# Patient Record
Sex: MALE | Race: WHITE | NOT HISPANIC OR LATINO
[De-identification: names, ages, dates, MRNs, and addresses within clinical notes are randomized per-mention and may not be internally consistent; named-entity substitution may affect disease eponyms.]

---

## 2021-02-20 VITALS
DIASTOLIC BLOOD PRESSURE: 80 MMHG | OXYGEN SATURATION: 95 % | HEART RATE: 128 BPM | RESPIRATION RATE: 18 BRPM | SYSTOLIC BLOOD PRESSURE: 181 MMHG | TEMPERATURE: 99 F

## 2021-02-20 LAB
ALBUMIN SERPL ELPH-MCNC: 2.7 G/DL — LOW (ref 3.3–5)
ALP SERPL-CCNC: 158 U/L — HIGH (ref 40–120)
ALT FLD-CCNC: 27 U/L — SIGNIFICANT CHANGE UP (ref 10–45)
ANION GAP SERPL CALC-SCNC: 17 MMOL/L — SIGNIFICANT CHANGE UP (ref 5–17)
ANISOCYTOSIS BLD QL: SLIGHT — SIGNIFICANT CHANGE UP
AST SERPL-CCNC: 22 U/L — SIGNIFICANT CHANGE UP (ref 10–40)
BASOPHILS # BLD AUTO: 0 K/UL — SIGNIFICANT CHANGE UP (ref 0–0.2)
BASOPHILS NFR BLD AUTO: 0 % — SIGNIFICANT CHANGE UP (ref 0–2)
BILIRUB SERPL-MCNC: 0.8 MG/DL — SIGNIFICANT CHANGE UP (ref 0.2–1.2)
BUN SERPL-MCNC: 108 MG/DL — HIGH (ref 7–23)
BURR CELLS BLD QL SMEAR: PRESENT — SIGNIFICANT CHANGE UP
CALCIUM SERPL-MCNC: 9.5 MG/DL — SIGNIFICANT CHANGE UP (ref 8.4–10.5)
CHLORIDE SERPL-SCNC: 101 MMOL/L — SIGNIFICANT CHANGE UP (ref 96–108)
CO2 SERPL-SCNC: 20 MMOL/L — LOW (ref 22–31)
CREAT SERPL-MCNC: 6.59 MG/DL — HIGH (ref 0.5–1.3)
EOSINOPHIL # BLD AUTO: 0 K/UL — SIGNIFICANT CHANGE UP (ref 0–0.5)
EOSINOPHIL NFR BLD AUTO: 0 % — SIGNIFICANT CHANGE UP (ref 0–6)
GLUCOSE SERPL-MCNC: 173 MG/DL — HIGH (ref 70–99)
HCT VFR BLD CALC: 35.1 % — LOW (ref 39–50)
HGB BLD-MCNC: 11.5 G/DL — LOW (ref 13–17)
LYMPHOCYTES # BLD AUTO: 0.48 K/UL — LOW (ref 1–3.3)
LYMPHOCYTES # BLD AUTO: 1.7 % — LOW (ref 13–44)
MACROCYTES BLD QL: SLIGHT — SIGNIFICANT CHANGE UP
MANUAL SMEAR VERIFICATION: SIGNIFICANT CHANGE UP
MCHC RBC-ENTMCNC: 28.3 PG — SIGNIFICANT CHANGE UP (ref 27–34)
MCHC RBC-ENTMCNC: 32.8 GM/DL — SIGNIFICANT CHANGE UP (ref 32–36)
MCV RBC AUTO: 86.2 FL — SIGNIFICANT CHANGE UP (ref 80–100)
MICROCYTES BLD QL: SLIGHT — SIGNIFICANT CHANGE UP
MONOCYTES # BLD AUTO: 0.74 K/UL — SIGNIFICANT CHANGE UP (ref 0–0.9)
MONOCYTES NFR BLD AUTO: 2.6 % — SIGNIFICANT CHANGE UP (ref 2–14)
NEUTROPHILS # BLD AUTO: 27.08 K/UL — HIGH (ref 1.8–7.4)
NEUTROPHILS NFR BLD AUTO: 94.8 % — HIGH (ref 43–77)
NEUTS BAND # BLD: 0.9 % — SIGNIFICANT CHANGE UP (ref 0–8)
NT-PROBNP SERPL-SCNC: HIGH PG/ML (ref 0–300)
OVALOCYTES BLD QL SMEAR: SLIGHT — SIGNIFICANT CHANGE UP
PLAT MORPH BLD: ABNORMAL
PLATELET # BLD AUTO: 149 K/UL — LOW (ref 150–400)
POIKILOCYTOSIS BLD QL AUTO: SLIGHT — SIGNIFICANT CHANGE UP
POLYCHROMASIA BLD QL SMEAR: SLIGHT — SIGNIFICANT CHANGE UP
POTASSIUM SERPL-MCNC: 5.6 MMOL/L — HIGH (ref 3.5–5.3)
POTASSIUM SERPL-SCNC: 5.6 MMOL/L — HIGH (ref 3.5–5.3)
PROT SERPL-MCNC: 5.7 G/DL — LOW (ref 6–8.3)
RBC # BLD: 4.07 M/UL — LOW (ref 4.2–5.8)
RBC # FLD: 14.8 % — HIGH (ref 10.3–14.5)
RBC BLD AUTO: ABNORMAL
SCHISTOCYTES BLD QL AUTO: SLIGHT — SIGNIFICANT CHANGE UP
SODIUM SERPL-SCNC: 138 MMOL/L — SIGNIFICANT CHANGE UP (ref 135–145)
WBC # BLD: 28.3 K/UL — HIGH (ref 3.8–10.5)
WBC # FLD AUTO: 28.3 K/UL — HIGH (ref 3.8–10.5)

## 2021-02-20 PROCEDURE — 93970 EXTREMITY STUDY: CPT | Mod: 26

## 2021-02-20 PROCEDURE — 71045 X-RAY EXAM CHEST 1 VIEW: CPT | Mod: 26

## 2021-02-20 RX ORDER — FUROSEMIDE 40 MG
80 TABLET ORAL ONCE
Refills: 0 | Status: COMPLETED | OUTPATIENT
Start: 2021-02-20 | End: 2021-02-20

## 2021-02-20 RX ADMIN — Medication 80 MILLIGRAM(S): at 22:54

## 2021-02-20 NOTE — ED PROVIDER NOTE - NS ED ROS FT
General: no fever, chills, confusion, + weakness  Cardiac: no chest pain, chest tightness, palpitations  Lungs: no sob, difficulty breathing  Abdomen: no abdominal pain, nausea, vomiting, diarrhea, mild constipation, last full bm x1 week ago  : no dysuria, urinary frequency/urgency  Extremities: + swelling to b/l upper and lower extremities    All other systems negative except as per HPI

## 2021-02-20 NOTE — ED ADULT NURSE NOTE - OBJECTIVE STATEMENT
Received pt on stretcher, family member at bedside. No apparent distress. Brought by daughter for generalized weakness and constipation. Recently discharged from [facility]. No SOB/cp.    -Labs collected and sent.   -for US ang CT.    Comfort and safety measures maintained. Received pt on stretcher, family member at bedside. No apparent distress. Brought by daughter for generalized weakness, constipation and b/l LE edema. Recently discharged from [facility] for RU. No SOB/CP.     -Labs collected and sent.   -US and CT done.  -meds given as ordered.  -comfort and safety measures maintained.     Pending dispo.     Comfort and safety measures maintained.

## 2021-02-20 NOTE — ED ADULT NURSE NOTE - NSIMPLEMENTINTERV_GEN_ALL_ED
Implemented All Fall with Harm Risk Interventions:  [place] to call system. Call bell, personal items and telephone within reach. Instruct patient to call for assistance. Room bathroom lighting operational. Non-slip footwear when patient is off stretcher. Physically safe environment: no spills, clutter or unnecessary equipment. Stretcher in lowest position, wheels locked, appropriate side rails in place. Provide visual cue, wrist band, yellow gown, etc. Monitor gait and stability. Monitor for mental status changes and reorient to person, place, and time. Review medications for side effects contributing to fall risk. Reinforce activity limits and safety measures with patient and family. Provide visual clues: red socks.

## 2021-02-20 NOTE — ED PROVIDER NOTE - CARE PLAN
Principal Discharge DX:	RU (acute kidney injury)  Secondary Diagnosis:	Weakness  Secondary Diagnosis:	Hyperkalemia

## 2021-02-20 NOTE — ED PROVIDER NOTE - PHYSICAL EXAMINATION
CONSTITUTIONAL: Well-developed; well-nourished; in no acute distress.  SKIN: Warm and dry, no acute rash. well healing ulcer to 1st digit  HEAD: Normocephalic; atraumatic.  EYES: PERRL, EOM intact; conjunctiva and sclera clear.  ENT: No nasal discharge; airway clear.  NECK: Supple; non tender.  CARD: S1, S2 normal; no murmurs, gallops, or rubs. Regular rate and rhythm.  RESP: No wheezes, rales or rhonchi.  ABD: Normal bowel sounds; soft; distended; non-tender; no hepatosplenomegaly, no CVAT  EXT: Normal ROM. + moderate 1+ pitting edema b/l lower and upper extremities  LYMPH: No acute cervical adenopathy.  NEURO: Alert, oriented. Grossly unremarkable.  PSYCH: Cooperative, appropriate.

## 2021-02-20 NOTE — ED PROVIDER NOTE - OBJECTIVE STATEMENT
77 y/o male with a PMHx of HTN, Gout, hemophillia A, dress syndrome who presents to the ED with is daughter (Char #[phone]) who noticed that pt was becoming more weak over the past week. Daughter states x3 weeks ago, pt was very active and ambulatory without difficulty, however now has noticed increased swelling to his legs and arms. Early January, pt had a gout attack and was placed on allopurinol 300 mg x3 weeks. 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 He was recently admitted for hyponatremia and RU at [facility] and was advised to follow-up with Nephrology. As per daughter, she has been conversing with Dr. Yap regarding pt's condition. Pt c/o weakness and additionally last full bm was x1 week ago. Denies the following: fever, chills, N/V, cough, chest pain, sob, back pain, numbness/tingling to extremities, inability to urinate, abdominal pain. 75 y/o male with a PMHx of HTN, Gout, hemophillia A, dress syndrome and PSHx of cholecystectomy (2011) who presents to the ED with his daughter (Char #[phone]) who noticed that pt was becoming more weak over the past week. Daughter states x3 weeks ago, pt was very active and ambulatory without difficulty, however now has noticed increased swelling to his legs and arms. Early January, pt had a gout attack and was placed on allopurinol 300 mg x3 weeks. 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 He was recently admitted for hyponatremia and RU at [facility] and was advised to follow-up with Nephrology. As per daughter, she has been conversing with Dr. Yap regarding pt's condition. Pt c/o weakness and additionally last full bm was x1 week ago. Denies the following: fever, chills, N/V, cough, chest pain, sob, back pain, numbness/tingling to extremities, inability to urinate, abdominal pain.    Current meds: Norvasc 5 mg QD, Doxazosin 4 mg QD, Renvela 800 mg TID, lasix 30 mg QD, Sodium Bicarbonate 650 mg BID

## 2021-02-20 NOTE — ED PROVIDER NOTE - PROGRESS NOTE DETAILS
Discussed results with daughter who advised recent echo conducted 1-2 weeks ago with negative findings and normal EF. Also reports pt with hx of tachycardia and recently elevated wbc in the mid 20's.

## 2021-02-20 NOTE — ED PROVIDER NOTE - CLINICAL SUMMARY MEDICAL DECISION MAKING FREE TEXT BOX
75 y/o male with a PMHx of HTN, Gout, hemophillia A, dress syndrome and PSHx of cholecystectomy (2011) who presents to the ED with his daughter (Char #[phone]) who noticed that pt was becoming more weak over the past week. Recent labs on Friday shows known leukocytosis of 23 and BUN of 100 and Cr of 6.3. Discussed with Dr. Yap, plan for labs, CT, US, IV lasix 80 mg and admission with possible dialysis in the am. Consider ATN/interstitial nephritis secondary to recent Allopurinol. 77 y/o male with a PMHx of HTN, Gout, hemophillia A, dress syndrome and PSHx of cholecystectomy (2011) who presents to the ED with his daughter (Char #[phone]) who noticed that pt was becoming more weak over the past week. Recent labs on Friday shows known leukocytosis of 23 and BUN of 100 and Cr of 6.3. Discussed with Dr. Yap, plan for labs, CT, US, IV lasix 80 mg and admission with possible dialysis in the am. Consider ATN/interstitial nephritis secondary to recent Allopurinol. CT chest shows mild b/l pleural effusions. Pt on RA 95% in NAD. CT abdomen shows mild ascites. Pt given lasix with good output in the ED.

## 2021-02-21 ENCOUNTER — TRANSCRIPTION ENCOUNTER (OUTPATIENT)
Age: 76
End: 2021-02-21

## 2021-02-21 ENCOUNTER — INPATIENT (INPATIENT)
Facility: HOSPITAL | Age: 76
LOS: 15 days | Discharge: HOME | DRG: 871 | End: 2021-03-09
Payer: MEDICARE

## 2021-02-21 DIAGNOSIS — D72.12 DRUG RASH WITH EOSINOPHILIA AND SYSTEMIC SYMPTOMS SYNDROME: ICD-10-CM

## 2021-02-21 DIAGNOSIS — I31.3 PERICARDIAL EFFUSION (NONINFLAMMATORY): ICD-10-CM

## 2021-02-21 DIAGNOSIS — Z87.438 PERSONAL HISTORY OF OTHER DISEASES OF MALE GENITAL ORGANS: ICD-10-CM

## 2021-02-21 DIAGNOSIS — E87.2 ACIDOSIS: ICD-10-CM

## 2021-02-21 DIAGNOSIS — M10.9 GOUT, UNSPECIFIED: ICD-10-CM

## 2021-02-21 DIAGNOSIS — R18.8 OTHER ASCITES: ICD-10-CM

## 2021-02-21 DIAGNOSIS — N17.9 ACUTE KIDNEY FAILURE, UNSPECIFIED: ICD-10-CM

## 2021-02-21 DIAGNOSIS — E87.5 HYPERKALEMIA: ICD-10-CM

## 2021-02-21 DIAGNOSIS — I10 ESSENTIAL (PRIMARY) HYPERTENSION: ICD-10-CM

## 2021-02-21 DIAGNOSIS — R63.8 OTHER SYMPTOMS AND SIGNS CONCERNING FOOD AND FLUID INTAKE: ICD-10-CM

## 2021-02-21 DIAGNOSIS — Z96.21 COCHLEAR IMPLANT STATUS: Chronic | ICD-10-CM

## 2021-02-21 DIAGNOSIS — D66 HEREDITARY FACTOR VIII DEFICIENCY: ICD-10-CM

## 2021-02-21 LAB
ALBUMIN SERPL ELPH-MCNC: 2.6 G/DL — LOW (ref 3.3–5)
ALP SERPL-CCNC: 157 U/L — HIGH (ref 40–120)
ALT FLD-CCNC: 23 U/L — SIGNIFICANT CHANGE UP (ref 10–45)
ANION GAP SERPL CALC-SCNC: 18 MMOL/L — HIGH (ref 5–17)
APPEARANCE UR: CLEAR — SIGNIFICANT CHANGE UP
APTT BLD: 43.2 SEC — HIGH (ref 27.5–35.5)
AST SERPL-CCNC: 24 U/L — SIGNIFICANT CHANGE UP (ref 10–40)
BACTERIA # UR AUTO: PRESENT /HPF
BILIRUB SERPL-MCNC: 0.9 MG/DL — SIGNIFICANT CHANGE UP (ref 0.2–1.2)
BILIRUB UR-MCNC: NEGATIVE — SIGNIFICANT CHANGE UP
BUN SERPL-MCNC: 109 MG/DL — HIGH (ref 7–23)
CALCIUM SERPL-MCNC: 9 MG/DL — SIGNIFICANT CHANGE UP (ref 8.4–10.5)
CHLORIDE SERPL-SCNC: 100 MMOL/L — SIGNIFICANT CHANGE UP (ref 96–108)
CO2 SERPL-SCNC: 22 MMOL/L — SIGNIFICANT CHANGE UP (ref 22–31)
COLOR SPEC: YELLOW — SIGNIFICANT CHANGE UP
CREAT SERPL-MCNC: 6.76 MG/DL — HIGH (ref 0.5–1.3)
DIFF PNL FLD: ABNORMAL
EPI CELLS # UR: SIGNIFICANT CHANGE UP /HPF (ref 0–5)
GLUCOSE SERPL-MCNC: 195 MG/DL — HIGH (ref 70–99)
GLUCOSE UR QL: NEGATIVE — SIGNIFICANT CHANGE UP
GRAM STN FLD: SIGNIFICANT CHANGE UP
GRAM STN FLD: SIGNIFICANT CHANGE UP
HCT VFR BLD CALC: 30.7 % — LOW (ref 39–50)
HCV AB S/CO SERPL IA: 0.08 S/CO — SIGNIFICANT CHANGE UP
HCV AB SERPL-IMP: SIGNIFICANT CHANGE UP
HGB BLD-MCNC: 10.3 G/DL — LOW (ref 13–17)
INR BLD: 1.52 — HIGH (ref 0.88–1.16)
INR BLD: 1.52 — HIGH (ref 0.88–1.16)
KETONES UR-MCNC: NEGATIVE — SIGNIFICANT CHANGE UP
LEUKOCYTE ESTERASE UR-ACNC: ABNORMAL
MAGNESIUM SERPL-MCNC: 2.3 MG/DL — SIGNIFICANT CHANGE UP (ref 1.6–2.6)
MAGNESIUM SERPL-MCNC: 2.3 MG/DL — SIGNIFICANT CHANGE UP (ref 1.6–2.6)
MCHC RBC-ENTMCNC: 28.1 PG — SIGNIFICANT CHANGE UP (ref 27–34)
MCHC RBC-ENTMCNC: 33.6 GM/DL — SIGNIFICANT CHANGE UP (ref 32–36)
MCV RBC AUTO: 83.9 FL — SIGNIFICANT CHANGE UP (ref 80–100)
METHOD TYPE: SIGNIFICANT CHANGE UP
MSSA DNA SPEC QL NAA+PROBE: SIGNIFICANT CHANGE UP
NITRITE UR-MCNC: NEGATIVE — SIGNIFICANT CHANGE UP
NRBC # BLD: 0 /100 WBCS — SIGNIFICANT CHANGE UP (ref 0–0)
PH UR: 5.5 — SIGNIFICANT CHANGE UP (ref 5–8)
PHOSPHATE SERPL-MCNC: 6.8 MG/DL — HIGH (ref 2.5–4.5)
PHOSPHATE SERPL-MCNC: 7 MG/DL — HIGH (ref 2.5–4.5)
PLATELET # BLD AUTO: 123 K/UL — LOW (ref 150–400)
POTASSIUM SERPL-MCNC: 5.1 MMOL/L — SIGNIFICANT CHANGE UP (ref 3.5–5.3)
POTASSIUM SERPL-SCNC: 5.1 MMOL/L — SIGNIFICANT CHANGE UP (ref 3.5–5.3)
PROT SERPL-MCNC: 5 G/DL — LOW (ref 6–8.3)
PROT UR-MCNC: NEGATIVE MG/DL — SIGNIFICANT CHANGE UP
PROTHROM AB SERPL-ACNC: 17.9 SEC — HIGH (ref 10.6–13.6)
PROTHROM AB SERPL-ACNC: 17.9 SEC — HIGH (ref 10.6–13.6)
RBC # BLD: 3.66 M/UL — LOW (ref 4.2–5.8)
RBC # FLD: 14.7 % — HIGH (ref 10.3–14.5)
RBC CASTS # UR COMP ASSIST: < 5 /HPF — SIGNIFICANT CHANGE UP
SARS-COV-2 RNA SPEC QL NAA+PROBE: SIGNIFICANT CHANGE UP
SODIUM SERPL-SCNC: 140 MMOL/L — SIGNIFICANT CHANGE UP (ref 135–145)
SP GR SPEC: 1.01 — SIGNIFICANT CHANGE UP (ref 1–1.03)
SPECIMEN SOURCE: SIGNIFICANT CHANGE UP
TROPONIN T SERPL-MCNC: 0.1 NG/ML — CRITICAL HIGH (ref 0–0.01)
TROPONIN T SERPL-MCNC: 0.15 NG/ML — CRITICAL HIGH (ref 0–0.01)
TROPONIN T SERPL-MCNC: 0.15 NG/ML — CRITICAL HIGH (ref 0–0.01)
UROBILINOGEN FLD QL: 0.2 E.U./DL — SIGNIFICANT CHANGE UP
WBC # BLD: 23.75 K/UL — HIGH (ref 3.8–10.5)
WBC # FLD AUTO: 23.75 K/UL — HIGH (ref 3.8–10.5)
WBC UR QL: ABNORMAL /HPF

## 2021-02-21 PROCEDURE — 76770 US EXAM ABDO BACK WALL COMP: CPT | Mod: 26

## 2021-02-21 PROCEDURE — 99285 EMERGENCY DEPT VISIT HI MDM: CPT

## 2021-02-21 PROCEDURE — 74176 CT ABD & PELVIS W/O CONTRAST: CPT | Mod: 26,MG

## 2021-02-21 PROCEDURE — 93010 ELECTROCARDIOGRAM REPORT: CPT

## 2021-02-21 PROCEDURE — 99223 1ST HOSP IP/OBS HIGH 75: CPT

## 2021-02-21 PROCEDURE — 71250 CT THORAX DX C-: CPT | Mod: 26,MG

## 2021-02-21 PROCEDURE — G1004: CPT

## 2021-02-21 PROCEDURE — 99223 1ST HOSP IP/OBS HIGH 75: CPT | Mod: GC

## 2021-02-21 RX ORDER — SENNA PLUS 8.6 MG/1
2 TABLET ORAL AT BEDTIME
Refills: 0 | Status: DISCONTINUED | OUTPATIENT
Start: 2021-02-21 | End: 2021-03-09

## 2021-02-21 RX ORDER — DOXAZOSIN MESYLATE 4 MG
4 TABLET ORAL DAILY
Refills: 0 | Status: DISCONTINUED | OUTPATIENT
Start: 2021-02-21 | End: 2021-03-09

## 2021-02-21 RX ORDER — CEFAZOLIN SODIUM 1 G
1000 VIAL (EA) INJECTION EVERY 24 HOURS
Refills: 0 | Status: DISCONTINUED | OUTPATIENT
Start: 2021-02-21 | End: 2021-02-22

## 2021-02-21 RX ORDER — FUROSEMIDE 40 MG
80 TABLET ORAL
Refills: 0 | Status: DISCONTINUED | OUTPATIENT
Start: 2021-02-21 | End: 2021-02-22

## 2021-02-21 RX ORDER — SODIUM ZIRCONIUM CYCLOSILICATE 10 G/10G
10 POWDER, FOR SUSPENSION ORAL ONCE
Refills: 0 | Status: COMPLETED | OUTPATIENT
Start: 2021-02-21 | End: 2021-02-21

## 2021-02-21 RX ORDER — DOXAZOSIN MESYLATE 4 MG
4 TABLET ORAL DAILY
Refills: 0 | Status: DISCONTINUED | OUTPATIENT
Start: 2021-02-21 | End: 2021-02-21

## 2021-02-21 RX ORDER — AMLODIPINE BESYLATE 2.5 MG/1
5 TABLET ORAL DAILY
Refills: 0 | Status: DISCONTINUED | OUTPATIENT
Start: 2021-02-21 | End: 2021-02-22

## 2021-02-21 RX ORDER — FUROSEMIDE 40 MG
40 TABLET ORAL
Refills: 0 | Status: DISCONTINUED | OUTPATIENT
Start: 2021-02-21 | End: 2021-02-21

## 2021-02-21 RX ORDER — SEVELAMER CARBONATE 2400 MG/1
800 POWDER, FOR SUSPENSION ORAL
Refills: 0 | Status: DISCONTINUED | OUTPATIENT
Start: 2021-02-21 | End: 2021-02-21

## 2021-02-21 RX ORDER — HEPARIN SODIUM 5000 [USP'U]/ML
5000 INJECTION INTRAVENOUS; SUBCUTANEOUS EVERY 8 HOURS
Refills: 0 | Status: DISCONTINUED | OUTPATIENT
Start: 2021-02-21 | End: 2021-02-23

## 2021-02-21 RX ORDER — FUROSEMIDE 40 MG
20 TABLET ORAL
Refills: 0 | Status: DISCONTINUED | OUTPATIENT
Start: 2021-02-21 | End: 2021-02-21

## 2021-02-21 RX ORDER — SODIUM ZIRCONIUM CYCLOSILICATE 10 G/10G
10 POWDER, FOR SUSPENSION ORAL EVERY 8 HOURS
Refills: 0 | Status: DISCONTINUED | OUTPATIENT
Start: 2021-02-21 | End: 2021-02-22

## 2021-02-21 RX ORDER — SODIUM BICARBONATE 1 MEQ/ML
650 SYRINGE (ML) INTRAVENOUS
Refills: 0 | Status: DISCONTINUED | OUTPATIENT
Start: 2021-02-21 | End: 2021-02-24

## 2021-02-21 RX ORDER — SEVELAMER CARBONATE 2400 MG/1
800 POWDER, FOR SUSPENSION ORAL
Refills: 0 | Status: DISCONTINUED | OUTPATIENT
Start: 2021-02-21 | End: 2021-03-09

## 2021-02-21 RX ORDER — POLYETHYLENE GLYCOL 3350 17 G/17G
17 POWDER, FOR SOLUTION ORAL DAILY
Refills: 0 | Status: DISCONTINUED | OUTPATIENT
Start: 2021-02-21 | End: 2021-03-09

## 2021-02-21 RX ADMIN — SEVELAMER CARBONATE 800 MILLIGRAM(S): 2400 POWDER, FOR SUSPENSION ORAL at 13:17

## 2021-02-21 RX ADMIN — Medication 80 MILLIGRAM(S): at 18:46

## 2021-02-21 RX ADMIN — AMLODIPINE BESYLATE 5 MILLIGRAM(S): 2.5 TABLET ORAL at 06:53

## 2021-02-21 RX ADMIN — Medication 40 MILLIGRAM(S): at 09:42

## 2021-02-21 RX ADMIN — Medication 60 MILLIGRAM(S): at 09:43

## 2021-02-21 RX ADMIN — HEPARIN SODIUM 5000 UNIT(S): 5000 INJECTION INTRAVENOUS; SUBCUTANEOUS at 21:39

## 2021-02-21 RX ADMIN — HEPARIN SODIUM 5000 UNIT(S): 5000 INJECTION INTRAVENOUS; SUBCUTANEOUS at 12:45

## 2021-02-21 RX ADMIN — Medication 650 MILLIGRAM(S): at 18:45

## 2021-02-21 RX ADMIN — SEVELAMER CARBONATE 800 MILLIGRAM(S): 2400 POWDER, FOR SUSPENSION ORAL at 18:45

## 2021-02-21 RX ADMIN — SODIUM ZIRCONIUM CYCLOSILICATE 10 GRAM(S): 10 POWDER, FOR SUSPENSION ORAL at 21:39

## 2021-02-21 RX ADMIN — SODIUM ZIRCONIUM CYCLOSILICATE 10 GRAM(S): 10 POWDER, FOR SUSPENSION ORAL at 16:31

## 2021-02-21 RX ADMIN — Medication 4 MILLIGRAM(S): at 09:43

## 2021-02-21 RX ADMIN — SODIUM ZIRCONIUM CYCLOSILICATE 10 GRAM(S): 10 POWDER, FOR SUSPENSION ORAL at 01:32

## 2021-02-21 RX ADMIN — Medication 650 MILLIGRAM(S): at 09:43

## 2021-02-21 RX ADMIN — POLYETHYLENE GLYCOL 3350 17 GRAM(S): 17 POWDER, FOR SOLUTION ORAL at 12:45

## 2021-02-21 RX ADMIN — SEVELAMER CARBONATE 800 MILLIGRAM(S): 2400 POWDER, FOR SUSPENSION ORAL at 09:43

## 2021-02-21 NOTE — H&P ADULT - PROBLEM SELECTOR PLAN 7
Previously on allopurinol for gout prophylaxis; however, patient acquired DRESS Syndrome from the therapy  - Monitor symptoms and avoid NSAIDs for pain

## 2021-02-21 NOTE — H&P ADULT - PROBLEM SELECTOR PLAN 9
Prior history of significant bleeding in past following ERCP  - Follows with Dr. Grace at [facility]  - Consider heme consult prior to any invasive procedures

## 2021-02-21 NOTE — H&P ADULT - HISTORY OF PRESENT ILLNESS
Mr. Don is a 76 year old male with a past medical history of CKD (unclear baseline), Hemophilia A, HTN, Gout, and DRESS Syndrome who presents with weakness for 1 week. Mr. Don states that he was in his usual state of health (performing all ADLs) until January 1st when he had an acute gout attack. He was treated with allopurinol x 3 weeks where he developed DRESS Syndrome. Subsequently, he was treated with prednisone 60 mg daily until February 7th. On February 14th, he was hospitalized at [facility] for hyponatremia and RU (unclear Cr). He was discharged with nephrology follow up. He denies any fevers/chills, nausea/vomiting, SOB or changes in bowel or urinary habits.     In the ED, he was afebrile (98.7 F), , /80, RR 18, and O2 saturation 95%.  Mr. Don is a 76 year old male with a past medical history of CKD (unclear baseline), Hemophilia A, HTN, Gout, and DRESS Syndrome who presents with weakness for 1 week. Mr. Don states that he was in his usual state of health (performing all ADLs) until January 1st when he had an acute gout attack. He was treated with allopurinol x 3 weeks where he developed a rash associated with decreased appetite and developed DRESS Syndrome. Subsequently, he was treated with prednisone 60 mg daily until February 7th. On February 14th, he was hospitalized at [facility] for hyponatremia and RU (unclear Cr). He was discharged with nephrology follow up. He denies any fevers/chills, nausea/vomiting, SOB or changes in bowel or urinary habits.     In the ED, he was afebrile (98.7 F), , /80, RR 18, and O2 saturation 95%. Labs were significant for WBC 28.30, Hgb 11.5, K 5.6, CO2 20  , Cr 6.59, albumin 2.7, alk phos 158, BNP 12138. CXR showed pleural effusion left greater than right with dependent edema consistent with volume overload. Imaging significant for a CT abdomen small to moderate bilateral pleural effusions, greater on the left. Mild pericardial effusion. Anasarca greater in left lower chest and trace ascites. He was given Lasix 80 mg IV and Lokelma 10 g. He was admitted for acute on chronic renal failure and likely induction of hemodialysis.    Mr. Don is a 76 year old male with a past medical history of CKD (recent Cr 6.6 at [facility]), Hemophilia A, HTN, Gout, and DRESS Syndrome who presents with weakness for 1 week. Mr. Don states that he was in his usual state of health (performing all ADLs) until January 1st when he had an acute gout attack. He was treated with allopurinol x 3 weeks where he developed a rash associated with decreased appetite and developed DRESS Syndrome. Subsequently, he was treated with prednisone 60 mg daily until February 7th. On February 14th, he was hospitalized at [facility] for hyponatremia and RU (unclear Cr). He was discharged with nephrology follow up. Since that time, he has experienced increasing weakness and swelling in his stomach and legs. The swelling has made it difficult to ambulate due to pain. He denies any fevers/chills, nausea/vomiting, SOB or changes in bowel or urinary habits.     In the ED, he was afebrile (98.7 F), , /80, RR 18, and O2 saturation 95%. Labs were significant for WBC 28.30, Hgb 11.5, K 5.6, CO2 20  , Cr 6.59, albumin 2.7, alk phos 158, BNP 56182. CXR showed pleural effusion left greater than right with dependent edema consistent with volume overload. Imaging significant for a CT abdomen small to moderate bilateral pleural effusions, greater on the left. Mild pericardial effusion. Anasarca greater in left lower chest and trace ascites. LE dopplers negative for DVT. He was given Lasix 80 mg IV and Lokelma 10 g. He was admitted for acute on chronic renal failure and likely induction of hemodialysis.

## 2021-02-21 NOTE — H&P ADULT - PROBLEM SELECTOR PLAN 3
Meeting 2/4 SIRS criteria on arrival (WBC, tachycardia); however, unlikely to be infectious in origin due lack of source or any infectious symptomology   - Blood cultures drawn in ED; f/u result prior to likely PermCath placement for HD    #Leukocytosis  - Chronic due to prolonged high dose Prednisone use for Dress Syndrome    #Dress Syndrome  - Currently taking Prednisone 60 mg with improvement in rash   - Continue Prednisone 60 mg daily  - F/U renal recs as above Meeting 2/4 SIRS criteria on arrival (WBC, tachycardia); however, unlikely to be infectious in origin due lack of source or any infectious symptomology   - Blood cultures drawn in ED; f/u result prior to likely PermCath placement for HD    #Leukocytosis  - Chronic due to prolonged high dose Prednisone use for Dress Syndrome    #Dress Syndrome  - Currently taking Prednisone 60 mg with improvement in rash   - Continue Prednisone 60 mg daily  - F/U renal recs as above    #Tachycardia  - HR constantly in 110s per daughter  - Sinus tachycardia on EKG  - Likely 2/2 intravascular depletion due to ascites/anasarca    #Anemia  - Hgb 11.5 on arrival with unclear baseline  - Like 2/2 acute kidney failure or dilutional due to volume overload  - Continue to monitor on daily CBC w diff

## 2021-02-21 NOTE — H&P ADULT - ATTENDING COMMENTS
reviewed pertinent data , h&p  patient seen and examined overnight   pt asleep at time of exam, awoken, in NAD, tired appearing however, hard of hearing (left cochlera implant), MMM,  +generalized anasarca, prominent at four extremities ; s1s2, mild crackles at the left base; abdomen soft/nd     1. RU on CKD w/ evidence of fluid overload, hyperkalemia on labs in setting of renal dysfunction from suspected DRESS; agree w/ bid IV lasix dosing; monitor renal function, followup renal consult recs, likely to have HD initiation. agree w/ heme consult in setting of Hemophilia A to minimize procedural bleeding risks and for recommendations re: VTE PPX ; c/w prednisone for DRESS. consult rheumatology for further recs if warranted.     2. followup TTE   3. monitor heart rate , tachycardia cause unclear, monitor for signs/sxs of infection vs PE (less likely) vs intravascular depletion given third spacing        rest of  plan as above reviewed pertinent data , h&p  patient seen and examined overnight   pt asleep at time of exam, awoken, in NAD, tired appearing however, hard of hearing (left cochlera implant), MMM,  +generalized anasarca, prominent at four extremities ; s1s2, mild crackles at the left base; abdomen soft/nd     1. RU on CKD w/ evidence of fluid overload, hyperkalemia on labs in setting of renal dysfunction from suspected DRESS; agree w/ bid IV lasix dosing; monitor renal function, followup renal consult recs, likely to have HD initiation. agree w/ heme consult in setting of Hemophilia A to minimize procedural bleeding risks and for recommendations re: VTE PPX ; c/w prednisone for DRESS. consult rheumatology for further recs    2. followup TTE ; trend troponins     3. monitor heart rate , tachycardia cause unclear, monitor for signs/sxs of infection vs PE (negative lower ext dopplers) vs intravascular depletion given third spacing ; pt not in pain at time of exam     rest of  plan as above

## 2021-02-21 NOTE — H&P ADULT - PROBLEM SELECTOR PLAN 4
CT abdomen showed trace pericaridal effusion. Likely 2/2 volume overload and fluid shifts from acute kidney failure  - No signs of tamponade or any of Valentin's Triad  - TTE ordered for AM to evaluate

## 2021-02-21 NOTE — PROGRESS NOTE ADULT - SUBJECTIVE AND OBJECTIVE BOX
O/N Events: STEPHANIE  Subjective/ROS: Denies HA, CP, SOB, n/v, changes in bowel/urinary habits.  12pt ROS otherwise negative.    VITALS  Vital Signs Last 24 Hrs  T(C): 36.8 (2021 20:31), Max: 37.7 (2021 02:59)  T(F): 98.2 (2021 20:31), Max: 99.9 (2021 02:59)  HR: 104 (2021 20:31) (104 - 120)  BP: 133/75 (2021 20:31) (121/63 - 153/74)  BP(mean): --  RR: 18 (2021 20:31) (17 - 20)  SpO2: 94% (2021 20:31) (93% - 95%)    CAPILLARY BLOOD GLUCOSE          PHYSICAL EXAM  General: A&Ox3; NAD, lethargic   Head: NC/AT; MMM; PERRL; EOMI;  Neck: Supple; no JVD  Respiratory: CTA B/L; no wheezes/crackles   Cardiovascular: Regular rhythm/rate; S1/S2   Gastrointestinal: Soft; NTND; normoactive BS  Extremities: WWP; 2+ Edema in the lower extremity   Neurological:  CNII-XII grossly intact; no obvious focal deficits    MEDICATIONS  (STANDING):  amLODIPine   Tablet 5 milliGRAM(s) Oral daily  doxazosin 4 milliGRAM(s) Oral daily  furosemide   Injectable 80 milliGRAM(s) IV Push two times a day  heparin   Injectable 5000 Unit(s) SubCutaneous every 8 hours  polyethylene glycol 3350 17 Gram(s) Oral daily  predniSONE   Tablet 60 milliGRAM(s) Oral daily  senna 2 Tablet(s) Oral at bedtime  sevelamer carbonate 800 milliGRAM(s) Oral three times a day with meals  sodium bicarbonate 650 milliGRAM(s) Oral two times a day  sodium zirconium cyclosilicate 10 Gram(s) Oral every 8 hours    MEDICATIONS  (PRN):      No Known Allergies      LABS                        10.3   23.75 )-----------( 123      ( 2021 14:26 )             30.7     02-21    140  |  100  |  109<H>  ----------------------------<  195<H>  5.1   |  22  |  6.76<H>    Ca    9.0      2021 14:26  Phos  6.8       Mg     2.3         TPro  5.0<L>  /  Alb  2.6<L>  /  TBili  0.9  /  DBili  x   /  AST  24  /  ALT  23  /  AlkPhos  157<H>      PT/INR - ( 2021 14:44 )   PT: 17.9 sec;   INR: 1.52          PTT - ( 2021 14:26 )  PTT:43.2 sec  Urinalysis Basic - ( 2021 14:26 )    Color: Yellow / Appearance: Clear / S.015 / pH: x  Gluc: x / Ketone: NEGATIVE  / Bili: Negative / Urobili: 0.2 E.U./dL   Blood: x / Protein: NEGATIVE mg/dL / Nitrite: NEGATIVE   Leuk Esterase: Small / RBC: < 5 /HPF / WBC 5-10 /HPF   Sq Epi: x / Non Sq Epi: 0-5 /HPF / Bacteria: Present /HPF      CARDIAC MARKERS ( 2021 16:38 )  x     / 0.15 ng/mL / x     / x     / x      CARDIAC MARKERS ( 2021 10:19 )  x     / 0.15 ng/mL / x     / x     / x      CARDIAC MARKERS ( 2021 22:56 )  x     / 0.10 ng/mL / x     / x     / x

## 2021-02-21 NOTE — PROGRESS NOTE ADULT - PROBLEM SELECTOR PLAN 3
Meeting 2/4 SIRS criteria on arrival (WBC, tachycardia); however, unlikely to be infectious in origin due lack of source or any infectious symptomology   - Blood cultures drawn in ED; f/u result prior to likely PermCath placement for HD    #Leukocytosis  - Chronic due to prolonged high dose Prednisone use for Dress Syndrome    #Dress Syndrome  - Currently taking Prednisone 60 mg with improvement in rash   - Continue Prednisone 60 mg daily  - F/U renal recs as above    #Tachycardia  - HR constantly in 110s per daughter  - Sinus tachycardia on EKG  - Likely 2/2 intravascular depletion due to ascites/anasarca    #Anemia  - Hgb 11.5 on arrival with unclear baseline  - Like 2/2 acute kidney failure or dilutional due to volume overload  - Continue to monitor on daily CBC w diff

## 2021-02-21 NOTE — CONSULT NOTE ADULT - ASSESSMENT
76 year old man with RU in setting of DRESS from allopurinol on steroids-   no signs of renal recovery as yet and becoming more volume expanded, edematous, hyperkalemic and acidotic  underlying  Hemophilia A, HTN, Gout.   for edema try lasix 80 mg IV- if no response increase to 120 mg this evening  for hyperkalemia add lokelma 10 mg Q8 x 48 hours, then daily  for acidosis add NaHCO3 tabs 650 BID  Suspect he will need acute dialysis with in the next 24-48 hours-   needs hematologic evaluation prior to any HD line placement to address his hemophilia  Have reviewed in detail with pt's daughter

## 2021-02-21 NOTE — PROGRESS NOTE ADULT - PROBLEM SELECTOR PLAN 10
F: None  E: Lokelma for K >5.5;  N: Renal diet    DVT: None, awaiting heme recs (hemophilia)  GI: none

## 2021-02-21 NOTE — H&P ADULT - PROBLEM SELECTOR PLAN 5
Tense abdomen on physical exam and CT showing ascitic fluid in abdomen  - Likely 2/2 acute renal failure and fluid shift  - Continue diuresis as above

## 2021-02-21 NOTE — H&P ADULT - PROBLEM SELECTOR PLAN 6
Continue home Norvasc of 5 mg daily  - Lasix as above for ascites/edema Continue home Norvasc of 5 mg daily  - Lasix as above for ascites/edema    ADDENDUM:   #elevated troponins: trend to peak, likely occuring in setting of demand ischemia

## 2021-02-21 NOTE — CONSULT NOTE ADULT - SUBJECTIVE AND OBJECTIVE BOX
Patient is a 76y Male admitted for edema and weakness  Mr. Don has a baseline creatinine 1.1 (9/2020), developed severe gout in January-  was started on allopurinol.   after several weeks develops diffuse erythematous rash with eosinophils Shortly thereafter developed several days of N. V-   felt weak and was admitted to [facility] 2/14 with a dx of DRESS  steroids started for his allergic reaction; creat was 4 and peaked at about 6.6 with  (2/15)- was stable and dc'd home 2/16  pt initially oliguric, converted to non oliguric last weekend  repeat labs on 2/19 showed creat 6.37 and - was becoming more edematous and weak-  No N, V SOB. Does feels that his legs are heavy        PAST MEDICAL & SURGICAL HISTORY:  History of BPH    Gout    Hemophilia A    HTN (hypertension)    Uses cochlear implant        MEDICATIONS  (STANDING):  amLODIPine   Tablet 5 milliGRAM(s) Oral daily  doxazosin 4 milliGRAM(s) Oral daily  furosemide   Injectable 80 milliGRAM(s) IV Push two times a day  heparin   Injectable 5000 Unit(s) SubCutaneous every 8 hours  polyethylene glycol 3350 17 Gram(s) Oral daily  predniSONE   Tablet 60 milliGRAM(s) Oral daily  senna 2 Tablet(s) Oral at bedtime  sevelamer carbonate 800 milliGRAM(s) Oral three times a day with meals  sodium bicarbonate 650 milliGRAM(s) Oral two times a day    MEDICATIONS  (PRN):      Allergies    No Known Allergies    SOCIAL HISTORY: No smoke    FAMILY HISTORY:  FH: HTN (hypertension)        T(C): , Max: 37.7 (02-21-21 @ 02:59)  T(F): , Max: 99.9 (02-21-21 @ 02:59)  HR: 112 (02-21-21 @ 06:36)  BP: 152/76 (02-21-21 @ 06:36)  BP(mean): --  RR: 18 (02-21-21 @ 06:36)  SpO2: 94% (02-21-21 @ 06:36)  Wt(kg): --    02-20 @ 07:01  -  02-21 @ 07:00  --------------------------------------------------------  IN:  Total IN: 0 mL    OUT:    Voided (mL): 210 mL  Total OUT: 210 mL    Total NET: -210 mL            PHYSICAL EXAM:  Constitutional: No acute distress  Back: No CVA tenderness  Respiratory: Clear with reduced BS bases  Cardiovascular: S1, S2.  Regular rate and rhythm.    Gastrointestinal: soft, non-tender, mildly distended  Extremities: + lower extremity edema.    Skin: Warm- no overt rash  Lymph Nodes:  No cervical lymph nodes.    Psychiatric: Normal affect.      LABS:                        11.5   28.30 )-----------( 149      ( 20 Feb 2021 22:56 )             35.1     02-20    138  |  101  |  108<H>  ----------------------------<  173<H>  5.6<H>   |  20<L>  |  6.59<H>    Ca    9.5      20 Feb 2021 22:56    TPro  5.7<L>  /  Alb  2.7<L>  /  TBili  0.8  /  DBili  x   /  AST  22  /  ALT  27  /  AlkPhos  158<H>  02-20                RADIOLOGY & ADDITIONAL STUDIES:

## 2021-02-21 NOTE — CONSULT NOTE ADULT - SUBJECTIVE AND OBJECTIVE BOX
HEMATOLOGY CONSULT NOTE  Mr. Don is a 76 year old male with a past medical history of CKD (recent Cr 6.6 at [facility]), Hemophilia A, HTN, Gout, and DRESS Syndrome who presents with weakness for 1 week. Mr. Don states that he was in his usual state of health (performing all ADLs) until January 1st when he had an acute gout attack. He was treated with allopurinol x 3 weeks where he developed a rash associated with decreased appetite and developed DRESS Syndrome. Subsequently, he was treated with prednisone 60 mg daily until February 7th. On February 14th, he was hospitalized at OSH for hyponatremia and RU (unclear Cr). He was discharged with nephrology follow up. Since that time, he has experienced increasing weakness and swelling in his stomach and legs. The swelling has made it difficult to ambulate due to pain. He denies any fevers/chills, nausea/vomiting, SOB or changes in bowel or urinary habits.     In the ED, he was afebrile (98.7 F), , /80, RR 18, and O2 saturation 95%. Labs were significant for WBC 28.30, Hgb 11.5, K 5.6, CO2 20  , Cr 6.59, albumin 2.7, alk phos 158, BNP 73765. CXR showed pleural effusion left greater than right with dependent edema consistent with volume overload. Imaging significant for a CT abdomen small to moderate bilateral pleural effusions, greater on the left. Mild pericardial effusion. Anasarca greater in left lower chest and trace ascites. LE dopplers negative for DVT. He was given Lasix 80 mg IV and Lokelma 10 g. He was admitted for acute on chronic renal failure and likely induction of hemodialysis.    (21 Feb 2021 03:38)      MEDICATIONS  (STANDING):  amLODIPine   Tablet 5 milliGRAM(s) Oral daily  doxazosin 4 milliGRAM(s) Oral daily  furosemide   Injectable 80 milliGRAM(s) IV Push two times a day  heparin   Injectable 5000 Unit(s) SubCutaneous every 8 hours  polyethylene glycol 3350 17 Gram(s) Oral daily  predniSONE   Tablet 60 milliGRAM(s) Oral daily  senna 2 Tablet(s) Oral at bedtime  sevelamer carbonate 800 milliGRAM(s) Oral three times a day with meals  sodium bicarbonate 650 milliGRAM(s) Oral two times a day    MEDICATIONS  (PRN):    Vital Signs Last 24 Hrs  T(C): 36.9 (02-21-21 @ 06:36), Max: 37.7 (02-21-21 @ 02:59)  T(F): 98.5 (02-21-21 @ 06:36), Max: 99.9 (02-21-21 @ 02:59)  HR: 112 (02-21-21 @ 06:36) (112 - 128)  BP: 152/76 (02-21-21 @ 06:36) (133/60 - 181/80)  BP(mean): --  RR: 18 (02-21-21 @ 06:36) (18 - 20)  SpO2: 94% (02-21-21 @ 06:36) (94% - 95%)    PHYSICAL EXAM:  Constitutional: NAD, well-groomed, well-developed  HEENT: PERRLA, EOMI, Normal Hearing, MMM  Neck: No LAD, No JVD  Back: Normal spine flexure, No CVA tenderness  Respiratory: rales B/l  Cardiovascular: S1 and S2, RRR, no M/G/R  Gastrointestinal: BS+, soft, NT/ND  Extremities: No peripheral edema  Vascular: 2+ peripheral pulses  Neurological: A/O x 3, no focal deficits  Psychiatric: Normal mood, normal affect  Musculoskeletal: 5/5 strength b/l upper and lower extremities  Skin: No rashes        CBC Full  -  ( 20 Feb 2021 22:56 )  WBC Count : 28.30 K/uL  RBC Count : 4.07 M/uL  Hemoglobin : 11.5 g/dL  Hematocrit : 35.1 %  Platelet Count - Automated : 149 K/uL  Mean Cell Volume : 86.2 fl  Mean Cell Hemoglobin : 28.3 pg  Mean Cell Hemoglobin Concentration : 32.8 gm/dL  Auto Neutrophil # : 27.08 K/uL  Auto Lymphocyte # : 0.48 K/uL  Auto Monocyte # : 0.74 K/uL  Auto Eosinophil # : 0.00 K/uL  Auto Basophil # : 0.00 K/uL  Auto Neutrophil % : 94.8 %  Auto Lymphocyte % : 1.7 %  Auto Monocyte % : 2.6 %  Auto Eosinophil % : 0.0 %  Auto Basophil % : 0.0 %            02-20    138  |  101  |  108<H>  ----------------------------<  173<H>  5.6<H>   |  20<L>  |  6.59<H>    Ca    9.5      20 Feb 2021 22:56    TPro  5.7<L>  /  Alb  2.7<L>  /  TBili  0.8  /  DBili  x   /  AST  22  /  ALT  27  /  AlkPhos  158<H>  02-20    Pathology:

## 2021-02-21 NOTE — PROGRESS NOTE ADULT - PROBLEM SELECTOR PLAN 9
Prior history of significant bleeding in past following ERCP  - Follows with Dr. Grace at [facility]  - Heme consulted

## 2021-02-21 NOTE — H&P ADULT - NSHPLABSRESULTS_GEN_ALL_CORE
LABS:                         11.5   28.30 )-----------( 149      ( 20 Feb 2021 22:56 )             35.1     02-20    138  |  101  |  108<H>  ----------------------------<  173<H>  5.6<H>   |  20<L>  |  6.59<H>    Ca    9.5      20 Feb 2021 22:56    TPro  5.7<L>  /  Alb  2.7<L>  /  TBili  0.8  /  DBili  x   /  AST  22  /  ALT  27  /  AlkPhos  158<H>  02-20        CARDIAC MARKERS ( 20 Feb 2021 22:56 )  x     / 0.10 ng/mL / x     / x     / x          Serum Pro-Brain Natriuretic Peptide: 95395 pg/mL (02-20 @ 22:56)        RADIOLOGY, EKG & ADDITIONAL TESTS: Reviewed.

## 2021-02-21 NOTE — CONSULT NOTE ADULT - ASSESSMENT
76 year old male with a past medical history of CKD (recent Cr 6.6 at [facility]), Hemophilia A, HTN, Gout, and DRESS Syndrome who presents with weakness for 1 week. being worked up for worsening CKD with plans for HD.  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 Patient states he was given some infusions prior to a surgery several years ago but doesn't know the details. Follows with . Per med rec review he is currently not on any ppx.    Hemophilia A  For preoperative optimization prior to HD cath placement it is imperative we find out the patient's most recent Factor VIII level, also if he has a Factor VIII inhibitor or not.  At this time he is not oozing or bleeding, his counts are stable. No coags available for review.  Please check STAT: PT/INR, PTT, factor VIII level and mixing studies  Please note the latter two will not result until tomorrow  Also please obtain collateral from patient's hematologist - we need to know most recent labs and if he has an inhibitor present  At this time no indication for factor replacement since he is not oozing or bleeding    Factor VIII replacement is calculated by the following in IU: (pt's weight x correction needed)/2  We need to know the patient's factor VIII level prior to recommending replacement needed  Alternatively DDAVP is an option, but that is in patient's who have previously demonstrated a response to DDAVP  Dosin.3 mcg/kg once (maximum recommended dose: 20 to 30 mcg). If used for prevention of surgical bleeding, administer 30 to 60 minutes before procedure    Due to the risk of bleeding and too many unknown parameters at this time, would recommend holding off HD catheter placement until we get an accurate Factor VIII level tomorrow morning since HD is not urgent per primary team and renal.    Of note, if patient is to develop severe bleeding he should be given Factor VIII dose of approximately 50 units/kg.    D/w .

## 2021-02-21 NOTE — PROGRESS NOTE ADULT - SUBJECTIVE AND OBJECTIVE BOX
Patient was seen and examined by me at bedside. I agree with resident's note, subjective, objective physical exam, assessment and plan with following modifications/additions.    Greater than 35 minutes spent on total encounter; more than 50% of the visit was spent counseling and/or coordinating care by the attending physician.    S: no events overnight, repeat labs today stable K and acidosis, uop noted to 80 IV lasix per admit team though minimal outpt noted on 40 IV lasix today am. Pt himself largely asymptomatic, minimal shortness of breath, n/v, endorsing LE edema    O: VSS- low grade ST, AOx3, lungs decreased BS at bases, CV RRR, abd mildly distended no fluid wave, sig 2+ pitting le edema b/l. Labs/imaging reviewed- fluid overload on CT c/a/p    76 year old male with hx of Hemophilia A, HTN, Gout, and recent DRESS Syndrome c/b stage V CKD sent in by nephrologist (Moreno) for HD initiation given marked overload and c/f uremia per hx No urgent indications for HD today, but likely to need in next 24-48 hours  -Reviewed case with Dr. Mayer and heme fellow personally- favor results back from heme tests today to setup factor replacement prior to TDC placement by IR- possibly for tomorrow afternoon or Tuesday (factor studies sent this afternoon return in 24 hours usually), will keep NPO after MN    -Progressive stage V CKD with anisarca- 80 lasix bid (bladder scan to ensure no retention, and monitor uop to ensure not needing higher dose of diuretic or pt not becoming oliguric), started bicarb, sevelemer, lokelma  -DRESS hx and progressive RU presumed AIN vs ATN- c/w prednisone 60mg, f/u UA, obtain renal US to quantify chronicity of changes and look into biopsy option to confirm diagnosis. Though since already on steroids and DRESS resolved (eos wnl) unclear value of biopsy at this time  -DVT px- SQH- can hold for procedure tomorrow   -Dispo- will likely need HD placement this visit- pending initiation currently     Pilo Gann MD [phone]  Patient was seen and examined by me at bedside. I agree with resident's note, subjective, objective physical exam, assessment and plan with following modifications/additions.    Greater than 35 minutes spent on total encounter; more than 50% of the visit was spent counseling and/or coordinating care by the attending physician.    S: no events overnight, repeat labs today stable K and acidosis, uop noted to 80 IV lasix per admit team though minimal outpt noted on 40 IV lasix today am. Pt himself largely asymptomatic, minimal shortness of breath, n/v, endorsing LE edema    O: VSS- low grade ST, AOx3, lungs decreased BS at bases, CV RRR, abd mildly distended no fluid wave, sig 2+ pitting le edema b/l. Labs/imaging reviewed- fluid overload on CT c/a/p    76 year old male with hx of Hemophilia A, HTN, Gout, and recent DRESS Syndrome c/b stage V CKD sent in by nephrologist (Moreno) for HD initiation given marked overload and c/f uremia per hx No urgent indications for HD today, but likely to need in next 24-48 hours  -Reviewed case with Dr. Mayer and heme fellow personally- favor results back from heme tests today to setup factor replacement prior to TDC placement by IR- possibly for tomorrow afternoon or Tuesday (factor studies sent this afternoon return in 24 hours usually), will keep NPO after MN    -Progressive stage V CKD with anisarca- 80 lasix bid (bladder scan to ensure no retention, and monitor uop to ensure not needing higher dose of diuretic or pt not becoming oliguric), started bicarb, sevelemer, lokelma  -DRESS hx and progressive RU presumed AIN vs ATN- c/w prednisone 60mg, f/u UA, obtain renal US to quantify chronicity of changes and look into biopsy option to confirm diagnosis. Though since already on steroids and DRESS resolved (eos wnl) unclear value of biopsy at this time  -Leukocytosis likely 2/2 steroids, low grade ST otherwise no clear signs of infection- f/u bcx, monitor HR   -DVT px- SQH- can hold for procedure tomorrow   -Dispo- will likely need HD placement this visit- pending initiation currently     Pilo Gann MD [phone]  Patient was seen and examined by me at bedside. I agree with resident's note, subjective, objective physical exam, assessment and plan with following modifications/additions.    Greater than 35 minutes spent on total encounter; more than 50% of the visit was spent counseling and/or coordinating care by the attending physician.    S: no events overnight, repeat labs today stable K and acidosis, uop noted to 80 IV lasix per admit team though minimal outpt noted on 40 IV lasix today am. Pt himself largely asymptomatic, minimal shortness of breath, n/v, endorsing LE edema    O: VSS- low grade ST, AOx3, lungs decreased BS at bases, CV RRR, abd mildly distended no fluid wave, sig 2+ pitting le edema b/l. Labs/imaging reviewed- fluid overload on CT c/a/p    76 year old male with hx of Hemophilia A, HTN, Gout, and recent DRESS Syndrome 2 weeks ago OSH c/b advanced RU sent in by nephrologist (Moreno) for HD initiation given marked overload and c/f uremia per hx No urgent indications for HD today, but likely to need in next 24-48 hours  -Reviewed case with Dr. Mayer and heme fellow personally- favor results back from heme tests today to setup factor replacement prior to TDC placement by IR- possibly for tomorrow afternoon or Tuesday (factor studies sent this afternoon return in 24 hours usually), will keep NPO after MN    -Advanced RU with anisarca- 80 lasix bid (bladder scan to ensure no retention, and monitor uop to ensure not needing higher dose of diuretic or pt not becoming oliguric), started bicarb, sevelemer, lokelma  -DRESS hx and progressive RU presumed AIN vs ATN- c/w prednisone 60mg, f/u UA, obtain renal US to quantify chronicity of changes and look into biopsy option to confirm diagnosis. Though since already on steroids and DRESS resolved (eos wnl) unclear value of biopsy at this time  -Leukocytosis likely 2/2 steroids, low grade ST otherwise no clear signs of infection- f/u bcx, monitor HR   -DVT px- SQH- can hold for procedure tomorrow   -Dispo- will likely need HD placement this visit- pending initiation currently     Pilo Gann MD [phone]

## 2021-02-21 NOTE — H&P ADULT - NSHPPHYSICALEXAM_GEN_ALL_CORE
VITAL SIGNS:  T(C): 37.7 (02-21-21 @ 02:59), Max: 37.7 (02-21-21 @ 02:59)  T(F): 99.9 (02-21-21 @ 02:59), Max: 99.9 (02-21-21 @ 02:59)  HR: 120 (02-21-21 @ 02:59) (116 - 128)  BP: 133/60 (02-21-21 @ 02:59) (133/60 - 181/80)  RR: 18 (02-21-21 @ 02:59) (18 - 20)  SpO2: 95% (02-21-21 @ 02:59) (95% - 95%)    PHYSICAL EXAM:  Constitutional: WDWN, lying comfortably in bed, NAD  HEENT: No JVD, NCAT, EOMI  Respiratory: CTA b/l, no wheezes, rales, or rhonchi  Cardiac: Tachycardia, Regular rhythm, normal s1/s2, no murmurs  Gastrointestinal: distended, slightly tense, nontender, no evidence of fluid wave, normoactive bs  Extremities: 2+ pitting edema to level of knee bilaterally, symmetric, pedal pulses intact  Musculoskeletal: NROM x4; no joint swelling, tenderness or erythema  Vascular: 2+ radial and DP pulses b/l  Dermatologic: skin warm, dry and intact, bilateral rash on all extremities (erythematous, maculopapular)  Lymphatic: no submandibular or cervical LAD  Neurologic: AAOx3, CNII-XII grossly intact, no focal deficits  Psychiatric: affect and characteristics of appearance, verbalizations, behaviors are appropriate

## 2021-02-21 NOTE — H&P ADULT - PROBLEM SELECTOR PLAN 1
Per patient's daughter, increasing BUN/Cr over time with unclear etiology. , Cr 6.59 on arrival. Cr in 11/20 was 1.1. Prior to hospitalization for DRESS, no known kidney disease. /Cr 6.6 upon discharge from [facility].   - Dr. Yap in contact with family and renal aware  - Start Lasix 40 mg IV BID  - Continue to closely monitor electrolytes and BUN    #Volume Overload  - Significant response to Lasix 80 mg IV in ED  - Likely due to acute renal failure; however, TTE ordered for AM  - Continue Lasix 40 mg IV BID as above

## 2021-02-21 NOTE — PROGRESS NOTE ADULT - PROBLEM SELECTOR PLAN 1
Per patient's daughter, increasing BUN/Cr over time with unclear etiology. , Cr 6.59 on arrival. Cr in 11/20 was 1.1. Prior to hospitalization for DRESS, no known kidney disease. /Cr 6.6 upon discharge from [facility].   - Dr. Yap in contact with family and renal aware  - Start Lasix 80 mg IV BID  - Continue to closely monitor electrolytes and BUN  - Will need dialysis in 24 - 48 hours

## 2021-02-21 NOTE — H&P ADULT - ASSESSMENT
Mr. Don is a 76 year old male with a past medical history of CKD (unclear baseline), Hemophilia A, HTN, Gout, and DRESS Syndrome who presents with weakness for 1 week. He was admitted for acute on chronic renal failure and likely induction of hemodialysis.

## 2021-02-21 NOTE — H&P ADULT - NSHPSOCIALHISTORY_GEN_ALL_CORE
Seldom alcohol use, denies tobacco use, denies illicit drug use. Performs all ADLs. Lives with wife and son.

## 2021-02-21 NOTE — PROGRESS NOTE ADULT - PROBLEM SELECTOR PLAN 2
K of 5.6 on arrival. Given Lokelma 10 g orally. EKG as above did not show any acute changes  -Lokelma 10mg BID

## 2021-02-21 NOTE — H&P ADULT - PROBLEM SELECTOR PLAN 2
K of 5.6 on arrival. Given Lokelma 10 g orally. EKG as above did not show any acute changes  - Continue to monitor with daily BMP  - Give Lokelma 10 g for potassium >5.5  - F/u renal recommendations

## 2021-02-21 NOTE — H&P ADULT - PROBLEM SELECTOR PLAN 10
F: None  E: Lokelma for K >5.5;  N: Renal diet    DVT: Heparin q8 subQ  GI: none F: None  E: Lokelma for K >5.5;  N: Renal diet    DVT: None, awaiting heme recs (hemophilia)  GI: none

## 2021-02-22 DIAGNOSIS — R78.81 BACTEREMIA: ICD-10-CM

## 2021-02-22 DIAGNOSIS — N17.9 ACUTE KIDNEY FAILURE, UNSPECIFIED: ICD-10-CM

## 2021-02-22 LAB
ALBUMIN SERPL ELPH-MCNC: 2.5 G/DL — LOW (ref 3.3–5)
ALP SERPL-CCNC: 158 U/L — HIGH (ref 40–120)
ALT FLD-CCNC: 24 U/L — SIGNIFICANT CHANGE UP (ref 10–45)
ANION GAP SERPL CALC-SCNC: 20 MMOL/L — HIGH (ref 5–17)
ANION GAP SERPL CALC-SCNC: 22 MMOL/L — HIGH (ref 5–17)
APTT 50/50 2HOUR INCUB: 46.2 SEC — HIGH (ref 27.5–36.5)
APTT BLD: 30 SECS — SIGNIFICANT CHANGE UP (ref 27.5–36.5)
APTT BLD: 44.3 SEC — HIGH (ref 27.5–35.5)
AST SERPL-CCNC: 25 U/L — SIGNIFICANT CHANGE UP (ref 10–40)
BILIRUB SERPL-MCNC: 1.2 MG/DL — SIGNIFICANT CHANGE UP (ref 0.2–1.2)
BLD GP AB SCN SERPL QL: NEGATIVE — SIGNIFICANT CHANGE UP
BLD GP AB SCN SERPL QL: NEGATIVE — SIGNIFICANT CHANGE UP
BUN SERPL-MCNC: 108 MG/DL — HIGH (ref 7–23)
BUN SERPL-MCNC: 111 MG/DL — HIGH (ref 7–23)
CALCIUM SERPL-MCNC: 9 MG/DL — SIGNIFICANT CHANGE UP (ref 8.4–10.5)
CALCIUM SERPL-MCNC: 9.1 MG/DL — SIGNIFICANT CHANGE UP (ref 8.4–10.5)
CHLORIDE SERPL-SCNC: 94 MMOL/L — LOW (ref 96–108)
CHLORIDE SERPL-SCNC: 97 MMOL/L — SIGNIFICANT CHANGE UP (ref 96–108)
CO2 SERPL-SCNC: 21 MMOL/L — LOW (ref 22–31)
CO2 SERPL-SCNC: 24 MMOL/L — SIGNIFICANT CHANGE UP (ref 22–31)
CREAT SERPL-MCNC: 6.47 MG/DL — HIGH (ref 0.5–1.3)
CREAT SERPL-MCNC: 6.66 MG/DL — HIGH (ref 0.5–1.3)
FACT VII ACT/NOR PPP: 24 % — LOW (ref 53–149)
FACT VIII ACT/NOR PPP: 35 % — LOW (ref 51–138)
GLUCOSE SERPL-MCNC: 179 MG/DL — HIGH (ref 70–99)
GLUCOSE SERPL-MCNC: 186 MG/DL — HIGH (ref 70–99)
HBV SURFACE AB SER-ACNC: SIGNIFICANT CHANGE UP
HBV SURFACE AG SER-ACNC: SIGNIFICANT CHANGE UP
HBV SURFACE AG SER-ACNC: SIGNIFICANT CHANGE UP
HCT VFR BLD CALC: 29.8 % — LOW (ref 39–50)
HCT VFR BLD CALC: 34.3 % — LOW (ref 39–50)
HCV AB S/CO SERPL IA: 0.08 S/CO — SIGNIFICANT CHANGE UP
HCV AB SERPL-IMP: SIGNIFICANT CHANGE UP
HGB BLD-MCNC: 10.1 G/DL — LOW (ref 13–17)
HGB BLD-MCNC: 11.4 G/DL — LOW (ref 13–17)
INR BLD: 1.52 — HIGH (ref 0.88–1.16)
LACTATE SERPL-SCNC: 1.2 MMOL/L — SIGNIFICANT CHANGE UP (ref 0.5–2)
MAGNESIUM SERPL-MCNC: 2.3 MG/DL — SIGNIFICANT CHANGE UP (ref 1.6–2.6)
MCHC RBC-ENTMCNC: 28 PG — SIGNIFICANT CHANGE UP (ref 27–34)
MCHC RBC-ENTMCNC: 28.5 PG — SIGNIFICANT CHANGE UP (ref 27–34)
MCHC RBC-ENTMCNC: 33.2 GM/DL — SIGNIFICANT CHANGE UP (ref 32–36)
MCHC RBC-ENTMCNC: 33.9 GM/DL — SIGNIFICANT CHANGE UP (ref 32–36)
MCV RBC AUTO: 84.2 FL — SIGNIFICANT CHANGE UP (ref 80–100)
MCV RBC AUTO: 84.3 FL — SIGNIFICANT CHANGE UP (ref 80–100)
NRBC # BLD: 0 /100 WBCS — SIGNIFICANT CHANGE UP (ref 0–0)
PAT CTL 2H: 48.4 SEC — HIGH (ref 27.5–36.5)
PHOSPHATE SERPL-MCNC: 7.6 MG/DL — HIGH (ref 2.5–4.5)
PLATELET # BLD AUTO: 77 K/UL — LOW (ref 150–400)
PLATELET # BLD AUTO: 98 K/UL — LOW (ref 150–400)
POTASSIUM SERPL-MCNC: 4.2 MMOL/L — SIGNIFICANT CHANGE UP (ref 3.5–5.3)
POTASSIUM SERPL-MCNC: 4.2 MMOL/L — SIGNIFICANT CHANGE UP (ref 3.5–5.3)
POTASSIUM SERPL-SCNC: 4.2 MMOL/L — SIGNIFICANT CHANGE UP (ref 3.5–5.3)
POTASSIUM SERPL-SCNC: 4.2 MMOL/L — SIGNIFICANT CHANGE UP (ref 3.5–5.3)
PROT SERPL-MCNC: 5.6 G/DL — LOW (ref 6–8.3)
PROTHROM AB SERPL-ACNC: 17.9 SEC — HIGH (ref 10.6–13.6)
PT 50/50: 13.2 SECS — SIGNIFICANT CHANGE UP (ref 10.6–14.7)
RBC # BLD: 3.54 M/UL — LOW (ref 4.2–5.8)
RBC # BLD: 4.07 M/UL — LOW (ref 4.2–5.8)
RBC # FLD: 14.6 % — HIGH (ref 10.3–14.5)
RBC # FLD: 14.7 % — HIGH (ref 10.3–14.5)
RH IG SCN BLD-IMP: POSITIVE — SIGNIFICANT CHANGE UP
RH IG SCN BLD-IMP: POSITIVE — SIGNIFICANT CHANGE UP
SODIUM SERPL-SCNC: 137 MMOL/L — SIGNIFICANT CHANGE UP (ref 135–145)
SODIUM SERPL-SCNC: 141 MMOL/L — SIGNIFICANT CHANGE UP (ref 135–145)
THROMBIN TIME: 19.4 SEC — SIGNIFICANT CHANGE UP (ref 17.6–24)
WBC # BLD: 16.67 K/UL — HIGH (ref 3.8–10.5)
WBC # BLD: 20.63 K/UL — HIGH (ref 3.8–10.5)
WBC # FLD AUTO: 16.67 K/UL — HIGH (ref 3.8–10.5)
WBC # FLD AUTO: 20.63 K/UL — HIGH (ref 3.8–10.5)

## 2021-02-22 PROCEDURE — 99233 SBSQ HOSP IP/OBS HIGH 50: CPT | Mod: GC

## 2021-02-22 PROCEDURE — 99291 CRITICAL CARE FIRST HOUR: CPT

## 2021-02-22 PROCEDURE — 99222 1ST HOSP IP/OBS MODERATE 55: CPT

## 2021-02-22 PROCEDURE — 99233 SBSQ HOSP IP/OBS HIGH 50: CPT

## 2021-02-22 PROCEDURE — 93306 TTE W/DOPPLER COMPLETE: CPT | Mod: 26

## 2021-02-22 PROCEDURE — 93010 ELECTROCARDIOGRAM REPORT: CPT

## 2021-02-22 PROCEDURE — 36556 INSERT NON-TUNNEL CV CATH: CPT | Mod: GC

## 2021-02-22 PROCEDURE — 71045 X-RAY EXAM CHEST 1 VIEW: CPT | Mod: 26

## 2021-02-22 PROCEDURE — 99223 1ST HOSP IP/OBS HIGH 75: CPT

## 2021-02-22 RX ORDER — NAFCILLIN 10 G/100ML
2 INJECTION, POWDER, FOR SOLUTION INTRAVENOUS EVERY 4 HOURS
Refills: 0 | Status: DISCONTINUED | OUTPATIENT
Start: 2021-02-22 | End: 2021-02-22

## 2021-02-22 RX ORDER — CEFAZOLIN SODIUM 1 G
1000 VIAL (EA) INJECTION EVERY 24 HOURS
Refills: 0 | Status: DISCONTINUED | OUTPATIENT
Start: 2021-02-23 | End: 2021-02-24

## 2021-02-22 RX ORDER — TUBERCULIN PURIFIED PROTEIN DERIVATIVE 5 [IU]/.1ML
5 INJECTION, SOLUTION INTRADERMAL ONCE
Refills: 0 | Status: DISCONTINUED | OUTPATIENT
Start: 2021-02-22 | End: 2021-03-09

## 2021-02-22 RX ADMIN — POLYETHYLENE GLYCOL 3350 17 GRAM(S): 17 POWDER, FOR SOLUTION ORAL at 17:50

## 2021-02-22 RX ADMIN — Medication 650 MILLIGRAM(S): at 17:50

## 2021-02-22 RX ADMIN — SEVELAMER CARBONATE 800 MILLIGRAM(S): 2400 POWDER, FOR SUSPENSION ORAL at 17:50

## 2021-02-22 RX ADMIN — Medication 60 MILLIGRAM(S): at 05:28

## 2021-02-22 RX ADMIN — AMLODIPINE BESYLATE 5 MILLIGRAM(S): 2.5 TABLET ORAL at 05:28

## 2021-02-22 RX ADMIN — Medication 100 MILLIGRAM(S): at 03:10

## 2021-02-22 RX ADMIN — SENNA PLUS 2 TABLET(S): 8.6 TABLET ORAL at 22:48

## 2021-02-22 RX ADMIN — SEVELAMER CARBONATE 800 MILLIGRAM(S): 2400 POWDER, FOR SUSPENSION ORAL at 07:09

## 2021-02-22 RX ADMIN — HEPARIN SODIUM 5000 UNIT(S): 5000 INJECTION INTRAVENOUS; SUBCUTANEOUS at 05:27

## 2021-02-22 RX ADMIN — Medication 650 MILLIGRAM(S): at 05:28

## 2021-02-22 RX ADMIN — SODIUM ZIRCONIUM CYCLOSILICATE 10 GRAM(S): 10 POWDER, FOR SUSPENSION ORAL at 05:27

## 2021-02-22 RX ADMIN — Medication 4 MILLIGRAM(S): at 05:28

## 2021-02-22 RX ADMIN — Medication 80 MILLIGRAM(S): at 05:27

## 2021-02-22 RX ADMIN — HEPARIN SODIUM 5000 UNIT(S): 5000 INJECTION INTRAVENOUS; SUBCUTANEOUS at 22:48

## 2021-02-22 NOTE — CONSULT NOTE ADULT - SUBJECTIVE AND OBJECTIVE BOX
Consultation Requested by:    Patient is a 76y old  Male who presents with a chief complaint of Weakness; Worsening CKD (2021 17:09)    HPI:  Mr. Don is a 76 year old male with a past medical history of CKD (recent Cr 6.6 at [facility]), Hemophilia A, HTN, Gout, BPH and DRESS Syndrome who presents with weakness for 1 week. Mr. Don states that he was in his usual state of health (performing all ADLs) until  when he had an acute gout attack. He was treated with allopurinol x 3 weeks where he developed a rash associated with decreased appetite and developed DRESS Syndrome. Subsequently, he was treated with prednisone 60 mg daily until . On , he was hospitalized at OSH for hyponatremia and RU (unclear Cr). He was discharged with nephrology follow up. Since that time, he has experienced increasing weakness and swelling in his stomach and legs. The swelling has made it difficult to ambulate due to pain. He denies any fevers/chills, nausea/vomiting, SOB or changes in bowel or urinary habits.     In the ED, he was afebrile (98.7 F), , /80, RR 18, and O2 saturation 95%. Labs were significant for WBC 28.30, Hgb 11.5, K 5.6, CO2 20  , Cr 6.59, albumin 2.7, alk phos 158, BNP 69030. CXR showed pleural effusion left greater than right with dependent edema consistent with volume overload. Imaging significant for a CT abdomen small to moderate bilateral pleural effusions, greater on the left. Mild pericardial effusion. Anasarca greater in left lower chest and trace ascites. LE dopplers negative for DVT. He was given Lasix 80 mg IV and Lokelma 10 g. He was admitted for acute on chronic renal failure and likely induction of hemodialysis.    (2021 03:38)    Interval History: Patient seen and examined at bedside. Patient endorses fatigue, weakness and edema of lower extremities and abdomen. Patient also noted SOB last night which has resolved since getting placed on 2L NC. Patient stated that rash from DRESS syndrome was pruritic and he frequently scratched his body. Patient denies fever, recent skin infections, presence of indwelling prosthetic devices, dysuria, urinary frequency, lower back pain, abdominal pain, N/V, vision changes, HA, CP, cough, pleuritic chest pain,       REVIEW OF SYSTEMS    Allergies    No Known Allergies    Intolerances      Antimicrobials:      Other Medications:  doxazosin 4 milliGRAM(s) Oral daily  heparin   Injectable 5000 Unit(s) SubCutaneous every 8 hours  polyethylene glycol 3350 17 Gram(s) Oral daily  PPD  5 Tuberculin Unit(s) Injectable 5 Unit(s) IntraDermal once  predniSONE   Tablet 60 milliGRAM(s) Oral daily  senna 2 Tablet(s) Oral at bedtime  sevelamer carbonate 800 milliGRAM(s) Oral three times a day with meals  sodium bicarbonate 650 milliGRAM(s) Oral two times a day      FAMILY HISTORY:  FH: HTN (hypertension)      PAST MEDICAL & SURGICAL HISTORY:  History of BPH    Gout    Hemophilia A    HTN (hypertension)    Uses cochlear implant      SOCIAL HISTORY:    IMMUNIZATIONS  [] Up to Date		[] Not Up to Date:  Recent Immunizations:	[] No	[] Yes:    Daily Height in cm: 172.72 (2021 16:55)    Daily Weight in k.4 (2021 15:25)  Head Circumference:  Vital Signs Last 24 Hrs  T(C): 37 (2021 17:03), Max: 37.1 (2021 05:53)  T(F): 98.6 (2021 17:03), Max: 98.7 (2021 05:53)  HR: 103 (2021 17:03) (102 - 120)  BP: 99/62 (2021 17:03) (98/51 - 133/75)  BP(mean): --  RR: 17 (2021 17:03) (17 - 19)  SpO2: 93% (2021 17:03) (93% - 95%)    PHYSICAL EXAM    General: NAD, laying comfortably in bed  Skin: Extremities and chest has coarse red rash with linear and circular scabs. Rash and scabs have no signs of warmth, purulence, drainage,   HEENT: Conjunctiva clear, sclera white, PERRLA  Cardiac: S1 and S1 clear. No murmurs, rubs or gallops  Lungs: unlabored breathing on 2L NC, no accessory muscle use, vesicular b/l   Abdomen: Distended, Soft, non-tender, normoactive BS in all four quadrants  Peripheral extremities: 1+ pitting edema in lower extremities b/l, 2+ DP and PT pulses b/l   Neurological: A&Ox3     Lab Results:                        11.4   20.63 )-----------( 98       ( 2021 07:14 )             34.3         141  |  97  |  111<H>  ----------------------------<  186<H>  4.2   |  24  |  6.47<H>    Ca    9.0      2021 16:06  Phos  7.6       Mg     2.3         TPro  5.6<L>  /  Alb  2.5<L>  /  TBili  1.2  /  DBili  x   /  AST  25  /  ALT  24  /  AlkPhos  158<H>      LIVER FUNCTIONS - ( 2021 07:14 )  Alb: 2.5 g/dL / Pro: 5.6 g/dL / ALK PHOS: 158 U/L / ALT: 24 U/L / AST: 25 U/L / GGT: x           PT/INR - ( 2021 10:35 )   PT: 17.9 sec;   INR: 1.52          PTT - ( 2021 10:35 )  PTT:44.3 sec  Urinalysis Basic - ( 2021 14:26 )    Color: Yellow / Appearance: Clear / S.015 / pH: x  Gluc: x / Ketone: NEGATIVE  / Bili: Negative / Urobili: 0.2 E.U./dL   Blood: x / Protein: NEGATIVE mg/dL / Nitrite: NEGATIVE   Leuk Esterase: Small / RBC: < 5 /HPF / WBC 5-10 /HPF   Sq Epi: x / Non Sq Epi: 0-5 /HPF / Bacteria: Present /HPF        MICROBIOLOGY  [] Pathology slides reviewed and/or discussed with pathologist  [] Microbiology findings discussed with microbiologist or slides reviewed  [] Images erviewed with radiologist  [] Case discussed with an attending physician in addition to the patient's primary physician  [] Records, [place] [facility] [place]    [] Patient requires continued monitoring for:  [] Total time spent by attending physician: __ minutes, excluding procedure time.

## 2021-02-22 NOTE — CONSULT NOTE ADULT - ASSESSMENT
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 Patient currently on cefazolin 1g q 24hrs which was chosen over Nafcillin in the setting of severe fluid overload since Cefazolin is administered with less fluid. Source of MSSA unknown. Possible source from skin as patient is immunocompromised from chronic steroids and broke skin barrier scratching the DRESS syndrome rash which created multiple lesions. Unlikely lung source since no cough, sputum production and CXR does not show any lung field opacities. SOB  and need for 2L NC likely due to pleural effusion noted on CXR in setting of severe fluid overload secondary to CKD. Weakly positive UA, however unlikely  source as patient does not have urinary symptoms and staph aureus usually does not commonly arise from urinary sources. Patient afebrile with downtrending WBC from 28.30 on admission (2/20) to 20.63 (2/22). TTE (2/22) showed no valvular disease.       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 Patient currently on cefazolin 1g q 24hrs which was chosen over Nafcillin in the setting of severe fluid overload since Cefazolin is administered with less fluid. Source of MSSA unknown. Possible source from skin as patient is immunocompromised from chronic steroids and broke skin barrier scratching the DRESS syndrome rash which created multiple lesions. Unlikely lung source since no cough, sputum production and CXR does not show any lung field opacities. SOB  and need for 2L NC likely due to pleural effusion noted on CXR in setting of severe fluid overload secondary to CKD. Weakly positive UA, however unlikely  source as patient does not have urinary symptoms and staph aureus usually does not commonly arise from urinary sources. Patient afebrile with downtrending WBC from 28.30 on admission (2/20) to 20.63 (2/22). TTE (2/22) showed no valvular disease.     Plan:  - C/w cefazolin 1g IV q 24hrs   - Daily surveillance blood cultures until MSSA bacteremia clears - As staph aureus is prone to seeding on heart valves, next positive blood culture will warrant a GUANAKO  - F/u blood culture susceptibilities    ID Team 1 will follow.    Discussed with Infectious Disease Attending Dr. Edgar. Recommendations are considered final after attending attestation.   75 yo M with PMHx of CKD (recent Cr 6.6 at 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 Patient currently on cefazolin 1g q 24hrs which was chosen over Nafcillin in the setting of severe fluid overload since Cefazolin is administered with less fluid. Source of MSSA unknown. Possible source from skin as patient is immunocompromised from chronic steroids and broke skin barrier scratching the DRESS syndrome rash which created multiple lesions. Unlikely lung source since no cough, sputum production and CXR does not show any lung field opacities. SOB  and need for 2L NC likely due to pleural effusion noted on CXR in setting of severe fluid overload secondary to CKD. Weakly positive UA, however unlikely  source as patient does not have urinary symptoms and staph aureus usually does not commonly arise from urinary sources. Patient afebrile with downtrending WBC from 28.30 on admission (2/20) to 20.63 (2/22). TTE (2/22) showed no valvular disease.     Plan:  - C/w cefazolin 1g IV q 24hrs   - Daily surveillance blood cultures until MSSA bacteremia clears - As staph aureus is prone to seeding on heart valves, next positive blood culture may warrant consideration of a GUANAKO  - F/u blood culture susceptibilities    ID Team 1 will follow.    Discussed with Infectious Disease Attending Dr. Edgar. Recommendations are considered final after attending attestation.

## 2021-02-22 NOTE — PROGRESS NOTE ADULT - SUBJECTIVE AND OBJECTIVE BOX
Patient was seen and evaluated on dialysis.     Dialyzer: Optiflux [id number]  QB: 250 mL/min  QD: 500 mL/min  K bath: 3  Goal UF: 1L, lowered from 2.5L secondary to hypotension   Duration: 120 min    Patient is tolerating the procedure well.   Continue full hemodialysis treatment as prescribed. Patient was seen and evaluated on dialysis.     Dialyzer: Optiflux [id number]  QB: 250 mL/min  QD: 500 mL/min  K bath: 3  Goal UF: 1L, lowered from 2.5L secondary to hypotension   Duration: 120 min    Patient is not tolerating the procedure well.     Returned fluid and aborted hemodialysis treatment.     BP now 102/56, remains on NC O2, recommend ICU evaluation.

## 2021-02-22 NOTE — PROGRESS NOTE ADULT - PROBLEM SELECTOR PLAN 3
Meeting 2/4 SIRS criteria on arrival (WBC, tachycardia); however, unlikely to be infectious in origin due lack of source or any infectious symptomology   - Blood cultures drawn in ED; f/u result prior to likely PermCath placement for HD    #Leukocytosis  - Chronic due to prolonged high dose Prednisone use for Dress Syndrome    #Dress Syndrome  - Currently taking Prednisone 60 mg with improvement in rash   - Continue Prednisone 60 mg daily  - F/U renal recs as above    #Tachycardia  - HR constantly in 110s per daughter  - Sinus tachycardia on EKG  - Likely 2/2 intravascular depletion due to ascites/anasarca    #Anemia  - Hgb 11.5 on arrival with unclear baseline  - Like 2/2 acute kidney failure or dilutional due to volume overload  - Continue to monitor on daily CBC w diff K of 5.6 on arrival. Given Lokelma 10 g orally. EKG as above did not show any acute changes  -Stopped Lokelma

## 2021-02-22 NOTE — PROGRESS NOTE ADULT - ATTENDING COMMENTS
Patient seen and examined. Factor VIII recommendations as above. If urgent and FVIII preparation not available in time, can give DDAVP within 30 minutes of procedure.   Discussed in detail with Dr. Bush and Primary Care team.

## 2021-02-22 NOTE — PROGRESS NOTE ADULT - PROBLEM SELECTOR PLAN 4
CT abdomen showed trace pericaridal effusion. Likely 2/2 volume overload and fluid shifts from acute kidney failure  - No signs of tamponade or any of Valentin's Triad  - TTE ordered

## 2021-02-22 NOTE — PROGRESS NOTE ADULT - PROBLEM SELECTOR PLAN 10
F: None  E: Lokelma for K >5.5;  N: Renal diet    DVT: None, awaiting heme recs (hemophilia)  GI: none F: None  E: Lokelma for K >5.5;  N: Renal diet  DVT: None  GI: none

## 2021-02-22 NOTE — PROGRESS NOTE ADULT - PROBLEM SELECTOR PLAN 3
K of 5.6 on arrival. Given Lokelma 10 g orally. EKG as above did not show any acute changes  -Stopped Lokelma

## 2021-02-22 NOTE — PROGRESS NOTE ADULT - ASSESSMENT
Mr. Don is a 76 year old male with a past medical history of CKD (unclear baseline), Hemophilia A, HTN, Gout, and DRESS Syndrome who presents with weakness for 1 week. He was admitted for acute on chronic renal failure and likely induction of hemodialysis.    Mr. Don is a 76 year old male with a past medical history Hemophilia A, HTN, Gout, and DRESS Syndrome who presents with weakness for 1 week. He was admitted for acute on chronic renal failure and likely induction of hemodialysis.

## 2021-02-22 NOTE — PROGRESS NOTE ADULT - PROBLEM SELECTOR PLAN 9
Prior history of significant bleeding in past following ERCP  - Follows with Dr. Grace at [facility]  - Heme consulted Prior history of significant bleeding in past following ERCP.   - Follows with Dr. Grace at [facility]  - [facility] consulted Prior history of significant bleeding in past following ERCP.   - Follows with Dr. Grace at [facility]  - Daily INR/PT/PTT Prior history of significant bleeding in past following ERCP.   -Heme following   -Daily INR/PT/PTT  -Factor VIII after procedure   -Follows with Dr. Grace at [facility]

## 2021-02-22 NOTE — PROGRESS NOTE ADULT - ATTENDING COMMENTS
given c/o SOB, needs for NC O2 and no significant improvement in BUN/creat  proceeding with HD  reviewed with med team, heme team  vascular team in regard to timing of factor 8 replacement and HD catheter placement  reviewed in detail with patient's daughter

## 2021-02-22 NOTE — PROGRESS NOTE ADULT - PROBLEM SELECTOR PLAN 4
CT abdomen showed trace pericaridal effusion. Likely 2/2 volume overload and fluid shifts from acute kidney failure  - No signs of tamponade or any of Valentin's Triad  - TTE ordered for AM to evaluate CT abdomen showed trace pericaridal effusion. Likely 2/2 volume overload and fluid shifts from acute kidney failure  - No signs of tamponade or any of Valentin's Triad  - TTE ordered CT abdomen showed trace pericaridal effusion. Likely 2/2 volume overload and fluid shifts from acute kidney failure  - No signs of tamponade or any of Valentin's Triad  - TTE not showing pathology consistent with tamponade

## 2021-02-22 NOTE — PROGRESS NOTE ADULT - PROBLEM SELECTOR PLAN 1
Acute renal failure with anasarca.  Patient with no history of CKD prior to admission. 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 Creatine worsened with deranged electrolytes after being diagnosed with Dress Syndrome.   -Dialysis tomorrow  -Factor VIII 25U/kg   -Recheck factor VIII level   -Hep Panel   -PPD

## 2021-02-22 NOTE — PROGRESS NOTE ADULT - ATTENDING COMMENTS
seen and evaluated while on dialysis  BP in 90s then some drop after only several minutes of HD  tried reduced Qb and no UF- BP remained low  ordered discontinuation of HD-  etiology of drop in BP unclear  Pt with no complaints at this time  reviewed with med team-   no evidence of bleeding  SIRS?   ?pericardial effusion  antihypertensive meds-  to follow up

## 2021-02-22 NOTE — PROGRESS NOTE ADULT - PROBLEM SELECTOR PLAN 2
K of 5.6 on arrival. Given Lokelma 10 g orally. EKG as above did not show any acute changes  -Lokelma 10mg BID K of 5.6 on arrival. Given Lokelma 10 g orally. EKG as above did not show any acute changes  -Stopped Lokelma #Bacteremia   Patient growing MSSA started on ANCEF at 3AM on 2/22.   -Continue ANCEF   -Repeat Blood cultures at 3AM.     #Leukocytosis  - Chronic due to prolonged high dose Prednisone use for Dress Syndrome    #Dress Syndrome  - Currently taking Prednisone 60 mg with improvement in rash   - Continue Prednisone 60 mg daily    #Tachycardia  - HR constantly in 110s per daughter  - Sinus tachycardia on EKG  - Likely 2/2 intravascular depletion due to ascites/anasarca    #Anemia  - Hgb 11.5 on arrival with unclear baseline  - Like 2/2 acute kidney failure or dilutional due to volume overload

## 2021-02-22 NOTE — PROGRESS NOTE ADULT - ASSESSMENT
76M PMHx Hemophilia A, HTN, Gout, p/w RU in setting of DRESS from allopurinol on steroids   no signs of renal recovery yet   remains hypervolemic, hyperkalemic, and acidotic without improvement     #RU secondary to ATN vs AIN on steroids   #hyperkalemia  #acidosis  #hypervolemia   now pending hemodialysis initiation for volume   continue with lasix 80 mg IV for edema   continue with  lokelma 10 mg Q8 x 48 hours, then daily for hyperkalemia   continue with NaHCO3 tabs 650 BID for acidosis   continue prednisone per primary team   hematologic evaluation to address hemophilia appreciated, pending HD line placement   trend CBC, BMP, Mg, Phos daily   strict IOs  renal diet     Thank you for the opportunity to participate in the care of your patient. The nephrology service remains available to assist with any questions or concerns. Please feel free to reach us by paging the on-call nephrology fellow for urgent issues or as below.     Ramón Dumont M.D.   PGY-4, Nephrology Fellow   C: [phone]   P: [phone]

## 2021-02-22 NOTE — PROGRESS NOTE ADULT - PROBLEM SELECTOR PLAN 2
Statement Selected #Bacteremia   Patient growing MSSA started on ANCEF at 3AM on 2/22.   -Continue ANCEF   -Repeat Blood cultures sent today at 8 PM    #Leukocytosis  - Chronic due to prolonged high dose Prednisone use for Dress Syndrome    #Dress Syndrome  - Currently taking Prednisone 60 mg with improvement in rash   - Continue Prednisone 60 mg daily    #Tachycardia  - HR constantly in 110s per daughter  - Sinus tachycardia on EKG  - Likely 2/2 intravascular depletion due to ascites/anasarca    #Anemia  - Hgb 11.5 on arrival with unclear baseline  - Like 2/2 acute kidney failure or dilutional due to volume overload #Bacteremia   Patient growing MSSA started on ANCEF at 3AM on 2/22.   -Continue ANCEF   -Repeat Blood cultures sent today at 10 PM    #Leukocytosis  - Chronic due to prolonged high dose Prednisone use for Dress Syndrome    #Dress Syndrome  - Currently taking Prednisone 60 mg with improvement in rash   - Continue Prednisone 60 mg daily    #Tachycardia  - HR constantly in 110s per daughter  - Sinus tachycardia on EKG  - Likely 2/2 intravascular depletion due to ascites/anasarca    #Anemia  - Hgb 11.5 on arrival with unclear baseline  - Like 2/2 acute kidney failure or dilutional due to volume overload

## 2021-02-22 NOTE — CONSULT NOTE ADULT - ASSESSMENT
76 year old male with a PMH of CKD (recent Cr 6.6 at [facility]), Hemophilia A, HTN, Gout, and DRESS Syndrome who presents with weakness for 1 week and progressive swelling of his legs and abdomen, admitted for acute on chronic renal failure with volume overload. Hospital course c/b MSSA bacteremia (2/21), started on Ancef. Patient was taken down to hemodialysis on 2/22, during session, patient became hypotensive (SBP 90s prior to HD), and session was immediately stopped.  ICU consulted for need for closer monitoring    #Hypotension  Patient is a new HD pt, first session on 2/22 c/b episode of hypotension. Patient with documented SBP 90s prior to HD, unclear how low BP dropped during HD. Patient was on Lasix 80mg IV PRN, last dose 5am on 2/22  - monitor BP   - goal MAP >65  - plan for HD on 2/23    #ESRD  On HD, first session on 2/22, Nephro following  - f/u renal recs  - plan for HD on 2/23    #MSSA bacteremia      Dispo: [email or web address]    Discussed with attending 76 year old male with a PMH of CKD (recent Cr 6.6 at [facility]), Hemophilia A, HTN, Gout, and DRESS Syndrome who presents with weakness for 1 week and progressive swelling of his legs and abdomen, admitted for acute on chronic renal failure with volume overload. Hospital course c/b MSSA bacteremia (2/21), started on Ancef. Patient was taken down to hemodialysis on 2/22, during session, patient became hypotensive (SBP 90s prior to HD), and session was immediately stopped.  ICU consulted for need for closer monitoring    #Hypotension  Patient is a new HD pt, first session on 2/22 c/b episode of hypotension. Patient with documented SBP 90s prior to HD, unclear how low BP dropped during HD. Patient was on Lasix 80mg IV PRN, last dose 5am on 2/22  - monitor BP   - goal MAP >65  - plan for HD on 2/23    #ESRD  On HD, first session on 2/22, Nephro following  - f/u renal recs  - plan for HD on 2/23    #MSSA bacteremia  Unclear source. Pt with blood cultures on admission growing MSSA  - TTE: no valvular disease  - c/w Ancef  - f/u ID recs    Dispo: [facility]    Discussed with attending 76 year old male with a PMH of CKD (recent Cr 6.6 at [facility]), Hemophilia A, HTN, Gout, and DRESS Syndrome who presents with weakness for 1 week and progressive swelling of his legs and abdomen, admitted for acute on chronic renal failure with volume overload. Hospital course c/b MSSA bacteremia (2/21), started on Ancef. Patient was taken down to hemodialysis on 2/22, during session, patient became hypotensive (SBP 90s prior to HD), and session was immediately stopped.  ICU consulted for need for closer monitoring    #Hypotension  Patient is a new HD pt, first session on 2/22 c/b episode of hypotension. Patient with documented SBP 90s prior to HD, unclear how low BP dropped during HD. Patient was on Lasix 80mg IV PRN, last dose 5am on 2/22  - monitor BP   - goal MAP >65  - plan for HD on 2/23    #ESRD  On HD, first session on 2/22, Nephro following  - f/u renal recs  - plan for HD on 2/23    #MSSA bacteremia  Unclear source. Likely skin given multiple lesions from scratching due to DRESS vs pulmonary source given pleural effusion however unlikely as CT doesn't show any consolidation. UA weakly positive however staph aureus usually does not commonly arise from urinary sources.  - TTE: no valvular disease  - f/u surveillance cultures  - c/w Cefazolin 1g q24h  - f/u ID recs    Dispo: [facility]    Discussed with attending

## 2021-02-22 NOTE — PROGRESS NOTE ADULT - PROBLEM SELECTOR PLAN 6
Continue home Norvasc of 5 mg daily  - Lasix as above for ascites/edema    ADDENDUM:   #elevated troponins: trend to peak, likely occuring in setting of demand ischemia Continue home Norvasc of 5 mg daily  - Lasix as above for ascites/edema

## 2021-02-22 NOTE — CONSULT NOTE ADULT - ASSESSMENT
76 year old male with a past medical history of CKD (recent Cr 6.6 at [facility]), Hemophilia A, HTN, Gout, and DRESS Syndrome who presents with weakness for 1 week. Cardiology consult for volume overload and elevated troponins.    Volume Overload: Patient with anasarca in setting of RU on CKD. Still making urine. EKG with no ischemic changes. Echo shows normal EF, no diastolic dysfunction. Likely secondary to renal failure rather than cardiogenic cause.   -Continue with Lasix 80mg IVP BID.   -Appreciate renal recs.   -STrict I&Os.  -Daily weights.  -Normal EF, no need for GDMT.     Troponemia: Trop T 0.15, flat in setting of RU on CKD. No ischemic changes.   -Likely due to demand and renal failure.  -No need to continue to trend.   -If patient has CP, can check EKG and cardiac enzymes.     Discussed with attending.

## 2021-02-22 NOTE — CONSULT NOTE ADULT - SUBJECTIVE AND OBJECTIVE BOX
HPI:  Mr. Don is a 76 year old male with a past medical history of CKD (recent Cr 6.6 at [facility]), Hemophilia A, HTN, Gout, and DRESS Syndrome who presents with weakness for 1 week. Mr. Don states that he was in his usual state of health (performing all ADLs) until January 1st when he had an acute gout attack. He was treated with allopurinol x 3 weeks where he developed a rash associated with decreased appetite and developed DRESS Syndrome. Subsequently, he was treated with prednisone 60 mg daily until February 7th. On February 14th, he was hospitalized at OSH for hyponatremia and RU (unclear Cr). He was discharged with nephrology follow up. Since that time, he has experienced increasing weakness and swelling in his stomach and legs. The swelling has made it difficult to ambulate due to pain. He denies any fevers/chills, nausea/vomiting, SOB or changes in bowel or urinary habits.     In the ED, he was afebrile (98.7 F), , /80, RR 18, and O2 saturation 95%. Labs were significant for WBC 28.30, Hgb 11.5, K 5.6, CO2 20  , Cr 6.59, albumin 2.7, alk phos 158, BNP 65240. CXR showed pleural effusion left greater than right with dependent edema consistent with volume overload. Imaging significant for a CT abdomen small to moderate bilateral pleural effusions, greater on the left. Mild pericardial effusion. Anasarca greater in left lower chest and trace ascites. LE dopplers negative for DVT. He was given Lasix 80 mg IV and Lokelma 10 g. He was admitted for acute on chronic renal failure and likely induction of hemodialysis.    (21 Feb 2021 03:38)    Patient denies any CP. Still with SOB though, but feels like it is better.     ROS: A 10-point review of systems was otherwise negative.    PAST MEDICAL & SURGICAL HISTORY:  History of BPH    Gout    Hemophilia A    HTN (hypertension)    Uses cochlear implant        SOCIAL HISTORY:  FAMILY HISTORY:  FH: HTN (hypertension)        ALLERGIES: 	  No Known Allergies            MEDICATIONS:  doxazosin 4 milliGRAM(s) Oral daily  heparin   Injectable 5000 Unit(s) SubCutaneous every 8 hours  polyethylene glycol 3350 17 Gram(s) Oral daily  PPD  5 Tuberculin Unit(s) Injectable 5 Unit(s) IntraDermal once  predniSONE   Tablet 60 milliGRAM(s) Oral daily  senna 2 Tablet(s) Oral at bedtime  sevelamer carbonate 800 milliGRAM(s) Oral three times a day with meals  sodium bicarbonate 650 milliGRAM(s) Oral two times a day      PHYSICAL EXAM:  T(C): 37 (02-22-21 @ 17:03), Max: 37.1 (02-22-21 @ 05:53)  HR: 103 (02-22-21 @ 17:03) (102 - 120)  BP: 99/62 (02-22-21 @ 17:03) (98/51 - 133/75)  RR: 17 (02-22-21 @ 17:03) (17 - 19)  SpO2: 93% (02-22-21 @ 17:03) (93% - 95%)  Wt(kg): --    GEN: Awake, comfortable. NAD.   HEENT: NCAT, PERRL, EOMI. Mucosa moist. No JVD.   RESP: B/L crackles on exam.   CV: RRR, normal s1/s2. No m/r/g.  ABD: Soft, NTND. BS+  EXT: Warm. 2-3+ pitting edema, diffusely on B/L UE/LE.   NEURO: AAOx3. No focal deficits.    I&O's Summary    21 Feb 2021 07:01  -  22 Feb 2021 07:00  --------------------------------------------------------  IN: 0 mL / OUT: 2200 mL / NET: -2200 mL    22 Feb 2021 07:01  -  22 Feb 2021 17:10  --------------------------------------------------------  IN: 400 mL / OUT: 65 mL / NET: 335 mL      Height (cm): 172.7 (02-22 @ 16:55)  Weight (kg): 84.5 (02-22 @ 16:55)  BMI (kg/m2): 28.3 (02-22 @ 16:55)  BSA (m2): 1.98 (02-22 @ 16:55)  	  LABS:	 	    CARDIAC MARKERS:  CARDIAC MARKERS ( 21 Feb 2021 16:38 )  x     / 0.15 ng/mL / x     / x     / x      CARDIAC MARKERS ( 21 Feb 2021 10:19 )  x     / 0.15 ng/mL / x     / x     / x      CARDIAC MARKERS ( 20 Feb 2021 22:56 )  x     / 0.10 ng/mL / x     / x     / x                                        11.4   20.63 )-----------( 98       ( 22 Feb 2021 07:14 )             34.3     02-22    141  |  97  |  x   ----------------------------<  186<H>  4.2   |  24  |  6.47<H>    Ca    9.0      22 Feb 2021 16:06  Phos  7.6     02-22  Mg     2.3     02-22    TPro  5.6<L>  /  Alb  2.5<L>  /  TBili  1.2  /  DBili  x   /  AST  25  /  ALT  24  /  AlkPhos  158<H>  02-22    proBNP:   Lipid Profile:   HgA1c:   TSH:         ECG:  NSR	  RADIOLOGY:   ECHO:< from: TTE Echo Complete w/o Contrast w/ Doppler (02.22.21 @ 14:30) >  1. No significant valvular disease.   2. Normal left and right ventricular size and systolic function.   3. Small pericardial effusion without echocardiographic evidence of cardiac tamponade physiology.   4. Left pleural effusion.   5. No prior echo is available for comparison.    < end of copied text >

## 2021-02-22 NOTE — CONSULT NOTE ADULT - SUBJECTIVE AND OBJECTIVE BOX
HPI:  Mr. Don is a 76 year old male with a past medical history of CKD (recent Cr 6.6 at [facility]), Hemophilia A, HTN, Gout, and DRESS Syndrome who presents with weakness for 1 week. Mr. Don states that he was in his usual state of health (performing all ADLs) until  when he had an acute gout attack. He was treated with allopurinol x 3 weeks where he developed a rash associated with decreased appetite and developed DRESS Syndrome. Subsequently, he was treated with prednisone 60 mg daily until . On , he was hospitalized at OSH for hyponatremia and UR (unclear Cr). He was discharged with nephrology follow up. Since that time, he has experienced increasing weakness and swelling in his stomach and legs. The swelling has made it difficult to ambulate due to pain. He denies any fevers/chills, nausea/vomiting, SOB or changes in bowel or urinary habits.     In the ED, he was afebrile (98.7 F), , /80, RR 18, and O2 saturation 95%. Labs were significant for WBC 28.30, Hgb 11.5, K 5.6, CO2 20  , Cr 6.59, albumin 2.7, alk phos 158, BNP 48150. CXR showed pleural effusion left greater than right with dependent edema consistent with volume overload. Imaging significant for a CT abdomen small to moderate bilateral pleural effusions, greater on the left. Mild pericardial effusion. Anasarca greater in left lower chest and trace ascites. LE dopplers negative for DVT. He was given Lasix 80 mg IV and Lokelma 10 g. He was admitted for acute on chronic renal failure and likely induction of hemodialysis.    (2021 03:38)      ADDITIONAL ID HPI:    PAST MEDICAL & SURGICAL HISTORY:  History of BPH    Gout    Hemophilia A    HTN (hypertension)    Uses cochlear implant        Home Medications:  doxazosin 4 mg oral tablet: 1 tab(s) orally once a day (2021 04:36)  Lasix 40 mg oral tablet: 1 tab(s) orally once a day (2021 04:36)  Norvasc 5 mg oral tablet: 1 tab(s) orally once a day (2021 04:35)  predniSONE: 60 milligram(s) orally once a day (2021 06:08)  Renvela 800 mg oral tablet: 1 tab(s) orally 3 times a day (with meals) (2021 04:36)  sodium bicarbonate 650 mg oral tablet: 1 tab(s) orally 2 times a day (2021 04:37)      OTHER MEDICATIONS:  doxazosin 4 milliGRAM(s) Oral daily  heparin   Injectable 5000 Unit(s) SubCutaneous every 8 hours  polyethylene glycol 3350 17 Gram(s) Oral daily  PPD  5 Tuberculin Unit(s) Injectable 5 Unit(s) IntraDermal once  predniSONE   Tablet 60 milliGRAM(s) Oral daily  senna 2 Tablet(s) Oral at bedtime  sevelamer carbonate 800 milliGRAM(s) Oral three times a day with meals  sodium bicarbonate 650 milliGRAM(s) Oral two times a day      ALLERGIES:    Allergies    No Known Allergies    Intolerances        FAMILY HISTORY:  FH: HTN (hypertension)        Social Hx:  Social History:  Seldom alcohol use, denies tobacco use, denies illicit drug use. Performs all ADLs. Lives with wife and son. (2021 03:38)      ROS:    VITAL SIGNS:  Vital Signs Last 24 Hrs  T(C): 36.2 (2021 15:25), Max: 37.1 (2021 05:53)  T(F): 97.2 (2021 15:25), Max: 98.7 (2021 05:53)  HR: 107 (2021 15:25) (102 - 120)  BP: 102/56 (2021 15:25) (98/51 - 133/75)  BP(mean): --  RR: 18 (2021 15:25) (17 - 19)  SpO2: 95% (2021 15:25) (94% - 95%)    PHYSICAL EXAM:  General: in NAD, lying comfortably in bed  HEENT: normocephalic, atraumatic; PERRL, anicteric sclera; MMM  Neck: supple, no JVD, no thyromegaly, no lymphadenopathy  Cardiovascular: +S1/S2, RRR, no M/G/R  Respiratory: clear to auscultation B/L; no wheezing, no rales, no rhonchi  Gastrointestinal: soft, NT/ND; +BSx4, no organomegaly  Extremities: WWP; no edema, clubbing or cyanosis  Vascular: 2+ radial, DP/PT pulses B/L  Neurological: AAOx3; no focal deficits    LABS:                        11.4   20.63 )-----------( 98       ( 2021 07:14 )             34.3         137  |  94<L>  |  108<H>  ----------------------------<  179<H>  4.2   |  21<L>  |  6.66<H>    Ca    9.1      2021 07:14  Phos  7.6       Mg     2.3         TPro  5.6<L>  /  Alb  2.5<L>  /  TBili  1.2  /  DBili  x   /  AST  25  /  ALT  24  /  AlkPhos  158<H>      PT/INR - ( 2021 10:35 )   PT: 17.9 sec;   INR: 1.52          PTT - ( 2021 10:35 )  PTT:44.3 sec  Urinalysis Basic - ( 2021 14:26 )    Color: Yellow / Appearance: Clear / S.015 / pH: x  Gluc: x / Ketone: NEGATIVE  / Bili: Negative / Urobili: 0.2 E.U./dL   Blood: x / Protein: NEGATIVE mg/dL / Nitrite: NEGATIVE   Leuk Esterase: Small / RBC: < 5 /HPF / WBC 5-10 /HPF   Sq Epi: x / Non Sq Epi: 0-5 /HPF / Bacteria: Present /HPF        RADIOLOGY & ADDITIONAL STUDIES:   HPI:  Patient is a 76 year old male with a PMH of CKD (recent Cr 6.6 at [facility]), Hemophilia A, HTN, Gout, and DRESS Syndrome who presents with weakness for 1 week. Patient was recently discharged from [facility] on  with a diagnosis of DRESS secondary to allopurinol used to treat gout.  While at [facility] patient had a creatinine that ranged from 4-6, however baseline is 1.1 (2020). After discharge from [facility] patient complained of weakness and progressive swelling of his legs and abdomen.     Upon ED arrival, T 98.7 F, , /80, RR 18, and O2 saturation 95%. Labs were significant for WBC 28.30, Hgb 11.5, K 5.6, CO2 20  , Cr 6.59, albumin 2.7, alk phos 158, BNP 86359. CXR showed pleural effusion left greater than right with dependent edema consistent with volume overload. Imaging significant for a CT abdomen small to moderate bilateral pleural effusions, greater on the left. Mild pericardial effusion. Anasarca greater in left lower chest and trace ascites. LE dopplers negative for DVT. The patient was given 80mg of IV lasix and admitted for acute renal failure with potential need of dialysis. While on [facility] nephrology was consulted who recommended IV diuresis with plans of hemodialysis on . Hematology was consulted prior to hemodialysis catheter placement due to history of  Hemophilia A. Prior to procedure (HD cath) Factor VIII levels were obtained.  Patient was given 25U/Kg prior to HD cath placement. Patient received echo on  due to pericardial effusion seen on CT chest.  Echo did not show evidence of tamponade physiology. Echo revealed normal left and right ventricular size and systolic function, and left pleural effusion.  Patient was taken down to hemodialysis session today.  During session patient became hypotensive (SBP 90s), and session was immediately stopped.  ICU consulted for need for closer monitoring    Blood cultures grew MSSA started on Ancef.  Around 5:30 Am patient desated to 90 nurse put him on 2L however no history of COPD.  Night team ordered chest X-ray appearing unchanged from CXR on .     ADDITIONAL ID HPI:    PAST MEDICAL & SURGICAL HISTORY:  History of BPH    Gout    Hemophilia A    HTN (hypertension)    Uses cochlear implant        Home Medications:  doxazosin 4 mg oral tablet: 1 tab(s) orally once a day (2021 04:36)  Lasix 40 mg oral tablet: 1 tab(s) orally once a day (2021 04:36)  Norvasc 5 mg oral tablet: 1 tab(s) orally once a day (2021 04:35)  predniSONE: 60 milligram(s) orally once a day (2021 06:08)  Renvela 800 mg oral tablet: 1 tab(s) orally 3 times a day (with meals) (2021 04:36)  sodium bicarbonate 650 mg oral tablet: 1 tab(s) orally 2 times a day (2021 04:37)      OTHER MEDICATIONS:  doxazosin 4 milliGRAM(s) Oral daily  heparin   Injectable 5000 Unit(s) SubCutaneous every 8 hours  polyethylene glycol 3350 17 Gram(s) Oral daily  PPD  5 Tuberculin Unit(s) Injectable 5 Unit(s) IntraDermal once  predniSONE   Tablet 60 milliGRAM(s) Oral daily  senna 2 Tablet(s) Oral at bedtime  sevelamer carbonate 800 milliGRAM(s) Oral three times a day with meals  sodium bicarbonate 650 milliGRAM(s) Oral two times a day      ALLERGIES:    Allergies    No Known Allergies    Intolerances        FAMILY HISTORY:  FH: HTN (hypertension)        Social Hx:  Social History:  Seldom alcohol use, denies tobacco use, denies illicit drug use. Performs all ADLs. Lives with wife and son. (2021 03:38)      ROS:    VITAL SIGNS:  Vital Signs Last 24 Hrs  T(C): 36.2 (2021 15:25), Max: 37.1 (2021 05:53)  T(F): 97.2 (2021 15:25), Max: 98.7 (2021 05:53)  HR: 107 (2021 15:25) (102 - 120)  BP: 102/56 (2021 15:25) (98/51 - 133/75)  BP(mean): --  RR: 18 (2021 15:25) (17 - 19)  SpO2: 95% (2021 15:25) (94% - 95%)    PHYSICAL EXAM:  General: in NAD, lying comfortably in bed  HEENT: normocephalic, atraumatic; PERRL, anicteric sclera; MMM  Neck: supple, no JVD, no thyromegaly, no lymphadenopathy  Cardiovascular: +S1/S2, RRR, no M/G/R  Respiratory: clear to auscultation B/L; no wheezing, no rales, no rhonchi  Gastrointestinal: soft, NT/ND; +BSx4, no organomegaly  Extremities: WWP; no edema, clubbing or cyanosis  Vascular: 2+ radial, DP/PT pulses B/L  Neurological: AAOx3; no focal deficits    LABS:                        11.4   20.63 )-----------( 98       ( 2021 07:14 )             34.3         137  |  94<L>  |  108<H>  ----------------------------<  179<H>  4.2   |  21<L>  |  6.66<H>    Ca    9.1      2021 07:14  Phos  7.6       Mg     2.3         TPro  5.6<L>  /  Alb  2.5<L>  /  TBili  1.2  /  DBili  x   /  AST  25  /  ALT  24  /  AlkPhos  158<H>      PT/INR - ( 2021 10:35 )   PT: 17.9 sec;   INR: 1.52          PTT - ( 2021 10:35 )  PTT:44.3 sec  Urinalysis Basic - ( 2021 14:26 )    Color: Yellow / Appearance: Clear / S.015 / pH: x  Gluc: x / Ketone: NEGATIVE  / Bili: Negative / Urobili: 0.2 E.U./dL   Blood: x / Protein: NEGATIVE mg/dL / Nitrite: NEGATIVE   Leuk Esterase: Small / RBC: < 5 /HPF / WBC 5-10 /HPF   Sq Epi: x / Non Sq Epi: 0-5 /HPF / Bacteria: Present /HPF        RADIOLOGY & ADDITIONAL STUDIES:   HPI:  Patient is a 76 year old male with a PMH of CKD (recent Cr 6.6 at [facility]), Hemophilia A, HTN, Gout, and DRESS Syndrome who presents with weakness for 1 week and progressive swelling of his legs and abdomen, admitted for acute on chronic renal failure with volume overload. Patient was diuresed with lasix PRN. CXR showed pleural effusion left greater than right with dependent edema consistent with volume overload. CT abdomen small to moderate bilateral pleural effusions, greater on the left. Mild pericardial effusion. Anasarca greater in left lower chest and trace ascites. Echo on  did not show evidence of tamponade physiology, with normal left and right ventricular size and systolic function, and left pleural effusion. Hospital course c/b MSSA bacteremia (), started on Ancef. Patient was taken down to hemodialysis on , during session, patient became hypotensive (SBP 90s), and session was immediately stopped.  ICU consulted for need for closer monitoring    Subjective: patient seen and examined at bedside, sitting up in bed, in NAD. No acute complaints, patient not aware of his BP issues during HD. Denies chest pain, palpitations, headache, lightheadedness, dizziness, blurry vision, nausea/vomiting.     PAST MEDICAL & SURGICAL HISTORY:  History of BPH    Gout    Hemophilia A    HTN (hypertension)    Uses cochlear implant        Home Medications:  doxazosin 4 mg oral tablet: 1 tab(s) orally once a day (2021 04:36)  Lasix 40 mg oral tablet: 1 tab(s) orally once a day (2021 04:36)  Norvasc 5 mg oral tablet: 1 tab(s) orally once a day (2021 04:35)  predniSONE: 60 milligram(s) orally once a day (2021 06:08)  Renvela 800 mg oral tablet: 1 tab(s) orally 3 times a day (with meals) (2021 04:36)  sodium bicarbonate 650 mg oral tablet: 1 tab(s) orally 2 times a day (2021 04:37)      OTHER MEDICATIONS:  doxazosin 4 milliGRAM(s) Oral daily  heparin   Injectable 5000 Unit(s) SubCutaneous every 8 hours  polyethylene glycol 3350 17 Gram(s) Oral daily  PPD  5 Tuberculin Unit(s) Injectable 5 Unit(s) IntraDermal once  predniSONE   Tablet 60 milliGRAM(s) Oral daily  senna 2 Tablet(s) Oral at bedtime  sevelamer carbonate 800 milliGRAM(s) Oral three times a day with meals  sodium bicarbonate 650 milliGRAM(s) Oral two times a day      ALLERGIES:    Allergies    No Known Allergies    Intolerances    FAMILY HISTORY:  FH: HTN (hypertension)    Social Hx:  Social History:  Seldom alcohol use, denies tobacco use, denies illicit drug use. Performs all ADLs. Lives with wife and son. (2021 03:38)    ROS: as per HPI    VITAL SIGNS:  Vital Signs Last 24 Hrs  T(C): 36.2 (2021 15:25), Max: 37.1 (2021 05:53)  T(F): 97.2 (2021 15:25), Max: 98.7 (2021 05:53)  HR: 107 (2021 15:25) (102 - 120)  BP: 102/56 (2021 15:25) (98/51 - 133/75)  BP(mean): --  RR: 18 (2021 15:25) (17 - 19)  SpO2: 95% (2021 15:25) (94% - 95%)    PHYSICAL EXAM:  General: in NAD, sitting up comfortably in bed, NC in place  HEENT: normocephalic, atraumatic; PERRL, anicteric sclera; MMM  Neck: supple, no JVD, no thyromegaly, no lymphadenopathy  Cardiovascular: +S1/S2, RRR, no M/G/R  Respiratory: decreased breath sounds on b/l bases; no increased WOB  Gastrointestinal: soft, NT/ND; +BSx4, no organomegaly  Extremities: WWP; 3+ pitting edema to abdomen, no clubbing or cyanosis  Vascular: 2+ radial, DP/PT pulses B/L  Neurological: AAOx3 (person, place, time); following commands, moving all extremities    LABS:                        11.4   20.63 )-----------( 98       ( 2021 07:14 )             34.3     02-22    137  |  94<L>  |  108<H>  ----------------------------<  179<H>  4.2   |  21<L>  |  6.66<H>    Ca    9.1      2021 07:14  Phos  7.6       Mg     2.3         TPro  5.6<L>  /  Alb  2.5<L>  /  TBili  1.2  /  DBili  x   /  AST  25  /  ALT  24  /  AlkPhos  158<H>      PT/INR - ( 2021 10:35 )   PT: 17.9 sec;   INR: 1.52          PTT - ( 2021 10:35 )  PTT:44.3 sec  Urinalysis Basic - ( 2021 14:26 )    Color: Yellow / Appearance: Clear / S.015 / pH: x  Gluc: x / Ketone: NEGATIVE  / Bili: Negative / Urobili: 0.2 E.U./dL   Blood: x / Protein: NEGATIVE mg/dL / Nitrite: NEGATIVE   Leuk Esterase: Small / RBC: < 5 /HPF / WBC 5-10 /HPF   Sq Epi: x / Non Sq Epi: 0-5 /HPF / Bacteria: Present /HPF    RADIOLOGY & ADDITIONAL STUDIES:  Xray Chest 1 View-PORTABLE IMMEDIATE (Xray Chest 1 View-PORTABLE IMMEDIATE .) (21 @ 06:22)  Frontal chest.    COMPARISON: 2021.    Findings/  impression: Stable heart size. Left basilar opacity/pleural effusion, increased. Right basilar opacity/pleural effusion.. Stable bony structures.    CT Abdomen and Pelvis No Cont (21 @ 02:30)     IMPRESSION:  1.  Small to moderate bilateral pleuraleffusions, greater on the left. Left greater than right lower lobe passive atelectasis.  2.  Mild pericardial effusion.  3.  Anasarca, greater in the lateral left lower chest/upper abdominal wall.  4.  Trace ascites. HPI:  Patient is a 76 year old male with a PMH of CKD (recent Cr 6.6 at [facility]), Hemophilia A, HTN, Gout, and DRESS Syndrome who presents with weakness for 1 week and progressive swelling of his legs and abdomen, admitted for acute on chronic renal failure with volume overload. Patient was diuresed with lasix PRN. CXR showed pleural effusion left greater than right with dependent edema consistent with volume overload. CT abdomen small to moderate bilateral pleural effusions, greater on the left. Mild pericardial effusion. Anasarca greater in left lower chest and trace ascites. Echo on  did not show evidence of tamponade physiology, with normal left and right ventricular size and systolic function, and left pleural effusion.     Hospital course c/b MSSA bacteremia (), started on Ancef. Patient was taken down to hemodialysis on , during session, patient became hypotensive (SBP 90s prior to HD), and session was immediately stopped.  ICU consulted for need for closer monitoring    Subjective: patient seen and examined at bedside, sitting up in bed, in NAD. No acute complaints, patient not aware of his BP issues during HD. Denies chest pain, palpitations, headache, lightheadedness, dizziness, blurry vision, nausea/vomiting.     PAST MEDICAL & SURGICAL HISTORY:  History of BPH    Gout    Hemophilia A    HTN (hypertension)    Uses cochlear implant    Home Medications:  doxazosin 4 mg oral tablet: 1 tab(s) orally once a day (2021 04:36)  Lasix 40 mg oral tablet: 1 tab(s) orally once a day (2021 04:36)  Norvasc 5 mg oral tablet: 1 tab(s) orally once a day (2021 04:35)  predniSONE: 60 milligram(s) orally once a day (2021 06:08)  Renvela 800 mg oral tablet: 1 tab(s) orally 3 times a day (with meals) (2021 04:36)  sodium bicarbonate 650 mg oral tablet: 1 tab(s) orally 2 times a day (2021 04:37)      OTHER MEDICATIONS:  doxazosin 4 milliGRAM(s) Oral daily  heparin   Injectable 5000 Unit(s) SubCutaneous every 8 hours  polyethylene glycol 3350 17 Gram(s) Oral daily  PPD  5 Tuberculin Unit(s) Injectable 5 Unit(s) IntraDermal once  predniSONE   Tablet 60 milliGRAM(s) Oral daily  senna 2 Tablet(s) Oral at bedtime  sevelamer carbonate 800 milliGRAM(s) Oral three times a day with meals  sodium bicarbonate 650 milliGRAM(s) Oral two times a day      ALLERGIES:    Allergies    No Known Allergies    Intolerances    FAMILY HISTORY:  FH: HTN (hypertension)    Social Hx:  Social History:  Seldom alcohol use, denies tobacco use, denies illicit drug use. Performs all ADLs. Lives with wife and son. (2021 03:38)    ROS: as per HPI    VITAL SIGNS:  Vital Signs Last 24 Hrs  T(C): 36.2 (2021 15:25), Max: 37.1 (2021 05:53)  T(F): 97.2 (2021 15:25), Max: 98.7 (2021 05:53)  HR: 107 (2021 15:25) (102 - 120)  BP: 102/56 (2021 15:25) (98/51 - 133/75)  BP(mean): --  RR: 18 (2021 15:25) (17 - 19)  SpO2: 95% (2021 15:25) (94% - 95%)    PHYSICAL EXAM:  General: in NAD, sitting up comfortably in bed, NC in place  HEENT: normocephalic, atraumatic; PERRL, anicteric sclera; MMM  Neck: supple, no JVD, no thyromegaly, no lymphadenopathy  Cardiovascular: +S1/S2, RRR, no M/G/R  Respiratory: decreased breath sounds on b/l bases; no increased WOB  Gastrointestinal: soft, NT/ND; +BSx4, no organomegaly  Extremities: WWP; 3+ pitting edema to abdomen, no clubbing or cyanosis  Vascular: 2+ radial, DP/PT pulses B/L  Neurological: AAOx3 (person, place, time); following commands, moving all extremities    LABS:                        11.4   20.63 )-----------( 98       ( 2021 07:14 )             34.3     02-22    137  |  94<L>  |  108<H>  ----------------------------<  179<H>  4.2   |  21<L>  |  6.66<H>    Ca    9.1      2021 07:14  Phos  7.6       Mg     2.3         TPro  5.6<L>  /  Alb  2.5<L>  /  TBili  1.2  /  DBili  x   /  AST  25  /  ALT  24  /  AlkPhos  158<H>      PT/INR - ( 2021 10:35 )   PT: 17.9 sec;   INR: 1.52          PTT - ( 2021 10:35 )  PTT:44.3 sec  Urinalysis Basic - ( 2021 14:26 )    Color: Yellow / Appearance: Clear / S.015 / pH: x  Gluc: x / Ketone: NEGATIVE  / Bili: Negative / Urobili: 0.2 E.U./dL   Blood: x / Protein: NEGATIVE mg/dL / Nitrite: NEGATIVE   Leuk Esterase: Small / RBC: < 5 /HPF / WBC 5-10 /HPF   Sq Epi: x / Non Sq Epi: 0-5 /HPF / Bacteria: Present /HPF    RADIOLOGY & ADDITIONAL STUDIES:  Xray Chest 1 View-PORTABLE IMMEDIATE (Xray Chest 1 View-PORTABLE IMMEDIATE .) (21 @ 06:22)  Frontal chest.    COMPARISON: 2021.    Findings/  impression: Stable heart size. Left basilar opacity/pleural effusion, increased. Right basilar opacity/pleural effusion.. Stable bony structures.    CT Abdomen and Pelvis No Cont (21 @ 02:30)     IMPRESSION:  1.  Small to moderate bilateral pleural effusions, greater on the left. Left greater than right lower lobe passive atelectasis.  2.  Mild pericardial effusion.  3.  Anasarca, greater in the lateral left lower chest/upper abdominal wall.  4.  Trace ascites. HPI:  Patient is a 76 year old male with a PMH of CKD (recent Cr 6.6 at [facility]), Hemophilia A, HTN, Gout, and DRESS Syndrome who presents with weakness for 1 week and progressive swelling of his legs and abdomen, admitted for acute on chronic renal failure with volume overload. Patient was diuresed with lasix PRN. CXR showed pleural effusion left greater than right with dependent edema consistent with volume overload. CT abdomen small to moderate bilateral pleural effusions, greater on the left. Mild pericardial effusion. Anasarca greater in left lower chest and trace ascites. Echo on  did not show evidence of tamponade physiology, with normal left and right ventricular size and systolic function, and left pleural effusion.     Hospital course c/b MSSA bacteremia (), started on Ancef. Patient was taken down to hemodialysis on , during session, patient became hypotensive (SBP 90s prior to HD), and session was immediately stopped.  ICU consulted for need for closer monitoring    Subjective: patient seen and examined at bedside, sitting up in bed, in NAD. No acute complaints, patient not aware of his BP issues during HD. Denies chest pain, palpitations, headache, lightheadedness, dizziness, blurry vision, nausea/vomiting.     PAST MEDICAL & SURGICAL HISTORY:  History of BPH    Gout    Hemophilia A    HTN (hypertension)    Uses cochlear implant    Home Medications:  doxazosin 4 mg oral tablet: 1 tab(s) orally once a day (2021 04:36)  Lasix 40 mg oral tablet: 1 tab(s) orally once a day (2021 04:36)  Norvasc 5 mg oral tablet: 1 tab(s) orally once a day (2021 04:35)  predniSONE: 60 milligram(s) orally once a day (2021 06:08)  Renvela 800 mg oral tablet: 1 tab(s) orally 3 times a day (with meals) (2021 04:36)  sodium bicarbonate 650 mg oral tablet: 1 tab(s) orally 2 times a day (2021 04:37)      OTHER MEDICATIONS:  doxazosin 4 milliGRAM(s) Oral daily  heparin   Injectable 5000 Unit(s) SubCutaneous every 8 hours  polyethylene glycol 3350 17 Gram(s) Oral daily  PPD  5 Tuberculin Unit(s) Injectable 5 Unit(s) IntraDermal once  predniSONE   Tablet 60 milliGRAM(s) Oral daily  senna 2 Tablet(s) Oral at bedtime  sevelamer carbonate 800 milliGRAM(s) Oral three times a day with meals  sodium bicarbonate 650 milliGRAM(s) Oral two times a day      ALLERGIES:    Allergies    No Known Allergies    Intolerances    FAMILY HISTORY:  FH: HTN (hypertension)    Social Hx:  Social History:  Seldom alcohol use, denies tobacco use, denies illicit drug use. Performs all ADLs. Lives with wife and son. (2021 03:38)    ROS: as per HPI    VITAL SIGNS:  Vital Signs Last 24 Hrs  T(C): 36.2 (2021 15:25), Max: 37.1 (2021 05:53)  T(F): 97.2 (2021 15:25), Max: 98.7 (2021 05:53)  HR: 107 (2021 15:25) (102 - 120)  BP: 102/56 (2021 15:25) (98/51 - 133/75)  BP(mean): --  RR: 18 (2021 15:25) (17 - 19)  SpO2: 95% (2021 15:25) (94% - 95%)    PHYSICAL EXAM:  General: in NAD, sitting up comfortably in bed, NC in place  HEENT: normocephalic, atraumatic; PERRL, anicteric sclera; MMM  Neck: supple, no JVD, no thyromegaly, no lymphadenopathy  Cardiovascular: +S1/S2, RRR, no M/G/R  Respiratory: decreased breath sounds on b/l bases; no increased WOB  Gastrointestinal: soft, NT/ND; +BSx4, no organomegaly  Genitourinary: +primafit in place, draining yellow urine  Extremities: WWP; 3+ pitting edema to abdomen, +R groin HD cath  Vascular: 2+ radial, DP/PT pulses B/L  Neurological: AAOx3 (person, place, time); following commands, moving all extremities    LABS:                        11.4   20.63 )-----------( 98       ( 2021 07:14 )             34.3     02-22    137  |  94<L>  |  108<H>  ----------------------------<  179<H>  4.2   |  21<L>  |  6.66<H>    Ca    9.1      2021 07:14  Phos  7.6       Mg     2.3         TPro  5.6<L>  /  Alb  2.5<L>  /  TBili  1.2  /  DBili  x   /  AST  25  /  ALT  24  /  AlkPhos  158<H>      PT/INR - ( 2021 10:35 )   PT: 17.9 sec;   INR: 1.52          PTT - ( 2021 10:35 )  PTT:44.3 sec  Urinalysis Basic - ( 2021 14:26 )    Color: Yellow / Appearance: Clear / S.015 / pH: x  Gluc: x / Ketone: NEGATIVE  / Bili: Negative / Urobili: 0.2 E.U./dL   Blood: x / Protein: NEGATIVE mg/dL / Nitrite: NEGATIVE   Leuk Esterase: Small / RBC: < 5 /HPF / WBC 5-10 /HPF   Sq Epi: x / Non Sq Epi: 0-5 /HPF / Bacteria: Present /HPF    RADIOLOGY & ADDITIONAL STUDIES:  Xray Chest 1 View-PORTABLE IMMEDIATE (Xray Chest 1 View-PORTABLE IMMEDIATE .) (21 @ 06:22)  Frontal chest.    COMPARISON: 2021.    Findings/  impression: Stable heart size. Left basilar opacity/pleural effusion, increased. Right basilar opacity/pleural effusion.. Stable bony structures.    CT Abdomen and Pelvis No Cont (21 @ 02:30)     IMPRESSION:  1.  Small to moderate bilateral pleural effusions, greater on the left. Left greater than right lower lobe passive atelectasis.  2.  Mild pericardial effusion.  3.  Anasarca, greater in the lateral left lower chest/upper abdominal wall.  4.  Trace ascites.

## 2021-02-22 NOTE — PROGRESS NOTE ADULT - SUBJECTIVE AND OBJECTIVE BOX
O/N Events: Blood cultures grew MSSA started on Ance[facility].  Around 5:30 Am patient desated to 90 nurse put him on 2L however no history of COPD.  Night team ordered chest X-ray appearing unchanged from CXR on .     Subjective/ROS: Patient stated he fel short of breath, but no cough, or pleuritic chest pain. Patient also stated he had pain in his feet bilaterally.  12pt ROS otherwise negative.    VITALS  Vital Signs Last 24 Hrs  T(C): 37.1 (2021 05:53), Max: 37.1 (2021 05:53)  T(F): 98.7 (2021 05:53), Max: 98.7 (2021 05:53)  HR: 120 (2021 05:53) (104 - 120)  BP: 117/71 (2021 05:53) (117/71 - 133/75)  BP(mean): --  RR: 17 (2021 05:53) (17 - 18)  SpO2: 94% (2021 05:53) (93% - 94%)    CAPILLARY BLOOD GLUCOSE    PHYSICAL EXAM  General: A&Ox3; NAD, Patient was taken off O2 and was able to converse well and remained at 92% saturation  Head: NC/AT; MMM; PERRL; EOMI;  Neck: Supple; no JVD  Respiratory: CTA B/L; no wheezes/crackles   Cardiovascular: Tachy, S1/S2   Gastrointestinal: Soft; NTND; normoactive BS  Extremities: WWP; Edema 2+ in ankles, reduces at shin level, 2+ in the thighs, 1+ in the upper extermity   Neurological:  CNII-XII grossly intact; no obvious focal deficits    MEDICATIONS  (STANDING):  amLODIPine   Tablet 5 milliGRAM(s) Oral daily  doxazosin 4 milliGRAM(s) Oral daily  furosemide   Injectable 80 milliGRAM(s) IV Push two times a day  heparin   Injectable 5000 Unit(s) SubCutaneous every 8 hours  polyethylene glycol 3350 17 Gram(s) Oral daily  predniSONE   Tablet 60 milliGRAM(s) Oral daily  senna 2 Tablet(s) Oral at bedtime  sevelamer carbonate 800 milliGRAM(s) Oral three times a day with meals  sodium bicarbonate 650 milliGRAM(s) Oral two times a day  sodium zirconium cyclosilicate 10 Gram(s) Oral every 8 hours    MEDICATIONS  (PRN):      No Known Allergies      LABS                        11.4   20.63 )-----------( 98       ( 2021 07:14 )             34.3     02    137  |  94<L>  |  108<H>  ----------------------------<  179<H>  4.2   |  21<L>  |  6.66<H>    Ca    9.1      2021 07:14  Phos  7.6       Mg     2.3         TPro  5.6<L>  /  Alb  2.5<L>  /  TBili  1.2  /  DBili  x   /  AST  25  /  ALT  24  /  AlkPhos  158<H>      PT/INR - ( 2021 10:35 )   PT: 17.9 sec;   INR: 1.52          PTT - ( 2021 10:35 )  PTT:44.3 sec  Urinalysis Basic - ( 2021 14:26 )    Color: Yellow / Appearance: Clear / S.015 / pH: x  Gluc: x / Ketone: NEGATIVE  / Bili: Negative / Urobili: 0.2 E.U./dL   Blood: x / Protein: NEGATIVE mg/dL / Nitrite: NEGATIVE   Leuk Esterase: Small / RBC: < 5 /HPF / WBC 5-10 /HPF   Sq Epi: x / Non Sq Epi: 0-5 /HPF / Bacteria: Present /HPF      CARDIAC MARKERS ( 2021 16:38 )  x     / 0.15 ng/mL / x     / x     / x      CARDIAC MARKERS ( 2021 10:19 )  x     / 0.15 ng/mL / x     / x     / x      CARDIAC MARKERS ( 2021 22:56 )  x     / 0.10 ng/mL / x     / x     / x         Transfer from [facility] to [facility].     Hospital Course:     76 year old male with a past medical history of CKD (recent Cr 6.6 at [facility]), Hemophilia A, HTN, Gout, and DRESS Syndrome who presents with weakness for 1 week. Patient was recently discharged from [facility] on  with a diagnosis of DRESS secondary to allopurinol used to treat gout.  While at [facility] patient had a creatinine that ranged from 4-6, however baseline is 1.1 (2020). After discharge from [facility] patient complained of weakness and progressive swelling of his legs and abdomen.  While in the [facility] ED he was afebrile (98.7 F), , /80, RR 18, and O2 saturation 95%. Labs were significant for WBC 28.30, Hgb 11.5, K 5.6, CO2 20  , Cr 6.59, albumin 2.7, alk phos 158, BNP 42400. CXR showed pleural effusion left greater than right with dependent edema consistent with volume overload. Imaging significant for a CT abdomen small to moderate bilateral pleural effusions, greater on the left. Mild pericardial effusion. Anasarca greater in left lower chest and trace ascites. LE dopplers negative for DVT. The patient was given 80mg of IV lasix and admitted for acute renal failure with potential need of dialysis. While on [facility] nephrology was consulted who recommended IV diuresis with palns of hemodialysis on . Hematology was consulted prior to hemodialysis catheter placement due to history of  Hemophilia A. Prior to procedure (HD cath) Factor VIII levels were obtained.  Patient was given 25U/Kg prior to HD cath placement. Patient received echo on  due to pericardial effusion seen on CT chest.  Echo did not show evidence of tamponade physiology. Echo revealed normal left and right ventricular size and systolic function, and left pleural effusion.  Patient was taken down to hemodialysis session today.  During session patient became hypotensive (BP unknown), and session was immediately stopped.  Patient will require closer monitoring and will be stepped up to [facility].       O/N Events: Blood cultures grew MSSA started on Ancef.  Around 5:30 Am patient desated to 90 nurse put him on 2L however no history of COPD.  Night team ordered chest X-ray appearing unchanged from CXR on .     Subjective/ROS: Patient stated he fel short of breath, but no cough, or pleuritic chest pain. Patient also stated he had pain in his feet bilaterally.  12pt ROS otherwise negative.    VITALS  Vital Signs Last 24 Hrs  T(C): 37.1 (2021 05:53), Max: 37.1 (2021 05:53)  T(F): 98.7 (2021 05:53), Max: 98.7 (2021 05:53)  HR: 120 (2021 05:53) (104 - 120)  BP: 117/71 (2021 05:53) (117/71 - 133/75)  BP(mean): --  RR: 17 (2021 05:53) (17 - 18)  SpO2: 94% (2021 05:53) (93% - 94%)    CAPILLARY BLOOD GLUCOSE    PHYSICAL EXAM  General: A&Ox3; NAD, Patient was taken off O2 and was able to converse well and remained at 92% saturation  Head: NC/AT; MMM; PERRL; EOMI;  Neck: Supple; no JVD  Respiratory: CTA B/L; no wheezes/crackles   Cardiovascular: Tachy, S1/S2   Gastrointestinal: Soft; NTND; normoactive BS  : Erythema in inguinal region   Extremities: WWP; Edema 2+ in ankles, reduces at shin level, 2+ in the thighs, 1+ in the upper extermity   Neurological:  CNII-XII grossly intact; no obvious focal deficits    MEDICATIONS  (STANDING):  amLODIPine   Tablet 5 milliGRAM(s) Oral daily  doxazosin 4 milliGRAM(s) Oral daily  furosemide   Injectable 80 milliGRAM(s) IV Push two times a day  heparin   Injectable 5000 Unit(s) SubCutaneous every 8 hours  polyethylene glycol 3350 17 Gram(s) Oral daily  predniSONE   Tablet 60 milliGRAM(s) Oral daily  senna 2 Tablet(s) Oral at bedtime  sevelamer carbonate 800 milliGRAM(s) Oral three times a day with meals  sodium bicarbonate 650 milliGRAM(s) Oral two times a day  sodium zirconium cyclosilicate 10 Gram(s) Oral every 8 hours    MEDICATIONS  (PRN):      No Known Allergies      LABS                        11.4   20.63 )-----------( 98       ( 2021 07:14 )             34.3     02    137  |  94<L>  |  108<H>  ----------------------------<  179<H>  4.2   |  21<L>  |  6.66<H>    Ca    9.1      2021 07:14  Phos  7.6       Mg     2.3         TPro  5.6<L>  /  Alb  2.5<L>  /  TBili  1.2  /  DBili  x   /  AST  25  /  ALT  24  /  AlkPhos  158<H>      PT/INR - ( 2021 10:35 )   PT: 17.9 sec;   INR: 1.52          PTT - ( 2021 10:35 )  PTT:44.3 sec  Urinalysis Basic - ( 2021 14:26 )    Color: Yellow / Appearance: Clear / S.015 / pH: x  Gluc: x / Ketone: NEGATIVE  / Bili: Negative / Urobili: 0.2 E.U./dL   Blood: x / Protein: NEGATIVE mg/dL / Nitrite: NEGATIVE   Leuk Esterase: Small / RBC: < 5 /HPF / WBC 5-10 /HPF   Sq Epi: x / Non Sq Epi: 0-5 /HPF / Bacteria: Present /HPF      CARDIAC MARKERS ( 2021 16:38 )  x     / 0.15 ng/mL / x     / x     / x      CARDIAC MARKERS ( 2021 10:19 )  x     / 0.15 ng/mL / x     / x     / x      CARDIAC MARKERS ( 2021 22:56 )  x     / 0.10 ng/mL / x     / x     / x         Transfer from [facility] to [facility].     Hospital Course:     76 year old male with a past medical history of CKD (recent Cr 6.6 at [facility]), Hemophilia A, HTN, Gout, and DRESS Syndrome who presents with weakness for 1 week. Patient was recently discharged from [facility] on  with a diagnosis of DRESS secondary to allopurinol used to treat gout.  While at [facility] patient had a creatinine that ranged from 4-6, however baseline is 1.1 (2020). After discharge from [facility] patient complained of weakness and progressive swelling of his legs and abdomen.  While in the [facility] ED he was afebrile (98.7 F), , /80, RR 18, and O2 saturation 95%. Labs were significant for WBC 28.30, Hgb 11.5, K 5.6, CO2 20  , Cr 6.59, albumin 2.7, alk phos 158, BNP 11896. CXR showed pleural effusion left greater than right with dependent edema consistent with volume overload. Imaging significant for a CT abdomen small to moderate bilateral pleural effusions, greater on the left. Mild pericardial effusion. Anasarca greater in left lower chest and trace ascites. LE dopplers negative for DVT. The patient was given 80mg of IV lasix and admitted for acute renal failure with potential need of dialysis. While on [facility] nephrology was consulted who recommended IV diuresis with palns of hemodialysis on . Hematology was consulted prior to hemodialysis catheter placement due to history of  Hemophilia A.  On the morning of  blood cultures grew MSSA.  At 3:00AM patient was started on Ancef, and the patient had desaturated to 90s and was placed on 2L of oxygen.  Prior to procedure (HD cath) Factor VIII levels were obtained.  Patient was given 25U/Kg prior to HD cath placement. Patient received echo on  due to pericardial effusion seen on CT chest.  Echo did not show evidence of tamponade physiology. Echo revealed normal left and right ventricular size and systolic function, and left pleural effusion.  Patient was taken down to hemodialysis session today.  During session patient became hypotensive (BP unknown), and session was immediately stopped.  Patient will require closer monitoring and will be stepped up to [facility].       O/N Events: Blood cultures grew MSSA started on [facility]ce[facility].  Around 5:30 Am patient desated to 90 nurse put him on 2L however no history of COPD.  Night team ordered chest X-ray appearing unchanged from CXR on .     Subjective/ROS: Patient stated he felt short of breath, but no cough, or pleuritic chest pain. Patient also stated he had pain in his feet bilaterally.  12pt ROS otherwise negative.    VITALS  Vital Signs Last 24 Hrs  T(C): 37.1 (2021 05:53), Max: 37.1 (2021 05:53)  T(F): 98.7 (2021 05:53), Max: 98.7 (2021 05:53)  HR: 120 (2021 05:53) (104 - 120)  BP: 117/71 (2021 05:53) (117/71 - 133/75)  BP(mean): --  RR: 17 (2021 05:53) (17 - 18)  SpO2: 94% (2021 05:53) (93% - 94%)    CAPILLARY BLOOD GLUCOSE    PHYSICAL EXAM  General: A&Ox3; NAD, Patient was taken off O2 and was able to converse well and remained at 92% saturation  Head: NC/AT; MMM; PERRL; EOMI;  Neck: Supple; no JVD  Respiratory: CTA B/L; no wheezes/crackles   Cardiovascular: Tachy, S1/S2   Gastrointestinal: Soft; NTND; normoactive BS  : Erythema in inguinal region   Extremities: WWP; Edema 2+ in ankles, reduces at shin level, 2+ in the thighs, 1+ in the upper extremity   Neurological:  CNII-XII grossly intact; no obvious focal deficits    MEDICATIONS  (STANDING):  amLODIPine   Tablet 5 milliGRAM(s) Oral daily  doxazosin 4 milliGRAM(s) Oral daily  furosemide   Injectable 80 milliGRAM(s) IV Push two times a day  heparin   Injectable 5000 Unit(s) SubCutaneous every 8 hours  polyethylene glycol 3350 17 Gram(s) Oral daily  predniSONE   Tablet 60 milliGRAM(s) Oral daily  senna 2 Tablet(s) Oral at bedtime  sevelamer carbonate 800 milliGRAM(s) Oral three times a day with meals  sodium bicarbonate 650 milliGRAM(s) Oral two times a day  sodium zirconium cyclosilicate 10 Gram(s) Oral every 8 hours    MEDICATIONS  (PRN):      No Known Allergies      LABS                        11.4   20.63 )-----------( 98       ( 2021 07:14 )             34.3     02    137  |  94<L>  |  108<H>  ----------------------------<  179<H>  4.2   |  21<L>  |  6.66<H>    Ca    9.1      2021 07:14  Phos  7.6       Mg     2.3         TPro  5.6<L>  /  Alb  2.5<L>  /  TBili  1.2  /  DBili  x   /  AST  25  /  ALT  24  /  AlkPhos  158<H>      PT/INR - ( 2021 10:35 )   PT: 17.9 sec;   INR: 1.52          PTT - ( 2021 10:35 )  PTT:44.3 sec  Urinalysis Basic - ( 2021 14:26 )    Color: Yellow / Appearance: Clear / S.015 / pH: x  Gluc: x / Ketone: NEGATIVE  / Bili: Negative / Urobili: 0.2 E.U./dL   Blood: x / Protein: NEGATIVE mg/dL / Nitrite: NEGATIVE   Leuk Esterase: Small / RBC: < 5 /HPF / WBC 5-10 /HPF   Sq Epi: x / Non Sq Epi: 0-5 /HPF / Bacteria: Present /HPF      CARDIAC MARKERS ( 2021 16:38 )  x     / 0.15 ng/mL / x     / x     / x      CARDIAC MARKERS ( 2021 10:19 )  x     / 0.15 ng/mL / x     / x     / x      CARDIAC MARKERS ( 2021 22:56 )  x     / 0.10 ng/mL / x     / x     / x

## 2021-02-22 NOTE — PROGRESS NOTE ADULT - SUBJECTIVE AND OBJECTIVE BOX
Patient is a 76y Male seen and evaluated at bedside.   ~400cc/12h UOP   +SOB overnight, no CP fever  on RA now   BP is acceptable  for hemodialysis initiation     Meds:    amLODIPine   Tablet 5 daily  doxazosin 4 daily  furosemide   Injectable 80 two times a day  heparin   Injectable 5000 every 8 hours  polyethylene glycol 3350 17 daily  predniSONE   Tablet 60 daily  senna 2 at bedtime  sevelamer carbonate 800 three times a day with meals  sodium bicarbonate 650 two times a day  sodium zirconium cyclosilicate 10 every 8 hours      T(C): , Max: 37.1 (21 @ 05:53)  T(F): , Max: 98.7 (21 @ 05:53)  HR: 120 (21 @ 05:53)  BP: 117/71 (21 @ 05:53)  BP(mean): --  RR: 17 (21 @ 05:53)  SpO2: 94% (21 @ 05:53)  Wt(kg): --     @ 07:01  -   @ 07:00  --------------------------------------------------------  IN: 0 mL / OUT: 2200 mL / NET: -2200 mL          ROS:  +SOB, no CP abdominal pain nausea or fever     PHYSICAL EXAM:  Constitutional: No acute distress on RA   Respiratory: Clear with reduced BS bases  Cardiovascular: S1, S2.  Regular rate and rhythm.    Gastrointestinal: soft, non-tender, mildly distended  Extremities: +2 non-pitting lower extremity edema  Access: pending       LABS:                        11.4   20.63 )-----------( 98       ( 2021 07:14 )             34.3     02-22    137  |  94<L>  |  108<H>  ----------------------------<  179<H>  4.2   |  21<L>  |  6.66<H>    Ca    9.1      2021 07:14  Phos  7.6       Mg     2.3         TPro  5.6<L>  /  Alb  2.5<L>  /  TBili  1.2  /  DBili  x   /  AST  25  /  ALT  24  /  AlkPhos  158<H>        PT/INR - ( 2021 10:35 )   PT: 17.9 sec;   INR: 1.52          PTT - ( 2021 10:35 )  PTT:44.3 sec  Urinalysis Basic - ( 2021 14:26 )    Color: Yellow / Appearance: Clear / S.015 / pH: x  Gluc: x / Ketone: NEGATIVE  / Bili: Negative / Urobili: 0.2 E.U./dL   Blood: x / Protein: NEGATIVE mg/dL / Nitrite: NEGATIVE   Leuk Esterase: Small / RBC: < 5 /HPF / WBC 5-10 /HPF   Sq Epi: x / Non Sq Epi: 0-5 /HPF / Bacteria: Present /HPF            RADIOLOGY & ADDITIONAL STUDIES:

## 2021-02-22 NOTE — PROGRESS NOTE ADULT - SUBJECTIVE AND OBJECTIVE BOX
LENGTH OF HOSPITAL STAY: 1d    SUBJECTIVE: Factor VIII level is 35%.     HISTORY OF PRESENTING ILLNESS:   Mr. Don is a 76 year old male with a past medical history of CKD (recent Cr 6.6 at [facility]), Hemophilia A, HTN, Gout, and DRESS Syndrome who presents with weakness for 1 week. He was admitted for acute on chronic renal failure and likely induction of hemodialysis.    (2021 03:38)    Patient is a very poor historian, but daughter who is a PA at [facility] [facility], who said he has gotten DDAVP prior to dental extractions, and needed pre, intra and post op IV factor VIII for a lap prabhjot. She also said he almost bled out during an ERCP when they didn't give him anything. She is not sure of hist FVIII level but the number 30 rings a bell to her. His hematologist is .    PAST MEDICAL & SURGICAL HISTORY:  History of BPH  Gout  Hemophilia A  HTN (hypertension)  Uses cochlear implant    ALLERGIES:  No Known Allergies    MEDICATIONS:  STANDING MEDICATIONS  amLODIPine   Tablet 5 milliGRAM(s) Oral daily  doxazosin 4 milliGRAM(s) Oral daily  furosemide   Injectable 80 milliGRAM(s) IV Push two times a day  heparin   Injectable 5000 Unit(s) SubCutaneous every 8 hours  polyethylene glycol 3350 17 Gram(s) Oral daily  predniSONE   Tablet 60 milliGRAM(s) Oral daily  senna 2 Tablet(s) Oral at bedtime  sevelamer carbonate 800 milliGRAM(s) Oral three times a day with meals  sodium bicarbonate 650 milliGRAM(s) Oral two times a day  sodium zirconium cyclosilicate 10 Gram(s) Oral every 8 hours    PRN MEDICATIONS    VITALS:   T(F): 98.7  HR: 120  BP: 117/71  RR: 17  SpO2: 94%    LABS:                        11.4   20.63 )-----------( 98       ( 2021 07:14 )             34.3     02-22    137  |  94<L>  |  108<H>  ----------------------------<  179<H>  4.2   |  21<L>  |  6.66<H>    Ca    9.1      2021 07:14  Phos  7.6     02-  Mg     2.3         TPro  5.6<L>  /  Alb  2.5<L>  /  TBili  1.2  /  DBili  x   /  AST  25  /  ALT  24  /  AlkPhos  158<H>      PT/INR - ( 2021 14:44 )   PT: 17.9 sec;   INR: 1.52       PTT - ( 2021 14:26 )  PTT:43.2 sec  Urinalysis Basic - ( 2021 14:26 )    Color: Yellow / Appearance: Clear / S.015 / pH: x  Gluc: x / Ketone: NEGATIVE  / Bili: Negative / Urobili: 0.2 E.U./dL   Blood: x / Protein: NEGATIVE mg/dL / Nitrite: NEGATIVE   Leuk Esterase: Small / RBC: < 5 /HPF / WBC 5-10 /HPF   Sq Epi: x / Non Sq Epi: 0-5 /HPF / Bacteria: Present /HPF    Troponin T, Serum: 0.15 ng/mL <HH> (21 @ 16:38)  Troponin T, Serum: 0.15 ng/mL <HH> (21 @ 10:19)    Culture - Blood (collected 2021 08:31)  Source: .Blood Blood  Gram Stain (2021 22:35):    Aerobic Bottle: Gram Positive Cocci in Clusters    Result called to and read back byUmu Hough RN  2021 20:09:26    Anaerobic Bottle: Gram Positive Cocci in Clusters    Floor previously notified.    ***Blood Panel PCR results on this specimen are available    approximately 3 hours after the Gram stain result.***    Gram stain, PCR, and/or culture results may not always    correspond due to difference in methodologies.    ************************************************************    This PCR assay was performed using [employer].    The following targets are tested for: Enterococcus,    vancomycin resistant enterococci, Listeria monocytogenes,    coagulase negative staphylococci, S. aureus,    methicillin resistant S. aureus, Streptococcus agalactiae    (Group B), S. pneumoniae, S. pyogenes (Group A),    Acinetobacter baumannii, Enterobacter cloacae, E. coli,    Klebsiella oxytoca, K. pneumoniae, Proteus sp.,    Serratia marcescens, Haemophilus influenzae,    Neisseria meningitidis, Pseudomonas aeruginosa, Candida    albicans, C. glabrata, C krusei, C parapsilosis,    C. tropicalis and the KPC resistance gene.  Preliminary Report (2021 09:12):    Growth in aerobic and anaerobic bottles: Staphylococcus aureus    Presumptive Methicillin susceptible    Confirmation to follow within 24 hrs.    Susceptibility to follow.  Organism: Blood Culture PCR (2021 21:46)  Organism: Blood Culture PCR (2021 21:46)    CARDIAC MARKERS ( 2021 16:38 )  x     / 0.15 ng/mL / x     / x     / x      CARDIAC MARKERS ( 2021 10:19 )  x     / 0.15 ng/mL / x     / x     / x      CARDIAC MARKERS ( 2021 22:56 )  x     / 0.10 ng/mL / x     / x     / x        RADIOLOGY:  < from: US Retroperitoneal Complete (21 @ 17:47) >  1.  No hydronephrosis.  2.  Post-void volume of 104 mL.    < end of copied text >    < from: CT Abdomen and Pelvis No Cont (21 @ 02:30) >  1.  Small to moderate bilateral pleuraleffusions, greater on the left. Left greater than right lower lobe passive atelectasis.  2.  Mild pericardial effusion.  3.  Anasarca, greater in the lateral left lower chest/upper abdominal wall.  4.  Trace ascites.    < end of copied text >    PHYSICAL EXAM:  GEN: No acute distress  HEENT:   LUNGS: Clear to auscultation bilaterally   HEART: S1/S2 present. RRR.   ABD: Soft, non-tender, non-distended. Bowel sounds present  EXT:  NEURO: AAOX3     LENGTH OF HOSPITAL STAY: 1d    SUBJECTIVE: Factor VIII level is 35%.     HISTORY OF PRESENTING ILLNESS:   Mr. Don is a 76 year old male with a past medical history of CKD (recent Cr 6.6 at [facility]), Hemophilia A, HTN, Gout, and DRESS Syndrome who presents with weakness for 1 week. He was admitted for acute on chronic renal failure and likely induction of hemodialysis.    (2021 03:38)    Patient is a very poor historian, but daughter who is a PA at [employer], who said he has gotten DDAVP prior to dental extractions, and needed pre, intra and post op IV factor VIII for a lap cholysystectomy. She also said he  bled excessively during an ERCP when they didn't give him anything. She is not sure of hist FVIII level but the number 30 rings a bell to her. His hematologist is .    PAST MEDICAL & SURGICAL HISTORY:  History of BPH  Gout  Hemophilia A  HTN (hypertension)  Uses cochlear implant    ALLERGIES:  No Known Allergies    MEDICATIONS:  STANDING MEDICATIONS  amLODIPine   Tablet 5 milliGRAM(s) Oral daily  doxazosin 4 milliGRAM(s) Oral daily  furosemide   Injectable 80 milliGRAM(s) IV Push two times a day  heparin   Injectable 5000 Unit(s) SubCutaneous every 8 hours  polyethylene glycol 3350 17 Gram(s) Oral daily  predniSONE   Tablet 60 milliGRAM(s) Oral daily  senna 2 Tablet(s) Oral at bedtime  sevelamer carbonate 800 milliGRAM(s) Oral three times a day with meals  sodium bicarbonate 650 milliGRAM(s) Oral two times a day  sodium zirconium cyclosilicate 10 Gram(s) Oral every 8 hours    PRN MEDICATIONS    VITALS:   T(F): 98.7  HR: 120  BP: 117/71  RR: 17  SpO2: 94%    LABS:                        11.4   20.63 )-----------( 98       ( 2021 07:14 )             34.3     02-22    137  |  94<L>  |  108<H>  ----------------------------<  179<H>  4.2   |  21<L>  |  6.66<H>    Ca    9.1      2021 07:14  Phos  7.6     -  Mg     2.3         TPro  5.6<L>  /  Alb  2.5<L>  /  TBili  1.2  /  DBili  x   /  AST  25  /  ALT  24  /  AlkPhos  158<H>      PT/INR - ( 2021 14:44 )   PT: 17.9 sec;   INR: 1.52       PTT - ( 2021 14:26 )  PTT:43.2 sec  Urinalysis Basic - ( 2021 14:26 )    Color: Yellow / Appearance: Clear / S.015 / pH: x  Gluc: x / Ketone: NEGATIVE  / Bili: Negative / Urobili: 0.2 E.U./dL   Blood: x / Protein: NEGATIVE mg/dL / Nitrite: NEGATIVE   Leuk Esterase: Small / RBC: < 5 /HPF / WBC 5-10 /HPF   Sq Epi: x / Non Sq Epi: 0-5 /HPF / Bacteria: Present /HPF    Troponin T, Serum: 0.15 ng/mL <HH> (21 @ 16:38)  Troponin T, Serum: 0.15 ng/mL <HH> (21 @ 10:19)    Culture - Blood (collected 2021 08:31)  Source: .Blood Blood  Gram Stain (2021 22:35):    Aerobic Bottle: Gram Positive Cocci in Clusters    Result called to and read back byUmu Hough RN  2021 20:09:26    Anaerobic Bottle: Gram Positive Cocci in Clusters    Floor previously notified.    ***Blood Panel PCR results on this specimen are available    approximately 3 hours after the Gram stain result.***    Gram stain, PCR, and/or culture results may not always    correspond due to difference in methodologies.    ************************************************************    This PCR assay was performed using [employer].    The following targets are tested for: Enterococcus,    vancomycin resistant enterococci, Listeria monocytogenes,    coagulase negative staphylococci, S. aureus,    methicillin resistant S. aureus, Streptococcus agalactiae    (Group B), S. pneumoniae, S. pyogenes (Group A),    Acinetobacter baumannii, Enterobacter cloacae, E. coli,    Klebsiella oxytoca, K. pneumoniae, Proteus sp.,    Serratia marcescens, Haemophilus influenzae,    Neisseria meningitidis, Pseudomonas aeruginosa, Candida    albicans, C. glabrata, C krusei, C parapsilosis,    C. tropicalis and the KPC resistance gene.  Preliminary Report (2021 09:12):    Growth in aerobic and anaerobic bottles: Staphylococcus aureus    Presumptive Methicillin susceptible    Confirmation to follow within 24 hrs.    Susceptibility to follow.  Organism: Blood Culture PCR (2021 21:46)  Organism: Blood Culture PCR (2021 21:46)    CARDIAC MARKERS ( 2021 16:38 )  x     / 0.15 ng/mL / x     / x     / x      CARDIAC MARKERS ( 2021 10:19 )  x     / 0.15 ng/mL / x     / x     / x      CARDIAC MARKERS ( 2021 22:56 )  x     / 0.10 ng/mL / x     / x     / x        RADIOLOGY:  < from: US Retroperitoneal Complete (21 @ 17:47) >  1.  No hydronephrosis.  2.  Post-void volume of 104 mL.    < end of copied text >    < from: CT Abdomen and Pelvis No Cont (21 @ 02:30) >  1.  Small to moderate bilateral pleuraleffusions, greater on the left. Left greater than right lower lobe passive atelectasis.  2.  Mild pericardial effusion.  3.  Anasarca, greater in the lateral left lower chest/upper abdominal wall.  4.  Trace ascites.    < end of copied text >    PHYSICAL EXAM:  GEN: No acute distress  HEENT:   LUNGS: Clear to auscultation bilaterally   HEART: S1/S2 present. RRR.   ABD: Soft, non-tender, non-distended. Bowel sounds present  EXT:  NEURO: AAOX3     LENGTH OF HOSPITAL STAY: 1d    SUBJECTIVE: Factor VIII level is 35%.     HISTORY OF PRESENTING ILLNESS:   Mr. Don is a 76 year old male with a past medical history of CKD (recent Cr 6.6 at [facility]), Hemophilia A, HTN, Gout, and DRESS Syndrome who presents with weakness for 1 week. He was admitted for acute on chronic renal failure and likely induction of hemodialysis.    (2021 03:38)    Patient is a very poor historian, but daughter who is a PA at [facility] [employer] [employer], who said he has gotten DDAVP prior to dental extractions, and needed pre, intra and post op IV factor VIII for a lap cholysystectomy. She also said he  bled excessively during an ERCP when they didn't give him anything. She is not sure of hist FVIII level but the number 30 rings a bell to her. His hematologist is .    PAST MEDICAL & SURGICAL HISTORY:  History of BPH  Gout  Hemophilia A  HTN (hypertension)  Uses cochlear implant    ALLERGIES:  No Known Allergies    MEDICATIONS:  STANDING MEDICATIONS  amLODIPine   Tablet 5 milliGRAM(s) Oral daily  doxazosin 4 milliGRAM(s) Oral daily  furosemide   Injectable 80 milliGRAM(s) IV Push two times a day  heparin   Injectable 5000 Unit(s) SubCutaneous every 8 hours  polyethylene glycol 3350 17 Gram(s) Oral daily  predniSONE   Tablet 60 milliGRAM(s) Oral daily  senna 2 Tablet(s) Oral at bedtime  sevelamer carbonate 800 milliGRAM(s) Oral three times a day with meals  sodium bicarbonate 650 milliGRAM(s) Oral two times a day  sodium zirconium cyclosilicate 10 Gram(s) Oral every 8 hours    PRN MEDICATIONS    VITALS:   T(F): 98.7  HR: 120  BP: 117/71  RR: 17  SpO2: 94%    LABS:                        11.4   20.63 )-----------( 98       ( 2021 07:14 )             34.3     02-22    137  |  94<L>  |  108<H>  ----------------------------<  179<H>  4.2   |  21<L>  |  6.66<H>    Ca    9.1      2021 07:14  Phos  7.6     -  Mg     2.3         TPro  5.6<L>  /  Alb  2.5<L>  /  TBili  1.2  /  DBili  x   /  AST  25  /  ALT  24  /  AlkPhos  158<H>      PT/INR - ( 2021 14:44 )   PT: 17.9 sec;   INR: 1.52       PTT - ( 2021 14:26 )  PTT:43.2 sec  Urinalysis Basic - ( 2021 14:26 )    Color: Yellow / Appearance: Clear / S.015 / pH: x  Gluc: x / Ketone: NEGATIVE  / Bili: Negative / Urobili: 0.2 E.U./dL   Blood: x / Protein: NEGATIVE mg/dL / Nitrite: NEGATIVE   Leuk Esterase: Small / RBC: < 5 /HPF / WBC 5-10 /HPF   Sq Epi: x / Non Sq Epi: 0-5 /HPF / Bacteria: Present /HPF    Troponin T, Serum: 0.15 ng/mL <HH> (21 @ 16:38)  Troponin T, Serum: 0.15 ng/mL <HH> (21 @ 10:19)    Culture - Blood (collected 2021 08:31)  Source: .Blood Blood  Gram Stain (2021 22:35):    Aerobic Bottle: Gram Positive Cocci in Clusters    Result called to and read back byUmu Hough RN  2021 20:09:26    Anaerobic Bottle: Gram Positive Cocci in Clusters    Floor previously notified.    ***Blood Panel PCR results on this specimen are available    approximately 3 hours after the Gram stain result.***    Gram stain, PCR, and/or culture results may not always    correspond due to difference in methodologies.    ************************************************************    This PCR assay was performed using [employer].    The following targets are tested for: Enterococcus,    vancomycin resistant enterococci, Listeria monocytogenes,    coagulase negative staphylococci, S. aureus,    methicillin resistant S. aureus, Streptococcus agalactiae    (Group B), S. pneumoniae, S. pyogenes (Group A),    Acinetobacter baumannii, Enterobacter cloacae, E. coli,    Klebsiella oxytoca, K. pneumoniae, Proteus sp.,    Serratia marcescens, Haemophilus influenzae,    Neisseria meningitidis, Pseudomonas aeruginosa, Candida    albicans, C. glabrata, C krusei, C parapsilosis,    C. tropicalis and the KPC resistance gene.  Preliminary Report (2021 09:12):    Growth in aerobic and anaerobic bottles: Staphylococcus aureus    Presumptive Methicillin susceptible    Confirmation to follow within 24 hrs.    Susceptibility to follow.  Organism: Blood Culture PCR (2021 21:46)  Organism: Blood Culture PCR (2021 21:46)    CARDIAC MARKERS ( 2021 16:38 )  x     / 0.15 ng/mL / x     / x     / x      CARDIAC MARKERS ( 2021 10:19 )  x     / 0.15 ng/mL / x     / x     / x      CARDIAC MARKERS ( 2021 22:56 )  x     / 0.10 ng/mL / x     / x     / x        RADIOLOGY:  < from: US Retroperitoneal Complete (21 @ 17:47) >  1.  No hydronephrosis.  2.  Post-void volume of 104 mL.    < end of copied text >    < from: CT Abdomen and Pelvis No Cont (21 @ 02:30) >  1.  Small to moderate bilateral pleuraleffusions, greater on the left. Left greater than right lower lobe passive atelectasis.  2.  Mild pericardial effusion.  3.  Anasarca, greater in the lateral left lower chest/upper abdominal wall.  4.  Trace ascites.    < end of copied text >    PHYSICAL EXAM:  GEN: No acute distress  HEENT: NCAT  LUNGS: Decreased breath sounds at bilateral lung bases  HEART: S1/S2 present. RRR.   ABD: Soft, non-tender, non-distended. Bowel sounds present  EXT: +2 pitting edema bilaterally  NEURO: AAOX3

## 2021-02-22 NOTE — PROGRESS NOTE ADULT - ASSESSMENT
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 Hematologist is Dr. Grace.     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   - Will need active T+S x 2 STAT per Blood Bank.    - Recheck factor VIII level post-procedure today  - Follow-up CBC and coagulation panel (PT/PTT) once daily  - Upon discharge, can follow-up with Dr. Grace (Hematologist)    Discussed with Dr. Tolbert 77 yo M with PMHx of CKD4, DRESS syndrome, Hemophilia A (last known 35%, no history of inhibitors) had dental extractions requiring DDAVP prior and pre-, intra- and post-operative factor VIII for a lap cholecystectomy, had near-miss event post-ERCP when he bled due to procedure, presents with weakness, now with plans for emergent hemodialysis due to resistant hypervolemia. Hematologist is Dr. Grace.     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 Completely corrects with normal plasma.    #) PLAN   - Due to emergent nature of hemodialysis for resistant hypervolemia, will recommend 25 U/Kg Factor VIII stat.   - Will need active T+S x 2 STAT per Blood Bank.    - Recheck factor VIII level post-procedure today  - Follow-up CBC and coagulation panel (PT/PTT) once daily  - Upon discharge, can follow-up with Dr. Grace (Hematologist)    Patient seen, examined and discussed with Dr. 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 Hematologist is Dr. Grace.     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 Completely corrects with normal plasma.    #) PLAN   - Due to emergent nature of hemodialysis for resistant hypervolemia, will recommend 25 U/Kg Factor VIII stat.   - Will need active T+S x 2 STAT per Blood Bank.    - Recheck factor VIII level post-procedure today  - Follow-up CBC and coagulation panel (PT/PTT) once daily  - Upon discharge, can follow-up with Dr. Grace (Hematologist)    Patient seen, examined and discussed with Dr. Tolbert

## 2021-02-22 NOTE — PROGRESS NOTE ADULT - ATTENDING COMMENTS
pt seen and examined by me this am and at HD  pt appeared comfortable in am breathing comfortably, at HD more somnolent but RR 20    1- Sepsis- 2/2 Staph bacteremia  c/w cefazolin  surveillance cultures  TTE  2- Acute renal failure/Acute pulm Edema- pt went for HD however due to labile BP HD was stopped  pt also received high dose diuresis this am and antihypertensives which may be contributing  3- Hypotension- is likely a combination of sepsis/antihypertensive meds and possible adrenal insuff  will likely give stress dose steroids  4- DRESS_ on prednisone , 2/2 allopurinol  5- Hemophilia A- heme following

## 2021-02-22 NOTE — PROGRESS NOTE ADULT - PROBLEM SELECTOR PLAN 1
Acute renal failure with anasarca.  Patient with no history of CKD prior to admission. Developed Dress Syndrome after allopurinol. Acute renal failure with anasarca.  Patient with no history of CKD prior to admission. 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 Creatine worsened Acute renal failure with anasarca.  Patient with no history of CKD prior to admission. 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 Creatine worsened with deranged electrolytes.   -Dialysis today   -Hep Panel   -PPD Acute renal failure with anasarca.  Patient with no history of CKD prior to admission. 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 Creatine worsened with deranged electrolytes.   -Dialysis today   -Factor VIII 25U/kg   -Hep Panel   -PPD Acute renal failure with anasarca.  Patient with no history of CKD prior to admission. 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 Creatine worsened with deranged electrolytes.   -Dialysis today   -Factor VIII 25U/kg   -Recheck factor VIII level post-procedure today  -Hep Panel   -PPD Acute renal failure with anasarca.  Patient with no history of CKD prior to admission. 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 Creatine worsened with deranged electrolytes after being diagnosed with Dress Syndrome.   -Dialysis today   -Factor VIII 25U/kg   -Recheck factor VIII level post-procedure today  -Hep Panel   -PPD Acute renal failure with anasarca.  Patient with no history of CKD prior to admission. 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 Creatine worsened with deranged electrolytes after being diagnosed with Dress Syndrome.   -Dialysis today   -Factor VIII 25U/kg   -Recheck factor VIII level post-procedure today  -Hep Panel (for potential long term dialysis)  -PPD (for potential long term dialysis)

## 2021-02-22 NOTE — PROGRESS NOTE ADULT - NUTRITIONAL ASSESSMENT
Per patient's daughter, increasing BUN/Cr over time with unclear etiology. , Cr 6.59 on arrival. Cr in 11/20 was 1.1. Prior to hospitalization for DRESS, no known kidney disease. /Cr 6.6 upon discharge from [facility].   - Dr. Yap in contact with family and renal aware  - Start Lasix 80 mg IV BID  - Continue to closely monitor electrolytes and BUN  - Will need dialysis in 24 - 48 hours Meeting 2/4 SIRS criteria on arrival (WBC, tachycardia); however, unlikely to be infectious in origin due lack of source or any infectious symptomology   - Blood cultures drawn in ED; f/u result prior to likely PermCath placement for HD    #Bactermia     #Leukocytosis  - Chronic due to prolonged high dose Prednisone use for Dress Syndrome    #Dress Syndrome  - Currently taking Prednisone 60 mg with improvement in rash   - Continue Prednisone 60 mg daily  - F/U renal recs as above    #Tachycardia  - HR constantly in 110s per daughter  - Sinus tachycardia on EKG  - Likely 2/2 intravascular depletion due to ascites/anasarca    #Anemia  - Hgb 11.5 on arrival with unclear baseline  - Like 2/2 acute kidney failure or dilutional due to volume overload  - Continue to monitor on daily CBC w diff

## 2021-02-22 NOTE — PROGRESS NOTE ADULT - SUBJECTIVE AND OBJECTIVE BOX
---------------------------TRANSFER ACCEPTANCE TO [facility] FROM [facility]---------------------------  HOSPITAL COURSE:  76 year old male with a past medical history of CKD (recent Cr 6.6 at [facility]), Hemophilia A, HTN, Gout, and DRESS Syndrome who presents with weakness for 1 week. Patient was recently discharged from [facility] on  with a diagnosis of DRESS secondary to allopurinol used to treat gout.  While at [facility] patient had a creatinine that ranged from 4-6, however baseline is 1.1 (2020). After discharge from [facility] patient complained of weakness and progressive swelling of his legs and abdomen.  While in the [facility] ED he was afebrile (98.7 F), , /80, RR 18, and O2 saturation 95%. Labs were significant for WBC 28.30, Hgb 11.5, K 5.6, CO2 20  , Cr 6.59, albumin 2.7, alk phos 158, BNP 06744. CXR showed pleural effusion left greater than right with dependent edema consistent with volume overload. Imaging significant for a CT abdomen small to moderate bilateral pleural effusions, greater on the left. Mild pericardial effusion. Anasarca greater in left lower chest and trace ascites. LE dopplers negative for DVT. The patient was given 80mg of IV lasix and admitted for acute renal failure with potential need of dialysis. While on [facility] nephrology was consulted who recommended IV diuresis with plans of hemodialysis on . Hematology was consulted prior to hemodialysis catheter placement due to history of  Hemophilia A.  On the morning of  blood cultures grew MSSA.  At 3:00AM patient was started on Ancef, and the patient had desaturated to 90s and was placed on 2L of oxygen.  Prior to procedure (HD cath) Factor VIII levels were obtained.  Patient was given 25U/Kg prior to HD cath placement. Patient received echo on  due to pericardial effusion seen on CT chest.  Echo did not show evidence of tamponade physiology. Echo revealed normal left and right ventricular size and systolic function, and left pleural effusion.  Patient was taken down to hemodialysis session today.  During session patient became hypotensive (BP unknown), and session was immediately stopped.  Patient will require closer monitoring and will be stepped up to [address] [facility] for HD tomorrow.      VITAL SIGNS:  Vital Signs Last 24 Hrs  T(C): 37 (2021 17:03), Max: 37.1 (2021 05:53)  T(F): 98.6 (2021 17:03), Max: 98.7 (2021 05:53)  HR: 103 (2021 17:03) (102 - 120)  BP: 99/62 (2021 17:03) (98/51 - 133/75)  BP(mean): --  RR: 17 (2021 17:03) (17 - 19)  SpO2: 93% (2021 17:03) (93% - 95%)    PHYSICAL EXAM:    General:   HEENT:   Neck:   Cardiovascular:   Respiratory:   Gastrointestinal:   Extremities:   Vascular:   Neurological:     MEDICATIONS:  MEDICATIONS  (STANDING):  doxazosin 4 milliGRAM(s) Oral daily  heparin   Injectable 5000 Unit(s) SubCutaneous every 8 hours  polyethylene glycol 3350 17 Gram(s) Oral daily  PPD  5 Tuberculin Unit(s) Injectable 5 Unit(s) IntraDermal once  predniSONE   Tablet 60 milliGRAM(s) Oral daily  senna 2 Tablet(s) Oral at bedtime  sevelamer carbonate 800 milliGRAM(s) Oral three times a day with meals  sodium bicarbonate 650 milliGRAM(s) Oral two times a day    MEDICATIONS  (PRN):      ALLERGIES:  Allergies    No Known Allergies    Intolerances        LABS:                        10.1   16.67 )-----------( 77       ( 2021 16:06 )             29.8     02-22    141  |  97  |  111<H>  ----------------------------<  186<H>  4.2   |  24  |  6.47<H>    Ca    9.0      2021 16:06  Phos  7.6     02-22  Mg     2.3     02-    TPro  5.6<L>  /  Alb  2.5<L>  /  TBili  1.2  /  DBili  x   /  AST  25  /  ALT  24  /  AlkPhos  158<H>  02-    PT/INR - ( 2021 10:35 )   PT: 17.9 sec;   INR: 1.52          PTT - ( 2021 10:35 )  PTT:44.3 sec  Urinalysis Basic - ( 2021 14:26 )    Color: Yellow / Appearance: Clear / S.015 / pH: x  Gluc: x / Ketone: NEGATIVE  / Bili: Negative / Urobili: 0.2 E.U./dL   Blood: x / Protein: NEGATIVE mg/dL / Nitrite: NEGATIVE   Leuk Esterase: Small / RBC: < 5 /HPF / WBC 5-10 /HPF   Sq Epi: x / Non Sq Epi: 0-5 /HPF / Bacteria: Present /HPF      CAPILLARY BLOOD GLUCOSE          RADIOLOGY & ADDITIONAL TESTS: Reviewed.   ---------------------------TRANSFER ACCEPTANCE TO [facility] FROM [facility]---------------------------  HOSPITAL COURSE:  76 year old male with a past medical history of CKD (recent Cr 6.6 at [facility]), Hemophilia A, HTN, Gout, and DRESS Syndrome who presents with weakness for 1 week. Patient was recently discharged from [facility] on  with a diagnosis of DRESS secondary to allopurinol used to treat gout.  While at [facility] patient had a creatinine that ranged from 4-6, however baseline is 1.1 (2020). After discharge from [facility] patient complained of weakness and progressive swelling of his legs and abdomen.  While in the [facility] ED he was afebrile (98.7 F), , /80, RR 18, and O2 saturation 95%. Labs were significant for WBC 28.30, Hgb 11.5, K 5.6, CO2 20  , Cr 6.59, albumin 2.7, alk phos 158, BNP 32733. CXR showed pleural effusion left greater than right with dependent edema consistent with volume overload. Imaging significant for a CT abdomen small to moderate bilateral pleural effusions, greater on the left. Mild pericardial effusion. Anasarca greater in left lower chest and trace ascites. LE dopplers negative for DVT. The patient was given 80mg of IV lasix and admitted for acute renal failure with potential need of dialysis. While on [facility] nephrology was consulted who recommended IV diuresis with plans of hemodialysis on . Hematology was consulted prior to hemodialysis catheter placement due to history of  Hemophilia A.  On the morning of  blood cultures grew MSSA.  At 3:00AM patient was started on Ancef, and the patient had desaturated to 90s and was placed on 2L of oxygen.  Prior to procedure (HD cath) Factor VIII levels were obtained.  Patient was given 25U/Kg prior to HD cath placement. Patient received echo on  due to pericardial effusion seen on CT chest.  Echo did not show evidence of tamponade physiology. Echo revealed normal left and right ventricular size and systolic function, and left pleural effusion.  Patient was taken down to hemodialysis session today.  During session patient became hypotensive (BP unknown), and session was immediately stopped.  Patient will require closer monitoring and will be stepped up to [address] [facility] for HD tomorrow.    VITAL SIGNS:  T(C): 37 (21 @ 17:03), Max: 37.1 (21 @ 05:53)  T(F): 98.6 (21 @ 17:03), Max: 98.7 (21 @ 05:53)  HR: 104 (21 @ 20:45) (102 - 120)  BP: 110/55 (21 @ 20:45) (98/51 - 117/71)  BP(mean): 76 (21 @ 20:45) (75 - 76)  RR: 18 (21 @ 20:45) (16 - 19)  SpO2: 93% (21 @ 20:45) (93% - 95%)  Wt(kg): --    PHYSICAL EXAM:    Constitutional: WDWN resting comfortably in bed; NAD  Head: NC/AT  Eyes: anicteric sclera  Respiratory: CTA B/L; no W/R/R, no retractions; on NC  Cardiac: +S1/S2; RRR; no M/R/G;   Gastrointestinal: soft, NT/ND; no rebound or guarding;  Ext: WWP, no edema  Dermatologic: skin warm, dry; small black ulcer present on L large toe, non tender to palpation  Neurologic: AAOx3;  Psychiatric: affect and characteristics of appearance, verbalizations, behaviors are appropriate    MEDICATIONS:  MEDICATIONS  (STANDING):  doxazosin 4 milliGRAM(s) Oral daily  heparin   Injectable 5000 Unit(s) SubCutaneous every 8 hours  polyethylene glycol 3350 17 Gram(s) Oral daily  PPD  5 Tuberculin Unit(s) Injectable 5 Unit(s) IntraDermal once  predniSONE   Tablet 60 milliGRAM(s) Oral daily  senna 2 Tablet(s) Oral at bedtime  sevelamer carbonate 800 milliGRAM(s) Oral three times a day with meals  sodium bicarbonate 650 milliGRAM(s) Oral two times a day    MEDICATIONS  (PRN):      ALLERGIES:  Allergies    No Known Allergies    Intolerances        LABS:                        10.1   16.67 )-----------( 77       ( 2021 16:06 )             29.8         141  |  97  |  111<H>  ----------------------------<  186<H>  4.2   |  24  |  6.47<H>    Ca    9.0      2021 16:06  Phos  7.6       Mg     2.3         TPro  5.6<L>  /  Alb  2.5<L>  /  TBili  1.2  /  DBili  x   /  AST  25  /  ALT  24  /  AlkPhos  158<H>      PT/INR - ( 2021 10:35 )   PT: 17.9 sec;   INR: 1.52          PTT - ( 2021 10:35 )  PTT:44.3 sec  Urinalysis Basic - ( 2021 14:26 )    Color: Yellow / Appearance: Clear / S.015 / pH: x  Gluc: x / Ketone: NEGATIVE  / Bili: Negative / Urobili: 0.2 E.U./dL   Blood: x / Protein: NEGATIVE mg/dL / Nitrite: NEGATIVE   Leuk Esterase: Small / RBC: < 5 /HPF / WBC 5-10 /HPF   Sq Epi: x / Non Sq Epi: 0-5 /HPF / Bacteria: Present /HPF      CAPILLARY BLOOD GLUCOSE          RADIOLOGY & ADDITIONAL TESTS: Reviewed.   ---------------------------TRANSFER ACCEPTANCE TO [facility] FROM [facility]---------------------------  HOSPITAL COURSE:  76 year old male with a past medical history of Hemophilia A, HTN, Gout, and DRESS Syndrome who presents with weakness for 1 week. Patient was recently discharged from [facility] on  with a diagnosis of DRESS secondary to allopurinol used to treat gout.  While at [facility] patient had a creatinine that ranged from 4-6, however baseline is 1.1 (2020). After discharge from [facility] patient complained of weakness and progressive swelling of his legs and abdomen.  While in the [facility] ED he was afebrile (98.7 F), , /80, RR 18, and O2 saturation 95%. Labs were significant for WBC 28.30, Hgb 11.5, K 5.6, CO2 20  , Cr 6.59, albumin 2.7, alk phos 158, BNP 44596. CXR showed pleural effusion left greater than right with dependent edema consistent with volume overload. Imaging significant for a CT abdomen small to moderate bilateral pleural effusions, greater on the left. Mild pericardial effusion. Anasarca greater in left lower chest and trace ascites. LE dopplers negative for DVT. The patient was given 80mg of IV lasix and admitted for acute renal failure with potential need of dialysis. While on [facility] nephrology was consulted who recommended IV diuresis with plans of hemodialysis on . Hematology was consulted prior to hemodialysis catheter placement due to history of  Hemophilia A.  On the morning of  blood cultures grew MSSA.  At 3:00AM patient was started on Ancef, and the patient had desaturated to 90s and was placed on 2L of oxygen.  Prior to procedure (HD cath) Factor VIII levels were obtained.  Patient was given 25U/Kg prior to HD cath placement. Patient received echo on  due to pericardial effusion seen on CT chest.  Echo did not show evidence of tamponade physiology. Echo revealed normal left and right ventricular size and systolic function, and left pleural effusion.  Patient was taken down to hemodialysis session today.  During session patient became hypotensive (BP unknown), and session was immediately stopped.  Patient will require closer monitoring and will be stepped up to [facility] for HD tomorrow.    VITAL SIGNS:  T(C): 37 (21 @ 17:03), Max: 37.1 (21 @ 05:53)  T(F): 98.6 (21 @ 17:03), Max: 98.7 (21 @ 05:53)  HR: 104 (21 @ 20:45) (102 - 120)  BP: 110/55 (21 @ 20:45) (98/51 - 117/71)  BP(mean): 76 (21 @ 20:45) (75 - 76)  RR: 18 (21 @ 20:45) (16 - 19)  SpO2: 93% (21 @ 20:45) (93% - 95%)  Wt(kg): --    PHYSICAL EXAM:    Constitutional: WDWN resting comfortably in bed; NAD  Head: NC/AT  Eyes: anicteric sclera  Respiratory: CTA B/L; no W/R/R, no retractions; on NC  Cardiac: +S1/S2; RRR; no M/R/G;   Gastrointestinal: soft, NT/ND; no rebound or guarding;  Ext: WWP, no edema  Dermatologic: skin warm, dry; small black ulcer present on L large toe, non tender to palpation  Neurologic: AAOx3;  Psychiatric: affect and characteristics of appearance, verbalizations, behaviors are appropriate    MEDICATIONS:  MEDICATIONS  (STANDING):  doxazosin 4 milliGRAM(s) Oral daily  heparin   Injectable 5000 Unit(s) SubCutaneous every 8 hours  polyethylene glycol 3350 17 Gram(s) Oral daily  PPD  5 Tuberculin Unit(s) Injectable 5 Unit(s) IntraDermal once  predniSONE   Tablet 60 milliGRAM(s) Oral daily  senna 2 Tablet(s) Oral at bedtime  sevelamer carbonate 800 milliGRAM(s) Oral three times a day with meals  sodium bicarbonate 650 milliGRAM(s) Oral two times a day    MEDICATIONS  (PRN):      ALLERGIES:  Allergies    No Known Allergies    Intolerances        LABS:                        10.1   16.67 )-----------( 77       ( 2021 16:06 )             29.8         141  |  97  |  111<H>  ----------------------------<  186<H>  4.2   |  24  |  6.47<H>    Ca    9.0      2021 16:06  Phos  7.6       Mg     2.3         TPro  5.6<L>  /  Alb  2.5<L>  /  TBili  1.2  /  DBili  x   /  AST  25  /  ALT  24  /  AlkPhos  158<H>      PT/INR - ( 2021 10:35 )   PT: 17.9 sec;   INR: 1.52          PTT - ( 2021 10:35 )  PTT:44.3 sec  Urinalysis Basic - ( 2021 14:26 )    Color: Yellow / Appearance: Clear / S.015 / pH: x  Gluc: x / Ketone: NEGATIVE  / Bili: Negative / Urobili: 0.2 E.U./dL   Blood: x / Protein: NEGATIVE mg/dL / Nitrite: NEGATIVE   Leuk Esterase: Small / RBC: < 5 /HPF / WBC 5-10 /HPF   Sq Epi: x / Non Sq Epi: 0-5 /HPF / Bacteria: Present /HPF      CAPILLARY BLOOD GLUCOSE          RADIOLOGY & ADDITIONAL TESTS: Reviewed.

## 2021-02-22 NOTE — PROGRESS NOTE ADULT - PROBLEM SELECTOR PLAN 9
Prior history of significant bleeding in past following ERCP.   -Heme following   -Daily INR/PT/PTT  -Factor VIII after procedure   -Follows with Dr. Grace at [facility]

## 2021-02-23 LAB
-  CEFAZOLIN: SIGNIFICANT CHANGE UP
-  CLINDAMYCIN: SIGNIFICANT CHANGE UP
-  ERYTHROMYCIN: SIGNIFICANT CHANGE UP
-  LINEZOLID: SIGNIFICANT CHANGE UP
-  OXACILLIN: SIGNIFICANT CHANGE UP
-  RIFAMPIN: SIGNIFICANT CHANGE UP
-  TRIMETHOPRIM/SULFAMETHOXAZOLE: SIGNIFICANT CHANGE UP
-  VANCOMYCIN: SIGNIFICANT CHANGE UP
ALBUMIN SERPL ELPH-MCNC: 2 G/DL — LOW (ref 3.3–5)
ALP SERPL-CCNC: 135 U/L — HIGH (ref 40–120)
ALT FLD-CCNC: 17 U/L — SIGNIFICANT CHANGE UP (ref 10–45)
ANION GAP SERPL CALC-SCNC: 19 MMOL/L — HIGH (ref 5–17)
APTT BLD: 37.8 SEC — HIGH (ref 27.5–35.5)
APTT BLD: 45.1 SEC — HIGH (ref 27.5–35.5)
AST SERPL-CCNC: 19 U/L — SIGNIFICANT CHANGE UP (ref 10–40)
BASOPHILS # BLD AUTO: 0.01 K/UL — SIGNIFICANT CHANGE UP (ref 0–0.2)
BASOPHILS NFR BLD AUTO: 0.1 % — SIGNIFICANT CHANGE UP (ref 0–2)
BILIRUB SERPL-MCNC: 0.7 MG/DL — SIGNIFICANT CHANGE UP (ref 0.2–1.2)
BUN SERPL-MCNC: 118 MG/DL — HIGH (ref 7–23)
CALCIUM SERPL-MCNC: 8.8 MG/DL — SIGNIFICANT CHANGE UP (ref 8.4–10.5)
CHLORIDE SERPL-SCNC: 95 MMOL/L — LOW (ref 96–108)
CK MB CFR SERPL CALC: 10.1 NG/ML — HIGH (ref 0–6.7)
CK MB CFR SERPL CALC: 10.3 NG/ML — HIGH (ref 0–6.7)
CK MB CFR SERPL CALC: 7.6 NG/ML — HIGH (ref 0–6.7)
CK SERPL-CCNC: 153 U/L — SIGNIFICANT CHANGE UP (ref 30–200)
CK SERPL-CCNC: 164 U/L — SIGNIFICANT CHANGE UP (ref 30–200)
CK SERPL-CCNC: 86 U/L — SIGNIFICANT CHANGE UP (ref 30–200)
CO2 SERPL-SCNC: 23 MMOL/L — SIGNIFICANT CHANGE UP (ref 22–31)
CREAT SERPL-MCNC: 6.79 MG/DL — HIGH (ref 0.5–1.3)
D DIMER BLD IA.RAPID-MCNC: 1208 NG/ML DDU — HIGH
EOSINOPHIL # BLD AUTO: 0 K/UL — SIGNIFICANT CHANGE UP (ref 0–0.5)
EOSINOPHIL NFR BLD AUTO: 0 % — SIGNIFICANT CHANGE UP (ref 0–6)
FACT VIII ACT/NOR PPP: 78 % — SIGNIFICANT CHANGE UP (ref 51–138)
FACT VIII ACT/NOR PPP: 83 % — SIGNIFICANT CHANGE UP (ref 51–138)
FIBRINOGEN PPP-MCNC: 562 MG/DL — HIGH (ref 258–438)
GLUCOSE SERPL-MCNC: 151 MG/DL — HIGH (ref 70–99)
GRAM STN FLD: SIGNIFICANT CHANGE UP
GRAM STN FLD: SIGNIFICANT CHANGE UP
HAPTOGLOB SERPL-MCNC: 352 MG/DL — HIGH (ref 34–200)
HAV IGM SER-ACNC: SIGNIFICANT CHANGE UP
HAV IGM SER-ACNC: SIGNIFICANT CHANGE UP
HBV CORE IGM SER-ACNC: SIGNIFICANT CHANGE UP
HBV CORE IGM SER-ACNC: SIGNIFICANT CHANGE UP
HBV SURFACE AG SER-ACNC: SIGNIFICANT CHANGE UP
HCT VFR BLD CALC: 29.3 % — LOW (ref 39–50)
HCT VFR BLD CALC: 36.1 % — LOW (ref 39–50)
HCV AB S/CO SERPL IA: 0.09 S/CO — SIGNIFICANT CHANGE UP
HCV AB SERPL-IMP: SIGNIFICANT CHANGE UP
HGB BLD-MCNC: 11.8 G/DL — LOW (ref 13–17)
HGB BLD-MCNC: 9.8 G/DL — LOW (ref 13–17)
IMM GRANULOCYTES NFR BLD AUTO: 1.1 % — SIGNIFICANT CHANGE UP (ref 0–1.5)
INR BLD: 1.35 — HIGH (ref 0.88–1.16)
INR BLD: 1.44 — HIGH (ref 0.88–1.16)
LDH SERPL L TO P-CCNC: 231 U/L — SIGNIFICANT CHANGE UP (ref 50–242)
LYMPHOCYTES # BLD AUTO: 0.35 K/UL — LOW (ref 1–3.3)
LYMPHOCYTES # BLD AUTO: 2.3 % — LOW (ref 13–44)
MAGNESIUM SERPL-MCNC: 2.4 MG/DL — SIGNIFICANT CHANGE UP (ref 1.6–2.6)
MCHC RBC-ENTMCNC: 27.9 PG — SIGNIFICANT CHANGE UP (ref 27–34)
MCHC RBC-ENTMCNC: 28 PG — SIGNIFICANT CHANGE UP (ref 27–34)
MCHC RBC-ENTMCNC: 32.7 GM/DL — SIGNIFICANT CHANGE UP (ref 32–36)
MCHC RBC-ENTMCNC: 33.4 GM/DL — SIGNIFICANT CHANGE UP (ref 32–36)
MCV RBC AUTO: 83.5 FL — SIGNIFICANT CHANGE UP (ref 80–100)
MCV RBC AUTO: 85.7 FL — SIGNIFICANT CHANGE UP (ref 80–100)
METHOD TYPE: SIGNIFICANT CHANGE UP
MONOCYTES # BLD AUTO: 0.41 K/UL — SIGNIFICANT CHANGE UP (ref 0–0.9)
MONOCYTES NFR BLD AUTO: 2.6 % — SIGNIFICANT CHANGE UP (ref 2–14)
NEUTROPHILS # BLD AUTO: 14.57 K/UL — HIGH (ref 1.8–7.4)
NEUTROPHILS NFR BLD AUTO: 93.9 % — HIGH (ref 43–77)
NRBC # BLD: 0 /100 WBCS — SIGNIFICANT CHANGE UP (ref 0–0)
NRBC # BLD: 0 /100 WBCS — SIGNIFICANT CHANGE UP (ref 0–0)
PHOSPHATE SERPL-MCNC: 8.3 MG/DL — HIGH (ref 2.5–4.5)
PLATELET # BLD AUTO: 54 K/UL — LOW (ref 150–400)
PLATELET # BLD AUTO: 78 K/UL — LOW (ref 150–400)
POTASSIUM SERPL-MCNC: 4 MMOL/L — SIGNIFICANT CHANGE UP (ref 3.5–5.3)
POTASSIUM SERPL-SCNC: 4 MMOL/L — SIGNIFICANT CHANGE UP (ref 3.5–5.3)
PROT SERPL-MCNC: 5.1 G/DL — LOW (ref 6–8.3)
PROTHROM AB SERPL-ACNC: 16 SEC — HIGH (ref 10.6–13.6)
PROTHROM AB SERPL-ACNC: 17 SEC — HIGH (ref 10.6–13.6)
RBC # BLD: 3.51 M/UL — LOW (ref 4.2–5.8)
RBC # BLD: 4.03 M/UL — LOW (ref 4.2–5.8)
RBC # BLD: 4.21 M/UL — SIGNIFICANT CHANGE UP (ref 4.2–5.8)
RBC # FLD: 14.6 % — HIGH (ref 10.3–14.5)
RBC # FLD: 14.6 % — HIGH (ref 10.3–14.5)
RETICS #: 19.3 K/UL — LOW (ref 25–125)
RETICS/RBC NFR: 0.5 % — SIGNIFICANT CHANGE UP (ref 0.5–2.5)
SODIUM SERPL-SCNC: 137 MMOL/L — SIGNIFICANT CHANGE UP (ref 135–145)
TROPONIN T SERPL-MCNC: 0.24 NG/ML — CRITICAL HIGH (ref 0–0.01)
TROPONIN T SERPL-MCNC: 0.27 NG/ML — CRITICAL HIGH (ref 0–0.01)
TROPONIN T SERPL-MCNC: 0.28 NG/ML — CRITICAL HIGH (ref 0–0.01)
WBC # BLD: 11.22 K/UL — HIGH (ref 3.8–10.5)
WBC # BLD: 15.51 K/UL — HIGH (ref 3.8–10.5)
WBC # FLD AUTO: 11.22 K/UL — HIGH (ref 3.8–10.5)
WBC # FLD AUTO: 15.51 K/UL — HIGH (ref 3.8–10.5)

## 2021-02-23 PROCEDURE — 93010 ELECTROCARDIOGRAM REPORT: CPT

## 2021-02-23 PROCEDURE — 99233 SBSQ HOSP IP/OBS HIGH 50: CPT

## 2021-02-23 PROCEDURE — 99232 SBSQ HOSP IP/OBS MODERATE 35: CPT | Mod: GC

## 2021-02-23 PROCEDURE — 99291 CRITICAL CARE FIRST HOUR: CPT

## 2021-02-23 RX ORDER — DILTIAZEM HCL 120 MG
5 CAPSULE, EXT RELEASE 24 HR ORAL ONCE
Refills: 0 | Status: COMPLETED | OUTPATIENT
Start: 2021-02-23 | End: 2021-02-23

## 2021-02-23 RX ORDER — PANTOPRAZOLE SODIUM 20 MG/1
40 TABLET, DELAYED RELEASE ORAL
Refills: 0 | Status: DISCONTINUED | OUTPATIENT
Start: 2021-02-23 | End: 2021-03-09

## 2021-02-23 RX ORDER — NOREPINEPHRINE BITARTRATE/D5W 8 MG/250ML
0.05 PLASTIC BAG, INJECTION (ML) INTRAVENOUS
Qty: 8 | Refills: 0 | Status: DISCONTINUED | OUTPATIENT
Start: 2021-02-23 | End: 2021-02-24

## 2021-02-23 RX ORDER — METOPROLOL TARTRATE 50 MG
5 TABLET ORAL ONCE
Refills: 0 | Status: COMPLETED | OUTPATIENT
Start: 2021-02-23 | End: 2021-02-23

## 2021-02-23 RX ORDER — MIDODRINE HYDROCHLORIDE 2.5 MG/1
5 TABLET ORAL EVERY 8 HOURS
Refills: 0 | Status: DISCONTINUED | OUTPATIENT
Start: 2021-02-23 | End: 2021-02-24

## 2021-02-23 RX ADMIN — MIDODRINE HYDROCHLORIDE 5 MILLIGRAM(S): 2.5 TABLET ORAL at 09:58

## 2021-02-23 RX ADMIN — Medication 650 MILLIGRAM(S): at 18:46

## 2021-02-23 RX ADMIN — Medication 60 MILLIGRAM(S): at 06:49

## 2021-02-23 RX ADMIN — SENNA PLUS 2 TABLET(S): 8.6 TABLET ORAL at 21:17

## 2021-02-23 RX ADMIN — MIDODRINE HYDROCHLORIDE 5 MILLIGRAM(S): 2.5 TABLET ORAL at 14:27

## 2021-02-23 RX ADMIN — Medication 5 MILLIGRAM(S): at 09:45

## 2021-02-23 RX ADMIN — MIDODRINE HYDROCHLORIDE 5 MILLIGRAM(S): 2.5 TABLET ORAL at 21:17

## 2021-02-23 RX ADMIN — PANTOPRAZOLE SODIUM 40 MILLIGRAM(S): 20 TABLET, DELAYED RELEASE ORAL at 14:29

## 2021-02-23 RX ADMIN — Medication 7.92 MICROGRAM(S)/KG/MIN: at 18:51

## 2021-02-23 RX ADMIN — SEVELAMER CARBONATE 800 MILLIGRAM(S): 2400 POWDER, FOR SUSPENSION ORAL at 06:48

## 2021-02-23 RX ADMIN — SEVELAMER CARBONATE 800 MILLIGRAM(S): 2400 POWDER, FOR SUSPENSION ORAL at 18:46

## 2021-02-23 RX ADMIN — POLYETHYLENE GLYCOL 3350 17 GRAM(S): 17 POWDER, FOR SOLUTION ORAL at 14:27

## 2021-02-23 RX ADMIN — Medication 100 MILLIGRAM(S): at 03:38

## 2021-02-23 RX ADMIN — Medication 4 MILLIGRAM(S): at 06:48

## 2021-02-23 RX ADMIN — HEPARIN SODIUM 5000 UNIT(S): 5000 INJECTION INTRAVENOUS; SUBCUTANEOUS at 14:27

## 2021-02-23 RX ADMIN — Medication 650 MILLIGRAM(S): at 06:48

## 2021-02-23 RX ADMIN — HEPARIN SODIUM 5000 UNIT(S): 5000 INJECTION INTRAVENOUS; SUBCUTANEOUS at 06:49

## 2021-02-23 NOTE — PROGRESS NOTE ADULT - ATTENDING COMMENTS
seen and evaluated while on dialysis  tolerating procedure so far  continue with present prescription - to follow up  reviewed prior with pt's daughter

## 2021-02-23 NOTE — PROGRESS NOTE ADULT - ASSESSMENT
Mr. Don is a 76 year old male with a past medical history Hemophilia A, HTN, Gout, and DRESS Syndrome who presents with weakness for 1 week. He was admitted for acute on chronic renal failure c/b anasarca and likely induction of hemodialysis.

## 2021-02-23 NOTE — PROGRESS NOTE ADULT - ASSESSMENT
Mr. Don is a 76 year old male with a past medical history Hemophilia A, HTN, Gout, and DRESS Syndrome who presents with weakness for 1 week. He was admitted for acute on chronic renal failure c/b anasarca and likely induction of hemodialysis. Transferred to MICU on 2/23 d/t afib with RVR and hypotension for closer monitoring and pressure support while undergoing dialysis.       NEURO:   AAOX3  - No active issues     CARDIOVASCULAR:  #Tachycardia  - HR constantly in 110s per daughter  - Sinus tachycardia on EKG  - went into afib with RVR on 2/23--> transferred to MICU   - s/p cardizem 5mg, afib likely 2/2 uremia  - Likely 2/2 intravascular depletion due to ascites/anasarca  PLAN   - continue to monitor HR, no longer in afib w/ RVR     #Pericardial effusion.    CT abdomen showed trace pericaridal effusion. Likely 2/2 volume overload and fluid shifts from acute kidney failure  - No signs of tamponade or any of Valentin's Triad.     #HTN (hypertension). Plan: Continue home Norvasc of 5 mg daily  - Lasix as above for ascites/edema.    PULM:  No active issues     GI:  #Ascites.    - Slightly distended abdomen on physical exam and CT showing ascitic fluid in abdomen  - Likely 2/2 acute renal failure and fluid shift  - Continue diuresis as above.     :  #History of BPH.  Plan: Continue Doxazosin daily  - Monitor UOP and Bladder scan for any concerns of retention.     RENAL:    #Acute renal failure.    Acute renal failure with anasarca.  Patient with no history of CKD prior to admission. 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 Creatine worsened with deranged electrolytes after being diagnosed with Dress Syndrome.   -Dialysis 2/23  -Factor VIII 25U/kg   -Recheck factor VIII level   -Hep Panel   -PPD.     Hyperkalemia.  Plan: K of 5.6 on arrival. Given Lokelma 10 g orally. EKG as above did not show any acute changes  -Stopped Lokelma  - RESOLVED.     ENDO:  No active issues    ID:  # Bacteremia.  Patient growing MSSA started on ANCEF at 3AM on 2/22.   -Continue ANCEF   -Repeat Blood cultures 2/22 10 PM    RHEUM:  #Dress Syndrome  - Currently taking Prednisone 60 mg with improvement in rash   - Continue Prednisone 60 mg daily    #Gout.  Plan: Previously on allopurinol for gout prophylaxis; however, patient acquired DRESS Syndrome from the therapy  - Monitor symptoms and avoid NSAIDs for pain.     HEME/ONC:   #Leukocytosis  - Chronic due to prolonged high dose Prednisone use for Dress Syndrome    #Anemia  - Hgb 11.5 on arrival with unclear baseline  - Like 2/2 acute kidney failure or dilutional due to volume overload.     # Hemophilia A.    Prior history of significant bleeding in past following ERCP.   -Heme following   -Daily INR/PT/PTT  -Factor VIII after procedure   -Follows with Dr. Grace at [facility].           Problem: Nutrition, metabolism, and development symptoms. Plan; F: None  E: Lokelma for K >5.5;  N: Renal diet  DVT: None  GI: none.

## 2021-02-23 NOTE — PROGRESS NOTE ADULT - ASSESSMENT
77 yo M with PMHx of CKD (recent Cr 6.6 at [facility]), Hemophilia A, HTN, Gout, BPH and DRESS Syndrome (on Prednisone 60mg QD) consulted to ID for MSSA bacteremia. Patient currently on cefazolin 1g q 24hrs which was chosen over Nafcillin in the setting of severe fluid overload since Cefazolin is administered with less fluid. Source of MSSA unknown. Possible source from skin as patient is immunocompromised from chronic steroids and broke skin barrier scratching the DRESS syndrome rash which created multiple lesions. Unlikely lung source since no cough, sputum production and CXR does not show any lung field opacities. SOB and need for 2L NC likely due to pleural effusion noted on CXR in setting of severe fluid overload secondary to CKD. Weakly positive UA, however unlikely  source as patient does not have urinary symptoms and staph aureus usually does not commonly arise from urinary sources. Patient afebrile with downtrending WBC from 28.30 on admission (2/20) to 11.22 (2/23). TTE (2/22) showed no valvular disease. Surveillance blood culture from 2/22 is growing gram positive cocci in clusters.    Plan:  - C/w cefazolin 1g IV q 24hrs   - Daily surveillance blood cultures until MSSA bacteremia clears  - GUANAKO once patient is stable - GUANAKO required since surveillance blood culture from 2/22 is growing gram positive cocci in clusters after two doses of cefazolin and staph aureus is prone to seeding on heart valves  - F/u blood culture susceptibilities    ID Team 1 will follow.    Discussed with Infectious Disease Attending Dr. Edgar. Recommendations are considered final after attending attestation.   77 yo M with PMHx of CKD (recent Cr 6.6 at [facility]), Hemophilia A, HTN, Gout, BPH and DRESS Syndrome (on Prednisone 60mg QD) consulted to ID for MSSA bacteremia. Patient currently on cefazolin 1g q 24hrs which was chosen over Nafcillin in the setting of severe fluid overload since Cefazolin is administered with less fluid. Source of MSSA unknown. Possible source from skin as patient is immunocompromised from chronic steroids and broke skin barrier scratching the DRESS syndrome rash which created multiple lesions. Unlikely lung source since no cough, sputum production and CXR does not show any lung field opacities. SOB and need for 2L NC likely due to pleural effusion noted on CXR in setting of severe fluid overload secondary to CKD. Weakly positive UA, however unlikely  source as patient does not have urinary symptoms and staph aureus usually does not commonly arise from urinary sources. Patient afebrile with downtrending WBC from 28.30 on admission (2/20) to 11.22 (2/23). TTE (2/22) showed no valvular disease. Surveillance blood culture from 2/22 is growing gram positive cocci in clusters.    Plan:  - C/w cefazolin 1g IV q 24hrs   - Daily surveillance blood cultures until MSSA bacteremia clears  - GUANAKO once patient is stable - since surveillance blood culture from 2/22 is growing gram positive cocci in clusters after two doses of cefazolin and staph aureus is prone to seeding on heart valves.    - F/u blood culture susceptibilities    ID Team 1 will follow.    Discussed with Infectious Disease Attending Dr. Edgar. Recommendations are considered final after attending attestation.

## 2021-02-23 NOTE — PROGRESS NOTE ADULT - SUBJECTIVE AND OBJECTIVE BOX
INTERVAL EVENTS: Team called for patient with new onset AF RVR with mild hypotension. Patient also with findings of diffuse YURIDIA on EKG, without CP or other symptoms. He received IVF boluses.     PAST MEDICAL & SURGICAL HISTORY:  History of BPH    Gout    Hemophilia A    HTN (hypertension)    Uses cochlear implant        MEDICATIONS  (STANDING):  ceFAZolin   IVPB 1000 milliGRAM(s) IV Intermittent every 24 hours  doxazosin 4 milliGRAM(s) Oral daily  midodrine. 5 milliGRAM(s) Oral every 8 hours  pantoprazole    Tablet 40 milliGRAM(s) Oral before breakfast  polyethylene glycol 3350 17 Gram(s) Oral daily  PPD  5 Tuberculin Unit(s) Injectable 5 Unit(s) IntraDermal once  predniSONE   Tablet 60 milliGRAM(s) Oral daily  senna 2 Tablet(s) Oral at bedtime  sevelamer carbonate 800 milliGRAM(s) Oral three times a day with meals  sodium bicarbonate 650 milliGRAM(s) Oral two times a day    MEDICATIONS  (PRN):      Vital Signs Last 24 Hrs  T(C): 36.4 (23 Feb 2021 14:33), Max: 37 (22 Feb 2021 17:03)  T(F): 97.5 (23 Feb 2021 14:33), Max: 98.6 (22 Feb 2021 17:03)  HR: 90 (23 Feb 2021 15:00) (88 - 126)  BP: 106/57 (23 Feb 2021 15:00) (75/43 - 111/57)  BP(mean): 78 (23 Feb 2021 15:00) (53 - 80)  RR: 13 (23 Feb 2021 15:00) (13 - 25)  SpO2: 96% (23 Feb 2021 15:00) (90% - 96%)     PHYSICAL EXAM:  GEN: Awake, comfortable. NAD.   HEENT: NCAT, PERRL, EOMI. Mucosa moist. No JVD.   RESP: B/L crackles on exam.   CV: RRR, normal s1/s2. No m/r/g.  ABD: Soft, NTND. BS+  EXT: Warm. 2-3+ pitting edema, diffusely on B/L UE/LE.   NEURO: AAOx3. No focal deficits.    LABS:                        9.8    11.22 )-----------( 54       ( 23 Feb 2021 09:45 )             29.3     02-23    137  |  95<L>  |  118<H>  ----------------------------<  151<H>  4.0   |  23  |  6.79<H>    Ca    8.8      23 Feb 2021 07:32  Phos  8.3     02-23  Mg     2.4     02-23    TPro  5.1<L>  /  Alb  2.0<L>  /  TBili  0.7  /  DBili  x   /  AST  19  /  ALT  17  /  AlkPhos  135<H>  02-23    CARDIAC MARKERS ( 23 Feb 2021 09:45 )  x     / 0.24 ng/mL / 164 U/L / x     / 10.1 ng/mL  CARDIAC MARKERS ( 21 Feb 2021 16:38 )  x     / 0.15 ng/mL / x     / x     / x          PT/INR - ( 23 Feb 2021 07:33 )   PT: 17.0 sec;   INR: 1.44          PTT - ( 23 Feb 2021 07:33 )  PTT:37.8 sec    I&O's Summary    22 Feb 2021 07:01  -  23 Feb 2021 07:00  --------------------------------------------------------  IN: 690 mL / OUT: 1365 mL / NET: -675 mL    23 Feb 2021 07:01  -  23 Feb 2021 16:04  --------------------------------------------------------  IN: 100 mL / OUT: 0 mL / NET: 100 mL      BNP  RADIOLOGY & ADDITIONAL STUDIES:    TELEMETRY: AF with RVR, HR 140s.     EKG: AF with RVR with diffuse YURIDIA and CO depressions (Seen on 2/22/21 EKG).

## 2021-02-23 NOTE — PROGRESS NOTE ADULT - PROBLEM SELECTOR PLAN 4
CT abdomen showed trace pericaridal effusion. Likely 2/2 volume overload and fluid shifts from acute kidney failure  - No signs of tamponade or any of Valentin's Triad

## 2021-02-23 NOTE — PROGRESS NOTE ADULT - SUBJECTIVE AND OBJECTIVE BOX
Patient is a 76y Male seen and evaluated at bedside.   ~1.3L/12h UOP   +SOB, no CP, fever, abdominal pain  BP low-side, acceptable  tachycardic, s/p lopressor, pending Cardizem  EKG w/YURIDIA diffusely   failed hemodialysis yesterday secondary to hypotension (pericardial effusion vs sepsis vs received BP meds)  plan for hemodialysis again today, pending ICU transfer and likely pressor     Meds:    ceFAZolin   IVPB 1000 every 24 hours  diltiazem Injectable 5 once  doxazosin 4 daily  heparin   Injectable 5000 every 8 hours  midodrine. 5 every 8 hours  polyethylene glycol 3350 17 daily  PPD  5 Tuberculin Unit(s) Injectable 5 once  predniSONE   Tablet 60 daily  senna 2 at bedtime  sevelamer carbonate 800 three times a day with meals  sodium bicarbonate 650 two times a day      T(C): , Max: 37 (21 @ 17:03)  T(F): , Max: 98.6 (21 @ 17:03)  HR: 112 (21 @ 08:32)  BP: 110/60 (21 @ 08:32)  BP(mean): 80 (21 @ 08:32)  RR: 17 (21 @ 08:32)  SpO2: 93% (21 @ 08:32)  Wt(kg): --     @ 07:01  -   @ 07:00  --------------------------------------------------------  IN: 690 mL / OUT: 1365 mL / NET: -675 mL      Height (cm): 172.7 ( @ 16:55)  Weight (kg): 84.5 ( @ 16:55)  BMI (kg/m2): 28.3 ( @ 16:55)  BSA (m2): 1.98 ( @ 16:55)      ROS:  +SOB, no CP abdominal pain nausea or fever     PHYSICAL EXAM:  Constitutional: No acute distress on 4L O2 NC   Respiratory: Clear with reduced BS bases  Cardiovascular: S1, S2.  Regular rate and rhythm.    Gastrointestinal: soft, non-tender, mildly distended  Extremities: +2 non-pitting lower extremity edema  Access: +R fem temp catheter c/d/i non-tender         LABS:                        9.8    11.22 )-----------( 54       ( 2021 09:45 )             29.3         137  |  95<L>  |  118<H>  ----------------------------<  151<H>  4.0   |  23  |  6.79<H>    Ca    8.8      2021 07:32  Phos  8.3       Mg     2.4         TPro  5.1<L>  /  Alb  2.0<L>  /  TBili  0.7  /  DBili  x   /  AST  19  /  ALT  17  /  AlkPhos  135<H>      Hepatitis C Virus S/CO Ratio: 0.09 S/CO ( @ 07:32)  Hepatitis C Virus Interpretation: Nonreact ( @ 07:32)    PT/INR - ( 2021 07:33 )   PT: 17.0 sec;   INR: 1.44          PTT - ( 2021 07:33 )  PTT:37.8 sec  Urinalysis Basic - ( 2021 14:26 )    Color: Yellow / Appearance: Clear / S.015 / pH: x  Gluc: x / Ketone: NEGATIVE  / Bili: Negative / Urobili: 0.2 E.U./dL   Blood: x / Protein: NEGATIVE mg/dL / Nitrite: NEGATIVE   Leuk Esterase: Small / RBC: < 5 /HPF / WBC 5-10 /HPF   Sq Epi: x / Non Sq Epi: 0-5 /HPF / Bacteria: Present /HPF            RADIOLOGY & ADDITIONAL STUDIES:

## 2021-02-23 NOTE — PROGRESS NOTE ADULT - ASSESSMENT
76M PMHx Hemophilia A, HTN, Gout, p/w RU in setting of DRESS from allopurinol on steroids   no signs of renal recovery yet   remains hypervolemic, hyperkalemic, and acidotic without improvement     #RU secondary to ATN vs AIN on steroids   #hyperkalemia  #acidosis  #hypervolemia   #uremic pericarditis   s/p failed trial of hemodialysis 2/22 (only tolerated a few minutes secondary to hypotension), etiology secondary to pericardial effusion vs sepsis vs received BP meds   EKG w/YURIDIA diffusely   for repeat trial of hemodialysis today 2/23 for uremic pericarditis, pending ICU upgrade and possible pressors   Cardiology consult to control HR   continue to hold all anti-HTN meds   continue prednisone per primary team   continue with Renvela   no need for bicarb while on hemodialysis   hematologic evaluation re hemophilia appreciated   trend CBC, BMP, Mg, Phos daily   strict IOs  renal diet     Thank you for the opportunity to participate in the care of your patient. The nephrology service remains available to assist with any questions or concerns. Please feel free to reach us by paging the on-call nephrology fellow for urgent issues or as below.     Ramón Dumont M.D.   PGY-4, Nephrology Fellow   C: [phone]   P: [phone]

## 2021-02-23 NOTE — PROGRESS NOTE ADULT - PROBLEM SELECTOR PLAN 3
K of 5.6 on arrival. Given Lokelma 10 g orally. EKG as above did not show any acute changes  -Stopped Lokelma  - RESOLVED

## 2021-02-23 NOTE — PROGRESS NOTE ADULT - ATTENDING COMMENTS
I have reviewed the medical record, including laboratory and radiographic studies, interviewed and examined the patient and discussed the plan with Dr. Alberts, the ID Resident.  Agree with above.  Will continue to follow with you – ID Team 1.

## 2021-02-23 NOTE — PROVIDER CONTACT NOTE (CHANGE IN STATUS NOTIFICATION) - ASSESSMENT
pt converted from sinus tach to rapid a fib as high to 200 bpm. BP low to 79/49 MAP of 59. Pt in no distress, asymptomatic.

## 2021-02-23 NOTE — PROGRESS NOTE ADULT - ASSESSMENT
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 Hematologist is Dr. Grace. s/p HD catheter placement (02/22), complicated by hypotension during dialysis and MSSA bacteremia.     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 Factor VIII post-procedure was 78-83% which is acceptable. No significant bleeding observed by primary team.    - Mixing study (02/21) performed for mild coagulopathy showed possible presence of a delayed inhibitor due to lack of correction with 2 hr incubation. Send inhibitor assay.   - Maintain active T+S, transfuse if Hb < 7 or symptomatic   - Follow-up CBC and coagulation panel (PT/PTT) once daily  - Upon discharge, can follow-up with Dr. Grace (Hematologist)    Patient discussed with Dr. 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 Hematologist is Dr. Grace. s/p HD catheter placement (02/22), complicated by hypotension during dialysis and MSSA bacteremia.     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 Factor VIII post-procedure was 78-83% which is acceptable. No significant bleeding observed by primary team.    - Mixing study (02/21) performed for mild coagulopathy showed possible presence of a delayed inhibitor due to lack of correction with 2 hr incubation. Send inhibitor assay.   - Will need to follow-up with Factor VIII once daily as there remains a possibility of future procedures, including GUANAKO.   - Maintain active T+S, transfuse if Hb < 7 or symptomatic   - Follow-up CBC and coagulation panel (PT/PTT) once daily  - Upon discharge, can follow-up with Dr. Grace (Hematologist)    Patient discussed with Dr. 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 Hematologist is Dr. Grace. s/p HD catheter placement (02/22), complicated by hypotension during dialysis and MSSA bacteremia.     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 Factor VIII post-procedure was 78-83% which is acceptable. No significant bleeding observed by primary team.    - Mixing study (02/21) performed for mild coagulopathy showed possible presence of a delayed inhibitor due to lack of correction with 2 hr incubation. Send inhibitor assay.   - Will need to follow-up with Factor VIII once daily as there remains a possibility of future procedures, including GUANAKO.   - Maintain active T+S, transfuse if Hb < 7 or symptomatic   - Follow-up CBC and coagulation panel (PT/PTT) once daily  - Upon discharge, can follow-up with Dr. Grace (Hematologist)    Patient discussed with Dr. 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 Hematologist is Dr. Grace. s/p HD catheter placement (02/22), complicated by hypotension during dialysis and MSSA bacteremia.     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 Factor VIII post-procedure was 78-83% which is acceptable. No significant bleeding observed by primary team.    - Mixing study (02/21) performed for mild coagulopathy showed possible presence of a delayed inhibitor due to lack of correction with 2 hr incubation. Send inhibitor assay.   - Will need to follow-up with Factor VIII once daily as there remains a possibility of future procedures, including GUANAKO.   - Maintain active T+S, transfuse if Hb < 7 or symptomatic   - Follow-up CBC and coagulation panel (PT/PTT) once daily  - Upon discharge, can follow-up with Dr. Grace (Hematologist)    #) Worsening thrombocytopenia, possibly sepsis-related, rule-out TTP and HIT  - Peripheral blood smear (02/23) showed platelet clumping and no schistocytes. PLASMIC score is 4 low-risk for TTP. HIT score is 3-4, low-intermediate risk.  - Follow-up blue top, fibrinogen, PT/PTT, D-dimer, haptoglobin, reticulocyte count and LDH. Send PF4-HIT Ab.   - Trend CBC with differential once daily  - Maintain active T+S, transfuse if platelet < 10K, or < 20K if febrile or <50K if bleeding.   - Hold pharmacologic DVT ppx if platelets consistently < 50 K    Patient discussed with Dr. Tolbert

## 2021-02-23 NOTE — PROGRESS NOTE ADULT - SUBJECTIVE AND OBJECTIVE BOX
OVERNIGHT EVENTS: Repeat blood cultures were sent overnight    Hospital Course:  76 year old male with a past medical history of Hemophilia A, HTN, Gout, and DRESS Syndrome who presents with weakness for 1 week. Pt had an acute gout flare on , treated with allopurinol for 3 weeks, c/b by development of DRESS (seen at [facility] where diagnosis was made, d/c ).  While at [facility] patient had a creatinine that ranged from 4-6, however baseline is 1.1 (2020). After discharge from [facility] patient complained of weakness and progressive swelling of his legs and abdomen. On admission, VS notable for tachycardia to 128. Labs were significant for WBC 28.30, Hgb 11.5, K 5.6, CO2 20  , Cr 6.59, albumin 2.7, alk phos 158, BNP 78565. CXR showed pleural effusion left greater than right with dependent edema consistent with volume overload. Imaging significant for a CT abdomen small to moderate bilateral pleural effusions, greater on the left. Mild pericardial effusion. Anasarca greater in left lower chest and trace ascites. LE dopplers negative for DVT. The patient was given 80mg of IV lasix and admitted for acute renal failure with potential need of dialysis. While on [facility] nephrology was consulted who recommended IV diuresis with plans of hemodialysis on . On the morning of  blood cultures grew MSSA.  Pt was started on ancef. Echo performed  due to pericardial effusion seen on CT chest, no evidence of tamponade.  Attempted HD , however pt became hypotensive (BP unknown), and session was immediately stopped. Pt transferred to [facility] for increased monitoring during dialysis .    SUBJECTIVE / INTERVAL HPI: Patient seen and examined at bedside. No complaints other than that he has not had full bowel movement, no abdominal pain, shortness of breath, palpitations, fevers/chills. Discussed that I will add in bowel regimen for him.    VITAL SIGNS:  Vital Signs Last 24 Hrs  T(C): 36.8 (2021 04:40), Max: 37 (2021 17:03)  T(F): 98.2 (2021 04:40), Max: 98.6 (2021 17:03)  HR: 100 (2021 04:24) (100 - 108)  BP: 109/55 (2021 04:24) (98/51 - 117/64)  BP(mean): 78 (2021 04:24) (75 - 80)  RR: 18 (2021 04:24) (16 - 19)  SpO2: 94% (2021 04:24) (93% - 95%)    PHYSICAL EXAM:    General: WDWN elderly male NAD  HEENT: NC/AT; PERRL, anicteric sclera; MMM  Cardiovascular: +S1/S2; RRR  Respiratory: CTA B/L; no W/R/R  Gastrointestinal: soft, NT/ND; +BSx4  Extremities: WWP; 2+ pitting edema to mid shins bilaterally  Vascular: 2+ radial, DP pulses bilaterally  Neurological: AAOx3; no focal deficits    MEDICATIONS:  MEDICATIONS  (STANDING):  ceFAZolin   IVPB 1000 milliGRAM(s) IV Intermittent every 24 hours  doxazosin 4 milliGRAM(s) Oral daily  heparin   Injectable 5000 Unit(s) SubCutaneous every 8 hours  polyethylene glycol 3350 17 Gram(s) Oral daily  PPD  5 Tuberculin Unit(s) Injectable 5 Unit(s) IntraDermal once  predniSONE   Tablet 60 milliGRAM(s) Oral daily  senna 2 Tablet(s) Oral at bedtime  sevelamer carbonate 800 milliGRAM(s) Oral three times a day with meals  sodium bicarbonate 650 milliGRAM(s) Oral two times a day    MEDICATIONS  (PRN):      ALLERGIES:  Allergies    No Known Allergies    Intolerances        LABS:                        10.1   16.67 )-----------( 77       ( 2021 16:06 )             29.8     02-    141  |  97  |  111<H>  ----------------------------<  186<H>  4.2   |  24  |  6.47<H>    Ca    9.0      2021 16:06  Phos  7.6     02  Mg     2.3         TPro  5.6<L>  /  Alb  2.5<L>  /  TBili  1.2  /  DBili  x   /  AST  25  /  ALT  24  /  AlkPhos  158<H>  02-22    PT/INR - ( 2021 10:35 )   PT: 17.9 sec;   INR: 1.52          PTT - ( 2021 10:35 )  PTT:44.3 sec  Urinalysis Basic - ( 2021 14:26 )    Color: Yellow / Appearance: Clear / S.015 / pH: x  Gluc: x / Ketone: NEGATIVE  / Bili: Negative / Urobili: 0.2 E.U./dL   Blood: x / Protein: NEGATIVE mg/dL / Nitrite: NEGATIVE   Leuk Esterase: Small / RBC: < 5 /HPF / WBC 5-10 /HPF   Sq Epi: x / Non Sq Epi: 0-5 /HPF / Bacteria: Present /HPF      CAPILLARY BLOOD GLUCOSE          RADIOLOGY & ADDITIONAL TESTS: Reviewed.   --------------------------------------TRANSFER [facility] TO ICU-----------------------------------------------------------    OVERNIGHT EVENTS: Repeat blood cultures were sent overnight, still growing MSSA, will need repeat cultures tonight.    HOSPITAL COUSE:  76 year old male with a past medical history of Hemophilia A, HTN, Gout, and DRESS Syndrome who presents with weakness for 1 week. Pt had an acute gout flare on , treated with allopurinol for 3 weeks, c/b by development of DRESS (seen at [facility] where diagnosis was made, d/c ).  While at [facility] patient had a creatinine that ranged from 4-6, however baseline is 1.1 (2020). After discharge from [facility] patient complained of weakness and progressive swelling of his legs and abdomen. On admission, VS notable for tachycardia to 128. Labs were significant for WBC 28.30, Hgb 11.5, K 5.6, CO2 20  , Cr 6.59, albumin 2.7, alk phos 158, BNP 00466. CXR showed pleural effusion left greater than right with dependent edema consistent with volume overload. Imaging significant for a CT abdomen small to moderate bilateral pleural effusions, greater on the left. Mild pericardial effusion. Anasarca greater in left lower chest and trace ascites. LE dopplers negative for DVT. The patient was given 80mg of IV lasix and admitted for acute renal failure with potential need of dialysis. While on [facility] nephrology was consulted who recommended IV diuresis with plans of hemodialysis on . On the morning of  blood cultures grew MSSA.  Pt was started on ancef. Echo performed  due to pericardial effusion seen on CT chest, no evidence of tamponade.  Attempted HD , however pt became hypotensive (BP unknown), and session was immediately stopped. Pt transferred to [facility] for increased monitoring during dialysis, to occur .  Around 9:20 AM 2/23, patient went into afib with RVR (no prior history of afib), rectal temp negative, EKG showed ST elevations which we felt were more pronounced compared to prior. Trop, CK, CKMB obtained (trop 0.24, CKMB 10.1, CK nml). Pt without any chest pain or complaints. Gave lopressor 5 IVP x1. BP dropped slightly, therefore gave 500cc bolus. Cardiology consulted stat- came to bedside and felt EKG mostly stable from prior and likely afib RVR was from uremia, necessitating dialysis. Gave one time dose of cardizem 5mg and started on 5 of midodrine TID. Per nephro, needs higher pressure and lower HR to tolerate dialysis, therefore transferring to ICU for further pressure support and management to facilitate dialysis.    SUBJECTIVE / INTERVAL HPI: Patient seen and examined at bedside. No complaints other than that he has not had full bowel movement, no abdominal pain, shortness of breath, palpitations, fevers/chills.     VITAL SIGNS:  Vital Signs Last 24 Hrs  T(C): 36.8 (2021 04:40), Max: 37 (2021 17:03)  T(F): 98.2 (2021 04:40), Max: 98.6 (2021 17:03)  HR: 100 (2021 04:24) (100 - 108)  BP: 109/55 (2021 04:24) (98/51 - 117/64)  BP(mean): 78 (2021 04:24) (75 - 80)  RR: 18 (2021 04:24) (16 - 19)  SpO2: 94% (2021 04:24) (93% - 95%)    PHYSICAL EXAM:    General: WDWN elderly male NAD  HEENT: NC/AT; PERRL, anicteric sclera; MMM  Cardiovascular: +S1/S2; RRR  Respiratory: CTA B/L; no W/R/R  Gastrointestinal: soft, NT/ND; +BSx4  Extremities: WWP; 2+ pitting edema to mid shins bilaterally  Vascular: 2+ radial, DP pulses bilaterally  Neurological: AAOx3; no focal deficits    MEDICATIONS:  MEDICATIONS  (STANDING):  ceFAZolin   IVPB 1000 milliGRAM(s) IV Intermittent every 24 hours  doxazosin 4 milliGRAM(s) Oral daily  heparin   Injectable 5000 Unit(s) SubCutaneous every 8 hours  polyethylene glycol 3350 17 Gram(s) Oral daily  PPD  5 Tuberculin Unit(s) Injectable 5 Unit(s) IntraDermal once  predniSONE   Tablet 60 milliGRAM(s) Oral daily  senna 2 Tablet(s) Oral at bedtime  sevelamer carbonate 800 milliGRAM(s) Oral three times a day with meals  sodium bicarbonate 650 milliGRAM(s) Oral two times a day    MEDICATIONS  (PRN):      ALLERGIES:  Allergies    No Known Allergies    Intolerances        LABS:                        10.1   16.67 )-----------( 77       ( 2021 16:06 )             29.8         141  |  97  |  111<H>  ----------------------------<  186<H>  4.2   |  24  |  6.47<H>    Ca    9.0      2021 16:06  Phos  7.6       Mg     2.3         TPro  5.6<L>  /  Alb  2.5<L>  /  TBili  1.2  /  DBili  x   /  AST  25  /  ALT  24  /  AlkPhos  158<H>      PT/INR - ( 2021 10:35 )   PT: 17.9 sec;   INR: 1.52          PTT - ( 2021 10:35 )  PTT:44.3 sec  Urinalysis Basic - ( 2021 14:26 )    Color: Yellow / Appearance: Clear / S.015 / pH: x  Gluc: x / Ketone: NEGATIVE  / Bili: Negative / Urobili: 0.2 E.U./dL   Blood: x / Protein: NEGATIVE mg/dL / Nitrite: NEGATIVE   Leuk Esterase: Small / RBC: < 5 /HPF / WBC 5-10 /HPF   Sq Epi: x / Non Sq Epi: 0-5 /HPF / Bacteria: Present /HPF      CAPILLARY BLOOD GLUCOSE          RADIOLOGY & ADDITIONAL TESTS: Reviewed.

## 2021-02-23 NOTE — PROGRESS NOTE ADULT - PROBLEM SELECTOR PLAN 5
Slightly distended abdomen on physical exam and CT showing ascitic fluid in abdomen  - Likely 2/2 acute renal failure and fluid shift  - Continue diuresis as above

## 2021-02-23 NOTE — PROGRESS NOTE ADULT - SUBJECTIVE AND OBJECTIVE BOX
LENGTH OF HOSPITAL STAY: 2d    SUBJECTIVE: s/p HD catheter placement, upgraded for hypotension during dialysis. Has MSSA bacteremia.     HISTORY OF PRESENTING ILLNESS:   Mr. Don is a 76 year old male with a past medical history of CKD (recent Cr 6.6 at [facility]), Hemophilia A, HTN, Gout, and DRESS Syndrome who presents with weakness for 1 week. He was admitted for acute on chronic renal failure and likely induction of hemodialysis.    (2021 03:38)    Patient is a very poor historian, but daughter who is a PA at [employer], who said he has gotten DDAVP prior to dental extractions, and needed pre, intra and post op IV factor VIII for a lap cholecystectomy. She also said he  bled excessively during an ERCP when they didn't give him anything. She is not sure of hist FVIII level but the number 30 rings a bell to her. His hematologist is .    PAST MEDICAL & SURGICAL HISTORY:  History of BPH  Gout  Hemophilia A  HTN (hypertension)  Uses cochlear implant    ALLERGIES:  No Known Allergies    MEDICATIONS:  STANDING MEDICATIONS  ceFAZolin   IVPB 1000 milliGRAM(s) IV Intermittent every 24 hours  doxazosin 4 milliGRAM(s) Oral daily  heparin   Injectable 5000 Unit(s) SubCutaneous every 8 hours  metoprolol tartrate Injectable 5 milliGRAM(s) IV Push once  polyethylene glycol 3350 17 Gram(s) Oral daily  PPD  5 Tuberculin Unit(s) Injectable 5 Unit(s) IntraDermal once  predniSONE   Tablet 60 milliGRAM(s) Oral daily  senna 2 Tablet(s) Oral at bedtime  sevelamer carbonate 800 milliGRAM(s) Oral three times a day with meals  sodium bicarbonate 650 milliGRAM(s) Oral two times a day    PRN MEDICATIONS    VITALS:   T(F): 98.2  HR: 100  BP: 109/55  RR: 18  SpO2: 94%    LABS:                        11.8   15.51 )-----------( 78       ( 2021 07:33 )             36.1     02-    137  |  95<L>  |  118<H>  ----------------------------<  151<H>  4.0   |  23  |  6.79<H>    Ca    8.8      2021 07:32  Phos  8.3       Mg     2.4         TPro  5.1<L>  /  Alb  2.0<L>  /  TBili  0.7  /  DBili  x   /  AST  19  /  ALT  17  /  AlkPhos  135<H>      PT/INR - ( 2021 07:33 )   PT: 17.0 sec;   INR: 1.44       PTT - ( 2021 07:33 )  PTT:37.8 sec  Urinalysis Basic - ( 2021 14:26 )    Color: Yellow / Appearance: Clear / S.015 / pH: x  Gluc: x / Ketone: NEGATIVE  / Bili: Negative / Urobili: 0.2 E.U./dL   Blood: x / Protein: NEGATIVE mg/dL / Nitrite: NEGATIVE   Leuk Esterase: Small / RBC: < 5 /HPF / WBC 5-10 /HPF   Sq Epi: x / Non Sq Epi: 0-5 /HPF / Bacteria: Present /HPF    Lactate, Blood: 1.2 mmol/L (21 @ 16:05)    Culture - Blood (collected 2021 08:31)  Source: .Blood Blood  Gram Stain (2021 22:35):    Aerobic Bottle: Gram Positive Cocci in Clusters    Result called to and read back byUmu Hough RN  2021 20:09:26    Anaerobic Bottle: Gram Positive Cocci in Clusters    Floor previously notified.    ***Blood Panel PCR results on this specimen are available    approximately 3 hours after the Gram stain result.***    Gram stain, PCR, and/or culture results may not always    correspond due to difference in methodologies.    ************************************************************    This PCR assay was performed using [employer].    The following targets are tested for: Enterococcus,    vancomycin resistant enterococci, Listeria monocytogenes,    coagulase negative staphylococci, S. aureus,    methicillin resistant S. aureus, Streptococcus agalactiae    (Group B), S. pneumoniae, S. pyogenes (Group A),    Acinetobacter baumannii, Enterobacter cloacae, E. coli,    Klebsiella oxytoca, K. pneumoniae, Proteus sp.,    Serratia marcescens, Haemophilus influenzae,    Neisseria meningitidis, Pseudomonas aeruginosa, Candida    albicans, C. glabrata, C krusei, C parapsilosis,    C. tropicalis and the KPC resistance gene.  Preliminary Report (2021 09:12):    Growth in aerobic and anaerobic bottles: Staphylococcus aureus    Presumptive Methicillin susceptible    Confirmation to follow within 24 hrs.    Susceptibility to follow.  Organism: Blood Culture PCR (2021 21:46)  Organism: Blood Culture PCR (2021 21:46)    CARDIAC MARKERS ( 2021 16:38 )  x     / 0.15 ng/mL / x     / x     / x      CARDIAC MARKERS ( 2021 10:19 )  x     / 0.15 ng/mL / x     / x     / x        RADIOLOGY:  < from: Xray Chest 1 View-PORTABLE IMMEDIATE (Xray Chest 1 View-PORTABLE IMMEDIATE .) (21 @ 06:22) >  Stable heart size. Left basilar opacity/pleural effusion, increased. Right basilar opacity/pleural effusion.. Stable bony structures.    < end of copied text >    PHYSICAL EXAM:  GEN: No acute distress  HEENT: NCAT  LUNGS: Clear to auscultation bilaterally   HEART: S1/S2 present. RRR.   ABD: Soft, non-tender, non-distended. Bowel sounds present  EXT: +2 pitting edema  NEURO: AAOX3

## 2021-02-23 NOTE — PROGRESS NOTE ADULT - ATTENDING COMMENTS
See fellow note written above for details. I reviewed the fellow documentation. I have personally seen and examined this patient. I reviewed vitals, labs, medications, cardiac studies, and additional imaging. I agree with the above fellow's findings and plans as written above     -Pt with new onset Afib with RVR in setting of uremia  -EKG with diffuse ST elevations with IA depression consistent with uremic pericarditis   -Pt remains without CP, no rub noted on exam  -Can given Cardizem IV push or initiate cardizem drip for better rate control as uremia resolves with HD  -ECHO with normal EF, small pericardial effusion  -No AC given hemophilia   -Will continue to follow

## 2021-02-23 NOTE — PROGRESS NOTE ADULT - ASSESSMENT
76 year old male with a past medical history of CKD (recent Cr 6.6 at [facility]), Hemophilia A, HTN, Gout, and DRESS Syndrome who presents with weakness for 1 week. Cardiology consult for volume overload and elevated troponins. 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     AF with RVR: No history of AF. Likley driven by pericarditis, which is likely from uremia. [id number]=9 but pt with Hemophilia A  -Would not recommend starting anticoagulation.  -Can trial Cardizem 5mg IVP for rate control, though until uremia resolved, will be difficult to control.  -Start Midodrine 5mg po TID.  -Urgent HD to correct uremia.     Volume Overload: Patient with anasarca in setting of RU on CKD. Still making urine. EKG with no ischemic changes. Echo shows normal EF, no diastolic dysfunction. Likely secondary to renal failure rather than cardiogenic cause.   -Continue with Lasix 80mg IVP BID.   -Appreciate renal recs.   -Urgent HD for uremia and also volume.   -STrict I&Os.  -Daily weights.  -Normal EF, no need for GDMT.     Troponemia: Trop T 0.15, flat in setting of RU on CKD. No ischemic changes.   -Likely due to demand and renal failure.  -No need to continue to trend.   -If patient has CP, can check EKG and cardiac enzymes.     Discussed with attending and primary team.

## 2021-02-23 NOTE — PROGRESS NOTE ADULT - ATTENDING COMMENTS
RU- no recovery  this AM with SVT- 's with BP 90s  will need transfer to ICU for IV meds to improve HR and  BP to help facilitate dialysis  Per cardiology concern for uremic pericarditis-   reviewed with ICU team- for transfer

## 2021-02-23 NOTE — PROGRESS NOTE ADULT - SUBJECTIVE AND OBJECTIVE BOX
Patient was seen and evaluated on dialysis.   HR: 87 (02-23-21 @ 16:05)  BP: 109/54 (02-23-21 @ 16:05)  Wt(kg): --  02-23    137  |  95<L>  |  118<H>  ----------------------------<  151<H>  4.0   |  23  |  6.79<H>    Ca    8.8      23 Feb 2021 07:32  Phos  8.3     02-23  Mg     2.4     02-23    TPro  5.1<L>  /  Alb  2.0<L>  /  TBili  0.7  /  DBili  x   /  AST  19  /  ALT  17  /  AlkPhos  135<H>  02-23      Dialyzer: Optiflux [id number]         QB: 250 mL/min         QD: 500 mL/min      K bath: 3  Goal UF: 0.5L                Duration: 120 min     Patient is tolerating the procedure well. Continue full hemodialysis treatment as prescribed.

## 2021-02-23 NOTE — PROGRESS NOTE ADULT - PROBLEM SELECTOR PLAN 9
Prior history of significant bleeding in past following ERCP.   -Heme following   -Daily INR/PT/PTT  -Factor VIII after procedure   -Follows with Dr. Grace at [facility]

## 2021-02-23 NOTE — PROGRESS NOTE ADULT - SUBJECTIVE AND OBJECTIVE BOX
infectious diseases progress note:  YAIR FELDMAN is a 76yMale patient    O/N events: Temporary dialysis catheter was placed yesterday and went to HD yesterday but had to be stopped due to hypotension. Patient was hypotensive today with Afib and RVR.    Interval history:  Patient seen and examined at bedside. Patient notes slight discomfort laying in bed but felt relief after he was repositioned. Patient denies change in vision, HA, lightheadedness, SOB, CP, N/V, back pain    ACUTE KIDNEY INJURY      Bacteremia    Acute renal failure    Metabolic acidosis    DRESS syndrome    Acute kidney injury    Nutrition, metabolism, and development symptoms    Hemophilia A    History of BPH    Gout    HTN (hypertension)    Other ascites    Pericardial effusion    Hyperkalemia    Acute renal failure superimposed on stage 5 chronic kidney disease, not on chronic dialysis, unspecified acute renal failure type    ROS:  CONSTITUTIONAL:  Negative fever or chills, feels well, good appetite  EYES:  Negative  blurry vision or double vision  CARDIOVASCULAR:  Negative for chest pain or palpitations  RESPIRATORY:  Negative for cough, wheezing, or SOB   GASTROINTESTINAL:  Negative for nausea, vomiting, diarrhea, constipation, or abdominal pain  GENITOURINARY:  Negative frequency, urgency or dysuria  NEUROLOGIC:  No headache, confusion, dizziness, lightheadedness    Allergies    No Known Allergies    Intolerances        ANTIBIOTICS/RELEVANT:  antimicrobials  ceFAZolin   IVPB 1000 milliGRAM(s) IV Intermittent every 24 hours    immunologic:  PPD  5 Tuberculin Unit(s) Injectable 5 Unit(s) IntraDermal once    OTHER:  diltiazem Injectable 5 milliGRAM(s) IV Push once  doxazosin 4 milliGRAM(s) Oral daily  heparin   Injectable 5000 Unit(s) SubCutaneous every 8 hours  midodrine. 5 milliGRAM(s) Oral every 8 hours  polyethylene glycol 3350 17 Gram(s) Oral daily  predniSONE   Tablet 60 milliGRAM(s) Oral daily  senna 2 Tablet(s) Oral at bedtime  sevelamer carbonate 800 milliGRAM(s) Oral three times a day with meals  sodium bicarbonate 650 milliGRAM(s) Oral two times a day      Objective:  Vital Signs Last 24 Hrs  T(C): 36.8 (2021 04:40), Max: 37 (2021 17:03)  T(F): 98.2 (2021 04:40), Max: 98.6 (2021 17:03)  HR: 112 (2021 08:32) (100 - 112)  BP: 110/60 (2021 08:32) (98/51 - 117/64)  BP(mean): 80 (2021 08:32) (75 - 80)  RR: 17 (2021 08:32) (16 - 19)  SpO2: 93% (2021 08:32) (93% - 95%)    PHYSICAL EXAM:  General: NAD, laying comfortably in bed  Skin: Extremities and chest has coarse red rash with linear and circular scabs. Rash and scabs have no signs of warmth, purulence, drainage,   HEENT: Conjunctiva clear, sclera white, PERRLA  Cardiac: S1 and S1 clear. No murmurs, rubs or gallops  Lungs: unlabored breathing on 2L NC, no accessory muscle use, vesicular b/l   Abdomen: Distended, Soft, non-tender, normoactive BS in all four quadrants  Peripheral extremities: 1+ pitting edema in lower extremities b/l, 2+ DP and PT pulses b/l   Neurological: A&Ox3           LABS:                        9.8    11.22 )-----------( 54       ( 2021 09:45 )             29.3     02-23    137  |  95<L>  |  118<H>  ----------------------------<  151<H>  4.0   |  23  |  6.79<H>    Ca    8.8      2021 07:32  Phos  8.3     02-  Mg     2.4     02-23    TPro  5.1<L>  /  Alb  2.0<L>  /  TBili  0.7  /  DBili  x   /  AST  19  /  ALT  17  /  AlkPhos  135<H>  02-23    PT/INR - ( 2021 07:33 )   PT: 17.0 sec;   INR: 1.44          PTT - ( 2021 07:33 )  PTT:37.8 sec  Urinalysis Basic - ( 2021 14:26 )    Color: Yellow / Appearance: Clear / S.015 / pH: x  Gluc: x / Ketone: NEGATIVE  / Bili: Negative / Urobili: 0.2 E.U./dL   Blood: x / Protein: NEGATIVE mg/dL / Nitrite: NEGATIVE   Leuk Esterase: Small / RBC: < 5 /HPF / WBC 5-10 /HPF   Sq Epi: x / Non Sq Epi: 0-5 /HPF / Bacteria: Present /HPF          MICROBIOLOGY:  Culture Results:   Culture in progress ( @ 21:12)        RADIOLOGY & ADDITIONAL STUDIES: infectious diseases progress note:  YAIR FELDMAN is a 76yMale patient    O/N events: Temporary dialysis catheter was placed yesterday. Patient went to HD yesterday but had to be stopped due to hypotension and was placed on tele. Patient transferred to ICU today after patient hypotensive and found to have afib RVR, trop 0.24, CKMB 10.1 and higher level of management required because patient needs higher pressure and lower HR to tolerate dialysis.    Interval history:  Patient seen and examined at bedside. Patient notes slight discomfort laying in bed but felt relief after he was repositioned. Patient denies change in vision, HA, lightheadedness, SOB, CP, N/V, back pain    ACUTE KIDNEY INJURY      Bacteremia    Acute renal failure    Metabolic acidosis    DRESS syndrome    Acute kidney injury    Nutrition, metabolism, and development symptoms    Hemophilia A    History of BPH    Gout    HTN (hypertension)    Other ascites    Pericardial effusion    Hyperkalemia    Acute renal failure superimposed on stage 5 chronic kidney disease, not on chronic dialysis, unspecified acute renal failure type    ROS:  CONSTITUTIONAL:  Negative fever or chills, feels well, good appetite  EYES:  Negative  blurry vision or double vision  CARDIOVASCULAR:  Negative for chest pain or palpitations  RESPIRATORY:  Negative for cough, wheezing, or SOB   GASTROINTESTINAL:  Negative for nausea, vomiting, diarrhea, constipation, or abdominal pain  GENITOURINARY:  Negative frequency, urgency or dysuria  NEUROLOGIC:  No headache, confusion, dizziness, lightheadedness    Allergies    No Known Allergies    Intolerances        ANTIBIOTICS/RELEVANT:  antimicrobials  ceFAZolin   IVPB 1000 milliGRAM(s) IV Intermittent every 24 hours    immunologic:  PPD  5 Tuberculin Unit(s) Injectable 5 Unit(s) IntraDermal once    OTHER:  diltiazem Injectable 5 milliGRAM(s) IV Push once  doxazosin 4 milliGRAM(s) Oral daily  heparin   Injectable 5000 Unit(s) SubCutaneous every 8 hours  midodrine. 5 milliGRAM(s) Oral every 8 hours  polyethylene glycol 3350 17 Gram(s) Oral daily  predniSONE   Tablet 60 milliGRAM(s) Oral daily  senna 2 Tablet(s) Oral at bedtime  sevelamer carbonate 800 milliGRAM(s) Oral three times a day with meals  sodium bicarbonate 650 milliGRAM(s) Oral two times a day      Objective:  Vital Signs Last 24 Hrs  T(C): 36.8 (2021 04:40), Max: 37 (2021 17:03)  T(F): 98.2 (2021 04:40), Max: 98.6 (2021 17:03)  HR: 112 (2021 08:32) (100 - 112)  BP: 110/60 (2021 08:32) (98/51 - 117/64)  BP(mean): 80 (2021 08:32) (75 - 80)  RR: 17 (2021 08:32) (16 - 19)  SpO2: 93% (2021 08:32) (93% - 95%)    PHYSICAL EXAM:  General: NAD, laying comfortably in bed  Skin: Extremities and chest has coarse red rash with linear and circular scabs. Rash and scabs have no signs of warmth, purulence, drainage,   HEENT: Conjunctiva clear, sclera white, PERRLA  Cardiac: S1 and S1 clear. No murmurs, rubs or gallops  Lungs: unlabored breathing on 2L NC, no accessory muscle use, vesicular b/l   Abdomen: Distended, Soft, non-tender, normoactive BS in all four quadrants  Peripheral extremities: 1+ pitting edema in lower extremities b/l, 2+ DP and PT pulses b/l   Neurological: A&Ox3           LABS:                        9.8    11.22 )-----------( 54       ( 2021 09:45 )             29.3     02-    137  |  95<L>  |  118<H>  ----------------------------<  151<H>  4.0   |  23  |  6.79<H>    Ca    8.8      2021 07:32  Phos  8.3     02-  Mg     2.4     02-23    TPro  5.1<L>  /  Alb  2.0<L>  /  TBili  0.7  /  DBili  x   /  AST  19  /  ALT  17  /  AlkPhos  135<H>  02-23    PT/INR - ( 2021 07:33 )   PT: 17.0 sec;   INR: 1.44          PTT - ( 2021 07:33 )  PTT:37.8 sec  Urinalysis Basic - ( 2021 14:26 )    Color: Yellow / Appearance: Clear / S.015 / pH: x  Gluc: x / Ketone: NEGATIVE  / Bili: Negative / Urobili: 0.2 E.U./dL   Blood: x / Protein: NEGATIVE mg/dL / Nitrite: NEGATIVE   Leuk Esterase: Small / RBC: < 5 /HPF / WBC 5-10 /HPF   Sq Epi: x / Non Sq Epi: 0-5 /HPF / Bacteria: Present /HPF          MICROBIOLOGY:  Culture Results:   Culture in progress ( @ 21:12)        RADIOLOGY & ADDITIONAL STUDIES:

## 2021-02-23 NOTE — PROGRESS NOTE ADULT - SUBJECTIVE AND OBJECTIVE BOX
INTERVAL HPI/OVERNIGHT EVENTS:    SUBJECTIVE: Patient seen and examined at bedside.    OBJECTIVE:    VITAL SIGNS:  ICU Vital Signs Last 24 Hrs  T(C): 36.8 (2021 04:40), Max: 37 (2021 17:03)  T(F): 98.2 (2021 04:40), Max: 98.6 (2021 17:03)  HR: 105 (2021 13:00) (100 - 126)  BP: 106/63 (2021 13:00) (79/49 - 111/57)  BP(mean): 80 (2021 13:00) (59 - 80)  ABP: --  ABP(mean): --  RR: 25 (2021 13:00) (16 - 25)  SpO2: 96% (2021 13:00) (93% - 96%)        - @ 07:01  -  02-23 @ 07:00  --------------------------------------------------------  IN: 690 mL / OUT: 1365 mL / NET: -675 mL      CAPILLARY BLOOD GLUCOSE          PHYSICAL EXAM:    Constitutional: NAD  HEENT: NC/AT; PERRL, anicteric sclera; MMM  Neck: supple, no JVD  Cardiovascular: +S1/S2, RRR  Respiratory: CTA B/L, no W/R/R  Gastrointestinal: abdomen soft, NT/ND; no rebound or guarding; +BSx4  Genitourinary: no suprapubic tenderness or fullness  Extremities: WWP; no LE edema; no clubbing or cyanosis  Vascular: 2+ radial, DP/PT and femoral pulses B/L  Dermatologic: normal color and turgor; no visible rashes  Neurological:     MEDICATIONS:  MEDICATIONS  (STANDING):  ceFAZolin   IVPB 1000 milliGRAM(s) IV Intermittent every 24 hours  diltiazem Injectable 5 milliGRAM(s) IV Push once  doxazosin 4 milliGRAM(s) Oral daily  heparin   Injectable 5000 Unit(s) SubCutaneous every 8 hours  midodrine. 5 milliGRAM(s) Oral every 8 hours  polyethylene glycol 3350 17 Gram(s) Oral daily  PPD  5 Tuberculin Unit(s) Injectable 5 Unit(s) IntraDermal once  predniSONE   Tablet 60 milliGRAM(s) Oral daily  senna 2 Tablet(s) Oral at bedtime  sevelamer carbonate 800 milliGRAM(s) Oral three times a day with meals  sodium bicarbonate 650 milliGRAM(s) Oral two times a day    MEDICATIONS  (PRN):      ALLERGIES:  Allergies    No Known Allergies    Intolerances        LABS:                        9.8    11.22 )-----------( 54       ( 2021 09:45 )             29.3     02-    137  |  95<L>  |  118<H>  ----------------------------<  151<H>  4.0   |  23  |  6.79<H>    Ca    8.8      2021 07:32  Phos  8.3       Mg     2.4         TPro  5.1<L>  /  Alb  2.0<L>  /  TBili  0.7  /  DBili  x   /  AST  19  /  ALT  17  /  AlkPhos  135<H>  -    PT/INR - ( 2021 07:33 )   PT: 17.0 sec;   INR: 1.44          PTT - ( 2021 07:33 )  PTT:37.8 sec  Urinalysis Basic - ( 2021 14:26 )    Color: Yellow / Appearance: Clear / S.015 / pH: x  Gluc: x / Ketone: NEGATIVE  / Bili: Negative / Urobili: 0.2 E.U./dL   Blood: x / Protein: NEGATIVE mg/dL / Nitrite: NEGATIVE   Leuk Esterase: Small / RBC: < 5 /HPF / WBC 5-10 /HPF   Sq Epi: x / Non Sq Epi: 0-5 /HPF / Bacteria: Present /HPF        RADIOLOGY & ADDITIONAL TESTS: Reviewed.    ASSESSMENT:    PLAN:    PULMONARY    NEURO    CARDIOVASCULAR    RENAL    ID    GI/FEN    DVT PPx -     LINES -     CODE -     DISPO - continue intensive level of care in CCM/MICU service INTERVAL HPI/OVERNIGHT EVENTS:    SUBJECTIVE: Patient seen and examined at bedside. Pt denies pain or SOB at this time. Oriented to person, place and year. Hard of hearing. Poor historian with medical issues and hospital course. Denies CP, SOB N/V/D.     OBJECTIVE:    VITAL SIGNS:  ICU Vital Signs Last 24 Hrs  T(C): 36.8 (2021 04:40), Max: 37 (2021 17:03)  T(F): 98.2 (2021 04:40), Max: 98.6 (2021 17:03)  HR: 105 (2021 13:00) (100 - 126)  BP: 106/63 (2021 13:00) (79/49 - 111/57)  BP(mean): 80 (2021 13:00) (59 - 80)  ABP: --  ABP(mean): --  RR: 25 (2021 13:00) (16 - 25)  SpO2: 96% (2021 13:00) (93% - 96%)         @ 07:01  -   @ 07:00  --------------------------------------------------------  IN: 690 mL / OUT: 1365 mL / NET: -675 mL      CAPILLARY BLOOD GLUCOSE          PHYSICAL EXAM:    Constitutional: NAD  HEENT: NC/AT; PERRL, anicteric sclera; MMM  Neck: supple, no JVD  Cardiovascular: +S1/S2, RRR  Respiratory: Diminished b/l lower lobes. Anterior fields clear.   Gastrointestinal: abdomen soft, slightly distended. NO rebound or guarding; +BSx4  Genitourinary: no suprapubic tenderness or fullness  Extremities: + LE edema +3 pitting edema; no clubbing or cyanosis  Vascular: 2+ radial  Dermatologic: normal color and turgor; + maculopapular blanchable rash to chest and upper extremities   Neurological: A&Ox3. Follow commands. Hard of hearing.     MEDICATIONS:  MEDICATIONS  (STANDING):  ceFAZolin   IVPB 1000 milliGRAM(s) IV Intermittent every 24 hours  diltiazem Injectable 5 milliGRAM(s) IV Push once  doxazosin 4 milliGRAM(s) Oral daily  heparin   Injectable 5000 Unit(s) SubCutaneous every 8 hours  midodrine. 5 milliGRAM(s) Oral every 8 hours  polyethylene glycol 3350 17 Gram(s) Oral daily  PPD  5 Tuberculin Unit(s) Injectable 5 Unit(s) IntraDermal once  predniSONE   Tablet 60 milliGRAM(s) Oral daily  senna 2 Tablet(s) Oral at bedtime  sevelamer carbonate 800 milliGRAM(s) Oral three times a day with meals  sodium bicarbonate 650 milliGRAM(s) Oral two times a day    MEDICATIONS  (PRN):      ALLERGIES:  Allergies    No Known Allergies    Intolerances        LABS:                        9.8    11.22 )-----------( 54       ( 2021 09:45 )             29.3         137  |  95<L>  |  118<H>  ----------------------------<  151<H>  4.0   |  23  |  6.79<H>    Ca    8.8      2021 07:32  Phos  8.3       Mg     2.4         TPro  5.1<L>  /  Alb  2.0<L>  /  TBili  0.7  /  DBili  x   /  AST  19  /  ALT  17  /  AlkPhos  135<H>      PT/INR - ( 2021 07:33 )   PT: 17.0 sec;   INR: 1.44          PTT - ( 2021 07:33 )  PTT:37.8 sec  Urinalysis Basic - ( 2021 14:26 )    Color: Yellow / Appearance: Clear / S.015 / pH: x  Gluc: x / Ketone: NEGATIVE  / Bili: Negative / Urobili: 0.2 E.U./dL   Blood: x / Protein: NEGATIVE mg/dL / Nitrite: NEGATIVE   Leuk Esterase: Small / RBC: < 5 /HPF / WBC 5-10 /HPF   Sq Epi: x / Non Sq Epi: 0-5 /HPF / Bacteria: Present /HPF        RADIOLOGY & ADDITIONAL TESTS: Reviewed.    ASSESSMENT:    PLAN:    PULMONARY    NEURO    CARDIOVASCULAR    RENAL    ID    GI/FEN    DVT PPx -     LINES -     CODE -     DISPO - continue intensive level of care in CCM/MICU service TRANSFER ACCEPTANCE NOTE FROM [facility] TO [facility] Hospital Course:     76 year old male with a past medical history of Hemophilia A, HTN, Gout, and DRESS Syndrome who presents with weakness for 1 week. Pt had an acute gout flare on , treated with allopurinol for 3 weeks, c/b by development of DRESS (seen at [facility] where diagnosis was made, d/c ).  While at [facility] patient had a creatinine that ranged from 4-6, however baseline is 1.1 (2020). After discharge from [facility] patient complained of weakness and progressive swelling of his legs and abdomen. On admission, VS notable for tachycardia to 128. Labs were significant for WBC 28.30, Hgb 11.5, K 5.6, CO2 20  , Cr 6.59, albumin 2.7, alk phos 158, BNP 33067. CXR showed pleural effusion left greater than right with dependent edema consistent with volume overload. Imaging significant for a CT abdomen small to moderate bilateral pleural effusions, greater on the left. Mild pericardial effusion. Anasarca greater in left lower chest and trace ascites. LE dopplers negative for DVT. The patient was given 80mg of IV lasix and admitted for acute renal failure with potential need of dialysis. While on [facility] nephrology was consulted who recommended IV diuresis with plans of hemodialysis on . On the morning of  blood cultures grew MSSA.  Pt was started on ancef. Echo performed  due to pericardial effusion seen on CT chest, no evidence of tamponade.  Attempted HD , however pt became hypotensive (BP unknown), and session was immediately stopped. Pt transferred to [facility] for increased monitoring during dialysis, to occur .  Around 9:20 AM , patient went into afib with RVR (no prior history of afib), rectal temp negative, EKG showed ST elevations which we felt were more pronounced compared to prior. Trop, CK, CKMB obtained (trop 0.24, CKMB 10.1, CK nml). Pt without any chest pain or complaints. Gave lopressor 5 IVP x1. BP dropped slightly, therefore gave 500cc bolus. Cardiology consulted stat- came to bedside and felt EKG mostly stable from prior and likely afib RVR was from uremia, necessitating dialysis. Gave one time dose of cardizem 5mg and started on 5 of midodrine TID. Per nephro, needs higher pressure and lower HR to tolerate dialysis, therefore transferring to ICU for further pressure support and management to facilitate dialysis.    SUBJECTIVE: Patient seen and examined at bedside. Pt denies pain or SOB at this time. Oriented to person, place and year. Hard of hearing. Poor historian with medical issues and hospital course. Denies CP, SOB N/V/D.     OBJECTIVE:    VITAL SIGNS:  ICU Vital Signs Last 24 Hrs  T(C): 36.8 (2021 04:40), Max: 37 (2021 17:03)  T(F): 98.2 (2021 04:40), Max: 98.6 (2021 17:03)  HR: 105 (2021 13:00) (100 - 126)  BP: 106/63 (2021 13:00) (79/49 - 111/57)  BP(mean): 80 (2021 13:00) (59 - 80)  ABP: --  ABP(mean): --  RR: 25 (2021 13:00) (16 - 25)  SpO2: 96% (2021 13:00) (93% - 96%)         @ 07:01  -   @ 07:00  --------------------------------------------------------  IN: 690 mL / OUT: 1365 mL / NET: -675 mL      CAPILLARY BLOOD GLUCOSE      PHYSICAL EXAM:    Constitutional: NAD  HEENT: NC/AT; PERRL, anicteric sclera; MMM  Neck: supple, no JVD  Cardiovascular: +S1/S2, RRR  Respiratory: Diminished b/l lower lobes. Anterior fields clear.   Gastrointestinal: abdomen soft, slightly distended. NO rebound or guarding; +BSx4  Genitourinary: no suprapubic tenderness or fullness  Extremities: + LE edema +3 pitting edema; no clubbing or cyanosis  Vascular: 2+ radial  Dermatologic: normal color and turgor; + maculopapular blanchable rash to chest and upper extremities   Neurological: A&Ox3. Follow commands. Hard of hearing.     MEDICATIONS:  MEDICATIONS  (STANDING):  ceFAZolin   IVPB 1000 milliGRAM(s) IV Intermittent every 24 hours  diltiazem Injectable 5 milliGRAM(s) IV Push once  doxazosin 4 milliGRAM(s) Oral daily  heparin   Injectable 5000 Unit(s) SubCutaneous every 8 hours  midodrine. 5 milliGRAM(s) Oral every 8 hours  polyethylene glycol 3350 17 Gram(s) Oral daily  PPD  5 Tuberculin Unit(s) Injectable 5 Unit(s) IntraDermal once  predniSONE   Tablet 60 milliGRAM(s) Oral daily  senna 2 Tablet(s) Oral at bedtime  sevelamer carbonate 800 milliGRAM(s) Oral three times a day with meals  sodium bicarbonate 650 milliGRAM(s) Oral two times a day    MEDICATIONS  (PRN):      ALLERGIES:  Allergies    No Known Allergies    Intolerances        LABS:                        9.8    11.22 )-----------( 54       ( 2021 09:45 )             29.3     02-    137  |  95<L>  |  118<H>  ----------------------------<  151<H>  4.0   |  23  |  6.79<H>    Ca    8.8      2021 07:32  Phos  8.3       Mg     2.4         TPro  5.1<L>  /  Alb  2.0<L>  /  TBili  0.7  /  DBili  x   /  AST  19  /  ALT  17  /  AlkPhos  135<H>  02-23    PT/INR - ( 2021 07:33 )   PT: 17.0 sec;   INR: 1.44          PTT - ( 2021 07:33 )  PTT:37.8 sec  Urinalysis Basic - ( 2021 14:26 )    Color: Yellow / Appearance: Clear / S.015 / pH: x  Gluc: x / Ketone: NEGATIVE  / Bili: Negative / Urobili: 0.2 E.U./dL   Blood: x / Protein: NEGATIVE mg/dL / Nitrite: NEGATIVE   Leuk Esterase: Small / RBC: < 5 /HPF / WBC 5-10 /HPF   Sq Epi: x / Non Sq Epi: 0-5 /HPF / Bacteria: Present /HPF        RADIOLOGY & ADDITIONAL TESTS: Reviewed.    ASSESSMENT:    PLAN:    PULMONARY    NEURO    CARDIOVASCULAR    RENAL    ID    GI/FEN    DVT PPx -     LINES -     CODE -     DISPO - continue intensive level of care in CCM/MICU service TRANSFER ACCEPTANCE NOTE FROM [facility] TO [facility] Hospital Course:     76 year old male with a past medical history of Hemophilia A, HTN, Gout, and DRESS Syndrome who presents with weakness for 1 week. Pt had an acute gout flare on , treated with allopurinol for 3 weeks, c/b by development of DRESS (seen at [facility] where diagnosis was made, d/c ).  While at [facility] patient had a creatinine that ranged from 4-6, however baseline is 1.1 (2020). After discharge from [facility] patient complained of weakness and progressive swelling of his legs and abdomen. On admission, VS notable for tachycardia to 128. Labs were significant for WBC 28.30, Hgb 11.5, K 5.6, CO2 20  , Cr 6.59, albumin 2.7, alk phos 158, BNP 63968. CXR showed pleural effusion left greater than right with dependent edema consistent with volume overload. Imaging significant for a CT abdomen small to moderate bilateral pleural effusions, greater on the left. Mild pericardial effusion. Anasarca greater in left lower chest and trace ascites. LE dopplers negative for DVT. The patient was given 80mg of IV lasix and admitted for acute renal failure with potential need of dialysis. While on [facility] nephrology was consulted who recommended IV diuresis with plans of hemodialysis on . On the morning of  blood cultures grew MSSA.  Pt was started on ancef. Echo performed  due to pericardial effusion seen on CT chest, no evidence of tamponade.  Attempted HD , however pt became hypotensive (BP unknown), and session was immediately stopped. Pt transferred to [facility] for increased monitoring during dialysis, to occur .  Around 9:20 AM , patient went into afib with RVR (no prior history of afib), rectal temp negative, EKG showed ST elevations which we felt were more pronounced compared to prior. Trop, CK, CKMB obtained (trop 0.24, CKMB 10.1, CK nml). Pt without any chest pain or complaints. Gave lopressor 5 IVP x1. BP dropped slightly, therefore gave 500cc bolus. Cardiology consulted stat- came to bedside and felt EKG mostly stable from prior and likely afib RVR was from uremia, necessitating dialysis. Gave one time dose of cardizem 5mg and started on 5 of midodrine TID. Per nephro, needs higher pressure and lower HR to tolerate dialysis, therefore transferring to ICU for further pressure support and management to facilitate dialysis.    SUBJECTIVE: Patient seen and examined at bedside. Pt denies pain or SOB at this time. Oriented to person, place and year. Hard of hearing. Poor historian with medical issues and hospital course. Denies CP, SOB N/V/D.     OBJECTIVE:    VITAL SIGNS:  ICU Vital Signs Last 24 Hrs  T(C): 36.8 (2021 04:40), Max: 37 (2021 17:03)  T(F): 98.2 (2021 04:40), Max: 98.6 (2021 17:03)  HR: 105 (2021 13:00) (100 - 126)  BP: 106/63 (2021 13:00) (79/49 - 111/57)  BP(mean): 80 (2021 13:00) (59 - 80)  ABP: --  ABP(mean): --  RR: 25 (2021 13:00) (16 - 25)  SpO2: 96% (2021 13:00) (93% - 96%)         @ 07:01  -   @ 07:00  --------------------------------------------------------  IN: 690 mL / OUT: 1365 mL / NET: -675 mL      CAPILLARY BLOOD GLUCOSE      PHYSICAL EXAM:    Constitutional: NAD  HEENT: NC/AT; PERRL, anicteric sclera; MMM  Neck: supple, no JVD  Cardiovascular: +S1/S2, RRR  Respiratory: Diminished b/l lower lobes. Anterior fields clear.   Gastrointestinal: abdomen soft, slightly distended. NO rebound or guarding; +BSx4  Genitourinary: no suprapubic tenderness or fullness  Extremities: + LE edema +3 pitting edema; no clubbing or cyanosis  Vascular: 2+ radial  Dermatologic: normal color and turgor; + maculopapular blanchable rash to chest and upper extremities   Neurological: A&Ox3. Follow commands. Hard of hearing.     MEDICATIONS:  MEDICATIONS  (STANDING):  ceFAZolin   IVPB 1000 milliGRAM(s) IV Intermittent every 24 hours  diltiazem Injectable 5 milliGRAM(s) IV Push once  doxazosin 4 milliGRAM(s) Oral daily  heparin   Injectable 5000 Unit(s) SubCutaneous every 8 hours  midodrine. 5 milliGRAM(s) Oral every 8 hours  polyethylene glycol 3350 17 Gram(s) Oral daily  PPD  5 Tuberculin Unit(s) Injectable 5 Unit(s) IntraDermal once  predniSONE   Tablet 60 milliGRAM(s) Oral daily  senna 2 Tablet(s) Oral at bedtime  sevelamer carbonate 800 milliGRAM(s) Oral three times a day with meals  sodium bicarbonate 650 milliGRAM(s) Oral two times a day    MEDICATIONS  (PRN):      ALLERGIES:  Allergies    No Known Allergies    Intolerances        LABS:                        9.8    11.22 )-----------( 54       ( 2021 09:45 )             29.3     02-    137  |  95<L>  |  118<H>  ----------------------------<  151<H>  4.0   |  23  |  6.79<H>    Ca    8.8      2021 07:32  Phos  8.3       Mg     2.4         TPro  5.1<L>  /  Alb  2.0<L>  /  TBili  0.7  /  DBili  x   /  AST  19  /  ALT  17  /  AlkPhos  135<H>  02-23    PT/INR - ( 2021 07:33 )   PT: 17.0 sec;   INR: 1.44          PTT - ( 2021 07:33 )  PTT:37.8 sec  Urinalysis Basic - ( 2021 14:26 )    Color: Yellow / Appearance: Clear / S.015 / pH: x  Gluc: x / Ketone: NEGATIVE  / Bili: Negative / Urobili: 0.2 E.U./dL   Blood: x / Protein: NEGATIVE mg/dL / Nitrite: NEGATIVE   Leuk Esterase: Small / RBC: < 5 /HPF / WBC 5-10 /HPF   Sq Epi: x / Non Sq Epi: 0-5 /HPF / Bacteria: Present /HPF        RADIOLOGY & ADDITIONAL TESTS: Reviewed.

## 2021-02-23 NOTE — PROGRESS NOTE ADULT - PROBLEM SELECTOR PLAN 1
Acute renal failure with anasarca.  Patient with no history of CKD prior to admission. 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 Creatine worsened with deranged electrolytes after being diagnosed with Dress Syndrome.   -Dialysis 2/23  -Factor VIII 25U/kg   -Recheck factor VIII level   -Hep Panel   -PPD

## 2021-02-24 LAB
ALBUMIN SERPL ELPH-MCNC: 1.8 G/DL — LOW (ref 3.3–5)
ALP SERPL-CCNC: 151 U/L — HIGH (ref 40–120)
ALT FLD-CCNC: 12 U/L — SIGNIFICANT CHANGE UP (ref 10–45)
ANION GAP SERPL CALC-SCNC: 13 MMOL/L — SIGNIFICANT CHANGE UP (ref 5–17)
APTT BLD: 38.8 SEC — HIGH (ref 27.5–35.5)
AST SERPL-CCNC: 14 U/L — SIGNIFICANT CHANGE UP (ref 10–40)
BASOPHILS # BLD AUTO: 0.03 K/UL — SIGNIFICANT CHANGE UP (ref 0–0.2)
BASOPHILS NFR BLD AUTO: 0.2 % — SIGNIFICANT CHANGE UP (ref 0–2)
BILIRUB SERPL-MCNC: 0.5 MG/DL — SIGNIFICANT CHANGE UP (ref 0.2–1.2)
BUN SERPL-MCNC: 104 MG/DL — HIGH (ref 7–23)
CALCIUM SERPL-MCNC: 8.4 MG/DL — SIGNIFICANT CHANGE UP (ref 8.4–10.5)
CHLORIDE SERPL-SCNC: 99 MMOL/L — SIGNIFICANT CHANGE UP (ref 96–108)
CLOSURE TME COLL+EPINEP BLD: 40 K/UL — LOW (ref 150–400)
CO2 SERPL-SCNC: 26 MMOL/L — SIGNIFICANT CHANGE UP (ref 22–31)
CREAT SERPL-MCNC: 5.41 MG/DL — HIGH (ref 0.5–1.3)
EOSINOPHIL # BLD AUTO: 0 K/UL — SIGNIFICANT CHANGE UP (ref 0–0.5)
EOSINOPHIL NFR BLD AUTO: 0 % — SIGNIFICANT CHANGE UP (ref 0–6)
FACT VIII ACT/NOR PPP: 50 % — LOW (ref 51–138)
GLUCOSE SERPL-MCNC: 169 MG/DL — HIGH (ref 70–99)
GRAM STN FLD: SIGNIFICANT CHANGE UP
HCT VFR BLD CALC: 29.5 % — LOW (ref 39–50)
HEPARIN-PF4 AB RESULT: <0.6 U/ML — SIGNIFICANT CHANGE UP (ref 0–0.9)
HGB BLD-MCNC: 9.8 G/DL — LOW (ref 13–17)
IMM GRANULOCYTES NFR BLD AUTO: 1 % — SIGNIFICANT CHANGE UP (ref 0–1.5)
INR BLD: 1.35 — HIGH (ref 0.88–1.16)
LYMPHOCYTES # BLD AUTO: 0.56 K/UL — LOW (ref 1–3.3)
LYMPHOCYTES # BLD AUTO: 3.8 % — LOW (ref 13–44)
MAGNESIUM SERPL-MCNC: 2.2 MG/DL — SIGNIFICANT CHANGE UP (ref 1.6–2.6)
MCHC RBC-ENTMCNC: 27.7 PG — SIGNIFICANT CHANGE UP (ref 27–34)
MCHC RBC-ENTMCNC: 33.2 GM/DL — SIGNIFICANT CHANGE UP (ref 32–36)
MCV RBC AUTO: 83.3 FL — SIGNIFICANT CHANGE UP (ref 80–100)
MONOCYTES # BLD AUTO: 0.47 K/UL — SIGNIFICANT CHANGE UP (ref 0–0.9)
MONOCYTES NFR BLD AUTO: 3.2 % — SIGNIFICANT CHANGE UP (ref 2–14)
NEUTROPHILS # BLD AUTO: 13.69 K/UL — HIGH (ref 1.8–7.4)
NEUTROPHILS NFR BLD AUTO: 91.8 % — HIGH (ref 43–77)
NRBC # BLD: 0 /100 WBCS — SIGNIFICANT CHANGE UP (ref 0–0)
PF4 HEPARIN CMPLX AB SER-ACNC: NEGATIVE — SIGNIFICANT CHANGE UP
PHOSPHATE SERPL-MCNC: 6.9 MG/DL — HIGH (ref 2.5–4.5)
PLATELET # BLD AUTO: 50 K/UL — LOW (ref 150–400)
POTASSIUM SERPL-MCNC: 3.9 MMOL/L — SIGNIFICANT CHANGE UP (ref 3.5–5.3)
POTASSIUM SERPL-SCNC: 3.9 MMOL/L — SIGNIFICANT CHANGE UP (ref 3.5–5.3)
PROT SERPL-MCNC: 4.6 G/DL — LOW (ref 6–8.3)
PROTHROM AB SERPL-ACNC: 16 SEC — HIGH (ref 10.6–13.6)
RBC # BLD: 3.54 M/UL — LOW (ref 4.2–5.8)
RBC # FLD: 14.3 % — SIGNIFICANT CHANGE UP (ref 10.3–14.5)
SODIUM SERPL-SCNC: 138 MMOL/L — SIGNIFICANT CHANGE UP (ref 135–145)
WBC # BLD: 14.9 K/UL — HIGH (ref 3.8–10.5)
WBC # FLD AUTO: 14.9 K/UL — HIGH (ref 3.8–10.5)

## 2021-02-24 PROCEDURE — 99233 SBSQ HOSP IP/OBS HIGH 50: CPT

## 2021-02-24 PROCEDURE — 90935 HEMODIALYSIS ONE EVALUATION: CPT

## 2021-02-24 PROCEDURE — 99233 SBSQ HOSP IP/OBS HIGH 50: CPT | Mod: GC

## 2021-02-24 PROCEDURE — 99232 SBSQ HOSP IP/OBS MODERATE 35: CPT | Mod: GC

## 2021-02-24 RX ORDER — CEFAZOLIN SODIUM 1 G
1000 VIAL (EA) INJECTION EVERY 24 HOURS
Refills: 0 | Status: DISCONTINUED | OUTPATIENT
Start: 2021-02-25 | End: 2021-03-09

## 2021-02-24 RX ORDER — FUROSEMIDE 40 MG
80 TABLET ORAL EVERY 12 HOURS
Refills: 0 | Status: DISCONTINUED | OUTPATIENT
Start: 2021-02-24 | End: 2021-03-03

## 2021-02-24 RX ORDER — MIDODRINE HYDROCHLORIDE 2.5 MG/1
10 TABLET ORAL EVERY 8 HOURS
Refills: 0 | Status: DISCONTINUED | OUTPATIENT
Start: 2021-02-24 | End: 2021-02-28

## 2021-02-24 RX ORDER — MIDODRINE HYDROCHLORIDE 2.5 MG/1
5 TABLET ORAL ONCE
Refills: 0 | Status: COMPLETED | OUTPATIENT
Start: 2021-02-24 | End: 2021-02-24

## 2021-02-24 RX ORDER — CEFAZOLIN SODIUM 1 G
500 VIAL (EA) INJECTION ONCE
Refills: 0 | Status: COMPLETED | OUTPATIENT
Start: 2021-02-24 | End: 2021-02-24

## 2021-02-24 RX ADMIN — Medication 60 MILLIGRAM(S): at 05:29

## 2021-02-24 RX ADMIN — MIDODRINE HYDROCHLORIDE 5 MILLIGRAM(S): 2.5 TABLET ORAL at 05:29

## 2021-02-24 RX ADMIN — Medication 650 MILLIGRAM(S): at 05:31

## 2021-02-24 RX ADMIN — POLYETHYLENE GLYCOL 3350 17 GRAM(S): 17 POWDER, FOR SOLUTION ORAL at 12:32

## 2021-02-24 RX ADMIN — SENNA PLUS 2 TABLET(S): 8.6 TABLET ORAL at 21:16

## 2021-02-24 RX ADMIN — MIDODRINE HYDROCHLORIDE 5 MILLIGRAM(S): 2.5 TABLET ORAL at 12:59

## 2021-02-24 RX ADMIN — Medication 100 MILLIGRAM(S): at 17:36

## 2021-02-24 RX ADMIN — SEVELAMER CARBONATE 800 MILLIGRAM(S): 2400 POWDER, FOR SUSPENSION ORAL at 12:32

## 2021-02-24 RX ADMIN — Medication 4 MILLIGRAM(S): at 05:31

## 2021-02-24 RX ADMIN — SEVELAMER CARBONATE 800 MILLIGRAM(S): 2400 POWDER, FOR SUSPENSION ORAL at 17:36

## 2021-02-24 RX ADMIN — Medication 100 MILLIGRAM(S): at 03:02

## 2021-02-24 RX ADMIN — MIDODRINE HYDROCHLORIDE 5 MILLIGRAM(S): 2.5 TABLET ORAL at 12:32

## 2021-02-24 RX ADMIN — PANTOPRAZOLE SODIUM 40 MILLIGRAM(S): 20 TABLET, DELAYED RELEASE ORAL at 05:29

## 2021-02-24 RX ADMIN — Medication 80 MILLIGRAM(S): at 21:16

## 2021-02-24 RX ADMIN — MIDODRINE HYDROCHLORIDE 10 MILLIGRAM(S): 2.5 TABLET ORAL at 21:16

## 2021-02-24 RX ADMIN — SEVELAMER CARBONATE 800 MILLIGRAM(S): 2400 POWDER, FOR SUSPENSION ORAL at 08:35

## 2021-02-24 NOTE — PROGRESS NOTE ADULT - ASSESSMENT
76 year old male with a past medical history of CKD (recent Cr 6.6 at [facility]), Hemophilia A, HTN, Gout, and DRESS Syndrome who presents with weakness for 1 week. Cardiology consult for volume overload and elevated troponins. 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     AF with RVR: No history of AF. Likley driven by pericarditis, which is likely from uremia. [id number] but pt with Hemophilia A. Currently in NSR/ST.   -Would not recommend starting anticoagulation.  -Can trial Cardizem 5mg IVP for rate control, though until uremia resolved, will be difficult to control.  -Can increase Midodrine 10mg po TID.  -Continue with HD to correct uremia.     Volume Overload: Patient with anasarca in setting of RU on CKD. Still making urine. EKG with no ischemic changes. Echo shows normal EF, no diastolic dysfunction. Likely secondary to renal failure rather than cardiogenic cause.   -Continue with Lasix 80mg IVP BID.   -Appreciate renal recs.   -HD for uremia and also volume.   -STrict I&Os.  -Daily weights.  -Normal EF, no need for GDMT.     Troponemia: Trop T 0.15, flat in setting of RU on CKD. No ischemic changes.   -Likely due to demand and renal failure.  -No need to continue to trend.   -If patient has CP, can check EKG and cardiac enzymes.     Uremic pericarditis: diffuse YURIDIA in setting of elevated BUN.   -HD to correct uremia.  -No need to trend troponins.  -Daily EKG      Discussed with attending and primary team.  76 year old male with a past medical history of CKD (recent Cr 6.6 at [facility]), Hemophilia A, HTN, Gout, and DRESS Syndrome who presents with weakness for 1 week. Cardiology consult for volume overload and elevated troponins. 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     AF with RVR: No history of AF. Likley driven by pericarditis, which is likely from uremia. [id number] but pt with Hemophilia A. Currently in NSR/ST.   -Would not recommend starting anticoagulation.  -Can trial Cardizem 5mg IVP for rate control, though until uremia resolved, will be difficult to control.  -Can increase Midodrine 10mg po TID.  -Continue with HD to correct uremia.     Volume Overload: Patient with anasarca in setting of RU on CKD. Still making urine. EKG with no ischemic changes. Echo shows normal EF, no diastolic dysfunction. Likely secondary to renal failure rather than cardiogenic cause.   -Continue with Lasix 80mg IVP BID.   -Appreciate renal recs.   -HD for uremia and also volume.   -STrict I&Os.  -Daily weights.  -Normal EF, no need for GDMT.     Troponemia: Trop T 0.15, flat in setting of RU on CKD. No ischemic changes.   -Likely due to demand and renal failure.  -No need to continue to trend.   -If patient has CP, can check EKG and cardiac enzymes.     Uremic pericarditis: diffuse YURIDIA in setting of elevated BUN.   -HD to correct uremia.  -No need to trend troponins.  -Daily EKG      Discussed with attending and primary team.     Radha Bliss MD  Cardiology consult attending

## 2021-02-24 NOTE — PROGRESS NOTE ADULT - ASSESSMENT
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 Hematologist is Dr. Grace. s/p HD catheter placement (02/22), complicated by hypotension during dialysis and MSSA bacteremia.     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 Factor VIII post-procedure was 78-83% which is acceptable. No significant bleeding observed by primary team.    - Will need to follow-up with Factor VIII once daily due to possible procedures. Will need Factor VIII > 50%, preferably around 80%. If planning for Permacath placement, will need to give 2500 U of recombinant Factor VIII from most recent measured value of Factor VIII (50%).   - Maintain active T+S, transfuse if Hb < 7 or symptomatic   - Follow-up CBC and coagulation panel (PT/PTT) once daily  - Upon discharge, can follow-up with Dr. Grace (Hematologist)    #) Mild coagulopathy, possible inhibitor. Factor VII deficiency   - Mixing study (02/21) performed for mild coagulopathy showed possible presence of a delayed inhibitor due to lack of correction with 2 hr incubation. Send inhibitor assay.     #) Worsening thrombocytopenia, possibly sepsis-related and dialysis membrane effect   - Peripheral blood smear (02/23) showed platelet clumping and no schistocytes. PLASMIC score is 4 low-risk for TTP. HIT score is 3-4, low-intermediate risk. PFT-HIT Ab negative. Pending SORAYA.   - Trend CBC with differential once daily  - Maintain active T+S, transfuse if platelet < 10K, or < 20K if febrile or <50K if bleeding.   - Hold pharmacologic DVT ppx if platelets consistently < 50 K    Discussed with Dr. Tolbert

## 2021-02-24 NOTE — PROGRESS NOTE ADULT - SUBJECTIVE AND OBJECTIVE BOX
infectious diseases progress note:  YAIR FELDMAN is a 76yMale patient    O/N events: Yesterday patient became dyspneic and went from requiring 2LNC to 6L NC. Patient is now on 4L NC. Patient had AF with RVR yesterday.  Patient went to HD yesterday and became hypotensive after so was placed on norepinephrine 8mg. Patient became normotensive after initiation of pressors and pressors were stopped at 21:00.     Interval history:  Patient seen and examined at bedside. Patient endorses fatigue and mildly decreased appetite. Patient notes dyspnea yesterday that has since resolved. Patient denies fevers, chills, change in vision, HA, lightheadedness, CP, N/V, abdominal pain, back pain.    ACUTE KIDNEY INJURY      Bacteremia    Acute renal failure    Metabolic acidosis    DRESS syndrome    Acute kidney injury    Nutrition, metabolism, and development symptoms    Hemophilia A    History of BPH    Gout    HTN (hypertension)    Other ascites    Pericardial effusion    Hyperkalemia    Acute renal failure superimposed on stage 5 chronic kidney disease, not on chronic dialysis, unspecified acute renal failure type    ROS:  CONSTITUTIONAL:  Negative fever or chills, feels well, good appetite  EYES:  Negative  blurry vision or double vision  CARDIOVASCULAR:  Negative for chest pain or palpitations  RESPIRATORY:  Negative for cough or wheezing  GASTROINTESTINAL:  Negative for nausea, vomiting, diarrhea, constipation, or abdominal pain  GENITOURINARY:  Negative frequency, urgency or dysuria  NEUROLOGIC:  No headache, confusion, dizziness, lightheadedness    Allergies    No Known Allergies    Intolerances        ANTIBIOTICS/RELEVANT:  antimicrobials  ceFAZolin   IVPB 1000 milliGRAM(s) IV Intermittent every 24 hours    immunologic:  PPD  5 Tuberculin Unit(s) Injectable 5 Unit(s) IntraDermal once    OTHER:  diltiazem Injectable 5 milliGRAM(s) IV Push once  doxazosin 4 milliGRAM(s) Oral daily  heparin   Injectable 5000 Unit(s) SubCutaneous every 8 hours  midodrine. 5 milliGRAM(s) Oral every 8 hours  polyethylene glycol 3350 17 Gram(s) Oral daily  predniSONE   Tablet 60 milliGRAM(s) Oral daily  senna 2 Tablet(s) Oral at bedtime  sevelamer carbonate 800 milliGRAM(s) Oral three times a day with meals  sodium bicarbonate 650 milliGRAM(s) Oral two times a day      Objective:  Vital Signs Last 24 Hrs  T(C): 36.8 (2021 04:40), Max: 37 (2021 17:03)  T(F): 98.2 (2021 04:40), Max: 98.6 (2021 17:03)  HR: 112 (2021 08:32) (100 - 112)  BP: 110/60 (2021 08:32) (98/51 - 117/64)  BP(mean): 80 (2021 08:32) (75 - 80)  RR: 17 (2021 08:32) (16 - 19)  SpO2: 93% (2021 08:32) (93% - 95%)    PHYSICAL EXAM:  General: NAD, sleeping comfortably in bed  Skin: Extremities have linear and circular scabs with no signs of warmth, purulence, drainage  HEENT: Conjunctiva clear, sclera white, PERRLA  Cardiac: S1 and S1 clear. No murmurs, rubs or gallops  Lungs: unlabored breathing on 4L NC, no accessory muscle use, vesicular b/l   Abdomen: Distended, Soft, non-tender, normoactive BS in all four quadrants  Peripheral extremities: 1+ pitting edema in lower extremities b/l, 2+ DP and PT pulses b/l   Neurological: A&Ox3           LABS:                        9.8    11.22 )-----------( 54       ( 2021 09:45 )             29.3     02-23    137  |  95<L>  |  118<H>  ----------------------------<  151<H>  4.0   |  23  |  6.79<H>    Ca    8.8      2021 07:32  Phos  8.3     02-23  Mg     2.4     02-23    TPro  5.1<L>  /  Alb  2.0<L>  /  TBili  0.7  /  DBili  x   /  AST  19  /  ALT  17  /  AlkPhos  135<H>  02-23    PT/INR - ( 2021 07:33 )   PT: 17.0 sec;   INR: 1.44          PTT - ( 2021 07:33 )  PTT:37.8 sec  Urinalysis Basic - ( 2021 14:26 )    Color: Yellow / Appearance: Clear / S.015 / pH: x  Gluc: x / Ketone: NEGATIVE  / Bili: Negative / Urobili: 0.2 E.U./dL   Blood: x / Protein: NEGATIVE mg/dL / Nitrite: NEGATIVE   Leuk Esterase: Small / RBC: < 5 /HPF / WBC 5-10 /HPF   Sq Epi: x / Non Sq Epi: 0-5 /HPF / Bacteria: Present /HPF          MICROBIOLOGY:  Culture Results:   Culture in progress ( @ 21:12)        RADIOLOGY & ADDITIONAL STUDIES: infectious diseases progress note:  YAIR FELDMAN is a 76yMale patient    O/N events: Yesterday patient became dyspneic and went from requiring 2LNC to 6L NC. Patient is now on 4L NC. Patient had AF with RVR yesterday.  Patient went to HD yesterday and became hypotensive after so was placed on norepinephrine 8mg. Patient became normotensive after initiation of pressors and pressors were stopped at 21:00.     Interval history:  Patient seen and examined at bedside. Patient endorses fatigue and mildly decreased appetite. Patient notes dyspnea yesterday that has since resolved. Patient denies fevers, chills, change in vision, HA, lightheadedness, CP, N/V, abdominal pain, back pain.    ACUTE KIDNEY INJURY      Bacteremia    Acute renal failure    Metabolic acidosis    DRESS syndrome    Acute kidney injury    Nutrition, metabolism, and development symptoms    Hemophilia A    History of BPH    Gout    HTN (hypertension)    Other ascites    Pericardial effusion    Hyperkalemia    Acute renal failure superimposed on stage 5 chronic kidney disease, not on chronic dialysis, unspecified acute renal failure type    ROS:  CONSTITUTIONAL:  Negative fever or chills  EYES:  Negative  blurry vision or double vision  CARDIOVASCULAR:  Negative for chest pain or palpitations  RESPIRATORY:  Negative for cough or wheezing  GASTROINTESTINAL:  Negative for nausea, vomiting, diarrhea, constipation, or abdominal pain  GENITOURINARY:  Negative frequency, urgency or dysuria  NEUROLOGIC:  No headache, confusion, dizziness, lightheadedness    Allergies    No Known Allergies    Intolerances        ANTIBIOTICS/RELEVANT:  antimicrobials  ceFAZolin   IVPB 1000 milliGRAM(s) IV Intermittent every 24 hours    immunologic:  PPD  5 Tuberculin Unit(s) Injectable 5 Unit(s) IntraDermal once    OTHER:  diltiazem Injectable 5 milliGRAM(s) IV Push once  doxazosin 4 milliGRAM(s) Oral daily  heparin   Injectable 5000 Unit(s) SubCutaneous every 8 hours  midodrine. 5 milliGRAM(s) Oral every 8 hours  polyethylene glycol 3350 17 Gram(s) Oral daily  predniSONE   Tablet 60 milliGRAM(s) Oral daily  senna 2 Tablet(s) Oral at bedtime  sevelamer carbonate 800 milliGRAM(s) Oral three times a day with meals  sodium bicarbonate 650 milliGRAM(s) Oral two times a day      Objective:  Vital Signs Last 24 Hrs  T(C): 36.8 (2021 04:40), Max: 37 (2021 17:03)  T(F): 98.2 (2021 04:40), Max: 98.6 (2021 17:03)  HR: 112 (2021 08:32) (100 - 112)  BP: 110/60 (2021 08:32) (98/51 - 117/64)  BP(mean): 80 (2021 08:32) (75 - 80)  RR: 17 (2021 08:32) (16 - 19)  SpO2: 93% (2021 08:32) (93% - 95%)    PHYSICAL EXAM:  General: NAD, sleeping comfortably in bed  Skin: Extremities have linear and circular scabs with no signs of warmth, purulence, drainage  HEENT: Conjunctiva clear, sclera white, PERRLA  Cardiac: S1 and S1 clear. No murmurs, rubs or gallops  Lungs: unlabored breathing on 4L NC, no accessory muscle use, vesicular b/l   Abdomen: Distended, Soft, non-tender, normoactive BS in all four quadrants  Peripheral extremities: 1+ pitting edema in lower extremities b/l, 2+ DP and PT pulses b/l   Neurological: A&Ox3           LABS:                        9.8    11.22 )-----------( 54       ( 2021 09:45 )             29.3     02-    137  |  95<L>  |  118<H>  ----------------------------<  151<H>  4.0   |  23  |  6.79<H>    Ca    8.8      2021 07:32  Phos  8.3     02-23  Mg     2.4     02-23    TPro  5.1<L>  /  Alb  2.0<L>  /  TBili  0.7  /  DBili  x   /  AST  19  /  ALT  17  /  AlkPhos  135<H>  02-23    PT/INR - ( 2021 07:33 )   PT: 17.0 sec;   INR: 1.44          PTT - ( 2021 07:33 )  PTT:37.8 sec  Urinalysis Basic - ( 2021 14:26 )    Color: Yellow / Appearance: Clear / S.015 / pH: x  Gluc: x / Ketone: NEGATIVE  / Bili: Negative / Urobili: 0.2 E.U./dL   Blood: x / Protein: NEGATIVE mg/dL / Nitrite: NEGATIVE   Leuk Esterase: Small / RBC: < 5 /HPF / WBC 5-10 /HPF   Sq Epi: x / Non Sq Epi: 0-5 /HPF / Bacteria: Present /HPF          MICROBIOLOGY:  Culture Results:   Culture in progress ( @ 21:12)        RADIOLOGY & ADDITIONAL STUDIES:

## 2021-02-24 NOTE — PROGRESS NOTE ADULT - ATTENDING COMMENTS
RU- no recovery  toerated HD adequately yesterday  says he feels a bit better today than yesterday  -120 no pressors  HR 80-90s  UO about 800ml    repeat HD today-    seen and evaluated while on dialysis  tolerating the procedure well  target 1 kg UF today  continue full treatment as prescribed

## 2021-02-24 NOTE — PROGRESS NOTE ADULT - ASSESSMENT
Mr. Don is a 76 year old male with a past medical history Hemophilia A, HTN, Gout, and DRESS Syndrome who presents with weakness for 1 week. He was admitted for acute on chronic renal failure c/b anasarca requiring induction of hemodialysis. Transferred to MICU on 2/23 d/t afib with RVR and hypotension likely 2/2 uremic pericarditis, transferred to MICU for closer monitoring and pressure support while undergoing dialysis.       NEURO:   AAOX3  - No active issues     CARDIOVASCULAR:  #Tachycardia/ Afib w/RVR   - HR constantly in 110s per daughter  - Sinus tachycardia on EKG   - went into afib with RVR on 2/23--> transferred to MICU  - s/p cardizem 5mg, afib likely 2/2 uremia  - Likely 2/2 uremic pericarditis   PLAN   - continue to monitor HR, no longer in afib w/ RVR   - has remained in sinus while on MICU     #Uremic pericarditis   - BUN >100 on presentation, initiating need to HD  - EKG with diffuse ST elevated and CT depressions c/w acute pericarditis   - continue with HD per renal recs  - trend BMP   - repeat EKG if any chest pain     #Hypotension   -patient hypotensive during afib w/RVR--> transferred to MICU   - likely 2/2 tachyarrhythmia elicited by uremic pericarditis   - currently off of pressors  - mentating at baseline, WWP   - monitor UOP and Hemodynamics     #Pericardial effusion.    CT abdomen showed trace pericaridal effusion. Likely 2/2 volume overload and fluid shifts from acute kidney failure  - No signs of tamponade or any of Valentin's Triad.     #HTN (hypertension). Plan:   - holding home Norvasc of 5 mg daily i/s/o hypotension   -c/w lasix 80IV BID    PULM:  No active issues     GI:  #Ascites.    - Slightly distended abdomen on physical exam and CT showing ascitic fluid in abdomen  - Likely 2/2 acute renal failure and fluid shift  - Continue diuresis as above.     :  #History of BPH.  Plan: Continue Doxazosin daily  - Monitor UOP and Bladder scan for any concerns of retention.     RENAL:  #Acute renal failure.    Acute renal failure with anasarca.  Patient with no history of CKD prior to admission. 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 Creatine worsened with deranged electrolytes after being diagnosed with Dress Syndrome.   - Dialysis 2/23, required brief pressor support  - Dialysis 2/24: tolerated well no pressors   - Factor VIII 25U/kg   PLAN   - Recheck factor VIII level daily   - PPD.   - follow up renal recs     Hyperkalemia.   - RESOLVED     ENDO:  No active issues    ID:  # MSSA Bacteremia.  - source unknown however possibly 2/2 skin infection given pruritic rash   Patient growing MSSA, blood cx positive on 2/21   - unclear source   - started on ANCEF at 3AM on 2/22.   - Repeat Blood cultures 2/23, NGTD  PLAN   - continue with ancef 1g q24hr  - ID following   - will need GUANAKO to evaluate for endocarditis given +staph bacteremia and unknown source  - will need heme recs prior to any procedures including GUANAKO due to hemophilia     RHEUM:  #Dress Syndrome  - Currently taking Prednisone 60 mg with improvement in rash   - Continue Prednisone 60 mg daily    #Gout.    Previously on allopurinol for gout prophylaxis; however, patient acquired DRESS Syndrome from the therapy  - Monitor symptoms and avoid NSAIDs for pain.     HEME/ONC:   #Leukocytosis  - Chronic due to prolonged high dose Prednisone use for Dress Syndrome    #Anemia  - Hgb 11.5 on arrival with unclear baseline  - Like 2/2 acute kidney failure or dilutional due to volume overload.     # Hemophilia A.    Prior history of significant bleeding in past following ERCP.   -Heme following   -Daily INR/PT/PTT and daily Factor   -Factor VIII after procedure   -Follows with Dr. Grace at [facility].           Problem: Nutrition, metabolism, and development symptoms. Plan; F: None  E: Lokelma for K >5.5;  N: Renal diet  DVT: None  GI: none.   Mr. Don is a 76 year old male with a past medical history Hemophilia A, HTN, Gout, and DRESS Syndrome who presents with weakness for 1 week. He was admitted for acute on chronic renal failure c/b anasarca requiring induction of hemodialysis. Transferred to MICU on 2/23 d/t afib with RVR and hypotension likely 2/2 uremic pericarditis, transferred to MICU for closer monitoring and pressure support while undergoing dialysis.       NEURO:   AAOX3  - No active issues     CARDIOVASCULAR:  #Tachycardia/ Afib w/RVR   - HR constantly in 110s per daughter  - Sinus tachycardia on EKG   - went into afib with RVR on 2/23--> transferred to MICU  - s/p cardizem 5mg, afib likely 2/2 uremia  - Likely 2/2 uremic pericarditis   PLAN   - continue to monitor HR, no longer in afib w/ RVR   - has remained in sinus while on MICU     #Uremic pericarditis   - BUN >100 on presentation, initiating need to HD  - EKG with diffuse ST elevated and WA depressions c/w acute pericarditis   - continue with HD per renal recs  - trend BMP   - repeat EKG if any chest pain     #Hypotension   -patient hypotensive during afib w/RVR--> transferred to MICU   - likely 2/2 tachyarrhythmia elicited by uremic pericarditis   - currently off of pressors  - mentating at baseline, WWP   - monitor UOP and Hemodynamics     #Pericardial effusion.    CT abdomen showed trace pericaridal effusion. Likely 2/2 volume overload and fluid shifts from acute kidney failure  - No signs of tamponade or any of Valentin's Triad.     #HTN (hypertension). Plan:   - holding home Norvasc of 5 mg daily i/s/o hypotension   -c/w lasix 80IV BID    PULM:  No active issues     GI:  #Ascites.    - Slightly distended abdomen on physical exam and CT showing ascitic fluid in abdomen  - Likely 2/2 acute renal failure and fluid shift  - Continue diuresis as above.     :  #History of BPH.  Plan: Continue Doxazosin daily  - Monitor UOP and Bladder scan for any concerns of retention.     RENAL:  #Acute renal failure.    Acute renal failure with anasarca.  Patient with no history of CKD prior to admission. 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 Creatine worsened with deranged electrolytes after being diagnosed with Dress Syndrome.   - Dialysis 2/23, required brief pressor support  - Dialysis 2/24: tolerated well no pressors   - Factor VIII 25U/kg   PLAN   - Recheck factor VIII level daily   - PPD.   - follow up renal recs     Hyperkalemia.   - RESOLVED     ENDO:  No active issues    ID:  # MSSA Bacteremia.  - source unknown however possibly 2/2 skin infection given pruritic rash   Patient growing MSSA, blood cx positive on 2/21   - unclear source   - started on ANCEF at 3AM on 2/22.   - Repeat Blood cultures 2/23, NGTD  PLAN   - continue with ancef 1g q24hr  - ID following   - will need GUANAKO to evaluate for endocarditis given +staph bacteremia and unknown source  - will need heme recs prior to any procedures including GUANAKO due to hemophilia     RHEUM:  #Dress Syndrome  - Currently taking Prednisone 60 mg with improvement in rash   - Continue Prednisone 60 mg daily    #Gout.    Previously on allopurinol for gout prophylaxis; however, patient acquired DRESS Syndrome from the therapy  - Monitor symptoms and avoid NSAIDs for pain.     HEME/ONC:   #Leukocytosis  - Chronic due to prolonged high dose Prednisone use for Dress Syndrome    #Anemia  - Hgb 11.5 on arrival with unclear baseline  - Like 2/2 acute kidney failure or dilutional due to volume overload.     # Hemophilia A.    Prior history of significant bleeding in past following ERCP.   - last factor VIII: 50 this AM   -Heme following - follow recs   -Daily INR/PT/PTT and daily Factor VIII   -Factor VIII after procedure   -Follows with Dr. Grace at [facility].           Problem: Nutrition, metabolism, and development symptoms. Plan; F: None  E: Lokelma for K >5.5;  N: Renal diet  DVT: SCDs, no AC given thrombocytopenia and hemophilia   GI: none.    Dispo: MICU transfer to [facility]    Mr. Don is a 76 year old male with a past medical history Hemophilia A, HTN, Gout, and DRESS Syndrome who presents with weakness for 1 week. He was admitted for acute on chronic renal failure c/b anasarca requiring induction of hemodialysis. Transferred to MICU on 2/23 d/t afib with RVR and hypotension likely 2/2 uremic pericarditis, transferred to MICU for closer monitoring and pressure support while undergoing dialysis.       NEURO:   AAOX3  - No active issues     CARDIOVASCULAR:  #Tachycardia/ Afib w/RVR   - HR constantly in 110s per daughter  - Sinus tachycardia on EKG   - went into afib with RVR on 2/23--> transferred to MICU  - s/p cardizem 5mg, afib likely 2/2 uremia  - Likely 2/2 uremic pericarditis   PLAN   - continue to monitor HR, no longer in afib w/ RVR   - has remained in sinus while on MICU     #Uremic pericarditis   - BUN >100 on presentation, initiating need to HD  - EKG with diffuse ST elevated and IA depressions c/w acute pericarditis   - continue with HD per renal recs  - trend BMP   - repeat EKG if any chest pain     #Hypotension   -patient hypotensive during afib w/RVR--> transferred to MICU   - likely 2/2 tachyarrhythmia elicited by uremic pericarditis   - currently off of pressors  - mentating at baseline, WWP   - monitor UOP and Hemodynamics     #Pericardial effusion.    CT abdomen showed trace pericaridal effusion. Likely 2/2 volume overload and fluid shifts from acute kidney failure  - No signs of tamponade or any of Valentin's Triad.     #HTN (hypertension). Plan:   - holding home Norvasc of 5 mg daily i/s/o hypotension   -c/w lasix 80IV BID    PULM:  No active issues     GI:  #Ascites.    - Slightly distended abdomen on physical exam and CT showing ascitic fluid in abdomen  - Likely 2/2 acute renal failure and fluid shift  - Continue diuresis as above.     :  #History of BPH.  Plan: Continue Doxazosin daily  - Monitor UOP and Bladder scan for any concerns of retention.     RENAL:  #Acute renal failure.    Acute renal failure with anasarca.  Patient with no history of CKD prior to admission. 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 Creatine worsened with deranged electrolytes after being diagnosed with Dress Syndrome.   - Dialysis 2/23, required brief pressor support  - Dialysis 2/24: tolerated well no pressors   - Factor VIII 25U/kg   PLAN   - Recheck factor VIII level daily   - PPD.   - follow up renal recs     Hyperkalemia.   - RESOLVED     ENDO:  No active issues    ID:  # MSSA Bacteremia.  - source unknown however possibly 2/2 skin infection given pruritic rash   Patient growing MSSA, blood cx positive on 2/21   - unclear source   - started on ANCEF at 3AM on 2/22.   - Repeat Blood cultures 2/23, NGTD  PLAN   - continue with ancef 1g q24hr  - ID following   - will need GUANAKO to evaluate for endocarditis given +staph bacteremia and unknown source  - will need heme recs prior to any procedures including GUANAKO due to hemophilia     RHEUM:  #Dress Syndrome  - Currently taking Prednisone 60 mg with improvement in rash   - Continue Prednisone 60 mg daily    #Gout.    Previously on allopurinol for gout prophylaxis; however, patient acquired DRESS Syndrome from the therapy  - Monitor symptoms and avoid NSAIDs for pain.     HEME/ONC:   #Leukocytosis  - Chronic due to prolonged high dose Prednisone use for Dress Syndrome    #Anemia  - Hgb 11.5 on arrival with unclear baseline  - Like 2/2 acute kidney failure or dilutional due to volume overload.     # Hemophilia A.    Prior history of significant bleeding in past following ERCP.   - last factor VIII: 50 this AM   -Heme following - follow recs   -Daily INR/PT/PTT and daily Factor VIII   -Factor VIII after procedure   -Follows with Dr. Grace at [facility].           Problem: Nutrition, metabolism, and development symptoms. Plan; F: None  E: Lokelma for K >5.5;  N: Renal diet  DVT: SCDs, no AC given thrombocytopenia and hemophilia   GI: none.    Dispo: MICU transfer to [facility]

## 2021-02-24 NOTE — PROGRESS NOTE ADULT - SUBJECTIVE AND OBJECTIVE BOX
LENGTH OF HOSPITAL STAY: 3d    SUBJECTIVE: Remains hypotensive.     HISTORY OF PRESENTING ILLNESS:   75 yo M with a past medical history of CKD (recent Cr 6.6 at [facility]), Hemophilia A, HTN, Gout, and DRESS Syndrome who presents with weakness for 1 week. He was admitted for acute on chronic renal failure and likely induction of hemodialysis.     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 She also said he  bled excessively during an ERCP when they didn't give him anything. She is not sure of hist FVIII level but the number 30 rings a bell to her. His hematologist is .    PAST MEDICAL & SURGICAL HISTORY:  History of BPH  Gout  Hemophilia A  HTN (hypertension)  Uses cochlear implant    ALLERGIES:  No Known Allergies    MEDICATIONS:  STANDING MEDICATIONS  doxazosin 4 milliGRAM(s) Oral daily  furosemide   Injectable 80 milliGRAM(s) IV Push every 12 hours  midodrine. 10 milliGRAM(s) Oral every 8 hours  pantoprazole    Tablet 40 milliGRAM(s) Oral before breakfast  polyethylene glycol 3350 17 Gram(s) Oral daily  PPD  5 Tuberculin Unit(s) Injectable 5 Unit(s) IntraDermal once  predniSONE   Tablet 60 milliGRAM(s) Oral daily  senna 2 Tablet(s) Oral at bedtime  sevelamer carbonate 800 milliGRAM(s) Oral three times a day with meals    PRN MEDICATIONS    VITALS:   T(F): 98.2  HR: 97  BP: 114/56  RR: 18  SpO2: 97%    LABS:                        9.8    14.90 )-----------( 50       ( 24 Feb 2021 06:19 )             29.5     02-24    138  |  99  |  104<H>  ----------------------------<  169<H>  3.9   |  26  |  5.41<H>    Ca    8.4      24 Feb 2021 06:19  Phos  6.9     02-24  Mg     2.2     02-24    TPro  4.6<L>  /  Alb  1.8<L>  /  TBili  0.5  /  DBili  x   /  AST  14  /  ALT  12  /  AlkPhos  151<H>  02-24    PT/INR - ( 24 Feb 2021 06:21 )   PT: 16.0 sec;   INR: 1.35       PTT - ( 24 Feb 2021 06:21 )  PTT:38.8 sec    Creatine Kinase, Serum: 86 U/L (02-23-21 @ 23:16)  Troponin T, Serum: 0.27 ng/mL <HH> (02-23-21 @ 23:16)    Culture - Blood (collected 23 Feb 2021 13:41)  Source: .Blood Blood  Gram Stain (24 Feb 2021 19:33):    Anaerobic Bottle: Gram Positive Cocci in Clusters    Result called to and read back by_ NURIA Loredo RN ([id number])  02/24/2021 19:32:55  Preliminary Report (24 Feb 2021 19:33):    Culture in progress    Culture - Blood (collected 22 Feb 2021 21:12)  Source: .Blood Blood  Gram Stain (23 Feb 2021 15:42):    Aerobic and Anaerobic Bottle: Gram Positive Cocci in Clusters    Result called to and read back by_ Nnamdi GREEN rn  02/23/2021 15:41:47  Preliminary Report (24 Feb 2021 10:39):    Growth in aerobic and anaerobic bottles: Staphylococcus aureus    Presumptive Methicillin susceptible    Confirmation to follow within 24 hrs.    Susceptibility to follow.    CARDIAC MARKERS ( 23 Feb 2021 23:16 )  x     / 0.27 ng/mL / 86 U/L / x     / 7.6 ng/mL  CARDIAC MARKERS ( 23 Feb 2021 17:08 )  x     / 0.28 ng/mL / 153 U/L / x     / 10.3 ng/mL  CARDIAC MARKERS ( 23 Feb 2021 09:45 )  x     / 0.24 ng/mL / 164 U/L / x     / 10.1 ng/mL    RADIOLOGY:  Reviewed    PHYSICAL EXAM:  GEN: No acute distress  HEENT: NCAT  LUNGS: Clear to auscultation bilaterally   HEART: S1/S2 present. RRR.   ABD: Soft, non-tender, non-distended. Bowel sounds present  EXT: +2 pitting edema bilaterally  NEURO: AAOX3

## 2021-02-24 NOTE — DIETITIAN INITIAL EVALUATION ADULT. - ADD RECOMMEND
1. Recommend addition of Nepro with Carb Steady BID (850 kcal, 38.2g protein, 344 mL free H2O) 2. Encourage PO intake 3. Monitor lytes and replete prn. POC BG Q6hrs. Continue w/phos binder 4. Pain and bowel regimens per team 5. Reinforce diet ed

## 2021-02-24 NOTE — DIETITIAN INITIAL EVALUATION ADULT. - PROBLEM SELECTOR PLAN 1
Per patient's daughter, increasing BUN/Cr over time with unclear etiology. , Cr 6.59 on arrival. Cr in 11/20 was 1.1. Prior to hospitalization for DRESS, no known kidney disease. /Cr 6.6 upon discharge from [facility].   - Dr. Yap in contact with family and renal aware  - Start Lasix 40 mg IV BID  - Continue to closely monitor electrolytes and BUN    #Volume Overload  - Significant response to Lasix 80 mg IV in ED  - Likely due to acute renal failure; however, TTE ordered for AM  - Continue Lasix 40 mg IV BID as above

## 2021-02-24 NOTE — PROGRESS NOTE ADULT - ATTENDING COMMENTS
I have reviewed the medical record, including laboratory and radiographic studies, interviewed and examined the patient and discussed the plan with Dr. Alberts, the ID Resident.  Agree with above. Recognize concerns regarding femoral line.  However, his blood culture from 2/23 is still positive.  Would want negative blood culture for at least 72 h prior to insertion of tunneled HD cath.  Please continue to obtain daily surveillance cultures.  Will continue to follow with you – ID Team 1.

## 2021-02-24 NOTE — PROGRESS NOTE ADULT - SUBJECTIVE AND OBJECTIVE BOX
INTERVAL HPI/OVERNIGHT EVENTS:    SUBJECTIVE: Patient seen and examined at bedside.    OBJECTIVE:    VITAL SIGNS:  ICU Vital Signs Last 24 Hrs  T(C): 36.9 (24 Feb 2021 01:03), Max: 36.9 (24 Feb 2021 01:03)  T(F): 98.5 (24 Feb 2021 01:03), Max: 98.5 (24 Feb 2021 01:03)  HR: 89 (24 Feb 2021 06:00) (87 - 126)  BP: 118/58 (24 Feb 2021 06:00) (75/43 - 121/60)  BP(mean): 82 (24 Feb 2021 06:00) (53 - 86)  ABP: --  ABP(mean): --  RR: 13 (24 Feb 2021 06:00) (13 - 28)  SpO2: 99% (24 Feb 2021 06:00) (90% - 99%)        02-22 @ 07:01  -  02-23 @ 07:00  --------------------------------------------------------  IN: 690 mL / OUT: 1365 mL / NET: -675 mL    02-23 @ 07:01  -  02-24 @ 06:19  --------------------------------------------------------  IN: 1024 mL / OUT: 1800 mL / NET: -776 mL      CAPILLARY BLOOD GLUCOSE          PHYSICAL EXAM:    Constitutional: NAD  HEENT: NC/AT; PERRL, anicteric sclera; MMM  Neck: supple, no JVD  Cardiovascular: +S1/S2, RRR  Respiratory: CTA B/L, no W/R/R  Gastrointestinal: abdomen soft, NT/ND; no rebound or guarding; +BSx4  Genitourinary: no suprapubic tenderness or fullness  Extremities: WWP; no LE edema; no clubbing or cyanosis  Vascular: 2+ radial, DP/PT and femoral pulses B/L  Dermatologic: normal color and turgor; no visible rashes  Neurological:     MEDICATIONS:  MEDICATIONS  (STANDING):  ceFAZolin   IVPB 1000 milliGRAM(s) IV Intermittent every 24 hours  doxazosin 4 milliGRAM(s) Oral daily  midodrine. 5 milliGRAM(s) Oral every 8 hours  norepinephrine Infusion 0.05 MICROgram(s)/kG/Min (7.92 mL/Hr) IV Continuous <Continuous>  pantoprazole    Tablet 40 milliGRAM(s) Oral before breakfast  polyethylene glycol 3350 17 Gram(s) Oral daily  PPD  5 Tuberculin Unit(s) Injectable 5 Unit(s) IntraDermal once  predniSONE   Tablet 60 milliGRAM(s) Oral daily  senna 2 Tablet(s) Oral at bedtime  sevelamer carbonate 800 milliGRAM(s) Oral three times a day with meals  sodium bicarbonate 650 milliGRAM(s) Oral two times a day    MEDICATIONS  (PRN):      ALLERGIES:  Allergies    No Known Allergies    Intolerances        LABS:                        9.8    11.22 )-----------( 54       ( 23 Feb 2021 09:45 )             29.3     02-23    137  |  95<L>  |  118<H>  ----------------------------<  151<H>  4.0   |  23  |  6.79<H>    Ca    8.8      23 Feb 2021 07:32  Phos  8.3     02-23  Mg     2.4     02-23    TPro  5.1<L>  /  Alb  2.0<L>  /  TBili  0.7  /  DBili  x   /  AST  19  /  ALT  17  /  AlkPhos  135<H>  02-23    PT/INR - ( 23 Feb 2021 17:08 )   PT: 16.0 sec;   INR: 1.35          PTT - ( 23 Feb 2021 17:08 )  PTT:45.1 sec      RADIOLOGY & ADDITIONAL TESTS: Reviewed.    ASSESSMENT:    PLAN:    PULMONARY    NEURO    CARDIOVASCULAR    RENAL    ID    GI/FEN    DVT PPx -     LINES -     CODE -     DISPO - continue intensive level of care in CCM/MICU service TRANSFER NOTE: MICU TO [facility]     Hospital Course:     76 year old male with a past medical history of Hemophilia A, HTN, Gout, and DRESS Syndrome who presents with weakness for 1 week. Pt had an acute gout flare on January 1, treated with allopurinol for 3 weeks, c/b by development of DRESS (seen at [facility] where diagnosis was made, d/c 2/14).  While at [facility] patient had a creatinine that ranged from 4-6, however baseline is 1.1 (9/2020). After discharge from [facility] patient complained of weakness and progressive swelling of his legs and abdomen. On admission, VS notable for tachycardia to 128. Labs were significant for WBC 28.30, Hgb 11.5, K 5.6, CO2 20  , Cr 6.59, albumin 2.7, alk phos 158, BNP 78766. CXR showed pleural effusion left greater than right with dependent edema consistent with volume overload. Imaging significant for a CT abdomen small to moderate bilateral pleural effusions, greater on the left. Mild pericardial effusion. Anasarca greater in left lower chest and trace ascites. LE dopplers negative for DVT. The patient was given 80mg of IV lasix and admitted for acute renal failure with potential need of dialysis. While on [facility] nephrology was consulted who recommended IV diuresis with plans of hemodialysis on 2/22. On the morning of 2/22 blood cultures grew MSSA.  Pt was started on ancef. Echo performed 2/22 due to pericardial effusion seen on CT chest, no evidence of tamponade.  Attempted HD 2/22, however pt became hypotensive (BP unknown), and session was immediately stopped. Pt transferred to [facility] for increased monitoring during dialysis, to occur 2/23.  Around 9:20 AM 2/23, patient went into afib with RVR (no prior history of afib), rectal temp negative, EKG showed ST elevations which we felt were more pronounced compared to prior. Trop, CK, CKMB obtained (trop 0.24, CKMB 10.1, CK nml). Pt without any chest pain or complaints. Gave lopressor 5 IVP x1. BP dropped slightly, therefore gave 500cc bolus. Cardiology consulted stat- came to bedside and felt EKG mostly stable from prior and likely afib RVR was from uremia, necessitating dialysis. Gave one time dose of cardizem 5mg and started on 5 of midodrine TID. Per nephro, needs higher pressure and lower HR to tolerate dialysis, therefore transferring to ICU for further pressure support and management to facilitate dialysis. Transferred to MICU on 2/23. Patient received HD on 2/23 0.5L UF on pressors, since weaned off. He required mild pressor support with Levophed (0.03mcg/kg/min) during HD. Patient off of pressors, repeat HD on 2/24 patient did not require any pressor support- tolerated well. Midodrine was increased to 10mg TID and restarted on lasix 80IV BID- home dose and per cardiology recs (patient still hypervolemic on exam). Patient remained off pressor support and is stable for transfer to regional medical floors.       INTERVAL HPI/OVERNIGHT EVENTS: Troponin peaked, CKMB downtrending. Taken off Levo at 8PM.     SUBJECTIVE: Patient seen and examined at bedside. He is AAOX3 although poor medical insight and confused about hospital course. He denies any chest pain or SOB. Made 200cc of urine overnight.     OBJECTIVE:    VITAL SIGNS:  ICU Vital Signs Last 24 Hrs  T(C): 36.9 (24 Feb 2021 01:03), Max: 36.9 (24 Feb 2021 01:03)  T(F): 98.5 (24 Feb 2021 01:03), Max: 98.5 (24 Feb 2021 01:03)  HR: 89 (24 Feb 2021 06:00) (87 - 126)  BP: 118/58 (24 Feb 2021 06:00) (75/43 - 121/60)  BP(mean): 82 (24 Feb 2021 06:00) (53 - 86)  ABP: --  ABP(mean): --  RR: 13 (24 Feb 2021 06:00) (13 - 28)  SpO2: 99% (24 Feb 2021 06:00) (90% - 99%)        02-22 @ 07:01  -  02-23 @ 07:00  --------------------------------------------------------  IN: 690 mL / OUT: 1365 mL / NET: -675 mL    02-23 @ 07:01  -  02-24 @ 06:19  --------------------------------------------------------  IN: 1024 mL / OUT: 1800 mL / NET: -776 mL      CAPILLARY BLOOD GLUCOSE          PHYSICAL EXAM:    Constitutional: NAD  HEENT: NC/AT; PERRL, anicteric sclera; MMM  Neck: supple, no JVD  Cardiovascular: +S1/S2, RRR  Respiratory: Diminished b/l lower lobes. Anterior fields clear.   Gastrointestinal: abdomen soft, slightly distended. NO rebound or guarding; +BSx4  Genitourinary: no suprapubic tenderness or fullness  Extremities: + LE edema +3 pitting edema; no clubbing or cyanosis  Vascular: 2+ radial  Dermatologic: normal color and turgor; + maculopapular blanchable rash to chest and upper extremities   Neurological: A&Ox3. Follow commands. Hard of hearing.     MEDICATIONS:  MEDICATIONS  (STANDING):  ceFAZolin   IVPB 1000 milliGRAM(s) IV Intermittent every 24 hours  doxazosin 4 milliGRAM(s) Oral daily  midodrine. 5 milliGRAM(s) Oral every 8 hours  norepinephrine Infusion 0.05 MICROgram(s)/kG/Min (7.92 mL/Hr) IV Continuous <Continuous>  pantoprazole    Tablet 40 milliGRAM(s) Oral before breakfast  polyethylene glycol 3350 17 Gram(s) Oral daily  PPD  5 Tuberculin Unit(s) Injectable 5 Unit(s) IntraDermal once  predniSONE   Tablet 60 milliGRAM(s) Oral daily  senna 2 Tablet(s) Oral at bedtime  sevelamer carbonate 800 milliGRAM(s) Oral three times a day with meals  sodium bicarbonate 650 milliGRAM(s) Oral two times a day    MEDICATIONS  (PRN):      ALLERGIES:  Allergies    No Known Allergies    Intolerances        LABS:                        9.8    11.22 )-----------( 54       ( 23 Feb 2021 09:45 )             29.3     02-23    137  |  95<L>  |  118<H>  ----------------------------<  151<H>  4.0   |  23  |  6.79<H>    Ca    8.8      23 Feb 2021 07:32  Phos  8.3     02-23  Mg     2.4     02-23    TPro  5.1<L>  /  Alb  2.0<L>  /  TBili  0.7  /  DBili  x   /  AST  19  /  ALT  17  /  AlkPhos  135<H>  02-23    PT/INR - ( 23 Feb 2021 17:08 )   PT: 16.0 sec;   INR: 1.35          PTT - ( 23 Feb 2021 17:08 )  PTT:45.1 sec      RADIOLOGY & ADDITIONAL TESTS: Reviewed/ TRANSFER NOTE: MICU TO [facility] Hospital Course:     76 year old male with a past medical history of Hemophilia A, HTN, Gout, and DRESS Syndrome who presents with weakness for 1 week. Pt had an acute gout flare on January 1, treated with allopurinol for 3 weeks, c/b by development of DRESS (seen at [facility] where diagnosis was made, d/c 2/14).  While at [facility] patient had a creatinine that ranged from 4-6, however baseline is 1.1 (9/2020). After discharge from [facility] patient complained of weakness and progressive swelling of his legs and abdomen. On admission, VS notable for tachycardia to 128. Labs were significant for WBC 28.30, Hgb 11.5, K 5.6, CO2 20  , Cr 6.59, albumin 2.7, alk phos 158, BNP 31556. CXR showed pleural effusion left greater than right with dependent edema consistent with volume overload. Imaging significant for a CT abdomen small to moderate bilateral pleural effusions, greater on the left. Mild pericardial effusion. Anasarca greater in left lower chest and trace ascites. LE dopplers negative for DVT. The patient was given 80mg of IV lasix and admitted for acute renal failure with potential need of dialysis. While on [facility] nephrology was consulted who recommended IV diuresis with plans of hemodialysis on 2/22. On the morning of 2/22 blood cultures grew MSSA.  Pt was started on ancef. Echo performed 2/22 due to pericardial effusion seen on CT chest, no evidence of tamponade.  Attempted HD 2/22, however pt became hypotensive (BP unknown), and session was immediately stopped. Pt transferred to [facility] for increased monitoring during dialysis, to occur 2/23.  Around 9:20 AM 2/23, patient went into afib with RVR (no prior history of afib), rectal temp negative, EKG showed ST elevations which we felt were more pronounced compared to prior. Trop, CK, CKMB obtained (trop 0.24, CKMB 10.1, CK nml). Pt without any chest pain or complaints. Gave lopressor 5 IVP x1. BP dropped slightly, therefore gave 500cc bolus. Cardiology consulted stat- came to bedside and felt EKG mostly stable from prior and likely afib RVR was from uremia, necessitating dialysis. Gave one time dose of cardizem 5mg and started on 5 of midodrine TID. Per nephro, needs higher pressure and lower HR to tolerate dialysis, therefore transferring to ICU for further pressure support and management to facilitate dialysis. Transferred to MICU on 2/23. Patient received HD on 2/23 0.5L UF on pressors, since weaned off. He required mild pressor support with Levophed (0.03mcg/kg/min) during HD. Patient off of pressors, repeat HD on 2/24 patient did not require any pressor support- tolerated well. Midodrine was increased to 10mg TID and restarted on lasix 80IV BID- home dose and per cardiology recs (patient still hypervolemic on exam). Patient remained off pressor support and is stable for transfer to regional medical floors.       INTERVAL HPI/OVERNIGHT EVENTS: Troponin peaked, CKMB downtrending. Taken off Levo at 8PM.     SUBJECTIVE: Patient seen and examined at bedside. He is AAOX3 although poor medical insight and confused about hospital course. He denies any chest pain or SOB. Made 200cc of urine overnight.     OBJECTIVE:    VITAL SIGNS:  ICU Vital Signs Last 24 Hrs  T(C): 36.9 (24 Feb 2021 01:03), Max: 36.9 (24 Feb 2021 01:03)  T(F): 98.5 (24 Feb 2021 01:03), Max: 98.5 (24 Feb 2021 01:03)  HR: 89 (24 Feb 2021 06:00) (87 - 126)  BP: 118/58 (24 Feb 2021 06:00) (75/43 - 121/60)  BP(mean): 82 (24 Feb 2021 06:00) (53 - 86)  ABP: --  ABP(mean): --  RR: 13 (24 Feb 2021 06:00) (13 - 28)  SpO2: 99% (24 Feb 2021 06:00) (90% - 99%)        02-22 @ 07:01  -  02-23 @ 07:00  --------------------------------------------------------  IN: 690 mL / OUT: 1365 mL / NET: -675 mL    02-23 @ 07:01  -  02-24 @ 06:19  --------------------------------------------------------  IN: 1024 mL / OUT: 1800 mL / NET: -776 mL      CAPILLARY BLOOD GLUCOSE          PHYSICAL EXAM:    Constitutional: NAD  HEENT: NC/AT; PERRL, anicteric sclera; MMM  Neck: supple, no JVD  Cardiovascular: +S1/S2, RRR  Respiratory: Diminished b/l lower lobes. Anterior fields clear.   Gastrointestinal: abdomen soft, slightly distended. NO rebound or guarding; +BSx4  Genitourinary: no suprapubic tenderness or fullness  Extremities: + LE edema +3 pitting edema; no clubbing or cyanosis  Vascular: 2+ radial  Dermatologic: normal color and turgor; + maculopapular blanchable rash to chest and upper extremities   Neurological: A&Ox3. Follow commands. Hard of hearing.     MEDICATIONS:  MEDICATIONS  (STANDING):  ceFAZolin   IVPB 1000 milliGRAM(s) IV Intermittent every 24 hours  doxazosin 4 milliGRAM(s) Oral daily  midodrine. 5 milliGRAM(s) Oral every 8 hours  norepinephrine Infusion 0.05 MICROgram(s)/kG/Min (7.92 mL/Hr) IV Continuous <Continuous>  pantoprazole    Tablet 40 milliGRAM(s) Oral before breakfast  polyethylene glycol 3350 17 Gram(s) Oral daily  PPD  5 Tuberculin Unit(s) Injectable 5 Unit(s) IntraDermal once  predniSONE   Tablet 60 milliGRAM(s) Oral daily  senna 2 Tablet(s) Oral at bedtime  sevelamer carbonate 800 milliGRAM(s) Oral three times a day with meals  sodium bicarbonate 650 milliGRAM(s) Oral two times a day    MEDICATIONS  (PRN):      ALLERGIES:  Allergies    No Known Allergies    Intolerances        LABS:                        9.8    11.22 )-----------( 54       ( 23 Feb 2021 09:45 )             29.3     02-23    137  |  95<L>  |  118<H>  ----------------------------<  151<H>  4.0   |  23  |  6.79<H>    Ca    8.8      23 Feb 2021 07:32  Phos  8.3     02-23  Mg     2.4     02-23    TPro  5.1<L>  /  Alb  2.0<L>  /  TBili  0.7  /  DBili  x   /  AST  19  /  ALT  17  /  AlkPhos  135<H>  02-23    PT/INR - ( 23 Feb 2021 17:08 )   PT: 16.0 sec;   INR: 1.35          PTT - ( 23 Feb 2021 17:08 )  PTT:45.1 sec      RADIOLOGY & ADDITIONAL TESTS: Reviewed/

## 2021-02-24 NOTE — PROGRESS NOTE ADULT - ATTENDING COMMENTS
further HD per renal as outlined. Need for permacath and coordination of care with Heme pre procedure will be needed. Continue Midodrine and fluid removal as tolerated. DVT prophylaxis held with low platelets and hemophilia per Heme but I am concerned in setting of Femoral line. consider interval LE dopplers.

## 2021-02-24 NOTE — PROGRESS NOTE ADULT - ASSESSMENT
76M PMHx Hemophilia A, HTN, Gout, p/w RU in setting of DRESS from allopurinol on steroids   hypervolemic, hyperkalemic, uremic, and acidotic without improvement   started on hemodialysis 2/23     #RU secondary to ATN vs AIN on steroids   #hyperkalemia  #acidosis  #hypervolemia   #uremic pericarditis   #thrombocytopenia noted, likely secondary to sepsis, unlikely related to hemodialysis given timeline, trend CBC daily   s/p failed trial of hemodialysis 2/22 (only tolerated a few minutes secondary to hypotension), etiology secondary to pericardial effusion vs sepsis vs received BP meds   tolerated hemodialysis well yesterday 2/23 w/0.5L UF on pressors, since weaned off    for repeat hemodialysis today 2/24   cardiology consult appreciated   continue to hold all anti-HTN meds   continue prednisone per primary team   continue with Renvela   hematology consult re hemophilia appreciated   trend CBC, BMP, Mg, Phos daily   strict IOs  renal diet    Patient was seen and evaluated on hemodialysis.      Dialyzer: Optiflux [id number]         QB: 300 mL/min         QD: 500 mL/min      K bath: 3  Goal UF: 1L                Duration: 150 min     Patient is tolerating the procedure well. Continue full hemodialysis treatment as prescribed.    Thank you for the opportunity to participate in the care of your patient. The nephrology service remains available to assist with any questions or concerns. Please feel free to reach us by paging the on-call nephrology fellow for urgent issues or as below.     Ramón Dumont M.D.   PGY-4, Nephrology Fellow   C: [phone]   P: [phone]

## 2021-02-24 NOTE — DIETITIAN INITIAL EVALUATION ADULT. - OTHER INFO
76 year old male with a past medical history Hemophilia A, HTN, Gout, and DRESS Syndrome who presents with weakness for 1 week. He was admitted for acute on chronic renal failure c/b anasarca and likely induction of hemodialysis. Transferred to MICU on 2/23 d/t afib with RVR and hypotension for closer monitoring and pressor support while undergoing dialysis. Pt seen in room and discussed during MICU rounds. Pt awake, alert, very hard of hearing. Undergoing HD at time of visit. MAP 82- weaned off of levophed and remains on midodrine. He endorsed poor appetite recently w/associated taste changes. Was unable to state whether or not it was a metallic taste in mouth. He reported that at home he eats oatmeal, coffee, cottage cheese, and chicken. NKFA. Observed <50% intake of breakfast tray and he endorsed not being hungry for lunch. He denied N/V, but reported lots of gas, and no BM (noted bowel regimen). He denied difficulty chewing or swallowing. Skin noted w/B/L hands mild edema and B/L feet moderate edema. Afebrile. Pt unsure of UBW, NFPE limited 2/2 edema. Renal diet education handout left at bedside for pt's daughter. Discussed w/pt importance of increasing PO intake. Will continue to follow per RD protocol.

## 2021-02-24 NOTE — PROGRESS NOTE ADULT - ATTENDING COMMENTS
Initial attending contact date 2/24/21     . See fellow note written above for details. I reviewed the fellow documentation. I have personally seen and examined this patient. I reviewed vitals, labs, medications, cardiac studies, and additional imaging. I agree with the above fellow's findings and plans as written above

## 2021-02-24 NOTE — DIETITIAN INITIAL EVALUATION ADULT. - OTHER CALCULATIONS
ActualBW used for calculations as pt between % of IBW (118% IBW). Needs estimated for age and adjusted for HD. Fluids per team 2/2 HD, edema

## 2021-02-24 NOTE — PROGRESS NOTE ADULT - ASSESSMENT
75 yo M with PMHx of CKD (recent Cr 6.6 at [facility]), Hemophilia A, HTN, Gout, BPH and DRESS Syndrome (on Prednisone 60mg QD) consulted to ID for MSSA bacteremia. Patient currently on cefazolin 1g q 24hrs which was chosen over Nafcillin in the setting of severe fluid overload since Cefazolin is administered with less fluid. Source of MSSA unknown. Possible source from skin as patient is immunocompromised from chronic steroids and broke skin barrier scratching the DRESS syndrome rash which created multiple lesions. Unlikely lung source since no cough, sputum production and CXR does not show any lung field opacities. SOB and need for 4L NC likely due to pleural effusion noted on CXR in setting of severe fluid overload secondary to CKD. Weakly positive UA, however unlikely  source as patient does not have urinary symptoms and staph aureus usually does not commonly arise from urinary sources. Patient afebrile with slight increase in WBC from 11.22 (2/23) to 14.22 (2/24). TTE (2/22) showed no valvular disease. Surveillance blood culture from 2/22 is growing MSSA. Surveillance blood culture on 2/23 no growth at 12 hours.    Plan:  - C/w cefazolin 1g IV q 24hrs   - Surveillance blood cultures on 2/24   - GUANAKO once patient is stable - since surveillance blood culture from 2/22 is growing MSSA after two doses of cefazolin and staph aureus is prone to seeding on heart valves.    - F/u blood culture 2/23    ID Team 1 will follow.    Discussed with Infectious Disease Attending Dr. Edgar. Recommendations are considered final after attending attestation.

## 2021-02-24 NOTE — DIETITIAN INITIAL EVALUATION ADULT. - PROBLEM SELECTOR PLAN 9
Prior history of significant bleeding in past following ERCP  - Follows with Dr. Grace at [facility]  - Consider heme consult prior to any invasive procedures

## 2021-02-24 NOTE — PROGRESS NOTE ADULT - SUBJECTIVE AND OBJECTIVE BOX
INTERVAL EVENTS: Patient undergoing HD. He is feeling better.     PAST MEDICAL & SURGICAL HISTORY:  History of BPH    Gout    Hemophilia A    HTN (hypertension)    Uses cochlear implant        MEDICATIONS  (STANDING):  ceFAZolin   IVPB 500 milliGRAM(s) IV Intermittent once  doxazosin 4 milliGRAM(s) Oral daily  furosemide   Injectable 80 milliGRAM(s) IV Push every 12 hours  midodrine. 10 milliGRAM(s) Oral every 8 hours  norepinephrine Infusion 0.05 MICROgram(s)/kG/Min (7.92 mL/Hr) IV Continuous <Continuous>  pantoprazole    Tablet 40 milliGRAM(s) Oral before breakfast  polyethylene glycol 3350 17 Gram(s) Oral daily  PPD  5 Tuberculin Unit(s) Injectable 5 Unit(s) IntraDermal once  predniSONE   Tablet 60 milliGRAM(s) Oral daily  senna 2 Tablet(s) Oral at bedtime  sevelamer carbonate 800 milliGRAM(s) Oral three times a day with meals    MEDICATIONS  (PRN):      Vital Signs Last 24 Hrs  T(C): 36.3 (24 Feb 2021 13:16), Max: 36.9 (24 Feb 2021 01:03)  T(F): 97.3 (24 Feb 2021 13:16), Max: 98.5 (24 Feb 2021 01:03)  HR: 108 (24 Feb 2021 13:00) (87 - 110)  BP: 97/54 (24 Feb 2021 13:00) (77/50 - 121/60)  BP(mean): 68 (24 Feb 2021 13:00) (60 - 86)  RR: 22 (24 Feb 2021 13:00) (13 - 29)  SpO2: 95% (24 Feb 2021 13:00) (93% - 99%)     PHYSICAL EXAM:  GEN: Awake, alert. NAD.   HEENT: NCAT, PERRL, EOMI. Mucosa moist. No JVD.  RESP: CTA b/l  CV: RRR. Normal S1/S2. No m/r/g.  ABD: Soft. NT/ND. BS+  EXT: Warm. Diffuse anasarca. Mildly improved from yesterday.   NEURO: AAOx3. No focal deficits.     LABS:                        9.8    14.90 )-----------( 50       ( 24 Feb 2021 06:19 )             29.5     02-24    138  |  99  |  104<H>  ----------------------------<  169<H>  3.9   |  26  |  5.41<H>    Ca    8.4      24 Feb 2021 06:19  Phos  6.9     02-24  Mg     2.2     02-24    TPro  4.6<L>  /  Alb  1.8<L>  /  TBili  0.5  /  DBili  x   /  AST  14  /  ALT  12  /  AlkPhos  151<H>  02-24    CARDIAC MARKERS ( 23 Feb 2021 23:16 )  x     / 0.27 ng/mL / 86 U/L / x     / 7.6 ng/mL  CARDIAC MARKERS ( 23 Feb 2021 17:08 )  x     / 0.28 ng/mL / 153 U/L / x     / 10.3 ng/mL  CARDIAC MARKERS ( 23 Feb 2021 09:45 )  x     / 0.24 ng/mL / 164 U/L / x     / 10.1 ng/mL      PT/INR - ( 24 Feb 2021 06:21 )   PT: 16.0 sec;   INR: 1.35          PTT - ( 24 Feb 2021 06:21 )  PTT:38.8 sec    I&O's Summary    23 Feb 2021 07:01  -  24 Feb 2021 07:00  --------------------------------------------------------  IN: 1024 mL / OUT: 1800 mL / NET: -776 mL    24 Feb 2021 07:01  -  24 Feb 2021 13:48  --------------------------------------------------------  IN: 240 mL / OUT: 800 mL / NET: -560 mL      BNP  RADIOLOGY & ADDITIONAL STUDIES:    TELEMETRY:    EKG:

## 2021-02-24 NOTE — PROGRESS NOTE ADULT - SUBJECTIVE AND OBJECTIVE BOX
Patient is a 76y Male seen and evaluated at bedside.   ~0.5L/24h UOP   0.5L UF w/HD yesterday +pressor since weaned off   no SOB CP, fever, abdominal pain  BP low-side, acceptable  plan for hemodialysis again today    Meds:    ceFAZolin   IVPB 1000 every 24 hours  doxazosin 4 daily  midodrine. 5 every 8 hours  norepinephrine Infusion 0.05 <Continuous>  pantoprazole    Tablet 40 before breakfast  polyethylene glycol 3350 17 daily  PPD  5 Tuberculin Unit(s) Injectable 5 once  predniSONE   Tablet 60 daily  senna 2 at bedtime  sevelamer carbonate 800 three times a day with meals      T(C): , Max: 36.9 (02-24-21 @ 01:03)  T(F): , Max: 98.5 (02-24-21 @ 01:03)  HR: 103 (02-24-21 @ 11:00)  BP: 117/56 (02-24-21 @ 11:00)  BP(mean): 79 (02-24-21 @ 11:00)  RR: 21 (02-24-21 @ 11:00)  SpO2: 97% (02-24-21 @ 11:00)  Wt(kg): --    02-23 @ 07:01  -  02-24 @ 07:00  --------------------------------------------------------  IN: 1024 mL / OUT: 1800 mL / NET: -776 mL    02-24 @ 07:01  -  02-24 @ 11:32  --------------------------------------------------------  IN: 0 mL / OUT: 800 mL / NET: -800 mL              ROS:  no SOB CP abdominal pain nausea or fever     PHYSICAL EXAM:  Constitutional: No acute distress on 4L O2 NC   Respiratory: Clear with reduced BS bases  Cardiovascular: S1, S2.  Regular rate and rhythm.    Gastrointestinal: soft, non-tender, mildly distended  Extremities: +2 non-pitting lower extremity edema  Access: +R fem temp catheter c/d/i non-tender       LABS:                        9.8    14.90 )-----------( 50       ( 24 Feb 2021 06:19 )             29.5     02-24    138  |  99  |  104<H>  ----------------------------<  169<H>  3.9   |  26  |  5.41<H>    Ca    8.4      24 Feb 2021 06:19  Phos  6.9     02-24  Mg     2.2     02-24    TPro  4.6<L>  /  Alb  1.8<L>  /  TBili  0.5  /  DBili  x   /  AST  14  /  ALT  12  /  AlkPhos  151<H>  02-24      PT/INR - ( 24 Feb 2021 06:21 )   PT: 16.0 sec;   INR: 1.35          PTT - ( 24 Feb 2021 06:21 )  PTT:38.8 sec          RADIOLOGY & ADDITIONAL STUDIES:

## 2021-02-24 NOTE — DIETITIAN INITIAL EVALUATION ADULT. - PROBLEM SELECTOR PLAN 6
Continue home Norvasc of 5 mg daily  - Lasix as above for ascites/edema    ADDENDUM:   #elevated troponins: trend to peak, likely occuring in setting of demand ischemia

## 2021-02-25 DIAGNOSIS — I31.9 DISEASE OF PERICARDIUM, UNSPECIFIED: ICD-10-CM

## 2021-02-25 DIAGNOSIS — R57.9 SHOCK, UNSPECIFIED: ICD-10-CM

## 2021-02-25 DIAGNOSIS — N40.0 BENIGN PROSTATIC HYPERPLASIA WITHOUT LOWER URINARY TRACT SYMPTOMS: ICD-10-CM

## 2021-02-25 DIAGNOSIS — R18.8 OTHER ASCITES: ICD-10-CM

## 2021-02-25 DIAGNOSIS — R00.0 TACHYCARDIA, UNSPECIFIED: ICD-10-CM

## 2021-02-25 LAB
-  CEFAZOLIN: SIGNIFICANT CHANGE UP
-  CLINDAMYCIN: SIGNIFICANT CHANGE UP
-  ERYTHROMYCIN: SIGNIFICANT CHANGE UP
-  LINEZOLID: SIGNIFICANT CHANGE UP
-  OXACILLIN: SIGNIFICANT CHANGE UP
-  RIFAMPIN: SIGNIFICANT CHANGE UP
-  TRIMETHOPRIM/SULFAMETHOXAZOLE: SIGNIFICANT CHANGE UP
-  VANCOMYCIN: SIGNIFICANT CHANGE UP
ALBUMIN SERPL ELPH-MCNC: 2 G/DL — LOW (ref 3.3–5)
ALP SERPL-CCNC: 221 U/L — HIGH (ref 40–120)
ALT FLD-CCNC: 14 U/L — SIGNIFICANT CHANGE UP (ref 10–45)
ANION GAP SERPL CALC-SCNC: 13 MMOL/L — SIGNIFICANT CHANGE UP (ref 5–17)
APTT BLD: 37.9 SEC — HIGH (ref 27.5–35.5)
AST SERPL-CCNC: 18 U/L — SIGNIFICANT CHANGE UP (ref 10–40)
BASOPHILS # BLD AUTO: 0.03 K/UL — SIGNIFICANT CHANGE UP (ref 0–0.2)
BASOPHILS NFR BLD AUTO: 0.2 % — SIGNIFICANT CHANGE UP (ref 0–2)
BILIRUB SERPL-MCNC: 0.6 MG/DL — SIGNIFICANT CHANGE UP (ref 0.2–1.2)
BLD GP AB SCN SERPL QL: NEGATIVE — SIGNIFICANT CHANGE UP
BUN SERPL-MCNC: 80 MG/DL — HIGH (ref 7–23)
CALCIUM SERPL-MCNC: 8.4 MG/DL — SIGNIFICANT CHANGE UP (ref 8.4–10.5)
CHLORIDE SERPL-SCNC: 96 MMOL/L — SIGNIFICANT CHANGE UP (ref 96–108)
CO2 SERPL-SCNC: 27 MMOL/L — SIGNIFICANT CHANGE UP (ref 22–31)
CREAT SERPL-MCNC: 4.09 MG/DL — HIGH (ref 0.5–1.3)
CULTURE RESULTS: SIGNIFICANT CHANGE UP
EOSINOPHIL # BLD AUTO: 0.01 K/UL — SIGNIFICANT CHANGE UP (ref 0–0.5)
EOSINOPHIL NFR BLD AUTO: 0.1 % — SIGNIFICANT CHANGE UP (ref 0–6)
FACT VIII ACT/NOR PPP: 49 % — LOW (ref 51–138)
FACT VIII ACT/NOR PPP: 52 % — SIGNIFICANT CHANGE UP (ref 51–138)
GLUCOSE SERPL-MCNC: 139 MG/DL — HIGH (ref 70–99)
HCT VFR BLD CALC: 29.3 % — LOW (ref 39–50)
HCT VFR BLD CALC: 31.2 % — LOW (ref 39–50)
HCT VFR BLD CALC: 31.6 % — LOW (ref 39–50)
HGB BLD-MCNC: 10.2 G/DL — LOW (ref 13–17)
HGB BLD-MCNC: 10.3 G/DL — LOW (ref 13–17)
HGB BLD-MCNC: 9.6 G/DL — LOW (ref 13–17)
IMM GRANULOCYTES NFR BLD AUTO: 1 % — SIGNIFICANT CHANGE UP (ref 0–1.5)
INR BLD: 1.25 — HIGH (ref 0.88–1.16)
LYMPHOCYTES # BLD AUTO: 0.39 K/UL — LOW (ref 1–3.3)
LYMPHOCYTES # BLD AUTO: 2.7 % — LOW (ref 13–44)
MAGNESIUM SERPL-MCNC: 2.2 MG/DL — SIGNIFICANT CHANGE UP (ref 1.6–2.6)
MCHC RBC-ENTMCNC: 27.7 PG — SIGNIFICANT CHANGE UP (ref 27–34)
MCHC RBC-ENTMCNC: 27.9 PG — SIGNIFICANT CHANGE UP (ref 27–34)
MCHC RBC-ENTMCNC: 28.2 PG — SIGNIFICANT CHANGE UP (ref 27–34)
MCHC RBC-ENTMCNC: 32.6 GM/DL — SIGNIFICANT CHANGE UP (ref 32–36)
MCHC RBC-ENTMCNC: 32.7 GM/DL — SIGNIFICANT CHANGE UP (ref 32–36)
MCHC RBC-ENTMCNC: 32.8 GM/DL — SIGNIFICANT CHANGE UP (ref 32–36)
MCV RBC AUTO: 84.8 FL — SIGNIFICANT CHANGE UP (ref 80–100)
MCV RBC AUTO: 85.2 FL — SIGNIFICANT CHANGE UP (ref 80–100)
MCV RBC AUTO: 86.6 FL — SIGNIFICANT CHANGE UP (ref 80–100)
METHOD TYPE: SIGNIFICANT CHANGE UP
MONOCYTES # BLD AUTO: 0.38 K/UL — SIGNIFICANT CHANGE UP (ref 0–0.9)
MONOCYTES NFR BLD AUTO: 2.6 % — SIGNIFICANT CHANGE UP (ref 2–14)
NEUTROPHILS # BLD AUTO: 13.61 K/UL — HIGH (ref 1.8–7.4)
NEUTROPHILS NFR BLD AUTO: 93.4 % — HIGH (ref 43–77)
NRBC # BLD: 0 /100 WBCS — SIGNIFICANT CHANGE UP (ref 0–0)
ORGANISM # SPEC MICROSCOPIC CNT: SIGNIFICANT CHANGE UP
ORGANISM # SPEC MICROSCOPIC CNT: SIGNIFICANT CHANGE UP
PHOSPHATE SERPL-MCNC: 5.8 MG/DL — HIGH (ref 2.5–4.5)
PLATELET # BLD AUTO: 19 K/UL — CRITICAL LOW (ref 150–400)
PLATELET # BLD AUTO: 34 K/UL — LOW (ref 150–400)
PLATELET # BLD AUTO: 52 K/UL — LOW (ref 150–400)
POTASSIUM SERPL-MCNC: 4.3 MMOL/L — SIGNIFICANT CHANGE UP (ref 3.5–5.3)
POTASSIUM SERPL-SCNC: 4.3 MMOL/L — SIGNIFICANT CHANGE UP (ref 3.5–5.3)
PROT SERPL-MCNC: 4.9 G/DL — LOW (ref 6–8.3)
PROTHROM AB SERPL-ACNC: 14.8 SEC — HIGH (ref 10.6–13.6)
RBC # BLD: 3.44 M/UL — LOW (ref 4.2–5.8)
RBC # BLD: 3.65 M/UL — LOW (ref 4.2–5.8)
RBC # BLD: 3.68 M/UL — LOW (ref 4.2–5.8)
RBC # FLD: 13.9 % — SIGNIFICANT CHANGE UP (ref 10.3–14.5)
RBC # FLD: 14.1 % — SIGNIFICANT CHANGE UP (ref 10.3–14.5)
RBC # FLD: 14.2 % — SIGNIFICANT CHANGE UP (ref 10.3–14.5)
RH IG SCN BLD-IMP: POSITIVE — SIGNIFICANT CHANGE UP
SODIUM SERPL-SCNC: 136 MMOL/L — SIGNIFICANT CHANGE UP (ref 135–145)
SPECIMEN SOURCE: SIGNIFICANT CHANGE UP
SRA INTERP SER-IMP: SIGNIFICANT CHANGE UP
WBC # BLD: 14.08 K/UL — HIGH (ref 3.8–10.5)
WBC # BLD: 14.56 K/UL — HIGH (ref 3.8–10.5)
WBC # BLD: 17.17 K/UL — HIGH (ref 3.8–10.5)
WBC # FLD AUTO: 14.08 K/UL — HIGH (ref 3.8–10.5)
WBC # FLD AUTO: 14.56 K/UL — HIGH (ref 3.8–10.5)
WBC # FLD AUTO: 17.17 K/UL — HIGH (ref 3.8–10.5)

## 2021-02-25 PROCEDURE — 99233 SBSQ HOSP IP/OBS HIGH 50: CPT

## 2021-02-25 PROCEDURE — 99232 SBSQ HOSP IP/OBS MODERATE 35: CPT | Mod: GC

## 2021-02-25 PROCEDURE — 90937 HEMODIALYSIS REPEATED EVAL: CPT

## 2021-02-25 PROCEDURE — 99233 SBSQ HOSP IP/OBS HIGH 50: CPT | Mod: GC

## 2021-02-25 RX ORDER — ALBUMIN HUMAN 25 %
50 VIAL (ML) INTRAVENOUS
Refills: 0 | Status: COMPLETED | OUTPATIENT
Start: 2021-02-25 | End: 2021-02-25

## 2021-02-25 RX ADMIN — SENNA PLUS 2 TABLET(S): 8.6 TABLET ORAL at 20:48

## 2021-02-25 RX ADMIN — PANTOPRAZOLE SODIUM 40 MILLIGRAM(S): 20 TABLET, DELAYED RELEASE ORAL at 07:03

## 2021-02-25 RX ADMIN — Medication 80 MILLIGRAM(S): at 20:48

## 2021-02-25 RX ADMIN — Medication 50 MILLILITER(S): at 12:11

## 2021-02-25 RX ADMIN — Medication 100 MILLIGRAM(S): at 18:06

## 2021-02-25 RX ADMIN — POLYETHYLENE GLYCOL 3350 17 GRAM(S): 17 POWDER, FOR SOLUTION ORAL at 14:29

## 2021-02-25 RX ADMIN — MIDODRINE HYDROCHLORIDE 10 MILLIGRAM(S): 2.5 TABLET ORAL at 04:17

## 2021-02-25 RX ADMIN — Medication 80 MILLIGRAM(S): at 09:31

## 2021-02-25 RX ADMIN — Medication 60 MILLIGRAM(S): at 04:16

## 2021-02-25 RX ADMIN — Medication 50 MILLILITER(S): at 12:53

## 2021-02-25 RX ADMIN — MIDODRINE HYDROCHLORIDE 10 MILLIGRAM(S): 2.5 TABLET ORAL at 14:30

## 2021-02-25 RX ADMIN — MIDODRINE HYDROCHLORIDE 10 MILLIGRAM(S): 2.5 TABLET ORAL at 20:48

## 2021-02-25 RX ADMIN — Medication 4 MILLIGRAM(S): at 04:16

## 2021-02-25 RX ADMIN — SEVELAMER CARBONATE 800 MILLIGRAM(S): 2400 POWDER, FOR SUSPENSION ORAL at 14:30

## 2021-02-25 RX ADMIN — SEVELAMER CARBONATE 800 MILLIGRAM(S): 2400 POWDER, FOR SUSPENSION ORAL at 18:06

## 2021-02-25 NOTE — PROGRESS NOTE ADULT - ASSESSMENT
76M PMHx Hemophilia A, HTN, Gout, p/w RU in setting of DRESS from allopurinol on steroids   hypervolemic, hyperkalemic, uremic, and acidotic without improvement   started on hemodialysis 2/23     #RU secondary to ATN vs AIN on steroids   #hyperkalemia  #acidosis  #hypervolemia   #uremic pericarditis   #thrombocytopenia noted, likely secondary to sepsis, unlikely related to hemodialysis given timeline, trend CBC daily   s/p failed trial of hemodialysis 2/22 (only tolerated a few minutes secondary to hypotension), likely 2/2 sepsis   tolerated hemodialysis #1 well 2/23 w/0.5L UF on pressors, since weaned off    s/p hemodialysis #2 2/24 w/1L UF  next hemodialysis #3 today w/goal 1.5L UF as tolerated, albumin PRN   continue to hold all anti-HTN meds   continue prednisone per primary team   continue with Renvela   hematology consult re hemophilia appreciated, will need coordination with IR for tunneled catheter placement prior to discharge   will require out-patient hemodialysis setup (needs CXR, QuantiFeron, EKG, and hepatitis panel)   trend CBC, BMP, Mg, Phos daily   strict IOs  renal diet    Patient was seen and evaluated on hemodialysis.      Dialyzer: Optiflux [id number]         QB: 300 mL/min as tolerated by catheter   QD: 500 mL/min      K bath: 3  Goal UF: 1L                Duration: 180 min     Patient is tolerating the procedure well.   Continue full hemodialysis treatment as prescribed.    Thank you for the opportunity to participate in the care of your patient. The nephrology service remains available to assist with any questions or concerns. Please feel free to reach us by paging the on-call nephrology fellow for urgent issues or as below.     Ramón Dumont M.D.   PGY-4, Nephrology Fellow   C: [phone]   P: [phone]

## 2021-02-25 NOTE — PROGRESS NOTE ADULT - SUBJECTIVE AND OBJECTIVE BOX
INTERVAL EVENTS: Patient undergoing HD. Feels well today.     PAST MEDICAL & SURGICAL HISTORY:  History of BPH    Gout    Hemophilia A    HTN (hypertension)    Uses cochlear implant        MEDICATIONS  (STANDING):  ceFAZolin   IVPB 1000 milliGRAM(s) IV Intermittent every 24 hours  doxazosin 4 milliGRAM(s) Oral daily  furosemide   Injectable 80 milliGRAM(s) IV Push every 12 hours  midodrine. 10 milliGRAM(s) Oral every 8 hours  pantoprazole    Tablet 40 milliGRAM(s) Oral before breakfast  polyethylene glycol 3350 17 Gram(s) Oral daily  PPD  5 Tuberculin Unit(s) Injectable 5 Unit(s) IntraDermal once  predniSONE   Tablet 60 milliGRAM(s) Oral daily  senna 2 Tablet(s) Oral at bedtime  sevelamer carbonate 800 milliGRAM(s) Oral three times a day with meals    MEDICATIONS  (PRN):      Vital Signs Last 24 Hrs  T(C): 36.8 (25 Feb 2021 10:20), Max: 36.9 (25 Feb 2021 02:00)  T(F): 98.2 (25 Feb 2021 10:20), Max: 98.5 (25 Feb 2021 02:00)  HR: 102 (25 Feb 2021 10:20) (86 - 102)  BP: 125/58 (25 Feb 2021 10:20) (113/57 - 127/62)  BP(mean): 84 (25 Feb 2021 10:20) (78 - 87)  RR: 20 (25 Feb 2021 10:20) (13 - 20)  SpO2: 95% (25 Feb 2021 10:20) (95% - 100%)     PHYSICAL EXAM:  GEN: Awake, alert. NAD.   HEENT: NCAT, PERRL, EOMI. Mucosa moist. No JVD.  RESP: CTA b/l  CV: RRR. Normal S1/S2. No m/r/g.  ABD: Soft. NT/ND. BS+  EXT: Warm. Diffuse anasarca. Mildly improved from yesterday.   NEURO: AAOx3. No focal deficits.     LABS:                        10.2   14.56 )-----------( 34       ( 25 Feb 2021 07:23 )             31.2     02-25    136  |  96  |  80<H>  ----------------------------<  139<H>  4.3   |  27  |  4.09<H>    Ca    8.4      25 Feb 2021 07:23  Phos  5.8     02-25  Mg     2.2     02-25    TPro  4.9<L>  /  Alb  2.0<L>  /  TBili  0.6  /  DBili  x   /  AST  18  /  ALT  14  /  AlkPhos  221<H>  02-25    CARDIAC MARKERS ( 23 Feb 2021 23:16 )  x     / 0.27 ng/mL / 86 U/L / x     / 7.6 ng/mL  CARDIAC MARKERS ( 23 Feb 2021 17:08 )  x     / 0.28 ng/mL / 153 U/L / x     / 10.3 ng/mL      PT/INR - ( 25 Feb 2021 07:23 )   PT: 14.8 sec;   INR: 1.25          PTT - ( 25 Feb 2021 07:23 )  PTT:37.9 sec    I&O's Summary    24 Feb 2021 07:01  -  25 Feb 2021 07:00  --------------------------------------------------------  IN: 1090 mL / OUT: 3100 mL / NET: -2010 mL    25 Feb 2021 07:01  -  25 Feb 2021 13:06  --------------------------------------------------------  IN: 0 mL / OUT: 50 mL / NET: -50 mL      BNP  RADIOLOGY & ADDITIONAL STUDIES:    TELEMETRY:    EKG:

## 2021-02-25 NOTE — PROGRESS NOTE ADULT - SUBJECTIVE AND OBJECTIVE BOX
LENGTH OF HOSPITAL STAY: 4d    CHIEF COMPLAINT:   Patient is a 76y old  Male who presents with a chief complaint of Weakness; Worsening CKD (25 Feb 2021 15:01)      HISTORY OF PRESENTING ILLNESS:    HPI:  Mr. Don is a 76 year old male with a past medical history of CKD (recent Cr 6.6 at [facility]), Hemophilia A, HTN, Gout, and DRESS Syndrome who presents with weakness for 1 week. Mr. Don states that he was in his usual state of health (performing all ADLs) until January 1st when he had an acute gout attack. He was treated with allopurinol x 3 weeks where he developed a rash associated with decreased appetite and developed DRESS Syndrome. Subsequently, he was treated with prednisone 60 mg daily until February 7th. On February 14th, he was hospitalized at OSH for hyponatremia and RU (unclear Cr). He was discharged with nephrology follow up. Since that time, he has experienced increasing weakness and swelling in his stomach and legs. The swelling has made it difficult to ambulate due to pain. He denies any fevers/chills, nausea/vomiting, SOB or changes in bowel or urinary habits.     In the ED, he was afebrile (98.7 F), , /80, RR 18, and O2 saturation 95%. Labs were significant for WBC 28.30, Hgb 11.5, K 5.6, CO2 20  , Cr 6.59, albumin 2.7, alk phos 158, BNP 58891. CXR showed pleural effusion left greater than right with dependent edema consistent with volume overload. Imaging significant for a CT abdomen small to moderate bilateral pleural effusions, greater on the left. Mild pericardial effusion. Anasarca greater in left lower chest and trace ascites. LE dopplers negative for DVT. He was given Lasix 80 mg IV and Lokelma 10 g. He was admitted for acute on chronic renal failure and likely induction of hemodialysis.    (21 Feb 2021 03:38)    PAST MEDICAL & SURGICAL HISTORY:  PAST MEDICAL & SURGICAL HISTORY:  History of BPH    Gout    Hemophilia A    HTN (hypertension)    Uses cochlear implant      SOCIAL HISTORY:    ALLERGIES:  No Known Allergies    MEDICATIONS:  STANDING MEDICATIONS  ceFAZolin   IVPB 1000 milliGRAM(s) IV Intermittent every 24 hours  doxazosin 4 milliGRAM(s) Oral daily  furosemide   Injectable 80 milliGRAM(s) IV Push every 12 hours  midodrine. 10 milliGRAM(s) Oral every 8 hours  pantoprazole    Tablet 40 milliGRAM(s) Oral before breakfast  polyethylene glycol 3350 17 Gram(s) Oral daily  PPD  5 Tuberculin Unit(s) Injectable 5 Unit(s) IntraDermal once  predniSONE   Tablet 60 milliGRAM(s) Oral daily  senna 2 Tablet(s) Oral at bedtime  sevelamer carbonate 800 milliGRAM(s) Oral three times a day with meals    PRN MEDICATIONS    VITALS:   T(F): 97.5  HR: 106  BP: 116/65  RR: 20  SpO2: 91%    LABS:                        10.3   17.17 )-----------( 19       ( 25 Feb 2021 13:24 )             31.6     02-25    136  |  96  |  80<H>  ----------------------------<  139<H>  4.3   |  27  |  4.09<H>    Ca    8.4      25 Feb 2021 07:23  Phos  5.8     02-25  Mg     2.2     02-25    TPro  4.9<L>  /  Alb  2.0<L>  /  TBili  0.6  /  DBili  x   /  AST  18  /  ALT  14  /  AlkPhos  221<H>  02-25    PT/INR - ( 25 Feb 2021 07:23 )   PT: 14.8 sec;   INR: 1.25          PTT - ( 25 Feb 2021 07:23 )  PTT:37.9 sec          Culture - Blood (collected 24 Feb 2021 21:01)  Source: .Blood Blood-Peripheral  Preliminary Report (25 Feb 2021 10:01):    No growth at 12 hours    Culture - Blood (collected 24 Feb 2021 20:23)  Source: .Blood Blood-Peripheral  Preliminary Report (25 Feb 2021 09:02):    No growth at 12 hours    Culture - Blood (collected 23 Feb 2021 13:41)  Source: .Blood Blood  Gram Stain (24 Feb 2021 19:33):    Anaerobic Bottle: Gram Positive Cocci in Clusters    Result called to and read back byUmu Loredo RN ([id number])  02/24/2021 19:32:55  Preliminary Report (25 Feb 2021 09:54):    Growth in anaerobic bottle: Staphylococcus aureus    See previous culture susceptibility    Culture - Blood (collected 22 Feb 2021 21:12)  Source: .Blood Blood  Gram Stain (23 Feb 2021 15:42):    Aerobic and Anaerobic Bottle: Gram Positive Cocci in Clusters    Result called to and read back byUmu GREEN rn  02/23/2021 15:41:47  Final Report (25 Feb 2021 10:08):    Growth in aerobic and anaerobic bottles: Staphylococcus aureus  Organism: Staphylococcus aureus (25 Feb 2021 10:08)  Organism: Staphylococcus aureus (25 Feb 2021 10:08)      CARDIAC MARKERS ( 23 Feb 2021 23:16 )  x     / 0.27 ng/mL / 86 U/L / x     / 7.6 ng/mL  CARDIAC MARKERS ( 23 Feb 2021 17:08 )  x     / 0.28 ng/mL / 153 U/L / x     / 10.3 ng/mL      RADIOLOGY:    PHYSICAL EXAM:  GEN: No acute distress  HEENT:   LUNGS: Clear to auscultation bilaterally   HEART: S1/S2 present. RRR.   ABD: Soft, non-tender, non-distended. 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 Hematologist is Dr. Grace. s/p HD catheter placement (02/22), complicated by hypotension during dialysis and MSSA bacteremia.     PAST MEDICAL & SURGICAL HISTORY:  History of BPH  Gout  Hemophilia A  HTN (hypertension)  Uses cochlear implant    ALLERGIES:  No Known Allergies    MEDICATIONS:  STANDING MEDICATIONS  ceFAZolin   IVPB 1000 milliGRAM(s) IV Intermittent every 24 hours  doxazosin 4 milliGRAM(s) Oral daily  furosemide   Injectable 80 milliGRAM(s) IV Push every 12 hours  midodrine. 10 milliGRAM(s) Oral every 8 hours  pantoprazole    Tablet 40 milliGRAM(s) Oral before breakfast  polyethylene glycol 3350 17 Gram(s) Oral daily  PPD  5 Tuberculin Unit(s) Injectable 5 Unit(s) IntraDermal once  predniSONE   Tablet 60 milliGRAM(s) Oral daily  senna 2 Tablet(s) Oral at bedtime  sevelamer carbonate 800 milliGRAM(s) Oral three times a day with meals    PRN MEDICATIONS    VITALS:   T(F): 97.5  HR: 106  BP: 116/65  RR: 20  SpO2: 91%    LABS:                        10.3   17.17 )-----------( 19       ( 25 Feb 2021 13:24 )             31.6     02-25    136  |  96  |  80<H>  ----------------------------<  139<H>  4.3   |  27  |  4.09<H>    Ca    8.4      25 Feb 2021 07:23  Phos  5.8     02-25  Mg     2.2     02-25    TPro  4.9<L>  /  Alb  2.0<L>  /  TBili  0.6  /  DBili  x   /  AST  18  /  ALT  14  /  AlkPhos  221<H>  02-25    PT/INR - ( 25 Feb 2021 07:23 )   PT: 14.8 sec;   INR: 1.25       PTT - ( 25 Feb 2021 07:23 )  PTT:37.9 sec    Culture - Blood (collected 24 Feb 2021 21:01)  Source: .Blood Blood-Peripheral  Preliminary Report (25 Feb 2021 10:01):    No growth at 12 hours    Culture - Blood (collected 24 Feb 2021 20:23)  Source: .Blood Blood-Peripheral  Preliminary Report (25 Feb 2021 09:02):    No growth at 12 hours    Culture - Blood (collected 23 Feb 2021 13:41)  Source: .Blood Blood  Gram Stain (24 Feb 2021 19:33):    Anaerobic Bottle: Gram Positive Cocci in Clusters    Result called to and read back by_ NURIA Loredo RN ([id number])  02/24/2021 19:32:55  Preliminary Report (25 Feb 2021 09:54):    Growth in anaerobic bottle: Staphylococcus aureus    See previous culture susceptibility    Culture - Blood (collected 22 Feb 2021 21:12)  Source: .Blood Blood  Gram Stain (23 Feb 2021 15:42):    Aerobic and Anaerobic Bottle: Gram Positive Cocci in Clusters    Result called to and read back by_ Nnamdi GREEN rn  02/23/2021 15:41:47  Final Report (25 Feb 2021 10:08):    Growth in aerobic and anaerobic bottles: Staphylococcus aureus  Organism: Staphylococcus aureus (25 Feb 2021 10:08)  Organism: Staphylococcus aureus (25 Feb 2021 10:08)    CARDIAC MARKERS ( 23 Feb 2021 23:16 )  x     / 0.27 ng/mL / 86 U/L / x     / 7.6 ng/mL  CARDIAC MARKERS ( 23 Feb 2021 17:08 )  x     / 0.28 ng/mL / 153 U/L / x     / 10.3 ng/mL    RADIOLOGY:  Reviewed    PHYSICAL EXAM:  GEN: No acute distress  HEENT: NCAT  LUNGS: Clear to auscultation bilaterally   HEART: S1/S2 present. RRR.   ABD: Soft, non-tender, non-distended. Bowel sounds present  EXT: 2+ pitting edema  NEURO: AAOX3

## 2021-02-25 NOTE — PROGRESS NOTE ADULT - PROBLEM SELECTOR PLAN 2
#Tachycardia/ Afib w/RVR   - HR constantly in 110s per daughter  - Sinus tachycardia on EKG   - went into afib with RVR on 2/23--> transferred to MICU  - s/p cardizem 5mg, afib likely 2/2 uremia  - Likely 2/2 uremic pericarditis   PLAN   - continue to monitor HR, no longer in afib w/ RVR   - has remained in sinus while on MICU

## 2021-02-25 NOTE — PROGRESS NOTE ADULT - PROBLEM SELECTOR PLAN 6
- Slightly distended abdomen on physical exam and CT showing ascitic fluid in abdomen  - Likely 2/2 acute renal failure and fluid shift  - Continue diuresis as above.

## 2021-02-25 NOTE — PROGRESS NOTE ADULT - SUBJECTIVE AND OBJECTIVE BOX
Patient was seen and evaluated again on hemodialysis.      HR low 100's. BP ~100's systolic. Will order albumin and reduce UF goal to 0.5L as tolerated.     Dialyzer: Optiflux [id number]         QB: 300 mL/min as tolerated by catheter   QD: 500 mL/min      K bath: 3  Goal UF: 0.5L                Duration: 180 min     Patient is tolerating the procedure well.   Continue full hemodialysis treatment as prescribed.

## 2021-02-25 NOTE — PROGRESS NOTE ADULT - SUBJECTIVE AND OBJECTIVE BOX
infectious diseases progress note:  YAIR FELDMAN is a 76yMale patient    O/N events: As patient tolerated HD yesterday and is normotensive with no pressor requirements patient was stable enough to transfer from MICU to [facility].     Interval history:  Patient seen and examined at bedside. Patient states he is feeling okay. He endorses fatigue, mildly decreased appetite and discomfort from laying in bed. Patient denies fevers, chills, change in vision, HA, lightheadedness, CP, SOB, cough, N/V, abdominal pain, back pain.    ACUTE KIDNEY INJURY      Bacteremia    Acute renal failure    Metabolic acidosis    DRESS syndrome    Acute kidney injury    Nutrition, metabolism, and development symptoms    Hemophilia A    History of BPH    Gout    HTN (hypertension)    Other ascites    Pericardial effusion    Hyperkalemia    Acute renal failure superimposed on stage 5 chronic kidney disease, not on chronic dialysis, unspecified acute renal failure type    ROS:  CONSTITUTIONAL:  Negative fever or chills  EYES:  Negative  blurry vision or double vision  CARDIOVASCULAR:  Negative for chest pain or palpitations  RESPIRATORY:  Negative for cough, dyspnea, or wheezing  GASTROINTESTINAL:  Negative for nausea, vomiting, diarrhea, constipation, or abdominal pain  GENITOURINARY:  Negative frequency, urgency or dysuria  NEUROLOGIC:  No headache, confusion, dizziness, lightheadedness    Allergies    No Known Allergies    Intolerances        ANTIBIOTICS/RELEVANT:  antimicrobials  ceFAZolin   IVPB 1000 milliGRAM(s) IV Intermittent every 24 hours    immunologic:  PPD  5 Tuberculin Unit(s) Injectable 5 Unit(s) IntraDermal once    OTHER:  diltiazem Injectable 5 milliGRAM(s) IV Push once  doxazosin 4 milliGRAM(s) Oral daily  heparin   Injectable 5000 Unit(s) SubCutaneous every 8 hours  midodrine. 5 milliGRAM(s) Oral every 8 hours  polyethylene glycol 3350 17 Gram(s) Oral daily  predniSONE   Tablet 60 milliGRAM(s) Oral daily  senna 2 Tablet(s) Oral at bedtime  sevelamer carbonate 800 milliGRAM(s) Oral three times a day with meals  sodium bicarbonate 650 milliGRAM(s) Oral two times a day      Objective:  Vital Signs Last 24 Hrs  T(C): 36.8 (2021 04:40), Max: 37 (2021 17:03)  T(F): 98.2 (2021 04:40), Max: 98.6 (2021 17:03)  HR: 112 (2021 08:32) (100 - 112)  BP: 110/60 (2021 08:32) (98/51 - 117/64)  BP(mean): 80 (2021 08:32) (75 - 80)  RR: 17 (2021 08:32) (16 - 19)  SpO2: 93% (2021 08:32) (93% - 95%)    PHYSICAL EXAM:  General: NAD, laying comfortably in bed  Skin: Extremities have healing linear and circular scabs with no signs of warmth, purulence, drainage  HEENT: Conjunctiva clear, sclera white, PERRLA  Cardiac: S1 and S1 clear. No murmurs, rubs or gallops  Lungs: unlabored breathing on 3L NC, no accessory muscle use, vesicular b/l   Abdomen: Distended, Soft, non-tender, normoactive BS in all four quadrants  Peripheral extremities: 1+ pitting edema in lower extremities b/l, 2+ DP and PT pulses b/l   Neurological: A&Ox3           LABS:                        9.8    11.22 )-----------( 54       ( 2021 09:45 )             29.3     02-23    137  |  95<L>  |  118<H>  ----------------------------<  151<H>  4.0   |  23  |  6.79<H>    Ca    8.8      2021 07:32  Phos  8.3     02-23  Mg     2.4     02-23    TPro  5.1<L>  /  Alb  2.0<L>  /  TBili  0.7  /  DBili  x   /  AST  19  /  ALT  17  /  AlkPhos  135<H>  02-23    PT/INR - ( 2021 07:33 )   PT: 17.0 sec;   INR: 1.44          PTT - ( 2021 07:33 )  PTT:37.8 sec  Urinalysis Basic - ( 2021 14:26 )    Color: Yellow / Appearance: Clear / S.015 / pH: x  Gluc: x / Ketone: NEGATIVE  / Bili: Negative / Urobili: 0.2 E.U./dL   Blood: x / Protein: NEGATIVE mg/dL / Nitrite: NEGATIVE   Leuk Esterase: Small / RBC: < 5 /HPF / WBC 5-10 /HPF   Sq Epi: x / Non Sq Epi: 0-5 /HPF / Bacteria: Present /HPF          MICROBIOLOGY:  Culture Results:   Culture in progress ( @ 21:12)        RADIOLOGY & ADDITIONAL STUDIES:

## 2021-02-25 NOTE — PHYSICAL THERAPY INITIAL EVALUATION ADULT - PERTINENT HX OF CURRENT PROBLEM, REHAB EVAL
76M with a PMH of CKD (recent Cr 6.6 at [facility]), Hemophilia A, HTN, Gout, and DRESS Syndrome who pres with weakness for 1 wk. Jan 1st he had an acute gout attack. He was treated w/ allopurinol x 3 weeks where he developed a rash associated with dec appetite and developed DRESS Syndrome.Feb 14th, he was hospitalized at OSH for hyponatremia and RU (unclear Cr). He was discharged with nephrology follow up. Since, has experienced increasing weakness and swelling in his stomach and legs.

## 2021-02-25 NOTE — PROGRESS NOTE ADULT - ASSESSMENT
76 year old male with a past medical history of CKD (recent Cr 6.6 at [facility]), Hemophilia A, HTN, Gout, and DRESS Syndrome who presents with weakness for 1 week. Cardiology consult for volume overload and elevated troponins. 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     AF with RVR: No history of AF. Likley driven by pericarditis, which is likely from uremia. [id number] but pt with Hemophilia A. Currently in NSR/ST.   -Would not recommend starting anticoagulation.  -Can trial Cardizem 5mg IVP for rate control, though until uremia resolved, will be difficult to control.  -Continue Midodrine 10mg po TID.  -Continue with HD to correct uremia.     Volume Overload: Patient with anasarca in setting of RU on CKD. Still making urine. EKG with no ischemic changes. Echo shows normal EF, no diastolic dysfunction. Likely secondary to renal failure rather than cardiogenic cause.   -Continue with Lasix 80mg IVP BID.   -Appreciate renal recs.   -HD for uremia and also volume.   -STrict I&Os.  -Daily weights.  -Normal EF, no need for GDMT.     Troponemia: Trop T 0.15, flat in setting of RU on CKD. No ischemic changes.   -Likely due to demand and renal failure.  -No need to continue to trend.   -If patient has CP, can check EKG and cardiac enzymes.     Uremic pericarditis: diffuse YURIDIA in setting of elevated BUN.   -HD to correct uremia.  -No need to trend troponins.  -Daily EKG      Discussed with attending and primary team.    76 year old male with a past medical history of CKD (recent Cr 6.6 at [facility]), Hemophilia A, HTN, Gout, and DRESS Syndrome who presents with weakness for 1 week. Cardiology consult for volume overload and elevated troponins. 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     AF with RVR: No history of AF. Likley driven by pericarditis, which is likely from uremia. [id number] but pt with Hemophilia A. ]  -Currently in NSR/Sinus tach   -Would not recommend starting anticoagulation.  -Continue Midodrine 10mg po TID.  -Continue with HD to correct uremia.     Volume Overload: Patient with anasarca in setting of RU on CKD. Still making urine. EKG with no ischemic changes. Echo shows normal EF, no diastolic dysfunction. Likely secondary to renal failure rather than cardiogenic cause.   - -2L last 24 hours, still with fluid overload. Continue with Lasix 80mg IVP BID.   -Appreciate renal recs.   -HD for uremia and also volume.   -Strict I&Os.  -Daily weights.  -Normal EF, no need for GDMT.     Troponemia: Trop T 0.15, flat in setting of RU on CKD. No ischemic changes.   -Likely due to demand and renal failure.  -No need to continue to trend.   -If patient has CP, can check EKG and cardiac enzymes.     Uremic pericarditis: diffuse YURIDIA in setting of elevated BUN.   -HD to correct uremia.  -No need to trend troponins.  -Daily EKG      Discussed with attending and primary team.     Radha Bliss MD  Cardiology consult attending

## 2021-02-25 NOTE — PROGRESS NOTE ADULT - PROBLEM SELECTOR PLAN 10
E: Lokelma for K >5.5;  N: Renal diet  DVT: SCDs, no AC given thrombocytopenia and hemophilia   GI: none.  Dispo: [facility]

## 2021-02-25 NOTE — PROGRESS NOTE ADULT - ASSESSMENT
77 yo M with PMHx of CKD (recent Cr 6.6 at [facility]), Hemophilia A, HTN, Gout, BPH and DRESS Syndrome (on Prednisone 60mg QD) consulted to ID for MSSA bacteremia. Patient currently on cefazolin 1g q 24hrs which was chosen over Nafcillin in the setting of severe fluid overload since Cefazolin is administered with less fluid. Source of MSSA unknown. Possible source from skin as patient is immunocompromised from chronic steroids and broke skin barrier scratching the DRESS syndrome rash which created multiple lesions. Unlikely lung source since no cough, sputum production and CXR does not show any lung field opacities. Prior SOB and need for 3L NC likely due to pleural effusion noted on CXR in setting of severe fluid overload secondary to CKD. Weakly positive UA, however unlikely  source as patient does not have urinary symptoms and staph aureus usually does not commonly arise from urinary sources. Patient afebrile with WBC 14.56 (2/25) which is approximately stable from yesterday.  TTE (2/22) showed no valvular disease. Surveillance blood culture from 2/22 and 2/23 grew MSSA. Surveillance blood cultures from 2/24 have no growth at 12 hours.     Plan:  - C/w cefazolin 1g IV q 24hrs   - Surveillance blood cultures on 2/25  - GUANAKO - since surveillance blood cultures from 2/22 and 2/23 is growing MSSA after two doses of cefazolin and staph aureus is prone to seeding on heart valves    - F/u blood cultures 2/24  - Recommend HD permacath after 72 hours of negative blood cultures to prevent seeding of MSSA     ID Team 1 will follow.    Discussed with Infectious Disease Attending Dr. Edgar. Recommendations are considered final after attending attestation.   77 yo M with PMHx of CKD (recent Cr 6.6 at [facility]), Hemophilia A, HTN, Gout, BPH and DRESS Syndrome (on Prednisone 60mg QD) consulted to ID for MSSA bacteremia. Patient currently on cefazolin 1g q 24hrs which was chosen over Nafcillin in the setting of severe fluid overload since Cefazolin is administered with less fluid. Source of MSSA unknown. Possible source from skin as patient is immunocompromised from chronic steroids and broke skin barrier scratching the DRESS syndrome rash which created multiple lesions. Unlikely lung source since no cough, sputum production and CXR does not show any lung field opacities. Prior SOB and need for 3L NC likely due to pleural effusion noted on CXR in setting of severe fluid overload secondary to CKD. Weakly positive UA, however unlikely  source as patient does not have urinary symptoms and staph aureus usually does not commonly arise from urinary sources. Patient afebrile with WBC 14.56 (2/25) which is approximately stable from yesterday.  TTE (2/22) showed no valvular disease. Surveillance blood culture from 2/22 and 2/23 grew MSSA. Surveillance blood cultures from 2/24 have no growth at 12 hours.     Plan:  - C/w cefazolin 1g IV q 24hrs   - Surveillance blood cultures on 2/25   - GUANAKO - since surveillance blood cultures from 2/22 and 2/23 are growing MSSA and staph aureus is prone to seeding on heart valves. If GUANAKO results do not show any signs of vegetation a gallium scan may be warranted due to 3 days of positive blood cultures   - F/u blood cultures 2/24 - If blood culture grows MSSA may need to consider switching Cefazolin to Nafcillin as it is a more efficacious against MSSA   - Recommend HD permacath after 72 hours of negative blood cultures to prevent seeding of MSSA     ID Team 1 will follow.    Discussed with Infectious Disease Attending Dr. Edgar. Recommendations are considered final after attending attestation.   77 yo M with PMHx of CKD (recent Cr 6.6 at [facility]), Hemophilia A, HTN, Gout, BPH and DRESS Syndrome (on Prednisone 60mg QD) consulted to ID for MSSA bacteremia. Patient currently on cefazolin 1g q 24hrs which was chosen over Nafcillin in the setting of severe fluid overload since Cefazolin is administered with less fluid. Source of MSSA unknown. Possible source from skin as patient is immunocompromised from chronic steroids and broke skin barrier scratching the DRESS syndrome rash which created multiple lesions. Unlikely lung source since no cough, sputum production and CXR does not show any lung field opacities. Prior SOB and need for 3L NC likely due to pleural effusion noted on CXR in setting of severe fluid overload secondary to CKD. Weakly positive UA, however unlikely  source as patient does not have urinary symptoms and staph aureus usually does not commonly arise from urinary sources. Patient afebrile with WBC 14.56 (2/25) which is approximately stable from yesterday.  TTE (2/22) showed no valvular disease. Surveillance blood culture from 2/22 and 2/23 grew MSSA. Surveillance blood cultures from 2/24 have no growth at 12 hours.     Plan:  - C/w cefazolin 1g IV q 24hrs   - Surveillance blood cultures on 2/25   - GUANAKO - since surveillance blood cultures from 2/22 and 2/23 are growing MSSA and staph aureus is prone to seeding on heart valves. If GUANAKO results do not show any signs of vegetation a gallium scan may be warranted due to 3 days of positive blood cultures   - F/u blood cultures 2/24 - If blood culture grows MSSA may need to consider switching Cefazolin to Nafcillin as it is a more efficacious against MSSA   - Recommend HD permacath after 72 hours of negative blood cultures to prevent seeding of MSSA. Okay for temporary catheter exchange if needed in interim.    ID Team 1 will follow.    Discussed with Infectious Disease Attending Dr. Edgar. Recommendations are considered final after attending attestation.

## 2021-02-25 NOTE — PROGRESS NOTE ADULT - PROBLEM SELECTOR PLAN 8
Acute renal failure with anasarca.  Patient with no history of CKD prior to admission. 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 Creatine worsened with deranged electrolytes after being diagnosed with Dress Syndrome.   - Dialysis 2/23, required brief pressor support  - Dialysis 2/24: tolerated well no pressors   - Factor VIII 25U/kg   PLAN   - Recheck factor VIII level daily   - PPD.   - follow up renal recs

## 2021-02-25 NOTE — PROGRESS NOTE ADULT - PROBLEM SELECTOR PLAN 5
RESOLVED  -patient hypotensive during afib w/RVR--> transferred to MICU   - likely 2/2 tachyarrhythmia elicited by uremic pericarditis   - currently off of pressors  - mentating at baseline, WWP   - monitor UOP and Hemodynamics

## 2021-02-25 NOTE — PROGRESS NOTE ADULT - PROBLEM SELECTOR PLAN 9
# Hemophilia A.    Prior history of significant bleeding in past following ERCP.   - last factor VIII: 50 this AM   -Heme following - follow recs   -Daily INR/PT/PTT and daily Factor VIII   -Factor VIII after procedure   -Follows with Dr. Grace at [facility].     #Anemia  - Hgb 11.5 on arrival with unclear baseline  - Like 2/2 acute kidney failure or dilutional due to volume overload.

## 2021-02-25 NOTE — PROGRESS NOTE ADULT - PROBLEM SELECTOR PLAN 3
I certify as stated above. #Uremic pericarditis   - BUN >100 on presentation, initiating need to HD  - EKG with diffuse ST elevated and AK depressions c/w acute pericarditis     #pericardial effusion  CT abdomen showed trace pericaridal effusion. Likely 2/2 volume overload and fluid shifts from acute kidney failure  - No signs of tamponade or any of Valentin's Triad.  - continue with HD per renal recs  - trend BMP   - repeat EKG if any chest pain

## 2021-02-25 NOTE — PROGRESS NOTE ADULT - SUBJECTIVE AND OBJECTIVE BOX
Hospital Course:     76 year old male with a past medical history of Hemophilia A, HTN, Gout, and DRESS Syndrome who presents with weakness for 1 week. Pt had an acute gout flare on January 1, treated with allopurinol for 3 weeks, c/b by development of DRESS (seen at [facility] where diagnosis was made, d/c 2/14).  While at [facility] patient had a creatinine that ranged from 4-6, however baseline is 1.1 (9/2020). After discharge from [facility] patient complained of weakness and progressive swelling of his legs and abdomen. On admission, VS notable for tachycardia to 128. Labs were significant for WBC 28.30, Hgb 11.5, K 5.6, CO2 20  , Cr 6.59, albumin 2.7, alk phos 158, BNP 28390. CXR showed pleural effusion left greater than right with dependent edema consistent with volume overload. Imaging significant for a CT abdomen small to moderate bilateral pleural effusions, greater on the left. Mild pericardial effusion. Anasarca greater in left lower chest and trace ascites. LE dopplers negative for DVT. The patient was given 80mg of IV lasix and admitted for acute renal failure with potential need of dialysis. While on [facility] nephrology was consulted who recommended IV diuresis with plans of hemodialysis on 2/22. On the morning of 2/22 blood cultures grew MSSA.  Pt was started on ancef. Echo performed 2/22 due to pericardial effusion seen on CT chest, no evidence of tamponade.  Attempted HD 2/22, however pt became hypotensive (BP unknown), and session was immediately stopped. Pt transferred to [facility] for increased monitoring during dialysis, to occur 2/23.  Around 9:20 AM 2/23, patient went into afib with RVR (no prior history of afib), rectal temp negative, EKG showed ST elevations which we felt were more pronounced compared to prior. Trop, CK, CKMB obtained (trop 0.24, CKMB 10.1, CK nml). Pt without any chest pain or complaints. Gave lopressor 5 IVP x1. BP dropped slightly, therefore gave 500cc bolus. Cardiology consulted stat- came to bedside and felt EKG mostly stable from prior and likely afib RVR was from uremia, necessitating dialysis. Gave one time dose of cardizem 5mg and started on 5 of midodrine TID. Per nephro, needs higher pressure and lower HR to tolerate dialysis, therefore transferring to ICU for further pressure support and management to facilitate dialysis. Transferred to MICU on 2/23. Patient received HD on 2/23 0.5L UF on pressors, since weaned off. He required mild pressor support with Levophed (0.03mcg/kg/min) during HD. Patient off of pressors, repeat HD on 2/24 patient did not require any pressor support- tolerated well. Midodrine was increased to 10mg TID and restarted on lasix 80IV BID- home dose and per cardiology recs (patient still hypervolemic on exam). Patient remained off pressor support and is stable for transfer to regional medical floors.     .  VITAL SIGNS:  T(C): 36.7 (02-24-21 @ 22:17), Max: 36.9 (02-24-21 @ 01:03)  T(F): 98 (02-24-21 @ 22:17), Max: 98.5 (02-24-21 @ 01:03)  HR: 98 (02-25-21 @ 00:00) (87 - 110)  BP: 125/60 (02-25-21 @ 00:00) (93/52 - 126/60)  BP(mean): 86 (02-25-21 @ 00:00) (65 - 87)  RR: 18 (02-25-21 @ 00:00) (13 - 29)  SpO2: 97% (02-25-21 @ 00:00) (94% - 100%)  Wt(kg): --    PHYSICAL EXAM:    Constitutional: NAD  HEENT: NC/AT; PERRL, anicteric sclera; MMM  Neck: supple, no JVD  Cardiovascular: +S1/S2, RRR  Respiratory: CTAB  Gastrointestinal: abdomen soft, slightly distended. NO rebound or guarding;   Genitourinary: no suprapubic tenderness or fullness  Extremities: + LE edema +3 pitting edema; no clubbing or cyanosis  Dermatologic: normal color and turgor; + maculopapular blanchable rash to chest and upper extremities   Neurological: A&Ox3. Follow commands. Hard of hearing.  Hospital Course:     76 year old male with a past medical history of Hemophilia A, HTN, Gout, and DRESS Syndrome who presents with weakness for 1 week. Pt had an acute gout flare on January 1, treated with allopurinol for 3 weeks, c/b by development of DRESS (seen at [facility] where diagnosis was made, d/c 2/14).  While at [facility] patient had a creatinine that ranged from 4-6, however baseline is 1.1 (9/2020). After discharge from [facility] patient complained of weakness and progressive swelling of his legs and abdomen. On admission, VS notable for tachycardia to 128. Labs were significant for WBC 28.30, Hgb 11.5, K 5.6, CO2 20  , Cr 6.59, albumin 2.7, alk phos 158, BNP 45741. CXR showed pleural effusion left greater than right with dependent edema consistent with volume overload. Imaging significant for a CT abdomen small to moderate bilateral pleural effusions, greater on the left. Mild pericardial effusion. Anasarca greater in left lower chest and trace ascites. LE dopplers negative for DVT. The patient was given 80mg of IV lasix and admitted for acute renal failure with potential need of dialysis. While on [facility] nephrology was consulted who recommended IV diuresis with plans of hemodialysis on 2/22. On the morning of 2/22 blood cultures grew MSSA.  Pt was started on ancef. Echo performed 2/22 due to pericardial effusion seen on CT chest, no evidence of tamponade.  Attempted HD 2/22, however pt became hypotensive (BP unknown), and session was immediately stopped. Pt transferred to [facility] for increased monitoring during dialysis, to occur 2/23.  Around 9:20 AM 2/23, patient went into afib with RVR (no prior history of afib), rectal temp negative, EKG showed ST elevations which we felt were more pronounced compared to prior. Trop, CK, CKMB obtained (trop 0.24, CKMB 10.1, CK nml). Pt without any chest pain or complaints. Gave lopressor 5 IVP x1. BP dropped slightly, therefore gave 500cc bolus. Cardiology consulted stat- came to bedside and felt EKG mostly stable from prior and likely afib RVR was from uremia, necessitating dialysis. Gave one time dose of cardizem 5mg and started on 5 of midodrine TID. Per nephro, needs higher pressure and lower HR to tolerate dialysis, therefore transferring to ICU for further pressure support and management to facilitate dialysis. Transferred to MICU on 2/23. Patient received HD on 2/23 0.5L UF on pressors, since weaned off. He required mild pressor support with Levophed (0.03mcg/kg/min) during HD. Patient off of pressors, repeat HD on 2/24 patient did not require any pressor support- tolerated well. Midodrine was increased to 10mg TID and restarted on lasix 80IV BID- home dose and per cardiology recs (patient still hypervolemic on exam). Patient remained off pressor support and is stable for transfer to [facility]    .  VITAL SIGNS:  T(C): 36.7 (02-24-21 @ 22:17), Max: 36.9 (02-24-21 @ 01:03)  T(F): 98 (02-24-21 @ 22:17), Max: 98.5 (02-24-21 @ 01:03)  HR: 98 (02-25-21 @ 00:00) (87 - 110)  BP: 125/60 (02-25-21 @ 00:00) (93/52 - 126/60)  BP(mean): 86 (02-25-21 @ 00:00) (65 - 87)  RR: 18 (02-25-21 @ 00:00) (13 - 29)  SpO2: 97% (02-25-21 @ 00:00) (94% - 100%)  Wt(kg): --    PHYSICAL EXAM:    Constitutional: NAD  HEENT: NC/AT; PERRL, anicteric sclera; MMM  Neck: supple, no JVD  Cardiovascular: +S1/S2, RRR  Respiratory: CTAB  Gastrointestinal: abdomen soft, slightly distended. NO rebound or guarding;   Genitourinary: no suprapubic tenderness or fullness  Extremities: + LE edema +3 pitting edema; no clubbing or cyanosis  Dermatologic: normal color and turgor; + maculopapular blanchable rash to chest and upper extremities   Neurological: A&Ox3. Follow commands. Hard of hearing.  Hospital Course:     76 year old male with a past medical history of Hemophilia A, HTN, Gout, and DRESS Syndrome who presents with weakness for 1 week. Pt had an acute gout flare on January 1, treated with allopurinol for 3 weeks, c/b by development of DRESS (seen at [facility] where diagnosis was made, d/c 2/14).  While at [facility] patient had a creatinine that ranged from 4-6, however baseline is 1.1 (9/2020). After discharge from [facility] patient complained of weakness and progressive swelling of his legs and abdomen. On admission, VS notable for tachycardia to 128. Labs were significant for WBC 28.30, Hgb 11.5, K 5.6, CO2 20  , Cr 6.59, albumin 2.7, alk phos 158, BNP 36607. CXR showed pleural effusion left greater than right with dependent edema consistent with volume overload. Imaging significant for a CT abdomen small to moderate bilateral pleural effusions, greater on the left. Mild pericardial effusion. Anasarca greater in left lower chest and trace ascites. LE dopplers negative for DVT. The patient was given 80mg of IV lasix and admitted for acute renal failure with potential need of dialysis. While on [facility] nephrology was consulted who recommended IV diuresis with plans of hemodialysis on 2/22. On the morning of 2/22 blood cultures grew MSSA.  Pt was started on ancef. Echo performed 2/22 due to pericardial effusion seen on CT chest, no evidence of tamponade.  Attempted HD 2/22, however pt became hypotensive (BP unknown), and session was immediately stopped. Pt transferred to [facility] for increased monitoring during dialysis, to occur 2/23.  Around 9:20 AM 2/23, patient went into afib with RVR (no prior history of afib), rectal temp negative, EKG showed ST elevations which we felt were more pronounced compared to prior. Trop, CK, CKMB obtained (trop 0.24, CKMB 10.1, CK nml). Pt without any chest pain or complaints. Gave lopressor 5 IVP x1. BP dropped slightly, therefore gave 500cc bolus. Cardiology consulted stat- came to bedside and felt EKG mostly stable from prior and likely afib RVR was from uremia, necessitating dialysis. Gave one time dose of cardizem 5mg and started on 5 of midodrine TID. Per nephro, needs higher pressure and lower HR to tolerate dialysis, therefore transferring to ICU for further pressure support and management to facilitate dialysis. Transferred to MICU on 2/23. Patient received HD on 2/23 0.5L UF on pressors, since weaned off. He required mild pressor support with Levophed (0.03mcg/kg/min) during HD. Patient off of pressors, repeat HD on 2/24 patient did not require any pressor support- tolerated well. Midodrine was increased to 10mg TID and restarted on lasix 80IV BID- home dose and per cardiology recs (patient still hypervolemic on exam). Patient remained off pressor support and is stable for transfer to [facility]    .  VITAL SIGNS:  T(C): 36.7 (02-24-21 @ 22:17), Max: 36.9 (02-24-21 @ 01:03)  T(F): 98 (02-24-21 @ 22:17), Max: 98.5 (02-24-21 @ 01:03)  HR: 98 (02-25-21 @ 00:00) (87 - 110)  BP: 125/60 (02-25-21 @ 00:00) (93/52 - 126/60)  BP(mean): 86 (02-25-21 @ 00:00) (65 - 87)  RR: 18 (02-25-21 @ 00:00) (13 - 29)  SpO2: 97% (02-25-21 @ 00:00) (94% - 100%)  Wt(kg): --    PHYSICAL EXAM:    Constitutional: NAD  HEENT: NC/AT; PERRL, anicteric sclera; MMM  Neck: supple, no JVD  Cardiovascular: +S1/S2, RRR  Respiratory: CTAB  Gastrointestinal: abdomen soft, slightly distended. NO rebound or guarding;   Genitourinary: no suprapubic tenderness or fullness  Extremities: + LE edema ; no clubbing or cyanosis  Dermatologic: normal color and turgor; + maculopapular blanchable rash to chest and upper extremities   Neurological: A&Ox3. Follow commands. Hard of hearing.

## 2021-02-25 NOTE — PROGRESS NOTE ADULT - PROBLEM SELECTOR PLAN 8
Acute renal failure with anasarca.  Patient with no history of CKD prior to admission. 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 Creatine worsened with deranged electrolytes after being diagnosed with Dress Syndrome.   - Dialysis 2/23, required brief pressor support  - Dialysis 2/24 and 2/25: tolerated well no pressors   - Factor VIII 25U/kg   PLAN   - Recheck factor VIII level daily   - PPD.   - follow up renal recs

## 2021-02-25 NOTE — PROGRESS NOTE ADULT - PROBLEM SELECTOR PLAN 1
# MSSA Bacteremia.  - source unknown however possibly 2/2 skin infection given pruritic rash   Patient growing MSSA, blood cx positive on 2/21     #Leukocytosis  - Chronic due to prolonged high dose Prednisone use for Dress Syndrome  - unclear source   - started on ANCEF at 3AM on 2/22.   - Repeat Blood cultures 2/23, NGTD  PLAN   - continue with ancef 1g q24hr  - ID following   - will need GUANAKO to evaluate for endocarditis given +staph bacteremia and unknown source  - will need heme recs prior to any procedures including GUANAKO due to hemophilia

## 2021-02-25 NOTE — PROGRESS NOTE ADULT - SUBJECTIVE AND OBJECTIVE BOX
Patient is a 76y Male seen and evaluated at bedside.   ~1.4L/24h UOP   1L UF w/HD yesterday, didn't require pressor  no SOB CP, fever, abdominal pain  BP noted, acceptable  plan for hemodialysis again today    Meds:    albumin human 25% IVPB 50 every 1 hour  ceFAZolin   IVPB 1000 every 24 hours  doxazosin 4 daily  furosemide   Injectable 80 every 12 hours  midodrine. 10 every 8 hours  pantoprazole    Tablet 40 before breakfast  polyethylene glycol 3350 17 daily  PPD  5 Tuberculin Unit(s) Injectable 5 once  predniSONE   Tablet 60 daily  senna 2 at bedtime  sevelamer carbonate 800 three times a day with meals      T(C): , Max: 36.9 (02-25-21 @ 02:00)  T(F): , Max: 98.5 (02-25-21 @ 02:00)  HR: 102 (02-25-21 @ 10:20)  BP: 125/58 (02-25-21 @ 10:20)  BP(mean): 84 (02-25-21 @ 10:20)  RR: 20 (02-25-21 @ 10:20)  SpO2: 95% (02-25-21 @ 10:20)  Wt(kg): --    02-24 @ 07:01  -  02-25 @ 07:00  --------------------------------------------------------  IN: 1090 mL / OUT: 3100 mL / NET: -2010 mL    02-25 @ 07:01  -  02-25 @ 12:16  --------------------------------------------------------  IN: 0 mL / OUT: 50 mL / NET: -50 mL          ROS:  no SOB CP abdominal pain nausea or fever     PHYSICAL EXAM:  Constitutional: No acute distress on 3L O2 NC   Respiratory: Clear with reduced BS bases  Cardiovascular: S1, S2.  Regular rate and rhythm.    Gastrointestinal: soft, non-tender, mildly distended  Extremities: +1 non-pitting lower extremity edema, slowly improving   Access: +R fem temp catheter c/d/i non-tender       LABS:                        10.2   14.56 )-----------( 34       ( 25 Feb 2021 07:23 )             31.2     02-25    136  |  96  |  80<H>  ----------------------------<  139<H>  4.3   |  27  |  4.09<H>    Ca    8.4      25 Feb 2021 07:23  Phos  5.8     02-25  Mg     2.2     02-25    TPro  4.9<L>  /  Alb  2.0<L>  /  TBili  0.6  /  DBili  x   /  AST  18  /  ALT  14  /  AlkPhos  221<H>  02-25      PT/INR - ( 25 Feb 2021 07:23 )   PT: 14.8 sec;   INR: 1.25          PTT - ( 25 Feb 2021 07:23 )  PTT:37.9 sec          RADIOLOGY & ADDITIONAL STUDIES:

## 2021-02-25 NOTE — PROGRESS NOTE ADULT - PROBLEM SELECTOR PLAN 4
- holding home Norvasc of 5 mg daily i/s/o hypotension   -c/w lasix 80IV BID    #Dress Syndrome  - Currently taking Prednisone 60 mg with improvement in rash   - Continue Prednisone 60 mg daily

## 2021-02-25 NOTE — PROGRESS NOTE ADULT - ATTENDING COMMENTS
See fellow note written above for details. I reviewed the fellow documentation. I have personally seen and examined this patient. I reviewed vitals, labs, medications, cardiac studies, and additional imaging. I agree with the above fellow's findings and plans as written above See fellow note written above for details. I reviewed the fellow documentation. I have personally seen and examined this patient. I reviewed vitals, labs, cardiac studies, and additional imaging. I agree with the above fellow's findings and plans as written above

## 2021-02-25 NOTE — PROGRESS NOTE ADULT - ASSESSMENT
Mr. Don is a 76 year old male with a past medical history Hemophilia A, HTN, Gout, and DRESS Syndrome who presents with weakness for 1 week. He was admitted for acute on chronic renal failure c/b anasarca requiring induction of hemodialysis. Transferred to MICU on 2/23 d/t afib with RVR and hypotension likely 2/2 uremic pericarditis, transferred to MICU for closer monitoring and pressure support while undergoing dialysis, now hemodynamically stable, on midodrine, and stepped down to tele, tolerating 2x sessions of dialysis off of pressors

## 2021-02-25 NOTE — PROGRESS NOTE ADULT - ATTENDING COMMENTS
tolerated HD adequately yesterday-   able to remove 1 liter and UO increased to 1.4 L  edema improving  no SOB repeat HD today for additional clearances and UF  seen and evaluated while on dialysis  tolerating the procedure well  continue full treatment as prescribed  to reassess in AM- will decide repeat HD 2/26 versus 2/27  anticipate that he will need tunneled HD catheter once cleared of bacteremia and coordinated with heme and IR  still chance he will regain kidney function tolerated HD adequately yesterday-   able to remove 1 liter and UO increased to 1.4 L  edema improving  no SOB repeat HD today for additional clearances and UF  seen and evaluated while on dialysis  tolerating the procedure well  continue full treatment as prescribed  note further drop in platelet count- no evidence of hemolysis- so TTP or HUS unlikely  to review with heme  will change dialyzer membrane, but typical dialyzer reactions do not yield such profound thrombocytopenia  to reassess in AM- will decide repeat HD 2/26 versus 2/27  anticipate that he will need tunneled HD catheter once cleared of bacteremia and coordinated with heme and IR  still chance he will regain kidney function

## 2021-02-25 NOTE — PROGRESS NOTE ADULT - ASSESSMENT
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 Hematologist is Dr. Grace. s/p HD catheter placement (02/22), complicated by hypotension during dialysis and MSSA bacteremia.     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 Factor VIII post-procedure was 78-83% which is acceptable. No significant bleeding observed by primary team.    - Will need to follow-up with Factor VIII once daily due to possible procedures.  - Will need Factor VIII > 50% for planned GUANAKO, preferably around 80%, due to history of bleeding with prior procedures when his factor deficiency was not managed prior. If planning for GUANAKO, give 2000 U of recombinant Factor VIII.   - Maintain active T+S, transfuse if Hb < 7 or symptomatic   - Follow-up CBC and coagulation panel (PT/PTT) once daily  - Upon discharge, can follow-up with Dr. Grace (Hematologist)    #) Mild coagulopathy, possible inhibitor. Factor VII deficiency   - Mixing study (02/21) performed for mild coagulopathy showed possible presence of a delayed inhibitor due to lack of correction with 2 hr incubation. Follow-up inhibitor assay.     #) Worsening thrombocytopenia, possibly sepsis-related and dialysis membrane effect   - Peripheral blood smear (02/23) showed platelet clumping and no schistocytes. PLASMIC score is 4 low-risk for TTP. HIT score is 3-4, low-intermediate risk. PFT-HIT Ab negative. Pending SORAYA.   - Trend CBC with differential once daily  - Maintain active T+S, transfuse if platelet < 10K, or < 20K if febrile or <50K if bleeding.   - Hold pharmacologic DVT ppx if platelets consistently < 50 K  - Per Cardiology, target platelet is 50K< for GUANAKO. Because his platelets are 19K/uL, please give 1 U platelet and repeat in AM. If platelet not yet at target, then can infuse another unit of platelets in the AM prior to procedure.      Discussed with Dr. Tolbert

## 2021-02-25 NOTE — PROGRESS NOTE ADULT - ATTENDING COMMENTS
called to see patient while on dialysis for low BP-  NAP  added albumin to sequester interstitial fluid  reduced UF goal as note above  okay to c/w HD treatment

## 2021-02-25 NOTE — PROGRESS NOTE ADULT - ASSESSMENT
Mr. Don is a 76 year old male with a past medical history Hemophilia A, HTN, Gout, and DRESS Syndrome who presents with weakness for 1 week. He was admitted for acute on chronic renal failure c/b anasarca requiring induction of hemodialysis. Transferred to MICU on 2/23 d/t afib with RVR and hypotension likely 2/2 uremic pericarditis, transferred to MICU for closer monitoring and pressure support while undergoing dialysis, now hemodynamically stable, on midodrine, and being monitored on telemetry

## 2021-02-25 NOTE — CONSULT NOTE ADULT - ASSESSMENT
per Internal Medicine    76 year old male with a past medical history Hemophilia A, HTN, Gout, and DRESS Syndrome who presents with weakness for 1 week. He was admitted for acute on chronic renal failure c/b anasarca requiring induction of hemodialysis. Transferred to MICU on 2/23 d/t afib with RVR and hypotension likely 2/2 uremic pericarditis, transferred to MICU for closer monitoring and pressure support while undergoing dialysis, now hemodynamically stable, on midodrine, and being monitored on telemetry       Problem/Plan - 1:  ·  Problem: Bacteremia.  Plan: # MSSA Bacteremia.  - source unknown however possibly 2/2 skin infection given pruritic rash   Patient growing MSSA, blood cx positive on 2/21     #Leukocytosis  - Chronic due to prolonged high dose Prednisone use for Dress Syndrome  - unclear source   - started on ANCEF at 3AM on 2/22.   - Repeat Blood cultures 2/23, NGTD  PLAN   - continue with ancef 1g q24hr  - ID following   - will need GUANAKO to evaluate for endocarditis given +staph bacteremia and unknown source  - will need heme recs prior to any procedures including GUANAKO due to hemophilia.     Problem/Plan - 2:  ·  Problem: Tachycardia.  Plan: #Tachycardia/ Afib w/RVR   - HR constantly in 110s per daughter  - Sinus tachycardia on EKG   - went into afib with RVR on 2/23--> transferred to MICU  - s/p cardizem 5mg, afib likely 2/2 uremia  - Likely 2/2 uremic pericarditis   PLAN   - continue to monitor HR, no longer in afib w/ RVR   - has remained in sinus while on MICU.     Problem/Plan - 3:  ·  Problem: Pericarditis.  Plan: #Uremic pericarditis   - BUN >100 on presentation, initiating need to HD  - EKG with diffuse ST elevated and IL depressions c/w acute pericarditis     #pericardial effusion  CT abdomen showed trace pericaridal effusion. Likely 2/2 volume overload and fluid shifts from acute kidney failure  - No signs of tamponade or any of Valentin's Triad.  - continue with HD per renal recs  - trend BMP   - repeat EKG if any chest pain.     Problem/Plan - 4:  ·  Problem: HTN (hypertension).  Plan: - holding home Norvasc of 5 mg daily i/s/o hypotension   -c/w lasix 80IV BID    #Dress Syndrome  - Currently taking Prednisone 60 mg with improvement in rash   - Continue Prednisone 60 mg daily.     Problem/Plan - 5:  ·  Problem: Shock.  Plan: RESOLVED  -patient hypotensive during afib w/RVR--> transferred to MICU   - likely 2/2 tachyarrhythmia elicited by uremic pericarditis   - currently off of pressors  - mentating at baseline, WWP   - monitor UOP and Hemodynamics.     Problem/Plan - 6:  Problem: Ascites. Plan: - Slightly distended abdomen on physical exam and CT showing ascitic fluid in abdomen  - Likely 2/2 acute renal failure and fluid shift  - Continue diuresis as above.    Problem/Plan - 7:  ·  Problem: BPH (benign prostatic hyperplasia).  Plan: #History of BPH.  Plan: Continue Doxazosin daily  - Monitor UOP and Bladder scan for any concerns of retention.    #gout  Previously on allopurinol for gout prophylaxis; however, patient acquired DRESS Syndrome from the therapy  - Monitor symptoms and avoid NSAIDs for pain.     Problem/Plan - 8:  ·  Problem: Acute renal failure.  Plan: Acute renal failure with anasarca.  Patient with no history of CKD prior to admission. 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 Creatine worsened with deranged electrolytes after being diagnosed with Dress Syndrome.   - Dialysis 2/23, required brief pressor support  - Dialysis 2/24: tolerated well no pressors   - Factor VIII 25U/kg   PLAN   - Recheck factor VIII level daily   - PPD.   - follow up renal recs.     Problem/Plan - 9:  ·  Problem: Hemophilia A.  Plan: # Hemophilia A.    Prior history of significant bleeding in past following ERCP.   - last factor VIII: 50 this AM   -Heme following - follow recs   -Daily INR/PT/PTT and daily Factor VIII   -Factor VIII after procedure   -Follows with Dr. Grace at [facility].     #Anemia  - Hgb 11.5 on arrival with unclear baseline  - Like 2/2 acute kidney failure or dilutional due to volume overload.     Problem/Plan - 10:  Problem: Nutrition, metabolism, and development symptoms. Plan; E: Lokelma for K >5.5;  N: Renal diet  DVT: SCDs, no AC given thrombocytopenia and hemophilia   GI: none.  Dispo: [facility].

## 2021-02-25 NOTE — PROGRESS NOTE ADULT - PROBLEM SELECTOR PLAN 1
# MSSA Bacteremia.  - source unknown however possibly 2/2 skin infection given pruritic rash   - cultures from 2/23 still positive, if from 2/24 also positive will need repeat cultures tonight  - started on ANCEF at 3AM on 2/22.   - continue with ancef 1g q24hr  - ID following   - will need GUANAKO to evaluate for endocarditis given +staph bacteremia and unknown source  - will need heme recs prior to any procedures including GUANAKO due to hemophilia; currently plan to order factor 8 levels and platelets and will f/u with cards and heme to see if necessary to give factor 8 and platelets  - will need to replace HD femoral cath by tomorrow, 2/26

## 2021-02-25 NOTE — PROGRESS NOTE ADULT - PROBLEM SELECTOR PLAN 9
# Hemophilia A.    Prior history of significant bleeding in past following ERCP.   -Heme following - follow recs   -Daily INR/PT/PTT and daily Factor VIII   -Factor VIII after procedure   -Follows with Dr. Grace at [facility].     #Anemia  - Hgb 11.5 on arrival with unclear baseline  - Like 2/2 acute kidney failure or dilutional due to volume overload.

## 2021-02-25 NOTE — CONSULT NOTE ADULT - SUBJECTIVE AND OBJECTIVE BOX
Patient is a 76y old  Male who presents with a chief complaint of Weakness; Worsening CKD (25 Feb 2021 00:56)       HPI:  Mr. Don is a 76 year old male with a past medical history of CKD (recent Cr 6.6 at [facility]), Hemophilia A, HTN, Gout, and DRESS Syndrome who presents with weakness for 1 week. Mr. Don states that he was in his usual state of health (performing all ADLs) until January 1st when he had an acute gout attack. He was treated with allopurinol x 3 weeks where he developed a rash associated with decreased appetite and developed DRESS Syndrome. Subsequently, he was treated with prednisone 60 mg daily until February 7th. On February 14th, he was hospitalized at OSH for hyponatremia and RU (unclear Cr). He was discharged with nephrology follow up. Since that time, he has experienced increasing weakness and swelling in his stomach and legs. The swelling has made it difficult to ambulate due to pain. He denies any fevers/chills, nausea/vomiting, SOB or changes in bowel or urinary habits.     In the ED, he was afebrile (98.7 F), , /80, RR 18, and O2 saturation 95%. Labs were significant for WBC 28.30, Hgb 11.5, K 5.6, CO2 20  , Cr 6.59, albumin 2.7, alk phos 158, BNP 54296. CXR showed pleural effusion left greater than right with dependent edema consistent with volume overload. Imaging significant for a CT abdomen small to moderate bilateral pleural effusions, greater on the left. Mild pericardial effusion. Anasarca greater in left lower chest and trace ascites. LE dopplers negative for DVT. He was given Lasix 80 mg IV and Lokelma 10 g. He was admitted for acute on chronic renal failure and likely induction of hemodialysis.    (21 Feb 2021 03:38)      PAST MEDICAL & SURGICAL HISTORY:  History of BPH    Gout    Hemophilia A    HTN (hypertension)    Uses cochlear implant        MEDICATIONS  (STANDING):  ceFAZolin   IVPB 1000 milliGRAM(s) IV Intermittent every 24 hours  doxazosin 4 milliGRAM(s) Oral daily  furosemide   Injectable 80 milliGRAM(s) IV Push every 12 hours  midodrine. 10 milliGRAM(s) Oral every 8 hours  pantoprazole    Tablet 40 milliGRAM(s) Oral before breakfast  polyethylene glycol 3350 17 Gram(s) Oral daily  PPD  5 Tuberculin Unit(s) Injectable 5 Unit(s) IntraDermal once  predniSONE   Tablet 60 milliGRAM(s) Oral daily  senna 2 Tablet(s) Oral at bedtime  sevelamer carbonate 800 milliGRAM(s) Oral three times a day with meals    MEDICATIONS  (PRN):      FAMILY HISTORY:  FH: HTN (hypertension)        CBC Full  -  ( 25 Feb 2021 07:23 )  WBC Count : 14.56 K/uL  RBC Count : 3.68 M/uL  Hemoglobin : 10.2 g/dL  Hematocrit : 31.2 %  Platelet Count - Automated : 34 K/uL  Mean Cell Volume : 84.8 fl  Mean Cell Hemoglobin : 27.7 pg  Mean Cell Hemoglobin Concentration : 32.7 gm/dL  Auto Neutrophil # : 13.61 K/uL  Auto Lymphocyte # : 0.39 K/uL  Auto Monocyte # : 0.38 K/uL  Auto Eosinophil # : 0.01 K/uL  Auto Basophil # : 0.03 K/uL  Auto Neutrophil % : 93.4 %  Auto Lymphocyte % : 2.7 %  Auto Monocyte % : 2.6 %  Auto Eosinophil % : 0.1 %  Auto Basophil % : 0.2 %      02-25    136  |  96  |  80<H>  ----------------------------<  139<H>  4.3   |  27  |  4.09<H>    Ca    8.4      25 Feb 2021 07:23  Phos  5.8     02-25  Mg     2.2     02-25    TPro  4.9<L>  /  Alb  2.0<L>  /  TBili  0.6  /  DBili  x   /  AST  18  /  ALT  14  /  AlkPhos  221<H>  02-25            Radiology:    < from: Xray Chest 1 View-PORTABLE IMMEDIATE (Xray Chest 1 View-PORTABLE IMMEDIATE .) (02.22.21 @ 06:22) >  EXAM:  XR CHEST PORTABLE IMMED 1V                          PROCEDURE DATE:  02/22/2021          INTERPRETATION:  Clinical history/reason for exam: Weakness.    Frontal chest.    COMPARISON: February 20, 2021.    Findings/  impression: Stable heart size. Left basilar opacity/pleural effusion, increased. Right basilar opacity/pleural effusion.. Stable bony structures.        Vital Signs Last 24 Hrs  T(C): 36.6 (25 Feb 2021 06:00), Max: 36.9 (25 Feb 2021 02:00)  T(F): 97.9 (25 Feb 2021 06:00), Max: 98.5 (25 Feb 2021 02:00)  HR: 96 (25 Feb 2021 04:15) (87 - 110)  BP: 127/62 (25 Feb 2021 04:15) (93/52 - 127/62)  BP(mean): 87 (25 Feb 2021 04:15) (65 - 87)  RR: 18 (25 Feb 2021 04:15) (13 - 29)  SpO2: 95% (25 Feb 2021 04:15) (94% - 100%)    REVIEW OF SYSTEMS: as per HPI        Physical Exam: WDWN 75 yo  gentleman lying in bed, awake/alert, c/o fatiuge/weakness    Head: normocephalic, atraumatic    Eyes: PERRLA, EOMI, no nystagmus, sclera anicteric    ENT: nasal discharge, uvula midline, no oropharyngeal erythema/exudate    Neck: supple, negative JVD, negative carotid bruits, no thyromegaly    Chest: dec breath sounds at bases    Cardiovascular: regular rate and rhythm, neg murmurs/rubs/gallops    Abdomen: soft, non distended, non tender to palpation in all 4 quadrants, negative rebound/guarding, normal bowel sounds    Extremities: 2+ LE edema    Musculoskeletal:    Skin:      :     Neurologic Exam:    Alert and oriented to person, place, date/year, speech fluent w/o dysarthria      Motor Exam:    Right UE:    no focal weakness > 3+/5    Left UE:    no focal weakness > 3+/5    Right LE:    no focal weakness > 3+/5    Left LE:      no focal weakness > 3+/5               Sensation: Intact to light touch x 4 extremities                                        DTR:                        biceps/brachioradialis: equal bilaterally                  patella/ankle: equal bilaterally                  neg clonus          neg Babinski                          Gait:  not tested        PM&R Impression:    1) deconditioned  2) no focal weakness  3) CKD    Plan: cleared to begin rehab    1) Physical therapy focusing on therapeutic exercises, bed mobility/transfer out of bed evaluation, progressive ambulation with assistive devices prn.    2) Anticipated Disposition Plan/Recs:   pending functional progress

## 2021-02-25 NOTE — PROGRESS NOTE ADULT - PROBLEM SELECTOR PLAN 4
- holding home Norvasc of 5 mg daily i/s/o hypotension   -c/w lasix 80IV BID    #Dress Syndrome  - Currently taking Prednisone 60 mg with improvement in rash   - Continue Prednisone 60 mg daily (DRESS requires taper over 8-12 weeks, will not decrease yet as still in renal failure likely 2/2 dress)

## 2021-02-25 NOTE — PROGRESS NOTE ADULT - SUBJECTIVE AND OBJECTIVE BOX
Hospital Course:   76 year old male with a past medical history of Hemophilia A, HTN, Gout, and DRESS Syndrome who presents with weakness for 1 week. Pt had an acute gout flare on January 1, treated with allopurinol for 3 weeks, c/b by development of DRESS (seen at [facility] where diagnosis was made, d/c 2/14).  While at [facility] patient had a creatinine that ranged from 4-6, however baseline is 1.1 (9/2020). After discharge from [facility] patient complained of weakness and progressive swelling of his legs and abdomen. On admission, VS notable for tachycardia to 128. Labs were significant for WBC 28.30, Hgb 11.5, K 5.6, CO2 20  , Cr 6.59, albumin 2.7, alk phos 158, BNP 15650. CXR showed pleural effusion left greater than right with dependent edema consistent with volume overload. Imaging significant for a CT abdomen small to moderate bilateral pleural effusions, greater on the left. Mild pericardial effusion. Anasarca greater in left lower chest and trace ascites. LE dopplers negative for DVT. The patient was given 80mg of IV lasix and admitted for acute renal failure with potential need of dialysis. While on [facility] nephrology was consulted who recommended IV diuresis with plans of hemodialysis on 2/22. On the morning of 2/22 blood cultures grew MSSA.  Pt was started on ancef. Echo performed 2/22 due to pericardial effusion seen on CT chest, no evidence of tamponade.  Attempted HD 2/22, however pt became hypotensive (BP unknown), and session was immediately stopped. Pt transferred to [facility] for increased monitoring during dialysis, to occur 2/23. While on [facility], pt went into afib with RVR, rectal temp negative, EKG showed ST elevations which we felt were more pronounced compared to prior. Trop, CK, CKMB obtained (trop 0.24, CKMB 10.1, CK nml). Given lopressor 5 IVP x1. BP dropped slightly, therefore gave 500cc bolus. Cardiology consulted stat- came to bedside and felt EKG mostly stable from prior and likely afib RVR was from uremia, necessitating dialysis. Gave one time dose of cardizem 5mg and started on 5 of midodrine TID. Was transferred to ICU for further pressure support and management to facilitate dialysis. Patient received HD on 2/23 0.5L UF on pressors, since weaned off. He required mild pressor support with Levophed (0.03mcg/kg/min) during HD. Patient off of pressors, repeat HD on 2/24 patient did not require any pressor support- tolerated well. Midodrine was increased to 10mg TID and restarted on lasix 80IV BID- home dose and per cardiology recs (patient still hypervolemic on exam). Patient remained off pressor support and is stable for transfer to [facility] overnight 2/24-2/25.    SUBJECTIVE / INTERVAL HPI: Patient seen and examined at bedside. No fevers/chills, nausea, vomiting, diarrhea, abdominal pain, chest pain, shortness of breath. Still has femoral catheter, L-sided.    VITAL SIGNS:  Vital Signs Last 24 Hrs  T(C): 36.4 (25 Feb 2021 13:36), Max: 37.1 (25 Feb 2021 13:30)  T(F): 97.5 (25 Feb 2021 13:36), Max: 98.8 (25 Feb 2021 13:30)  HR: 98 (25 Feb 2021 13:30) (86 - 102)  BP: 122/60 (25 Feb 2021 13:30) (113/57 - 127/62)  BP(mean): 84 (25 Feb 2021 13:30) (78 - 87)  RR: 20 (25 Feb 2021 13:30) (13 - 20)  SpO2: 96% (25 Feb 2021 13:30) (95% - 100%)    PHYSICAL EXAM:    General: relatively well-appearing male comfortable in bed in NAD  HEENT: NC/AT; anicteric sclera; MMM  Cardiovascular: +S1/S2; RRR  Respiratory: CTA B/L; no W/R/R  Gastrointestinal: soft, NT/ND; +BSx4  Extremities: WWP; 2+ pitting edema bilateral lower extremities to the mid shins, no clubbing, cyanosis  Vascular: 2+ radial, DP pulses B/L  Neurological: AAOx3; no focal deficits    MEDICATIONS:  MEDICATIONS  (STANDING):  ceFAZolin   IVPB 1000 milliGRAM(s) IV Intermittent every 24 hours  doxazosin 4 milliGRAM(s) Oral daily  furosemide   Injectable 80 milliGRAM(s) IV Push every 12 hours  midodrine. 10 milliGRAM(s) Oral every 8 hours  pantoprazole    Tablet 40 milliGRAM(s) Oral before breakfast  polyethylene glycol 3350 17 Gram(s) Oral daily  PPD  5 Tuberculin Unit(s) Injectable 5 Unit(s) IntraDermal once  predniSONE   Tablet 60 milliGRAM(s) Oral daily  senna 2 Tablet(s) Oral at bedtime  sevelamer carbonate 800 milliGRAM(s) Oral three times a day with meals    MEDICATIONS  (PRN):      ALLERGIES:  Allergies    No Known Allergies    Intolerances        LABS:                        10.3   17.17 )-----------( 19       ( 25 Feb 2021 13:24 )             31.6     02-25    136  |  96  |  80<H>  ----------------------------<  139<H>  4.3   |  27  |  4.09<H>    Ca    8.4      25 Feb 2021 07:23  Phos  5.8     02-25  Mg     2.2     02-25    TPro  4.9<L>  /  Alb  2.0<L>  /  TBili  0.6  /  DBili  x   /  AST  18  /  ALT  14  /  AlkPhos  221<H>  02-25    PT/INR - ( 25 Feb 2021 07:23 )   PT: 14.8 sec;   INR: 1.25          PTT - ( 25 Feb 2021 07:23 )  PTT:37.9 sec    CAPILLARY BLOOD GLUCOSE          RADIOLOGY & ADDITIONAL TESTS: Reviewed.

## 2021-02-25 NOTE — PROGRESS NOTE ADULT - PROBLEM SELECTOR PLAN 3
#Uremic pericarditis   - BUN >100 on presentation, initiating need to HD  - EKG with diffuse ST elevated and LA depressions c/w acute pericarditis     #pericardial effusion  CT abdomen showed trace pericaridal effusion. Likely 2/2 volume overload and fluid shifts from acute kidney failure  - No signs of tamponade or any of Valent[place]'s [place].  - continue with HD per renal recs  - trend BMP   - repeat EKG if any chest pain

## 2021-02-26 LAB
ALBUMIN SERPL ELPH-MCNC: 2.4 G/DL — LOW (ref 3.3–5)
ALP SERPL-CCNC: 288 U/L — HIGH (ref 40–120)
ALT FLD-CCNC: 17 U/L — SIGNIFICANT CHANGE UP (ref 10–45)
ANION GAP SERPL CALC-SCNC: 10 MMOL/L — SIGNIFICANT CHANGE UP (ref 5–17)
APTT BLD: 33.7 SEC — SIGNIFICANT CHANGE UP (ref 27.5–35.5)
AST SERPL-CCNC: 19 U/L — SIGNIFICANT CHANGE UP (ref 10–40)
BASOPHILS # BLD AUTO: 0.01 K/UL — SIGNIFICANT CHANGE UP (ref 0–0.2)
BASOPHILS # BLD AUTO: 0.02 K/UL — SIGNIFICANT CHANGE UP (ref 0–0.2)
BASOPHILS NFR BLD AUTO: 0.1 % — SIGNIFICANT CHANGE UP (ref 0–2)
BASOPHILS NFR BLD AUTO: 0.1 % — SIGNIFICANT CHANGE UP (ref 0–2)
BILIRUB SERPL-MCNC: 0.7 MG/DL — SIGNIFICANT CHANGE UP (ref 0.2–1.2)
BLD GP AB SCN SERPL QL: NEGATIVE — SIGNIFICANT CHANGE UP
BUN SERPL-MCNC: 60 MG/DL — HIGH (ref 7–23)
CALCIUM SERPL-MCNC: 8.7 MG/DL — SIGNIFICANT CHANGE UP (ref 8.4–10.5)
CHLORIDE SERPL-SCNC: 98 MMOL/L — SIGNIFICANT CHANGE UP (ref 96–108)
CO2 SERPL-SCNC: 30 MMOL/L — SIGNIFICANT CHANGE UP (ref 22–31)
CREAT SERPL-MCNC: 3.4 MG/DL — HIGH (ref 0.5–1.3)
CULTURE RESULTS: SIGNIFICANT CHANGE UP
EOSINOPHIL # BLD AUTO: 0 K/UL — SIGNIFICANT CHANGE UP (ref 0–0.5)
EOSINOPHIL # BLD AUTO: 0.11 K/UL — SIGNIFICANT CHANGE UP (ref 0–0.5)
EOSINOPHIL NFR BLD AUTO: 0 % — SIGNIFICANT CHANGE UP (ref 0–6)
EOSINOPHIL NFR BLD AUTO: 0.9 % — SIGNIFICANT CHANGE UP (ref 0–6)
FACT VIII ACT/NOR PPP: 76 % — SIGNIFICANT CHANGE UP (ref 51–138)
GLUCOSE SERPL-MCNC: 127 MG/DL — HIGH (ref 70–99)
HCT VFR BLD CALC: 30.5 % — LOW (ref 39–50)
HCT VFR BLD CALC: 31 % — LOW (ref 39–50)
HGB BLD-MCNC: 9.8 G/DL — LOW (ref 13–17)
HGB BLD-MCNC: 9.8 G/DL — LOW (ref 13–17)
IMM GRANULOCYTES NFR BLD AUTO: 0.6 % — SIGNIFICANT CHANGE UP (ref 0–1.5)
IMM GRANULOCYTES NFR BLD AUTO: 0.9 % — SIGNIFICANT CHANGE UP (ref 0–1.5)
INR BLD: 1.31 — HIGH (ref 0.88–1.16)
LYMPHOCYTES # BLD AUTO: 0.38 K/UL — LOW (ref 1–3.3)
LYMPHOCYTES # BLD AUTO: 0.66 K/UL — LOW (ref 1–3.3)
LYMPHOCYTES # BLD AUTO: 2.4 % — LOW (ref 13–44)
LYMPHOCYTES # BLD AUTO: 5.3 % — LOW (ref 13–44)
MAGNESIUM SERPL-MCNC: 2.3 MG/DL — SIGNIFICANT CHANGE UP (ref 1.6–2.6)
MCHC RBC-ENTMCNC: 27.4 PG — SIGNIFICANT CHANGE UP (ref 27–34)
MCHC RBC-ENTMCNC: 27.9 PG — SIGNIFICANT CHANGE UP (ref 27–34)
MCHC RBC-ENTMCNC: 31.6 GM/DL — LOW (ref 32–36)
MCHC RBC-ENTMCNC: 32.1 GM/DL — SIGNIFICANT CHANGE UP (ref 32–36)
MCV RBC AUTO: 86.6 FL — SIGNIFICANT CHANGE UP (ref 80–100)
MCV RBC AUTO: 86.9 FL — SIGNIFICANT CHANGE UP (ref 80–100)
MONOCYTES # BLD AUTO: 0.44 K/UL — SIGNIFICANT CHANGE UP (ref 0–0.9)
MONOCYTES # BLD AUTO: 0.45 K/UL — SIGNIFICANT CHANGE UP (ref 0–0.9)
MONOCYTES NFR BLD AUTO: 2.8 % — SIGNIFICANT CHANGE UP (ref 2–14)
MONOCYTES NFR BLD AUTO: 3.5 % — SIGNIFICANT CHANGE UP (ref 2–14)
NEUTROPHILS # BLD AUTO: 11.11 K/UL — HIGH (ref 1.8–7.4)
NEUTROPHILS # BLD AUTO: 14.82 K/UL — HIGH (ref 1.8–7.4)
NEUTROPHILS NFR BLD AUTO: 89.6 % — HIGH (ref 43–77)
NEUTROPHILS NFR BLD AUTO: 93.8 % — HIGH (ref 43–77)
NRBC # BLD: 0 /100 WBCS — SIGNIFICANT CHANGE UP (ref 0–0)
NRBC # BLD: 0 /100 WBCS — SIGNIFICANT CHANGE UP (ref 0–0)
ORGANISM # SPEC MICROSCOPIC CNT: SIGNIFICANT CHANGE UP
PHOSPHATE SERPL-MCNC: 5.2 MG/DL — HIGH (ref 2.5–4.5)
PLATELET # BLD AUTO: 52 K/UL — LOW (ref 150–400)
PLATELET # BLD AUTO: 57 K/UL — LOW (ref 150–400)
POTASSIUM SERPL-MCNC: 4.3 MMOL/L — SIGNIFICANT CHANGE UP (ref 3.5–5.3)
POTASSIUM SERPL-SCNC: 4.3 MMOL/L — SIGNIFICANT CHANGE UP (ref 3.5–5.3)
PROT SERPL-MCNC: 5.2 G/DL — LOW (ref 6–8.3)
PROTHROM AB SERPL-ACNC: 15.5 SEC — HIGH (ref 10.6–13.6)
RBC # BLD: 3.51 M/UL — LOW (ref 4.2–5.8)
RBC # BLD: 3.58 M/UL — LOW (ref 4.2–5.8)
RBC # FLD: 13.9 % — SIGNIFICANT CHANGE UP (ref 10.3–14.5)
RBC # FLD: 14.1 % — SIGNIFICANT CHANGE UP (ref 10.3–14.5)
RH IG SCN BLD-IMP: POSITIVE — SIGNIFICANT CHANGE UP
SODIUM SERPL-SCNC: 138 MMOL/L — SIGNIFICANT CHANGE UP (ref 135–145)
SPECIMEN SOURCE: SIGNIFICANT CHANGE UP
WBC # BLD: 12.41 K/UL — HIGH (ref 3.8–10.5)
WBC # BLD: 15.82 K/UL — HIGH (ref 3.8–10.5)
WBC # FLD AUTO: 12.41 K/UL — HIGH (ref 3.8–10.5)
WBC # FLD AUTO: 15.82 K/UL — HIGH (ref 3.8–10.5)

## 2021-02-26 PROCEDURE — 93312 ECHO TRANSESOPHAGEAL: CPT | Mod: 26

## 2021-02-26 PROCEDURE — 90935 HEMODIALYSIS ONE EVALUATION: CPT

## 2021-02-26 PROCEDURE — 99232 SBSQ HOSP IP/OBS MODERATE 35: CPT | Mod: GC

## 2021-02-26 PROCEDURE — 99232 SBSQ HOSP IP/OBS MODERATE 35: CPT

## 2021-02-26 PROCEDURE — 99233 SBSQ HOSP IP/OBS HIGH 50: CPT | Mod: GC

## 2021-02-26 RX ORDER — NYSTATIN CREAM 100000 [USP'U]/G
1 CREAM TOPICAL EVERY 12 HOURS
Refills: 0 | Status: DISCONTINUED | OUTPATIENT
Start: 2021-02-26 | End: 2021-03-09

## 2021-02-26 RX ADMIN — Medication 80 MILLIGRAM(S): at 09:29

## 2021-02-26 RX ADMIN — Medication 100 MILLIGRAM(S): at 19:05

## 2021-02-26 RX ADMIN — SENNA PLUS 2 TABLET(S): 8.6 TABLET ORAL at 22:47

## 2021-02-26 RX ADMIN — PANTOPRAZOLE SODIUM 40 MILLIGRAM(S): 20 TABLET, DELAYED RELEASE ORAL at 06:03

## 2021-02-26 RX ADMIN — MIDODRINE HYDROCHLORIDE 10 MILLIGRAM(S): 2.5 TABLET ORAL at 06:03

## 2021-02-26 RX ADMIN — Medication 80 MILLIGRAM(S): at 22:47

## 2021-02-26 RX ADMIN — MIDODRINE HYDROCHLORIDE 10 MILLIGRAM(S): 2.5 TABLET ORAL at 22:47

## 2021-02-26 RX ADMIN — Medication 60 MILLIGRAM(S): at 06:03

## 2021-02-26 RX ADMIN — NYSTATIN CREAM 1 APPLICATION(S): 100000 CREAM TOPICAL at 19:05

## 2021-02-26 RX ADMIN — MIDODRINE HYDROCHLORIDE 10 MILLIGRAM(S): 2.5 TABLET ORAL at 15:43

## 2021-02-26 RX ADMIN — POLYETHYLENE GLYCOL 3350 17 GRAM(S): 17 POWDER, FOR SOLUTION ORAL at 15:43

## 2021-02-26 RX ADMIN — Medication 4 MILLIGRAM(S): at 06:03

## 2021-02-26 RX ADMIN — SEVELAMER CARBONATE 800 MILLIGRAM(S): 2400 POWDER, FOR SUSPENSION ORAL at 19:05

## 2021-02-26 RX ADMIN — SEVELAMER CARBONATE 800 MILLIGRAM(S): 2400 POWDER, FOR SUSPENSION ORAL at 07:26

## 2021-02-26 NOTE — PROGRESS NOTE ADULT - ASSESSMENT
75 yo M with PMHx of CKD (recent Cr 6.6 at [facility]), Hemophilia A, HTN, Gout, BPH and DRESS Syndrome (on Prednisone 60mg QD) consulted to ID for MSSA bacteremia. Patient currently on cefazolin 1g q 24hrs which was chosen over Nafcillin in the setting of severe fluid overload since Cefazolin is administered with less fluid. Source of MSSA unknown. Possible source from skin as patient is immunocompromised from chronic steroids and broke skin barrier scratching the DRESS syndrome rash which created multiple lesions. Unlikely lung source since no cough, sputum production and CXR does not show any lung field opacities. Prior SOB and need for 3L NC likely due to pleural effusion noted on CXR in setting of severe fluid overload secondary to CKD. Weakly positive UA, however unlikely  source as patient does not have urinary symptoms and staph aureus usually does not commonly arise from urinary sources. Patient afebrile with WBC 12.41 (2/26) which is downtrending from 2/25 (14.56).  TTE (2/22) showed no valvular disease. Surveillance blood culture from 2/22 and 2/23 grew MSSA. Surveillance blood cultures from 2/24 have no growth at 24 hours.     Plan:  - C/w cefazolin 1g IV q 24hrs   - Surveillance blood cultures on 2/26    - GUANAKO - since surveillance blood cultures from 2/22 and 2/23 are growing MSSA and MSSA is prone to seeding on heart valves. If GUANAKO results do not show any signs of vegetation a gallium scan may be warranted due to 3 days of positive blood cultures   - F/u blood cultures 2/24 and 2/25 - If blood culture grows MSSA may need to consider switching Cefazolin to Nafcillin as it is a more efficacious against MSSA   - Recommend HD permacath after 72 hours of negative blood cultures to prevent seeding of MSSA. Okay for temporary catheter exchange if needed in interim.    ID Team 1 will follow.    Discussed with Infectious Disease Attending Dr. Edgar. Recommendations are considered final after attending attestation.   77 yo M with PMHx of CKD (recent Cr 6.6 at [facility]), Hemophilia A, HTN, Gout, BPH and DRESS Syndrome (on Prednisone 60mg QD) consulted to ID for MSSA bacteremia. Patient currently on cefazolin 1g q 24hrs which was chosen over Nafcillin in the setting of severe fluid overload since Cefazolin is administered with less fluid. Source of MSSA unknown. Possible source from skin as patient is immunocompromised from chronic steroids and broke skin barrier scratching the DRESS syndrome rash which created multiple lesions. Unlikely lung source since no cough, sputum production and CXR does not show any lung field opacities. Prior SOB and need for 3L NC likely due to pleural effusion noted on CXR in setting of severe fluid overload secondary to CKD. Weakly positive UA, however unlikely  source as patient does not have urinary symptoms and staph aureus usually does not commonly arise from urinary sources. Patient afebrile with WBC 12.41 (2/26) which is downtrending from 2/25 (14.56).  TTE (2/22) showed no valvular disease. Surveillance blood culture from 2/22 and 2/23 grew MSSA. Surveillance blood cultures from 2/24 have no growth at 24 hours.     Plan:  - C/w cefazolin 1g IV q 24hrs   - Surveillance blood cultures on 2/26    - F/u GUANAKO - since surveillance blood cultures from 2/22 and 2/23 are growing MSSA and MSSA is prone to seeding on heart valves. If GUANAKO results do not show any signs of vegetation a gallium scan may be warranted due to 3 days of positive blood cultures   - F/u blood cultures 2/24 and 2/25 - If blood culture grows MSSA may need to consider switching Cefazolin to Nafcillin as it is a more efficacious against MSSA   - Recommend HD permacath after 72 hours of negative blood cultures to prevent seeding of MSSA. Okay for temporary catheter exchange if needed in interim.    ID Team 1 will follow.    Discussed with Infectious Disease Attending Dr. Edgar. Recommendations are considered final after attending attestation.

## 2021-02-26 NOTE — PROGRESS NOTE ADULT - SUBJECTIVE AND OBJECTIVE BOX
Physical Medicine and Rehabilitation Progress Note:    Patient is a 76y old  Male who presents with a chief complaint of Weakness; Worsening CKD (26 Feb 2021 09:23)      HPI:  Mr. Don is a 76 year old male with a past medical history of CKD (recent Cr 6.6 at [facility]), Hemophilia A, HTN, Gout, and DRESS Syndrome who presents with weakness for 1 week. Mr. Don states that he was in his usual state of health (performing all ADLs) until January 1st when he had an acute gout attack. He was treated with allopurinol x 3 weeks where he developed a rash associated with decreased appetite and developed DRESS Syndrome. Subsequently, he was treated with prednisone 60 mg daily until February 7th. On February 14th, he was hospitalized at OSH for hyponatremia and RU (unclear Cr). He was discharged with nephrology follow up. Since that time, he has experienced increasing weakness and swelling in his stomach and legs. The swelling has made it difficult to ambulate due to pain. He denies any fevers/chills, nausea/vomiting, SOB or changes in bowel or urinary habits.     In the ED, he was afebrile (98.7 F), , /80, RR 18, and O2 saturation 95%. Labs were significant for WBC 28.30, Hgb 11.5, K 5.6, CO2 20  , Cr 6.59, albumin 2.7, alk phos 158, BNP 68148. CXR showed pleural effusion left greater than right with dependent edema consistent with volume overload. Imaging significant for a CT abdomen small to moderate bilateral pleural effusions, greater on the left. Mild pericardial effusion. Anasarca greater in left lower chest and trace ascites. LE dopplers negative for DVT. He was given Lasix 80 mg IV and Lokelma 10 g. He was admitted for acute on chronic renal failure and likely induction of hemodialysis.    (21 Feb 2021 03:38)                            9.8    12.41 )-----------( 57       ( 26 Feb 2021 06:38 )             31.0       02-26    138  |  98  |  60<H>  ----------------------------<  127<H>  4.3   |  30  |  3.40<H>    Ca    8.7      26 Feb 2021 06:38  Phos  5.2     02-26  Mg     2.3     02-26    TPro  5.2<L>  /  Alb  2.4<L>  /  TBili  0.7  /  DBili  x   /  AST  19  /  ALT  17  /  AlkPhos  288<H>  02-26    Vital Signs Last 24 Hrs  T(C): 36.2 (26 Feb 2021 09:31), Max: 37.1 (25 Feb 2021 13:30)  T(F): 97.2 (26 Feb 2021 09:31), Max: 98.8 (25 Feb 2021 13:30)  HR: 104 (26 Feb 2021 08:00) (86 - 106)  BP: 139/58 (26 Feb 2021 08:00) (108/57 - 139/58)  BP(mean): 83 (26 Feb 2021 08:00) (77 - 95)  RR: 19 (26 Feb 2021 08:00) (18 - 20)  SpO2: 96% (26 Feb 2021 08:00) (91% - 97%)    MEDICATIONS  (STANDING):  ceFAZolin   IVPB 1000 milliGRAM(s) IV Intermittent every 24 hours  doxazosin 4 milliGRAM(s) Oral daily  furosemide   Injectable 80 milliGRAM(s) IV Push every 12 hours  midodrine. 10 milliGRAM(s) Oral every 8 hours  pantoprazole    Tablet 40 milliGRAM(s) Oral before breakfast  polyethylene glycol 3350 17 Gram(s) Oral daily  PPD  5 Tuberculin Unit(s) Injectable 5 Unit(s) IntraDermal once  predniSONE   Tablet 60 milliGRAM(s) Oral daily  senna 2 Tablet(s) Oral at bedtime  sevelamer carbonate 800 milliGRAM(s) Oral three times a day with meals    MEDICATIONS  (PRN):    Currently Undergoing Physical/ Occupational Therapy at bedside.    Functional Status Assessment:   2/25/2021    Previous Level of Function:     · Ambulation Skills	independent  · Transfer Skills	independent  · ADL Skills	independent  · Work/Leisure Activity	independent  · Additional Comments	Pt states he lives in elevator building with wife and child. No stairs. No hx of AD use.    Cognitive Status Examination:   · Orientation	oriented to person, place, time and situation  · Level of Consciousness	alert  · Follows Commands and Answers Questions	100% of the time  · Personal Safety and Judgment	intact    Range of Motion Exam:   · Range of Motion Examination	Limited neck extension, pt reports tightness    Manual Muscle Testing:   · Manual Muscle Testing Results	LLE 4/5, RLE 3+/5    Bed Mobility: Supine to Sit:     · Level of [place]	maximum assist (25% patients effort)  · Physical Assist/Nonphysical Assist	2 person assist; verbal cues; nonverbal cues (demo/gestures)    Bed Mobility Analysis:     · Bed Mobility Limitations	decreased ability to use arms for pushing/pulling; decreased ability to use legs for bridging/pushing; impaired ability to control trunk for mobility  · Impairments Contributing to Impaired Bed Mobility	decreased strength; impaired postural control; impaired balance    Transfer: Sit to Stand:     · Level of [place]	moderate assist (50% patients effort)  · Physical Assist/Nonphysical Assist	2 person assist; verbal cues  · Weight-Bearing Restrictions	full weight-bearing    Transfer: Stand to Sit:     · Level of [place]	minimum assist (75% patients effort)  · Physical Assist/Nonphysical Assist	2 person assist; verbal cues  · Weight-Bearing Restrictions	full weight-bearing    Sit/Stand Transfer Safety Analysis:     · Transfer Safety Concerns Noted	losing balance; decreased weight-shifting ability  · Impairments Contributing to Impaired Transfers	decreased strength; impaired balance; impaired postural control    Gait Skills:     · Level of [place]	moderate assist (50% patients effort)  · Physical Assist/Nonphysical Assist	2 person assist; verbal cues  · Weight-Bearing Restrictions	weight-bearing as tolerated  · Gait Distance	4 side steps x2    Gait Analysis:     · Gait Deviations Noted	decreased valeria; increased time in double stance; decreased weight-shifting ability  · Impairments Contributing to Gait Deviations	impaired balance; impaired postural control; decreased strength    Balance Skills Assessment:     · Sitting Balance: Static	good balance  · Sitting Balance: Dynamic	good balance  · Sit-to-Stand Balance	fair balance  · Standing Balance: Static	fair balance  · Standing Balance: Dynamic	fair balance    Sensory Examination:   Sensory Examination:    Grossly Intact:   · Gross Sensory Examination	Grossly Intact      Clinical Impressions:   · Criteria for Skilled Therapeutic Interventions	impairments found; anticipated discharge recommendation; functional limitations in following categories; rehab potential; therapy frequency; predicted duration of therapy intervention  · Impairments Found (describe specific impairments)	aerobic capacity/endurance; gait, locomotion, and balance  · Rehab Potential	good, to achieve stated therapy goals  · Therapy Frequency	3-5x/week        PM&R Impression: as above    Current Disposition Plan Recommendations:    subacute rehab placement

## 2021-02-26 NOTE — PROGRESS NOTE ADULT - ATTENDING COMMENTS
I have reviewed the medical record, including laboratory and radiographic studies, interviewed and examined the patient and discussed the plan with Dr. Alberts, the ID Resident.  Agree with above. Will continue to follow with you – ID Team 1.  Dr. Schneider will cover from [facility] through 2/28.  I will resume care 3/1.

## 2021-02-26 NOTE — PROGRESS NOTE ADULT - SUBJECTIVE AND OBJECTIVE BOX
last HD on 2/25, able to tolerate 0.5 L, not as prescribed due to hypotension requir albumin infusion  Plt down to 19, s/p 1u platelets as well as factor 8  pending repeat BCx  plan for GUANAKO today, 2/26 to assess for source of bacteremia  non-oliguric w acceptable volume status and electrolytes      Meds:  ceFAZolin   IVPB 1000 every 24 hours  doxazosin 4 daily  furosemide   Injectable 80 every 12 hours  midodrine. 10 every 8 hours  pantoprazole    Tablet 40 before breakfast  polyethylene glycol 3350 17 daily  PPD  5 Tuberculin Unit(s) Injectable 5 once  predniSONE   Tablet 60 daily  senna 2 at bedtime  sevelamer carbonate 800 three times a day with meals      T(C): , Max: 37.1 (02-25-21 @ 13:30)  T(F): , Max: 98.8 (02-25-21 @ 13:30)  HR: 104 (02-26-21 @ 08:00)  BP: 139/58 (02-26-21 @ 08:00)  BP(mean): 83 (02-26-21 @ 08:00)  RR: 19 (02-26-21 @ 08:00)  SpO2: 96% (02-26-21 @ 08:00)  Wt(kg): --    02-25 @ 07:01  -  02-26 @ 07:00  --------------------------------------------------------  IN: 884 mL / OUT: 1750 mL / NET: -866 mL      ROS:  no fever, SOB, CP, abdominal pain, nausea, vomiting, diarrhea    PHYSICAL EXAM:  Constitutional: No acute distress, on 3L NC   Respiratory: Clear with reduced BS bases  Cardiovascular: S1, S2.  Regular rate and rhythm  Gastrointestinal: soft, non-tender, mildly distended  Extremities: +1 non-pitting lower extremity edema, slowly improving   Access: +R fem temp catheter c/d/i non-tender         LABS:                        9.8    12.41 )-----------( 57       ( 26 Feb 2021 06:38 )             31.0     02-26    138  |  98  |  60<H>  ----------------------------<  127<H>  4.3   |  30  |  3.40<H>    Ca    8.7      26 Feb 2021 06:38  Phos  5.2     02-26  Mg     2.3     02-26    TPro  5.2<L>  /  Alb  2.4<L>  /  TBili  0.7  /  DBili  x   /  AST  19  /  ALT  17  /  AlkPhos  288<H>  02-26      PT/INR - ( 26 Feb 2021 06:38 )   PT: 15.5 sec;   INR: 1.31          PTT - ( 26 Feb 2021 06:38 )  PTT:33.7 sec          RADIOLOGY & ADDITIONAL STUDIES:  reviewed

## 2021-02-26 NOTE — PROGRESS NOTE ADULT - ASSESSMENT
76 year old male with a past medical history of CKD (recent Cr 6.6 at [facility]), Hemophilia A, HTN, Gout, and DRESS Syndrome who presents with weakness for 1 week. Cardiology consult for volume overload and elevated troponins. 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     AF with RVR: No history of AF. Likley driven by pericarditis, which is likely from uremia. [id number] but pt with Hemophilia A. ]  -Currently in NSR/Sinus tach   -Would not recommend starting anticoagulation.  -Continue Midodrine 10mg po TID.  -Continue with HD to correct uremia.     Volume Overload: Patient with anasarca in setting of RU on CKD. Still making urine. EKG with no ischemic changes. Echo shows normal EF, no diastolic dysfunction. Likely secondary to renal failure rather than cardiogenic cause.   - -2L last 24 hours, still with fluid overload. Continue with Lasix 80mg IVP BID.   -Appreciate renal recs.   -HD for uremia and also volume.   -Strict I&Os.  -Daily weights.  -Normal EF, no need for GDMT.     Troponemia: Trop T 0.15, flat in setting of RU on CKD. No ischemic changes.   -Likely due to demand and renal failure.  -No need to continue to trend.   -If patient has CP, can check EKG and cardiac enzymes.     Uremic pericarditis: diffuse YURIDIA in setting of elevated BUN.   -HD to correct uremia.  -No need to trend troponins.  -Daily EKG    MSSA Bacteremia: Persistent bacteremia despite appropriate antibiotic use  -GUANAKO today to evaluate for endocarditis.  -TTE shows no valvular abnormalities.     REcommendations are preliminary until attending attestation and signature.    76 year old male with a past medical history of CKD (recent Cr 6.6 at [facility]), Hemophilia A, HTN, Gout, and DRESS Syndrome who presents with weakness for 1 week. Cardiology consult for volume overload and elevated troponins. 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     AF with RVR: No history of AF. Likley driven by pericarditis, which is likely from uremia. [id number] but pt with Hemophilia A. ]  -Currently in NSR/Sinus tach   -Would not recommend starting anticoagulation.  -Continue Midodrine 10mg po TID.  -Continue with HD to correct uremia.     Volume Overload: Patient with anasarca in setting of RU on CKD. Still making urine. EKG with no ischemic changes. Echo shows normal EF, no diastolic dysfunction. Likely secondary to renal failure rather than cardiogenic cause.   - -2L last 24 hours, still with fluid overload. Continue with Lasix 80mg IVP BID.   -Appreciate renal recs.   -HD for uremia and also volume.   -Strict I&Os.  -Daily weights.  -Normal EF, no need for GDMT.     Troponemia: Trop T 0.15, flat in setting of RU on CKD. No ischemic changes.   -Likely due to demand and renal failure.  -No need to continue to trend.   -If patient has CP, can check EKG and cardiac enzymes.     Uremic pericarditis: diffuse YURIDIA in setting of elevated BUN.   -HD to correct uremia.  -No need to trend troponins.  -Daily EKG    MSSA Bacteremia: Persistent bacteremia despite appropriate antibiotic use  -GUANAKO today to evaluate for endocarditis.  -TTE shows no valvular abnormalities.     Discussed with attending.

## 2021-02-26 NOTE — PROGRESS NOTE ADULT - SUBJECTIVE AND OBJECTIVE BOX
infectious diseases progress note:  YAIR FELDMAN is a 76yMale patient    O/N events: None    Interval history:  Patient seen and examined at bedside. Patient states he is feeling similar to yesterday. He notes improvement in fatigue. Patient still endorses mildly decreased appetite. Patient has abdominal discomfort due to constipation.  Patient denies fevers, chills, change in vision, HA, lightheadedness, CP, SOB, cough, N/V, abdominal pain, back pain.    ACUTE KIDNEY INJURY      Bacteremia    Acute renal failure    Metabolic acidosis    DRESS syndrome    Acute kidney injury    Nutrition, metabolism, and development symptoms    Hemophilia A    History of BPH    Gout    HTN (hypertension)    Other ascites    Pericardial effusion    Hyperkalemia    Acute renal failure superimposed on stage 5 chronic kidney disease, not on chronic dialysis, unspecified acute renal failure type    ROS:  CONSTITUTIONAL:  Negative fever or chills  EYES:  Negative  blurry vision or double vision  CARDIOVASCULAR:  Negative for chest pain or palpitations  RESPIRATORY:  Negative for cough, dyspnea, or wheezing  GASTROINTESTINAL:  Negative for nausea, vomiting, diarrhea, or abdominal pain  GENITOURINARY:  Negative frequency, urgency or dysuria  NEUROLOGIC:  No headache, confusion, dizziness, lightheadedness    Allergies    No Known Allergies    Intolerances        ANTIBIOTICS/RELEVANT:  antimicrobials  ceFAZolin   IVPB 1000 milliGRAM(s) IV Intermittent every 24 hours    immunologic:  PPD  5 Tuberculin Unit(s) Injectable 5 Unit(s) IntraDermal once    OTHER:  diltiazem Injectable 5 milliGRAM(s) IV Push once  doxazosin 4 milliGRAM(s) Oral daily  heparin   Injectable 5000 Unit(s) SubCutaneous every 8 hours  midodrine. 5 milliGRAM(s) Oral every 8 hours  polyethylene glycol 3350 17 Gram(s) Oral daily  predniSONE   Tablet 60 milliGRAM(s) Oral daily  senna 2 Tablet(s) Oral at bedtime  sevelamer carbonate 800 milliGRAM(s) Oral three times a day with meals  sodium bicarbonate 650 milliGRAM(s) Oral two times a day      Objective:  Vital Signs Last 24 Hrs  T(C): 36.8 (2021 04:40), Max: 37 (2021 17:03)  T(F): 98.2 (2021 04:40), Max: 98.6 (2021 17:03)  HR: 112 (2021 08:32) (100 - 112)  BP: 110/60 (2021 08:32) (98/51 - 117/64)  BP(mean): 80 (2021 08:32) (75 - 80)  RR: 17 (2021 08:32) (16 - 19)  SpO2: 93% (2021 08:32) (93% - 95%)    PHYSICAL EXAM:  General: NAD, sleeping comfortably in bed  Skin: Extremities have healing linear and circular scabs with no signs of warmth, purulence, drainage  HEENT: Conjunctiva clear, sclera white, PERRLA  Cardiac: S1 and S1 clear. No murmurs, rubs or gallops  Lungs: Unlabored breathing on 3L NC, no accessory muscle use, vesicular b/l   Abdomen: Distended, soft, non-tender, normoactive BS in all four quadrants  Peripheral extremities: 1+ pitting edema in lower extremities b/l, 2+ DP and PT pulses b/l   Neurological: A&Ox3           LABS:                        9.8    11.22 )-----------( 54       ( 2021 09:45 )             29.3     02-23    137  |  95<L>  |  118<H>  ----------------------------<  151<H>  4.0   |  23  |  6.79<H>    Ca    8.8      2021 07:32  Phos  8.3     02-  Mg     2.4     02-23    TPro  5.1<L>  /  Alb  2.0<L>  /  TBili  0.7  /  DBili  x   /  AST  19  /  ALT  17  /  AlkPhos  135<H>  02-23    PT/INR - ( 2021 07:33 )   PT: 17.0 sec;   INR: 1.44          PTT - ( 2021 07:33 )  PTT:37.8 sec  Urinalysis Basic - ( 2021 14:26 )    Color: Yellow / Appearance: Clear / S.015 / pH: x  Gluc: x / Ketone: NEGATIVE  / Bili: Negative / Urobili: 0.2 E.U./dL   Blood: x / Protein: NEGATIVE mg/dL / Nitrite: NEGATIVE   Leuk Esterase: Small / RBC: < 5 /HPF / WBC 5-10 /HPF   Sq Epi: x / Non Sq Epi: 0-5 /HPF / Bacteria: Present /HPF          MICROBIOLOGY:  Culture Results:   Culture in progress ( @ 21:12)        RADIOLOGY & ADDITIONAL STUDIES:

## 2021-02-26 NOTE — PROGRESS NOTE ADULT - SUBJECTIVE AND OBJECTIVE BOX
Hospital Course:   76 year old male with a past medical history of Hemophilia A, HTN, Gout, and DRESS Syndrome who presents with weakness for 1 week. Pt had an acute gout flare on January 1, treated with allopurinol for 3 weeks, c/b by development of DRESS (seen at [facility] where diagnosis was made, d/c 2/14).  While at [facility] patient had a creatinine that ranged from 4-6, however baseline is 1.1 (9/2020). After discharge from [facility] patient complained of weakness and progressive swelling of his legs and abdomen. On admission, VS notable for tachycardia to 128. Labs were significant for WBC 28.30, Hgb 11.5, K 5.6, CO2 20  , Cr 6.59, albumin 2.7, alk phos 158, BNP 18407. CXR showed pleural effusion left greater than right with dependent edema consistent with volume overload. Imaging significant for a CT abdomen small to moderate bilateral pleural effusions, greater on the left. Mild pericardial effusion. Anasarca greater in left lower chest and trace ascites. Pt was given 80mg of IV lasix and admitted for acute renal failure with potential need of dialysis.   RMF  While on RMF nephrology was consulted who recommended IV diuresis with plans of hemodialysis on 2/22. On the morning of 2/22 blood cultures grew MSSA.  Pt was started on ancef. Echo performed 2/22 due to pericardial effusion seen on CT chest, no evidence of tamponade.  Attempted HD 2/22, however pt became hypotensive (BP unknown), and session was stopped. Pt transferred to [facility] for increased monitoring during dialysis, to occur 2/23.   la  While on [facility], pt went into afib with RVR, rectal temp negative, EKG showed ST elevations which we felt were more pronounced compared to prior. Trop, CK, CKMB obtained (trop 0.24, CKMB 10.1, CK nml). Given lopressor 5 IVP x1. BP dropped slightly, therefore gave 500cc bolus. Cardiology consulted stat- came to bedside and felt EKG mostly stable from prior and likely afib RVR was from uremia, necessitating dialysis. Gave one time dose of cardizem 5mg and started on 5 of midodrine TID. Was transferred to ICU for further pressure support and management to facilitate dialysis.  ICU  Patient received HD on 2/23 0.5L UF on pressors, since weaned off. He required mild pressor support with Levophed (0.03mcg/kg/min) during HD. Patient off of pressors, repeat HD on 2/24 patient did not require any pressor support- tolerated well. Midodrine was increased to 10mg TID and restarted on lasix 80IV BID- home dose and per cardiology recs (patient still hypervolemic on exam). Patient remained off pressor support and is stable for transfer to [facility] overnight 2/24-2/25.  [facility]  Received dialysis 2/25 while on [facility], tolerated well. Plts, however, we low to 19 which per discussion with nephro can be rxn to dialysis Given 1u platelets as well as factor 8. Repeat BCx sent. Awaiting GUANAKO 2/26 to assess for source of bacteremia.    SUBJECTIVE / INTERVAL HPI: Patient seen and examined at bedside. No fevers/chills, nausea, vomiting, diarrhea, abdominal pain, chest pain, shortness of breath. Still has femoral catheter, L-sided.    VITAL SIGNS:  Vital Signs Last 24 Hrs  T(C): 36.2 (26 Feb 2021 09:31), Max: 37.1 (25 Feb 2021 13:30)  T(F): 97.2 (26 Feb 2021 09:31), Max: 98.8 (25 Feb 2021 13:30)  HR: 104 (26 Feb 2021 08:00) (86 - 106)  BP: 139/58 (26 Feb 2021 08:00) (108/57 - 139/58)  BP(mean): 83 (26 Feb 2021 08:00) (77 - 95)  RR: 19 (26 Feb 2021 08:00) (18 - 20)  SpO2: 96% (26 Feb 2021 08:00) (91% - 97%)    PHYSICAL EXAM:    General: relatively well-appearing male comfortable in bed in NAD  HEENT: NC/AT; anicteric sclera; MMM  Cardiovascular: +S1/S2; RRR  Respiratory: CTA B/L; no W/R/R  Gastrointestinal: soft, NT/ND; +BSx4  Extremities: WWP; 2+ pitting edema bilateral lower extremities to the mid shins, slightly improved from yesterday  Vascular: 2+ radial, DP pulses B/L  Neurological: AAOx3; no focal deficits    MEDICATIONS:  MEDICATIONS  (STANDING):  ceFAZolin   IVPB 1000 milliGRAM(s) IV Intermittent every 24 hours  doxazosin 4 milliGRAM(s) Oral daily  furosemide   Injectable 80 milliGRAM(s) IV Push every 12 hours  midodrine. 10 milliGRAM(s) Oral every 8 hours  pantoprazole    Tablet 40 milliGRAM(s) Oral before breakfast  polyethylene glycol 3350 17 Gram(s) Oral daily  PPD  5 Tuberculin Unit(s) Injectable 5 Unit(s) IntraDermal once  predniSONE   Tablet 60 milliGRAM(s) Oral daily  senna 2 Tablet(s) Oral at bedtime  sevelamer carbonate 800 milliGRAM(s) Oral three times a day with meals    MEDICATIONS  (PRN):      ALLERGIES:  Allergies    allopurinol (Other)    Intolerances        LABS:                        9.8    12.41 )-----------( 57       ( 26 Feb 2021 06:38 )             31.0     02-26    138  |  98  |  60<H>  ----------------------------<  127<H>  4.3   |  30  |  3.40<H>    Ca    8.7      26 Feb 2021 06:38  Phos  5.2     02-26  Mg     2.3     02-26    TPro  5.2<L>  /  Alb  2.4<L>  /  TBili  0.7  /  DBili  x   /  AST  19  /  ALT  17  /  AlkPhos  288<H>  02-26    PT/INR - ( 26 Feb 2021 06:38 )   PT: 15.5 sec;   INR: 1.31          PTT - ( 26 Feb 2021 06:38 )  PTT:33.7 sec    CAPILLARY BLOOD GLUCOSE          RADIOLOGY & ADDITIONAL TESTS: Reviewed.

## 2021-02-26 NOTE — PROGRESS NOTE ADULT - ASSESSMENT
per Internal Medicine    76 year old male with a past medical history Hemophilia A, HTN, Gout, and DRESS Syndrome who presents with weakness for 1 week. He was admitted for acute on chronic renal failure c/b anasarca requiring induction of hemodialysis. Transferred to MICU on 2/23 d/t afib with RVR and hypotension likely 2/2 uremic pericarditis, transferred to MICU for closer monitoring and pressure support while undergoing dialysis, now hemodynamically stable, on midodrine, and stepped down to tele, tolerating 2x sessions of dialysis off of pressors (has had 3 sessions total as of 2/26)      Problem/Plan - 1:  ·  Problem: Bacteremia.  Plan: # MSSA Bacteremia.  - source unknown however possibly 2/2 skin infection given pruritic rash   - cultures from 2/23 still positive, 2/24 remain negative, will f/u repeat Cx from 2/25  - started on ANCEF at 3AM on 2/22.   - continue with ancef 1g q24hr  - ID following   - will need GUANAKO to evaluate for endocarditis given +staph bacteremia and unknown source (today 2/26)  - will need to replace HD femoral cath.     Problem/Plan - 2:  ·  Problem: Tachycardia.  Plan: #Tachycardia/ Afib w/RVR   - HR constantly in 110s per daughter  - Sinus tachycardia on EKG   - went into afib with RVR on 2/23--> transferred to MICU  - s/p cardizem 5mg, afib likely 2/2 uremia  - Likely 2/2 uremic pericarditis   PLAN   - continue to monitor HR, no longer in afib w/ RVR   - has remained in sinus since 1x episode of afib with RVR.     Problem/Plan - 3:  ·  Problem: Pericarditis.  Plan: #Uremic pericarditis   - BUN >100 on presentation, initiating need to HD  - EKG with diffuse ST elevated and MA depressions c/w acute pericarditis     #pericardial effusion  CT abdomen showed trace pericaridal effusion. Likely 2/2 volume overload and fluid shifts from acute kidney failure  - No signs of tamponade or any of Valentin's Triad.  - continue with HD per renal recs  - trend BMP   - repeat EKG if any chest pain.     Problem/Plan - 4:  ·  Problem: HTN (hypertension).  Plan: - holding home Norvasc of 5 mg daily i/s/o hypotension   -c/w lasix 80IV BID    #Dress Syndrome  - Currently taking Prednisone 60 mg with improvement in rash   - Continue Prednisone 60 mg daily (DRESS requires taper over 8-12 weeks, will not decrease yet as still in renal failure likely 2/2 dress).     Problem/Plan - 5:  ·  Problem: Shock.  Plan: RESOLVED  -patient hypotensive during afib w/RVR--> transferred to MICU   - likely 2/2 tachyarrhythmia elicited by uremic pericarditis   - currently off of pressors  - mentating at baseline, WWP   - monitor UOP and Hemodynamics.     Problem/Plan - 6:  Problem: Ascites. Plan: - Slightly distended abdomen on physical exam and CT showing ascitic fluid in abdomen  - Likely 2/2 acute renal failure and fluid shift  - Continue diuresis as above.    Problem/Plan - 7:  ·  Problem: BPH (benign prostatic hyperplasia).  Plan: #History of BPH.  Plan: Continue Doxazosin daily  - Monitor UOP and Bladder scan for any concerns of retention.    #gout  Previously on allopurinol for gout prophylaxis; however, patient acquired DRESS Syndrome from the therapy  - Monitor symptoms and avoid NSAIDs for pain.     Problem/Plan - 8:  ·  Problem: Acute renal failure.  Plan: Acute renal failure with anasarca.  Patient with no history of CKD prior to admission. 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 Creatine worsened with deranged electrolytes after being diagnosed with Dress Syndrome.   - Dialysis 2/23, required brief pressor support  - Dialysis 2/24 and 2/25: tolerated well no pressors   - Factor VIII 25U/kg   PLAN   - Recheck factor VIII level daily   - PPD.   - follow up renal recs.     Problem/Plan - 9:  ·  Problem: Hemophilia A.  Plan: # Hemophilia A.    Prior history of significant bleeding in past following ERCP.   -Heme following - follow recs   -Daily INR/PT/PTT and daily Factor VIII   -Factor VIII after procedure   -Follows with Dr. Grace at [facility].     #Anemia  - Hgb 11.5 on arrival with unclear baseline  - Like 2/2 acute kidney failure or dilutional due to volume overload.     Problem/Plan - 10:  Problem: Nutrition, metabolism, and development symptoms. Plan; E: Lokelma for K >5.5;  N: Renal diet  DVT: SCDs, no AC given thrombocytopenia and hemophilia   GI: none.  Dispo: [facility].

## 2021-02-26 NOTE — PROGRESS NOTE ADULT - PROBLEM SELECTOR PLAN 2
#Tachycardia/ Afib w/RVR   - HR constantly in 110s per daughter  - Sinus tachycardia on EKG   - went into afib with RVR on 2/23--> transferred to MICU  - s/p cardizem 5mg, afib likely 2/2 uremia  - Likely 2/2 uremic pericarditis   PLAN   - continue to monitor HR, no longer in afib w/ RVR   - has remained in sinus since 1x episode of afib with RVR

## 2021-02-26 NOTE — PROGRESS NOTE ADULT - ASSESSMENT
Mr. Don is a 76 year old male with a past medical history Hemophilia A, HTN, Gout, and DRESS Syndrome who presents with weakness for 1 week. He was admitted for acute on chronic renal failure c/b anasarca requiring induction of hemodialysis. Transferred to MICU on 2/23 d/t afib with RVR and hypotension likely 2/2 uremic pericarditis, transferred to MICU for closer monitoring and pressure support while undergoing dialysis, now hemodynamically stable, on midodrine, and stepped down to tele, tolerating 2x sessions of dialysis off of pressors (has had 3 sessions total as of 2/26)

## 2021-02-26 NOTE — PROGRESS NOTE ADULT - ASSESSMENT
76M PMHx Hemophilia A, HTN, Gout, p/w RU in setting of DRESS from allopurinol on steroids   hypervolemic, hyperkalemic, uremic, and acidotic without improvement   started on hemodialysis 2/23     #RU secondary to ATN vs AIN on steroids   #hyperkalemia  #acidosis  #hypervolemia   #uremic pericarditis   #thrombocytopenia - could be 2/2 sepsis vs reaction to dialyzer - will use Revaclear with next HD instead of Optiflux  - s/p failed trial of hemodialysis 2/22 (only tolerated a few minutes secondary to hypotension), likely 2/2 sepsis - tolerated hemodialysis #1 well 2/23 w/0.5L UF on pressors, since weaned off - s/p hemodialysis #2, 2/24 w/1L UF  - last hemodialysis #3 on 2/25 - able to tolerate 0.5 L, not as prescribed due to hypotension requir albumin infusion  - acceptable volume status and electrolytes today - no acute absolute indication for RRT at the moment - pending GUANAKO for bacteremia evaluation - will reassess the need for RRT after GUANAKO today as well as need for femoral HD cath - also would like to monitor for possible renal recovery over the weekend   - continue to hold all anti-HTN meds   - continue prednisone per primary team   - hematology consult re hemophilia appreciated, will need coordination with IR for tunneled catheter placement prior to discharge if need arise   - trend CBC, BMP, Mg, Phos daily, daily weight    - strict IOs  - renal diet

## 2021-02-26 NOTE — PROGRESS NOTE ADULT - ATTENDING COMMENTS
Pt for HD today. No endocarditis on GUANAKO. Plan to remove Hd catheter with recs from Heme regarding optimizing coagulation profile ie Factor VIII and platelets.

## 2021-02-26 NOTE — PROGRESS NOTE ADULT - PROBLEM SELECTOR PLAN 1
# MSSA Bacteremia.  - source unknown however possibly 2/2 skin infection given pruritic rash   - cultures from 2/23 still positive, 2/24 remain negative, will f/u repeat Cx from 2/25  - started on ANCEF at 3AM on 2/22.   - continue with ancef 1g q24hr  - ID following   - will need GUANAKO to evaluate for endocarditis given +staph bacteremia and unknown source (today 2/26)  - will need to replace HD femoral cath

## 2021-02-26 NOTE — PROGRESS NOTE ADULT - PROBLEM SELECTOR PLAN 9
# Hemophilia A.    Prior history of significant bleeding in past following ERCP.   -Heme following - follow recs   -Daily INR/PT/PTT and daily Factor VIII   -Factor VIII after procedure   -Follows with Dr. Grace at [facility].     #Anemia  - Hgb 11.5 on arrival with unclear baseline  - Like 2/2 acute kidney failure or dilutional due to volume overload.

## 2021-02-26 NOTE — PROGRESS NOTE ADULT - ATTENDING COMMENTS
RU- no recovery probable ATN versus AIN  returned from GUANAKO- no vegetations-    have decided to proceed with HD today to remove femoral vein catheter after HD today- reconsider HD on Monday, 3/1-  will follow trend in creat and UO    seen and evaluated while on dialysis  tolerating the procedure well  continue treatment as prescribed    daughter here at bedside- reviewed case and renal plan

## 2021-02-26 NOTE — PROGRESS NOTE ADULT - PROBLEM SELECTOR PLAN 3
#Uremic pericarditis   - BUN >100 on presentation, initiating need to HD  - EKG with diffuse ST elevated and WI depressions c/w acute pericarditis     #pericardial effusion  CT abdomen showed trace pericaridal effusion. Likely 2/2 volume overload and fluid shifts from acute kidney failure  - No signs of tamponade or any of Valentin's Triad.  - continue with HD per renal recs  - trend BMP   - repeat EKG if any chest pain

## 2021-02-27 LAB
ALBUMIN SERPL ELPH-MCNC: 2.6 G/DL — LOW (ref 3.3–5)
ALP SERPL-CCNC: 304 U/L — HIGH (ref 40–120)
ALT FLD-CCNC: 16 U/L — SIGNIFICANT CHANGE UP (ref 10–45)
ANION GAP SERPL CALC-SCNC: 9 MMOL/L — SIGNIFICANT CHANGE UP (ref 5–17)
APTT BLD: 34.9 SEC — SIGNIFICANT CHANGE UP (ref 27.5–35.5)
AST SERPL-CCNC: 20 U/L — SIGNIFICANT CHANGE UP (ref 10–40)
BASOPHILS # BLD AUTO: 0.02 K/UL — SIGNIFICANT CHANGE UP (ref 0–0.2)
BASOPHILS NFR BLD AUTO: 0.2 % — SIGNIFICANT CHANGE UP (ref 0–2)
BILIRUB SERPL-MCNC: 0.6 MG/DL — SIGNIFICANT CHANGE UP (ref 0.2–1.2)
BUN SERPL-MCNC: 59 MG/DL — HIGH (ref 7–23)
CALCIUM SERPL-MCNC: 8.8 MG/DL — SIGNIFICANT CHANGE UP (ref 8.4–10.5)
CHLORIDE SERPL-SCNC: 97 MMOL/L — SIGNIFICANT CHANGE UP (ref 96–108)
CO2 SERPL-SCNC: 29 MMOL/L — SIGNIFICANT CHANGE UP (ref 22–31)
CREAT SERPL-MCNC: 3.15 MG/DL — HIGH (ref 0.5–1.3)
EOSINOPHIL # BLD AUTO: 0.14 K/UL — SIGNIFICANT CHANGE UP (ref 0–0.5)
EOSINOPHIL NFR BLD AUTO: 1.3 % — SIGNIFICANT CHANGE UP (ref 0–6)
GLUCOSE SERPL-MCNC: 131 MG/DL — HIGH (ref 70–99)
HCT VFR BLD CALC: 30.1 % — LOW (ref 39–50)
HCT VFR BLD CALC: 30.8 % — LOW (ref 39–50)
HGB BLD-MCNC: 9.5 G/DL — LOW (ref 13–17)
HGB BLD-MCNC: 9.8 G/DL — LOW (ref 13–17)
IMM GRANULOCYTES NFR BLD AUTO: 0.8 % — SIGNIFICANT CHANGE UP (ref 0–1.5)
INR BLD: 1.26 — HIGH (ref 0.88–1.16)
LYMPHOCYTES # BLD AUTO: 0.66 K/UL — LOW (ref 1–3.3)
LYMPHOCYTES # BLD AUTO: 6.1 % — LOW (ref 13–44)
MAGNESIUM SERPL-MCNC: 2.2 MG/DL — SIGNIFICANT CHANGE UP (ref 1.6–2.6)
MCHC RBC-ENTMCNC: 27.5 PG — SIGNIFICANT CHANGE UP (ref 27–34)
MCHC RBC-ENTMCNC: 27.7 PG — SIGNIFICANT CHANGE UP (ref 27–34)
MCHC RBC-ENTMCNC: 30.8 GM/DL — LOW (ref 32–36)
MCHC RBC-ENTMCNC: 32.6 GM/DL — SIGNIFICANT CHANGE UP (ref 32–36)
MCV RBC AUTO: 85 FL — SIGNIFICANT CHANGE UP (ref 80–100)
MCV RBC AUTO: 89.3 FL — SIGNIFICANT CHANGE UP (ref 80–100)
MONOCYTES # BLD AUTO: 0.43 K/UL — SIGNIFICANT CHANGE UP (ref 0–0.9)
MONOCYTES NFR BLD AUTO: 4 % — SIGNIFICANT CHANGE UP (ref 2–14)
NEUTROPHILS # BLD AUTO: 9.49 K/UL — HIGH (ref 1.8–7.4)
NEUTROPHILS NFR BLD AUTO: 87.6 % — HIGH (ref 43–77)
NRBC # BLD: 0 /100 WBCS — SIGNIFICANT CHANGE UP (ref 0–0)
NRBC # BLD: 0 /100 WBCS — SIGNIFICANT CHANGE UP (ref 0–0)
PHOSPHATE SERPL-MCNC: 5.1 MG/DL — HIGH (ref 2.5–4.5)
PLATELET # BLD AUTO: 49 K/UL — LOW (ref 150–400)
PLATELET # BLD AUTO: 54 K/UL — LOW (ref 150–400)
POTASSIUM SERPL-MCNC: 4.2 MMOL/L — SIGNIFICANT CHANGE UP (ref 3.5–5.3)
POTASSIUM SERPL-SCNC: 4.2 MMOL/L — SIGNIFICANT CHANGE UP (ref 3.5–5.3)
PROT SERPL-MCNC: 5 G/DL — LOW (ref 6–8.3)
PROTHROM AB SERPL-ACNC: 14.9 SEC — HIGH (ref 10.6–13.6)
RBC # BLD: 3.45 M/UL — LOW (ref 4.2–5.8)
RBC # BLD: 3.54 M/UL — LOW (ref 4.2–5.8)
RBC # FLD: 13.7 % — SIGNIFICANT CHANGE UP (ref 10.3–14.5)
RBC # FLD: 13.7 % — SIGNIFICANT CHANGE UP (ref 10.3–14.5)
SODIUM SERPL-SCNC: 135 MMOL/L — SIGNIFICANT CHANGE UP (ref 135–145)
WBC # BLD: 10.83 K/UL — HIGH (ref 3.8–10.5)
WBC # BLD: 8.87 K/UL — SIGNIFICANT CHANGE UP (ref 3.8–10.5)
WBC # FLD AUTO: 10.83 K/UL — HIGH (ref 3.8–10.5)
WBC # FLD AUTO: 8.87 K/UL — SIGNIFICANT CHANGE UP (ref 3.8–10.5)

## 2021-02-27 PROCEDURE — 99232 SBSQ HOSP IP/OBS MODERATE 35: CPT

## 2021-02-27 PROCEDURE — 99233 SBSQ HOSP IP/OBS HIGH 50: CPT | Mod: GC

## 2021-02-27 RX ADMIN — NYSTATIN CREAM 1 APPLICATION(S): 100000 CREAM TOPICAL at 17:15

## 2021-02-27 RX ADMIN — SEVELAMER CARBONATE 800 MILLIGRAM(S): 2400 POWDER, FOR SUSPENSION ORAL at 11:39

## 2021-02-27 RX ADMIN — SENNA PLUS 2 TABLET(S): 8.6 TABLET ORAL at 21:02

## 2021-02-27 RX ADMIN — SEVELAMER CARBONATE 800 MILLIGRAM(S): 2400 POWDER, FOR SUSPENSION ORAL at 17:14

## 2021-02-27 RX ADMIN — Medication 80 MILLIGRAM(S): at 21:02

## 2021-02-27 RX ADMIN — NYSTATIN CREAM 1 APPLICATION(S): 100000 CREAM TOPICAL at 05:38

## 2021-02-27 RX ADMIN — Medication 60 MILLIGRAM(S): at 05:38

## 2021-02-27 RX ADMIN — Medication 100 MILLIGRAM(S): at 17:13

## 2021-02-27 RX ADMIN — MIDODRINE HYDROCHLORIDE 10 MILLIGRAM(S): 2.5 TABLET ORAL at 05:38

## 2021-02-27 RX ADMIN — Medication 80 MILLIGRAM(S): at 10:07

## 2021-02-27 RX ADMIN — MIDODRINE HYDROCHLORIDE 10 MILLIGRAM(S): 2.5 TABLET ORAL at 13:30

## 2021-02-27 RX ADMIN — SEVELAMER CARBONATE 800 MILLIGRAM(S): 2400 POWDER, FOR SUSPENSION ORAL at 05:39

## 2021-02-27 RX ADMIN — POLYETHYLENE GLYCOL 3350 17 GRAM(S): 17 POWDER, FOR SOLUTION ORAL at 11:39

## 2021-02-27 RX ADMIN — PANTOPRAZOLE SODIUM 40 MILLIGRAM(S): 20 TABLET, DELAYED RELEASE ORAL at 05:37

## 2021-02-27 RX ADMIN — Medication 4 MILLIGRAM(S): at 05:38

## 2021-02-27 NOTE — PROGRESS NOTE ADULT - ASSESSMENT
76 year old male with a past medical history Hemophilia A, HTN, Gout, and DRESS Syndrome who presents with weakness for 1 week. He was admitted for acute on chronic renal failure c/b anasarca requiring induction of hemodialysis.

## 2021-02-27 NOTE — PROGRESS NOTE ADULT - ATTENDING COMMENTS
We evaluated this patient with hypertension, gout and DRESS (from Allopurinol).  The patient underwent emergent dialysis for renal failure.  Subsequently the patient was found to have MSSA bacteremia.  Transesophageal echocardiography did not show presence of vegetations.  The patient has a temporary catheter in the right groin.  His factor VIII levels yesterday were in an acceptable range.  We will check with hematology before withdrawing the catheter today.  He remains on prednisone at 60 mg/day.  There is a history of atrial fibrillation with rapid ventricular response rate.  We are closely monitoring his platelets.  His last platelet count was 54,000.  We have reviewed his chest x-ray from 2/22.  It shows a cutoff of the left lower lobe bronchus and potentially left lower lobe atelectasis.  We will do percussion therapy and will repeat the chest x-ray in a day or so.  He is not hypoxemic.  His heart rate is 110/min.  It appears to be sinus tachycardia.  We will use Lopressor if the heart rate remains fast and no specific cause is discerned.

## 2021-02-27 NOTE — PROGRESS NOTE ADULT - SUBJECTIVE AND OBJECTIVE BOX
INTERVAL HPI/OVERNIGHT EVENTS: No fevers.     CONSTITUTIONAL:  Negative fever or chills, feels well, good appetite  EYES:  Negative  blurry vision or double vision  CARDIOVASCULAR:  Negative for chest pain or palpitations  RESPIRATORY:  Negative for cough, wheezing, or SOB   GASTROINTESTINAL:  Negative for nausea, vomiting, diarrhea, constipation, or abdominal pain  GENITOURINARY:  Negative frequency, urgency or dysuria  NEUROLOGIC:  No headache, confusion, dizziness, lightheadedness      ANTIBIOTICS/RELEVANT:    MEDICATIONS  (STANDING):  ceFAZolin   IVPB 1000 milliGRAM(s) IV Intermittent every 24 hours  doxazosin 4 milliGRAM(s) Oral daily  furosemide   Injectable 80 milliGRAM(s) IV Push every 12 hours  midodrine. 10 milliGRAM(s) Oral every 8 hours  nystatin Cream 1 Application(s) Topical every 12 hours  pantoprazole    Tablet 40 milliGRAM(s) Oral before breakfast  polyethylene glycol 3350 17 Gram(s) Oral daily  PPD  5 Tuberculin Unit(s) Injectable 5 Unit(s) IntraDermal once  predniSONE   Tablet 60 milliGRAM(s) Oral daily  senna 2 Tablet(s) Oral at bedtime  sevelamer carbonate 800 milliGRAM(s) Oral three times a day with meals    MEDICATIONS  (PRN):        Vital Signs Last 24 Hrs  T(C): 36.7 (27 Feb 2021 13:00), Max: 37.3 (27 Feb 2021 09:54)  T(F): 98 (27 Feb 2021 13:00), Max: 99.1 (27 Feb 2021 09:54)  HR: 106 (27 Feb 2021 13:38) (84 - 126)  BP: 108/53 (27 Feb 2021 13:38) (93/52 - 144/67)  BP(mean): 76 (27 Feb 2021 13:38) (70 - 97)  RR: 14 (27 Feb 2021 13:38) (14 - 18)  SpO2: 93% (27 Feb 2021 13:38) (93% - 100%)    PHYSICAL EXAM:  Constitutional:Well-developed, well nourished  Eyes:VANESA, EOMI  Ear/Nose/Throat: no oral lesion, no sinus tenderness on percussion	  Neck:no JVD, no lymphadenopathy, supple  Respiratory: CTA libertad  Cardiovascular: S1S2 RRR, no murmurs  Gastrointestinal:soft, (+) BS, no HSM  Extremities:no e/e/c  Vascular: DP Pulse:	right normal; left normal      LABS:                        9.8    10.83 )-----------( 54       ( 27 Feb 2021 06:26 )             30.1     02-27    135  |  97  |  59<H>  ----------------------------<  131<H>  4.2   |  29  |  3.15<H>    Ca    8.8      27 Feb 2021 06:25  Phos  5.1     02-27  Mg     2.2     02-27    TPro  5.0<L>  /  Alb  2.6<L>  /  TBili  0.6  /  DBili  x   /  AST  20  /  ALT  16  /  AlkPhos  304<H>  02-27    PT/INR - ( 27 Feb 2021 06:25 )   PT: 14.9 sec;   INR: 1.26          PTT - ( 27 Feb 2021 06:25 )  PTT:34.9 sec      MICROBIOLOGY: reviewed    RADIOLOGY & ADDITIONAL STUDIES: reviewed

## 2021-02-27 NOTE — PROGRESS NOTE ADULT - PROBLEM SELECTOR PLAN 1
# MSSA Bacteremia.  - source unknown however possibly 2/2 skin infection given pruritic rash   - cultures from 2/23 still positive, 2/24 remain negative, will follow up cx from today   - continue with ancef 1g q24hr  - ID following   - GUANAKO negative for vegetatiosn   - will need to replace HD femoral cath # MSSA Bacteremia.  - source unknown however possibly 2/2 skin infection given pruritic rash   - cultures neg for 48 h   - continue with ancef 1g q24hr  - ID following   - GUANAKO negative for vegetations   - will need to replace HD femoral cath

## 2021-02-27 NOTE — PROGRESS NOTE ADULT - ASSESSMENT
Hemophilia A, gout, DRESS syndrome 2/2 allopurinol; MSSA BSI ?skin portal of entry; surveillance bcx 2/24, 2/26 ngtd. Continue cefazolin 1g IV q24h (ensure dosed after HD on HD days).  Dr. Edgar/ID Team 1 to resume care Monday 3/1

## 2021-02-27 NOTE — PROGRESS NOTE ADULT - PROBLEM SELECTOR PLAN 3
#Uremic pericarditis   - BUN >100 on presentation, initiating need to HD  - EKG with diffuse ST elevated and MD depressions c/w acute pericarditis     #pericardial effusion  CT abdomen showed trace pericaridal effusion. Likely 2/2 volume overload and fluid shifts from acute kidney failure  - No signs of tamponade or any of Valentin's Triad.  - continue with HD per renal recs  - trend BMP   - repeat EKG if any chest pain

## 2021-02-27 NOTE — PROGRESS NOTE ADULT - PROBLEM SELECTOR PLAN 2
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

## 2021-02-27 NOTE — PROGRESS NOTE ADULT - PROBLEM SELECTOR PLAN 9
# Hemophilia A.    Prior history of significant bleeding in past following ERCP.   -Heme following - follow recs   -Daily INR/PT/PTT and daily Factor VIII   -Factor VIII after procedure   -[facility]     #Anemia  - Hgb 11.5 on arrival with unclear baseline  - Like 2/2 acute kidney failure or dilutional due to volume overload.

## 2021-02-27 NOTE — PROGRESS NOTE ADULT - SUBJECTIVE AND OBJECTIVE BOX
CC: ACUTE KIDNEY INJURY        INTERVAL HISTORY: sitting in chair in NAD  offers no complaints      ROS: No chest pain, no sob, no abd pain. No n/v/d    PAST MEDICAL & SURGICAL HISTORY:  History of BPH    Gout    Hemophilia A    HTN (hypertension)    Uses cochlear implant        PHYSICAL EXAM:  T(C): 37.3 (02-27-21 @ 09:54), Max: 37.3 (02-27-21 @ 09:54)  HR: 84 (02-27-21 @ 08:00)  BP: 137/62 (02-27-21 @ 08:00) (112/62 - 144/67)  RR: 14 (02-27-21 @ 08:00)  SpO2: 93% (02-27-21 @ 08:00)  Wt(kg): --  I&O's Summary    26 Feb 2021 07:01  -  27 Feb 2021 07:00  --------------------------------------------------------  IN: 750 mL / OUT: 2275 mL / NET: -1525 mL      Weight 84.5 (02-22 @ 16:55)  General: AAO x 3,  NAD.  HEENT: moist mucous membranes, no pallor/cyanosis.  Neck: no JVD visible.  Cardiac: S1, S2. RRR. No murmurs   Respratory: CTA b/l, no access muscle use.   Abdomen: soft. nontender. nondistended  Skin: no rashes.  Extremities: + LE edema b/l  Access:  femoral catheter      DATA:                        9.8<L>  10.83<H> )-----------( 54<L>    ( 27 Feb 2021 06:26 )             30.1<L>        135    |  97     |  59<H>  ----------------------------<  131<H>  Ca:8.8   (27 Feb 2021 06:25)  4.2     |  29     |  3.15<H>      eGFR if Non : 18 <L>  eGFR if : 21 <L>    TPro  5.0<L>  /  Alb  2.6<L>  /  TBili  0.6    /  DBili  x      /  AST  20     /  ALT  16     /  AlkPhos  304<H>  27 Feb 2021 06:25                    MEDICATIONS  (STANDING):  ceFAZolin   IVPB 1000 milliGRAM(s) IV Intermittent every 24 hours  doxazosin 4 milliGRAM(s) Oral daily  furosemide   Injectable 80 milliGRAM(s) IV Push every 12 hours  midodrine. 10 milliGRAM(s) Oral every 8 hours  nystatin Cream 1 Application(s) Topical every 12 hours  pantoprazole    Tablet 40 milliGRAM(s) Oral before breakfast  polyethylene glycol 3350 17 Gram(s) Oral daily  PPD  5 Tuberculin Unit(s) Injectable 5 Unit(s) IntraDermal once  predniSONE   Tablet 60 milliGRAM(s) Oral daily  senna 2 Tablet(s) Oral at bedtime  sevelamer carbonate 800 milliGRAM(s) Oral three times a day with meals    MEDICATIONS  (PRN):

## 2021-02-27 NOTE — PROGRESS NOTE ADULT - SUBJECTIVE AND OBJECTIVE BOX
OVERNIGHT EVENTS: STEPHANIE on.     SUBJECTIVE / INTERVAL HPI: Patient seen and examined at bedside. He feels fine, was sleepy this AM, but otherwise denies pain at catheter site, chest pain, shortness of breath.     VITAL SIGNS:  Vital Signs Last 24 Hrs  T(C): 36.7 (27 Feb 2021 05:00), Max: 36.9 (26 Feb 2021 18:35)  T(F): 98 (27 Feb 2021 05:00), Max: 98.4 (26 Feb 2021 18:35)  HR: 96 (27 Feb 2021 05:40) (84 - 104)  BP: 136/64 (27 Feb 2021 05:40) (112/62 - 144/67)  BP(mean): 92 (27 Feb 2021 05:40) (84 - 97)  RR: 16 (27 Feb 2021 04:00) (16 - 18)  SpO2: 94% (27 Feb 2021 05:40) (94% - 100%)    PHYSICAL EXAM:    General: relatively well-appearing male comfortable in bed in NAD  HEENT: NC/AT; anicteric sclera; MMM  Cardiovascular: +S1/S2; RRR  Respiratory: CTA B/L; no W/R/R  Gastrointestinal: soft, NT/ND; +BSx4  Extremities: WWP; 2+ pitting edema bilateral lower extremities to the mid shins, slightly improved from yesterday  Vascular: 2+ radial, DP pulses B/L  Neurological: AAOx3; no focal deficits    MEDICATIONS:  MEDICATIONS  (STANDING):  ceFAZolin   IVPB 1000 milliGRAM(s) IV Intermittent every 24 hours  doxazosin 4 milliGRAM(s) Oral daily  furosemide   Injectable 80 milliGRAM(s) IV Push every 12 hours  midodrine. 10 milliGRAM(s) Oral every 8 hours  nystatin Cream 1 Application(s) Topical every 12 hours  pantoprazole    Tablet 40 milliGRAM(s) Oral before breakfast  polyethylene glycol 3350 17 Gram(s) Oral daily  PPD  5 Tuberculin Unit(s) Injectable 5 Unit(s) IntraDermal once  predniSONE   Tablet 60 milliGRAM(s) Oral daily  senna 2 Tablet(s) Oral at bedtime  sevelamer carbonate 800 milliGRAM(s) Oral three times a day with meals    MEDICATIONS  (PRN):      ALLERGIES:  Allergies    allopurinol (Other)    Intolerances        LABS:                        9.8    10.83 )-----------( 54       ( 27 Feb 2021 06:26 )             30.1     02-27    135  |  97  |  59<H>  ----------------------------<  131<H>  4.2   |  29  |  3.15<H>    Ca    8.8      27 Feb 2021 06:25  Phos  5.1     02-27  Mg     2.2     02-27    TPro  5.0<L>  /  Alb  2.6<L>  /  TBili  0.6  /  DBili  x   /  AST  20  /  ALT  16  /  AlkPhos  304<H>  02-27    PT/INR - ( 27 Feb 2021 06:25 )   PT: 14.9 sec;   INR: 1.26          PTT - ( 27 Feb 2021 06:25 )  PTT:34.9 sec    CAPILLARY BLOOD GLUCOSE          RADIOLOGY & ADDITIONAL TESTS: Reviewed.    ASSESSMENT:    PLAN:

## 2021-02-27 NOTE — PROGRESS NOTE ADULT - PROBLEM SELECTOR PLAN 4
- holding home Norvasc of 5 mg daily i/s/o hypotension   -c/w lasix 80IV BID    #Dress Syndrome  - Currently taking Prednisone 60 mg with improvement in rash   - Continue Prednisone 60 mg daily (DRESS requires taper over 8-12 weeks, will not decrease yet as still in renal failure likely 2/2 dress) - holding home Norvasc of 5 mg daily i/s/o hypotension   - c/w lasix 80IV BID    #Dress Syndrome  - Currently taking Prednisone 60 mg with improvement in rash   - Continue Prednisone 60 mg daily (DRESS requires taper over 8-12 weeks, will not decrease yet as still in renal failure likely 2/2 dress)

## 2021-02-27 NOTE — PROGRESS NOTE ADULT - PROBLEM SELECTOR PLAN 1
etiology is ATN vs AIN  now dialysis dependent  received HD yesterday with UF ~1L  femoral catheter to be removed today pending Factor VIII assay  continue Lasix 80mg IV q 12 - UO ~880cc/24 hours  will re-assess on daily basis regarding further dialytic intervention

## 2021-02-28 DIAGNOSIS — N18.6 END STAGE RENAL DISEASE: ICD-10-CM

## 2021-02-28 LAB
ANION GAP SERPL CALC-SCNC: 11 MMOL/L — SIGNIFICANT CHANGE UP (ref 5–17)
BLD GP AB SCN SERPL QL: NEGATIVE — SIGNIFICANT CHANGE UP
BUN SERPL-MCNC: 82 MG/DL — HIGH (ref 7–23)
CALCIUM SERPL-MCNC: 8.5 MG/DL — SIGNIFICANT CHANGE UP (ref 8.4–10.5)
CHLORIDE SERPL-SCNC: 96 MMOL/L — SIGNIFICANT CHANGE UP (ref 96–108)
CO2 SERPL-SCNC: 29 MMOL/L — SIGNIFICANT CHANGE UP (ref 22–31)
CREAT SERPL-MCNC: 3.97 MG/DL — HIGH (ref 0.5–1.3)
GLUCOSE SERPL-MCNC: 134 MG/DL — HIGH (ref 70–99)
GRAM STN FLD: SIGNIFICANT CHANGE UP
GRAM STN FLD: SIGNIFICANT CHANGE UP
HCT VFR BLD CALC: 29.4 % — LOW (ref 39–50)
HGB BLD-MCNC: 9.7 G/DL — LOW (ref 13–17)
MAGNESIUM SERPL-MCNC: 2.2 MG/DL — SIGNIFICANT CHANGE UP (ref 1.6–2.6)
MCHC RBC-ENTMCNC: 28.4 PG — SIGNIFICANT CHANGE UP (ref 27–34)
MCHC RBC-ENTMCNC: 33 GM/DL — SIGNIFICANT CHANGE UP (ref 32–36)
MCV RBC AUTO: 86.2 FL — SIGNIFICANT CHANGE UP (ref 80–100)
METHOD TYPE: SIGNIFICANT CHANGE UP
MSSA DNA SPEC QL NAA+PROBE: SIGNIFICANT CHANGE UP
NRBC # BLD: 0 /100 WBCS — SIGNIFICANT CHANGE UP (ref 0–0)
PHOSPHATE SERPL-MCNC: 5.1 MG/DL — HIGH (ref 2.5–4.5)
PLATELET # BLD AUTO: 62 K/UL — LOW (ref 150–400)
POTASSIUM SERPL-MCNC: 4.4 MMOL/L — SIGNIFICANT CHANGE UP (ref 3.5–5.3)
POTASSIUM SERPL-SCNC: 4.4 MMOL/L — SIGNIFICANT CHANGE UP (ref 3.5–5.3)
RBC # BLD: 3.41 M/UL — LOW (ref 4.2–5.8)
RBC # FLD: 13.7 % — SIGNIFICANT CHANGE UP (ref 10.3–14.5)
RH IG SCN BLD-IMP: POSITIVE — SIGNIFICANT CHANGE UP
SODIUM SERPL-SCNC: 136 MMOL/L — SIGNIFICANT CHANGE UP (ref 135–145)
WBC # BLD: 10.78 K/UL — HIGH (ref 3.8–10.5)
WBC # FLD AUTO: 10.78 K/UL — HIGH (ref 3.8–10.5)

## 2021-02-28 PROCEDURE — 99233 SBSQ HOSP IP/OBS HIGH 50: CPT | Mod: GC

## 2021-02-28 RX ORDER — MIDODRINE HYDROCHLORIDE 2.5 MG/1
10 TABLET ORAL EVERY 8 HOURS
Refills: 0 | Status: DISCONTINUED | OUTPATIENT
Start: 2021-02-28 | End: 2021-03-01

## 2021-02-28 RX ORDER — ACETAMINOPHEN 500 MG
650 TABLET ORAL ONCE
Refills: 0 | Status: COMPLETED | OUTPATIENT
Start: 2021-02-28 | End: 2021-02-28

## 2021-02-28 RX ADMIN — SEVELAMER CARBONATE 800 MILLIGRAM(S): 2400 POWDER, FOR SUSPENSION ORAL at 07:16

## 2021-02-28 RX ADMIN — Medication 80 MILLIGRAM(S): at 20:47

## 2021-02-28 RX ADMIN — MIDODRINE HYDROCHLORIDE 10 MILLIGRAM(S): 2.5 TABLET ORAL at 14:23

## 2021-02-28 RX ADMIN — NYSTATIN CREAM 1 APPLICATION(S): 100000 CREAM TOPICAL at 17:00

## 2021-02-28 RX ADMIN — Medication 100 MILLIGRAM(S): at 17:00

## 2021-02-28 RX ADMIN — POLYETHYLENE GLYCOL 3350 17 GRAM(S): 17 POWDER, FOR SOLUTION ORAL at 12:05

## 2021-02-28 RX ADMIN — PANTOPRAZOLE SODIUM 40 MILLIGRAM(S): 20 TABLET, DELAYED RELEASE ORAL at 05:57

## 2021-02-28 RX ADMIN — Medication 80 MILLIGRAM(S): at 09:04

## 2021-02-28 RX ADMIN — SENNA PLUS 2 TABLET(S): 8.6 TABLET ORAL at 20:47

## 2021-02-28 RX ADMIN — MIDODRINE HYDROCHLORIDE 10 MILLIGRAM(S): 2.5 TABLET ORAL at 05:55

## 2021-02-28 RX ADMIN — Medication 4 MILLIGRAM(S): at 05:58

## 2021-02-28 RX ADMIN — SEVELAMER CARBONATE 800 MILLIGRAM(S): 2400 POWDER, FOR SUSPENSION ORAL at 17:00

## 2021-02-28 RX ADMIN — Medication 650 MILLIGRAM(S): at 20:46

## 2021-02-28 RX ADMIN — Medication 60 MILLIGRAM(S): at 05:56

## 2021-02-28 RX ADMIN — SEVELAMER CARBONATE 800 MILLIGRAM(S): 2400 POWDER, FOR SUSPENSION ORAL at 12:05

## 2021-02-28 RX ADMIN — NYSTATIN CREAM 1 APPLICATION(S): 100000 CREAM TOPICAL at 05:58

## 2021-02-28 NOTE — PROGRESS NOTE ADULT - PROBLEM SELECTOR PLAN 1
# MSSA Bacteremia.  - source unknown; GUANAKO negative; no open sores; possibly 2/2 skin infection given pruritic rash   - last positive culture 2/26 therefore will send repeat today  - continue with ancef 1g q24hr  - ID following, will f/u recs  - will need to replace cath for HD to occur tomorrow however cannot get permcath as BCx still positive

## 2021-02-28 NOTE — PROGRESS NOTE ADULT - ASSESSMENT
Mr. Don is a 76 year old male with a past medical history Hemophilia A, HTN, Gout, and DRESS Syndrome who presents with weakness for 1 week. He was admitted for acute on chronic renal failure c/b anasarca requiring induction of hemodialysis. Transferred to MICU on 2/23 d/t afib with RVR and hypotension likely 2/2 uremic pericarditis, transferred to MICU for closer monitoring and pressure support while undergoing dialysis, now hemodynamically stable, on midodrine, and stepped down to tele, tolerating 3x sessions of dialysis off of pressors (has had 4 sessions total as of 2/26)

## 2021-02-28 NOTE — PROGRESS NOTE ADULT - SUBJECTIVE AND OBJECTIVE BOX
CC: ACUTE KIDNEY INJURY        INTERVAL HISTORY:  lying in bed in NAD      ROS: No chest pain, no sob, no abd pain. No n/v/d    PAST MEDICAL & SURGICAL HISTORY:  History of BPH    Gout    Hemophilia A    HTN (hypertension)    Uses cochlear implant        PHYSICAL EXAM:  T(C): 36.8 (02-28-21 @ 05:00), Max: 37.3 (02-27-21 @ 09:54)  HR: 100 (02-28-21 @ 04:25)  BP: 145/65 (02-28-21 @ 04:25) (93/52 - 145/65)  RR: 16 (02-28-21 @ 04:25)  SpO2: 94% (02-28-21 @ 04:25)  Wt(kg): --  I&O's Summary    27 Feb 2021 07:01  -  28 Feb 2021 07:00  --------------------------------------------------------  IN: 0 mL / OUT: 1400 mL / NET: -1400 mL      Weight 84.5 (02-22 @ 16:55)  General:,  NAD.  HEENT: moist mucous membranes, no pallor/cyanosis.  Neck: no JVD visible.  Cardiac: S1, S2. RRR. No murmurs   Respratory: CTA b/l, no access muscle use.   Abdomen: soft. nontender. nondistended  Skin: no rashes.  Extremities: +3 LE edema b/l  Access:       DATA:                        9.7<L>  10.78<H> )-----------( 62<L>    ( 28 Feb 2021 06:33 )             29.4<L>        136    |  96     |  82<H>  ----------------------------<  134<H>  Ca:8.5   (28 Feb 2021 06:33)  4.4     |  29     |  3.97<H>      eGFR if Non : 14 <L>  eGFR if : 16 <L>    TPro  5.0<L>  /  Alb  2.6<L>  /  TBili  0.6    /  DBili  x      /  AST  20     /  ALT  16     /  AlkPhos  304<H>  27 Feb 2021 06:25                    MEDICATIONS  (STANDING):  ceFAZolin   IVPB 1000 milliGRAM(s) IV Intermittent every 24 hours  doxazosin 4 milliGRAM(s) Oral daily  furosemide   Injectable 80 milliGRAM(s) IV Push every 12 hours  midodrine. 10 milliGRAM(s) Oral every 8 hours  nystatin Cream 1 Application(s) Topical every 12 hours  pantoprazole    Tablet 40 milliGRAM(s) Oral before breakfast  polyethylene glycol 3350 17 Gram(s) Oral daily  PPD  5 Tuberculin Unit(s) Injectable 5 Unit(s) IntraDermal once  predniSONE   Tablet 60 milliGRAM(s) Oral daily  senna 2 Tablet(s) Oral at bedtime  sevelamer carbonate 800 milliGRAM(s) Oral three times a day with meals    MEDICATIONS  (PRN):

## 2021-02-28 NOTE — PROGRESS NOTE ADULT - PROBLEM SELECTOR PLAN 6
RESOLVED  - patient hypotensive during afib w/RVR--> transferred to MICU   - likely 2/2 tachyarrhythmia elicited by uremic pericarditis   - currently off of pressors  - mentating at baseline, ABIGAIL

## 2021-02-28 NOTE — PROGRESS NOTE ADULT - PROBLEM SELECTOR PLAN 5
#Uremic pericarditis   - BUN >100 on presentation, initiating need to HD  - EKG with diffuse ST elevated and NM depressions c/w acute pericarditis     #pericardial effusion  CT abdomen showed trace pericardial effusion. Likely 2/2 volume overload and fluid shifts from acute kidney failure  - No signs of tamponade or any of Valentin's Triad.  - continue with HD per renal recs  - trend BMP   - repeat EKG if any chest pain

## 2021-02-28 NOTE — PROGRESS NOTE ADULT - PROBLEM SELECTOR PLAN 1
worsening non-oliguric RU  diuresing well with Lasix 80mg IV q12  will most likely need further clearance with HD given rising BUN/creatinine  will need new access tomorrow for next HD session

## 2021-02-28 NOTE — PROGRESS NOTE ADULT - PROBLEM SELECTOR PLAN 3
- holding home Norvasc of 5 mg daily i/s/o hypotension   - c/w lasix 80IV BID    #Dress Syndrome  - Currently taking Prednisone 60 mg with improvement in rash   - Continue Prednisone 60 mg daily (DRESS requires taper over 8-12 weeks, will not decrease yet as still in renal failure likely 2/2 dress)

## 2021-02-28 NOTE — PROGRESS NOTE ADULT - SUBJECTIVE AND OBJECTIVE BOX
OVERNIGHT EVENTS: No acute events, femoral cath site without bleeding or hematoma. 9PM midodrine held for BP of 143/67; BCx from 2/26 returned positive, culture labels sent to lab    SUBJECTIVE / INTERVAL HPI: Patient seen and examined at bedside. No chest pain or shortness of breath. No fevers or chills. No pain in legs, slight pain in feet to palpation, unchanged from prior    VITAL SIGNS:  Vital Signs Last 24 Hrs  T(C): 36.4 (28 Feb 2021 09:00), Max: 37.3 (27 Feb 2021 09:54)  T(F): 97.5 (28 Feb 2021 09:00), Max: 99.1 (27 Feb 2021 09:54)  HR: 90 (28 Feb 2021 08:18) (90 - 126)  BP: 146/63 (28 Feb 2021 08:18) (93/52 - 146/63)  BP(mean): 91 (28 Feb 2021 08:18) (70 - 97)  RR: 18 (28 Feb 2021 08:18) (14 - 18)  SpO2: 93% (28 Feb 2021 08:18) (93% - 97%)    PHYSICAL EXAM:    General: relatively well-appearing male comfortable in bed in NAD  HEENT: NC/AT; anicteric sclera; MMM  Cardiovascular: +S1/S2; RRR  Respiratory: CTA B/L; no W/R/R  Gastrointestinal: soft, NT/ND; +BSx4  Extremities: WWP; 2+ pitting edema bilateral lower extremities to the mid-shins  Vascular: 2+ radial, DP pulses B/L  Neurological: AAOx3; no focal deficits    MEDICATIONS:  MEDICATIONS  (STANDING):  ceFAZolin   IVPB 1000 milliGRAM(s) IV Intermittent every 24 hours  doxazosin 4 milliGRAM(s) Oral daily  furosemide   Injectable 80 milliGRAM(s) IV Push every 12 hours  midodrine. 10 milliGRAM(s) Oral every 8 hours  nystatin Cream 1 Application(s) Topical every 12 hours  pantoprazole    Tablet 40 milliGRAM(s) Oral before breakfast  polyethylene glycol 3350 17 Gram(s) Oral daily  PPD  5 Tuberculin Unit(s) Injectable 5 Unit(s) IntraDermal once  predniSONE   Tablet 60 milliGRAM(s) Oral daily  senna 2 Tablet(s) Oral at bedtime  sevelamer carbonate 800 milliGRAM(s) Oral three times a day with meals    MEDICATIONS  (PRN):      ALLERGIES:  Allergies    allopurinol (Other)    Intolerances        LABS:                        9.7    10.78 )-----------( 62       ( 28 Feb 2021 06:33 )             29.4     02-28    136  |  96  |  82<H>  ----------------------------<  134<H>  4.4   |  29  |  3.97<H>    Ca    8.5      28 Feb 2021 06:33  Phos  5.1     02-28  Mg     2.2     02-28    TPro  5.0<L>  /  Alb  2.6<L>  /  TBili  0.6  /  DBili  x   /  AST  20  /  ALT  16  /  AlkPhos  304<H>  02-27    PT/INR - ( 27 Feb 2021 06:25 )   PT: 14.9 sec;   INR: 1.26          PTT - ( 27 Feb 2021 06:25 )  PTT:34.9 sec    CAPILLARY BLOOD GLUCOSE          RADIOLOGY & ADDITIONAL TESTS: Reviewed.

## 2021-02-28 NOTE — PROGRESS NOTE ADULT - PROBLEM SELECTOR PLAN 8
#History of BPH.  Plan: Continue Doxazosin daily  - Monitor UOP and Bladder scan for any concerns of retention.    #Gout  Previously on allopurinol for gout prophylaxis; however, patient acquired DRESS from the therapy  - Monitor symptoms and avoid NSAIDs for pain.

## 2021-02-28 NOTE — PROGRESS NOTE ADULT - PROBLEM SELECTOR PLAN 2
#Tachycardia/ Afib w/RVR   - HR constantly in 110s per daughter  - Sinus tachycardia on EKG   - went into afib with RVR on 2/23--> transferred to MICU; s/p cardizem 5mg, and thought was that afib was 2/2 uremia  - ctm however has not been issue since stepdown back to [facility]

## 2021-02-28 NOTE — PROGRESS NOTE ADULT - ASSESSMENT
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 Hematologist is Dr. Grace. s/p HD catheter placement (02/22), complicated by hypotension during dialysis and MSSA bacteremia.     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 Factor VIII post-procedure was 78-83% which is acceptable. No significant bleeding observed by primary team.    - Continue to follow-up with Factor VIII once daily due to possible procedures.  - Maintain active T+S, transfuse if Hb < 7 or symptomatic   - Follow-up CBC and coagulation panel (PT/PTT) once daily  - Upon discharge, can follow-up with Dr. Grace (Hematologist)    #) Mild coagulopathy, possible inhibitor. Factor VII deficiency   - Mixing study (02/21) performed for mild coagulopathy showed possible presence of a delayed inhibitor due to lack of correction with 2 hr incubation. Follow-up inhibitor assay.     #) Thrombocytopenia, possibly sepsis-related and dialysis membrane effect, stable  - Peripheral blood smear (02/23) showed platelet clumping and no schistocytes. PLASMIC score is 4 low-risk for TTP. HIT score is 3-4, low-intermediate risk. PFT-HIT Ab negative. SORAYA negative.    - Trend CBC with differential once daily  - Maintain active T+S, transfuse if platelet < 10K, or < 20K if febrile or <50K if bleeding.   - Hold pharmacologic DVT ppx if platelets consistently < 50 K    Discussed with Dr. Tolbert

## 2021-02-28 NOTE — PROGRESS NOTE ADULT - ATTENDING COMMENTS
We evaluated this patient with hypertension, gout and DRESS (from Allopurinol).  HHe developed acute renal failure and underwent emergent dialysis.  Subsequently the patient was found to have MSSA bacteremia.  Transesophageal echocardiography did not demonstrate valvular vegetations.  The patient had a temporary catheter in the right groin.  His factor VIII levels yesterday were in an acceptable range.  We spoke to hematology and with their input, removed the catheter. No significant bleeding was observed.   He remains on prednisone at 60 mg/day.  There is a history of atrial fibrillation with rapid ventricular response rate.  We are closely monitoring his platelets.  His last platelet count was 62,000.  We have reviewed his chest x-ray from 2/22.  It shows a cutoff of the left lower lobe bronchus and potentially left lower lobe atelectasis.  We will do percussion therapy and will repeat the chest x-ray tomorrow.  He is not hypoxemic.  His heart rate is 110/min.  It appears to be sinus tachycardia.  We will use Lopressor if the heart rate remains rapid

## 2021-02-28 NOTE — PROGRESS NOTE ADULT - SUBJECTIVE AND OBJECTIVE BOX
LENGTH OF HOSPITAL STAY: 7d    SUBJECTIVE: Pulled out dialysis catheter without complication.      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 Hematologist is Dr. Grace. s/p HD catheter placement (02/22), complicated by hypotension during dialysis and MSSA bacteremia.       PAST MEDICAL & SURGICAL HISTORY:  History of BPH  Gout  Hemophilia A  HTN (hypertension)  Uses cochlear implant    ALLERGIES:  allopurinol (Other)    MEDICATIONS:  STANDING MEDICATIONS  ceFAZolin   IVPB 1000 milliGRAM(s) IV Intermittent every 24 hours  doxazosin 4 milliGRAM(s) Oral daily  furosemide   Injectable 80 milliGRAM(s) IV Push every 12 hours  midodrine. 10 milliGRAM(s) Oral every 8 hours  nystatin Cream 1 Application(s) Topical every 12 hours  pantoprazole    Tablet 40 milliGRAM(s) Oral before breakfast  polyethylene glycol 3350 17 Gram(s) Oral daily  PPD  5 Tuberculin Unit(s) Injectable 5 Unit(s) IntraDermal once  predniSONE   Tablet 60 milliGRAM(s) Oral daily  senna 2 Tablet(s) Oral at bedtime  sevelamer carbonate 800 milliGRAM(s) Oral three times a day with meals    PRN MEDICATIONS    VITALS:   T(F): 98.4  HR: 108  BP: 132/57  RR: 18  SpO2: 95%    LABS:                        9.7    10.78 )-----------( 62       ( 28 Feb 2021 06:33 )             29.4     02-28    136  |  96  |  82<H>  ----------------------------<  134<H>  4.4   |  29  |  3.97<H>    Ca    8.5      28 Feb 2021 06:33  Phos  5.1     02-28  Mg     2.2     02-28    TPro  5.0<L>  /  Alb  2.6<L>  /  TBili  0.6  /  DBili  x   /  AST  20  /  ALT  16  /  AlkPhos  304<H>  02-27    PT/INR - ( 27 Feb 2021 06:25 )   PT: 14.9 sec;   INR: 1.26        PTT - ( 27 Feb 2021 06:25 )  PTT:34.9 sec    Culture - Blood (collected 26 Feb 2021 23:31)  Source: .Blood Blood  Gram Stain (28 Feb 2021 06:31):    Aerobic Bottle: Gram Positive Cocci in Clusters    Result called to and read back by_ Ms. Anya Cobb RN ([id number])    02/28/2021 06:31:23    ***Blood Panel PCR results on this specimen are available    approximately 3 hours after the Gram stain result.***    Gram stain, PCR, and/or culture results may not always    correspond due to difference in methodologies.    ************************************************************    This PCR assay was performed using [employer].    The following targets are tested for: Enterococcus,    vancomycin resistant enterococci, Listeria monocytogenes,    coagulase negative staphylococci, S. aureus,    methicillin resistant S. aureus, Streptococcus agalactiae    (Group B), S. pneumoniae, S. pyogenes (Group A),    Acinetobacterbaumannii, Enterobacter cloacae, E. coli,    Klebsiella oxytoca, K. pneumoniae, Proteus sp.,    Serratia marcescens, Haemophilus influenzae,    Neisseria meningitidis, Pseudomonas aeruginosa, Candida    albicans, C. glabrata, C krusei, C parapsilosis,    C. tropicalis and the KPC resistance gene.  Preliminary Report (28 Feb 2021 06:31):    Culture in progress  Organism: Blood Culture PCR (28 Feb 2021 09:04)  Organism: Blood Culture PCR (28 Feb 2021 09:04)    Culture - Blood (collected 26 Feb 2021 07:24)  Source: .Blood Blood  Preliminary Report (28 Feb 2021 08:00):    No growth at 2 days.    RADIOLOGY:  Reviewed    PHYSICAL EXAM:  GEN: No acute distress  HEENT: NCAT  LUNGS: Clear to auscultation bilaterally   HEART: S1/S2 present. RRR.   ABD: Soft, non-tender, non-distended. Bowel sounds present  EXT: 2+ pitting edema  NEURO: AAOX3

## 2021-02-28 NOTE — PROGRESS NOTE ADULT - PROBLEM SELECTOR PLAN 9
# Hemophilia A.    Prior history of significant bleeding in past following ERCP.   -Heme following - follow recs   -Daily INR/PT/PTT and daily Factor VIII   -Factor VIII after procedure   -Follows with Dr. Grace at [facility].     #Anemia  - Hgb 11.5 on arrival with unclear baseline  - Like 2/2 acute kidney failure or dilutional due to volume overload.

## 2021-02-28 NOTE — PROGRESS NOTE ADULT - PROBLEM SELECTOR PLAN 4
Acute renal failure with anasarca.  Patient with no history of CKD prior to admission. 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 Creatine worsened with deranged electrolytes after being diagnosed with Dress Syndrome.   - Dialysis 2/23, required brief pressor support, however, after that session has tolerate well off of pressors for additional sessions  - on midodrine 10mg TID however AM dose 2/28 was held as pt hypertensive, will monitor and see if it can be d/c if pt persistently hypertensive given BPs stable during dialysis  - Factor VIII 25U/kg    - Recheck factor VIII level daily   - follow up renal recs

## 2021-03-01 ENCOUNTER — OUTPATIENT (OUTPATIENT)
Dept: OUTPATIENT SERVICES | Facility: HOSPITAL | Age: 76
LOS: 1 days | End: 2021-03-01
Payer: MEDICARE

## 2021-03-01 DIAGNOSIS — I99.8 OTHER DISORDER OF CIRCULATORY SYSTEM: ICD-10-CM

## 2021-03-01 DIAGNOSIS — Z96.21 COCHLEAR IMPLANT STATUS: Chronic | ICD-10-CM

## 2021-03-01 LAB
ALBUMIN SERPL ELPH-MCNC: 2.7 G/DL — LOW (ref 3.3–5)
ALP SERPL-CCNC: 320 U/L — HIGH (ref 40–120)
ALT FLD-CCNC: 11 U/L — SIGNIFICANT CHANGE UP (ref 10–45)
ANION GAP SERPL CALC-SCNC: 14 MMOL/L — SIGNIFICANT CHANGE UP (ref 5–17)
APTT BLD: 36 SEC — HIGH (ref 27.5–35.5)
AST SERPL-CCNC: 20 U/L — SIGNIFICANT CHANGE UP (ref 10–40)
BASOPHILS # BLD AUTO: 0 K/UL — SIGNIFICANT CHANGE UP (ref 0–0.2)
BASOPHILS NFR BLD AUTO: 0 % — SIGNIFICANT CHANGE UP (ref 0–2)
BILIRUB SERPL-MCNC: 0.6 MG/DL — SIGNIFICANT CHANGE UP (ref 0.2–1.2)
BUN SERPL-MCNC: 96 MG/DL — HIGH (ref 7–23)
CALCIUM SERPL-MCNC: 8.4 MG/DL — SIGNIFICANT CHANGE UP (ref 8.4–10.5)
CHLORIDE SERPL-SCNC: 97 MMOL/L — SIGNIFICANT CHANGE UP (ref 96–108)
CO2 SERPL-SCNC: 29 MMOL/L — SIGNIFICANT CHANGE UP (ref 22–31)
CREAT SERPL-MCNC: 4.5 MG/DL — HIGH (ref 0.5–1.3)
CULTURE RESULTS: SIGNIFICANT CHANGE UP
EOSINOPHIL # BLD AUTO: 0.34 K/UL — SIGNIFICANT CHANGE UP (ref 0–0.5)
EOSINOPHIL NFR BLD AUTO: 3.6 % — SIGNIFICANT CHANGE UP (ref 0–6)
FACT VIII ACT/NOR PPP: 57 % — SIGNIFICANT CHANGE UP (ref 51–138)
FACT VIII ACT/NOR PPP: 67 % — SIGNIFICANT CHANGE UP (ref 51–138)
FACT VIII ACT/NOR PPP: 67 % — SIGNIFICANT CHANGE UP (ref 51–138)
FACT VIII ACT/NOR PPP: 77 % — SIGNIFICANT CHANGE UP (ref 51–138)
GLUCOSE SERPL-MCNC: 109 MG/DL — HIGH (ref 70–99)
GRAM STN FLD: SIGNIFICANT CHANGE UP
HCT VFR BLD CALC: 29.2 % — LOW (ref 39–50)
HGB BLD-MCNC: 9.4 G/DL — LOW (ref 13–17)
IMM GRANULOCYTES NFR BLD AUTO: 0.6 % — SIGNIFICANT CHANGE UP (ref 0–1.5)
INR BLD: 1.25 — HIGH (ref 0.88–1.16)
LYMPHOCYTES # BLD AUTO: 0.56 K/UL — LOW (ref 1–3.3)
LYMPHOCYTES # BLD AUTO: 5.9 % — LOW (ref 13–44)
MAGNESIUM SERPL-MCNC: 2.4 MG/DL — SIGNIFICANT CHANGE UP (ref 1.6–2.6)
MCHC RBC-ENTMCNC: 27.6 PG — SIGNIFICANT CHANGE UP (ref 27–34)
MCHC RBC-ENTMCNC: 32.2 GM/DL — SIGNIFICANT CHANGE UP (ref 32–36)
MCV RBC AUTO: 85.6 FL — SIGNIFICANT CHANGE UP (ref 80–100)
MONOCYTES # BLD AUTO: 0.45 K/UL — SIGNIFICANT CHANGE UP (ref 0–0.9)
MONOCYTES NFR BLD AUTO: 4.7 % — SIGNIFICANT CHANGE UP (ref 2–14)
NEUTROPHILS # BLD AUTO: 8.09 K/UL — HIGH (ref 1.8–7.4)
NEUTROPHILS NFR BLD AUTO: 85.2 % — HIGH (ref 43–77)
NRBC # BLD: 0 /100 WBCS — SIGNIFICANT CHANGE UP (ref 0–0)
PHOSPHATE SERPL-MCNC: 5.1 MG/DL — HIGH (ref 2.5–4.5)
PLATELET # BLD AUTO: 73 K/UL — LOW (ref 150–400)
POTASSIUM SERPL-MCNC: 4.7 MMOL/L — SIGNIFICANT CHANGE UP (ref 3.5–5.3)
POTASSIUM SERPL-SCNC: 4.7 MMOL/L — SIGNIFICANT CHANGE UP (ref 3.5–5.3)
PROT SERPL-MCNC: 5.2 G/DL — LOW (ref 6–8.3)
PROTHROM AB SERPL-ACNC: 14.8 SEC — HIGH (ref 10.6–13.6)
RBC # BLD: 3.41 M/UL — LOW (ref 4.2–5.8)
RBC # FLD: 13.7 % — SIGNIFICANT CHANGE UP (ref 10.3–14.5)
SODIUM SERPL-SCNC: 140 MMOL/L — SIGNIFICANT CHANGE UP (ref 135–145)
SPECIMEN SOURCE: SIGNIFICANT CHANGE UP
WBC # BLD: 9.5 K/UL — SIGNIFICANT CHANGE UP (ref 3.8–10.5)
WBC # FLD AUTO: 9.5 K/UL — SIGNIFICANT CHANGE UP (ref 3.8–10.5)

## 2021-03-01 PROCEDURE — 99233 SBSQ HOSP IP/OBS HIGH 50: CPT | Mod: GC

## 2021-03-01 PROCEDURE — 99232 SBSQ HOSP IP/OBS MODERATE 35: CPT | Mod: GC

## 2021-03-01 PROCEDURE — G9005: CPT

## 2021-03-01 PROCEDURE — 99232 SBSQ HOSP IP/OBS MODERATE 35: CPT

## 2021-03-01 RX ORDER — MIDODRINE HYDROCHLORIDE 2.5 MG/1
7.5 TABLET ORAL EVERY 8 HOURS
Refills: 0 | Status: DISCONTINUED | OUTPATIENT
Start: 2021-03-01 | End: 2021-03-04

## 2021-03-01 RX ADMIN — Medication 80 MILLIGRAM(S): at 12:15

## 2021-03-01 RX ADMIN — POLYETHYLENE GLYCOL 3350 17 GRAM(S): 17 POWDER, FOR SOLUTION ORAL at 12:15

## 2021-03-01 RX ADMIN — MIDODRINE HYDROCHLORIDE 7.5 MILLIGRAM(S): 2.5 TABLET ORAL at 23:03

## 2021-03-01 RX ADMIN — PANTOPRAZOLE SODIUM 40 MILLIGRAM(S): 20 TABLET, DELAYED RELEASE ORAL at 06:29

## 2021-03-01 RX ADMIN — SEVELAMER CARBONATE 800 MILLIGRAM(S): 2400 POWDER, FOR SUSPENSION ORAL at 12:55

## 2021-03-01 RX ADMIN — Medication 60 MILLIGRAM(S): at 06:29

## 2021-03-01 RX ADMIN — NYSTATIN CREAM 1 APPLICATION(S): 100000 CREAM TOPICAL at 17:30

## 2021-03-01 RX ADMIN — NYSTATIN CREAM 1 APPLICATION(S): 100000 CREAM TOPICAL at 06:30

## 2021-03-01 RX ADMIN — SEVELAMER CARBONATE 800 MILLIGRAM(S): 2400 POWDER, FOR SUSPENSION ORAL at 10:29

## 2021-03-01 RX ADMIN — Medication 100 MILLIGRAM(S): at 17:00

## 2021-03-01 RX ADMIN — MIDODRINE HYDROCHLORIDE 7.5 MILLIGRAM(S): 2.5 TABLET ORAL at 14:07

## 2021-03-01 RX ADMIN — Medication 80 MILLIGRAM(S): at 23:02

## 2021-03-01 RX ADMIN — SEVELAMER CARBONATE 800 MILLIGRAM(S): 2400 POWDER, FOR SUSPENSION ORAL at 16:51

## 2021-03-01 RX ADMIN — SENNA PLUS 2 TABLET(S): 8.6 TABLET ORAL at 23:03

## 2021-03-01 RX ADMIN — Medication 4 MILLIGRAM(S): at 06:29

## 2021-03-01 NOTE — PROGRESS NOTE ADULT - SUBJECTIVE AND OBJECTIVE BOX
infectious diseases progress note:  YAIR FELDMAN is a 76yMale patient    Overnight events: None    Interval History: Patient seen and examined at bedside. Patient endorses fatigue, mild dyspnea, slight decrease in appetite and abdominal discomfort from excess flatulence. Patient denies fevers, chills, headache, vision changes, CP, palpitations, cough, sputum production, abdominal pain, N/V/D, pruritis and discomfort of skin.    ACUTE KIDNEY INJURY      ESRD (end stage renal disease)    BPH (benign prostatic hyperplasia)    Ascites    Shock    Pericarditis    Tachycardia    Bacteremia    Acute renal failure    Metabolic acidosis    DRESS syndrome    Acute kidney injury    Nutrition, metabolism, and development symptoms    Hemophilia A    History of BPH    Gout    HTN (hypertension)    Other ascites    Pericardial effusion    Hyperkalemia    Acute renal failure superimposed on stage 5 chronic kidney disease, not on chronic dialysis, unspecified acute renal failure type        ROS:  CONSTITUTIONAL:  Negative fever or chills  EYES:  Negative  blurry vision or double vision  CARDIOVASCULAR:  Negative for chest pain or palpitations  RESPIRATORY:  Negative for cough or wheezing  GASTROINTESTINAL:  Negative for nausea, vomiting, diarrhea, or abdominal pain  GENITOURINARY:  Negative frequency, urgency or dysuria  NEUROLOGIC:  No headache, confusion, dizziness, lightheadedness    Allergies    allopurinol (Other)    Intolerances        ANTIBIOTICS/RELEVANT:  antimicrobials  ceFAZolin   IVPB 1000 milliGRAM(s) IV Intermittent every 24 hours    immunologic:  PPD  5 Tuberculin Unit(s) Injectable 5 Unit(s) IntraDermal once    OTHER:  doxazosin 4 milliGRAM(s) Oral daily  furosemide   Injectable 80 milliGRAM(s) IV Push every 12 hours  midodrine. 7.5 milliGRAM(s) Oral every 8 hours  nystatin Cream 1 Application(s) Topical every 12 hours  pantoprazole    Tablet 40 milliGRAM(s) Oral before breakfast  polyethylene glycol 3350 17 Gram(s) Oral daily  predniSONE   Tablet 60 milliGRAM(s) Oral daily  senna 2 Tablet(s) Oral at bedtime  sevelamer carbonate 800 milliGRAM(s) Oral three times a day with meals      Objective:  Vital Signs Last 24 Hrs  T(C): 36.9 (01 Mar 2021 05:00), Max: 36.9 (28 Feb 2021 14:03)  T(F): 98.4 (01 Mar 2021 05:00), Max: 98.5 (01 Mar 2021 00:39)  HR: 114 (01 Mar 2021 09:28) (96 - 116)  BP: 91/51 (01 Mar 2021 09:28) (91/51 - 155/67)  BP(mean): 67 (01 Mar 2021 09:28) (67 - 100)  RR: 18 (01 Mar 2021 09:28) (18 - 18)  SpO2: 91% (01 Mar 2021 09:28) (91% - 96%)    PHYSICAL EXAM:  General: NAD, somnolent, sitting in chair   Skin: Extremities with dry scales and healing linear and circular scabs with no signs of warmth, purulence, drainage  HEENT: Conjunctiva clear, sclera white, PERRLA  Cardiac: S1 and S1 clear. No murmurs, rubs or gallops  Lungs: Unlabored breathing, no accessory muscle use, vesicular b/l   Abdomen: Distended, soft, non-tender, normoactive BS in all four quadrants  Peripheral extremities: 1+ pitting edema in lower extremities b/l, 2+ DP and PT pulses b/l   Neurological: A&Ox3      LABS:                        9.4    9.50  )-----------( 73       ( 01 Mar 2021 07:27 )             29.2     03-01    140  |  97  |  96<H>  ----------------------------<  109<H>  4.7   |  29  |  4.50<H>    Ca    8.4      01 Mar 2021 07:26  Phos  5.1     03-01  Mg     2.4     03-01    TPro  5.2<L>  /  Alb  2.7<L>  /  TBili  0.6  /  DBili  x   /  AST  20  /  ALT  11  /  AlkPhos  320<H>  03-01    PT/INR - ( 01 Mar 2021 07:28 )   PT: 14.8 sec;   INR: 1.25          PTT - ( 01 Mar 2021 07:28 )  PTT:36.0 sec        MICROBIOLOGY:  Culture Results:   No growth at 12 hours (02-28 @ 17:17)  Culture Results:   No growth at 12 hours (02-28 @ 17:17)        RADIOLOGY & ADDITIONAL STUDIES:

## 2021-03-01 NOTE — PROGRESS NOTE ADULT - PROBLEM SELECTOR PLAN 1
# MSSA Bacteremia.  - source unknown; GUANAKO negative; no open sores; possibly 2/2 skin infection given pruritic rash   - last positive culture 2/26 therefore still sending BCx to obtain   - continue with ancef 1g q24hr  - ID following, will f/u recs  - will need to replace cath for HD to occur tomorrow however cannot get permcath as BCx still positive # MSSA Bacteremia.  - source unknown; GUANAKO negative; no open sores; possibly 2/2 skin infection given pruritic rash   - last positive culture 2/26 therefore still sending BCx daily until they are negative x72hrs  - continue with ancef 1g q24hr  - ID following, will f/u recs  - needs permacath for HD however cannot get yet therefore if needs HD may need temp cath

## 2021-03-01 NOTE — PROGRESS NOTE ADULT - PROBLEM SELECTOR PLAN 9
# Hemophilia A.    Prior history of significant bleeding in past following ERCP.   -Heme following - follow recs   -Daily INR/PT/PTT and daily Factor VIII   -Factor VIII after procedure   -Follows with Dr. Grace at [facility].     #Anemia  - Hgb 11.5 on arrival with unclear baseline  - Like 2/2 acute kidney failure or dilutional due to volume overload. # Hemophilia A.    Prior history of significant bleeding in past following ERCP.   -Heme following - follow recs   -Daily INR/PT/PTT and daily Factor VIII   -Follows with Dr. Grace at [facility].     #Anemia  - Hgb 11.5 on arrival with unclear baseline  - Like 2/2 acute kidney failure or dilutional due to volume overload.

## 2021-03-01 NOTE — PROGRESS NOTE ADULT - ASSESSMENT
75 yo M with PMHx of CKD (recent Cr 6.6 at [facility]), Hemophilia A, HTN, Gout, BPH and DRESS Syndrome (on Prednisone 60mg QD) consulted to ID for MSSA bacteremia. Patient currently on cefazolin 1g q 24hrs which was chosen over Nafcillin in the setting of severe fluid overload since Cefazolin is administered with less fluid. Source of MSSA unknown. Possible source from skin as patient is immunocompromised from chronic steroids and broke skin barrier scratching the DRESS syndrome rash which created multiple lesions. Unlikely lung source since no cough, sputum production and CXR does not show any lung field opacities. Prior SOB and prior need for 3L NC likely due to pleural effusion noted on CXR in setting of severe fluid overload secondary to CKD. Weakly positive UA, however unlikely  source as patient does not have urinary symptoms and staph aureus usually does not commonly arise from urinary sources. Patient afebrile with WBC 9.5 (3/1) which is downtrending from 2/28 (10.78).  TTE (2/22) showed no valvular disease and GUANAKO (2/26) had no vegetations on heart valves. Surveillance blood cultures from 2/22, 2/23 and 2/26 grew MSSA. Surveillance blood cultures from 2/28 have no growth at 12 hours.     Plan:  - C/w cefazolin 1g IV q 24hrs   - Recommend Gallium scan - Since MSSA is prone to seeding, gallium scan is warranted due to continuously positive blood cultures    - Surveillance blood cultures on 3/1   - F/u surveillance blood cultures 2/28   - Recommend HD permacath after 72 hours of negative blood cultures to prevent seeding of MSSA. Okay for temporary catheter exchange if needed in interim.    ID Team 1 will follow.    Discussed with Infectious Disease Attending Dr. Edgar. Recommendations are considered final after attending attestation.

## 2021-03-01 NOTE — PROGRESS NOTE ADULT - PROBLEM SELECTOR PLAN 10
E: Lokelma for K >5.5;  N: Renal diet  DVT: SCDs, no AC given thrombocytopenia and hemophilia   GI: none.  Dispo: ADELINE

## 2021-03-01 NOTE — CONSULT NOTE ADULT - ASSESSMENT
Patient with:  1) Hemophylia A level of factorVlll between 57 and 67 acceptable after 2 factor Vlll transfusions  2) Normocytic anemia in CRF, septic patient, stable anemia    Plan:  anemia w/u ( Vitamin B12/folate, iron studies  continue to monitor coags (PT,PTT,INR)        Caroline Rasheed MD

## 2021-03-01 NOTE — PROGRESS NOTE ADULT - PROBLEM SELECTOR PLAN 2
#Tachycardia/ Afib w/RVR   - HR constantly in 110s per daughter  - Sinus tachycardia on EKG   - went into afib with RVR on 2/23--> transferred to MICU; s/p cardizem 5mg, and thought was that afib was 2/2 uremia  - ctm however has not been issue since stepdown back to [facility] #Tachycardia/ Afib w/RVR   - HR constantly in 110s per daughter  - Sinus tachycardia on EKG   - ~1 week ago had episode of afib rvr, requiring 1-day stay in MICU; resolved and has not had any further episodes

## 2021-03-01 NOTE — PROGRESS NOTE ADULT - SUBJECTIVE AND OBJECTIVE BOX
Patient is a 76y Male seen and evaluated at bedside. Staph bacteraemia on cx 2/26. Last HD 2/26- fem cath removed. Non oliguric. Lytes, bicarb, volume acceptable. Not uraemic. Worsening BUN/creat. Will defer HD today, may require temp HD cath for dialysis this week if cx remain positive.     Meds:    ceFAZolin   IVPB 1000 every 24 hours  doxazosin 4 daily  furosemide   Injectable 80 every 12 hours  midodrine. 7.5 every 8 hours  nystatin Cream 1 every 12 hours  pantoprazole    Tablet 40 before breakfast  polyethylene glycol 3350 17 daily  PPD  5 Tuberculin Unit(s) Injectable 5 once  predniSONE   Tablet 60 daily  senna 2 at bedtime  sevelamer carbonate 800 three times a day with meals      T(C): , Max: 36.9 (02-28-21 @ 14:03)  T(F): , Max: 98.5 (03-01-21 @ 00:39)  HR: 114 (03-01-21 @ 09:28)  BP: 91/51 (03-01-21 @ 09:28)  BP(mean): 67 (03-01-21 @ 09:28)  RR: 18 (03-01-21 @ 09:28)  SpO2: 91% (03-01-21 @ 09:28)  Wt(kg): --    02-28 @ 07:01  -  03-01 @ 07:00  --------------------------------------------------------  IN: 250 mL / OUT: 800 mL / NET: -550 mL      Review of Systems:  RESPIRATORY: No shortness of breath  CARDIOVASCULAR: No chest pain  MUSCULOSKELETAL: No edema    PHYSICAL EXAM:  GENERAL: well-developed, well nourished, alert, no acute distress at present on RA  CHEST/LUNG: Clear to auscultation bilaterally  HEART: normal S1S2, RRR  ABDOMEN: Soft, Nontender  EXTREMITIES: No oedema       LABS:                        9.4    9.50  )-----------( 73       ( 01 Mar 2021 07:27 )             29.2     03-01    140  |  97  |  96<H>  ----------------------------<  109<H>  4.7   |  29  |  4.50<H>    Ca    8.4      01 Mar 2021 07:26  Phos  5.1     03-01  Mg     2.4     03-01    TPro  5.2<L>  /  Alb  2.7<L>  /  TBili  0.6  /  DBili  x   /  AST  20  /  ALT  11  /  AlkPhos  320<H>  03-01      PT/INR - ( 01 Mar 2021 07:28 )   PT: 14.8 sec;   INR: 1.25          PTT - ( 01 Mar 2021 07:28 )  PTT:36.0 sec          RADIOLOGY & ADDITIONAL STUDIES:

## 2021-03-01 NOTE — PROGRESS NOTE ADULT - ATTENDING COMMENTS
reviewed pertinent data , chart note  patient seen and examined overnight   PE as above    1. followup repeat blood ctxs, c/w ceftriaxone, followup ID and renal recs re: HD cath placement  2. RU: monitor renal function, followup renal recs; holding off on home BP meds, c/w lasix and midodrine  3. monitor Factor VIII levels  4. c/w prednisone for DRESS        rest of  plan as above

## 2021-03-01 NOTE — PROGRESS NOTE ADULT - PROBLEM SELECTOR PLAN 1
# MSSA Bacteremia.  - source unknown; GUANAKO negative; no open sores; possibly 2/2 skin infection given pruritic rash   - last positive culture 2/26 therefore still sending BCx daily until they are negative x72hrs  - continue with ancef 1g q24hr  - ID following, will f/u recs  - needs permacath for HD however cannot get yet therefore if needs HD may need temp cath # MSSA Bacteremia.  - source unknown; GUANAKO negative; no open sores; possibly 2/2 skin infection given pruritic rash   - last positive culture 2/26. sending BCx daily until they are negative x72hrs  - continue with ancef 1g q24hr  - ID following, will f/u recs  - needs permacath for HD however cannot get yet therefore if needs HD may need temp cath

## 2021-03-01 NOTE — PROGRESS NOTE ADULT - PROBLEM SELECTOR PLAN 5
During first episode of dialysis over 1 week ago, pt became hypotensive, requiring transfer to MICU for dialysis. Tolerated dialysis 1x on pressors and 1x off and was transferred back to [facility] on midodrine 10mg TID  - midodrine tapered to 7.5 mg TID with hold parameters in place to not give for SBP>140  - continue to taper

## 2021-03-01 NOTE — CHART NOTE - NSCHARTNOTEFT_GEN_A_CORE
Admitting Diagnosis:   Patient is a 76y old  Male who presents with a chief complaint of Weakness; Worsening CKD (01 Mar 2021 12:57)      PAST MEDICAL & SURGICAL HISTORY:  History of BPH  Gout  Hemophilia A  HTN (hypertension)  Uses cochlear implant        Current Nutrition Order:  Renal Diet      PO Intake: Good (%) [   ]  Fair (50-75%) [   ] Poor (<25%) [ X  ]    GI Issues: No complaints of N/V  +Flatus, -minimal BM     Pain: He has mouth pain from dentures?    Skin Integrity: Ab 16; B/L LE 2/3+ edema  Sacrum DTI    Labs:   03-01    140  |  97  |  96<H>  ----------------------------<  109<H>  4.7   |  29  |  4.50<H>    Ca    8.4      01 Mar 2021 07:26  Phos  5.1     03-01  Mg     2.4     03-01    TPro  5.2<L>  /  Alb  2.7<L>  /  TBili  0.6  /  DBili  x   /  AST  20  /  ALT  11  /  AlkPhos  320<H>  03-01    CAPILLARY BLOOD GLUCOSE          Medications:  MEDICATIONS  (STANDING):  ceFAZolin   IVPB 1000 milliGRAM(s) IV Intermittent every 24 hours  doxazosin 4 milliGRAM(s) Oral daily  furosemide   Injectable 80 milliGRAM(s) IV Push every 12 hours  midodrine. 7.5 milliGRAM(s) Oral every 8 hours  nystatin Cream 1 Application(s) Topical every 12 hours  pantoprazole    Tablet 40 milliGRAM(s) Oral before breakfast  polyethylene glycol 3350 17 Gram(s) Oral daily  PPD  5 Tuberculin Unit(s) Injectable 5 Unit(s) IntraDermal once  predniSONE   Tablet 60 milliGRAM(s) Oral daily  senna 2 Tablet(s) Oral at bedtime  sevelamer carbonate 800 milliGRAM(s) Oral three times a day with meals    MEDICATIONS  (PRN):      Weight: 84.5kg (admit)  81.5kg (2/25, dry)  77.6kg (2/26, dry)    Weight Change: 7kg weight loss since admit 2/2 fluid loss from HD     Nutrition Focused Physical Exam: Completed [ X- please see malnutrition risk note  ]  Not Pertinent [   ]    Estimated energy needs: Height 68"; ABW 77.6kg (2/26); IBW 69.8kg; 111%IBW  ActualBW used for calculations as pt between % of IBW. Needs estimated for age and adjusted for HD, malnutrition. Fluids per team 2/2 HD, edema  Calories: 30-35 kcal/kg = 9645-0343 kcal/day  Protein: 1.2-1.4 g/kg = 93-109g protein/day  Fluids per team     Subjective: 76 year old male with a past medical history Hemophilia A, HTN, Gout, and DRESS Syndrome who presents with weakness for 1 week. He was admitted for acute on chronic renal failure c/b anasarca and likely induction of hemodialysis. Transferred to MICU on 2/23 d/t afib with RVR and hypotension for closer monitoring and pressor support while undergoing dialysis.    Previous Nutrition Diagnosis:  Increased Nutrient Needs...     Etiology RT increased demand for protein-calorie intake.     Signs/Symptoms AEB HD.   Active [   ]  Resolved [   ]    If resolved, new PES:     Goal:    Recommendations:    Education: CKD/HD diet handout provided to pt and spoke w/pt's daughter over phone explaining dietary modifications     Risk Level: High [  X ] Moderate [   ] Low [   ] Admitting Diagnosis:   Patient is a 76y old  Male who presents with a chief complaint of Weakness; Worsening CKD (01 Mar 2021 12:57)      PAST MEDICAL & SURGICAL HISTORY:  History of BPH  Gout  Hemophilia A  HTN (hypertension)  Uses cochlear implant        Current Nutrition Order:  Renal Diet      PO Intake: Good (%) [   ]  Fair (50-75%) [   ] Poor (<25%) [ X  ]    GI Issues: No complaints of N/V  +Flatus, -minimal BM     Pain: He has mouth pain from dentures?    Skin Integrity: Ab 16; B/L LE 2/3+ edema  Sacrum DTI    Labs:   03-01    140  |  97  |  96<H>  ----------------------------<  109<H>  4.7   |  29  |  4.50<H>    Ca    8.4      01 Mar 2021 07:26  Phos  5.1     03-01  Mg     2.4     03-01    TPro  5.2<L>  /  Alb  2.7<L>  /  TBili  0.6  /  DBili  x   /  AST  20  /  ALT  11  /  AlkPhos  320<H>  03-01    CAPILLARY BLOOD GLUCOSE          Medications:  MEDICATIONS  (STANDING):  ceFAZolin   IVPB 1000 milliGRAM(s) IV Intermittent every 24 hours  doxazosin 4 milliGRAM(s) Oral daily  furosemide   Injectable 80 milliGRAM(s) IV Push every 12 hours  midodrine. 7.5 milliGRAM(s) Oral every 8 hours  nystatin Cream 1 Application(s) Topical every 12 hours  pantoprazole    Tablet 40 milliGRAM(s) Oral before breakfast  polyethylene glycol 3350 17 Gram(s) Oral daily  PPD  5 Tuberculin Unit(s) Injectable 5 Unit(s) IntraDermal once  predniSONE   Tablet 60 milliGRAM(s) Oral daily  senna 2 Tablet(s) Oral at bedtime  sevelamer carbonate 800 milliGRAM(s) Oral three times a day with meals    MEDICATIONS  (PRN):      Weight: 84.5kg (admit)  81.5kg (2/25, dry)  77.6kg (2/26, dry)    Weight Change: 7kg weight loss since admit 2/2 fluid loss from HD     Nutrition Focused Physical Exam: Completed [ X- please see malnutrition risk note  ]  Not Pertinent [   ]    Estimated energy needs: Height 68"; ABW 77.6kg (2/26); IBW 69.8kg; 111%IBW  ActualBW used for calculations as pt between % of IBW. Needs estimated for age and adjusted for HD, malnutrition. Fluids per team 2/2 HD, edema  Calories: 30-35 kcal/kg = 0406-3487 kcal/day  Protein: 1.2-1.4 g/kg = 93-109g protein/day  Fluids per team     Subjective: 76 year old male with a past medical history Hemophilia A, HTN, Gout, and DRESS Syndrome who presents with weakness for 1 week. He was admitted for acute on chronic renal failure c/b anasarca and likely induction of hemodialysis. Transferred to MICU on 2/23 d/t afib with RVR and hypotension for closer monitoring and pressor support while undergoing dialysis. Blood cultures on 2/26 +MSSA bacteremia, but no source identified. GUANAKO negative. Blood cultures from 2/28 w/NG x 12hrs. Pt seen on [facility] [place], awake, alert, OOB in chair. Pt w/very poor appetite and mouth pain/discomfort 2/2 sores (possibly from mouth piece during GUANAKO). Pt denied N/V but passing lots of gas. Small BM noted. Pt denied any specific pain except for in mouth. Pt feeling overall unwell. Last HD 2/26- pending clearance of blood cultures prior to permacath placement. Afebrile. Phos 5.1 (H), BUN 96/Cr 4.5 (H, trending up). Will continue to follow per RD protocol.     Previous Nutrition Diagnosis:  Increased Nutrient Needs RT increased demand for protein-calorie intake AEB HD.   Active [ X  ]  Resolved [   ]    If resolved, new PES:     Goal: Pt will consistently meet >50% of EER     Recommendations:  1. Encourage PO intake and liberalize diet as feasible. Consider addition of Nepro 1x/day (425kcal, 19g pro) and appetite stimulant   2. Monitor lytes and replete prn. Continue with phos binder   3. Pain and bowel regimens per team   4. Trend dry wts     Education: CKD/HD diet handout provided to pt and spoke w/pt's daughter over phone explaining dietary modifications     Risk Level: High [  X ] Moderate [   ] Low [   ]

## 2021-03-01 NOTE — PROGRESS NOTE ADULT - ATTENDING COMMENTS
Comfortable, platelets, factor VII levels better.  Cultures from 2/26 positive, subsequent negative.  Plans as outlined above.

## 2021-03-01 NOTE — PROGRESS NOTE ADULT - ATTENDING COMMENTS
volume , lytes ok  cont to monitor re need for further HD - no needed yet. urine output improved-- hopefully will have improvement in renal fxn soon

## 2021-03-01 NOTE — PROGRESS NOTE ADULT - SUBJECTIVE AND OBJECTIVE BOX
LENGTH OF HOSPITAL STAY: 8d    SUBJECTIVE: No bleeding reported this morning.     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 Hematologist is Dr. Grace. s/p HD catheter placement (02/22), complicated by hypotension during dialysis and MSSA bacteremia.       PAST MEDICAL & SURGICAL HISTORY:  History of BPH  Gout  Hemophilia A  HTN (hypertension)  Uses cochlear implant    ALLERGIES:  allopurinol (Other)    MEDICATIONS:  MEDICATIONS  (STANDING):  ceFAZolin   IVPB 1000 milliGRAM(s) IV Intermittent every 24 hours  doxazosin 4 milliGRAM(s) Oral daily  furosemide   Injectable 80 milliGRAM(s) IV Push every 12 hours  midodrine. 7.5 milliGRAM(s) Oral every 8 hours  nystatin Cream 1 Application(s) Topical every 12 hours  pantoprazole    Tablet 40 milliGRAM(s) Oral before breakfast  polyethylene glycol 3350 17 Gram(s) Oral daily  PPD  5 Tuberculin Unit(s) Injectable 5 Unit(s) IntraDermal once  predniSONE   Tablet 60 milliGRAM(s) Oral daily  senna 2 Tablet(s) Oral at bedtime  sevelamer carbonate 800 milliGRAM(s) Oral three times a day with meals    MEDICATIONS  (PRN):    VITALS:   Vital Signs Last 24 Hrs  T(C): 36.7 (01 Mar 2021 09:49), Max: 36.9 (28 Feb 2021 14:03)  T(F): 98 (01 Mar 2021 09:49), Max: 98.5 (01 Mar 2021 00:39)  HR: 114 (01 Mar 2021 09:28) (96 - 116)  BP: 91/51 (01 Mar 2021 09:28) (91/51 - 155/67)  BP(mean): 67 (01 Mar 2021 09:28) (67 - 100)  RR: 18 (01 Mar 2021 09:28) (18 - 18)  SpO2: 91% (01 Mar 2021 09:28) (91% - 96%)    LABS:                          9.4    9.50  )-----------( 73       ( 01 Mar 2021 07:27 )             29.2     03-01    140  |  97  |  96<H>  ----------------------------<  109<H>  4.7   |  29  |  4.50<H>    Ca    8.4      01 Mar 2021 07:26  Phos  5.1     03-01  Mg     2.4     03-01    TPro  5.2<L>  /  Alb  2.7<L>  /  TBili  0.6  /  DBili  x   /  AST  20  /  ALT  11  /  AlkPhos  320<H>  03-01    PT/INR - ( 01 Mar 2021 07:28 )   PT: 14.8 sec;   INR: 1.25        PTT - ( 01 Mar 2021 07:28 )  PTT:36.0 sec  Factor VIII Assay: 57% (03.01.21 @ 07:28)  RADIOLOGY:  Reviewed    PHYSICAL EXAM:  GEN: No acute distress  HEENT: NCAT  LUNGS: Clear to auscultation bilaterally   HEART: S1/S2 present. RRR.   ABD: Soft, non-tender, non-distended. Bowel sounds present  EXT: 2+ pitting edema  NEURO: AAOX3

## 2021-03-01 NOTE — PROGRESS NOTE ADULT - ASSESSMENT
Mr. Don is a 76 year old male with a past medical history Hemophilia A, HTN, Gout, and DRESS Syndrome who presents with weakness for 1 week. He was admitted for acute on chronic renal failure c/b anasarca requiring induction of hemodialysis. Transferred to MICU on 2/23 d/t afib with RVR and hypotension likely 2/2 uremic pericarditis, transferred to MICU for closer monitoring and pressure support while undergoing dialysis, now hemodynamically stable, on midodrine, and stepped down to tele, tolerating 3x sessions of dialysis off of pressors (has had 4 sessions total as of 2/26).

## 2021-03-01 NOTE — PROGRESS NOTE ADULT - PROBLEM SELECTOR PLAN 6
RESOLVED  - patient hypotensive during afib w/RVR--> transferred to MICU   - likely 2/2 tachyarrhythmia elicited by uremic pericarditis   - currently off of pressors  - mentating at baseline, ABIGAIL #Uremic pericarditis   - BUN >100 on presentation, initiating need to HD  - EKG with diffuse ST elevated and OR depressions c/w acute pericarditis  - repeat EKG for any chest pain

## 2021-03-01 NOTE — PROGRESS NOTE ADULT - ASSESSMENT
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 Hematologist is Dr. Grace. s/p HD catheter placement (02/22), complicated by hypotension during dialysis and MSSA bacteremia.     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 Factor VIII 57% which is acceptable. No significant bleeding observed by primary team.    - Continue to follow-up with Factor VIII once daily due to possible procedures.  - Maintain active T+S, transfuse if Hb < 7 or symptomatic   - Follow-up CBC and coagulation panel (PT/PTT) once daily  - Upon discharge, can follow-up with Dr. Grace (Hematologist)    #) Mild coagulopathy, possible inhibitor. Factor VII deficiency   - Mixing study (02/21) performed for mild coagulopathy showed possible presence of a delayed inhibitor due to lack of correction with 2 hr incubation. Follow-up inhibitor assay.     #) Thrombocytopenia, possibly sepsis-related and dialysis membrane effect, improving  - Peripheral blood smear (02/23) showed platelet clumping and no schistocytes. PLASMIC score is 4 low-risk for TTP. HIT score is 3-4, low-intermediate risk. PFT-HIT Ab negative. SORAYA negative.    - Trend CBC with differential once daily  - Maintain active T+S, transfuse if platelet < 10K, or < 20K if febrile or <50K if bleeding.   - Hold pharmacologic DVT ppx if platelets consistently < 50 K    #) Anemia   - secondary to chronic renal disease  - check B12, folate and iron studies     To Discuss with Dr. 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 Hematologist is Dr. Grace. s/p HD catheter placement (02/22), complicated by hypotension during dialysis and MSSA bacteremia.     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 Factor VIII 57% which is acceptable. No significant bleeding observed by primary team.    - Continue to follow-up with Factor VIII once daily due to possible procedures.  - Maintain active T+S, transfuse if Hb < 7 or symptomatic   - Follow-up CBC and coagulation panel (PT/PTT) once daily  - Upon discharge, can follow-up with Dr. Grace (Hematologist)    #) Mild coagulopathy, possible inhibitor. Factor VII deficiency   - Mixing study (02/21) performed for mild coagulopathy showed possible presence of a delayed inhibitor due to lack of correction with 2 hr incubation. Follow-up inhibitor assay.     #) Thrombocytopenia, possibly sepsis-related and dialysis membrane effect, improving  - Peripheral blood smear (02/23) showed platelet clumping and no schistocytes. PLASMIC score is 4 low-risk for TTP. HIT score is 3-4, low-intermediate risk. PFT-HIT Ab negative. SORAYA negative.    - Trend CBC with differential once daily  - Maintain active T+S, transfuse if platelet < 10K, or < 20K if febrile or <50K if bleeding.   - Hold pharmacologic DVT ppx if platelets consistently < 50 K    #) Anemia   - secondary to chronic renal disease  - check B12, folate and iron studies     Discussed with Dr. Rasheed

## 2021-03-01 NOTE — PROGRESS NOTE ADULT - PROBLEM SELECTOR PLAN 3
- holding home Norvasc of 5 mg daily i/s/o hypotension   - c/w lasix 80IV BID    #Dress Syndrome  - Currently taking Prednisone 60 mg with improvement in rash   - Continue Prednisone 60 mg daily (DRESS requires taper over 8-12 weeks, will not decrease yet as still in renal failure likely 2/2 dress) During first episode of dialysis over 1 week ago, pt became hypotensive, requiring transfer to MICU for dialysis. Tolerated dialysis 1x on pressors and 1x off and was transferred back to [facility] on midodrine 10mg TID  - BPs have been okay during dialysis and while on the floor  - currently tapering midodrine (now to 7.5mg TID) with hold parameters in place to not give for SBP>140  - continue to taper

## 2021-03-01 NOTE — PROGRESS NOTE ADULT - PROBLEM SELECTOR PLAN 5
#Uremic pericarditis   - BUN >100 on presentation, initiating need to HD  - EKG with diffuse ST elevated and ND depressions c/w acute pericarditis     #pericardial effusion  CT abdomen showed trace pericardial effusion. Likely 2/2 volume overload and fluid shifts from acute kidney failure  - No signs of tamponade or any of Valentin's Triad.  - continue with HD per renal recs  - trend BMP   - repeat EKG if any chest pain Acute renal failure with anasarca.  Patient with no history of CKD prior to admission. 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 Creatine worsened with deranged electrolytes after being diagnosed with Dress Syndrome.   - Dialysis 2/23, required brief pressor support, however, after that session has tolerate well off of pressors for additional sessions  - on midodrine 10mg TID however AM dose 2/28 was held as pt hypertensive, will monitor and see if it can be d/c if pt persistently hypertensive given BPs stable during dialysis  - Factor VIII 25U/kg    - Recheck factor VIII level daily   - follow up renal recs

## 2021-03-01 NOTE — PROGRESS NOTE ADULT - PROBLEM SELECTOR PLAN 4
Acute renal failure with anasarca.  Patient with no history of CKD prior to admission. 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 Creatine worsened with deranged electrolytes after being diagnosed with Dress Syndrome.   - Dialysis 2/23, required brief pressor support, however, after that session has tolerate well off of pressors for additional sessions  - on midodrine 10mg TID however AM dose 2/28 was held as pt hypertensive, will monitor and see if it can be d/c if pt persistently hypertensive given BPs stable during dialysis  - Factor VIII 25U/kg    - Recheck factor VIII level daily   - follow up renal recs - holding home Norvasc of 5 mg daily i/s/o hypotension   - c/w lasix 80IV BID    #Dress Syndrome  - Currently taking Prednisone 60 mg with improvement in rash   - Continue Prednisone 60 mg daily (DRESS requires taper over 8-12 weeks, will not decrease yet as still in renal failure likely 2/2 dress)  - will necessitate a prolonged taper

## 2021-03-01 NOTE — PROGRESS NOTE ADULT - PROBLEM SELECTOR PLAN 9
#History of BPH  - Continue Doxazosin daily  - Monitor UOP and Bladder scan for any concerns of retention.    #Gout  Previously on allopurinol for gout prophylaxis; however, patient acquired DRESS from the therapy  - Monitor symptoms and avoid NSAIDs for pain.

## 2021-03-01 NOTE — PROGRESS NOTE ADULT - ATTENDING COMMENTS
I have reviewed the medical record, including laboratory and radiographic studies, interviewed and examined the patient and discussed the plan with Dr. Alberts, the ID Resident.  Agree with above.  Blood cultures were positive on 2/26 - still had femoral catheter, d/cuca that day.  Would continue with cefazolin for now - would like to avoid switch to nafcillin, which would necessitate PICC for d/c.  Continue to f/u blood cultures.  If he needs HD, will need to be replacement of temporary catheter until blood cultures have cleared.  Will continue to follow with you – ID Team 1.

## 2021-03-01 NOTE — PROGRESS NOTE ADULT - PROBLEM SELECTOR PLAN 8
#History of BPH.  Plan: Continue Doxazosin daily  - Monitor UOP and Bladder scan for any concerns of retention.    #Gout  Previously on allopurinol for gout prophylaxis; however, patient acquired DRESS from the therapy  - Monitor symptoms and avoid NSAIDs for pain. #History of BPH  - Continue Doxazosin daily  - Monitor UOP and Bladder scan for any concerns of retention.    #Gout  Previously on allopurinol for gout prophylaxis; however, patient acquired DRESS from the therapy  - Monitor symptoms and avoid NSAIDs for pain.

## 2021-03-01 NOTE — PROGRESS NOTE ADULT - PROBLEM SELECTOR PLAN 6
- holding home Norvasc of 5 mg daily i/s/o hypotension   - c/w lasix 80IV BID    #Dress Syndrome  - Currently taking Prednisone 60 mg with improvement in rash   - Continue Prednisone 60 mg daily (DRESS requires taper over 8-12 weeks, will not decrease yet as still in renal failure likely 2/2 dress)  - will necessitate a prolonged taper

## 2021-03-01 NOTE — DIETITIAN NUTRITION RISK NOTIFICATION - TREATMENT: THE FOLLOWING DIET HAS BEEN RECOMMENDED
Encourage PO intake and please consider liberalizing diet if possible   Recommend addition of Nepro 1x/day (425kcal, 19g pro) and consider appetite stimulant  Continue with phos binder

## 2021-03-01 NOTE — PROGRESS NOTE ADULT - PROBLEM SELECTOR PLAN 2
Acute renal failure with anasarca.  Patient with no history of CKD prior to admission. 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 Creatine worsened with deranged electrolytes after being diagnosed with Dress Syndrome.   - last HD 2/26. Received total x4 HD sessions  - s/p Factor VIII 25U/kg    - Recheck factor VIII level daily   - c/w IV lasix 80 mg BID  - follow up renal recs Acute renal failure with anasarca.  Patient with no history of CKD prior to admission. 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 Creatine worsened with deranged electrolytes after being diagnosed with Dress Syndrome.   - last HD 2/26. Received total x4 HD sessions  - s/p Factor VIII 25U/kg 2/22 prior to HD cath placement.  - check factor VIII level daily   - c/w IV lasix 80 mg BID  - follow up renal recs

## 2021-03-01 NOTE — PROGRESS NOTE ADULT - PROBLEM SELECTOR PLAN 4
#Uremic pericarditis   - BUN >100 on presentation, initiating need to HD  - EKG with diffuse ST elevated and MA depressions c/w acute pericarditis  - repeat EKG for any chest pain #Uremic pericarditis   - BUN >100 on presentation, initiating need to HD  - EKG with diffuse ST elevated and CT depressions c/w acute pericarditis  - repeat EKG for any chest pain    #Aortic plaque  - GUANAKO showing moderate non-mobile plaque seen in the visualized portion of the aortic arch.  - cards f/u

## 2021-03-01 NOTE — PROGRESS NOTE ADULT - ASSESSMENT
76M PMHx Hemophilia A, HTN, Gout, p/w RU in setting of DRESS from allopurinol on steroids   hypervolemic, hyperkalemic, uremic, and acidotic without improvement   started on hemodialysis 2/23     #RU secondary to ATN vs AIN on steroids   #uremic pericarditis  #thrombocytopenia - could be 2/2 sepsis vs reaction to dialyzer - will use Revaclear with next HD instead of Optiflux    Staph bacteraemia on cx 2/26  Last HD 2/26- fem cath removed  Non oliguric. Lytes, bicarb, volume acceptable. Not uraemic.   Worsening BUN/creat. Will defer HD today, may require temp HD cath for dialysis this week if cultures remain positive  Likely will remain dialysis dependent    BP: low normal with tachycardia- hold antiHTNsives  Anaemia- no IV iron given septicaemia, haem/onc involved for haemophilia; will need coordination with IR for tunneled catheter placement prior to discharge if need arise    Daily weights, strict I&Os, renally dose meds, renal diet

## 2021-03-01 NOTE — PROGRESS NOTE ADULT - PROBLEM SELECTOR PLAN 3
# Hemophilia A.    Prior history of significant bleeding in past following ERCP.   -Heme following - follow recs   -Daily INR/PT/PTT and daily Factor VIII   -Follows with Dr. Grace at [facility].     #Anemia  - Hgb 11.5 on arrival with unclear baseline  - Like 2/2 acute kidney failure or dilutional due to volume overload.

## 2021-03-01 NOTE — PROGRESS NOTE ADULT - SUBJECTIVE AND OBJECTIVE BOX
Hospital Course:   76 year old male with a past medical history of Hemophilia A, HTN, Gout, and DRESS Syndrome who presents with weakness for 1 week. Pt had an acute gout flare on January 1, treated with allopurinol for 3 weeks, c/b by development of DRESS (seen at [facility] where diagnosis was made, d/c 2/14).  While at [facility] patient had a creatinine that ranged from 4-6, however baseline is 1.1 (9/2020). After discharge from [facility] patient complained of weakness and progressive swelling of his legs and abdomen. On admission, VS notable for tachycardia to 128. Labs were significant for WBC 28.30, Hgb 11.5, K 5.6, CO2 20  , Cr 6.59, albumin 2.7, alk phos 158, BNP 67395. CXR showed pleural effusion left greater than right with dependent edema consistent with volume overload. Imaging significant for a CT abdomen small to moderate bilateral pleural effusions, greater on the left. Mild pericardial effusion. Anasarca greater in left lower chest and trace ascites. Pt was given 80mg of IV lasix and admitted for acute renal failure with potential need of dialysis.   [facility]  While on [facility] nephrology was consulted who recommended IV diuresis with plans of hemodialysis on 2/22. On the morning of 2/22 blood cultures grew MSSA.  Pt was started on ancef. Echo performed 2/22 due to pericardial effusion seen on CT chest, no evidence of tamponade.  Attempted HD 2/22, however pt became hypotensive (BP unknown), and session was stopped. Pt transferred to [facility] for increased monitoring during dialysis, to occur 2/23.   la  While on [facility], pt went into afib with RVR, rectal temp negative, EKG showed ST elevations which we felt were more pronounced compared to prior. Trop, CK, CKMB obtained (trop 0.24, CKMB 10.1, CK nml). Given lopressor 5 IVP x1. BP dropped slightly, therefore gave 500cc bolus. Cardiology consulted stat- came to bedside and felt EKG mostly stable from prior and likely afib RVR was from uremia, necessitating dialysis. Gave one time dose of cardizem 5mg and started on 5 of midodrine TID. Was transferred to ICU for further pressure support and management to facilitate dialysis.  ICU  Patient received HD on 2/23 0.5L UF on pressors, since weaned off. He required mild pressor support with Levophed (0.03mcg/kg/min) during HD. Patient off of pressors, repeat HD on 2/24 patient did not require any pressor support- tolerated well. Midodrine was increased to 10mg TID and restarted on lasix 80IV BID- home dose and per cardiology recs (patient still hypervolemic on exam). Patient remained off pressor support and is stable for transfer to [facility] overnight 2/24-2/25.  [facility]  Received dialysis 2/25 while on [facility], tolerated well. Plts, however, we low to 19 which per discussion with nephro can be rxn to dialysis Given 1u platelets as well as factor 8. Repeat BCx sent. GUANAKO was negative for any pathology/vegetations. Tolerated dialysis well while. Removed L femoral cath. BCx from 2/26 grew therefore cannot get permacath yet.     OVERNIGHT EVENTS: No acute events overnight    SUBJECTIVE / INTERVAL HPI: Patient seen and examined at bedside. Looks more awake, states that he feels okay. No fevers/chills, nausea, vomiting, diarrhea, abdominal pain, chest pain, shortness of breath.      VITAL SIGNS:  Vital Signs Last 24 Hrs  T(C): 36.9 (01 Mar 2021 05:00), Max: 36.9 (28 Feb 2021 14:03)  T(F): 98.4 (01 Mar 2021 05:00), Max: 98.5 (01 Mar 2021 00:39)  HR: 96 (01 Mar 2021 04:01) (96 - 112)  BP: 155/67 (01 Mar 2021 04:01) (132/57 - 155/67)  BP(mean): 96 (01 Mar 2021 04:01) (87 - 100)  RR: 18 (01 Mar 2021 04:01) (18 - 18)  SpO2: 93% (01 Mar 2021 04:01) (93% - 96%)    PHYSICAL EXAM:    General: relatively well-appearing male comfortable in bed in [facility]  HEENT: NC/AT; anicteric sclera; MMM  Cardiovascular: +S1/S2; RRR  Respiratory: CTA B/L; no W/R/R  Gastrointestinal: soft, NT/ND; +BSx4  Extremities: WWP; 2+ pitting edema bilateral lower extremities to the mid-shins  Vascular: 2+ radial, DP pulses B/L  Neurological: AAOx3; no focal deficits    MEDICATIONS:  MEDICATIONS  (STANDING):  ceFAZolin   IVPB 1000 milliGRAM(s) IV Intermittent every 24 hours  doxazosin 4 milliGRAM(s) Oral daily  furosemide   Injectable 80 milliGRAM(s) IV Push every 12 hours  midodrine. 7.5 milliGRAM(s) Oral every 8 hours  nystatin Cream 1 Application(s) Topical every 12 hours  pantoprazole    Tablet 40 milliGRAM(s) Oral before breakfast  polyethylene glycol 3350 17 Gram(s) Oral daily  PPD  5 Tuberculin Unit(s) Injectable 5 Unit(s) IntraDermal once  predniSONE   Tablet 60 milliGRAM(s) Oral daily  senna 2 Tablet(s) Oral at bedtime  sevelamer carbonate 800 milliGRAM(s) Oral three times a day with meals    MEDICATIONS  (PRN):      ALLERGIES:  Allergies    allopurinol (Other)    Intolerances        LABS:                        9.4    9.50  )-----------( 73       ( 01 Mar 2021 07:27 )             29.2     03-01    140  |  97  |  96<H>  ----------------------------<  109<H>  4.7   |  29  |  4.50<H>    Ca    8.4      01 Mar 2021 07:26  Phos  5.1     03-01  Mg     2.4     03-01    TPro  5.2<L>  /  Alb  2.7<L>  /  TBili  0.6  /  DBili  x   /  AST  20  /  ALT  11  /  AlkPhos  320<H>  03-01    PT/INR - ( 01 Mar 2021 07:28 )   PT: 14.8 sec;   INR: 1.25          PTT - ( 01 Mar 2021 07:28 )  PTT:36.0 sec    CAPILLARY BLOOD GLUCOSE          RADIOLOGY & ADDITIONAL TESTS: Reviewed.   --------------------TRANSFER [facility] to [facility]--------------------------  Hospital Course:   76 year old male with a past medical history of Hemophilia A, HTN, Gout, and DRESS Syndrome who presents with weakness for 1 week. Pt had an acute gout flare on January 1, treated with allopurinol for 3 weeks, c/b by development of DRESS (seen at [facility] where diagnosis was made, d/c 2/14).  While at [facility] patient had a creatinine that ranged from 4-6, however baseline is 1.1 (9/2020). After discharge from [facility] patient complained of weakness and progressive swelling of his legs and abdomen. On admission, VS notable for tachycardia to 128. Labs were significant for WBC 28.30, Hgb 11.5, K 5.6, CO2 20  , Cr 6.59, albumin 2.7, alk phos 158, BNP 97669. CXR showed pleural effusion left greater than right with dependent edema consistent with volume overload. Imaging significant for a CT abdomen small to moderate bilateral pleural effusions, greater on the left. Mild pericardial effusion. Anasarca greater in left lower chest and trace ascites. Pt was given 80mg of IV lasix and admitted for acute renal failure with potential need of dialysis.   [facility]  While on [facility] nephrology was consulted who recommended IV diuresis with plans of hemodialysis on 2/22. On the morning of 2/22 blood cultures grew MSSA.  Pt was started on ancef. Echo performed 2/22 due to pericardial effusion seen on CT chest, no evidence of tamponade.  Attempted HD 2/22, however pt became hypotensive (BP unknown), and session was stopped. Pt transferred to [facility] for increased monitoring during dialysis, to occur 2/23.   [facility]  While on [facility], pt went into afib with RVR, rectal temp negative, EKG showed ST elevations which we felt were more pronounced compared to prior. Trop, CK, CKMB obtained (trop 0.24, CKMB 10.1, CK nml). Given lopressor 5 IVP x1. BP dropped slightly, therefore gave 500cc bolus. Cardiology consulted stat- came to bedside and felt EKG mostly stable from prior and likely afib RVR was from uremia, necessitating dialysis. Gave one time dose of cardizem 5mg and started on 5 of midodrine TID. Was transferred to ICU for further pressure support and management to facilitate dialysis.  ICU  Patient received HD on 2/23 0.5L UF on pressors, since weaned off. He required mild pressor support with Levophed (0.03mcg/kg/min) during HD. Patient off of pressors, repeat HD on 2/24 patient did not require any pressor support- tolerated well. Midodrine was increased to 10mg TID and restarted on lasix 80IV BID- home dose and per cardiology recs (patient still hypervolemic on exam). Patient remained off pressor support and is stable for transfer to [facility] overnight 2/24-2/25.  [facility]  Received dialysis 2/25 while on [facility], tolerated well. Plts, however, we low to 19 which per discussion with nephro can be rxn to dialysis Given 1u platelets as well as factor 8. Repeat BCx sent. GUANAKO was negative for any pathology/vegetations. Tolerated dialysis well while. Removed L femoral cath. BCx from 2/26 grew therefore cannot get permacath yet, however, per nephrology, does not need dialysis today. May need temporary cath if will need HD before blood culture clear.    OVERNIGHT EVENTS: No acute events overnight    SUBJECTIVE / INTERVAL HPI: Patient seen and examined at bedside. Looks more awake, states that he feels okay. No fevers/chills, nausea, vomiting, diarrhea, abdominal pain, chest pain, shortness of breath.      VITAL SIGNS:  Vital Signs Last 24 Hrs  T(C): 36.9 (01 Mar 2021 05:00), Max: 36.9 (28 Feb 2021 14:03)  T(F): 98.4 (01 Mar 2021 05:00), Max: 98.5 (01 Mar 2021 00:39)  HR: 96 (01 Mar 2021 04:01) (96 - 112)  BP: 155/67 (01 Mar 2021 04:01) (132/57 - 155/67)  BP(mean): 96 (01 Mar 2021 04:01) (87 - 100)  RR: 18 (01 Mar 2021 04:01) (18 - 18)  SpO2: 93% (01 Mar 2021 04:01) (93% - 96%)    PHYSICAL EXAM:    General: relatively well-appearing male comfortable in bed in [facility]  HEENT: NC/AT; anicteric sclera; MMM  Cardiovascular: +S1/S2; RRR  Respiratory: CTA B/L; no W/R/R  Gastrointestinal: soft, NT/ND; +BSx4  Extremities: WWP; 2+ pitting edema bilateral lower extremities to the mid-shins  Vascular: 2+ radial, DP pulses B/L  Neurological: AAOx3; no focal deficits    MEDICATIONS:  MEDICATIONS  (STANDING):  ceFAZolin   IVPB 1000 milliGRAM(s) IV Intermittent every 24 hours  doxazosin 4 milliGRAM(s) Oral daily  furosemide   Injectable 80 milliGRAM(s) IV Push every 12 hours  midodrine. 7.5 milliGRAM(s) Oral every 8 hours  nystatin Cream 1 Application(s) Topical every 12 hours  pantoprazole    Tablet 40 milliGRAM(s) Oral before breakfast  polyethylene glycol 3350 17 Gram(s) Oral daily  PPD  5 Tuberculin Unit(s) Injectable 5 Unit(s) IntraDermal once  predniSONE   Tablet 60 milliGRAM(s) Oral daily  senna 2 Tablet(s) Oral at bedtime  sevelamer carbonate 800 milliGRAM(s) Oral three times a day with meals    MEDICATIONS  (PRN):      ALLERGIES:  Allergies    allopurinol (Other)    Intolerances        LABS:                        9.4    9.50  )-----------( 73       ( 01 Mar 2021 07:27 )             29.2     03-01    140  |  97  |  96<H>  ----------------------------<  109<H>  4.7   |  29  |  4.50<H>    Ca    8.4      01 Mar 2021 07:26  Phos  5.1     03-01  Mg     2.4     03-01    TPro  5.2<L>  /  Alb  2.7<L>  /  TBili  0.6  /  DBili  x   /  AST  20  /  ALT  11  /  AlkPhos  320<H>  03-01    PT/INR - ( 01 Mar 2021 07:28 )   PT: 14.8 sec;   INR: 1.25          PTT - ( 01 Mar 2021 07:28 )  PTT:36.0 sec    CAPILLARY BLOOD GLUCOSE          RADIOLOGY & ADDITIONAL TESTS: Reviewed.

## 2021-03-01 NOTE — PROGRESS NOTE ADULT - NUTRITIONAL ASSESSMENT
This patient has been assessed with a concern for Malnutrition and has been determined to have a diagnosis/diagnoses of Moderate protein-calorie malnutrition.    This patient is being managed with:   Diet Renal Restrictions-  For patients receiving Renal Replacement - No Protein Restr No Conc K No Conc Phos Low Sodium  Entered: Feb 25 2021 12:36PM

## 2021-03-01 NOTE — PROGRESS NOTE ADULT - SUBJECTIVE AND OBJECTIVE BOX
OVERNIGHT EVENTS:    SUBJECTIVE / INTERVAL HPI: Patient seen and examined at bedside.     VITAL SIGNS:  Vital Signs Last 24 Hrs  T(C): 36.4 (01 Mar 2021 17:30), Max: 36.9 (01 Mar 2021 00:39)  T(F): 97.6 (01 Mar 2021 17:30), Max: 98.5 (01 Mar 2021 00:39)  HR: 100 (01 Mar 2021 17:32) (94 - 116)  BP: 134/60 (01 Mar 2021 17:32) (91/51 - 155/67)  BP(mean): 87 (01 Mar 2021 17:32) (67 - 100)  RR: 18 (01 Mar 2021 17:32) (18 - 18)  SpO2: 100% (01 Mar 2021 17:32) (91% - 100%)    PHYSICAL EXAM:    General: WDWN, NAD, resting comfortably in bed  HEENT: NC/AT; anicteric sclera; MMM  Neck: supple  Cardiovascular: +S1/S2; RRR  Respiratory: CTA B/L; no W/R/R  Gastrointestinal: soft, NT/ND; +BSx4  Extremities: WWP; no edema, clubbing or cyanosis  Vascular: 2+ radial, DP/PT pulses B/L  Neurological: AAOx3    MEDICATIONS:  MEDICATIONS  (STANDING):  ceFAZolin   IVPB 1000 milliGRAM(s) IV Intermittent every 24 hours  doxazosin 4 milliGRAM(s) Oral daily  furosemide   Injectable 80 milliGRAM(s) IV Push every 12 hours  midodrine. 7.5 milliGRAM(s) Oral every 8 hours  nystatin Cream 1 Application(s) Topical every 12 hours  pantoprazole    Tablet 40 milliGRAM(s) Oral before breakfast  polyethylene glycol 3350 17 Gram(s) Oral daily  PPD  5 Tuberculin Unit(s) Injectable 5 Unit(s) IntraDermal once  predniSONE   Tablet 60 milliGRAM(s) Oral daily  senna 2 Tablet(s) Oral at bedtime  sevelamer carbonate 800 milliGRAM(s) Oral three times a day with meals    MEDICATIONS  (PRN):      ALLERGIES:  Allergies    allopurinol (Other)    Intolerances        LABS:                        9.4    9.50  )-----------( 73       ( 01 Mar 2021 07:27 )             29.2     03-01    140  |  97  |  96<H>  ----------------------------<  109<H>  4.7   |  29  |  4.50<H>    Ca    8.4      01 Mar 2021 07:26  Phos  5.1     03-01  Mg     2.4     03-01    TPro  5.2<L>  /  Alb  2.7<L>  /  TBili  0.6  /  DBili  x   /  AST  20  /  ALT  11  /  AlkPhos  320<H>  03-01    PT/INR - ( 01 Mar 2021 07:28 )   PT: 14.8 sec;   INR: 1.25          PTT - ( 01 Mar 2021 07:28 )  PTT:36.0 sec    CAPILLARY BLOOD GLUCOSE          RADIOLOGY & ADDITIONAL TESTS: Reviewed.   Transfer Acceptance note from [facility] to [address] [place] [address] Course:   76 year old male with a past medical history of Hemophilia A, HTN, Gout, and DRESS Syndrome who presents with weakness for 1 week. Pt had an acute gout flare on January 1, treated with allopurinol for 3 weeks, c/b by development of DRESS (seen at [facility] where diagnosis was made, d/c 2/14).  While at [facility] patient had a creatinine that ranged from 4-6, however baseline is 1.1 (9/2020). After discharge from [facility] patient complained of weakness and progressive swelling of his legs and abdomen. On admission, VS notable for tachycardia to 128. Labs were significant for WBC 28.30, Hgb 11.5, K 5.6, CO2 20  , Cr 6.59, albumin 2.7, alk phos 158, BNP 54210. CXR showed pleural effusion left greater than right with dependent edema consistent with volume overload. Imaging significant for a CT abdomen small to moderate bilateral pleural effusions, greater on the left. Mild pericardial effusion. Anasarca greater in left lower chest and trace ascites. Pt was given 80mg of IV lasix and admitted for acute renal failure with potential need of dialysis.   [facility]  While on [facility] nephrology was consulted who recommended IV diuresis with plans of hemodialysis on 2/22. On the morning of 2/22 blood cultures grew MSSA.  Pt was started on ancef. Echo performed 2/22 due to pericardial effusion seen on CT chest, no evidence of tamponade.  Attempted HD 2/22, however pt became hypotensive (BP unknown), and session was stopped. Pt transferred to [facility] for increased monitoring during dialysis, to occur 2/23.   [facility]  While on [facility], pt went into afib with RVR, rectal temp negative, EKG showed ST elevations which we felt were more pronounced compared to prior. Trop, CK, CKMB obtained (trop 0.24, CKMB 10.1, CK nml). Given lopressor 5 IVP x1. BP dropped slightly, therefore gave 500cc bolus. Cardiology consulted stat- came to bedside and felt EKG mostly stable from prior and likely afib RVR was from uremia, necessitating dialysis. Gave one time dose of cardizem 5mg and started on 5 of midodrine TID. Was transferred to ICU for further pressure support and management to facilitate dialysis.  ICU  Patient received HD on 2/23 0.5L UF on pressors, since weaned off. He required mild pressor support with Levophed (0.03mcg/kg/min) during HD. Patient off of pressors, repeat HD on 2/24 patient did not require any pressor support- tolerated well. Midodrine was increased to 10mg TID and restarted on lasix 80IV BID- home dose and per cardiology recs (patient still hypervolemic on exam). Patient remained off pressor support and is stable for transfer to [facility] overnight 2/24-2/25.  [facility]  Received dialysis 2/25 while on [facility], tolerated well. Plts, however, we low to 19 which per discussion with nephro can be rxn to dialysis Given 1u platelets as well as factor 8. Repeat BCx sent. GUANAKO was negative for any pathology/vegetations. Tolerated dialysis well while. Removed L femoral cath. BCx from 2/26 grew therefore cannot get permacath yet, however, per nephrology, does not need dialysis today. May need temporary cath if will need HD before blood culture clear.    SUBJECTIVE / INTERVAL HPI: Patient seen and examined at bedside. Pt with no acute complaints. States he is tired, didn't sleep well the past few days. Denies fever, chills, chest pain, SOB, abd pain, n/v/d.     VITAL SIGNS:  Vital Signs Last 24 Hrs  T(C): 36.4 (01 Mar 2021 17:30), Max: 36.9 (01 Mar 2021 00:39)  T(F): 97.6 (01 Mar 2021 17:30), Max: 98.5 (01 Mar 2021 00:39)  HR: 100 (01 Mar 2021 17:32) (94 - 116)  BP: 134/60 (01 Mar 2021 17:32) (91/51 - 155/67)  BP(mean): 87 (01 Mar 2021 17:32) (67 - 100)  RR: 18 (01 Mar 2021 17:32) (18 - 18)  SpO2: 100% (01 Mar 2021 17:32) (91% - 100%)    PHYSICAL EXAM:    General: WDWN, NAD, resting comfortably in bed  HEENT: NC/AT; anicteric sclera; MMM  Neck: supple  Cardiovascular: +S1/S2; RRR  Respiratory: CTA B/L; no W/R/R  Gastrointestinal: soft, NT/ND; +BSx4  Extremities: WWP; 2+ pitting edema b/l LE up to thighs  Vascular: 2+ radial, DP/PT pulses B/L  Neurological: AAOx3, moves all 4 extremities     MEDICATIONS:  MEDICATIONS  (STANDING):  ceFAZolin   IVPB 1000 milliGRAM(s) IV Intermittent every 24 hours  doxazosin 4 milliGRAM(s) Oral daily  furosemide   Injectable 80 milliGRAM(s) IV Push every 12 hours  midodrine. 7.5 milliGRAM(s) Oral every 8 hours  nystatin Cream 1 Application(s) Topical every 12 hours  pantoprazole    Tablet 40 milliGRAM(s) Oral before breakfast  polyethylene glycol 3350 17 Gram(s) Oral daily  PPD  5 Tuberculin Unit(s) Injectable 5 Unit(s) IntraDermal once  predniSONE   Tablet 60 milliGRAM(s) Oral daily  senna 2 Tablet(s) Oral at bedtime  sevelamer carbonate 800 milliGRAM(s) Oral three times a day with meals    MEDICATIONS  (PRN):      ALLERGIES:  Allergies    allopurinol (Other)    Intolerances        LABS:                        9.4    9.50  )-----------( 73       ( 01 Mar 2021 07:27 )             29.2     03-01    140  |  97  |  96<H>  ----------------------------<  109<H>  4.7   |  29  |  4.50<H>    Ca    8.4      01 Mar 2021 07:26  Phos  5.1     03-01  Mg     2.4     03-01    TPro  5.2<L>  /  Alb  2.7<L>  /  TBili  0.6  /  DBili  x   /  AST  20  /  ALT  11  /  AlkPhos  320<H>  03-01    PT/INR - ( 01 Mar 2021 07:28 )   PT: 14.8 sec;   INR: 1.25          PTT - ( 01 Mar 2021 07:28 )  PTT:36.0 sec    CAPILLARY BLOOD GLUCOSE          RADIOLOGY & ADDITIONAL TESTS: Reviewed.   Transfer Acceptance note from [facility] to [address] [place] [address] Course:   76 year old male with a past medical history of Hemophilia A, HTN, Gout, and DRESS Syndrome who presents with weakness for 1 week. Pt had an acute gout flare on January 1, treated with allopurinol for 3 weeks, c/b by development of DRESS (seen at [facility] where diagnosis was made, d/c 2/14).  While at [facility] patient had a creatinine that ranged from 4-6, however baseline is 1.1 (9/2020). After discharge from [facility] patient complained of weakness and progressive swelling of his legs and abdomen. On admission, VS notable for tachycardia to 128. Labs were significant for WBC 28.30, Hgb 11.5, K 5.6, CO2 20  , Cr 6.59, albumin 2.7, alk phos 158, BNP 29466. CXR showed pleural effusion left greater than right with dependent edema consistent with volume overload. Imaging significant for a CT abdomen small to moderate bilateral pleural effusions, greater on the left. Mild pericardial effusion. Anasarca greater in left lower chest and trace ascites. Pt was given 80mg of IV lasix and admitted for acute renal failure with potential need of dialysis.   [facility]  While on [facility] nephrology was consulted who recommended IV diuresis with plans of hemodialysis on 2/22. On the morning of 2/22 blood cultures grew MSSA.  Pt was started on ancef. Echo performed 2/22 due to pericardial effusion seen on CT chest, no evidence of tamponade.  Attempted HD 2/22, however pt became hypotensive (BP unknown), and session was stopped. Pt transferred to [facility] for increased monitoring during dialysis, to occur 2/23.   [facility]  While on [facility], pt went into afib with RVR, rectal temp negative, EKG showed ST elevations which we felt were more pronounced compared to prior. Trop, CK, CKMB obtained (trop 0.24, CKMB 10.1, CK nml). Given lopressor 5 IVP x1. BP dropped slightly, therefore gave 500cc bolus. Cardiology consulted stat- came to bedside and felt EKG mostly stable from prior and likely afib RVR was from uremia, necessitating dialysis. Gave one time dose of cardizem 5mg and started on 5 of midodrine TID. Was transferred to ICU for further pressure support and management to facilitate dialysis.  ICU  Patient received HD on 2/23 0.5L UF on pressors, since weaned off. He required mild pressor support with Levophed (0.03mcg/kg/min) during HD. Patient off of pressors, repeat HD on 2/24 patient did not require any pressor support- tolerated well. Midodrine was increased to 10mg TID and restarted on lasix 80IV BID- home dose and per cardiology recs (patient still hypervolemic on exam). Patient remained off pressor support and is stable for transfer to [facility] overnight 2/24-2/25.  [facility]  Received dialysis 2/25 while on [facility], tolerated well. Plts, however, we low to 19 which per discussion with nephro can be rxn to dialysis Given 1u platelets as well as factor 8. Repeat BCx sent. GUANAKO was negative for any pathology/vegetations. Tolerated dialysis well while. Removed L femoral cath. BCx from 2/26 grew therefore cannot get permacath yet, however, per nephrology, does not need dialysis today. May need temporary cath if will need HD before blood culture clear.    SUBJECTIVE / INTERVAL HPI: Patient seen and examined at bedside. Pt with no acute complaints. States he is tired, didn't sleep well the past few days. Denies fever, chills, chest pain, SOB, abd pain, n/v/d.     VITAL SIGNS:  Vital Signs Last 24 Hrs  T(C): 36.4 (01 Mar 2021 17:30), Max: 36.9 (01 Mar 2021 00:39)  T(F): 97.6 (01 Mar 2021 17:30), Max: 98.5 (01 Mar 2021 00:39)  HR: 100 (01 Mar 2021 17:32) (94 - 116)  BP: 134/60 (01 Mar 2021 17:32) (91/51 - 155/67)  BP(mean): 87 (01 Mar 2021 17:32) (67 - 100)  RR: 18 (01 Mar 2021 17:32) (18 - 18)  SpO2: 100% (01 Mar 2021 17:32) (91% - 100%)    PHYSICAL EXAM:    General: WDWN, NAD, resting comfortably in bed  HEENT: NC/AT; anicteric sclera; MMM  Neck: supple  Cardiovascular: +S1/S2; RRR  Respiratory: CTA B/L; no W/R/R  Gastrointestinal: soft, +distension. NT; +BSx4  Extremities: WWP; 2+ pitting edema b/l LE up to thighs  Vascular: 2+ radial, DP/PT pulses B/L  Neurological: AAOx3, moves all 4 extremities     MEDICATIONS:  MEDICATIONS  (STANDING):  ceFAZolin   IVPB 1000 milliGRAM(s) IV Intermittent every 24 hours  doxazosin 4 milliGRAM(s) Oral daily  furosemide   Injectable 80 milliGRAM(s) IV Push every 12 hours  midodrine. 7.5 milliGRAM(s) Oral every 8 hours  nystatin Cream 1 Application(s) Topical every 12 hours  pantoprazole    Tablet 40 milliGRAM(s) Oral before breakfast  polyethylene glycol 3350 17 Gram(s) Oral daily  PPD  5 Tuberculin Unit(s) Injectable 5 Unit(s) IntraDermal once  predniSONE   Tablet 60 milliGRAM(s) Oral daily  senna 2 Tablet(s) Oral at bedtime  sevelamer carbonate 800 milliGRAM(s) Oral three times a day with meals    MEDICATIONS  (PRN):      ALLERGIES:  Allergies    allopurinol (Other)    Intolerances        LABS:                        9.4    9.50  )-----------( 73       ( 01 Mar 2021 07:27 )             29.2     03-01    140  |  97  |  96<H>  ----------------------------<  109<H>  4.7   |  29  |  4.50<H>    Ca    8.4      01 Mar 2021 07:26  Phos  5.1     03-01  Mg     2.4     03-01    TPro  5.2<L>  /  Alb  2.7<L>  /  TBili  0.6  /  DBili  x   /  AST  20  /  ALT  11  /  AlkPhos  320<H>  03-01    PT/INR - ( 01 Mar 2021 07:28 )   PT: 14.8 sec;   INR: 1.25          PTT - ( 01 Mar 2021 07:28 )  PTT:36.0 sec    CAPILLARY BLOOD GLUCOSE          RADIOLOGY & ADDITIONAL TESTS: Reviewed.

## 2021-03-02 LAB
-  CEFAZOLIN: SIGNIFICANT CHANGE UP
-  CLINDAMYCIN: SIGNIFICANT CHANGE UP
-  ERYTHROMYCIN: SIGNIFICANT CHANGE UP
-  LINEZOLID: SIGNIFICANT CHANGE UP
-  OXACILLIN: SIGNIFICANT CHANGE UP
-  RIFAMPIN: SIGNIFICANT CHANGE UP
-  TRIMETHOPRIM/SULFAMETHOXAZOLE: SIGNIFICANT CHANGE UP
-  VANCOMYCIN: SIGNIFICANT CHANGE UP
ANION GAP SERPL CALC-SCNC: 11 MMOL/L — SIGNIFICANT CHANGE UP (ref 5–17)
APTT BLD: 36.1 SEC — HIGH (ref 27.5–35.5)
BUN SERPL-MCNC: 111 MG/DL — HIGH (ref 7–23)
CALCIUM SERPL-MCNC: 9.1 MG/DL — SIGNIFICANT CHANGE UP (ref 8.4–10.5)
CHLORIDE SERPL-SCNC: 97 MMOL/L — SIGNIFICANT CHANGE UP (ref 96–108)
CO2 SERPL-SCNC: 31 MMOL/L — SIGNIFICANT CHANGE UP (ref 22–31)
CREAT SERPL-MCNC: 4.83 MG/DL — HIGH (ref 0.5–1.3)
CULTURE RESULTS: SIGNIFICANT CHANGE UP
FACT VIII ACT/NOR PPP: 56 % — SIGNIFICANT CHANGE UP (ref 51–138)
FERRITIN SERPL-MCNC: 730 NG/ML — HIGH (ref 30–400)
FOLATE SERPL-MCNC: 2.5 NG/ML — LOW
GAMMA INTERFERON BACKGROUND BLD IA-ACNC: 0.02 IU/ML — SIGNIFICANT CHANGE UP
GLUCOSE SERPL-MCNC: 113 MG/DL — HIGH (ref 70–99)
HCT VFR BLD CALC: 26.6 % — LOW (ref 39–50)
HGB BLD-MCNC: 8.7 G/DL — LOW (ref 13–17)
INR BLD: 1.27 — HIGH (ref 0.88–1.16)
IRON SATN MFR SERPL: 42 % — SIGNIFICANT CHANGE UP (ref 16–55)
IRON SATN MFR SERPL: 63 UG/DL — SIGNIFICANT CHANGE UP (ref 45–165)
M TB IFN-G BLD-IMP: ABNORMAL
M TB IFN-G CD4+ BCKGRND COR BLD-ACNC: 0 IU/ML — SIGNIFICANT CHANGE UP
M TB IFN-G CD4+CD8+ BCKGRND COR BLD-ACNC: 0 IU/ML — SIGNIFICANT CHANGE UP
MAGNESIUM SERPL-MCNC: 2.3 MG/DL — SIGNIFICANT CHANGE UP (ref 1.6–2.6)
MCHC RBC-ENTMCNC: 28.2 PG — SIGNIFICANT CHANGE UP (ref 27–34)
MCHC RBC-ENTMCNC: 32.7 GM/DL — SIGNIFICANT CHANGE UP (ref 32–36)
MCV RBC AUTO: 86.1 FL — SIGNIFICANT CHANGE UP (ref 80–100)
METHOD TYPE: SIGNIFICANT CHANGE UP
NRBC # BLD: 0 /100 WBCS — SIGNIFICANT CHANGE UP (ref 0–0)
ORGANISM # SPEC MICROSCOPIC CNT: SIGNIFICANT CHANGE UP
PHOSPHATE SERPL-MCNC: 5.8 MG/DL — HIGH (ref 2.5–4.5)
PLATELET # BLD AUTO: 82 K/UL — LOW (ref 150–400)
POTASSIUM SERPL-MCNC: 4.8 MMOL/L — SIGNIFICANT CHANGE UP (ref 3.5–5.3)
POTASSIUM SERPL-SCNC: 4.8 MMOL/L — SIGNIFICANT CHANGE UP (ref 3.5–5.3)
PROTHROM AB SERPL-ACNC: 15.1 SEC — HIGH (ref 10.6–13.6)
QUANT TB PLUS MITOGEN MINUS NIL: 0 IU/ML — SIGNIFICANT CHANGE UP
RBC # BLD: 3.09 M/UL — LOW (ref 4.2–5.8)
RBC # FLD: 13.9 % — SIGNIFICANT CHANGE UP (ref 10.3–14.5)
SODIUM SERPL-SCNC: 139 MMOL/L — SIGNIFICANT CHANGE UP (ref 135–145)
SPECIMEN SOURCE: SIGNIFICANT CHANGE UP
TIBC SERPL-MCNC: 151 UG/DL — LOW (ref 220–430)
UIBC SERPL-MCNC: 88 UG/DL — LOW (ref 110–370)
VIT B12 SERPL-MCNC: 1212 PG/ML — SIGNIFICANT CHANGE UP (ref 232–1245)
WBC # BLD: 9.8 K/UL — SIGNIFICANT CHANGE UP (ref 3.8–10.5)
WBC # FLD AUTO: 9.8 K/UL — SIGNIFICANT CHANGE UP (ref 3.8–10.5)

## 2021-03-02 PROCEDURE — 99233 SBSQ HOSP IP/OBS HIGH 50: CPT | Mod: GC

## 2021-03-02 PROCEDURE — 78802 RP LOCLZJ TUM WHBDY 1 D IMG: CPT | Mod: 26

## 2021-03-02 RX ORDER — SODIUM CHLORIDE 0.65 %
1 AEROSOL, SPRAY (ML) NASAL
Refills: 0 | Status: DISCONTINUED | OUTPATIENT
Start: 2021-03-02 | End: 2021-03-09

## 2021-03-02 RX ORDER — PETROLATUM,WHITE
1 JELLY (GRAM) TOPICAL EVERY 24 HOURS
Refills: 0 | Status: DISCONTINUED | OUTPATIENT
Start: 2021-03-02 | End: 2021-03-09

## 2021-03-02 RX ADMIN — Medication 10 MILLIGRAM(S): at 18:16

## 2021-03-02 RX ADMIN — POLYETHYLENE GLYCOL 3350 17 GRAM(S): 17 POWDER, FOR SOLUTION ORAL at 09:58

## 2021-03-02 RX ADMIN — Medication 1 APPLICATION(S): at 15:30

## 2021-03-02 RX ADMIN — Medication 80 MILLIGRAM(S): at 09:58

## 2021-03-02 RX ADMIN — SEVELAMER CARBONATE 800 MILLIGRAM(S): 2400 POWDER, FOR SUSPENSION ORAL at 18:17

## 2021-03-02 RX ADMIN — NYSTATIN CREAM 1 APPLICATION(S): 100000 CREAM TOPICAL at 18:16

## 2021-03-02 RX ADMIN — MIDODRINE HYDROCHLORIDE 7.5 MILLIGRAM(S): 2.5 TABLET ORAL at 05:58

## 2021-03-02 RX ADMIN — MIDODRINE HYDROCHLORIDE 7.5 MILLIGRAM(S): 2.5 TABLET ORAL at 21:44

## 2021-03-02 RX ADMIN — NYSTATIN CREAM 1 APPLICATION(S): 100000 CREAM TOPICAL at 05:58

## 2021-03-02 RX ADMIN — SEVELAMER CARBONATE 800 MILLIGRAM(S): 2400 POWDER, FOR SUSPENSION ORAL at 08:24

## 2021-03-02 RX ADMIN — Medication 80 MILLIGRAM(S): at 21:44

## 2021-03-02 RX ADMIN — Medication 60 MILLIGRAM(S): at 05:58

## 2021-03-02 RX ADMIN — PANTOPRAZOLE SODIUM 40 MILLIGRAM(S): 20 TABLET, DELAYED RELEASE ORAL at 05:58

## 2021-03-02 RX ADMIN — MIDODRINE HYDROCHLORIDE 7.5 MILLIGRAM(S): 2.5 TABLET ORAL at 13:22

## 2021-03-02 RX ADMIN — Medication 100 MILLIGRAM(S): at 18:17

## 2021-03-02 RX ADMIN — SENNA PLUS 2 TABLET(S): 8.6 TABLET ORAL at 21:44

## 2021-03-02 RX ADMIN — SEVELAMER CARBONATE 800 MILLIGRAM(S): 2400 POWDER, FOR SUSPENSION ORAL at 12:40

## 2021-03-02 RX ADMIN — Medication 1 SPRAY(S): at 15:54

## 2021-03-02 RX ADMIN — Medication 4 MILLIGRAM(S): at 05:58

## 2021-03-02 NOTE — PROGRESS NOTE ADULT - SUBJECTIVE AND OBJECTIVE BOX
LENGTH OF HOSPITAL STAY: 9d    SUBJECTIVE: No bleeding reported this morning. No new complaints.     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 Hematologist is Dr. Grace. s/p HD catheter placement (02/22), complicated by hypotension during dialysis and MSSA bacteremia.       PAST MEDICAL & SURGICAL HISTORY:  History of BPH  Gout  Hemophilia A  HTN (hypertension)  Uses cochlear implant    ALLERGIES:  allopurinol (Other)    MEDICATIONS:  MEDICATIONS  (STANDING):  ceFAZolin   IVPB 1000 milliGRAM(s) IV Intermittent every 24 hours  doxazosin 4 milliGRAM(s) Oral daily  furosemide   Injectable 80 milliGRAM(s) IV Push every 12 hours  midodrine. 7.5 milliGRAM(s) Oral every 8 hours  nystatin Cream 1 Application(s) Topical every 12 hours  pantoprazole    Tablet 40 milliGRAM(s) Oral before breakfast  polyethylene glycol 3350 17 Gram(s) Oral daily  PPD  5 Tuberculin Unit(s) Injectable 5 Unit(s) IntraDermal once  predniSONE   Tablet 60 milliGRAM(s) Oral daily  senna 2 Tablet(s) Oral at bedtime  sevelamer carbonate 800 milliGRAM(s) Oral three times a day with meals    MEDICATIONS  (PRN):    VITALS:   Vital Signs Last 24 Hrs  T(C): 37.1 (02 Mar 2021 05:32), Max: 37.1 (02 Mar 2021 05:32)  T(F): 98.7 (02 Mar 2021 05:32), Max: 98.7 (02 Mar 2021 05:32)  HR: 90 (02 Mar 2021 05:32) (90 - 102)  BP: 126/63 (02 Mar 2021 05:32) (126/63 - 152/75)  BP(mean): 87 (01 Mar 2021 17:32) (84 - 95)  RR: 19 (02 Mar 2021 05:32) (18 - 19)  SpO2: 96% (02 Mar 2021 05:32) (96% - 100%)    LABS:                          8.7    9.80  )-----------( 82       ( 02 Mar 2021 07:57 )             26.6     03-02    139  |  97  |  111<H>  ----------------------------<  113<H>  4.8   |  31  |  4.83<H>    Ca    9.1      02 Mar 2021 07:57  Phos  5.8     03-02  Mg     2.3     03-02    TPro  5.2<L>  /  Alb  2.7<L>  /  TBili  0.6  /  DBili  x   /  AST  20  /  ALT  11  /  AlkPhos  320<H>  03-01    PT/INR - ( 02 Mar 2021 07:57 )   PT: 15.1 sec;   INR: 1.27     PTT - ( 02 Mar 2021 07:57 )  PTT:36.1 sec  Ferritin, Serum in AM (03.02.21 @ 07:57)   Ferritin, Serum: 730 ng/mL  Iron with Total Binding Capacity in AM (03.02.21 @ 07:57)   Iron - Total Binding Capacity.: 151 ug/dL   % Saturation, Iron: 42 %   Iron Total, Serum: 63 ug/dL   Unsaturated Iron Binding Capacity: 88 ug/dL   Factor VIII Assay (03.02.21 @ 07:57)   Factor VIII Assay: 56: The presence of direct thrombin inhibitors (argatroban, refludan) may   falsely decrease activity. %     RADIOLOGY:  Reviewed    PHYSICAL EXAM:  GEN: No acute distress  HEENT: NCAT  LUNGS: Clear to auscultation bilaterally   HEART: S1/S2 present. RRR.   ABD: Soft, non-tender, non-distended. Bowel sounds present  EXT: 2+ pitting edema  NEURO: AAOX3

## 2021-03-02 NOTE — PROGRESS NOTE ADULT - ASSESSMENT
Mr. Don is a 76 year old male with a past medical history Hemophilia A, HTN, Gout, and DRESS Syndrome who presents with weakness for 1 week. He was admitted for acute on chronic renal failure c/b anasarca requiring induction of hemodialysis. Transferred to MICU on 2/23 d/t afib with RVR and hypotension likely 2/2 uremic pericarditis, transferred to MICU for closer monitoring and pressure support while undergoing dialysis, now hemodynamically stable, on midodrine, and stepped down to tele, tolerating 3x sessions of dialysis off of pressors (has had 4 sessions total as of 2/26). Stepped down to [facility]F on 3/1.

## 2021-03-02 NOTE — PROGRESS NOTE ADULT - PROBLEM SELECTOR PLAN 1
# MSSA Bacteremia.  - source unknown; GUANAKO negative; no open sores; possibly 2/2 skin infection given pruritic rash   - last positive culture 2/26.  - Negative NGTD from 2/28  - continue with ancef 1g q24hr  - ID following, will f/u recs  - needs permacath for HD - IR consutled - can get after 3/3 5 PM, which is 72 hours of negative blood cultures  - f/u with IR for permacath

## 2021-03-02 NOTE — PROGRESS NOTE ADULT - PROBLEM SELECTOR PLAN 2
Acute renal failure with anasarca.  Patient with no history of CKD prior to admission. 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 Creatine worsened with deranged electrolytes after being diagnosed with Dress Syndrome.   - last HD 2/26. Received total x4 HD sessions  - s/p Factor VIII 25U/kg 2/22 prior to HD cath placement.  - check factor VIII level daily   - c/w IV lasix 80 mg BID  - follow up renal recs  -- daughter plans to speak with Dr. Yap this afternoon regarding long-term HD; amenable and already in talks with local HD center v home HD  -- needs [facility] for HD placement (ordered 3/2 - f/u)  -- discuss with Renal switching to PO diuretic regimen  - strict Is/Os   - Daily Weights

## 2021-03-02 NOTE — PROGRESS NOTE ADULT - ASSESSMENT
75 yo M with PMHx of CKD (recent Cr 6.6 at [facility]), Hemophilia A, HTN, Gout, BPH and DRESS Syndrome (on Prednisone 60mg QD) consulted to ID for MSSA bacteremia. Patient currently on cefazolin 1g q 24hrs which was chosen over Nafcillin in the setting of severe fluid overload since Cefazolin is administered with less fluid. Source of MSSA unknown. Possible source from skin as patient is immunocompromised from chronic steroids and broke skin barrier scratching the DRESS syndrome rash which created multiple lesions. Unlikely lung source since no cough, sputum production and CXR does not show any lung field opacities. Prior SOB and prior need for 3L NC likely due to pleural effusion noted on CXR in setting of severe fluid overload secondary to CKD. Weakly positive UA, however unlikely  source as patient does not have urinary symptoms and staph aureus usually does not commonly arise from urinary sources. Patient afebrile with WBC count of 9.8 (3/3) which is downtrending from 28.30 on admission (2/20).  TTE (2/22) showed no valvular disease and GUANAKO (2/26) had no vegetations on heart valves. Surveillance blood cultures from 2/22, 2/23 and 2/26 grew MSSA. Surveillance blood cultures from 2/28 and 3/1 have no growth to date.     Plan:  - C/w cefazolin 1g IV q 24hrs   - F/u Gallium scan - Since MSSA is prone to seeding, gallium scan is warranted due to continuously positive blood cultures. The gallium scan results will help guide treatment duration.   - Surveillance blood cultures on 3/2   - F/u surveillance blood cultures 2/28 and 3/1  - Recommend HD permacath after 72 hours of negative blood cultures to prevent seeding of MSSA. Okay for temporary catheter exchange if needed in interim.    ID Team 1 will follow.    Discussed with Infectious Disease Attending Dr. Edgar. Recommendations are considered final after attending attestation.

## 2021-03-02 NOTE — PROGRESS NOTE ADULT - PROBLEM SELECTOR PLAN 3
# Hemophilia A.    Prior history of significant bleeding in past following ERCP.   -Heme following - follow recs   -Daily INR/PT/PTT and daily Factor VIII   -Follows with Dr. Grace at [facility].     #Anemia  - Hgb 11.5 on arrival with unclear baseline  - Like 2/2 acute kidney failure or dilutional due to volume overload.

## 2021-03-02 NOTE — PROGRESS NOTE ADULT - PROBLEM SELECTOR PLAN 4
#Uremic pericarditis   - BUN >100 on presentation, initiating need to HD  - EKG with diffuse ST elevated and [place] depressions c/w acute pericarditis  - repeat EKG for any chest pain    #Aortic plaque  - GUANAKO showing moderate non-mobile plaque seen in the visualized portion of the aortic arch.  - cards f/u

## 2021-03-02 NOTE — PROGRESS NOTE ADULT - ATTENDING COMMENTS
Patient was seen and examined with the resident team today.  I agree with Dr. Fuentes assessment and plan with the following exceptions/additions:     Briefly, this is a 75yo gentleman with a PMH of Hemophilia A, HTN, Gout c/b allopurinol-induced DRESS Syndrome and CKD (on high-dose steroids) who p/w acute on chronic renal failure c/b HD unstable Afib w/RVR and shock, requiring HD.  Hospital course c/b MSSA bacteremia.  Transferred to the [facility] on 3/1 for ongoing volume overload and coordination of outpatient HD.     -- would clarify with ID that patient needs Gallium give cultures negative  -- Cefazolin as per ID  -- daughter plans to speak with Dr. Yap this afternoon regarding long-term HD; amenable and already in talks with local HD center v home HD  -- would plan with IR TDC placement on 3/3 if Dr. Yap recommends HD and ID is in agreement  -- needs [facility] for HD placement  -- discuss with Renal switching to PO diuretic regimen   -- per family, patient did not have someone managing his high-dose steroids as an outpatient; will need to coordinate this (Renal v PCP?)   -- PT and OOB to chair as able   -- on SCDs given hemophilia and thrombocytopenia  -- Dispo - family prefers home w/outpatient HD and home PT (daughter Char very involved)    Amarilys Velasco  [phone] Patient was seen and examined with the resident team today.  I agree with Dr. Fuentes assessment and plan with the following exceptions/additions:     Briefly, this is a 75yo gentleman with a PMH of Hemophilia A, HTN, Gout c/b allopurinol-induced DRESS Syndrome and CKD (on high-dose steroids) who p/w acute on chronic renal failure c/b HD unstable Afib w/RVR and shock c/b thrombocytopenia, requiring emergent initiation of HD.  Hospital course c/b MSSA bacteremia.  Transferred to the [facility] on 3/1 for ongoing volume overload and coordination of outpatient HD.  Clinically improving today but still w/o HD access.  Family would like to discuss prognosis with Dr. Yap but amenable to outpatient HD.      -- would clarify with ID that patient needs Gallium give cultures negative  -- Cefazolin as per ID  -- daughter plans to speak with Dr. Yap this afternoon regarding long-term HD; amenable and already in talks with local HD center v home HD  -- would plan with IR TDC placement on 3/3 if Dr. Yap recommends HD and ID is in agreement  -- needs [facility] for HD placement  -- discuss with Renal switching to PO diuretic regimen   -- per family, patient did not have someone managing his high-dose steroids as an outpatient; will need to coordinate this (Renal v PCP?)   -- PT and OOB to chair as able   -- on SCDs given hemophilia and thrombocytopenia  -- Dispo - family prefers home w/outpatient HD and home PT (daughter Char very involved)    Amarilys Velasco  [phone]

## 2021-03-02 NOTE — PROGRESS NOTE ADULT - ASSESSMENT
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   Per ID 72hr of negative cx prior to tunneled cath placement.  Last HD 2/26- fem cath removed, no HD access at this time    Non oliguric. Lytes, bicarb, volume acceptable. Not uraemic.     BP: controlled on midodrine 7.5q8; getting lasix 80 IV q12h  BMD: renvela 800 TID  Anaemia- no IV iron given septicaemia, haem/onc involved for haemophilia; will need coordination with IR for tunneled catheter placement prior to discharge if need arise    Daily weights, strict I&Os, renally dose meds, renal diet

## 2021-03-02 NOTE — PROGRESS NOTE ADULT - ASSESSMENT
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 Hematologist is Dr. Grace. s/p HD catheter placement (02/22), complicated by hypotension during dialysis and MSSA bacteremia.     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 Factor VIII 57% which is acceptable. No significant bleeding observed by primary team.    - Continue to follow-up with Factor VIII once daily due to possible procedures.  - Maintain active T+S, transfuse if Hb < 7 or symptomatic   - Follow-up CBC and coagulation panel (PT/PTT) once daily  - Upon discharge, can follow-up with Dr. Grace (Hematologist)    #) Mild coagulopathy, possible inhibitor. Factor VII deficiency   - Mixing study (02/21) performed for mild coagulopathy showed possible presence of a delayed inhibitor due to lack of correction with 2 hr incubation. Follow-up inhibitor assay.     #) Thrombocytopenia, possibly sepsis-related and dialysis membrane effect, improving  - Peripheral blood smear (02/23) showed platelet clumping and no schistocytes. PLASMIC score is 4 low-risk for TTP. HIT score is 3-4, low-intermediate risk. PFT-HIT Ab negative. SORAYA negative.    - Trend CBC with differential once daily  - Maintain active T+S, transfuse if platelet < 10K, or < 20K if febrile or <50K if bleeding.   - Hold pharmacologic DVT ppx if platelets consistently < 50 K    #) Anemia   - secondary to chronic renal disease  - Pending B12, folate. Iron studies consistent with ACD    Discussed with Dr. Rasheed

## 2021-03-02 NOTE — PROGRESS NOTE ADULT - SUBJECTIVE AND OBJECTIVE BOX
Patient is a 76y Male seen and evaluated at bedside. Cultures negative on 2/28/21. No urgent indication to dialyse at this time, creatinine worsening, no plateau seen yet. Will likely require dialysis this week, will assess daily. Per ID 72hr of negative cx prior to tunneled cath placement.    Meds:    ceFAZolin   IVPB 1000 every 24 hours  doxazosin 4 daily  furosemide   Injectable 80 every 12 hours  midodrine. 7.5 every 8 hours  nystatin Cream 1 every 12 hours  pantoprazole    Tablet 40 before breakfast  polyethylene glycol 3350 17 daily  PPD  5 Tuberculin Unit(s) Injectable 5 once  predniSONE   Tablet 60 daily  senna 2 at bedtime  sevelamer carbonate 800 three times a day with meals      T(C): , Max: 37.1 (03-02-21 @ 05:32)  T(F): , Max: 98.7 (03-02-21 @ 05:32)  HR: 90 (03-02-21 @ 05:32)  BP: 126/63 (03-02-21 @ 05:32)  BP(mean): 87 (03-01-21 @ 17:32)  RR: 19 (03-02-21 @ 05:32)  SpO2: 96% (03-02-21 @ 05:32)  Wt(kg): --    03-01 @ 07:01  -  03-02 @ 07:00  --------------------------------------------------------  IN: 0 mL / OUT: 200 mL / NET: -200 mL    Review of Systems:  RESPIRATORY: No shortness of breath  CARDIOVASCULAR: No chest pain  MUSCULOSKELETAL: No edema    PHYSICAL EXAM:  GENERAL: well-developed, well nourished, alert, no acute distress at present on RA  CHEST/LUNG: Clear to auscultation bilaterally  HEART: normal S1S2, RRR  ABDOMEN: Soft, Nontender  EXTREMITIES: No oedema     LABS:                        8.7    9.80  )-----------( 82       ( 02 Mar 2021 07:57 )             26.6     03-02    139  |  97  |  111<H>  ----------------------------<  113<H>  4.8   |  31  |  4.83<H>    Ca    9.1      02 Mar 2021 07:57  Phos  5.8     03-02  Mg     2.3     03-02    TPro  5.2<L>  /  Alb  2.7<L>  /  TBili  0.6  /  DBili  x   /  AST  20  /  ALT  11  /  AlkPhos  320<H>  03-01      PT/INR - ( 02 Mar 2021 07:57 )   PT: 15.1 sec;   INR: 1.27          PTT - ( 02 Mar 2021 07:57 )  PTT:36.1 sec          RADIOLOGY & ADDITIONAL STUDIES:

## 2021-03-02 NOTE — PROGRESS NOTE ADULT - ATTENDING COMMENTS
I have reviewed the medical record, including laboratory and radiographic studies, interviewed and examined the patient and discussed the plan with Dr. Lowe, the ID Resident.  Agree with above.  Reviewed gallium scan results with Dr. Chin ([facility]) - uptake in pericardium is very concerning, particularly in view of lack of uptake in pleural fluid.  He was bacteremic for at least 6 days - may have been related to necessity for femoral catheter inserted in setting of bacteremia.  However, he may have seeded inflamed pericardium in setting of uremic pericarditis.  Would discuss with Cardiology.  No obvious buttock lesion but may need to look deeper.  Will continue to follow with you – ID Team 1.

## 2021-03-02 NOTE — PROGRESS NOTE ADULT - SUBJECTIVE AND OBJECTIVE BOX
infectious diseases progress note:  YAIR FELDMAN is a 76yMale patient    Overnight events: Patient stepped down from 7 lach to 7 uris.     Interval History: Patient seen and examined at bedside. Patient still c/o of discomfort from excessive flatulence and fatigue. Patient states it is difficult to eat due to uncomfortable lesion on lip. Patient denies fever, chills, headache, lightheadedness, vision changes, CP, palpitations, SOB, cough, N/V/D, abdominal pain.    ACUTE KIDNEY INJURY      Blood pressure instability    ESRD (end stage renal disease)    BPH (benign prostatic hyperplasia)    Ascites    Shock    Pericarditis    Tachycardia    Bacteremia    Acute renal failure    Metabolic acidosis    DRESS syndrome    Acute kidney injury    Nutrition, metabolism, and development symptoms    Hemophilia A    History of BPH    Gout    HTN (hypertension)    Other ascites    Pericardial effusion    Hyperkalemia    Acute renal failure superimposed on stage 5 chronic kidney disease, not on chronic dialysis, unspecified acute renal failure type        ROS:  CONSTITUTIONAL:  Negative fever or chills  EYES:  Negative  blurry vision or double vision  CARDIOVASCULAR:  Negative for chest pain or palpitations  RESPIRATORY:  Negative for cough, wheezing, or SOB   GASTROINTESTINAL:  Negative for nausea, vomiting, diarrhea or abdominal pain  GENITOURINARY:  Negative frequency, urgency or dysuria  NEUROLOGIC:  No headache, confusion, dizziness, lightheadedness    Allergies    allopurinol (Other)    Intolerances        ANTIBIOTICS/RELEVANT:  antimicrobials  ceFAZolin   IVPB 1000 milliGRAM(s) IV Intermittent every 24 hours    immunologic:  PPD  5 Tuberculin Unit(s) Injectable 5 Unit(s) IntraDermal once    OTHER:  doxazosin 4 milliGRAM(s) Oral daily  furosemide   Injectable 80 milliGRAM(s) IV Push every 12 hours  midodrine. 7.5 milliGRAM(s) Oral every 8 hours  nystatin Cream 1 Application(s) Topical every 12 hours  pantoprazole    Tablet 40 milliGRAM(s) Oral before breakfast  polyethylene glycol 3350 17 Gram(s) Oral daily  predniSONE   Tablet 60 milliGRAM(s) Oral daily  senna 2 Tablet(s) Oral at bedtime  sevelamer carbonate 800 milliGRAM(s) Oral three times a day with meals      Objective:  Vital Signs Last 24 Hrs  T(C): 37.1 (02 Mar 2021 05:32), Max: 37.1 (02 Mar 2021 05:32)  T(F): 98.7 (02 Mar 2021 05:32), Max: 98.7 (02 Mar 2021 05:32)  HR: 90 (02 Mar 2021 05:32) (90 - 102)  BP: 126/63 (02 Mar 2021 05:32) (126/63 - 152/75)  BP(mean): 87 (01 Mar 2021 17:32) (84 - 95)  RR: 19 (02 Mar 2021 05:32) (18 - 19)  SpO2: 96% (02 Mar 2021 05:32) (96% - 100%)    PHYSICAL EXAM:  General: NAD, eating comfortably in bed  Skin: Extremities with dry scales with no signs of warmth, purulence, drainage  HEENT: Conjunctiva clear, sclera white, PERRLA  Cardiac: S1 and S1 clear. No murmurs, rubs or gallops  Lungs: Unlabored breathing, no accessory muscle use, vesicular b/l   Abdomen: Distended, soft, non-tender, normoactive BS in all four quadrants  Peripheral extremities: 1+ pitting edema in lower extremities b/l, 2+ DP and PT pulses b/l   Neurological: A&Ox3      LABS:                        8.7    9.80  )-----------( 82       ( 02 Mar 2021 07:57 )             26.6     03-02    139  |  97  |  111<H>  ----------------------------<  113<H>  4.8   |  31  |  4.83<H>    Ca    9.1      02 Mar 2021 07:57  Phos  5.8     03-02  Mg     2.3     03-02    TPro  5.2<L>  /  Alb  2.7<L>  /  TBili  0.6  /  DBili  x   /  AST  20  /  ALT  11  /  AlkPhos  320<H>  03-01    PT/INR - ( 02 Mar 2021 07:57 )   PT: 15.1 sec;   INR: 1.27          PTT - ( 02 Mar 2021 07:57 )  PTT:36.1 sec        MICROBIOLOGY:  Culture Results:   No growth at 12 hours (03-01 @ 15:37)        RADIOLOGY & ADDITIONAL STUDIES: infectious diseases progress note:  YAIR FELDMAN is a 76yMale patient    Overnight events: Patient stepped down from 7 lach to 7 uris.     Interval History: Patient seen and examined at bedside. Patient still c/o of discomfort from excessive flatulence and fatigue. Patient states it is difficult to eat due to uncomfortable lesion on lip. Patient denies fever, chills, headache, lightheadedness, vision changes, CP, palpitations, SOB, cough, N/V/D, abdominal pain.    ACUTE KIDNEY INJURY      Blood pressure instability    ESRD (end stage renal disease)    BPH (benign prostatic hyperplasia)    Ascites    Shock    Pericarditis    Tachycardia    Bacteremia    Acute renal failure    Metabolic acidosis    DRESS syndrome    Acute kidney injury    Nutrition, metabolism, and development symptoms    Hemophilia A    History of BPH    Gout    HTN (hypertension)    Other ascites    Pericardial effusion    Hyperkalemia    Acute renal failure superimposed on stage 5 chronic kidney disease, not on chronic dialysis, unspecified acute renal failure type        ROS:  CONSTITUTIONAL:  Negative fever or chills  EYES:  Negative  blurry vision or double vision  CARDIOVASCULAR:  Negative for chest pain or palpitations  RESPIRATORY:  Negative for cough, wheezing, or SOB   GASTROINTESTINAL:  Negative for nausea, vomiting, diarrhea or abdominal pain  GENITOURINARY:  Negative frequency, urgency or dysuria  NEUROLOGIC:  No headache, confusion, dizziness, lightheadedness    Allergies    allopurinol (Other)    Intolerances        ANTIBIOTICS/RELEVANT:  antimicrobials  ceFAZolin   IVPB 1000 milliGRAM(s) IV Intermittent every 24 hours    immunologic:  PPD  5 Tuberculin Unit(s) Injectable 5 Unit(s) IntraDermal once    OTHER:  doxazosin 4 milliGRAM(s) Oral daily  furosemide   Injectable 80 milliGRAM(s) IV Push every 12 hours  midodrine. 7.5 milliGRAM(s) Oral every 8 hours  nystatin Cream 1 Application(s) Topical every 12 hours  pantoprazole    Tablet 40 milliGRAM(s) Oral before breakfast  polyethylene glycol 3350 17 Gram(s) Oral daily  predniSONE   Tablet 60 milliGRAM(s) Oral daily  senna 2 Tablet(s) Oral at bedtime  sevelamer carbonate 800 milliGRAM(s) Oral three times a day with meals      Objective:  Vital Signs Last 24 Hrs  T(C): 37.1 (02 Mar 2021 05:32), Max: 37.1 (02 Mar 2021 05:32)  T(F): 98.7 (02 Mar 2021 05:32), Max: 98.7 (02 Mar 2021 05:32)  HR: 90 (02 Mar 2021 05:32) (90 - 102)  BP: 126/63 (02 Mar 2021 05:32) (126/63 - 152/75)  BP(mean): 87 (01 Mar 2021 17:32) (84 - 95)  RR: 19 (02 Mar 2021 05:32) (18 - 19)  SpO2: 96% (02 Mar 2021 05:32) (96% - 100%)    PHYSICAL EXAM:  General: NAD, eating comfortably in bed  Skin: Extremities with dry scales with no signs of warmth, purulence, drainage  HEENT: Conjunctiva clear, sclera white, PERRLA  Cardiac: S1 and S1 clear. No murmurs, rubs or gallops  Lungs: Unlabored breathing, no accessory muscle use, vesicular b/l   Abdomen: Distended, soft, non-tender, normoactive BS in all four quadrants  Peripheral extremities: 1+ pitting edema in lower extremities b/l, 2+ DP and PT pulses b/l   Neurological: A&Ox3      LABS:                        8.7    9.80  )-----------( 82       ( 02 Mar 2021 07:57 )             26.6     03-02    139  |  97  |  111<H>  ----------------------------<  113<H>  4.8   |  31  |  4.83<H>    Ca    9.1      02 Mar 2021 07:57  Phos  5.8     03-02  Mg     2.3     03-02    TPro  5.2<L>  /  Alb  2.7<L>  /  TBili  0.6  /  DBili  x   /  AST  20  /  ALT  11  /  AlkPhos  320<H>  03-01    PT/INR - ( 02 Mar 2021 07:57 )   PT: 15.1 sec;   INR: 1.27          PTT - ( 02 Mar 2021 07:57 )  PTT:36.1 sec        MICROBIOLOGY:  Culture Results:   No growth at 12 hours (03-01 @ 15:37)        RADIOLOGY & ADDITIONAL STUDIES:  < from: [facility] Inflammatory Loc Wholebody Gallium, Single Day (03.02.21 @ 15:20) >  Indication: MSSA bacteremia with unknown source. The patient has known pleural and pericardial effusions and had a dialysis catheter recently removed from the left groin.    Procedure: The patient received an intravenous injection of 6 mCi of gallium-67 citrate on 3/1/2021 and images of the whole body from the top of the head to the knees were obtained at about 24 hours in the anterior and posterior projections.    Findings: Alignment of activity surrounding the cardiac silhouette is seen best on anterior, but also on posterior images suggesting uptake in the pericardium. Of note is the factthat no significant activity is seen in the patient's pleural effusions.    Two focal areas of increased activity are seen on the posterior aspect of the left buttock. The possibility of a localized skin lesion or abscess in this location should be evaluated clinically.    Irregular activity is seen overlying the left hip anteriorly which may be related to the recently removed dialysis catheter. Infection is not strongly suspected in this area.    Impression: 1. Significant activity outlining thecardiac silhouette suggesting pericardial infection. Please note that no significant activity was seen in the pleural effusions noted on recent studies.    2. Two focal areas of activity are seen in the left buttock raising the possibility of skin lesions or abscess in this area.    3. Irregular activity in the left groin is probably related to the removal of a dialysis catheter in this location. Infection is not suspected.      < end of copied text >

## 2021-03-02 NOTE — PROGRESS NOTE ADULT - SUBJECTIVE AND OBJECTIVE BOX
### INCOMPLETE NOTE ###  ### INCOMPLETE NOTE ###    INTERVAL HPI/OVERNIGHT EVENTS:    SUBJECTIVE: Patient seen and examined at bedside.  REVIEW OF SYSTEMS:    CONSTITUTIONAL: No weakness, fevers or chills  EYES/ENT: No visual changes;  No vertigo or throat pain   NECK: No pain or stiffness  RESPIRATORY: No cough, wheezing, hemoptysis; No shortness of breath  CARDIOVASCULAR: No chest pain or palpitations  GASTROINTESTINAL: No abdominal or epigastric pain. No nausea, vomiting, or hematemesis; No diarrhea or constipation. No melena or hematochezia.  GENITOURINARY: No dysuria, frequency or hematuria  NEUROLOGICAL: No numbness or weakness  SKIN: No itching, rashes      OBJECTIVE:    VITAL SIGNS:  ICU Vital Signs Last 24 Hrs  T(C): 37.1 (02 Mar 2021 05:32), Max: 37.1 (02 Mar 2021 05:32)  T(F): 98.7 (02 Mar 2021 05:32), Max: 98.7 (02 Mar 2021 05:32)  HR: 90 (02 Mar 2021 05:32) (90 - 116)  BP: 126/63 (02 Mar 2021 05:32) (91/51 - 152/75)  BP(mean): 87 (01 Mar 2021 17:32) (67 - 95)  ABP: --  ABP(mean): --  RR: 19 (02 Mar 2021 05:32) (18 - 19)  SpO2: 96% (02 Mar 2021 05:32) (91% - 100%)        03-01 @ 07:01  -  03-02 @ 07:00  --------------------------------------------------------  IN: 0 mL / OUT: 200 mL / NET: -200 mL      CAPILLARY BLOOD GLUCOSE          PHYSICAL EXAM:    General: NAD  HEENT: NC/AT; PERRL, clear conjunctiva  Neck: supple  Respiratory: CTA b/l  Cardiovascular: +S1/S2; RRR  Abdomen: soft, NT/ND; +BS x4  Extremities: 2+ peripheral pulses b/l; no LE edema  Skin: normal color and turgor; no rash  Neurological:   Psychiatry:    LABS:                        9.4    9.50  )-----------( 73       ( 01 Mar 2021 07:27 )             29.2     03-01    140  |  97  |  96<H>  ----------------------------<  109<H>  4.7   |  29  |  4.50<H>    Ca    8.4      01 Mar 2021 07:26  Phos  5.1     03-01  Mg     2.4     03-01    TPro  5.2<L>  /  Alb  2.7<L>  /  TBili  0.6  /  DBili  x   /  AST  20  /  ALT  11  /  AlkPhos  320<H>  03-01    PT/INR - ( 01 Mar 2021 07:28 )   PT: 14.8 sec;   INR: 1.25          PTT - ( 01 Mar 2021 07:28 )  PTT:36.0 sec      RADIOLOGY & ADDITIONAL TESTS: Reviewed.    MEDICATIONS:  MEDICATIONS  (STANDING):  ceFAZolin   IVPB 1000 milliGRAM(s) IV Intermittent every 24 hours  doxazosin 4 milliGRAM(s) Oral daily  furosemide   Injectable 80 milliGRAM(s) IV Push every 12 hours  midodrine. 7.5 milliGRAM(s) Oral every 8 hours  nystatin Cream 1 Application(s) Topical every 12 hours  pantoprazole    Tablet 40 milliGRAM(s) Oral before breakfast  polyethylene glycol 3350 17 Gram(s) Oral daily  PPD  5 Tuberculin Unit(s) Injectable 5 Unit(s) IntraDermal once  predniSONE   Tablet 60 milliGRAM(s) Oral daily  senna 2 Tablet(s) Oral at bedtime  sevelamer carbonate 800 milliGRAM(s) Oral three times a day with meals    MEDICATIONS  (PRN):      ALLERGIES:  Allergies    allopurinol (Other)    Intolerances         INTERVAL HPI/OVERNIGHT EVENTS:    SUBJECTIVE: Patient seen and examined at bedside.  REVIEW OF SYSTEMS:    CONSTITUTIONAL: feels weak and tired, endorses that has not felt like himself since "everything started"  EYES/ENT: No visual changes;  No vertigo or throat pain   NECK: No pain or stiffness  RESPIRATORY: Some dyspnea  CARDIOVASCULAR: No chest pain or palpitations  GASTROINTESTINAL: Feels constipated and has not had a bm in a few days. Concerned about it.   GENITOURINARY: No dysuria, frequency or hematuria  NEUROLOGICAL: Diffusely weak. Has "aches and pains"  SKIN: No itching, rashes      OBJECTIVE:    VITAL SIGNS:  ICU Vital Signs Last 24 Hrs  T(C): 37.1 (02 Mar 2021 05:32), Max: 37.1 (02 Mar 2021 05:32)  T(F): 98.7 (02 Mar 2021 05:32), Max: 98.7 (02 Mar 2021 05:32)  HR: 90 (02 Mar 2021 05:32) (90 - 116)  BP: 126/63 (02 Mar 2021 05:32) (91/51 - 152/75)  BP(mean): 87 (01 Mar 2021 17:32) (67 - 95)  ABP: --  ABP(mean): --  RR: 19 (02 Mar 2021 05:32) (18 - 19)  SpO2: 96% (02 Mar 2021 05:32) (91% - 100%)        03-01 @ 07:01  -  03-02 @ 07:00  --------------------------------------------------------  IN: 0 mL / OUT: 200 mL / NET: -200 mL      CAPILLARY BLOOD GLUCOSE          PHYSICAL EXAM:    General: WDWN, NAD, resting comfortably in bed  HEENT: NC/AT; anicteric sclera; dry lips  Neck: supple  Cardiovascular: +S1/S2; RRR  Respiratory: CTA B/L; no W/R/R  Gastrointestinal: soft, +distension. NT; +BSx4  Extremities: WWP; 2+ pitting edema b/l LE up to thighs  Vascular: 2+ radial, DP/PT pulses B/L  Neurological: AAOx3, moves all 4 extremities       LABS:                        9.4    9.50  )-----------( 73       ( 01 Mar 2021 07:27 )             29.2     03-01    140  |  97  |  96<H>  ----------------------------<  109<H>  4.7   |  29  |  4.50<H>    Ca    8.4      01 Mar 2021 07:26  Phos  5.1     03-01  Mg     2.4     03-01    TPro  5.2<L>  /  Alb  2.7<L>  /  TBili  0.6  /  DBili  x   /  AST  20  /  ALT  11  /  AlkPhos  320<H>  03-01    PT/INR - ( 01 Mar 2021 07:28 )   PT: 14.8 sec;   INR: 1.25          PTT - ( 01 Mar 2021 07:28 )  PTT:36.0 sec      RADIOLOGY & ADDITIONAL TESTS: Reviewed.    MEDICATIONS:  MEDICATIONS  (STANDING):  ceFAZolin   IVPB 1000 milliGRAM(s) IV Intermittent every 24 hours  doxazosin 4 milliGRAM(s) Oral daily  furosemide   Injectable 80 milliGRAM(s) IV Push every 12 hours  midodrine. 7.5 milliGRAM(s) Oral every 8 hours  nystatin Cream 1 Application(s) Topical every 12 hours  pantoprazole    Tablet 40 milliGRAM(s) Oral before breakfast  polyethylene glycol 3350 17 Gram(s) Oral daily  PPD  5 Tuberculin Unit(s) Injectable 5 Unit(s) IntraDermal once  predniSONE   Tablet 60 milliGRAM(s) Oral daily  senna 2 Tablet(s) Oral at bedtime  sevelamer carbonate 800 milliGRAM(s) Oral three times a day with meals    MEDICATIONS  (PRN):      ALLERGIES:  Allergies    allopurinol (Other)    Intolerances

## 2021-03-02 NOTE — PROGRESS NOTE ADULT - PROBLEM SELECTOR PLAN 5
During first episode of dialysis over 1 week ago, pt became hypotensive, requiring transfer to MICU for dialysis. Tolerated dialysis 1x on pressors and 1x off and was transferred back to [facility] on midodrine 10mg TID  - midodrine tapered to 7.5 mg TID with hold parameters in place to not give for SBP>140  - continue to taper

## 2021-03-03 ENCOUNTER — TRANSCRIPTION ENCOUNTER (OUTPATIENT)
Age: 76
End: 2021-03-03

## 2021-03-03 DIAGNOSIS — Z71.89 OTHER SPECIFIED COUNSELING: ICD-10-CM

## 2021-03-03 PROBLEM — I10 ESSENTIAL (PRIMARY) HYPERTENSION: Chronic | Status: ACTIVE | Noted: 2021-02-20

## 2021-03-03 PROBLEM — D66 HEREDITARY FACTOR VIII DEFICIENCY: Chronic | Status: ACTIVE | Noted: 2021-02-20

## 2021-03-03 PROBLEM — M10.9 GOUT, UNSPECIFIED: Chronic | Status: ACTIVE | Noted: 2021-02-20

## 2021-03-03 PROBLEM — Z87.438 PERSONAL HISTORY OF OTHER DISEASES OF MALE GENITAL ORGANS: Chronic | Status: ACTIVE | Noted: 2021-02-21

## 2021-03-03 LAB
ANION GAP SERPL CALC-SCNC: 13 MMOL/L — SIGNIFICANT CHANGE UP (ref 5–17)
APTT BLD: 37.2 SEC — HIGH (ref 27.5–35.5)
BLD GP AB SCN SERPL QL: NEGATIVE — SIGNIFICANT CHANGE UP
BUN SERPL-MCNC: 122 MG/DL — HIGH (ref 7–23)
CALCIUM SERPL-MCNC: 8.5 MG/DL — SIGNIFICANT CHANGE UP (ref 8.4–10.5)
CHLORIDE SERPL-SCNC: 97 MMOL/L — SIGNIFICANT CHANGE UP (ref 96–108)
CO2 SERPL-SCNC: 29 MMOL/L — SIGNIFICANT CHANGE UP (ref 22–31)
CREAT SERPL-MCNC: 5 MG/DL — HIGH (ref 0.5–1.3)
GLUCOSE SERPL-MCNC: 105 MG/DL — HIGH (ref 70–99)
GRAM STN FLD: SIGNIFICANT CHANGE UP
GRAM STN FLD: SIGNIFICANT CHANGE UP
HCT VFR BLD CALC: 25.6 % — LOW (ref 39–50)
HGB BLD-MCNC: 8.1 G/DL — LOW (ref 13–17)
INR BLD: 1.32 — HIGH (ref 0.88–1.16)
MAGNESIUM SERPL-MCNC: 2.1 MG/DL — SIGNIFICANT CHANGE UP (ref 1.6–2.6)
MCHC RBC-ENTMCNC: 27.6 PG — SIGNIFICANT CHANGE UP (ref 27–34)
MCHC RBC-ENTMCNC: 31.6 GM/DL — LOW (ref 32–36)
MCV RBC AUTO: 87.4 FL — SIGNIFICANT CHANGE UP (ref 80–100)
NRBC # BLD: 0 /100 WBCS — SIGNIFICANT CHANGE UP (ref 0–0)
PHOSPHATE SERPL-MCNC: 5.5 MG/DL — HIGH (ref 2.5–4.5)
PLATELET # BLD AUTO: 96 K/UL — LOW (ref 150–400)
POTASSIUM SERPL-MCNC: 4.7 MMOL/L — SIGNIFICANT CHANGE UP (ref 3.5–5.3)
POTASSIUM SERPL-SCNC: 4.7 MMOL/L — SIGNIFICANT CHANGE UP (ref 3.5–5.3)
PROTHROM AB SERPL-ACNC: 15.6 SEC — HIGH (ref 10.6–13.6)
RBC # BLD: 2.93 M/UL — LOW (ref 4.2–5.8)
RBC # FLD: 14 % — SIGNIFICANT CHANGE UP (ref 10.3–14.5)
RH IG SCN BLD-IMP: POSITIVE — SIGNIFICANT CHANGE UP
SARS-COV-2 RNA SPEC QL NAA+PROBE: SIGNIFICANT CHANGE UP
SODIUM SERPL-SCNC: 139 MMOL/L — SIGNIFICANT CHANGE UP (ref 135–145)
WBC # BLD: 9.87 K/UL — SIGNIFICANT CHANGE UP (ref 3.8–10.5)
WBC # FLD AUTO: 9.87 K/UL — SIGNIFICANT CHANGE UP (ref 3.8–10.5)

## 2021-03-03 PROCEDURE — 99232 SBSQ HOSP IP/OBS MODERATE 35: CPT

## 2021-03-03 PROCEDURE — 99233 SBSQ HOSP IP/OBS HIGH 50: CPT

## 2021-03-03 PROCEDURE — 76882 US LMTD JT/FCL EVL NVASC XTR: CPT | Mod: 26,LT

## 2021-03-03 PROCEDURE — 99233 SBSQ HOSP IP/OBS HIGH 50: CPT | Mod: GC

## 2021-03-03 PROCEDURE — 93308 TTE F-UP OR LMTD: CPT | Mod: 26

## 2021-03-03 RX ORDER — FOLIC ACID 0.8 MG
1 TABLET ORAL DAILY
Refills: 0 | Status: DISCONTINUED | OUTPATIENT
Start: 2021-03-03 | End: 2021-03-09

## 2021-03-03 RX ADMIN — Medication 4 MILLIGRAM(S): at 05:37

## 2021-03-03 RX ADMIN — SEVELAMER CARBONATE 800 MILLIGRAM(S): 2400 POWDER, FOR SUSPENSION ORAL at 14:04

## 2021-03-03 RX ADMIN — Medication 10 MILLIGRAM(S): at 22:18

## 2021-03-03 RX ADMIN — POLYETHYLENE GLYCOL 3350 17 GRAM(S): 17 POWDER, FOR SOLUTION ORAL at 11:01

## 2021-03-03 RX ADMIN — MIDODRINE HYDROCHLORIDE 7.5 MILLIGRAM(S): 2.5 TABLET ORAL at 05:37

## 2021-03-03 RX ADMIN — Medication 80 MILLIGRAM(S): at 11:01

## 2021-03-03 RX ADMIN — Medication 1 SPRAY(S): at 19:04

## 2021-03-03 RX ADMIN — Medication 100 MILLIGRAM(S): at 19:03

## 2021-03-03 RX ADMIN — SEVELAMER CARBONATE 800 MILLIGRAM(S): 2400 POWDER, FOR SUSPENSION ORAL at 19:02

## 2021-03-03 RX ADMIN — NYSTATIN CREAM 1 APPLICATION(S): 100000 CREAM TOPICAL at 05:37

## 2021-03-03 RX ADMIN — Medication 1 MILLIGRAM(S): at 14:50

## 2021-03-03 RX ADMIN — SENNA PLUS 2 TABLET(S): 8.6 TABLET ORAL at 22:19

## 2021-03-03 RX ADMIN — PANTOPRAZOLE SODIUM 40 MILLIGRAM(S): 20 TABLET, DELAYED RELEASE ORAL at 05:37

## 2021-03-03 RX ADMIN — NYSTATIN CREAM 1 APPLICATION(S): 100000 CREAM TOPICAL at 19:04

## 2021-03-03 RX ADMIN — MIDODRINE HYDROCHLORIDE 7.5 MILLIGRAM(S): 2.5 TABLET ORAL at 22:19

## 2021-03-03 RX ADMIN — Medication 60 MILLIGRAM(S): at 05:37

## 2021-03-03 RX ADMIN — Medication 1 SPRAY(S): at 05:37

## 2021-03-03 RX ADMIN — MIDODRINE HYDROCHLORIDE 7.5 MILLIGRAM(S): 2.5 TABLET ORAL at 14:50

## 2021-03-03 RX ADMIN — Medication 1 APPLICATION(S): at 14:50

## 2021-03-03 NOTE — PROGRESS NOTE ADULT - PROBLEM SELECTOR PLAN 6
-holding home norvasc in setting of normotension     #Dress Syndrome  - Currently taking Prednisone 60 mg with improvement in rash   - c/w prednisone 60 mg daily (DRESS requires taper over 8-12 weeks, will not decrease yet as still in renal failure likely 2/2 dress)  - outpt prednisone recs per renal recommendations

## 2021-03-03 NOTE — DISCHARGE NOTE PROVIDER - PROVIDER TOKENS
PROVIDER:[TOKEN:[[id number]],FOLLOWUP:[2 weeks]],FREE:[LAST:[Green],FIRST:[Al],PHONE:[phone]:[(   )    -],ADDRESS:[[address] [place] [address]],FOLLOWUP:[2 weeks],ESTABLISHEDPATIENT:[T]] PROVIDER:[TOKEN:[[id number]],FOLLOWUP:[2 weeks]],FREE:[LAST:[Green],FIRST:[Al],PHONE:[phone]],FAX:[(   )    -],ADDRESS:[[address] [place] [address], [address] [place] [address]],FOLLOWUP:[1 week],ESTABLISHEDPATIENT:[T]] PROVIDER:[TOKEN:[[id number]],FOLLOWUP:[2 weeks]],PROVIDER:[TOKEN:[[id number]],FOLLOWUP:[1 week]],PROVIDER:[TOKEN:[[id number]],SCHEDULEDAPPT:[03/17/2021],SCHEDULEDAPPTTIME:[10:00 AM]] PROVIDER:[TOKEN:[[id number]],SCHEDULEDAPPT:[03/17/2021],SCHEDULEDAPPTTIME:[10:00 AM]],PROVIDER:[TOKEN:[[id number]],FOLLOWUP:[1 week]] PROVIDER:[TOKEN:[[id number]],SCHEDULEDAPPT:[03/17/2021],SCHEDULEDAPPTTIME:[10:00 AM]],PROVIDER:[TOKEN:[[id number]],FOLLOWUP:[1 week]],PROVIDER:[TOKEN:[[id number]],SCHEDULEDAPPT:[03/30/2021],SCHEDULEDAPPTTIME:[11:00 AM]] PROVIDER:[TOKEN:[[id number]],SCHEDULEDAPPT:[03/17/2021],SCHEDULEDAPPTTIME:[10:00 AM]],PROVIDER:[TOKEN:[[id number]],SCHEDULEDAPPT:[03/30/2021],SCHEDULEDAPPTTIME:[11:00 AM]],PROVIDER:[TOKEN:[[id number]],FOLLOWUP:[1 week]] PROVIDER:[TOKEN:[[id number]],SCHEDULEDAPPT:[03/17/2021],SCHEDULEDAPPTTIME:[10:00 AM]],PROVIDER:[TOKEN:[[id number]],SCHEDULEDAPPT:[03/30/2021],SCHEDULEDAPPTTIME:[11:00 AM]],PROVIDER:[TOKEN:[[id number]],SCHEDULEDAPPT:[03/16/2021],SCHEDULEDAPPTTIME:[11:30 AM]],FREE:[LAST:[Green],FIRST:[Lars],PHONE:[phone]:[(   )    -],ADDRESS:[[address] [place] [address]],FOLLOWUP:[1 week]]

## 2021-03-03 NOTE — PROGRESS NOTE ADULT - PROBLEM SELECTOR PLAN 4
#Uremic pericarditis - BUN >100 on presentation, initiating need to HD. EKG with diffuse ST elevated and ME depressions c/w acute pericarditis. Gallium scan shows increased uptake in pericardium, consistent with pericarditis. No current CP. Echo showed small pericardial effusion, repeat TTE on 3/3 shows trivial pericardial effusion with no increased size.  -cards consulted, recs appreciated  -CTSG consulted for possible culture of fluid , recs appreciated   - repeat EKG for any chest pain

## 2021-03-03 NOTE — DISCHARGE NOTE PROVIDER - HOSPITAL COURSE
#Discharge: do not delete    76M PMH Hemophilia A, HTN, gout c/b allopurinol-induced DRESS Syndrome, CKD (on high dose steroids) who p/w acute on chronic renal failure c/b hemodynamically unstable Afib w RVR and shock c/b thrombocytopenia, requiring emergent initation of HD. Hospital course c/b MSSA bacteremia.    Problem List/Main Diagnoses (system-based):   1. MSSA Bacteremia - blood cultures persistently growing MSSA.  -Gallium scan showed uptake in pericardium, which was presumed source for infection  -received ancef 1g q24h (2/25-)  -upon discharge, will require 2g, 2g, 3g after dialysis days     2. Acute renal failure - Had developed dress syndrome after allopurinol, and has required high dose steroid therapy.   -Cr had worsened with deranged electrolytes after being diagnosed with Dress syndrome.   -Received multiple dialysis sessions inpatient   -THC catheter placed 3/5 and dialysis will be continued outpatient   -c/w renvela 800 TID  -IV lasix transitioned to torsemide 10 mg BID for volume overload     3. 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  -pt hemodynamically stable during hospital stay   -required daily factor VIII checks  -received factor 8 products prior to THC catheter     4. Folic acid deficiency - started on 1 mg folic acid qd     5. Pericarditis - pt had uremic pericarditis with BUN >100 on presentation   -Gallium scan showed increased pericardial uptake, consistent with pericarditis   -CTSG and cardiology said no intervention     6. BP instability with dialysis - started on midodrine, tapered to 5 TID on discharge    7. Dress syndrome - c/w prednisone 60 mg while inpatient   Outpatient, c/w 50 mg prednisone for 7 days (3/6-3/12), 40 mg for 7 days (3/13-3/19), 30 mg for 7 days (3/20-3/26) until tapered to 0     8. Tachycardia - pt had episode of hemodynamically unstable A fib with RVR, requiring 1 day stay in MICU  -hold AC given bleeding risks     9. BPH - c/w daily doxazosin     Inpatient hospital course: 77yo gentleman with a PMH of Hemophilia A, HTN, Gout c/b allopurinol-induced DRESS Syndrome and CKD (on high-dose steroids) who p/w acute on chronic renal failure c/b HD unstable Afib w/RVR and shock c/b thrombocytopenia, requiring emergent initiation of HD.  Hospital course c/b MSSA bacteremia with slow clearance of cultures, pericardium presumed to be the source.  Transferred to the [facility] on 3/1 for ongoing volume overload, coordination of outpatient HD and clearance of blood cultures.  BCx neg x >72 hours.  Permacath placed, dialysis set up outpatient.     New medications: torsemide 10 mg BID, cefazolin, midodrine 5 TID, folic acid  Labs to be followed outpatient: Cr   Exam to be followed outpatient: none   #Discharge: do not delete    76M PMH Hemophilia A, HTN, gout c/b allopurinol-induced DRESS Syndrome, CKD (on high dose steroids) who p/w acute on chronic renal failure c/b hemodynamically unstable Afib w RVR and shock c/b thrombocytopenia, requiring emergent initation of HD. Hospital course c/b MSSA bacteremia.    Problem List/Main Diagnoses (system-based):   1. MSSA Bacteremia - blood cultures persistently growing MSSA.  -Gallium scan showed uptake in pericardium, which was presumed source for infection  -received ancef 1g q24h (2/25-)  -upon discharge, will require 2g, 2g, 3g after dialysis days     2. Acute renal failure - Had developed dress syndrome after allopurinol, and has required high dose steroid therapy.   -Cr had worsened with deranged electrolytes after being diagnosed with Dress syndrome.   -Received multiple dialysis sessions inpatient   -THC catheter placed 3/5 and dialysis will be continued outpatient   -c/w renvela 800 TID  -IV lasix transitioned to torsemide 10 mg BID for volume overload   -Stop torsemide upon discharge per renal     3. 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  -pt hemodynamically stable during hospital stay   -required daily factor VIII checks  -received factor 8 products prior to THC catheter     4. Folic acid deficiency - started on 1 mg folic acid qd     5. Pericarditis - pt had uremic pericarditis with BUN >100 on presentation   -Gallium scan showed increased pericardial uptake, consistent with pericarditis   -CTSG and cardiology said no intervention     6. BP instability with dialysis - started on midodrine, tapered to 5 TID on discharge    7. Dress syndrome - c/w prednisone 60 mg while inpatient   Outpatient, c/w 50 mg prednisone for 7 days (3/6-3/12), 40 mg for 7 days (3/13-3/19), 30 mg for 7 days (3/20-3/26) until tapered to 0     8. Tachycardia - pt had episode of hemodynamically unstable A fib with RVR, requiring 1 day stay in MICU  -hold AC given bleeding risks     9. BPH - c/w daily doxazosin     Inpatient hospital course: 75yo gentleman with a PMH of Hemophilia A, HTN, Gout c/b allopurinol-induced DRESS Syndrome and CKD (on high-dose steroids) who p/w acute on chronic renal failure c/b HD unstable Afib w/RVR and shock c/b thrombocytopenia, requiring emergent initiation of HD.  Hospital course c/b MSSA bacteremia with slow clearance of cultures, pericardium presumed to be the source.  Transferred to the [facility] on 3/1 for ongoing volume overload, coordination of outpatient HD and clearance of blood cultures.  BCx neg x >72 hours.  Permacath placed, dialysis set up outpatient.     New medications: torsemide 10 mg BID, cefazolin, midodrine 5 TID, folic acid  Labs to be followed outpatient: Cr   Exam to be followed outpatient: none   #Discharge: do not delete    76M PMH Hemophilia A, HTN, gout c/b allopurinol-induced DRESS Syndrome, CKD (on high dose steroids) who p/w acute on chronic renal failure c/b hemodynamically unstable Afib w RVR and shock c/b thrombocytopenia, requiring emergent initation of HD. Hospital course c/b MSSA bacteremia.    Problem List/Main Diagnoses (system-based):   1. MSSA Bacteremia - blood cultures persistently growing MSSA.  -Gallium scan showed uptake in pericardium, which was presumed source for infection  -received ancef 1g q24h (2/25-)  -upon discharge, will require 2g, 2g, 3g after dialysis days     2. Acute renal failure - Had developed dress syndrome after allopurinol, and has required high dose steroid therapy.   -Cr had worsened with deranged electrolytes after being diagnosed with Dress syndrome.   -Received multiple dialysis sessions inpatient   -THC catheter placed 3/5 and dialysis will be continued outpatient   -c/w renvela 800 TID  -IV lasix transitioned to torsemide 10 mg BID for volume overload   -Stop torsemide upon discharge per renal     3. 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  -pt hemodynamically stable during hospital stay   -required daily factor VIII checks  -received factor 8 products prior to THC catheter     4. Folic acid deficiency - started on 1 mg folic acid qd     5. Pericarditis - pt had uremic pericarditis with BUN >100 on presentation   -Gallium scan showed increased pericardial uptake, consistent with pericarditis   -CTSG and cardiology said no intervention     6. BP instability with dialysis - started on midodrine, tapered to 5 TID on discharge    7. Dress syndrome - c/w prednisone 60 mg while inpatient   Outpatient, c/w 50 mg prednisone for 7 days (3/6-3/12), 40 mg for 7 days (3/13-3/19), 30 mg for 7 days (3/20-3/26) until tapered to 0     8. Tachycardia - pt had episode of hemodynamically unstable A fib with RVR, requiring 1 day stay in MICU  -hold AC given bleeding risks     9. BPH - c/w daily doxazosin     10. PPD indeterminate - likely due to high dose prednisone. No cavitary lesion on CXR  -f/u PCP outpatient    Inpatient hospital course: 77yo gentleman with a PMH of Hemophilia A, HTN, Gout c/b allopurinol-induced DRESS Syndrome and CKD (on high-dose steroids) who p/w acute on chronic renal failure c/b HD unstable Afib w/RVR and shock c/b thrombocytopenia, requiring emergent initiation of HD.  Hospital course c/b MSSA bacteremia with slow clearance of cultures, pericardium presumed to be the source.  Transferred to the [facility] on 3/1 for ongoing volume overload, coordination of outpatient HD and clearance of blood cultures.  BCx neg x >72 hours.  Permacath placed, dialysis set up outpatient.     New medications: torsemide 10 mg BID, cefazolin, midodrine 5 TID, folic acid  Labs to be followed outpatient: Cr   Exam to be followed outpatient: none   #Discharge: do not delete    76M PMH Hemophilia A, HTN, gout c/b allopurinol-induced DRESS Syndrome, CKD (on high dose steroids) who p/w acute on chronic renal failure c/b hemodynamically unstable Afib w RVR and shock c/b thrombocytopenia, requiring emergent initation of HD. Hospital course c/b MSSA bacteremia.    Problem List/Main Diagnoses (system-based):   1. MSSA Bacteremia - blood cultures persistently growing MSSA.  -Gallium scan showed uptake in pericardium, which was presumed source for infection  -received ancef 1g q24h (2/25-)  -upon discharge, will require 2g, 2g, 3g after dialysis days     2. Acute renal failure - Had developed dress syndrome after allopurinol, and has required high dose steroid therapy.   -Cr had worsened with deranged electrolytes after being diagnosed with Dress syndrome.   -Received multiple dialysis sessions inpatient   -THC catheter placed 3/5 and dialysis will be continued outpatient   -c/w renvela 800 TID  -IV lasix transitioned to torsemide, no diuretics upon DC    -Stop torsemide upon discharge per renal     3. 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  -required daily factor VIII checks  -received factor 8 products prior to HD catheter placement  -pt had continual oozing from HD catheter with gradually downtrending Hgb  -upon DC, HD catheter stopped oozing blood and Hgb stable >7    4. Folic acid deficiency - started on 1 mg folic acid qd     5. Pericarditis - pt had uremic pericarditis with BUN >100 on presentation   -Gallium scan showed increased pericardial uptake, consistent with pericarditis   -CTSG and cardiology said no intervention     6. BP instability with dialysis - started on midodrine, c/w 7.5 mg BID     7. Dress syndrome - c/w prednisone 60 mg while inpatient   Outpatient, c/w 50 mg prednisone for 7 days (3/6-3/12), 40 mg for 7 days (3/13-3/19), 30 mg for 7 days (3/20-3/26) until tapered to 0     8. Tachycardia - pt had episode of hemodynamically unstable A fib with RVR, requiring 1 day stay in MICU  -hold AC given bleeding risks     9. BPH - c/w daily doxazosin     10. PPD indeterminate - likely due to high dose prednisone. No cavitary lesion on CXR  -f/u PCP outpatient    Inpatient hospital course: 77yo gentleman with a PMH of Hemophilia A, HTN, Gout c/b allopurinol-induced DRESS Syndrome and CKD (on high-dose steroids) who p/w acute on chronic renal failure c/b HD unstable Afib w/RVR and shock c/b thrombocytopenia, requiring emergent initiation of HD.  Hospital course c/b MSSA bacteremia with slow clearance of cultures, pericardium presumed to be the source.  Transferred to the [facility] on 3/1 for ongoing volume overload, coordination of outpatient HD and clearance of blood cultures.  BCx neg x >72 hours.  Permacath placed, dialysis set up outpatient.     New medications: cefazolin, midodrine 7.5 TID, tranexamic acid, folic acid  Labs to be followed outpatient: Cr   Exam to be followed outpatient: none

## 2021-03-03 NOTE — PROGRESS NOTE ADULT - PROBLEM SELECTOR PLAN 5
During first episode of dialysis over 1 week ago, pt became hypotensive, requiring transfer to MICU for dialysis. Tolerated dialysis 1x on pressors and 1x off. BP well controlled on midodrine.  -c/w midodrine 7.5 mg TID, taper as clinically appropriate

## 2021-03-03 NOTE — PROGRESS NOTE ADULT - ASSESSMENT
76M PMHx Hemophilia A, HTN, Gout, p/w RU in setting of DRESS from allopurinol on steroids   hypervolemic, hyperkalemic, uremic, and acidotic without improvement   started on hemodialysis 2/23     #RU secondary to ATN vs AIN on steroids   #uremic pericarditis  #thrombocytopenia - could be 2/2 sepsis vs reaction to dialyzer - will use Revaclear with next HD instead of Optiflux    Staph bacteraemia on cx 2/26; cultures negative 2/28  GFR unchanged over last few days, lytes, bicarb and volume acceptable, defer HD cath placement and HD- potential recovery, non oliguric  No urgent indication to dialyse at this time   Per ID 72hr of negative cx prior to tunneled cath placement.  Last HD 2/26- fem cath removed, no HD access at this time    BP: controlled on midodrine 7.5q8; lasix 80 IV q12h  BMD: renvela 800 TID  Anaemia- no IV iron given septicaemia, haem/onc involved for haemophilia; will need coordination with IR for tunneled catheter placement prior to discharge if need arise    Daily weights, strict I&Os, renally dose meds, renal diet

## 2021-03-03 NOTE — DISCHARGE NOTE PROVIDER - NSDCMRMEDTOKEN_GEN_ALL_CORE_FT
doxazosin 4 mg oral tablet: 1 tab(s) orally once a day  Lasix 40 mg oral tablet: 1 tab(s) orally once a day  Norvasc 5 mg oral tablet: 1 tab(s) orally once a day  predniSONE: 60 milligram(s) orally once a day  Renvela 800 mg oral tablet: 1 tab(s) orally 3 times a day (with meals)  sodium bicarbonate 650 mg oral tablet: 1 tab(s) orally 2 times a day   doxazosin 4 mg oral tablet: 1 tab(s) orally once a day  Lasix 40 mg oral tablet: 1 tab(s) orally once a day  Norvasc 5 mg oral tablet: 1 tab(s) orally once a day  predniSONE: 60 milligram(s) orally once a day  Renvela 800 mg oral tablet: 1 tab(s) orally 3 times a day (with meals)  sodium bicarbonate 650 mg oral tablet: 1 tab(s) orally 2 times a day  torsemide 10 mg oral tablet: 1 tab(s) orally 2 times a day   ceFAZolin: 2 gram(s) on 1st dialysis day, 2 grams on 2nd dialysis day, 3 grams on 3rd dialysis day. To be taken after dialysis.    Script sent to dialysis center   doxazosin 4 mg oral tablet: 1 tab(s) orally once a day  folic acid 1 mg oral tablet: 1 tab(s) orally once a day  midodrine 5 mg oral tablet: 1 tab(s) orally every 8 hours  predniSONE 50 mg oral tablet: 1 tab(s) orally once a day. Take 50 mg 1x/day (3/6-3/12), then 40 mg 1x/day (3/13-3/19), then 30 mg 1x/day (3/20-3/26), then 20 mg (3/27-4/2) 1x/day, then 10 mg 1x/day (4/3-4/9),    [id number]  7ur 7605-1  Anticipated discharge: 3/6 at 1pm  Pager - [phone]  Renvela 800 mg oral tablet: 1 tab(s) orally 3 times a day (with meals)  torsemide 10 mg oral tablet: 1 tab(s) orally 2 times a day   ceFAZolin: 2 gram(s) on 1st dialysis day, 2 grams on 2nd dialysis day, 3 grams on 3rd dialysis day. To be taken after dialysis.    Script sent to dialysis center   doxazosin 4 mg oral tablet: 1 tab(s) orally once a day  folic acid 1 mg oral tablet: 1 tab(s) orally once a day    Dr Meron Reyes (attending)  midodrine 5 mg oral tablet: 1 tab(s) orally every 8 hours  predniSONE 50 mg oral tablet: 1 tab(s) orally once a day. Take 50 mg 1x/day (3/6-3/12), then 40 mg 1x/day (3/13-3/19), then 30 mg 1x/day (3/20-3/26), then 20 mg (3/27-4/2) 1x/day, then 10 mg 1x/day (4/3-4/9),    [id number]  7ur 7605-1  Anticipated discharge: 3/6 at 1pm  Pager - [phone]  Renvela 800 mg oral tablet: 1 tab(s) orally 3 times a day (with meals)  torsemide 10 mg oral tablet: 1 tab(s) orally 2 times a day   ceFAZolin: 2 gram(s) on 1st dialysis day, 2 grams on 2nd dialysis day, 3 grams on 3rd dialysis day. To be taken after dialysis.    Script sent to dialysis center   doxazosin 4 mg oral tablet: 1 tab(s) orally once a day  folic acid 1 mg oral tablet: 1 tab(s) orally once a day    Dr Meron Reyes (attending)  midodrine 5 mg oral tablet: 1 tab(s) orally every 8 hours  predniSONE 50 mg oral tablet: 1 tab(s) orally once a day. Take 50 mg 1x/day (3/6-3/12), then 40 mg 1x/day (3/13-3/19), then 30 mg 1x/day (3/20-3/26), then 20 mg (3/27-4/2) 1x/day, then 10 mg 1x/day (4/3-4/9),    [id number]  7ur 7605-1  Anticipated discharge: 3/6 at 1pm  Pager - [phone]  Renvela 800 mg oral tablet: 1 tab(s) orally 3 times a day (with meals)   ceFAZolin: 2 gram(s) on 1st dialysis day, 2 grams on 2nd dialysis day, 3 grams on 3rd dialysis day. To be taken after dialysis.    Script sent to dialysis center   doxazosin 4 mg oral tablet: 1 tab(s) orally once a day  folic acid 1 mg oral tablet: 1 tab(s) orally once a day    Dr Meron Reyes (attending)  midodrine 5 mg oral tablet: 1 tab(s) orally every 8 hours  predniSONE 50 mg oral tablet: 1 tab(s) orally once a day. Take 50 mg 1x/day (3/6-3/12), then 40 mg 1x/day (3/13-3/19), then 30 mg 1x/day (3/20-3/26), then 20 mg (3/27-4/2) 1x/day, then 10 mg 1x/day (4/3-4/9),    [id number]  7ur 7605-1  Anticipated discharge: 3/6 at 1pm  Pager - [phone]  Renvela 800 mg oral tablet: 1 tab(s) orally 3 times a day (with meals)  tranexamic acid 650 mg oral tablet: 2 tab(s) orally once a day   start from 3/10-3/12       ceFAZolin: 2 gram(s) on 1st dialysis day, 2 grams on 2nd dialysis day, 3 grams on 3rd dialysis day. To be taken after dialysis.    Script sent to dialysis center   doxazosin 4 mg oral tablet: 1 tab(s) orally once a day  folic acid 1 mg oral tablet: 1 tab(s) orally once a day    Dr Meron Reyes (attending)  midodrine 2.5 mg oral tablet: 3 tab(s) orally every 8 hours  predniSONE 50 mg oral tablet: 1 tab(s) orally once a day. Take 50 mg 1x/day (3/6-3/12), then 40 mg 1x/day (3/13-3/19), then 30 mg 1x/day (3/20-3/26), then 20 mg (3/27-4/2) 1x/day, then 10 mg 1x/day (4/3-4/9),    [id number]  7ur 7605-1  Anticipated discharge: 3/6 at 1pm  Pager - [phone]  Renvela 800 mg oral tablet: 1 tab(s) orally 3 times a day (with meals)  tranexamic acid 650 mg oral tablet: 2 tab(s) orally every 24 hours

## 2021-03-03 NOTE — PROGRESS NOTE ADULT - ASSESSMENT
76 year old male with a past medical history of CKD (recent Cr 6.6 at [facility]), Hemophilia A, HTN, Gout, and DRESS Syndrome who presents with weakness for 1 week. Cardiology consult for volume overload and elevated troponins. Patient had AF with RVR in setting of likely uremic pericarditis, asymptomatic. Patient now in NSR. Reconsult for pericardial gallium uptake.     Uremic pericarditis: Uremia still present. Patient without symptoms of pericarditis. Continuing to receive HD.   -Continue with HD.   -No need for treatment with colchicine or NSAIDs.  -Trivial pericardial effusion. No intervention needed.     Discussed with attending and primary team. Please reconsult as needed.    76 year old male with a past medical history of CKD (recent Cr 6.6 at [facility]), Hemophilia A, HTN, Gout, and DRESS Syndrome who presents with weakness for 1 week. Cardiology consult for volume overload and elevated troponins. Patient had AF with RVR in setting of likely uremic pericarditis, asymptomatic. Patient now in NSR. Reconsult for pericardial gallium uptake.     Uremic pericarditis: Uremia still present. Patient without symptoms of pericarditis. Continuing to receive HD.   -Continue with HD.   -No need for treatment with colchicine or NSAIDs given lack of symptoms   -Trivial pericardial effusion. No intervention needed.     Discussed with attending and primary team. Please reconsult as needed.     Radha Bliss MD  Cardiology consult attending

## 2021-03-03 NOTE — PROGRESS NOTE ADULT - SUBJECTIVE AND OBJECTIVE BOX
infectious diseases progress note:  YAIR FELDMAN is a 76yMale patient    Overnight events: None    Interval History: Patient seen and examined at bedside. Patient still c/o of fatigue and pain from lip lesion. Patient denies fever, chills, headache, change in vision, CP, cough, SOB, N/V/D, abdominal pain.    ACUTE KIDNEY INJURY      Blood pressure instability    ESRD (end stage renal disease)    BPH (benign prostatic hyperplasia)    Ascites    Shock    Pericarditis    Tachycardia    Bacteremia    Acute renal failure    Metabolic acidosis    DRESS syndrome    Acute kidney injury    Nutrition, metabolism, and development symptoms    Hemophilia A    History of BPH    Gout    HTN (hypertension)    Other ascites    Pericardial effusion    Hyperkalemia    Acute renal failure superimposed on stage 5 chronic kidney disease, not on chronic dialysis, unspecified acute renal failure type        ROS:  CONSTITUTIONAL:  Positive for fatigue. Negative fever or chills  EYES:  Negative  blurry vision or double vision  CARDIOVASCULAR:  Negative for chest pain or palpitations  RESPIRATORY:  Negative for cough, wheezing, or SOB   GASTROINTESTINAL:  Negative for nausea, vomiting, diarrhea, constipation, or abdominal pain  GENITOURINARY:  Negative frequency, urgency or dysuria  NEUROLOGIC:  No headache, confusion, dizziness, lightheadedness    Allergies    allopurinol (Other)    Intolerances        ANTIBIOTICS/RELEVANT:  antimicrobials  ceFAZolin   IVPB 1000 milliGRAM(s) IV Intermittent every 24 hours    immunologic:  PPD  5 Tuberculin Unit(s) Injectable 5 Unit(s) IntraDermal once    OTHER:  doxazosin 4 milliGRAM(s) Oral daily  folic acid 1 milliGRAM(s) Oral daily  midodrine. 7.5 milliGRAM(s) Oral every 8 hours  nystatin Cream 1 Application(s) Topical every 12 hours  pantoprazole    Tablet 40 milliGRAM(s) Oral before breakfast  petrolatum white Ointment 1 Application(s) Topical every 24 hours  polyethylene glycol 3350 17 Gram(s) Oral daily  predniSONE   Tablet 60 milliGRAM(s) Oral daily  senna 2 Tablet(s) Oral at bedtime  sevelamer carbonate 800 milliGRAM(s) Oral three times a day with meals  sodium chloride 0.65% Nasal 1 Spray(s) Both Nostrils two times a day  torsemide 10 milliGRAM(s) Oral two times a day      Objective:  Vital Signs Last 24 Hrs  T(C): 36.9 (03 Mar 2021 11:47), Max: 37.2 (03 Mar 2021 05:20)  T(F): 98.4 (03 Mar 2021 11:47), Max: 99 (03 Mar 2021 05:20)  HR: 93 (03 Mar 2021 11:47) (90 - 100)  BP: 138/66 (03 Mar 2021 11:47) (127/57 - 144/68)  BP(mean): --  RR: 17 (03 Mar 2021 11:47) (17 - 18)  SpO2: 98% (03 Mar 2021 11:47) (92% - 98%)    PHYSICAL EXAM:  General: NAD, somnolent, kept falling asleep during exam  Skin: Extremities with dry scales with no signs of warmth, purulence, drainage  HEENT: Conjunctiva clear, sclera white, PERRLA  Cardiac: S1 and S1 clear. No murmurs, rubs or gallops  Lungs: Unlabored breathing, no accessory muscle use, vesicular b/l   Abdomen: Distended, soft, non-tender, normoactive BS in all four quadrants  Peripheral extremities: 2+ pitting edema in lower extremities b/l, 2+ DP and PT pulses b/l   Neurological: A&Ox3          LABS:                        8.1    9.87  )-----------( 96       ( 03 Mar 2021 06:51 )             25.6     03-03    139  |  97  |  122<H>  ----------------------------<  105<H>  4.7   |  29  |  5.00<H>    Ca    8.5      03 Mar 2021 06:51  Phos  5.5     03-03  Mg     2.1     03-03      PT/INR - ( 03 Mar 2021 06:51 )   PT: 15.6 sec;   INR: 1.32          PTT - ( 03 Mar 2021 06:51 )  PTT:37.2 sec        MICROBIOLOGY:        RADIOLOGY & ADDITIONAL STUDIES: infectious diseases progress note:  YAIR FELDMAN is a 76yMale patient    Overnight events: None    Interval History: Patient seen and examined at bedside. Patient still c/o of fatigue and pain from lip lesion. Patient denies fever, chills, headache, change in vision, CP, cough, SOB, N/V/D, abdominal pain.    ACUTE KIDNEY INJURY      Blood pressure instability    ESRD (end stage renal disease)    BPH (benign prostatic hyperplasia)    Ascites    Shock    Pericarditis    Tachycardia    Bacteremia    Acute renal failure    Metabolic acidosis    DRESS syndrome    Acute kidney injury    Nutrition, metabolism, and development symptoms    Hemophilia A    History of BPH    Gout    HTN (hypertension)    Other ascites    Pericardial effusion    Hyperkalemia    Acute renal failure superimposed on stage 5 chronic kidney disease, not on chronic dialysis, unspecified acute renal failure type        ROS:  CONSTITUTIONAL:  Positive for fatigue. Negative fever or chills  EYES:  Negative  blurry vision or double vision  CARDIOVASCULAR:  Negative for chest pain or palpitations  RESPIRATORY:  Negative for cough, wheezing, or SOB   GASTROINTESTINAL:  Negative for nausea, vomiting, diarrhea, constipation, or abdominal pain  GENITOURINARY:  Negative frequency, urgency or dysuria  NEUROLOGIC:  No headache, confusion, dizziness, lightheadedness    Allergies    allopurinol (Other)    Intolerances        ANTIBIOTICS/RELEVANT:  antimicrobials  ceFAZolin   IVPB 1000 milliGRAM(s) IV Intermittent every 24 hours    immunologic:  PPD  5 Tuberculin Unit(s) Injectable 5 Unit(s) IntraDermal once    OTHER:  doxazosin 4 milliGRAM(s) Oral daily  folic acid 1 milliGRAM(s) Oral daily  midodrine. 7.5 milliGRAM(s) Oral every 8 hours  nystatin Cream 1 Application(s) Topical every 12 hours  pantoprazole    Tablet 40 milliGRAM(s) Oral before breakfast  petrolatum white Ointment 1 Application(s) Topical every 24 hours  polyethylene glycol 3350 17 Gram(s) Oral daily  predniSONE   Tablet 60 milliGRAM(s) Oral daily  senna 2 Tablet(s) Oral at bedtime  sevelamer carbonate 800 milliGRAM(s) Oral three times a day with meals  sodium chloride 0.65% Nasal 1 Spray(s) Both Nostrils two times a day  torsemide 10 milliGRAM(s) Oral two times a day      Objective:  Vital Signs Last 24 Hrs  T(C): 36.9 (03 Mar 2021 11:47), Max: 37.2 (03 Mar 2021 05:20)  T(F): 98.4 (03 Mar 2021 11:47), Max: 99 (03 Mar 2021 05:20)  HR: 93 (03 Mar 2021 11:47) (90 - 100)  BP: 138/66 (03 Mar 2021 11:47) (127/57 - 144/68)  BP(mean): --  RR: 17 (03 Mar 2021 11:47) (17 - 18)  SpO2: 98% (03 Mar 2021 11:47) (92% - 98%)    PHYSICAL EXAM:  General: NAD, somnolent, kept falling asleep during exam  Skin: Extremities with dry scales with no signs of warmth, purulence, drainage. Left buttock region with 2cm x 1cm stage 2 pressure ulcer without surrounding erythema, warmth and drainage. No palpable induration or fluctuance of left buttock region.  HEENT: Conjunctiva clear, sclera white, PERRLA  Cardiac: S1 and S1 clear. No murmurs, rubs or gallops  Lungs: Unlabored breathing, no accessory muscle use, vesicular b/l   Abdomen: Distended, soft, non-tender, normoactive BS in all four quadrants  Peripheral extremities: 2+ pitting edema in lower extremities b/l, 2+ DP and PT pulses b/l   Neurological: A&Ox3          LABS:                        8.1    9.87  )-----------( 96       ( 03 Mar 2021 06:51 )             25.6     03-03    139  |  97  |  122<H>  ----------------------------<  105<H>  4.7   |  29  |  5.00<H>    Ca    8.5      03 Mar 2021 06:51  Phos  5.5     03-03  Mg     2.1     03-03      PT/INR - ( 03 Mar 2021 06:51 )   PT: 15.6 sec;   INR: 1.32          PTT - ( 03 Mar 2021 06:51 )  PTT:37.2 sec        MICROBIOLOGY:        RADIOLOGY & ADDITIONAL STUDIES:

## 2021-03-03 NOTE — PROGRESS NOTE ADULT - PROBLEM SELECTOR PLAN 10
F: none   E: Lokelma for K >5.5;  N: Renal diet  DVT: SCDs, no AC given thrombocytopenia and hemophilia   GI: none.  Dispo: [facility]

## 2021-03-03 NOTE — PROGRESS NOTE ADULT - ASSESSMENT
75 yo M with PMHx of CKD (recent Cr 6.6 at [facility]), Hemophilia A, HTN, Gout, BPH and DRESS Syndrome (on Prednisone 60mg QD) consulted to ID for MSSA bacteremia. Patient currently on cefazolin 1g q 24hrs which was chosen over Nafcillin in the setting of severe fluid overload since Cefazolin is administered with less fluid. Source of MSSA unknown. Possible source from skin as patient is immunocompromised from chronic steroids and broke skin barrier scratching the DRESS syndrome rash which created multiple lesions. Unlikely lung source since no cough, sputum production and CXR does not show any lung field opacities. Prior SOB and prior need for 3L NC likely due to pleural effusion noted on CXR in setting of severe fluid overload secondary to CKD. Weakly positive UA, however unlikely  source as patient does not have urinary symptoms and staph aureus usually does not commonly arise from urinary sources. Patient afebrile with WBC count of 9.87 (3/3) which is downtrending from 28.30 on admission (2/20).  Surveillance blood cultures from 2/22, 2/23 and 2/26 grew MSSA. Surveillance blood cultures from 2/28 and 3/1 have no growth to date. TTE (2/22) showed no valvular disease and GUANAKO (2/26) had no vegetations on heart valves.  Gallium scan (3/2) shows uptake in pericardium without uptake in pleural fluid which is concerning for seeding of MSSA of the pericardium especially since patient was bacteremic for a of minimum six days. Gallium scan (3/2) also shows two focal areas in left buttock that could be a skin lesion or abscess. As there is no obvious superficial infection, this may indicate a deeper abscess in the left buttock region.    Plan:  - C/w cefazolin 1g IV q 24hrs   - Repeat TTE to see if pericardial effusion has increased in setting of potential MSSA pericarditis   - Recommend Cardiology involvement for potential MSSA pericarditis   - F/u surveillance blood cultures 2/28 and 3/1  - Recommend HD permacath after 72 hours of negative blood cultures to prevent seeding of MSSA. Okay for temporary catheter exchange if needed in interim.    ID Team 1 will follow.    Discussed with Infectious Disease Attending Dr. Edgar. Recommendations are considered final after attending attestation.   75 yo M with PMHx of CKD (recent Cr 6.6 at [facility]), Hemophilia A, HTN, Gout, BPH and DRESS Syndrome (on Prednisone 60mg QD) consulted to ID for MSSA bacteremia. Patient currently on cefazolin 1g q 24hrs which was chosen over Nafcillin in the setting of severe fluid overload since Cefazolin is administered with less fluid. Source of MSSA unknown. Possible source from skin as patient is immunocompromised from chronic steroids and broke skin barrier scratching the DRESS syndrome rash which created multiple lesions. Unlikely lung source since no cough, sputum production and CXR does not show any lung field opacities. Prior SOB and prior need for 3L NC likely due to pleural effusion noted on CXR in setting of severe fluid overload secondary to CKD. Weakly positive UA, however unlikely  source as patient does not have urinary symptoms and staph aureus usually does not commonly arise from urinary sources. Patient afebrile with WBC count of 9.87 (3/3) which is downtrending from 28.30 on admission (2/20).  Surveillance blood cultures from 2/22, 2/23 and 2/26 grew MSSA. Surveillance blood cultures from 2/28 and 3/1 have no growth to date. TTE (2/22) showed no valvular disease and GUANAKO (2/26) had no vegetations on heart valves.  Gallium scan (3/2) shows uptake in pericardium without uptake in pleural fluid which is concerning for seeding of MSSA of the pericardium especially since patient was bacteremic for a of minimum six days. Gallium scan (3/2) also shows two focal areas in left buttock that could be a skin lesion or abscess. As there is no obvious superficial infection or palpable induration or fluctuance, this may indicate a deeper abscess in the left buttock region.      Plan:  - C/w cefazolin 1g IV q 24hrs   - Repeat TTE to see if pericardial effusion has increased in setting of potential MSSA pericarditis   - Recommend Cardiology involvement for potential MSSA pericarditis   - F/u surveillance blood cultures 2/28 and 3/1  - Recommend HD permacath after 72 hours of negative blood cultures to prevent seeding of MSSA. Okay for temporary catheter exchange if needed in interim.    ID Team 1 will follow.    Discussed with Infectious Disease Attending Dr. Edgar. Recommendations are considered final after attending attestation.   77 yo M with PMHx of CKD (recent Cr 6.6 at [facility]), Hemophilia A, HTN, Gout, BPH and DRESS Syndrome (on Prednisone 60mg QD) consulted to ID for MSSA bacteremia. Patient currently on cefazolin 1g q 24hrs which was chosen over Nafcillin in the setting of severe fluid overload since Cefazolin is administered with less fluid. Source of MSSA unknown. Possible source from skin as patient is immunocompromised from chronic steroids and broke skin barrier scratching the DRESS syndrome rash which created multiple lesions. Unlikely lung source since no cough, sputum production and CXR does not show any lung field opacities. Prior SOB and prior need for 3L NC likely due to pleural effusion noted on CXR in setting of severe fluid overload secondary to CKD. Weakly positive UA, however unlikely  source as patient does not have urinary symptoms and staph aureus usually does not commonly arise from urinary sources. Patient afebrile with WBC count of 9.87 (3/3) which is downtrending from 28.30 on admission (2/20).  Surveillance blood cultures from 2/22, 2/23 and 2/26 grew MSSA. Surveillance blood cultures from 2/28 and 3/1 have no growth to date. TTE (2/22) showed no valvular disease and GUANAKO (2/26) had no vegetations on heart valves.  Gallium scan (3/2) shows uptake in pericardium without uptake in pleural fluid which is concerning for seeding of MSSA of the pericardium especially since patient was bacteremic for a of minimum six days. Gallium scan (3/2) also shows two focal areas in left buttock that could be a skin lesion or abscess. As there is no obvious superficial infection or palpable induration or fluctuance, this may indicate a deeper abscess in the left buttock region.      Plan:  - C/w cefazolin 1g IV q 24hrs   - Repeat TTE to see if pericardial effusion has increased in setting of potential MSSA pericarditis   - Recommend Cardiology involvement for potential MSSA pericarditis   - F/u surveillance blood cultures 2/28 and 3/1  - Recommend HD permacath after 5 d of negative blood cultures to prevent seeding of MSSA. Okay for temporary catheter exchange if needed in interim.    ID Team 1 will follow.    Discussed with Infectious Disease Attending Dr. Edgar. Recommendations are considered final after attending attestation.

## 2021-03-03 NOTE — PROGRESS NOTE ADULT - PROBLEM SELECTOR PLAN 3
Prior history of significant bleeding in past following ERCP. Follows w Dr Grace ([facility]).  -Heme following - follow recs   -Daily INR/PT/PTT and daily Factor VIII   -active T&S (3/3)   -f/u Dr Grace outpt     #folic acid deficiency - pt found to be folate deficient  -start 1 mg folic acid qd     #Anemia  - Hgb 11.5 on arrival with unclear baseline  - Like 2/2 acute kidney failure or dilutional due to volume overload.

## 2021-03-03 NOTE — DISCHARGE NOTE PROVIDER - CARE PROVIDERS DIRECT ADDRESSES
,DirectAddress_Unknown,DirectAddress_Unknown ,DirectAddress_Unknown,DirectAddress_Unknown,DirectAddress_Unknown ,DirectAddress_Unknown,[email or web address] ,[email or web address],[email or web address],DirectAddress_Unknown ,DirectAddress_Unknown,DirectAddress_Unknown,DirectAddress_Unknown,DirectAddress_Unknown

## 2021-03-03 NOTE — DISCHARGE NOTE PROVIDER - DETAILS OF MALNUTRITION DIAGNOSIS/DIAGNOSES
This patient has been assessed with a concern for Malnutrition and was treated during this hospitalization for the following Nutrition diagnosis/diagnoses:     -  03/01/2021: Moderate protein-calorie malnutrition   This patient has been assessed with a concern for Malnutrition and was treated during this hospitalization for the following Nutrition diagnosis/diagnoses:     -  03/01/2021: Moderate protein-calorie malnutrition    This patient has been assessed with a concern for Malnutrition and was treated during this hospitalization for the following Nutrition diagnosis/diagnoses:     -  03/01/2021: Moderate protein-calorie malnutrition   This patient has been assessed with a concern for Malnutrition and was treated during this hospitalization for the following Nutrition diagnosis/diagnoses:     -  03/01/2021: Moderate protein-calorie malnutrition    This patient has been assessed with a concern for Malnutrition and was treated during this hospitalization for the following Nutrition diagnosis/diagnoses:     -  03/01/2021: Moderate protein-calorie malnutrition    This patient has been assessed with a concern for Malnutrition and was treated during this hospitalization for the following Nutrition diagnosis/diagnoses:     -  03/01/2021: Moderate protein-calorie malnutrition    This patient has been assessed with a concern for Malnutrition and was treated during this hospitalization for the following Nutrition diagnosis/diagnoses:     -  03/01/2021: Moderate protein-calorie malnutrition   This patient has been assessed with a concern for Malnutrition and was treated during this hospitalization for the following Nutrition diagnosis/diagnoses:     -  03/01/2021: Moderate protein-calorie malnutrition    This patient has been assessed with a concern for Malnutrition and was treated during this hospitalization for the following Nutrition diagnosis/diagnoses:     -  03/01/2021: Moderate protein-calorie malnutrition    This patient has been assessed with a concern for Malnutrition and was treated during this hospitalization for the following Nutrition diagnosis/diagnoses:     -  03/01/2021: Moderate protein-calorie malnutrition    This patient has been assessed with a concern for Malnutrition and was treated during this hospitalization for the following Nutrition diagnosis/diagnoses:     -  03/01/2021: Moderate protein-calorie malnutrition    This patient has been assessed with a concern for Malnutrition and was treated during this hospitalization for the following Nutrition diagnosis/diagnoses:     -  03/01/2021: Moderate protein-calorie malnutrition   This patient has been assessed with a concern for Malnutrition and was treated during this hospitalization for the following Nutrition diagnosis/diagnoses:     -  03/01/2021: Moderate protein-calorie malnutrition    This patient has been assessed with a concern for Malnutrition and was treated during this hospitalization for the following Nutrition diagnosis/diagnoses:     -  03/01/2021: Moderate protein-calorie malnutrition    This patient has been assessed with a concern for Malnutrition and was treated during this hospitalization for the following Nutrition diagnosis/diagnoses:     -  03/01/2021: Moderate protein-calorie malnutrition    This patient has been assessed with a concern for Malnutrition and was treated during this hospitalization for the following Nutrition diagnosis/diagnoses:     -  03/01/2021: Moderate protein-calorie malnutrition    This patient has been assessed with a concern for Malnutrition and was treated during this hospitalization for the following Nutrition diagnosis/diagnoses:     -  03/01/2021: Moderate protein-calorie malnutrition    This patient has been assessed with a concern for Malnutrition and was treated during this hospitalization for the following Nutrition diagnosis/diagnoses:     -  03/01/2021: Moderate protein-calorie malnutrition   This patient has been assessed with a concern for Malnutrition and was treated during this hospitalization for the following Nutrition diagnosis/diagnoses:     -  03/01/2021: Moderate protein-calorie malnutrition    This patient has been assessed with a concern for Malnutrition and was treated during this hospitalization for the following Nutrition diagnosis/diagnoses:     -  03/01/2021: Moderate protein-calorie malnutrition    This patient has been assessed with a concern for Malnutrition and was treated during this hospitalization for the following Nutrition diagnosis/diagnoses:     -  03/01/2021: Moderate protein-calorie malnutrition    This patient has been assessed with a concern for Malnutrition and was treated during this hospitalization for the following Nutrition diagnosis/diagnoses:     -  03/01/2021: Moderate protein-calorie malnutrition    This patient has been assessed with a concern for Malnutrition and was treated during this hospitalization for the following Nutrition diagnosis/diagnoses:     -  03/01/2021: Moderate protein-calorie malnutrition    This patient has been assessed with a concern for Malnutrition and was treated during this hospitalization for the following Nutrition diagnosis/diagnoses:     -  03/01/2021: Moderate protein-calorie malnutrition    This patient has been assessed with a concern for Malnutrition and was treated during this hospitalization for the following Nutrition diagnosis/diagnoses:     -  03/01/2021: Moderate protein-calorie malnutrition   This patient has been assessed with a concern for Malnutrition and was treated during this hospitalization for the following Nutrition diagnosis/diagnoses:     -  03/01/2021: Moderate protein-calorie malnutrition    This patient has been assessed with a concern for Malnutrition and was treated during this hospitalization for the following Nutrition diagnosis/diagnoses:     -  03/01/2021: Moderate protein-calorie malnutrition    This patient has been assessed with a concern for Malnutrition and was treated during this hospitalization for the following Nutrition diagnosis/diagnoses:     -  03/01/2021: Moderate protein-calorie malnutrition    This patient has been assessed with a concern for Malnutrition and was treated during this hospitalization for the following Nutrition diagnosis/diagnoses:     -  03/01/2021: Moderate protein-calorie malnutrition    This patient has been assessed with a concern for Malnutrition and was treated during this hospitalization for the following Nutrition diagnosis/diagnoses:     -  03/01/2021: Moderate protein-calorie malnutrition    This patient has been assessed with a concern for Malnutrition and was treated during this hospitalization for the following Nutrition diagnosis/diagnoses:     -  03/01/2021: Moderate protein-calorie malnutrition    This patient has been assessed with a concern for Malnutrition and was treated during this hospitalization for the following Nutrition diagnosis/diagnoses:     -  03/01/2021: Moderate protein-calorie malnutrition    This patient has been assessed with a concern for Malnutrition and was treated during this hospitalization for the following Nutrition diagnosis/diagnoses:     -  03/01/2021: Moderate protein-calorie malnutrition   This patient has been assessed with a concern for Malnutrition and was treated during this hospitalization for the following Nutrition diagnosis/diagnoses:     -  03/01/2021: Moderate protein-calorie malnutrition    This patient has been assessed with a concern for Malnutrition and was treated during this hospitalization for the following Nutrition diagnosis/diagnoses:     -  03/01/2021: Moderate protein-calorie malnutrition    This patient has been assessed with a concern for Malnutrition and was treated during this hospitalization for the following Nutrition diagnosis/diagnoses:     -  03/01/2021: Moderate protein-calorie malnutrition    This patient has been assessed with a concern for Malnutrition and was treated during this hospitalization for the following Nutrition diagnosis/diagnoses:     -  03/01/2021: Moderate protein-calorie malnutrition    This patient has been assessed with a concern for Malnutrition and was treated during this hospitalization for the following Nutrition diagnosis/diagnoses:     -  03/01/2021: Moderate protein-calorie malnutrition    This patient has been assessed with a concern for Malnutrition and was treated during this hospitalization for the following Nutrition diagnosis/diagnoses:     -  03/01/2021: Moderate protein-calorie malnutrition    This patient has been assessed with a concern for Malnutrition and was treated during this hospitalization for the following Nutrition diagnosis/diagnoses:     -  03/01/2021: Moderate protein-calorie malnutrition    This patient has been assessed with a concern for Malnutrition and was treated during this hospitalization for the following Nutrition diagnosis/diagnoses:     -  03/01/2021: Moderate protein-calorie malnutrition    This patient has been assessed with a concern for Malnutrition and was treated during this hospitalization for the following Nutrition diagnosis/diagnoses:     -  03/01/2021: Moderate protein-calorie malnutrition   This patient has been assessed with a concern for Malnutrition and was treated during this hospitalization for the following Nutrition diagnosis/diagnoses:     -  03/01/2021: Moderate protein-calorie malnutrition    This patient has been assessed with a concern for Malnutrition and was treated during this hospitalization for the following Nutrition diagnosis/diagnoses:     -  03/01/2021: Moderate protein-calorie malnutrition    This patient has been assessed with a concern for Malnutrition and was treated during this hospitalization for the following Nutrition diagnosis/diagnoses:     -  03/01/2021: Moderate protein-calorie malnutrition    This patient has been assessed with a concern for Malnutrition and was treated during this hospitalization for the following Nutrition diagnosis/diagnoses:     -  03/01/2021: Moderate protein-calorie malnutrition    This patient has been assessed with a concern for Malnutrition and was treated during this hospitalization for the following Nutrition diagnosis/diagnoses:     -  03/01/2021: Moderate protein-calorie malnutrition    This patient has been assessed with a concern for Malnutrition and was treated during this hospitalization for the following Nutrition diagnosis/diagnoses:     -  03/01/2021: Moderate protein-calorie malnutrition    This patient has been assessed with a concern for Malnutrition and was treated during this hospitalization for the following Nutrition diagnosis/diagnoses:     -  03/01/2021: Moderate protein-calorie malnutrition    This patient has been assessed with a concern for Malnutrition and was treated during this hospitalization for the following Nutrition diagnosis/diagnoses:     -  03/01/2021: Moderate protein-calorie malnutrition    This patient has been assessed with a concern for Malnutrition and was treated during this hospitalization for the following Nutrition diagnosis/diagnoses:     -  03/01/2021: Moderate protein-calorie malnutrition    This patient has been assessed with a concern for Malnutrition and was treated during this hospitalization for the following Nutrition diagnosis/diagnoses:     -  03/01/2021: Moderate protein-calorie malnutrition   This patient has been assessed with a concern for Malnutrition and was treated during this hospitalization for the following Nutrition diagnosis/diagnoses:     -  03/01/2021: Moderate protein-calorie malnutrition    This patient has been assessed with a concern for Malnutrition and was treated during this hospitalization for the following Nutrition diagnosis/diagnoses:     -  03/01/2021: Moderate protein-calorie malnutrition    This patient has been assessed with a concern for Malnutrition and was treated during this hospitalization for the following Nutrition diagnosis/diagnoses:     -  03/01/2021: Moderate protein-calorie malnutrition    This patient has been assessed with a concern for Malnutrition and was treated during this hospitalization for the following Nutrition diagnosis/diagnoses:     -  03/01/2021: Moderate protein-calorie malnutrition    This patient has been assessed with a concern for Malnutrition and was treated during this hospitalization for the following Nutrition diagnosis/diagnoses:     -  03/01/2021: Moderate protein-calorie malnutrition    This patient has been assessed with a concern for Malnutrition and was treated during this hospitalization for the following Nutrition diagnosis/diagnoses:     -  03/01/2021: Moderate protein-calorie malnutrition    This patient has been assessed with a concern for Malnutrition and was treated during this hospitalization for the following Nutrition diagnosis/diagnoses:     -  03/01/2021: Moderate protein-calorie malnutrition    This patient has been assessed with a concern for Malnutrition and was treated during this hospitalization for the following Nutrition diagnosis/diagnoses:     -  03/01/2021: Moderate protein-calorie malnutrition    This patient has been assessed with a concern for Malnutrition and was treated during this hospitalization for the following Nutrition diagnosis/diagnoses:     -  03/01/2021: Moderate protein-calorie malnutrition    This patient has been assessed with a concern for Malnutrition and was treated during this hospitalization for the following Nutrition diagnosis/diagnoses:     -  03/01/2021: Moderate protein-calorie malnutrition    This patient has been assessed with a concern for Malnutrition and was treated during this hospitalization for the following Nutrition diagnosis/diagnoses:     -  03/01/2021: Moderate protein-calorie malnutrition   This patient has been assessed with a concern for Malnutrition and was treated during this hospitalization for the following Nutrition diagnosis/diagnoses:     -  03/01/2021: Moderate protein-calorie malnutrition    This patient has been assessed with a concern for Malnutrition and was treated during this hospitalization for the following Nutrition diagnosis/diagnoses:     -  03/01/2021: Moderate protein-calorie malnutrition    This patient has been assessed with a concern for Malnutrition and was treated during this hospitalization for the following Nutrition diagnosis/diagnoses:     -  03/01/2021: Moderate protein-calorie malnutrition    This patient has been assessed with a concern for Malnutrition and was treated during this hospitalization for the following Nutrition diagnosis/diagnoses:     -  03/01/2021: Moderate protein-calorie malnutrition    This patient has been assessed with a concern for Malnutrition and was treated during this hospitalization for the following Nutrition diagnosis/diagnoses:     -  03/01/2021: Moderate protein-calorie malnutrition    This patient has been assessed with a concern for Malnutrition and was treated during this hospitalization for the following Nutrition diagnosis/diagnoses:     -  03/01/2021: Moderate protein-calorie malnutrition    This patient has been assessed with a concern for Malnutrition and was treated during this hospitalization for the following Nutrition diagnosis/diagnoses:     -  03/01/2021: Moderate protein-calorie malnutrition    This patient has been assessed with a concern for Malnutrition and was treated during this hospitalization for the following Nutrition diagnosis/diagnoses:     -  03/01/2021: Moderate protein-calorie malnutrition    This patient has been assessed with a concern for Malnutrition and was treated during this hospitalization for the following Nutrition diagnosis/diagnoses:     -  03/01/2021: Moderate protein-calorie malnutrition    This patient has been assessed with a concern for Malnutrition and was treated during this hospitalization for the following Nutrition diagnosis/diagnoses:     -  03/01/2021: Moderate protein-calorie malnutrition    This patient has been assessed with a concern for Malnutrition and was treated during this hospitalization for the following Nutrition diagnosis/diagnoses:     -  03/01/2021: Moderate protein-calorie malnutrition    This patient has been assessed with a concern for Malnutrition and was treated during this hospitalization for the following Nutrition diagnosis/diagnoses:     -  03/01/2021: Moderate protein-calorie malnutrition   This patient has been assessed with a concern for Malnutrition and was treated during this hospitalization for the following Nutrition diagnosis/diagnoses:     -  03/01/2021: Moderate protein-calorie malnutrition    This patient has been assessed with a concern for Malnutrition and was treated during this hospitalization for the following Nutrition diagnosis/diagnoses:     -  03/01/2021: Moderate protein-calorie malnutrition    This patient has been assessed with a concern for Malnutrition and was treated during this hospitalization for the following Nutrition diagnosis/diagnoses:     -  03/01/2021: Moderate protein-calorie malnutrition    This patient has been assessed with a concern for Malnutrition and was treated during this hospitalization for the following Nutrition diagnosis/diagnoses:     -  03/01/2021: Moderate protein-calorie malnutrition    This patient has been assessed with a concern for Malnutrition and was treated during this hospitalization for the following Nutrition diagnosis/diagnoses:     -  03/01/2021: Moderate protein-calorie malnutrition    This patient has been assessed with a concern for Malnutrition and was treated during this hospitalization for the following Nutrition diagnosis/diagnoses:     -  03/01/2021: Moderate protein-calorie malnutrition    This patient has been assessed with a concern for Malnutrition and was treated during this hospitalization for the following Nutrition diagnosis/diagnoses:     -  03/01/2021: Moderate protein-calorie malnutrition    This patient has been assessed with a concern for Malnutrition and was treated during this hospitalization for the following Nutrition diagnosis/diagnoses:     -  03/01/2021: Moderate protein-calorie malnutrition    This patient has been assessed with a concern for Malnutrition and was treated during this hospitalization for the following Nutrition diagnosis/diagnoses:     -  03/01/2021: Moderate protein-calorie malnutrition    This patient has been assessed with a concern for Malnutrition and was treated during this hospitalization for the following Nutrition diagnosis/diagnoses:     -  03/01/2021: Moderate protein-calorie malnutrition    This patient has been assessed with a concern for Malnutrition and was treated during this hospitalization for the following Nutrition diagnosis/diagnoses:     -  03/01/2021: Moderate protein-calorie malnutrition    This patient has been assessed with a concern for Malnutrition and was treated during this hospitalization for the following Nutrition diagnosis/diagnoses:     -  03/01/2021: Moderate protein-calorie malnutrition    This patient has been assessed with a concern for Malnutrition and was treated during this hospitalization for the following Nutrition diagnosis/diagnoses:     -  03/01/2021: Moderate protein-calorie malnutrition   This patient has been assessed with a concern for Malnutrition and was treated during this hospitalization for the following Nutrition diagnosis/diagnoses:     -  03/01/2021: Moderate protein-calorie malnutrition    This patient has been assessed with a concern for Malnutrition and was treated during this hospitalization for the following Nutrition diagnosis/diagnoses:     -  03/01/2021: Moderate protein-calorie malnutrition    This patient has been assessed with a concern for Malnutrition and was treated during this hospitalization for the following Nutrition diagnosis/diagnoses:     -  03/01/2021: Moderate protein-calorie malnutrition    This patient has been assessed with a concern for Malnutrition and was treated during this hospitalization for the following Nutrition diagnosis/diagnoses:     -  03/01/2021: Moderate protein-calorie malnutrition    This patient has been assessed with a concern for Malnutrition and was treated during this hospitalization for the following Nutrition diagnosis/diagnoses:     -  03/01/2021: Moderate protein-calorie malnutrition    This patient has been assessed with a concern for Malnutrition and was treated during this hospitalization for the following Nutrition diagnosis/diagnoses:     -  03/01/2021: Moderate protein-calorie malnutrition    This patient has been assessed with a concern for Malnutrition and was treated during this hospitalization for the following Nutrition diagnosis/diagnoses:     -  03/01/2021: Moderate protein-calorie malnutrition    This patient has been assessed with a concern for Malnutrition and was treated during this hospitalization for the following Nutrition diagnosis/diagnoses:     -  03/01/2021: Moderate protein-calorie malnutrition    This patient has been assessed with a concern for Malnutrition and was treated during this hospitalization for the following Nutrition diagnosis/diagnoses:     -  03/01/2021: Moderate protein-calorie malnutrition    This patient has been assessed with a concern for Malnutrition and was treated during this hospitalization for the following Nutrition diagnosis/diagnoses:     -  03/01/2021: Moderate protein-calorie malnutrition    This patient has been assessed with a concern for Malnutrition and was treated during this hospitalization for the following Nutrition diagnosis/diagnoses:     -  03/01/2021: Moderate protein-calorie malnutrition    This patient has been assessed with a concern for Malnutrition and was treated during this hospitalization for the following Nutrition diagnosis/diagnoses:     -  03/01/2021: Moderate protein-calorie malnutrition    This patient has been assessed with a concern for Malnutrition and was treated during this hospitalization for the following Nutrition diagnosis/diagnoses:     -  03/01/2021: Moderate protein-calorie malnutrition    This patient has been assessed with a concern for Malnutrition and was treated during this hospitalization for the following Nutrition diagnosis/diagnoses:     -  03/01/2021: Moderate protein-calorie malnutrition   This patient has been assessed with a concern for Malnutrition and was treated during this hospitalization for the following Nutrition diagnosis/diagnoses:     -  03/01/2021: Moderate protein-calorie malnutrition    This patient has been assessed with a concern for Malnutrition and was treated during this hospitalization for the following Nutrition diagnosis/diagnoses:     -  03/01/2021: Moderate protein-calorie malnutrition    This patient has been assessed with a concern for Malnutrition and was treated during this hospitalization for the following Nutrition diagnosis/diagnoses:     -  03/01/2021: Moderate protein-calorie malnutrition    This patient has been assessed with a concern for Malnutrition and was treated during this hospitalization for the following Nutrition diagnosis/diagnoses:     -  03/01/2021: Moderate protein-calorie malnutrition    This patient has been assessed with a concern for Malnutrition and was treated during this hospitalization for the following Nutrition diagnosis/diagnoses:     -  03/01/2021: Moderate protein-calorie malnutrition    This patient has been assessed with a concern for Malnutrition and was treated during this hospitalization for the following Nutrition diagnosis/diagnoses:     -  03/01/2021: Moderate protein-calorie malnutrition    This patient has been assessed with a concern for Malnutrition and was treated during this hospitalization for the following Nutrition diagnosis/diagnoses:     -  03/01/2021: Moderate protein-calorie malnutrition    This patient has been assessed with a concern for Malnutrition and was treated during this hospitalization for the following Nutrition diagnosis/diagnoses:     -  03/01/2021: Moderate protein-calorie malnutrition    This patient has been assessed with a concern for Malnutrition and was treated during this hospitalization for the following Nutrition diagnosis/diagnoses:     -  03/01/2021: Moderate protein-calorie malnutrition    This patient has been assessed with a concern for Malnutrition and was treated during this hospitalization for the following Nutrition diagnosis/diagnoses:     -  03/01/2021: Moderate protein-calorie malnutrition    This patient has been assessed with a concern for Malnutrition and was treated during this hospitalization for the following Nutrition diagnosis/diagnoses:     -  03/01/2021: Moderate protein-calorie malnutrition    This patient has been assessed with a concern for Malnutrition and was treated during this hospitalization for the following Nutrition diagnosis/diagnoses:     -  03/01/2021: Moderate protein-calorie malnutrition    This patient has been assessed with a concern for Malnutrition and was treated during this hospitalization for the following Nutrition diagnosis/diagnoses:     -  03/01/2021: Moderate protein-calorie malnutrition    This patient has been assessed with a concern for Malnutrition and was treated during this hospitalization for the following Nutrition diagnosis/diagnoses:     -  03/01/2021: Moderate protein-calorie malnutrition    This patient has been assessed with a concern for Malnutrition and was treated during this hospitalization for the following Nutrition diagnosis/diagnoses:     -  03/01/2021: Moderate protein-calorie malnutrition   This patient has been assessed with a concern for Malnutrition and was treated during this hospitalization for the following Nutrition diagnosis/diagnoses:     -  03/01/2021: Moderate protein-calorie malnutrition    This patient has been assessed with a concern for Malnutrition and was treated during this hospitalization for the following Nutrition diagnosis/diagnoses:     -  03/01/2021: Moderate protein-calorie malnutrition    This patient has been assessed with a concern for Malnutrition and was treated during this hospitalization for the following Nutrition diagnosis/diagnoses:     -  03/01/2021: Moderate protein-calorie malnutrition    This patient has been assessed with a concern for Malnutrition and was treated during this hospitalization for the following Nutrition diagnosis/diagnoses:     -  03/01/2021: Moderate protein-calorie malnutrition    This patient has been assessed with a concern for Malnutrition and was treated during this hospitalization for the following Nutrition diagnosis/diagnoses:     -  03/01/2021: Moderate protein-calorie malnutrition    This patient has been assessed with a concern for Malnutrition and was treated during this hospitalization for the following Nutrition diagnosis/diagnoses:     -  03/01/2021: Moderate protein-calorie malnutrition    This patient has been assessed with a concern for Malnutrition and was treated during this hospitalization for the following Nutrition diagnosis/diagnoses:     -  03/01/2021: Moderate protein-calorie malnutrition    This patient has been assessed with a concern for Malnutrition and was treated during this hospitalization for the following Nutrition diagnosis/diagnoses:     -  03/01/2021: Moderate protein-calorie malnutrition    This patient has been assessed with a concern for Malnutrition and was treated during this hospitalization for the following Nutrition diagnosis/diagnoses:     -  03/01/2021: Moderate protein-calorie malnutrition    This patient has been assessed with a concern for Malnutrition and was treated during this hospitalization for the following Nutrition diagnosis/diagnoses:     -  03/01/2021: Moderate protein-calorie malnutrition    This patient has been assessed with a concern for Malnutrition and was treated during this hospitalization for the following Nutrition diagnosis/diagnoses:     -  03/01/2021: Moderate protein-calorie malnutrition    This patient has been assessed with a concern for Malnutrition and was treated during this hospitalization for the following Nutrition diagnosis/diagnoses:     -  03/01/2021: Moderate protein-calorie malnutrition    This patient has been assessed with a concern for Malnutrition and was treated during this hospitalization for the following Nutrition diagnosis/diagnoses:     -  03/01/2021: Moderate protein-calorie malnutrition    This patient has been assessed with a concern for Malnutrition and was treated during this hospitalization for the following Nutrition diagnosis/diagnoses:     -  03/01/2021: Moderate protein-calorie malnutrition    This patient has been assessed with a concern for Malnutrition and was treated during this hospitalization for the following Nutrition diagnosis/diagnoses:     -  03/01/2021: Moderate protein-calorie malnutrition    This patient has been assessed with a concern for Malnutrition and was treated during this hospitalization for the following Nutrition diagnosis/diagnoses:     -  03/01/2021: Moderate protein-calorie malnutrition

## 2021-03-03 NOTE — PROGRESS NOTE ADULT - ASSESSMENT
76M PMH Hemophilia A, HTN, gout c/b allopurinol-induced DRESS Syndrome, CKD (on high dose steroids) who p/w acute on chronic renal failure c/b hemodynamically unstable Afib w RVR and shock c/b thrombocytopenia, requiring emergent initation of HD. Hospital course c/b MSSA bacteremia.

## 2021-03-03 NOTE — PROGRESS NOTE ADULT - ATTENDING COMMENTS
I reviewed the medical record, including laboratory and radiographic studies, interviewed and examined the patient on 3/3 and discussed the plan with PA Student Karthik.  Agree with above.  Though I remain concerned by pericardial uptake of gallium, repeat TTE with only trace pericardial effusion.  Will continue to follow with you.  Dr. Barnard assumed care 3/4.

## 2021-03-03 NOTE — DISCHARGE NOTE PROVIDER - CARE PROVIDER_API CALL
[facility]  [address] [facility], [address]  [place] [address]  Phone: [phone]  Fax: [phone]  Follow Up Time: 2 weeks    Al Grace  [address] [place] [address], [address] [place] [address]  Phone: [phone]  Fax: (   )    -  Established Patient  Follow Up Time: 2 weeks   [facility]  [address] [facility], [address]  [place] [address]  Phone: [phone]  Fax: [phone]  Follow Up Time: 2 weeks    Al Grace  [address] [place] [address], [address]th [place] [address]  Phone: [phone]  Fax: (   )    -  Established Patient  Follow Up Time: 1 week   Elysia Fink  [facility]  [address] [facility] [address]  [place] [address]  Phone: [phone]  Fax: [phone]  Follow Up Time: 2 weeks    Honorio Fry  HEMATOLOGY  [address], [address]  [place] [address]  Phone: [phone]  Fax: [phone]  Follow Up Time: 1 week    SHAY HUGO  [id number]  [address] [place] [address]  Phone: ()-  Fax: ()-  Scheduled Appointment: 03/17/2021 10:00 AM   SHAY HUOG  [id number]  [address] [place], [place] [address]  Phone: ()-  Fax: ()-  Scheduled Appointment: 03/17/2021 10:00 AM    Sindhu Fischer)  Internal Medicine  [address], [address]th [place] [address]  Phone: [phone]  Fax: [phone]  Follow Up Time: 1 week   SHAY HUGO  [id number]  [address] [place] [address]  Phone: ()-  Fax: ()-  Scheduled Appointment: 03/17/2021 10:00 AM    Sindhu Fischer)  Internal Medicine  [address], [address] [place] [address]  Phone: [phone]  Fax: [phone]  Follow Up Time: 1 week    Yusef Sofia)  Internal Medicine; Nephrology  [address], [address] [place] [address]  Phone: [phone]  Fax: [phone]  Scheduled Appointment: 03/30/2021 11:00 AM   SHAY HUGO  [id number]  [address] [place] [address]  Phone: ()-  Fax: ()-  Scheduled Appointment: 03/17/2021 10:00 AM    Yusef Sofia)  Internal Medicine; Nephrology  [address], [address] [place] [address]  Phone: [phone]  Fax: [phone]  Scheduled Appointment: 03/30/2021 11:00 AM    Angelina Briceño)  HematologyOncology; Internal Medicine  [address] [address]  Phone: [phone]  Fax: [phone]  Follow Up Time: 1 week   SHAY HUGO  [id number]  [address] [place] [address]  Phone: ()-  Fax: ()-  Scheduled Appointment: 03/17/2021 10:00 AM    Yusef Sofia)  Internal Medicine; Nephrology  [address], [address] [place] [address]  Phone: [phone]  Fax: [phone]  Scheduled Appointment: 03/30/2021 11:00 AM    Angelina Briceño)  HematologyOncology; Internal Medicine  [address] [address]  Phone: [phone]  Fax: [phone]  Scheduled Appointment: 03/16/2021 11:30 AM    Al Grace  [address], 19th [place] [address]  Phone: [phone]  Fax: (   )    -  Follow Up Time: 1 week

## 2021-03-03 NOTE — DISCHARGE NOTE PROVIDER - NSDCFUSCHEDAPPT_GEN_ALL_CORE_FT
YAIR FELDMAN ; 03/23/2021 ; NPP Med  [address] YAIR FELDMAN ; 03/23/2021 ; NPP Med  [address]   YAIR FELDMAN ; 03/30/2021 ; NPP Nephro [address] YAIR FELDMAN ; 03/23/2021 ; NPP Med  [facility] [address]   YAIR FELDMAN ; 04/19/2021 ; \[facility]\"" HemOnc 178  [address]

## 2021-03-03 NOTE — DISCHARGE NOTE PROVIDER - NSDCCPCAREPLAN_GEN_ALL_CORE_FT
PRINCIPAL DISCHARGE DIAGNOSIS  Diagnosis: Bacteremia  Assessment and Plan of Treatment: You were found to have staph bacteremia in your blood. The reason for this was unknown, but could have been due to an infection around your heart. To treat this, we gave you IV antibiotics (cefazolin) which have cleared the infection. To ensure the infection is adequately treated, you will continue cefazolin as directed on your dialysis days after dialysis.      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 This was caused because your kidneys were not working as they should have. This heart infection may have spread, which caused the staph bactereria in your blood. We gave you antibiotics for this condition which cleared your blood. Our cardiology team saw you, and they said because you weren't having chest pain, there was no further treatment indicated.    Diagnosis: Atrial fibrillation  Assessment and Plan of Treatment: While sick, your heart rhythm became abnormal, and your heart was beating very fast which lowered your blood pressure. This resolved spontaneously on its own and you no longer have an abnormal heart rhythm. This condition (atrial fibrillation) has you at risk for having blood clots throughout your body. However, due to your risk of bleeding, we are not starting you on a blood thinning medication.    Diagnosis: Folic acid deficiency  Assessment and Plan of Treatment: You were found to be folic acid deficient. Please continue taking 1 mg folic acid a day upon discharge    Diagnosis: Blood pressure instability  Assessment and Plan of Treatment: Your blood pressure was found to be low, especially after dialysis. To treat this, we gave you midodrine to keep your blood pressure within normal limits. Please continue to take as directed upon discharge    Diagnosis: Acute kidney injury  Assessment and Plan of Treatment: You were found to have acute kidney injury, possibly from the DRESS syndrome that you were diagnosed with at [facility]. To treat this, we are giving you high dose steroids. You will taper the prednisone as directed. Also, because your kidney markers did not improve over time, you required dialysis. Upon discharge, you will continue dialysis 3 times a week to flush the toxins out of your body. We are optimistic that your kidney function may recover over time, but it is possible you have suffered permanent damage. For now, continue with dialysis and follow up with your renal doctor as directed.     PRINCIPAL DISCHARGE DIAGNOSIS  Diagnosis: Bacteremia  Assessment and Plan of Treatment: You were found to have staph bacteremia in your blood. The reason for this was unknown, but could have been due to an infection around your heart. To treat this, we gave you IV antibiotics (cefazolin) which have cleared the infection. To ensure the infection is adequately treated, you will continue cefazolin as directed on your dialysis days after dialysis.      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 This was caused because your kidneys were not working as they should have. This heart infection may have spread, which caused the staph bactereria in your blood. We gave you antibiotics for this condition which cleared your blood. Our cardiology team saw you, and they said because you weren't having chest pain, there was no further treatment indicated.    Diagnosis: Atrial fibrillation  Assessment and Plan of Treatment: While sick, your heart rhythm became abnormal, and your heart was beating very fast which lowered your blood pressure. This resolved spontaneously on its own and you no longer have an abnormal heart rhythm. This condition (atrial fibrillation) has you at risk for having blood clots throughout your body. However, due to your risk of bleeding, we are not starting you on a blood thinning medication.    Diagnosis: Folic acid deficiency  Assessment and Plan of Treatment: You were found to be folic acid deficient. Please continue taking 1 mg folic acid a day upon discharge    Diagnosis: Blood pressure instability  Assessment and Plan of Treatment: Your blood pressure was found to be low, especially after dialysis. To treat this, we gave you midodrine to keep your blood pressure within normal limits. Please continue to take as directed upon discharge    Diagnosis: Acute kidney injury  Assessment and Plan of Treatment: You were found to have acute kidney injury, possibly from the DRESS syndrome that [facility] were [facility] with at [facility]. To treat this, we are giving you high dose steroids. You will taper the prednisone as directed. Also, because your kidney markers did not improve over time, you required dialysis. Upon discharge, you will continue dialysis 3 times a week to flush the toxins out of your body. We are optimistic that your kidney function may recover over time, but it is possible you have suffered permanent damage. For now, continue with dialysis and follow up with your renal doctor as directed. You will not need to take any diuretics upon discharge.     PRINCIPAL DISCHARGE DIAGNOSIS  Diagnosis: Bacteremia  Assessment and Plan of Treatment: You were found to have staph bacteremia in your blood. The reason for this was unknown, but could have been due to an infection around your heart. To treat this, we gave you IV antibiotics (cefazolin) which have cleared the infection. To ensure the infection is adequately treated, you will continue cefazolin as directed on your dialysis days after dialysis.      SECONDARY DISCHARGE DIAGNOSES  Diagnosis: PPD screening test  Assessment and Plan of Treatment: We did a blood test to test you for TB called a Quantiferon Gold test. Both times, it was read as 'indeterminate' which means it could not be proven that it was negative. This is likely because your immune system is suppressed due to the high dose steroids you have been taking. Since you are having no shortness of breath and your xray does not show a lesion suspicious for PPD, we recommend you follow up with your PCP regarding if further workup needs to be done.    Diagnosis: Pericarditis secondary to uremia  Assessment and Plan of Treatment: You were found to have an infection surrounding your heart, called pericarditis. This was caused because your kidneys were not working as they should have. This heart infection may have spread, which caused the staph bactereria in your blood. We gave you antibiotics for this condition which cleared your blood. Our cardiology team saw you, and they said because you weren't having chest pain, there was no further treatment indicated.    Diagnosis: Atrial fibrillation  Assessment and Plan of Treatment: While sick, your heart rhythm became abnormal, and your heart was beating very fast which lowered your blood pressure. This resolved spontaneously on its own and you no longer have an abnormal heart rhythm. This condition (atrial fibrillation) has you at risk for having blood clots throughout your body. However, due to your risk of bleeding, we are not starting you on a blood thinning medication.    Diagnosis: Folic acid deficiency  Assessment and Plan of Treatment: You were found to be folic acid deficient. Please continue taking 1 mg folic acid a day upon discharge    Diagnosis: Blood pressure instability  Assessment and Plan of Treatment: Your blood pressure was found to be low, especially after dialysis. To treat this, we gave you midodrine to keep your blood pressure within normal limits. Please continue to take as directed upon discharge    Diagnosis: Acute kidney injury  Assessment and Plan of Treatment: You were found to have acute kidney injury, possibly from the DRESS syndrome that you were diagnosed with at [facility]. To treat this, we are giving you high dose steroids. You will taper the prednisone as directed. Also, because your kidney markers did not improve over time, you required dialysis. Upon discharge, you will continue dialysis 3 times a week to flush the toxins out of your body. We are optimistic that your kidney function may recover over time, but it is possible you have suffered permanent damage. For now, continue with dialysis and follow up with your renal doctor as directed. You will not need to take any diuretics upon discharge.     PRINCIPAL DISCHARGE DIAGNOSIS  Diagnosis: Bacteremia  Assessment and Plan of Treatment: You were found to have staph bacteremia in your blood. The reason for this was unknown, but could have been due to an infection around your heart. To treat this, we gave you IV antibiotics (cefazolin) which have cleared the infection. To ensure the infection is adequately treated, you will continue cefazolin as directed on your dialysis days after dialysis.      SECONDARY DISCHARGE DIAGNOSES  Diagnosis: At high risk for bleeding  Assessment and Plan of Treatment: Due to your history of hemophilia A, you have a high risk of bleeding from your hemodialysis catheter site on the right side of your neck. It is very important that you do not pull on this catheter to precipitate bleeding. To help stop further episodes, we are discharging you with a medication called tranexamic acid which should help curb the bleeding. It is also very important to follow up with your hematologist within 1 week to make sure your blood levels are stable.    Diagnosis: PPD screening test  Assessment and Plan of Treatment: We did a blood test to test you for TB called a Quantiferon Gold test. Both times, it was read as 'indeterminate' which means it could not be proven that it was negative. This is likely because your immune system is suppressed due to the high dose steroids you have been taking. Since you are having no shortness of breath and your xray does not show a lesion suspicious for PPD, we recommend you follow up with your PCP regarding if further workup needs to be done.    Diagnosis: Pericarditis secondary to uremia  Assessment and Plan of Treatment: You were found to have an infection surrounding your heart, called pericarditis. This was caused because your kidneys were not working as they should have. This heart infection may have spread, which caused the staph bactereria in your blood. We gave you antibiotics for this condition which cleared your blood. Our cardiology team saw you, and they said because you weren't having chest pain, there was no further treatment indicated.    Diagnosis: Atrial fibrillation  Assessment and Plan of Treatment: While sick, your heart rhythm became abnormal, and your heart was beating very fast which lowered your blood pressure. This resolved spontaneously on its own and you no longer have an abnormal heart rhythm. This condition (atrial fibrillation) has you at risk for having blood clots throughout your body. However, due to your risk of bleeding, we are not starting you on a blood thinning medication.    Diagnosis: Folic acid deficiency  Assessment and Plan of Treatment: You were found to be folic acid deficient. Please continue taking 1 mg folic acid a day upon discharge    Diagnosis: Blood pressure instability  Assessment and Plan of Treatment: Your blood pressure was found to be low, especially after dialysis. To treat this, we gave you midodrine to keep your blood pressure within normal limits. Please continue to take as directed upon discharge    Diagnosis: Acute kidney injury  Assessment and Plan of Treatment: You were found to have acute kidney injury, possibly from the DRESS syndrome that you were diagnosed with at [facility]. To treat this, we are giving you high dose steroids. You will taper the prednisone as directed. Also, because your kidney markers did not improve over time, you required dialysis. Upon discharge, you will continue dialysis 3 times a week to flush the toxins out of your body. We are optimistic that your kidney function may recover over time, but it is possible you have suffered permanent damage. For now, continue with dialysis and follow up with your renal doctor as directed. You will not need to take any diuretics upon discharge.

## 2021-03-03 NOTE — PROGRESS NOTE ADULT - SUBJECTIVE AND OBJECTIVE BOX
INTERVAL HPI/OVERNIGHT EVENTS: STEPHANIE     SUBJECTIVE: Patient seen and examined at bedside. Feels tired but otherwise well. No CP, SOB, lightheadedness, fever/chills, n/v/d. Making good urine. 12 pt ROS otherwise negative.       OBJECTIVE:    VITAL SIGNS:  ICU Vital Signs Last 24 Hrs  T(C): 37.1 (02 Mar 2021 05:32), Max: 37.1 (02 Mar 2021 05:32)  T(F): 98.7 (02 Mar 2021 05:32), Max: 98.7 (02 Mar 2021 05:32)  HR: 90 (02 Mar 2021 05:32) (90 - 116)  BP: 126/63 (02 Mar 2021 05:32) (91/51 - 152/75)  BP(mean): 87 (01 Mar 2021 17:32) (67 - 95)  ABP: --  ABP(mean): --  RR: 19 (02 Mar 2021 05:32) (18 - 19)  SpO2: 96% (02 Mar 2021 05:32) (91% - 100%)        03-01 @ 07:01  -  03-02 @ 07:00  --------------------------------------------------------  IN: 0 mL / OUT: 200 mL / NET: -200 mL      CAPILLARY BLOOD GLUCOSE    PHYSICAL EXAM:    General: WDWN, NAD, resting comfortably in bed  HEENT: NC/AT; anicteric sclera; dry lips  Neck: supple  Cardiovascular: +S1/S2; RRR  Respiratory: CTA B/L; no W/R/R  Gastrointestinal: soft, +distension. NT; +BSx4  Extremities: WWP; 2+ pitting edema b/l LE up to thighs  Vascular: 2+ radial, DP/PT pulses B/L  Neurological: AAOx3, moves all 4 extremities   Skin: L buttock ulcer       LABS:                        9.4    9.50  )-----------( 73       ( 01 Mar 2021 07:27 )             29.2     03-01    140  |  97  |  96<H>  ----------------------------<  109<H>  4.7   |  29  |  4.50<H>    Ca    8.4      01 Mar 2021 07:26  Phos  5.1     03-01  Mg     2.4     03-01    TPro  5.2<L>  /  Alb  2.7<L>  /  TBili  0.6  /  DBili  x   /  AST  20  /  ALT  11  /  AlkPhos  320<H>  03-01    PT/INR - ( 01 Mar 2021 07:28 )   PT: 14.8 sec;   INR: 1.25          PTT - ( 01 Mar 2021 07:28 )  PTT:36.0 sec      RADIOLOGY & ADDITIONAL TESTS: Reviewed.    MEDICATIONS:  MEDICATIONS  (STANDING):  ceFAZolin   IVPB 1000 milliGRAM(s) IV Intermittent every 24 hours  doxazosin 4 milliGRAM(s) Oral daily  furosemide   Injectable 80 milliGRAM(s) IV Push every 12 hours  midodrine. 7.5 milliGRAM(s) Oral every 8 hours  nystatin Cream 1 Application(s) Topical every 12 hours  pantoprazole    Tablet 40 milliGRAM(s) Oral before breakfast  polyethylene glycol 3350 17 Gram(s) Oral daily  PPD  5 Tuberculin Unit(s) Injectable 5 Unit(s) IntraDermal once  predniSONE   Tablet 60 milliGRAM(s) Oral daily  senna 2 Tablet(s) Oral at bedtime  sevelamer carbonate 800 milliGRAM(s) Oral three times a day with meals    MEDICATIONS  (PRN):      ALLERGIES:  Allergies    allopurinol (Other)    Intolerances

## 2021-03-03 NOTE — PROGRESS NOTE ADULT - ATTENDING COMMENTS
Patient was seen and examined with the resident team today.  I agree with Dr. Thomas's assessment and plan with the following exceptions/additions:     Briefly, this is a 75yo gentleman with a PMH of Hemophilia A, HTN, Gout c/b allopurinol-induced DRESS Syndrome and CKD (on high-dose steroids) who p/w acute on chronic renal failure c/b HD unstable Afib w/RVR and shock c/b thrombocytopenia, requiring emergent initiation of HD.  Hospital course c/b MSSA bacteremia.  Transferred to the [facility] on 3/1 for ongoing volume overload and coordination of outpatient HD.    Notably, NM Gallium showing uptake in gluteal and pericardium.  TTE today unremarkable.  Exam w/o SSTI in gluteal area; however, LE markedly improved from yesterday w/appreciable skin tenting.     -- Cefazolin as per ID  -- switched to PO diuretic today  -- Renal to finalize need for HD +/- TDC   -- per family, patient did not have someone managing his high-dose steroids as an outpatient; will need to coordinate this (Renal v PCP?)   -- PT and OOB to chair as able   -- no changes to Midodrine dose today   -- on SCDs given hemophilia and thrombocytopenia  -- Dispo - family prefers home w/outpatient HD and home PT (daughter Char very involved)    Amarilys Velasco  [phone]

## 2021-03-03 NOTE — PROGRESS NOTE ADULT - SUBJECTIVE AND OBJECTIVE BOX
LENGTH OF HOSPITAL STAY: 10d    SUBJECTIVE: No bleeding reported this morning. No new complaints.     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 Hematologist is Dr. Grace. s/p HD catheter placement (02/22), complicated by hypotension during dialysis and MSSA bacteremia.       PAST MEDICAL & SURGICAL HISTORY:  History of BPH  Gout  Hemophilia A  HTN (hypertension)  Uses cochlear implant    ALLERGIES:  allopurinol (Other)    MEDICATIONS:  MEDICATIONS  (STANDING):  ceFAZolin   IVPB 1000 milliGRAM(s) IV Intermittent every 24 hours  doxazosin 4 milliGRAM(s) Oral daily  furosemide   Injectable 80 milliGRAM(s) IV Push every 12 hours  midodrine. 7.5 milliGRAM(s) Oral every 8 hours  nystatin Cream 1 Application(s) Topical every 12 hours  pantoprazole    Tablet 40 milliGRAM(s) Oral before breakfast  petrolatum white Ointment 1 Application(s) Topical every 24 hours  polyethylene glycol 3350 17 Gram(s) Oral daily  PPD  5 Tuberculin Unit(s) Injectable 5 Unit(s) IntraDermal once  predniSONE   Tablet 60 milliGRAM(s) Oral daily  senna 2 Tablet(s) Oral at bedtime  sevelamer carbonate 800 milliGRAM(s) Oral three times a day with meals  sodium chloride 0.65% Nasal 1 Spray(s) Both Nostrils two times a day    MEDICATIONS  (PRN):    VITALS:   Vital Signs Last 24 Hrs  T(C): 37.2 (03 Mar 2021 05:20), Max: 37.2 (03 Mar 2021 05:20)  T(F): 99 (03 Mar 2021 05:20), Max: 99 (03 Mar 2021 05:20)  HR: 90 (03 Mar 2021 05:20) (90 - 100)  BP: 127/57 (03 Mar 2021 05:20) (127/57 - 144/68)  RR: 18 (03 Mar 2021 05:20) (18 - 18)  SpO2: 92% (03 Mar 2021 05:20) (92% - 97%)    LABS:                          8.1    9.87  )-----------( 96       ( 03 Mar 2021 06:51 )             25.6     03-03    139  |  97  |  122<H>  ----------------------------<  105<H>  4.7   |  29  |  5.00<H>    Ca    8.5      03 Mar 2021 06:51  Phos  5.5     03-03  Mg     2.1     03-03    PT/INR - ( 03 Mar 2021 06:51 )   PT: 15.6 sec;   INR: 1.32       PTT - ( 03 Mar 2021 06:51 )  PTT:37.2 sec           Folate, Serum in AM (03.02.21 @ 19:59)   Folate, Serum: 2.5 ng/mL   Vitamin B12, Serum (03.02.21 @ 19:59)   Vitamin B12, Serum: 1212 pg/mL   Factor VIII Assay (03.02.21 @ 07:57)   Factor VIII Assay: 56: The presence of direct thrombin inhibitors (argatroban, refludan) may   falsely decrease activity. %   Ferritin, Serum in AM (03.02.21 @ 07:57)   Ferritin, Serum: 730 ng/mL  Iron with Total Binding Capacity in AM (03.02.21 @ 07:57)   Iron - Total Binding Capacity.: 151 ug/dL   % Saturation, Iron: 42 %   Iron Total, Serum: 63 ug/dL   Unsaturated Iron Binding Capacity: 88 ug/dL     RADIOLOGY:  Reviewed    PHYSICAL EXAM:  GEN: No acute distress  HEENT: NCAT  LUNGS: Clear to auscultation bilaterally   HEART: S1/S2 present. RRR.   ABD: Soft, non-tender, non-distended. Bowel sounds present  EXT: 2+ pitting edema  NEURO: AAOX3

## 2021-03-03 NOTE — PROGRESS NOTE ADULT - SUBJECTIVE AND OBJECTIVE BOX
Patient is a 76y Male seen and evaluated at bedside. GFR unchanged over last few days, lytes, bicarb and volume acceptable. Non oliguric. Approaching 72hr since negative blood cultures. Possibility of renal recovery, will defer HD cath placement and HD today. Has hx of DRESS syndrome, prednisone listed as home medication- should verify with PCP/outpt pharmacy regarding taper.    Meds:    ceFAZolin   IVPB 1000 every 24 hours  doxazosin 4 daily  furosemide   Injectable 80 every 12 hours  midodrine. 7.5 every 8 hours  nystatin Cream 1 every 12 hours  pantoprazole    Tablet 40 before breakfast  petrolatum white Ointment 1 every 24 hours  polyethylene glycol 3350 17 daily  PPD  5 Tuberculin Unit(s) Injectable 5 once  predniSONE   Tablet 60 daily  senna 2 at bedtime  sevelamer carbonate 800 three times a day with meals  sodium chloride 0.65% Nasal 1 two times a day      T(C): , Max: 37.2 (03-03-21 @ 05:20)  T(F): , Max: 99 (03-03-21 @ 05:20)  HR: 90 (03-03-21 @ 05:20)  BP: 127/57 (03-03-21 @ 05:20)  BP(mean): --  RR: 18 (03-03-21 @ 05:20)  SpO2: 92% (03-03-21 @ 05:20)  Wt(kg): --    03-02 @ 07:01  -  03-03 @ 07:00  --------------------------------------------------------  IN: 0 mL / OUT: 700 mL / NET: -700 mL    03-03 @ 07:01  -  03-03 @ 11:16  --------------------------------------------------------  IN: 0 mL / OUT: 200 mL / NET: -200 mL    Review of Systems:  RESPIRATORY: No shortness of breath  CARDIOVASCULAR: No chest pain  MUSCULOSKELETAL: No edema    PHYSICAL EXAM:  GENERAL: well-developed, well nourished, alert, no acute distress at present on RA  CHEST/LUNG: Clear to auscultation bilaterally  HEART: normal S1S2, RRR  ABDOMEN: Soft, Nontender  EXTREMITIES: No oedema       LABS:                        8.1    9.87  )-----------( 96       ( 03 Mar 2021 06:51 )             25.6     03-03    139  |  97  |  122<H>  ----------------------------<  105<H>  4.7   |  29  |  5.00<H>    Ca    8.5      03 Mar 2021 06:51  Phos  5.5     03-03  Mg     2.1     03-03        PT/INR - ( 03 Mar 2021 06:51 )   PT: 15.6 sec;   INR: 1.32          PTT - ( 03 Mar 2021 06:51 )  PTT:37.2 sec          RADIOLOGY & ADDITIONAL STUDIES:

## 2021-03-03 NOTE — DISCHARGE NOTE PROVIDER - NSDCFUADDAPPT_GEN_ALL_CORE_FT
Please call and make an appointment  with your nephrologist and hematologist doctors as directed within 2 weeks of being discharged. The numbers are provided above.  You currently have an appointment with Dr Briceño of hematology/oncology, which we hav escheduled for you. However, we know you are in contact with Dr Grace, please follow up with him if possible within 1 week of discharge.

## 2021-03-03 NOTE — PROGRESS NOTE ADULT - PROBLEM SELECTOR PLAN 1
#MSSA Bacteremia. Source unknown. Pt has L buttock ulcer which could be source. Gallium scan showed areas of activity in L buttock, as well as pericardium. Cultures ngtd since 2/28.   - continue with ancef 1g q24hr (2/25-)  - ID recs appreciated  -CTSG consult to culture pericardial fluid for possible source of bacteremia

## 2021-03-03 NOTE — PROGRESS NOTE ADULT - SUBJECTIVE AND OBJECTIVE BOX
INTERVAL EVENTS: Reconsult for findings of pericardial gallium uptake.  Patient is still without symptoms of pericarditis. Denies CP, SOB, positional pain. HD stable.   Repeat echo shows normal EF and trivial pericardial effusion.     PAST MEDICAL & SURGICAL HISTORY:  History of BPH    Gout    Hemophilia A    HTN (hypertension)    Uses cochlear implant        MEDICATIONS  (STANDING):  ceFAZolin   IVPB 1000 milliGRAM(s) IV Intermittent every 24 hours  doxazosin 4 milliGRAM(s) Oral daily  folic acid 1 milliGRAM(s) Oral daily  midodrine. 7.5 milliGRAM(s) Oral every 8 hours  nystatin Cream 1 Application(s) Topical every 12 hours  pantoprazole    Tablet 40 milliGRAM(s) Oral before breakfast  petrolatum white Ointment 1 Application(s) Topical every 24 hours  polyethylene glycol 3350 17 Gram(s) Oral daily  PPD  5 Tuberculin Unit(s) Injectable 5 Unit(s) IntraDermal once  predniSONE   Tablet 60 milliGRAM(s) Oral daily  senna 2 Tablet(s) Oral at bedtime  sevelamer carbonate 800 milliGRAM(s) Oral three times a day with meals  sodium chloride 0.65% Nasal 1 Spray(s) Both Nostrils two times a day  torsemide 10 milliGRAM(s) Oral two times a day    MEDICATIONS  (PRN):      Vital Signs Last 24 Hrs  T(C): 36.9 (03 Mar 2021 11:47), Max: 37.2 (03 Mar 2021 05:20)  T(F): 98.4 (03 Mar 2021 11:47), Max: 99 (03 Mar 2021 05:20)  HR: 93 (03 Mar 2021 11:47) (90 - 100)  BP: 138/66 (03 Mar 2021 11:47) (127/57 - 144/68)  BP(mean): --  RR: 17 (03 Mar 2021 11:47) (17 - 18)  SpO2: 98% (03 Mar 2021 11:47) (92% - 98%)     PHYSICAL EXAM:  GEN: Awake, alert. NAD.   HEENT: NCAT, PERRL, EOMI. Mucosa moist. No JVD.  RESP: CTA b/l  CV: RRR. Normal S1/S2. No m/r/g.  ABD: Soft. NT/ND. BS+  EXT: Warm. No edema, clubbing, or cyanosis.   NEURO: AAOx3. No focal deficits.     LABS:                        8.1    9.87  )-----------( 96       ( 03 Mar 2021 06:51 )             25.6     03-03    139  |  97  |  122<H>  ----------------------------<  105<H>  4.7   |  29  |  5.00<H>    Ca    8.5      03 Mar 2021 06:51  Phos  5.5     03-03  Mg     2.1     03-03          PT/INR - ( 03 Mar 2021 06:51 )   PT: 15.6 sec;   INR: 1.32          PTT - ( 03 Mar 2021 06:51 )  PTT:37.2 sec    I&O's Summary    02 Mar 2021 07:01  -  03 Mar 2021 07:00  --------------------------------------------------------  IN: 0 mL / OUT: 700 mL / NET: -700 mL    03 Mar 2021 07:01  -  03 Mar 2021 14:21  --------------------------------------------------------  IN: 0 mL / OUT: 500 mL / NET: -500 mL

## 2021-03-03 NOTE — PROGRESS NOTE ADULT - ASSESSMENT
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 Hematologist is Dr. Grace. s/p HD catheter placement (02/22), complicated by hypotension during dialysis and MSSA bacteremia.     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 Factor VIII 56% which is acceptable. No significant bleeding observed by primary team.    - Continue to follow-up with Factor VIII once daily due to possible procedures.  - Maintain active T+S, transfuse if Hb < 7 or symptomatic   - Follow-up CBC and coagulation panel (PT/PTT) once daily  - Upon discharge, can follow-up with Dr. Grace (Hematologist)    #) Mild coagulopathy, possible inhibitor. Factor VII deficiency   - Mixing study (02/21) performed for mild coagulopathy showed possible presence of a delayed inhibitor due to lack of correction with 2 hr incubation. Follow-up inhibitor assay.     #) Thrombocytopenia, possibly sepsis-related and dialysis membrane effect, improving  - Peripheral blood smear (02/23) showed platelet clumping and no schistocytes. PLASMIC score is 4 low-risk for TTP. HIT score is 3-4, low-intermediate risk. PFT-HIT Ab negative. SORAYA negative.    - Trend CBC with differential once daily  - Maintain active T+S, transfuse if platelet < 10K, or < 20K if febrile or <50K if bleeding.   - Hold pharmacologic DVT ppx if platelets consistently < 50 K    #) Anemia   - secondary to chronic renal disease  - B12 WNL, folate deficient, start folic acid 1mg qd  - Iron studies consistent with ACD    To discuss with Dr. 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 Hematologist is Dr. Grace. s/p HD catheter placement (02/22), complicated by hypotension during dialysis and MSSA bacteremia.     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 Factor VIII 56% which is acceptable. No significant bleeding observed by primary team.    - Continue to follow-up with Factor VIII once daily due to possible procedures.  - Maintain active T+S, transfuse if Hb < 7 or symptomatic   - Follow-up CBC and coagulation panel (PT/PTT) once daily  - Upon discharge, can follow-up with Dr. Grace (Hematologist)    #) Mild coagulopathy, possible inhibitor. Factor VII deficiency   - Mixing study (02/21) performed for mild coagulopathy showed possible presence of a delayed inhibitor due to lack of correction with 2 hr incubation. Follow-up inhibitor assay.     #) Thrombocytopenia, possibly sepsis-related and dialysis membrane effect, improving  - Peripheral blood smear (02/23) showed platelet clumping and no schistocytes. PLASMIC score is 4 low-risk for TTP. HIT score is 3-4, low-intermediate risk. PFT-HIT Ab negative. SORAYA negative.    - Trend CBC with differential once daily  - Maintain active T+S, transfuse if platelet < 10K, or < 20K if febrile or <50K if bleeding.   - Hold pharmacologic DVT ppx if platelets consistently < 50 K    #) Anemia   - secondary to chronic renal disease  - B12 WNL, folate deficient, start folic acid 1mg qd  - Iron studies consistent with ACD    Discussed with Dr. Rasheed

## 2021-03-03 NOTE — PROGRESS NOTE ADULT - PROBLEM SELECTOR PLAN 2
Developed Dress Syndrome after allopurinol, and has been on high dose steroid therapy. Cr worsened with deranged electrolytes after being diagnosed with Dress Syndrome. last HD 2/26. Received total x4 HD sessions  -Per renal, defer HD cath placement and HD d/t potential recovery  -c/w renvela 800 TID  -switch IV lasix to oral torsemide 10 mg BID   -renal recs appreciated   - discuss with Renal switching to PO diuretic regimen  - strict Is/Os, daily weights, renal diet, renally dose meds

## 2021-03-04 LAB
ANION GAP SERPL CALC-SCNC: 12 MMOL/L — SIGNIFICANT CHANGE UP (ref 5–17)
APTT BLD: 36.5 SEC — HIGH (ref 27.5–35.5)
BUN SERPL-MCNC: 131 MG/DL — HIGH (ref 7–23)
CALCIUM SERPL-MCNC: 8.8 MG/DL — SIGNIFICANT CHANGE UP (ref 8.4–10.5)
CHLORIDE SERPL-SCNC: 97 MMOL/L — SIGNIFICANT CHANGE UP (ref 96–108)
CO2 SERPL-SCNC: 32 MMOL/L — HIGH (ref 22–31)
CREAT SERPL-MCNC: 5.27 MG/DL — HIGH (ref 0.5–1.3)
FACT VIII ACT/NOR PPP: 39 % — LOW (ref 51–138)
GAMMA INTERFERON BACKGROUND BLD IA-ACNC: 0.03 IU/ML — SIGNIFICANT CHANGE UP
GLUCOSE SERPL-MCNC: 116 MG/DL — HIGH (ref 70–99)
HCT VFR BLD CALC: 27.3 % — LOW (ref 39–50)
HGB BLD-MCNC: 8.7 G/DL — LOW (ref 13–17)
INR BLD: 1.31 — HIGH (ref 0.88–1.16)
M TB IFN-G BLD-IMP: ABNORMAL
M TB IFN-G CD4+ BCKGRND COR BLD-ACNC: 0 IU/ML — SIGNIFICANT CHANGE UP
M TB IFN-G CD4+CD8+ BCKGRND COR BLD-ACNC: 0 IU/ML — SIGNIFICANT CHANGE UP
MAGNESIUM SERPL-MCNC: 2.2 MG/DL — SIGNIFICANT CHANGE UP (ref 1.6–2.6)
MCHC RBC-ENTMCNC: 27.7 PG — SIGNIFICANT CHANGE UP (ref 27–34)
MCHC RBC-ENTMCNC: 31.9 GM/DL — LOW (ref 32–36)
MCV RBC AUTO: 86.9 FL — SIGNIFICANT CHANGE UP (ref 80–100)
NRBC # BLD: 0 /100 WBCS — SIGNIFICANT CHANGE UP (ref 0–0)
PHOSPHATE SERPL-MCNC: 5.5 MG/DL — HIGH (ref 2.5–4.5)
PLATELET # BLD AUTO: 105 K/UL — LOW (ref 150–400)
POTASSIUM SERPL-MCNC: 4.5 MMOL/L — SIGNIFICANT CHANGE UP (ref 3.5–5.3)
POTASSIUM SERPL-SCNC: 4.5 MMOL/L — SIGNIFICANT CHANGE UP (ref 3.5–5.3)
PROTHROM AB SERPL-ACNC: 15.5 SEC — HIGH (ref 10.6–13.6)
QUANT TB PLUS MITOGEN MINUS NIL: 0.17 IU/ML — SIGNIFICANT CHANGE UP
RBC # BLD: 3.14 M/UL — LOW (ref 4.2–5.8)
RBC # FLD: 13.8 % — SIGNIFICANT CHANGE UP (ref 10.3–14.5)
SODIUM SERPL-SCNC: 141 MMOL/L — SIGNIFICANT CHANGE UP (ref 135–145)
WBC # BLD: 11.54 K/UL — HIGH (ref 3.8–10.5)
WBC # FLD AUTO: 11.54 K/UL — HIGH (ref 3.8–10.5)

## 2021-03-04 PROCEDURE — 99232 SBSQ HOSP IP/OBS MODERATE 35: CPT | Mod: GC

## 2021-03-04 PROCEDURE — 99233 SBSQ HOSP IP/OBS HIGH 50: CPT | Mod: GC

## 2021-03-04 RX ORDER — MIDODRINE HYDROCHLORIDE 2.5 MG/1
5 TABLET ORAL EVERY 8 HOURS
Refills: 0 | Status: DISCONTINUED | OUTPATIENT
Start: 2021-03-04 | End: 2021-03-06

## 2021-03-04 RX ADMIN — Medication 4 MILLIGRAM(S): at 07:01

## 2021-03-04 RX ADMIN — Medication 60 MILLIGRAM(S): at 07:01

## 2021-03-04 RX ADMIN — PANTOPRAZOLE SODIUM 40 MILLIGRAM(S): 20 TABLET, DELAYED RELEASE ORAL at 07:01

## 2021-03-04 RX ADMIN — Medication 10 MILLIGRAM(S): at 07:01

## 2021-03-04 RX ADMIN — Medication 1 APPLICATION(S): at 11:54

## 2021-03-04 RX ADMIN — Medication 10 MILLIGRAM(S): at 18:13

## 2021-03-04 RX ADMIN — POLYETHYLENE GLYCOL 3350 17 GRAM(S): 17 POWDER, FOR SOLUTION ORAL at 11:53

## 2021-03-04 RX ADMIN — MIDODRINE HYDROCHLORIDE 5 MILLIGRAM(S): 2.5 TABLET ORAL at 14:52

## 2021-03-04 RX ADMIN — Medication 100 MILLIGRAM(S): at 18:05

## 2021-03-04 RX ADMIN — NYSTATIN CREAM 1 APPLICATION(S): 100000 CREAM TOPICAL at 07:01

## 2021-03-04 RX ADMIN — SEVELAMER CARBONATE 800 MILLIGRAM(S): 2400 POWDER, FOR SUSPENSION ORAL at 18:05

## 2021-03-04 RX ADMIN — SEVELAMER CARBONATE 800 MILLIGRAM(S): 2400 POWDER, FOR SUSPENSION ORAL at 07:02

## 2021-03-04 RX ADMIN — MIDODRINE HYDROCHLORIDE 5 MILLIGRAM(S): 2.5 TABLET ORAL at 22:31

## 2021-03-04 RX ADMIN — NYSTATIN CREAM 1 APPLICATION(S): 100000 CREAM TOPICAL at 18:06

## 2021-03-04 RX ADMIN — MIDODRINE HYDROCHLORIDE 7.5 MILLIGRAM(S): 2.5 TABLET ORAL at 07:01

## 2021-03-04 RX ADMIN — Medication 1 MILLIGRAM(S): at 11:52

## 2021-03-04 RX ADMIN — Medication 1 SPRAY(S): at 18:13

## 2021-03-04 RX ADMIN — SEVELAMER CARBONATE 800 MILLIGRAM(S): 2400 POWDER, FOR SUSPENSION ORAL at 11:52

## 2021-03-04 RX ADMIN — Medication 1 SPRAY(S): at 07:02

## 2021-03-04 NOTE — PROGRESS NOTE ADULT - ASSESSMENT
77 yo M with PMHx of CKD (recent Cr 6.6 at [facility]), Hemophilia A, HTN, Gout, BPH and DRESS Syndrome (on Prednisone 60mg QD) consulted to ID for MSSA bacteremia. Patient currently on cefazolin 1g q 24hrs which was chosen over Nafcillin in the setting of severe fluid overload since Cefazolin is administered with less fluid. Source of MSSA unknown. Possible source from skin as patient is immunocompromised from chronic steroids and broke skin barrier scratching the DRESS syndrome rash which created multiple lesions. Unlikely lung source since no cough, sputum production and CXR does not show any lung field opacities. Prior SOB and prior need for 3L NC likely due to pleural effusion noted on CXR in setting of severe fluid overload secondary to CKD. Weakly positive UA, however unlikely  source as patient does not have urinary symptoms and staph aureus usually does not commonly arise from urinary sources. Patient afebrile with WBC count of 11.54 (3/4) which is increased from 9.87 on 3/3.  Surveillance blood cultures from 2/22, 2/23, 2/26 and 2/28 grew MSSA. Surveillance blood cultures from 3/1 have no growth to date. TTE (2/22) showed no valvular disease and GUANAKO (2/26) had no vegetations on heart valves.  Gallium scan (3/2) shows uptake in pericardium without uptake in pleural fluid which is concerning for seeding of MSSA of the pericardium especially since patient was bacteremic for a minimum of eight days. 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 Gallium scan (3/2) also shows two focal areas in left buttock that could be a skin lesion or abscess. There is no obvious superficial infection or palpable induration or fluctuance. Repeat TTE on 3/3 shows trace pericardial fluid.     Plan:  - C/w cefazolin 1g IV q 24hrs  - Advise surveillance cultures 3/4  - F/u surveillance blood cultures 3/1   - Recommend HD permacath after 72 hours of negative blood cultures to prevent seeding of MSSA. As official 72 hours from 3/1 blood cultures is 15:37 3/4, recommend checking blood cultures after this time before placing HD permacath to ensure there is no growth    ID Team 1 will follow.    Discussed with Infectious Disease Attending Dr. Edgar. Recommendations are considered final after attending attestation.   77 yo M with PMHx of CKD (recent Cr 6.6 at [facility]), Hemophilia A, HTN, Gout, BPH and DRESS Syndrome (on Prednisone 60mg QD) consulted to ID for MSSA bacteremia. Patient currently on cefazolin 1g q 24hrs which was chosen over Nafcillin in the setting of severe fluid overload since Cefazolin is administered with less fluid. Source of MSSA unknown. Possible source from skin as patient is immunocompromised from chronic steroids and broke skin barrier scratching the DRESS syndrome rash which created multiple lesions. Unlikely lung source since no cough, sputum production and CXR does not show any lung field opacities. Prior SOB and prior need for 3L NC likely due to pleural effusion noted on CXR in setting of severe fluid overload secondary to CKD. Weakly positive UA, however unlikely  source as patient does not have urinary symptoms and staph aureus usually does not commonly arise from urinary sources. Patient afebrile with WBC count of 11.54 (3/4) which is increased from 9.87 on 3/3.  Surveillance blood cultures from 2/22, 2/23, 2/26 and 2/28 grew MSSA. Surveillance blood cultures from 3/1 have no growth to date. TTE (2/22) showed no valvular disease and GUANAKO (2/26) had no vegetations on heart valves.  Gallium scan (3/2) shows uptake in pericardium without uptake in pleural fluid which is concerning for seeding of MSSA of the pericardium especially since patient was bacteremic for a minimum of eight days. 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 Gallium scan (3/2) also shows two focal areas in left buttock that could be a skin lesion or abscess. There is no obvious superficial infection or palpable induration or fluctuance. Repeat TTE on 3/3 shows trace pericardial fluid.     Plan:  - C/w cefazolin 1g IV q 24hrs  - Advise surveillance cultures 3/4  - F/u surveillance blood cultures 3/1   - Recommend HD permacath after 72 hours of negative blood cultures to prevent seeding of MSSA. As official 72 hours from 3/1 blood cultures is 15:37 3/4, recommend checking blood cultures after this time before placing HD permacath to ensure there is no growth    ID Team 1 will follow.    Discussed with Infectious Disease Attending Dr. Barnard. Recommendations are considered final after attending attestation.   75 yo M with PMHx of CKD (recent Cr 6.6 at [facility]), Hemophilia A, HTN, Gout, BPH and DRESS Syndrome (on Prednisone 60mg QD) consulted to ID for MSSA bacteremia. Patient currently on cefazolin 1g q 24hrs which was chosen over Nafcillin in the setting of severe fluid overload since Cefazolin is administered with less fluid. Source of MSSA unknown. Possible source from skin as patient is immunocompromised from chronic steroids and broke skin barrier scratching the DRESS syndrome rash which created multiple lesions. Unlikely lung source since no cough, sputum production and CXR does not show any lung field opacities. Prior SOB and prior need for 3L NC likely due to pleural effusion noted on CXR in setting of severe fluid overload secondary to CKD. Weakly positive UA, however unlikely  source as patient does not have urinary symptoms and staph aureus usually does not commonly arise from urinary sources. Patient afebrile with WBC count of 11.54 (3/4) which is increased from 9.87 on 3/3.  Surveillance blood cultures from 2/22, 2/23, 2/26 and 2/28 grew MSSA. Surveillance blood cultures from 3/1 and 3/4 have no growth to date. TTE (2/22) showed no valvular disease and GUANAKO (2/26) had no vegetations on heart valves.  Gallium scan (3/2) shows uptake in pericardium without uptake in pleural fluid which is concerning for seeding of MSSA of the pericardium especially since patient was bacteremic for a minimum of eight days. 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 Gallium scan (3/2) also shows two focal areas in left buttock that could be a skin lesion or abscess. There is no obvious superficial infection or palpable induration or fluctuance and US on 3/3 shows no fluid collection of the area. Per radiology the uptake in the left buttock could be attributed to dye that attached to stool.     Plan:  - C/w cefazolin 1g IV q 24hrs  - F/u surveillance cultures 3/4  - F/u surveillance blood cultures 3/1   - Recommend HD permacath after 72 hours of negative blood cultures to prevent seeding of MSSA. As official 72 hours from 3/1 blood cultures is 15:37 3/4, recommend checking blood cultures after this time before placing HD permacath to ensure there is no growth    ID Team 1 will follow.    Discussed with Infectious Disease Attending Dr. Barnard. Recommendations are considered final after attending attestation.

## 2021-03-04 NOTE — PROGRESS NOTE ADULT - PROBLEM SELECTOR PLAN 6
-holding home norvasc in setting of normotension     #Dress Syndrome  - Currently taking Prednisone 60 mg with improvement in rash   - c/w prednisone 60 mg daily. Plan on taper by reducing prednisone by 10 mg each week (50 mg for 7 days, 40 mg for 7 days, etc)   -oupt followup

## 2021-03-04 NOTE — PROGRESS NOTE ADULT - SUBJECTIVE AND OBJECTIVE BOX
LENGTH OF HOSPITAL STAY: 11d    SUBJECTIVE: No bleeding reported this morning. No new complaints.     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 Hematologist is Dr. Grace. s/p HD catheter placement (02/22), complicated by hypotension during dialysis and MSSA bacteremia.       PAST MEDICAL & SURGICAL HISTORY:  History of BPH  Gout  Hemophilia A  HTN (hypertension)  Uses cochlear implant    ALLERGIES:  allopurinol (Other)    MEDICATIONS:  MEDICATIONS  (STANDING):  ceFAZolin   IVPB 1000 milliGRAM(s) IV Intermittent every 24 hours  doxazosin 4 milliGRAM(s) Oral daily  folic acid 1 milliGRAM(s) Oral daily  midodrine. 5 milliGRAM(s) Oral every 8 hours  nystatin Cream 1 Application(s) Topical every 12 hours  pantoprazole    Tablet 40 milliGRAM(s) Oral before breakfast  petrolatum white Ointment 1 Application(s) Topical every 24 hours  polyethylene glycol 3350 17 Gram(s) Oral daily  PPD  5 Tuberculin Unit(s) Injectable 5 Unit(s) IntraDermal once  predniSONE   Tablet 60 milliGRAM(s) Oral daily  senna 2 Tablet(s) Oral at bedtime  sevelamer carbonate 800 milliGRAM(s) Oral three times a day with meals  sodium chloride 0.65% Nasal 1 Spray(s) Both Nostrils two times a day  torsemide 10 milliGRAM(s) Oral two times a day      VITALS:   Vital Signs Last 24 Hrs  T(C): 36.9 (04 Mar 2021 05:47), Max: 36.9 (04 Mar 2021 05:47)  T(F): 98.4 (04 Mar 2021 05:47), Max: 98.4 (04 Mar 2021 05:47)  HR: 78 (04 Mar 2021 05:47) (78 - 87)  BP: 155/54 (04 Mar 2021 05:47) (133/71 - 155/54)  BP(mean): 92 (03 Mar 2021 21:00) (92 - 92)  RR: 18 (04 Mar 2021 05:47) (16 - 18)  SpO2: 95% (04 Mar 2021 05:47) (95% - 97%)    LABS:                        8.7    11.54 )-----------( 105      ( 04 Mar 2021 07:01 )             27.3     03-04    141  |  97  |  131<H>  ----------------------------<  116<H>  4.5   |  32<H>  |  5.27<H>    Ca    8.8      04 Mar 2021 07:01  Phos  5.5     03-04  Mg     2.2     03-04    PT/INR - ( 04 Mar 2021 07:01 )   PT: 15.5 sec;   INR: 1.31        PTT - ( 04 Mar 2021 07:01 )  PTT:36.5 sec     Factor VIII Assay (03.04.21 @ 07:01)   Factor VIII Assay: 39%  Folate, Serum in AM (03.02.21 @ 19:59)   Folate, Serum: 2.5 ng/mL   Vitamin B12, Serum (03.02.21 @ 19:59)   Vitamin B12, Serum: 1212 pg/mL   Factor VIII Assay (03.02.21 @ 07:57)   Factor VIII Assay: 56: The presence of direct thrombin inhibitors (argatroban, refludan) may   falsely decrease activity. %   Ferritin, Serum in AM (03.02.21 @ 07:57)   Ferritin, Serum: 730 ng/mL  Iron with Total Binding Capacity in AM (03.02.21 @ 07:57)   Iron - Total Binding Capacity.: 151 ug/dL   % Saturation, Iron: 42 %   Iron Total, Serum: 63 ug/dL   Unsaturated Iron Binding Capacity: 88 ug/dL     RADIOLOGY:  Reviewed    PHYSICAL EXAM:  GEN: No acute distress  HEENT: NCAT  LUNGS: Clear to auscultation bilaterally   HEART: S1/S2 present. RRR.   ABD: Soft, non-tender, non-distended. Bowel sounds present  EXT: no edema  NEURO: AAOX3

## 2021-03-04 NOTE — PROGRESS NOTE ADULT - ASSESSMENT
76M PMHx Hemophilia A, HTN, Gout, p/w RU in setting of DRESS from allopurinol on steroids   hypervolemic, hyperkalemic, uremic, and acidotic without improvement   started on hemodialysis 2/23     #RU secondary to ATN vs AIN on steroids   #uremic pericarditis  #thrombocytopenia - could be 2/2 sepsis vs reaction to dialyzer - will use Revaclear with next HD instead of Optiflux    Steroids were patient's outpt medication- verify from pharmacy/PCP taper regimen, avoid discontinuing without taper to prevent adrenal insufficiency    Staph bacteraemia on cx 2/28; cultures negative 3/1  Will require tunneled cath and long term HD  No urgent indication to dialyse at this time- no uraemic sx  Per ID 72hr of negative cx prior to tunneled cath placement  Last HD 2/26- fem cath removed, no HD access at this time    BP: controlled on midodrine 5q8; torsemide 10 BID  BMD: renvela 800 TID  Anaemia- no IV iron given septicaemia, haem/onc involved for haemophilia; will need coordination with IR for tunneled catheter placement prior to discharge if need arise    Daily weights, strict I&Os, renally dose meds, renal diet

## 2021-03-04 NOTE — PROGRESS NOTE ADULT - PROBLEM SELECTOR PLAN 3
Prior history of significant bleeding in past following ERCP. Follows w Dr Grace ([facility]).  -Heme following - follow recs   -Daily INR/PT/PTT and daily Factor VIII   -active T&S (3/3)   -f/u Dr Grace outpt     #folic acid deficiency - pt found to be folate deficient  -c/w 1 mg folic acid qd     #Anemia - Hgb 11.5 on arrival with unclear baseline  - Like 2/2 acute kidney failure or dilutional due to volume overload.

## 2021-03-04 NOTE — PROGRESS NOTE ADULT - ATTENDING COMMENTS
Patient was seen and examined with the resident team today.  I agree with Dr. Thomas's assessment and plan with the following exceptions/additions:     Briefly, this is a 75yo gentleman with a PMH of Hemophilia A, HTN, Gout c/b allopurinol-induced DRESS Syndrome and CKD (on high-dose steroids) who p/w acute on chronic renal failure c/b HD unstable Afib w/RVR and shock c/b thrombocytopenia, requiring emergent initiation of HD.  Hospital course c/b MSSA bacteremia with slow clearance of cultures.  Transferred to the [facility] on 3/1 for ongoing volume overload and coordination of outpatient HD.  Notably, NM Gallium showing uptake in gluteal and pericardium - in light of exam and data, suspect pericardium to be inflammed/infected but not gluteal region.  Will wait overnight to place TDC to watch 3/1 cultures and will likely eduar 6 weeks of IV abx.     -- Cefazolin as per ID w/HD; upon discharge 2g/2g/3g after HD  -- c/w PO diuretic; would not change as labs suggest contract alkalosis and BUN very high   -- TDC tomorrow if bcx negative by the morning    -- per family, patient did not have someone managing his high-dose steroids as an outpatient; will need to coordinate this taper and ensure follow-up   -- PT and OOB to chair as able   -- decreased Midodrine to 5mg TID today   -- on SCDs given hemophilia and thrombocytopenia  -- Dispo - family prefers home w/outpatient HD and home PT (daughter Char very involved)    Amarilys Velasco  [phone]

## 2021-03-04 NOTE — PROGRESS NOTE ADULT - NUTRITIONAL ASSESSMENT
This patient has been assessed with a concern for Malnutrition and has been determined to have a diagnosis/diagnoses of Moderate protein-calorie malnutrition.    This patient is being managed with:   Diet Renal Restrictions-  For patients receiving Renal Replacement - No Protein Restr No Conc K No Conc Phos Low Sodium  Dysphagia 3 Soft Thin Liquids ([id number])  Supplement Feeding Modality:  Oral  Nepro Cans or Servings Per Day:  1       Frequency:  Daily  Entered: Mar  4 2021 10:18AM    Diet Renal Restrictions-  For patients receiving Renal Replacement - No Protein Restr No Conc K No Conc Phos Low Sodium  Entered: Feb 25 2021 12:36PM    The following pending diet order is being considered for treatment of Moderate protein-calorie malnutrition:null

## 2021-03-04 NOTE — PROGRESS NOTE ADULT - SUBJECTIVE AND OBJECTIVE BOX
INTERVAL HPI/OVERNIGHT EVENTS: STEPHANIE     SUBJECTIVE: Patient seen and examined at bedside. Feels well No CP, SOB, lightheadedness, fever/chills, n/v/d. Making good urine. 12 pt ROS otherwise negative.       OBJECTIVE:    VITAL SIGNS:  ICU Vital Signs Last 24 Hrs  T(C): 37.1 (02 Mar 2021 05:32), Max: 37.1 (02 Mar 2021 05:32)  T(F): 98.7 (02 Mar 2021 05:32), Max: 98.7 (02 Mar 2021 05:32)  HR: 90 (02 Mar 2021 05:32) (90 - 116)  BP: 126/63 (02 Mar 2021 05:32) (91/51 - 152/75)  BP(mean): 87 (01 Mar 2021 17:32) (67 - 95)  ABP: --  ABP(mean): --  RR: 19 (02 Mar 2021 05:32) (18 - 19)  SpO2: 96% (02 Mar 2021 05:32) (91% - 100%)        03-01 @ 07:01  -  03-02 @ 07:00  --------------------------------------------------------  IN: 0 mL / OUT: 200 mL / NET: -200 mL      CAPILLARY BLOOD GLUCOSE    PHYSICAL EXAM:    General: WDWN, NAD, resting comfortably in bed  HEENT: NC/AT; anicteric sclera; MMM  Neck: supple  Cardiovascular: +S1/S2; RRR  Respiratory: CTA B/L; no W/R/R  Gastrointestinal: soft, +distension. NT; +BSx4  Extremities: WWP; trace pitting edema   Vascular: 2+ radial, DP/PT pulses B/L  Neurological: AAOx3, moves all 4 extremities   Skin: L buttock ulcer       LABS:                        9.4    9.50  )-----------( 73       ( 01 Mar 2021 07:27 )             29.2     03-01    140  |  97  |  96<H>  ----------------------------<  109<H>  4.7   |  29  |  4.50<H>    Ca    8.4      01 Mar 2021 07:26  Phos  5.1     03-01  Mg     2.4     03-01    TPro  5.2<L>  /  Alb  2.7<L>  /  TBili  0.6  /  DBili  x   /  AST  20  /  ALT  11  /  AlkPhos  320<H>  03-01    PT/INR - ( 01 Mar 2021 07:28 )   PT: 14.8 sec;   INR: 1.25          PTT - ( 01 Mar 2021 07:28 )  PTT:36.0 sec      RADIOLOGY & ADDITIONAL TESTS: Reviewed.    MEDICATIONS:  MEDICATIONS  (STANDING):  ceFAZolin   IVPB 1000 milliGRAM(s) IV Intermittent every 24 hours  doxazosin 4 milliGRAM(s) Oral daily  furosemide   Injectable 80 milliGRAM(s) IV Push every 12 hours  midodrine. 7.5 milliGRAM(s) Oral every 8 hours  nystatin Cream 1 Application(s) Topical every 12 hours  pantoprazole    Tablet 40 milliGRAM(s) Oral before breakfast  polyethylene glycol 3350 17 Gram(s) Oral daily  PPD  5 Tuberculin Unit(s) Injectable 5 Unit(s) IntraDermal once  predniSONE   Tablet 60 milliGRAM(s) Oral daily  senna 2 Tablet(s) Oral at bedtime  sevelamer carbonate 800 milliGRAM(s) Oral three times a day with meals    MEDICATIONS  (PRN):      ALLERGIES:  Allergies    allopurinol (Other)    Intolerances

## 2021-03-04 NOTE — PROGRESS NOTE ADULT - PROBLEM SELECTOR PLAN 5
During first episode of dialysis over 1 week ago, pt became hypotensive, requiring transfer to MICU for dialysis. Tolerated dialysis 1x on pressors and 1x off. BP well controlled on midodrine.  -reduce midodrine to 5 mg TID, taper as clinically appropriate

## 2021-03-04 NOTE — PROGRESS NOTE ADULT - SUBJECTIVE AND OBJECTIVE BOX
Patient is a 76y Male seen and evaluated at bedside. Renal function not recovering, will require HD. Cultures from 2/28 grew staph, thus far 3/1 cultures negative. Will require tunneled HD cath placement, pending negative cultures. Anticipate cath placement tomorrow. No urgent need for HD today.    Meds:    ceFAZolin   IVPB 1000 every 24 hours  doxazosin 4 daily  folic acid 1 daily  midodrine. 5 every 8 hours  nystatin Cream 1 every 12 hours  pantoprazole    Tablet 40 before breakfast  petrolatum white Ointment 1 every 24 hours  polyethylene glycol 3350 17 daily  PPD  5 Tuberculin Unit(s) Injectable 5 once  predniSONE   Tablet 60 daily  senna 2 at bedtime  sevelamer carbonate 800 three times a day with meals  sodium chloride 0.65% Nasal 1 two times a day  torsemide 10 two times a day      T(C): , Max: 36.9 (03-03-21 @ 11:47)  T(F): , Max: 98.4 (03-03-21 @ 11:47)  HR: 78 (03-04-21 @ 05:47)  BP: 155/54 (03-04-21 @ 05:47)  BP(mean): 92 (03-03-21 @ 21:00)  RR: 18 (03-04-21 @ 05:47)  SpO2: 95% (03-04-21 @ 05:47)  Wt(kg): --    03-03 @ 07:01  -  03-04 @ 07:00  --------------------------------------------------------  IN: 0 mL / OUT: 500 mL / NET: -500 mL    Review of Systems:  RESPIRATORY: No shortness of breath  CARDIOVASCULAR: No chest pain  MUSCULOSKELETAL: No edema    PHYSICAL EXAM:  GENERAL: well-developed, well nourished, alert, no acute distress at present on RA  CHEST/LUNG: Clear to auscultation bilaterally  HEART: normal S1S2, RRR  ABDOMEN: Soft, Nontender  EXTREMITIES: No oedema     LABS:                        8.7    11.54 )-----------( 105      ( 04 Mar 2021 07:01 )             27.3     03-04    141  |  97  |  131<H>  ----------------------------<  116<H>  4.5   |  32<H>  |  5.27<H>    Ca    8.8      04 Mar 2021 07:01  Phos  5.5     03-04  Mg     2.2     03-04        PT/INR - ( 04 Mar 2021 07:01 )   PT: 15.5 sec;   INR: 1.31          PTT - ( 04 Mar 2021 07:01 )  PTT:36.5 sec          RADIOLOGY & ADDITIONAL STUDIES:

## 2021-03-04 NOTE — PROGRESS NOTE ADULT - PROBLEM SELECTOR PLAN 10
F: none   E: Lokelma for K >5.5;  N: Renal diet  DVT: SCDs, no AC given thrombocytopenia and hemophilia   GI: none.  Dispo: [facility]

## 2021-03-04 NOTE — PROGRESS NOTE ADULT - PROBLEM SELECTOR PLAN 1
#MSSA Bacteremia. Source unknown. Pt has L buttock ulcer which could be source. Gallium scan showed areas of activity in L buttock, as well as pericardium. Cultures ngtd since 3/1.  - continue with ancef 1g q24hr (2/25-)  -f/u 3/1 and 3/2 bcx   - ID recs appreciated

## 2021-03-04 NOTE — CHART NOTE - NSCHARTNOTEFT_GEN_A_CORE
Admitting Diagnosis:   Patient is a 76y old  Male who presents with a chief complaint of Weakness; Worsening CKD (04 Mar 2021 09:51)      PAST MEDICAL & SURGICAL HISTORY:  History of BPH    Gout    Hemophilia A    HTN (hypertension)    Uses cochlear implant        Current Nutrition Order:   Diet, Renal Restrictions:   For patients receiving Renal Replacement - No Protein Restr, No Conc K, No Conc Phos, Low Sodium  Dysphagia 3, Soft, Thin Liquids ([id number])  Supplement Feeding Modality:  Oral  Nepro Cans or Servings Per Day:  1       Frequency:  Daily (21 @ 10:19)      PO Intake: Good (%) [   ]  Fair (50-75%) [ x  ] Poor (<25%) [   ]fair 50% 2/2 reported "pain in gums and tongue"  GI Issues: No N/V/D/C BM3/2    Pain:yes in gums    Skin Integrity:DTI's reported in sacrum  Labs:       141  |  97  |  131<H>  ----------------------------<  116<H>  4.5   |  32<H>  |  5.27<H>    Ca    8.8      04 Mar 2021 07:01  Phos  5.5     03-04  Mg     2.2     03-04      CAPILLARY BLOOD GLUCOSE          Medications:  MEDICATIONS  (STANDING):  ceFAZolin   IVPB 1000 milliGRAM(s) IV Intermittent every 24 hours  doxazosin 4 milliGRAM(s) Oral daily  folic acid 1 milliGRAM(s) Oral daily  midodrine. 5 milliGRAM(s) Oral every 8 hours  nystatin Cream 1 Application(s) Topical every 12 hours  pantoprazole    Tablet 40 milliGRAM(s) Oral before breakfast  petrolatum white Ointment 1 Application(s) Topical every 24 hours  polyethylene glycol 3350 17 Gram(s) Oral daily  PPD  5 Tuberculin Unit(s) Injectable 5 Unit(s) IntraDermal once  predniSONE   Tablet 60 milliGRAM(s) Oral daily  senna 2 Tablet(s) Oral at bedtime  sevelamer carbonate 800 milliGRAM(s) Oral three times a day with meals  sodium chloride 0.65% Nasal 1 Spray(s) Both Nostrils two times a day  torsemide 10 milliGRAM(s) Oral two times a day    MEDICATIONS  (PRN):      Weight:  Daily     Daily Weight in k (04 Mar 2021 09:40)    Weight Change: admitted weight:84.5kg() to 68kg suspected fluid shifts    Estimated energy needs: based on IBW due to fluid shifts IBW:69.8kg [id number] with HD and DTI's protein needs:1.4gm:98gmprotein and fluids per team    Subjective: 76y/omale with Hemophilia, HTN, Gout, DRESS syndrome ,CKD,CRF,AFib with RVR and shock c/b thrombocytopenia requiring urgent HD.Also with noted MRSA.Determined to have moderate PCM. Presently eating only fair amounts due to reported "pain in gums" and lack of appetite. RD will request ONS Nepro for increased kcal. Patient needs diet changed to soft. May benefit from appetite stimulant.Monitor po closely    Previous Nutrition Diagnosis:Increased nutrient needs r/t increased demand for kcal and protein AEB;HD demands     Active [ x  ]  Resolved [   ]    If resolved, new PES:     Goal:Meet 80% of needs consistently    Recommendations:1.Change diet to soft renal with 1 Nepro 2.Consider appetite stimulant    Education: discussed Nepro and food tolerance with patient    Risk Level: High [ x  ] Moderate [   ] Low [   ]

## 2021-03-04 NOTE — PROGRESS NOTE ADULT - PROBLEM SELECTOR PLAN 4
#Uremic pericarditis - BUN >100 on presentation, initiating need to HD. EKG with diffuse ST elevated and NC depressions c/w acute pericarditis. Gallium scan shows increased uptake in pericardium, consistent with pericarditis. No current CP. Echo showed small pericardial effusion, repeat TTE on 3/3 shows trivial pericardial effusion with no increased size. Cards and CTSG say no intervention or changes in mgmt on pericarditis/pericardial fluid  -cards consulted, recs appreciated  -CTSG consulted for possible culture of fluid , recs appreciated   - repeat EKG for any chest pain

## 2021-03-04 NOTE — PROGRESS NOTE ADULT - PROBLEM SELECTOR PLAN 2
Developed Dress Syndrome after allopurinol, and has been on high dose steroid therapy. Cr worsened with deranged electrolytes after being diagnosed with Dress Syndrome. last HD 2/26. Received total x4 HD sessions  -Per renal, will do THD cath tomorrow 3/5  -c/w renvela 800 TID  -c/w PO torsemide 10 mg BID   -renal recs appreciated   - strict Is/Os, daily weights, renal diet, renally dose meds

## 2021-03-04 NOTE — ED ADULT NURSE NOTE - CHIEF COMPLAINT QUOTE
Pt arrives via EMS from [facility]. Per report staff went and checked on pt around 7 am and noticed left sided weakness left facial droop. When EMS arrived around 9 to 9:30 am the symptoms have already resolved.     Hx of dementia.   Pt is orientated to person and to situation. Confused to the time which is normal.     Blood sugar 113.  
general edema, weakness, kidney injury

## 2021-03-04 NOTE — PROGRESS NOTE ADULT - ASSESSMENT
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 Hematologist is Dr. Grace. s/p HD catheter placement (02/22), complicated by hypotension during dialysis and MSSA bacteremia.     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 Factor VIII 56% which is acceptable. No significant bleeding observed by primary team.    - Continue to follow-up with Factor VIII once daily due to possible procedures.  - Maintain active T+S, transfuse if Hb < 7 or symptomatic   - Follow-up CBC and coagulation panel (PT/PTT) once daily  - Upon discharge, can follow-up with Dr. Grace (Hematologist)    #) Mild coagulopathy, possible inhibitor. Factor VII deficiency   - Mixing study (02/21) performed for mild coagulopathy showed possible presence of a delayed inhibitor due to lack of correction with 2 hr incubation. Follow-up inhibitor assay.     #) Thrombocytopenia, possibly sepsis-related and dialysis membrane effect, improving  - Peripheral blood smear (02/23) showed platelet clumping and no schistocytes. PLASMIC score is 4 low-risk for TTP. HIT score is 3-4, low-intermediate risk. PFT-HIT Ab negative. SORAYA negative.    - Trend CBC with differential once daily  - Maintain active T+S, transfuse if platelet < 10K, or < 20K if febrile or <50K if bleeding.   - Hold pharmacologic DVT ppx if platelets consistently < 50 K    #) Anemia   - secondary to chronic renal disease  - B12 WNL, folate deficient, on folic acid 1mg qd  - Iron studies consistent with ACD    To Discuss with Dr. 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 Hematologist is Dr. Grace. s/p HD catheter placement (02/22), complicated by hypotension during dialysis and MSSA bacteremia.     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 No significant bleeding observed.    - Continue to follow-up with Factor VIII once daily due to possible procedures.  - Factor VIII level is 39% today. He is possibly going for HD catheter placement on 3/4. He will need recombinant factor VIII 3000 Units prior to procedure. Please check factor VIII level post procedure.   - Maintain active T+S, transfuse if Hb < 7 or symptomatic   - Follow-up CBC and coagulation panel (PT/PTT) once daily  - Upon discharge, can follow-up with Dr. Grace (Hematologist)    #) Mild coagulopathy, possible inhibitor. Factor VII deficiency   - Mixing study (02/21) performed for mild coagulopathy showed possible presence of a delayed inhibitor due to lack of correction with 2 hr incubation. Follow-up inhibitor assay.     #) Thrombocytopenia, possibly sepsis-related and dialysis membrane effect, improving  - Peripheral blood smear (02/23) showed platelet clumping and no schistocytes. PLASMIC score is 4 low-risk for TTP. HIT score is 3-4, low-intermediate risk. PFT-HIT Ab negative. SORAYA negative.    - Trend CBC with differential once daily  - Maintain active T+S, transfuse if platelet < 10K, or < 20K if febrile or <50K if bleeding.   - Hold pharmacologic DVT ppx if platelets consistently < 50 K    #) Anemia   - secondary to chronic renal disease  - B12 WNL, folate deficient, on folic acid 1mg qd  - Iron studies consistent with ACD    Discussed with Dr. Fry

## 2021-03-04 NOTE — PROGRESS NOTE ADULT - SUBJECTIVE AND OBJECTIVE BOX
infectious diseases progress note:  YAIR FELDMAN is a 76yMale patient    O/N events: none    Interval History: Patient seen and examined at bedside. Patient is feeling okay today and notes improvement in fatigue. Patient denies fevers, chills, headache, lightheadedness, CP, palpitations, SOB, cough, N/V/D, back pain, buttock pain.    ACUTE KIDNEY INJURY      Blood pressure instability    ESRD (end stage renal disease)    BPH (benign prostatic hyperplasia)    Ascites    Shock    Pericarditis    Tachycardia    Bacteremia    Acute renal failure    Metabolic acidosis    DRESS syndrome    Acute kidney injury    Nutrition, metabolism, and development symptoms    Hemophilia A    History of BPH    Gout    HTN (hypertension)    Other ascites    Pericardial effusion    Hyperkalemia    Acute renal failure superimposed on stage 5 chronic kidney disease, not on chronic dialysis, unspecified acute renal failure type        ROS:  CONSTITUTIONAL:  Negative fever or chills, feels well  EYES:  Negative  blurry vision or double vision  CARDIOVASCULAR:  Negative for chest pain or palpitations  RESPIRATORY:  Negative for cough, wheezing, or SOB   GASTROINTESTINAL:  Negative for nausea, vomiting, diarrhea or abdominal pain  GENITOURINARY:  Negative frequency, urgency or dysuria  NEUROLOGIC:  No headache, confusion, dizziness, lightheadedness    Allergies    allopurinol (Other)    Intolerances        ANTIBIOTICS/RELEVANT:  antimicrobials  ceFAZolin   IVPB 1000 milliGRAM(s) IV Intermittent every 24 hours    immunologic:  PPD  5 Tuberculin Unit(s) Injectable 5 Unit(s) IntraDermal once    OTHER:  doxazosin 4 milliGRAM(s) Oral daily  folic acid 1 milliGRAM(s) Oral daily  midodrine. 5 milliGRAM(s) Oral every 8 hours  nystatin Cream 1 Application(s) Topical every 12 hours  pantoprazole    Tablet 40 milliGRAM(s) Oral before breakfast  petrolatum white Ointment 1 Application(s) Topical every 24 hours  polyethylene glycol 3350 17 Gram(s) Oral daily  predniSONE   Tablet 60 milliGRAM(s) Oral daily  senna 2 Tablet(s) Oral at bedtime  sevelamer carbonate 800 milliGRAM(s) Oral three times a day with meals  sodium chloride 0.65% Nasal 1 Spray(s) Both Nostrils two times a day  torsemide 10 milliGRAM(s) Oral two times a day      Objective:  Vital Signs Last 24 Hrs  T(C): 36.9 (04 Mar 2021 05:47), Max: 36.9 (03 Mar 2021 11:47)  T(F): 98.4 (04 Mar 2021 05:47), Max: 98.4 (03 Mar 2021 11:47)  HR: 78 (04 Mar 2021 05:47) (78 - 93)  BP: 155/54 (04 Mar 2021 05:47) (133/71 - 155/54)  BP(mean): 92 (03 Mar 2021 21:00) (92 - 92)  RR: 18 (04 Mar 2021 05:47) (16 - 18)  SpO2: 95% (04 Mar 2021 05:47) (95% - 98%)    PHYSICAL EXAM:  General: NAD, laying comfortably in bed   Skin: Extremities with dry scales with no signs of warmth, purulence, drainage.   HEENT: Conjunctiva clear, sclera white, PERRLA  Cardiac: S1 and S1 clear. No murmurs, rubs or gallops  Lungs: Unlabored breathing, no accessory muscle use, vesicular b/l   Abdomen: Distended, soft, non-tender, normoactive BS in all four quadrants  Peripheral extremities: 1+ pitting edema in lower extremities b/l, 2+ DP and PT pulses b/l   Neurological: A&Ox3        LABS:                        8.7    11.54 )-----------( 105      ( 04 Mar 2021 07:01 )             27.3     03-04    141  |  97  |  131<H>  ----------------------------<  116<H>  4.5   |  32<H>  |  5.27<H>    Ca    8.8      04 Mar 2021 07:01  Phos  5.5     03-04  Mg     2.2     03-04      PT/INR - ( 04 Mar 2021 07:01 )   PT: 15.5 sec;   INR: 1.31          PTT - ( 04 Mar 2021 07:01 )  PTT:36.5 sec        MICROBIOLOGY:        RADIOLOGY & ADDITIONAL STUDIES:

## 2021-03-05 LAB
-  CEFAZOLIN: SIGNIFICANT CHANGE UP
-  CEFAZOLIN: SIGNIFICANT CHANGE UP
-  CLINDAMYCIN: SIGNIFICANT CHANGE UP
-  CLINDAMYCIN: SIGNIFICANT CHANGE UP
-  ERYTHROMYCIN: SIGNIFICANT CHANGE UP
-  ERYTHROMYCIN: SIGNIFICANT CHANGE UP
-  LINEZOLID: SIGNIFICANT CHANGE UP
-  LINEZOLID: SIGNIFICANT CHANGE UP
-  OXACILLIN: SIGNIFICANT CHANGE UP
-  OXACILLIN: SIGNIFICANT CHANGE UP
-  RIFAMPIN: SIGNIFICANT CHANGE UP
-  RIFAMPIN: SIGNIFICANT CHANGE UP
-  TRIMETHOPRIM/SULFAMETHOXAZOLE: SIGNIFICANT CHANGE UP
-  TRIMETHOPRIM/SULFAMETHOXAZOLE: SIGNIFICANT CHANGE UP
-  VANCOMYCIN: SIGNIFICANT CHANGE UP
-  VANCOMYCIN: SIGNIFICANT CHANGE UP
ANION GAP SERPL CALC-SCNC: 14 MMOL/L — SIGNIFICANT CHANGE UP (ref 5–17)
APTT BLD: 36 SEC — HIGH (ref 27.5–35.5)
BLD GP AB SCN SERPL QL: NEGATIVE — SIGNIFICANT CHANGE UP
BUN SERPL-MCNC: 136 MG/DL — HIGH (ref 7–23)
CALCIUM SERPL-MCNC: 9.1 MG/DL — SIGNIFICANT CHANGE UP (ref 8.4–10.5)
CHLORIDE SERPL-SCNC: 99 MMOL/L — SIGNIFICANT CHANGE UP (ref 96–108)
CO2 SERPL-SCNC: 29 MMOL/L — SIGNIFICANT CHANGE UP (ref 22–31)
CREAT SERPL-MCNC: 4.97 MG/DL — HIGH (ref 0.5–1.3)
CULTURE RESULTS: SIGNIFICANT CHANGE UP
CULTURE RESULTS: SIGNIFICANT CHANGE UP
FACT VIII ACT/NOR PPP: 37 % — LOW (ref 51–138)
GLUCOSE SERPL-MCNC: 113 MG/DL — HIGH (ref 70–99)
HCT VFR BLD CALC: 27 % — LOW (ref 39–50)
HGB BLD-MCNC: 8.6 G/DL — LOW (ref 13–17)
INR BLD: 1.18 — HIGH (ref 0.88–1.16)
MAGNESIUM SERPL-MCNC: 2.2 MG/DL — SIGNIFICANT CHANGE UP (ref 1.6–2.6)
MCHC RBC-ENTMCNC: 28.1 PG — SIGNIFICANT CHANGE UP (ref 27–34)
MCHC RBC-ENTMCNC: 31.9 GM/DL — LOW (ref 32–36)
MCV RBC AUTO: 88.2 FL — SIGNIFICANT CHANGE UP (ref 80–100)
METHOD TYPE: SIGNIFICANT CHANGE UP
METHOD TYPE: SIGNIFICANT CHANGE UP
NRBC # BLD: 0 /100 WBCS — SIGNIFICANT CHANGE UP (ref 0–0)
ORGANISM # SPEC MICROSCOPIC CNT: SIGNIFICANT CHANGE UP
ORGANISM # SPEC MICROSCOPIC CNT: SIGNIFICANT CHANGE UP
PHOSPHATE SERPL-MCNC: 5.4 MG/DL — HIGH (ref 2.5–4.5)
PLATELET # BLD AUTO: 123 K/UL — LOW (ref 150–400)
POTASSIUM SERPL-MCNC: 4.7 MMOL/L — SIGNIFICANT CHANGE UP (ref 3.5–5.3)
POTASSIUM SERPL-SCNC: 4.7 MMOL/L — SIGNIFICANT CHANGE UP (ref 3.5–5.3)
PROTHROM AB SERPL-ACNC: 14 SEC — HIGH (ref 10.6–13.6)
RBC # BLD: 3.06 M/UL — LOW (ref 4.2–5.8)
RBC # FLD: 13.9 % — SIGNIFICANT CHANGE UP (ref 10.3–14.5)
RH IG SCN BLD-IMP: POSITIVE — SIGNIFICANT CHANGE UP
SODIUM SERPL-SCNC: 142 MMOL/L — SIGNIFICANT CHANGE UP (ref 135–145)
SPECIMEN SOURCE: SIGNIFICANT CHANGE UP
SPECIMEN SOURCE: SIGNIFICANT CHANGE UP
WBC # BLD: 11.54 K/UL — HIGH (ref 3.8–10.5)
WBC # FLD AUTO: 11.54 K/UL — HIGH (ref 3.8–10.5)

## 2021-03-05 PROCEDURE — 76937 US GUIDE VASCULAR ACCESS: CPT | Mod: 26

## 2021-03-05 PROCEDURE — 99233 SBSQ HOSP IP/OBS HIGH 50: CPT | Mod: GC

## 2021-03-05 PROCEDURE — 99232 SBSQ HOSP IP/OBS MODERATE 35: CPT | Mod: GC

## 2021-03-05 PROCEDURE — 36558 INSERT TUNNELED CV CATH: CPT

## 2021-03-05 PROCEDURE — 77001 FLUOROGUIDE FOR VEIN DEVICE: CPT | Mod: 26

## 2021-03-05 PROCEDURE — 99152 MOD SED SAME PHYS/QHP 5/>YRS: CPT

## 2021-03-05 RX ADMIN — MIDODRINE HYDROCHLORIDE 5 MILLIGRAM(S): 2.5 TABLET ORAL at 06:29

## 2021-03-05 RX ADMIN — MIDODRINE HYDROCHLORIDE 5 MILLIGRAM(S): 2.5 TABLET ORAL at 13:52

## 2021-03-05 RX ADMIN — Medication 100 MILLIGRAM(S): at 19:06

## 2021-03-05 RX ADMIN — NYSTATIN CREAM 1 APPLICATION(S): 100000 CREAM TOPICAL at 19:06

## 2021-03-05 RX ADMIN — SENNA PLUS 2 TABLET(S): 8.6 TABLET ORAL at 23:02

## 2021-03-05 RX ADMIN — Medication 1 SPRAY(S): at 06:30

## 2021-03-05 RX ADMIN — SEVELAMER CARBONATE 800 MILLIGRAM(S): 2400 POWDER, FOR SUSPENSION ORAL at 19:06

## 2021-03-05 RX ADMIN — MIDODRINE HYDROCHLORIDE 5 MILLIGRAM(S): 2.5 TABLET ORAL at 23:45

## 2021-03-05 RX ADMIN — Medication 10 MILLIGRAM(S): at 19:06

## 2021-03-05 RX ADMIN — Medication 1 APPLICATION(S): at 12:34

## 2021-03-05 RX ADMIN — POLYETHYLENE GLYCOL 3350 17 GRAM(S): 17 POWDER, FOR SOLUTION ORAL at 19:06

## 2021-03-05 RX ADMIN — NYSTATIN CREAM 1 APPLICATION(S): 100000 CREAM TOPICAL at 06:29

## 2021-03-05 RX ADMIN — Medication 60 MILLIGRAM(S): at 06:32

## 2021-03-05 RX ADMIN — Medication 1 MILLIGRAM(S): at 12:33

## 2021-03-05 RX ADMIN — PANTOPRAZOLE SODIUM 40 MILLIGRAM(S): 20 TABLET, DELAYED RELEASE ORAL at 06:32

## 2021-03-05 RX ADMIN — Medication 10 MILLIGRAM(S): at 06:32

## 2021-03-05 RX ADMIN — Medication 4 MILLIGRAM(S): at 06:30

## 2021-03-05 RX ADMIN — Medication 1 SPRAY(S): at 19:07

## 2021-03-05 NOTE — PROGRESS NOTE ADULT - ATTENDING COMMENTS
Patient was seen and examined with the resident team today.  I agree with Dr. Thomas's assessment and plan with the following exceptions/additions:     Briefly, this is a 75yo gentleman with a PMH of Hemophilia A, HTN, Gout c/b allopurinol-induced DRESS Syndrome and CKD (on high-dose steroids) who p/w acute on chronic renal failure c/b HD unstable Afib w/RVR and shock c/b thrombocytopenia, requiring emergent initiation of HD.  Hospital course c/b MSSA bacteremia with slow clearance of cultures, pericardium presumed to be the source.  Transferred to the [facility] on 3/1 for ongoing volume overload, coordination of outpatient HD and clearance of blood cultures.  BCx neg x >72 hours.  Planning for Permacath today with HD after Factor VIII repalcement.  SW still working with family to find HD placement as rejected from 1st choice.      -- Factor VIII this AM, then IR for TDC and the HD   -- c/w Cefazolin as per ID w/HD; upon discharge 2g/2g/3g after HD  -- c/w Pred 60mg today, decrease to Pred 50mg x 7 days starting tomorrow and then by 10mg weekly until discontinued  -- family will discuss PMD the management of his outpatient Pred taper   -- c/w Midodrine 5mg TID today and wean as able once HD initiated   -- DVT PPx - SCDs given hemophilia and thrombocytopenia  -- Dispo - home w/services once outpatient HD established     Amarilys Velasco  [phone] Patient was seen and examined with the resident team today.  I agree with Dr. Thomas's assessment and plan with the following exceptions/additions:     Briefly, this is a 75yo gentleman with a PMH of Hemophilia A, HTN, Gout c/b allopurinol-induced DRESS Syndrome and CKD (on high-dose steroids) who p/w acute on chronic renal failure c/b HD unstable Afib w/RVR and shock c/b thrombocytopenia, requiring emergent initiation of HD.  Hospital course c/b MSSA bacteremia with slow clearance of cultures, pericardium presumed to be the source.  Transferred to the [facility] on 3/1 for ongoing volume overload, coordination of outpatient HD and clearance of blood cultures.  BCx neg x >72 hours.  Planning for Permacath today with HD after Factor VIII replacement.  SW still working with family to find HD placement as rejected from 1st choice.      -- Factor VIII this AM, then IR for TDC and the HD   -- c/w Torsemide 10mg BID; please finalize regimen with Renal prior to d/c  -- c/w Cefazolin as per ID w/HD; upon discharge 2g/2g/3g after HD  -- c/w Pred 60mg today, decrease to Pred 50mg x 7 days starting tomorrow and then by 10mg weekly until discontinued  -- family will discuss PMD the management of his outpatient Pred taper   -- c/w Midodrine 5mg TID today and wean as able once HD initiated   -- DVT PPx - SCDs given hemophilia and thrombocytopenia  -- Dispo - home w/services once outpatient HD established     Amarilys Velasco  [phone]

## 2021-03-05 NOTE — PROGRESS NOTE ADULT - ASSESSMENT
per Internal Medicine    77 yo M PMH Hemophilia A, HTN, gout c/b allopurinol-induced DRESS Syndrome, CKD (on high dose steroids) who p/w acute on chronic renal failure c/b hemodynamically unstable Afib w RVR and shock c/b thrombocytopenia, requiring emergent initation of HD. Hospital course c/b MSSA bacteremia.     Problem/Plan - 1:  ·  Problem: Bacteremia.  Plan: #MSSA Bacteremia. Source unknown. Pt has L buttock ulcer which could be source. Gallium scan showed areas of activity in L buttock, as well as pericardium. Cultures ngtd since 3/1.  - continue with ancef 1g q24hr (2/25-)  -f/u 3/1 and 3/2 bcx   - ID recs appreciated.     Problem/Plan - 2:  ·  Problem: Acute renal failure.  Plan: Developed Dress Syndrome after allopurinol, and has been on high dose steroid therapy. Cr worsened with deranged electrolytes after being diagnosed with Dress Syndrome. last HD 2/26. Received total x4 HD sessions  -Per renal, will do THD cath tomorrow 3/5  -c/w renvela 800 TID  -c/w PO torsemide 10 mg BID   -renal recs appreciated   - strict Is/Os, daily weights, renal diet, renally dose meds.     Problem/Plan - 3:  ·  Problem: Hemophilia A.  Plan: Prior history of significant bleeding in past following ERCP. Follows w Dr Grace ([facility]).  -Heme following - follow recs   -Daily INR/PT/PTT and daily Factor VIII   -active T&S (3/3)   -f/u Dr Grace outpt     #folic acid deficiency - pt found to be folate deficient  -c/w 1 mg folic acid qd     #Anemia - Hgb 11.5 on arrival with unclear baseline  - Like 2/2 acute kidney failure or dilutional due to volume overload.     Problem/Plan - 4:  ·  Problem: Pericarditis.  Plan: #Uremic pericarditis - BUN >100 on presentation, initiating need to HD. EKG with diffuse ST elevated and CT depressions c/w acute pericarditis. Gallium scan shows increased uptake in pericardium, consistent with pericarditis. No current CP. Echo showed small pericardial effusion, repeat TTE on 3/3 shows trivial pericardial effusion with no increased size. Cards and CTSG say no intervention or changes in mgmt on pericarditis/pericardial fluid  -cards consulted, recs appreciated  -CTSG consulted for possible culture of fluid , recs appreciated   - repeat EKG for any chest pain.     Problem/Plan - 5:  ·  Problem: Blood pressure instability.  Plan: During first episode of dialysis over 1 week ago, pt became hypotensive, requiring transfer to MICU for dialysis. Tolerated dialysis 1x on pressors and 1x off. BP well controlled on midodrine.  -reduce midodrine to 5 mg TID, taper as clinically appropriate.     Problem/Plan - 6:  Problem: HTN (hypertension). Plan: -holding home norvasc in setting of normotension     #Dress Syndrome  - Currently taking Prednisone 60 mg with improvement in rash   - c/w prednisone 60 mg daily. Plan on taper by reducing prednisone by 10 mg each week (50 mg for 7 days, 40 mg for 7 days, etc)   -oupt followup.    Problem/Plan - 7:  ·  Problem: Tachycardia.  Plan: #Tachycardia/ Afib w/RVR - RESOLVED.   -had episode of hemodynamically unstable afib rvr, requiring 1-day stay in MICU; resolved and has not had any further episodes  - hold on anticoagulation given bleeding risks.     Problem/Plan - 8:  ·  Problem: Ascites.  Plan: - Slightly distended abdomen on physical exam and CT showing ascitic fluid in abdomen  - Likely 2/2 acute renal failure and fluid shift  - Continue diuresis as above.     Problem/Plan - 9:  ·  Problem: BPH (benign prostatic hyperplasia).  Plan: #History of BPH  - Continue Doxazosin daily  - Monitor UOP and Bladder scan for any concerns of retention.    #Gout  Previously on allopurinol for gout prophylaxis; however, patient acquired DRESS from the therapy  - Monitor symptoms and avoid NSAIDs for pain.     Problem/Plan - 10:  Problem: Nutrition, metabolism, and development symptoms. Plan; F: none   E: Lokelma for K >5.5;  N: Renal diet  DVT: SCDs, no AC given thrombocytopenia and hemophilia   GI: none.  Dispo: [facility].    Attending Attestation:   Patient was seen and examined with the resident team today.  I agree with Dr. Thomas's assessment and plan with the following exceptions/additions:     Briefly, this is a 77yo gentleman with a PMH of Hemophilia A, HTN, Gout c/b allopurinol-induced DRESS Syndrome and CKD (on high-dose steroids) who p/w acute on chronic renal failure c/b HD unstable Afib w/RVR and shock c/b thrombocytopenia, requiring emergent initiation of HD.  Hospital course c/b MSSA bacteremia with slow clearance of cultures.  Transferred to the [facility] on 3/1 for ongoing volume overload and coordination of outpatient HD.  Notably, NM Gallium showing uptake in gluteal and pericardium - in light of exam and data, suspect pericardium to be inflammed/infected but not gluteal region.  Will wait overnight to place TDC to watch 3/1 cultures and will likely eduar 6 weeks of IV abx.     -- Cefazolin as per ID w/HD; upon discharge 2g/2g/3g after HD  -- c/w PO diuretic; would not change as labs suggest contract alkalosis and BUN very high   -- TDC tomorrow if bcx negative by the morning    -- per family, patient did not have someone managing his high-dose steroids as an outpatient; will need to coordinate this taper and ensure follow-up   -- PT and OOB to chair as able   -- decreased Midodrine to 5mg TID today   -- on SCDs given hemophilia and thrombocytopenia  -- Dispo - family prefers home w/outpatient HD and home PT (daughter Char very involved)

## 2021-03-05 NOTE — PROGRESS NOTE ADULT - ASSESSMENT
76M PMH Hemophilia A, HTN, gout c/b allopurinol-induced DRESS Syndrome, CKD (on high dose steroids) who p/w acute on chronic renal failure c/b hemodynamically unstable Afib w RVR and shock c/b thrombocytopenia, requiring emergent initation of HD. Hospital course c/b MSSA bacteremia, now pending permacath placement.

## 2021-03-05 NOTE — PROGRESS NOTE ADULT - SUBJECTIVE AND OBJECTIVE BOX
CC: ACUTE KIDNEY INJURY        INTERVAL HISTORY: sitting in chair in NAD      ROS: No chest pain, no sob, no abd pain. No n/v/d    PAST MEDICAL & SURGICAL HISTORY:  History of BPH    Gout    Hemophilia A    HTN (hypertension)    Uses cochlear implant        PHYSICAL EXAM:  T(C): 36.9 (03-05-21 @ 06:16), Max: 37.1 (03-04-21 @ 14:50)  HR: 80 (03-05-21 @ 06:16)  BP: 126/60 (03-05-21 @ 06:16) (126/60 - 138/63)  RR: 18 (03-05-21 @ 06:16)  SpO2: 93% (03-05-21 @ 06:16)  Wt(kg): --  I&O's Summary    Weight   General:   NAD.  HEENT: moist mucous membranes, no pallor/cyanosis.  Neck: no JVD visible.  Cardiac: S1, S2. RRR. No murmurs   Respratory: CTA b/l, no access muscle use.   Abdomen: soft. nontender. nondistended  Skin: no rashes.  Extremities: + LE edema b/l  Access:       DATA:                        8.6<L>  11.54<H> )-----------( 123<L>    ( 05 Mar 2021 07:14 )             27.0<L>    Ferritin, Serum: 730 ng/mL <H> (03-02 @ 07:57)      142    |  99     |  136<H>  ----------------------------<  113<H>  Ca:9.1   (05 Mar 2021 07:14)  4.7     |  29     |  4.97<H>      eGFR if Non : 11 <L>  eGFR if : 12 <L>                        MEDICATIONS  (STANDING):  ceFAZolin   IVPB 1000 milliGRAM(s) IV Intermittent every 24 hours  doxazosin 4 milliGRAM(s) Oral daily  folic acid 1 milliGRAM(s) Oral daily  midodrine. 5 milliGRAM(s) Oral every 8 hours  nystatin Cream 1 Application(s) Topical every 12 hours  pantoprazole    Tablet 40 milliGRAM(s) Oral before breakfast  petrolatum white Ointment 1 Application(s) Topical every 24 hours  polyethylene glycol 3350 17 Gram(s) Oral daily  PPD  5 Tuberculin Unit(s) Injectable 5 Unit(s) IntraDermal once  predniSONE   Tablet 60 milliGRAM(s) Oral daily  senna 2 Tablet(s) Oral at bedtime  sevelamer carbonate 800 milliGRAM(s) Oral three times a day with meals  sodium chloride 0.65% Nasal 1 Spray(s) Both Nostrils two times a day  torsemide 10 milliGRAM(s) Oral two times a day    MEDICATIONS  (PRN):

## 2021-03-05 NOTE — PROGRESS NOTE ADULT - SUBJECTIVE AND OBJECTIVE BOX
infectious diseases progress note:  YAIR FELDMAN is a 76yMale patient    Overnight: No acute events    Interval history: Patient seen and examined at bedside. Patient feeling well today. Patient denies fever, chills, fatigue, headache, lightheadedness, vision changes, CP, palpitations, SOB, cough, N/V, abdominal pain.    ACUTE KIDNEY INJURY      Blood pressure instability    ESRD (end stage renal disease)    BPH (benign prostatic hyperplasia)    Ascites    Shock    Pericarditis    Tachycardia    Bacteremia    Acute renal failure    Metabolic acidosis    DRESS syndrome    Acute kidney injury    Nutrition, metabolism, and development symptoms    Hemophilia A    History of BPH    Gout    HTN (hypertension)    Other ascites    Pericardial effusion    Hyperkalemia    Acute renal failure superimposed on stage 5 chronic kidney disease, not on chronic dialysis, unspecified acute renal failure type        ROS:  CONSTITUTIONAL:  Negative fever or chills, feels well  EYES:  Negative blurry vision or double vision  CARDIOVASCULAR:  Negative for chest pain or palpitations  RESPIRATORY:  Negative for cough, wheezing, or SOB   GASTROINTESTINAL:  Negative for nausea, vomiting, diarrhea or abdominal pain  GENITOURINARY:  Negative frequency, urgency or dysuria  NEUROLOGIC:  No headache, confusion, dizziness, lightheadedness    Allergies    allopurinol (Other)    Intolerances        ANTIBIOTICS/RELEVANT:  antimicrobials  ceFAZolin   IVPB 1000 milliGRAM(s) IV Intermittent every 24 hours    immunologic:  PPD  5 Tuberculin Unit(s) Injectable 5 Unit(s) IntraDermal once    OTHER:  doxazosin 4 milliGRAM(s) Oral daily  folic acid 1 milliGRAM(s) Oral daily  midodrine. 5 milliGRAM(s) Oral every 8 hours  nystatin Cream 1 Application(s) Topical every 12 hours  pantoprazole    Tablet 40 milliGRAM(s) Oral before breakfast  petrolatum white Ointment 1 Application(s) Topical every 24 hours  polyethylene glycol 3350 17 Gram(s) Oral daily  predniSONE   Tablet 60 milliGRAM(s) Oral daily  senna 2 Tablet(s) Oral at bedtime  sevelamer carbonate 800 milliGRAM(s) Oral three times a day with meals  sodium chloride 0.65% Nasal 1 Spray(s) Both Nostrils two times a day  torsemide 10 milliGRAM(s) Oral two times a day      Objective:  Vital Signs Last 24 Hrs  T(C): 36.4 (05 Mar 2021 12:29), Max: 37.1 (04 Mar 2021 14:50)  T(F): 97.5 (05 Mar 2021 12:29), Max: 98.7 (04 Mar 2021 14:50)  HR: 103 (05 Mar 2021 12:29) (80 - 103)  BP: 111/65 (05 Mar 2021 12:29) (111/65 - 138/63)  BP(mean): --  RR: 18 (05 Mar 2021 12:29) (17 - 18)  SpO2: 98% (05 Mar 2021 12:29) (93% - 98%)    PHYSICAL EXAM:  General: NAD, laying comfortably in bed   Skin: Extremities with dry scales with no signs of warmth, purulence, drainage.   HEENT: Conjunctiva clear, sclera white, PERRLA  Cardiac: S1 and S1 clear. No murmurs, rubs or gallops  Lungs: Unlabored breathing, no accessory muscle use, vesicular b/l   Abdomen: Distended, soft, non-tender, normoactive BS in all four quadrants  Peripheral extremities: 1+ pitting edema in lower extremities b/l, 2+ DP and PT pulses b/l   Neurological: A&Ox3        LABS:                        8.6    11.54 )-----------( 123      ( 05 Mar 2021 07:14 )             27.0     03-05    142  |  99  |  136<H>  ----------------------------<  113<H>  4.7   |  29  |  4.97<H>    Ca    9.1      05 Mar 2021 07:14  Phos  5.4     03-05  Mg     2.2     03-05      PT/INR - ( 05 Mar 2021 07:14 )   PT: 14.0 sec;   INR: 1.18          PTT - ( 05 Mar 2021 07:14 )  PTT:36.0 sec        MICROBIOLOGY:        RADIOLOGY & ADDITIONAL STUDIES:

## 2021-03-05 NOTE — PROGRESS NOTE ADULT - ATTENDING COMMENTS
I saw and evaluated the patient on the above date of service and discussed the care with the resident. I agree with the findings and plan as documented in the note above except for the following:      Recommend 6 weeks of IV cefazolin to treat complicated MSSA bacteremia with possible pericardium infection per NM scan (no vegetation on GUANAKO).  Patient will be on dialysis three times/week upon discharge - getting HD cath today.  Cont current cefazolin in-house.    - Discharge patient with cefazolin 2g/2g/3g post-HD three times/wee   - Duration of antibiotics is 6 weeks ( 3/1 - 4/12 )  - Weekly labs: CMP, CBC, ESR, CRP faxed to ID office at [phone]  - Patient to follow up with Dr. barnard in 2 weeks ([address] [place] [address], [phone]), ID office will call patient to schedule     Thank you for your consult.  Please re-consult us or call us with questions.    Joyce Barnard MD, MS  Infectious disease attending  Work cell [phone]

## 2021-03-05 NOTE — PROGRESS NOTE ADULT - PROBLEM SELECTOR PLAN 1
#MSSA Bacteremia. Source unknown. Gallium scan showed areas of activity in L buttock, as well as pericardium. Cultures ngtd since 3/1.  - continue with ancef 1g q24hr (2/25-)  -f/u 3/1 and 3/2 bcx   - ID recs appreciated

## 2021-03-05 NOTE — CONSULT NOTE ADULT - ASSESSMENT
Assessment: 76M PMH Hemophilia A, HTN, gout c/b allopurinol-induced DRESS Syndrome, CKD (on high dose steroids) who p/w acute on chronic renal failure c/b hemodynamically unstable Afib w RVR and shock c/b thrombocytopenia, requiring emergent initiation of HD. Hospital course c/b MSSA bacteremia. Now for dialysis catheter placement. Per primary team and renal, pt needed to have negative blood cultures x3 days; negative since 3/1/21. Team decided to pursue tunneled catheter on 3/4/21 but pt was not NPO. Plan for 3/5/21 after pt receives factor viii infusion.       Recommendations: NPO at midnight prior to procedure with sedation.      Communicated with: primary team

## 2021-03-05 NOTE — PROGRESS NOTE ADULT - PROBLEM SELECTOR PLAN 3
Prior history of significant bleeding in past following ERCP. Follows w Dr Grace ([facility]).  -Heme following - follow recs   -Daily INR/PT/PTT and daily Factor VIII   -active T&S (3/5)   -f/u Dr Grace outpt     #folic acid deficiency - pt found to be folate deficient  -c/w 1 mg folic acid qd     #Anemia - Hgb 11.5 on arrival with unclear baseline. Currently Hgb 8.5, stable, no signs/symptoms of bleeding.  - active T&S (3/5)

## 2021-03-05 NOTE — PROGRESS NOTE ADULT - ASSESSMENT
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 Patient currently on cefazolin 1g q 24hrs which was chosen over Nafcillin in the setting of severe fluid overload since Cefazolin is administered with less fluid. Source of MSSA unknown. Possible source from skin as patient is immunocompromised from chronic steroids and broke skin barrier scratching the DRESS syndrome rash which created multiple lesions. Unlikely lung source since no cough, sputum production and CXR does not show any lung field opacities. Prior SOB and prior need for 3L NC likely due to pleural effusion noted on CXR in setting of severe fluid overload secondary to CKD. Weakly positive UA, however unlikely  source as patient does not have urinary symptoms and staph aureus usually does not commonly arise from urinary sources. Patient afebrile with WBC count of 11.54 (3/5). Surveillance blood cultures from 2/22, 2/23, 2/26 and 2/28 grew MSSA. Surveillance blood cultures from 3/1 and 3/4 have no growth to date. TTE (2/22) showed no valvular disease and GUANAKO (2/26) had no vegetations on heart valves.  Gallium scan (3/2) shows uptake in pericardium without uptake in pleural fluid which is concerning for seeding of MSSA of the pericardium especially since patient was bacteremic for a minimum of eight days. 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 Gallium scan (3/2) also shows two focal areas in left buttock that could be a skin lesion or abscess. There is no obvious superficial infection or palpable induration or fluctuance and US on 3/3 shows no fluid collection of the area. Per radiology the uptake in the left buttock could be attributed to dye that attached to stool.     Plan:  - C/w cefazolin 1g IV q 24hrs while inpatient  - Transition cefazolin to dosing after HD on discharge: 2g, 2g, 3g 3x weekly - Treatment duration is from first negative culture on 3/1 and last day of treatment is on 4/12  - F/u surveillance cultures 3/4 and 3/1  - F/u outpatient with Dr. Barnard in 2 weeks    ID Team 1 will sign off. Please reconsult if you require further ID assistance.     Discussed with Infectious Disease Attending Dr. Barnard. Recommendations are considered final after attending attestation.   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 Patient currently on cefazolin 1g q 24hrs which was chosen over Nafcillin in the setting of severe fluid overload since Cefazolin is administered with less fluid. Source of MSSA unknown. Possible source from skin as patient is immunocompromised from chronic steroids and broke skin barrier scratching the DRESS syndrome rash which created multiple lesions. Unlikely lung source since no cough, sputum production and CXR does not show any lung field opacities. Prior SOB and prior need for 3L NC likely due to pleural effusion noted on CXR in setting of severe fluid overload secondary to CKD. Weakly positive UA, however unlikely  source as patient does not have urinary symptoms and staph aureus usually does not commonly arise from urinary sources. Patient afebrile with WBC count of 11.54 (3/5). Surveillance blood cultures from 2/22, 2/23, 2/26 and 2/28 grew MSSA. Surveillance blood cultures from 3/1 and 3/4 have no growth to date. TTE (2/22) showed no valvular disease and GUANAKO (2/26) had no vegetations on heart valves.  Gallium scan (3/2) shows uptake in pericardium without uptake in pleural fluid which is concerning for seeding of MSSA of the pericardium especially since patient was bacteremic for a minimum of eight days. 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 Gallium scan (3/2) also shows two focal areas in left buttock that could be a skin lesion or abscess. There is no obvious superficial infection or palpable induration or fluctuance and US on 3/3 shows no fluid collection of the area. Per radiology the uptake in the left buttock could be attributed to dye that attached to stool.     Plan:  - C/w cefazolin 1g IV q 24hrs while inpatient  - Transition cefazolin to dosing after HD on discharge: 2g, 2g, 3g 3x weekly - Treatment duration is from first negative culture on 3/1 and last day of treatment is on 4/12  - F/u surveillance cultures 3/4 and 3/1  - F/u outpatient with Dr. Barnard in 2 weeks    For discharge:  - Discharge patient with Cefazolin 2g, 2g, 3g 3x weekly dosed after HD  - Duration of antibiotics is 6 weeks ( 3/1 - 4/12 )  - Weekly labs: CMP, CBC, ESR, CRP faxed to ID office at [phone]  - Patient to follow up with Dr. Barnard in 2 weeks ([address] [place] [address], [phone]), ID office will call patient to schedule       ID Team 1 will sign off. Please reconsult if you require further ID assistance.     Discussed with Infectious Disease Attending Dr. Barnard. Recommendations are considered final after attending attestation.

## 2021-03-05 NOTE — PROGRESS NOTE ADULT - NUTRITIONAL ASSESSMENT
This patient has been assessed with a concern for Malnutrition and has been determined to have a diagnosis/diagnoses of Moderate protein-calorie malnutrition.    This patient is being managed with:   Diet NPO after Midnight-     NPO Start Date: 04-Mar-2021   NPO Start Time: 23:59  Entered: Mar  4 2021  4:10PM    Diet Renal Restrictions-  For patients receiving Renal Replacement - No Protein Restr No Conc K No Conc Phos Low Sodium  Dysphagia 3 Soft Thin Liquids ([id number])  Supplement Feeding Modality:  Oral  Nepro Cans or Servings Per Day:  1       Frequency:  Daily  Entered: Mar  4 2021 10:18AM    Diet Renal Restrictions-  For patients receiving Renal Replacement - No Protein Restr No Conc K No Conc Phos Low Sodium  Entered: Feb 25 2021 12:36PM    The following pending diet order is being considered for treatment of Moderate protein-calorie malnutrition:null

## 2021-03-05 NOTE — PROGRESS NOTE ADULT - PROBLEM SELECTOR PLAN 6
-holding home norvasc in setting of normotension     #Dress Syndrome  - Currently taking Prednisone 60 mg with improvement in rash   - c/w prednisone 60 mg daily. Plan on taper by reducing prednisone by 10 mg each week (50 mg for 7 days, 40 mg for 7 days, etc)   -outpt followup

## 2021-03-05 NOTE — PROGRESS NOTE ADULT - PROBLEM SELECTOR PLAN 4
#Uremic pericarditis - BUN >100 on presentation, initiating need to HD. EKG with diffuse ST elevated and MI depressions c/w acute pericarditis. Gallium scan shows increased uptake in pericardium, consistent with pericarditis. No current CP. Echo showed small pericardial effusion, repeat TTE on 3/3 shows trivial pericardial effusion with no increased size. Cards and CTSG say no intervention or changes in mgmt on pericarditis/pericardial fluid  - repeat EKG for any chest pain

## 2021-03-05 NOTE — PROGRESS NOTE ADULT - ASSESSMENT
76 year old male with a past medical history of CKD (recent Cr 6.6 at [facility]), Hemophilia A, HTN, Gout, and DRESS Syndrome who presents with weakness for 1 week. Mr. Don states that he was in his usual state of health (performing all ADLs) until January 1st when he had an acute gout attack. He was treated with allopurinol x 3 weeks where he developed a rash associated with decreased appetite and developed DRESS Syndrome  treatment with Prednisone was initiated in February and one week later developed RU from ? interstitial nephritis requiring hemodialysis

## 2021-03-05 NOTE — PROGRESS NOTE ADULT - PROBLEM SELECTOR PLAN 2
Developed Dress Syndrome after allopurinol, and has been on high dose steroid therapy. Cr worsened with deranged electrolytes after being diagnosed with Dress Syndrome. last HD 2/26. Received total x4 HD sessions  -THD cath planned for today, with subsequent dialysis today  -c/w renvela 800 TID  -c/w PO torsemide 10 mg BID   -renal recs appreciated   - strict Is/Os, daily weights, renal diet, renally dose meds

## 2021-03-05 NOTE — PROGRESS NOTE ADULT - PROBLEM SELECTOR PLAN 1
last hemodialysis was one week ago with no improvement in Scret since  for Permacath placement today to be followed by hemodialysis for UF ~1L and clearance  for Prednisone taper (decrease by 10mg/week)

## 2021-03-05 NOTE — PROGRESS NOTE ADULT - NUTRITIONAL ASSESSMENT
This patient has been assessed with a concern for Malnutrition and has been determined to have a diagnosis/diagnoses of Moderate protein-calorie malnutrition.    This patient is being managed with:   Diet Renal Restrictions-  For patients receiving Renal Replacement - No Protein Restr No Conc K No Conc Phos Low Sodium  Dysphagia 3 Soft Thin Liquids ([id number])  Supplement Feeding Modality:  Oral  Nepro Cans or Servings Per Day:  1       Frequency:  Daily  Entered: Mar  4 2021 10:18AM    Diet Renal Restrictions-  For patients receiving Renal Replacement - No Protein Restr No Conc K No Conc Phos Low Sodium  Entered: Feb 25 2021 12:36PM    The following pending diet order is being considered for treatment of Moderate protein-calorie malnutrition:null

## 2021-03-05 NOTE — PROGRESS NOTE ADULT - NUTRITIONAL ASSESSMENT
This patient has been assessed with a concern for Malnutrition and has been determined to have a diagnosis/diagnoses of Moderate protein-calorie malnutrition.    This patient is being managed with:   Diet NPO after Midnight-     NPO Start Date: 04-Mar-2021   NPO Start Time: 23:59  Entered: Mar  4 2021  4:10PM    Diet Renal Restrictions-  For patients receiving Renal Replacement - No Protein Restr No Conc K No Conc Phos Low Sodium  Dysphagia 3 Soft Thin Liquids ([id number])  Supplement Feeding Modality:  Oral  Nepro Cans or Servings Per Day:  1       Frequency:  Daily  Entered: Mar  4 2021 10:18AM    Diet Renal Restrictions-  For patients receiving Renal Replacement - No Protein Restr No Conc K No Conc Phos Low Sodium  Entered: Feb 25 2021 12:36PM    The following pending diet order is being considered for treatment of Moderate protein-calorie malnutrition:null

## 2021-03-05 NOTE — CONSULT NOTE ADULT - SUBJECTIVE AND OBJECTIVE BOX
76M PMH Hemophilia A, HTN, gout c/b allopurinol-induced DRESS Syndrome, CKD (on high dose steroids) who p/w acute on chronic renal failure c/b hemodynamically unstable Afib w RVR and shock c/b thrombocytopenia, requiring emergent initiation of HD. Hospital course c/b MSSA bacteremia. IR consulted for dialysis catheter placement.       Clinical History: ACUTE KIDNEY INJURY    FH: HTN (hypertension)    Handoff    MEWS Score    History of BPH    Gout    Hemophilia A    HTN (hypertension)    RU (acute kidney injury)    Blood pressure instability    ESRD (end stage renal disease)    BPH (benign prostatic hyperplasia)    Ascites    Shock    Pericarditis    Tachycardia    Bacteremia    Acute renal failure    Metabolic acidosis    DRESS syndrome    Acute kidney injury    Nutrition, metabolism, and development symptoms    Hemophilia A    History of BPH    Gout    HTN (hypertension)    Other ascites    Pericardial effusion    Hyperkalemia    Acute renal failure superimposed on stage 5 chronic kidney disease, not on chronic dialysis, unspecified acute renal failure type    Uses cochlear implant    WEAKNESS    SIRS of non-infectious origin w/o acute organ dysfunction    Hyperkalemia    Weakness    [email or web address]        Meds:ceFAZolin   IVPB 1000 milliGRAM(s) IV Intermittent every 24 hours  doxazosin 4 milliGRAM(s) Oral daily  folic acid 1 milliGRAM(s) Oral daily  midodrine. 5 milliGRAM(s) Oral every 8 hours  nystatin Cream 1 Application(s) Topical every 12 hours  pantoprazole    Tablet 40 milliGRAM(s) Oral before breakfast  petrolatum white Ointment 1 Application(s) Topical every 24 hours  polyethylene glycol 3350 17 Gram(s) Oral daily  PPD  5 Tuberculin Unit(s) Injectable 5 Unit(s) IntraDermal once  predniSONE   Tablet 60 milliGRAM(s) Oral daily  senna 2 Tablet(s) Oral at bedtime  sevelamer carbonate 800 milliGRAM(s) Oral three times a day with meals  sodium chloride 0.65% Nasal 1 Spray(s) Both Nostrils two times a day  torsemide 10 milliGRAM(s) Oral two times a day      Allergies:allopurinol (Other)        Labs:                           8.6    11.54 )-----------( 123      ( 05 Mar 2021 07:14 )             27.0     PT/INR - ( 05 Mar 2021 07:14 )   PT: 14.0 sec;   INR: 1.18          PTT - ( 05 Mar 2021 07:14 )  PTT:36.0 sec  03-05    142  |  99  |  136<H>  ----------------------------<  113<H>  4.7   |  29  |  4.97<H>    Ca    9.1      05 Mar 2021 07:14  Phos  5.4     03-05  Mg     2.2     03-05

## 2021-03-05 NOTE — PROGRESS NOTE ADULT - SUBJECTIVE AND OBJECTIVE BOX
Physical Medicine and Rehabilitation Progress Note:    Patient is a 76y old  Male who presents with a chief complaint of Weakness; Worsening CKD (05 Mar 2021 09:33)      HPI:  Mr. Don is a 76 year old male with a past medical history of CKD (recent Cr 6.6 at [facility]), Hemophilia A, HTN, Gout, and DRESS Syndrome who presents with weakness for 1 week. Mr. Don states that he was in his usual state of health (performing all ADLs) until January 1st when he had an acute gout attack. He was treated with allopurinol x 3 weeks where he developed a rash associated with decreased appetite and developed DRESS Syndrome. Subsequently, he was treated with prednisone 60 mg daily until February 7th. On February 14th, he was hospitalized at OSH for hyponatremia and RU (unclear Cr). He was discharged with nephrology follow up. Since that time, he has experienced increasing weakness and swelling in his stomach and legs. The swelling has made it difficult to ambulate due to pain. He denies any fevers/chills, nausea/vomiting, SOB or changes in bowel or urinary habits.     In the ED, he was afebrile (98.7 F), , /80, RR 18, and O2 saturation 95%. Labs were significant for WBC 28.30, Hgb 11.5, K 5.6, CO2 20  , Cr 6.59, albumin 2.7, alk phos 158, BNP 73837. CXR showed pleural effusion left greater than right with dependent edema consistent with volume overload. Imaging significant for a CT abdomen small to moderate bilateral pleural effusions, greater on the left. Mild pericardial effusion. Anasarca greater in left lower chest and trace ascites. LE dopplers negative for DVT. He was given Lasix 80 mg IV and Lokelma 10 g. He was admitted for acute on chronic renal failure and likely induction of hemodialysis.    (21 Feb 2021 03:38)                            8.6    11.54 )-----------( 123      ( 05 Mar 2021 07:14 )             27.0       03-05    142  |  99  |  136<H>  ----------------------------<  113<H>  4.7   |  29  |  4.97<H>    Ca    9.1      05 Mar 2021 07:14  Phos  5.4     03-05  Mg     2.2     03-05      Vital Signs Last 24 Hrs  T(C): 36.9 (05 Mar 2021 06:16), Max: 37.1 (04 Mar 2021 14:50)  T(F): 98.5 (05 Mar 2021 06:16), Max: 98.7 (04 Mar 2021 14:50)  HR: 80 (05 Mar 2021 06:16) (80 - 97)  BP: 126/60 (05 Mar 2021 06:16) (126/60 - 138/63)  BP(mean): --  RR: 18 (05 Mar 2021 06:16) (17 - 18)  SpO2: 93% (05 Mar 2021 06:16) (93% - 95%)    MEDICATIONS  (STANDING):  ceFAZolin   IVPB 1000 milliGRAM(s) IV Intermittent every 24 hours  doxazosin 4 milliGRAM(s) Oral daily  folic acid 1 milliGRAM(s) Oral daily  midodrine. 5 milliGRAM(s) Oral every 8 hours  nystatin Cream 1 Application(s) Topical every 12 hours  pantoprazole    Tablet 40 milliGRAM(s) Oral before breakfast  petrolatum white Ointment 1 Application(s) Topical every 24 hours  polyethylene glycol 3350 17 Gram(s) Oral daily  PPD  5 Tuberculin Unit(s) Injectable 5 Unit(s) IntraDermal once  predniSONE   Tablet 60 milliGRAM(s) Oral daily  senna 2 Tablet(s) Oral at bedtime  sevelamer carbonate 800 milliGRAM(s) Oral three times a day with meals  sodium chloride 0.65% Nasal 1 Spray(s) Both Nostrils two times a day  torsemide 10 milliGRAM(s) Oral two times a day    MEDICATIONS  (PRN):    Currently Undergoing Physical/ Occupational Therapy at bedside.    Functional Status Assessment:   3/4/2021    Therapeutic Interventions      Bed Mobility  Bed Mobility Training Sit-to-Supine: contact guard;  1 person assist;  verbal cues;  bed rails  Bed Mobility Training Supine-to-Sit: supervision;  verbal cues;  nonverbal cues (demo/gestures);  1 person assist;  bed rails  Bed Mobility Training Limitations: decreased ability to use legs for bridging/pushing;  decreased ability to use arms for pushing/pulling;  impaired postural control;  impaired balance;  decreased strength    Bed-Chair Transfer Training  Transfer Training Bed-to-Chair Transfer: minimum assist (75% patient effort);  1 person assist;  nonverbal cues (demo/gestures);  verbal cues;  rolling walker  Transfer Training Chair-to-Bed Transfer: minimum assist (75% patient effort);  1 person assist;  verbal cues;  nonverbal cues (demo/gestures);  rolling walker  Bed-to-Chair Transfer Training Transfer Safety Analysis: decreased balance;  decreased weight-shifting ability;  impaired postural control;  impaired balance;  decreased strength;  rolling walker    Sit-Stand Transfer Training  Transfer Training Sit-to-Stand Transfer: maximum assist (25% patient effort);  1 person assist;  nonverbal cues (demo/gestures);  verbal cues;  rolling walker  Transfer Training Stand-to-Sit Transfer: minimum assist (75% patient effort);  1 person assist;  nonverbal cues (demo/gestures);  verbal cues;  rolling walker  Sit-to-Stand Transfer Training Transfer Safety Analysis: decreased balance;  decreased weight-shifting ability;  decreased strength;  impaired balance;  impaired postural control;  rolling walker    Gait Training  Gait Training: contact guard;  Min A x 1 on one occasion 2/2 LOB backwards.;  1 person assist;  nonverbal cues (demo/gestures);  verbal cues;  rolling walker;  80 feet  Gait Analysis: 2-point gait   decreased valeria;  decreased step length;  decreased stride length;  decreased toe clearance;  decreased weight-shifting ability;  narrow step width;  impaired postural control;  impaired balance;  decreased strength;  decreased endurance;  rolling walker  Gait Number of Times:: x 1    Therapeutic Exercise  Therapeutic Exercise Detail: STS with Max A x 1 3x from chair           PM&R Impression: as above    Current Disposition Plan Recommendations:    subacute rehab placement

## 2021-03-05 NOTE — PROGRESS NOTE ADULT - PROBLEM SELECTOR PLAN 5
During first episode of dialysis over 1 week ago, pt became hypotensive, requiring transfer to MICU for dialysis. Tolerated dialysis 1x on pressors and 1x off. BP well controlled on midodrine.  -c/w 5 mg TID midodrine

## 2021-03-05 NOTE — PROGRESS NOTE ADULT - PROBLEM SELECTOR PLAN 10
F: none   E: Lokelma for K >5.5;  N: Renal diet  DVT: SCDs, no AC given thrombocytopenia and hemophilia   GI: none.  Dispo: [facility]

## 2021-03-05 NOTE — PROGRESS NOTE ADULT - SUBJECTIVE AND OBJECTIVE BOX
LENGTH OF HOSPITAL STAY: 12d    SUBJECTIVE: As per nurse had an episode of mild epistaxis this morning, resolved now. Denies any other bleeding episode. Received recombinant factor VIII replacement. Waiting for catheter placement.      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 Hematologist is Dr. Grace. s/p HD catheter placement (02/22), complicated by hypotension during dialysis and MSSA bacteremia.       PAST MEDICAL & SURGICAL HISTORY:  History of BPH  Gout  Hemophilia A  HTN (hypertension)  Uses cochlear implant    ALLERGIES:  allopurinol (Other)    MEDICATIONS:  MEDICATIONS  (STANDING):  ceFAZolin   IVPB 1000 milliGRAM(s) IV Intermittent every 24 hours  doxazosin 4 milliGRAM(s) Oral daily  folic acid 1 milliGRAM(s) Oral daily  midodrine. 5 milliGRAM(s) Oral every 8 hours  nystatin Cream 1 Application(s) Topical every 12 hours  pantoprazole    Tablet 40 milliGRAM(s) Oral before breakfast  petrolatum white Ointment 1 Application(s) Topical every 24 hours  polyethylene glycol 3350 17 Gram(s) Oral daily  PPD  5 Tuberculin Unit(s) Injectable 5 Unit(s) IntraDermal once  predniSONE   Tablet 60 milliGRAM(s) Oral daily  senna 2 Tablet(s) Oral at bedtime  sevelamer carbonate 800 milliGRAM(s) Oral three times a day with meals  sodium chloride 0.65% Nasal 1 Spray(s) Both Nostrils two times a day  torsemide 10 milliGRAM(s) Oral two times a day    MEDICATIONS  (PRN):    VITALS:   ICU Vital Signs Last 24 Hrs  T(C): 36.4 (05 Mar 2021 12:29), Max: 37.1 (04 Mar 2021 14:50)  T(F): 97.5 (05 Mar 2021 12:29), Max: 98.7 (04 Mar 2021 14:50)  HR: 103 (05 Mar 2021 12:29) (80 - 103)  BP: 111/65 (05 Mar 2021 12:29) (111/65 - 138/63)  RR: 18 (05 Mar 2021 12:29) (17 - 18)  SpO2: 98% (05 Mar 2021 12:29) (93% - 98%)      LABS:                                   8.6    11.54 )-----------( 123      ( 05 Mar 2021 07:14 )             27.0     03-05    142  |  99  |  136<H>  ----------------------------<  113<H>  4.7   |  29  |  4.97<H>    Ca    9.1      05 Mar 2021 07:14  Phos  5.4     03-05  Mg     2.2     03-05    PT/INR - ( 05 Mar 2021 07:14 )   PT: 14.0 sec;   INR: 1.18       PTT - ( 05 Mar 2021 07:14 )  PTT:36.0 sec    Factor VIII Assay (03.05.21 @ 07:14)   Factor VIII Assay: 37: The presence of direct thrombin inhibitors (argatroban, refludan) may   falsely decrease activity. %   Factor VIII Assay (03.04.21 @ 07:01)   Factor VIII Assay: 39%  Folate, Serum in AM (03.02.21 @ 19:59)   Folate, Serum: 2.5 ng/mL   Vitamin B12, Serum (03.02.21 @ 19:59)   Vitamin B12, Serum: 1212 pg/mL   Factor VIII Assay (03.02.21 @ 07:57)   Factor VIII Assay: 56: The presence of direct thrombin inhibitors (argatroban, refludan) may   falsely decrease activity. %   Ferritin, Serum in AM (03.02.21 @ 07:57)   Ferritin, Serum: 730 ng/mL  Iron with Total Binding Capacity in AM (03.02.21 @ 07:57)   Iron - Total Binding Capacity.: 151 ug/dL   % Saturation, Iron: 42 %   Iron Total, Serum: 63 ug/dL   Unsaturated Iron Binding Capacity: 88 ug/dL     RADIOLOGY:  Reviewed    PHYSICAL EXAM:  GEN: No acute distress  HEENT: NCAT, + dry blood noted in left nostril. No gum bleeding.   LUNGS: Clear to auscultation bilaterally   HEART: S1/S2 present. RRR.   ABD: Soft, non-tender, non-distended. Bowel sounds present  EXT: no edema  NEURO: AAOX3

## 2021-03-05 NOTE — PROGRESS NOTE ADULT - ASSESSMENT
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 Hematologist is Dr. Grace. s/p HD catheter placement (02/22), complicated by hypotension during dialysis and MSSA bacteremia.     #) DIAGNOSIS  - Hemophilia A, mild epistaxis.   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 No significant bleeding observed.    - Continue to follow-up with Factor VIII once daily due to possible procedures.  - Factor VIII level is 37% today. He is going for HD catheter placement today. Received recombinant factor VIII 3000 Units prior to procedure. Please check factor VIII level post procedure.   - Maintain active T+S, transfuse if Hb < 7 or symptomatic   - Follow-up CBC and coagulation panel (PT/PTT) once daily  - Upon discharge, can follow-up with Dr. Grace (Hematologist)    #) Mild coagulopathy, possible inhibitor. Factor VII deficiency   - Mixing study (02/21) performed for mild coagulopathy showed possible presence of a delayed inhibitor due to lack of correction with 2 hr incubation. Follow-up inhibitor assay.     #) Thrombocytopenia, possibly sepsis-related and dialysis membrane effect, improving  - Peripheral blood smear (02/23) showed platelet clumping and no schistocytes. PLASMIC score is 4 low-risk for TTP. HIT score is 3-4, low-intermediate risk. PFT-HIT Ab negative. SORAYA negative.    - Trend CBC with differential once daily  - Maintain active T+S, transfuse if platelet < 10K, or < 20K if febrile or <50K if bleeding.   - Hold pharmacologic DVT ppx if platelets consistently < 50 K    #) Anemia   - secondary to chronic renal disease  - B12 WNL, folate deficient, on folic acid 1mg qd  - Iron studies consistent with ACD    Discussed with Dr. 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 Hematologist is Dr. Grace. s/p HD catheter placement (02/22), complicated by hypotension during dialysis and MSSA bacteremia.     #) DIAGNOSIS  - Hemophilia A, mild epistaxis.   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 No significant bleeding observed.    - Continue to follow-up with Factor VIII once daily due to possible procedures.  - Factor VIII level is 37% today. He is going for HD catheter placement today. Received recombinant factor VIII 3000 Units prior to procedure. Please check factor VIII level post procedure.   - Given patient is at increased risk of bleeding from uremia and underlying hemophilia A. Would recommend giving 3000 units of recombinant factor VIII tomorrow 3/6. Can reassess further need based on bleeding and factor VIII level over the weekend.   - Goal is to keep factor VIII level >70-80%. To calculate the dosing for Factor VIII replacement, use the following formula: (Pt's weight x needed correction)/2.   - Maintain active T+S, transfuse if Hb < 7 or symptomatic   - Follow-up CBC and coagulation panel (PT/PTT) once daily  - Upon discharge, can follow-up with Dr. Grace (Hematologist)    #) Mild coagulopathy, possible inhibitor. Factor VII deficiency   - Mixing study (02/21) performed for mild coagulopathy showed possible presence of a delayed inhibitor due to lack of correction with 2 hr incubation. Follow-up inhibitor assay.     #) Thrombocytopenia, possibly sepsis-related and dialysis membrane effect, improving  - Peripheral blood smear (02/23) showed platelet clumping and no schistocytes. PLASMIC score is 4 low-risk for TTP. HIT score is 3-4, low-intermediate risk. PFT-HIT Ab negative. SORAYA negative.    - Trend CBC with differential once daily  - Maintain active T+S, transfuse if platelet < 10K, or < 20K if febrile or <50K if bleeding.   - Hold pharmacologic DVT ppx if platelets consistently < 50 K    #) Anemia   - secondary to chronic renal disease  - B12 WNL, folate deficient, on folic acid 1mg qd  - Iron studies consistent with ACD    Discussed with Dr. Fry

## 2021-03-06 DIAGNOSIS — N17.9 ACUTE KIDNEY FAILURE, UNSPECIFIED: ICD-10-CM

## 2021-03-06 LAB
ANION GAP SERPL CALC-SCNC: 11 MMOL/L — SIGNIFICANT CHANGE UP (ref 5–17)
APTT BLD: 28.6 SEC — SIGNIFICANT CHANGE UP (ref 27.5–35.5)
APTT BLD: 29.9 SEC — SIGNIFICANT CHANGE UP (ref 27.5–35.5)
BUN SERPL-MCNC: 95 MG/DL — HIGH (ref 7–23)
CALCIUM SERPL-MCNC: 8.7 MG/DL — SIGNIFICANT CHANGE UP (ref 8.4–10.5)
CHLORIDE SERPL-SCNC: 101 MMOL/L — SIGNIFICANT CHANGE UP (ref 96–108)
CO2 SERPL-SCNC: 31 MMOL/L — SIGNIFICANT CHANGE UP (ref 22–31)
CREAT SERPL-MCNC: 4.14 MG/DL — HIGH (ref 0.5–1.3)
CULTURE RESULTS: SIGNIFICANT CHANGE UP
FACT VIII ACT/NOR PPP: 109 % — SIGNIFICANT CHANGE UP (ref 51–138)
FACT VIII ACT/NOR PPP: 109 % — SIGNIFICANT CHANGE UP (ref 51–138)
FACT VIII ACT/NOR PPP: 92 % — SIGNIFICANT CHANGE UP (ref 51–138)
GLUCOSE SERPL-MCNC: 107 MG/DL — HIGH (ref 70–99)
HCT VFR BLD CALC: 24.3 % — LOW (ref 39–50)
HCT VFR BLD CALC: 25.2 % — LOW (ref 39–50)
HCT VFR BLD CALC: 25.9 % — LOW (ref 39–50)
HGB BLD-MCNC: 7.8 G/DL — LOW (ref 13–17)
HGB BLD-MCNC: 7.8 G/DL — LOW (ref 13–17)
HGB BLD-MCNC: 8.1 G/DL — LOW (ref 13–17)
INR BLD: 1.31 — HIGH (ref 0.88–1.16)
INR BLD: 1.37 — HIGH (ref 0.88–1.16)
LDH SERPL L TO P-CCNC: 202 U/L — SIGNIFICANT CHANGE UP (ref 50–242)
MAGNESIUM SERPL-MCNC: 2.1 MG/DL — SIGNIFICANT CHANGE UP (ref 1.6–2.6)
MCHC RBC-ENTMCNC: 27.9 PG — SIGNIFICANT CHANGE UP (ref 27–34)
MCHC RBC-ENTMCNC: 27.9 PG — SIGNIFICANT CHANGE UP (ref 27–34)
MCHC RBC-ENTMCNC: 28.2 PG — SIGNIFICANT CHANGE UP (ref 27–34)
MCHC RBC-ENTMCNC: 31 GM/DL — LOW (ref 32–36)
MCHC RBC-ENTMCNC: 31.3 GM/DL — LOW (ref 32–36)
MCHC RBC-ENTMCNC: 32.1 GM/DL — SIGNIFICANT CHANGE UP (ref 32–36)
MCV RBC AUTO: 86.8 FL — SIGNIFICANT CHANGE UP (ref 80–100)
MCV RBC AUTO: 89.3 FL — SIGNIFICANT CHANGE UP (ref 80–100)
MCV RBC AUTO: 91 FL — SIGNIFICANT CHANGE UP (ref 80–100)
NRBC # BLD: 0 /100 WBCS — SIGNIFICANT CHANGE UP (ref 0–0)
PHOSPHATE SERPL-MCNC: 4.6 MG/DL — HIGH (ref 2.5–4.5)
PLATELET # BLD AUTO: 84 K/UL — LOW (ref 150–400)
PLATELET # BLD AUTO: 90 K/UL — LOW (ref 150–400)
PLATELET # BLD AUTO: 99 K/UL — LOW (ref 150–400)
POTASSIUM SERPL-MCNC: 4.6 MMOL/L — SIGNIFICANT CHANGE UP (ref 3.5–5.3)
POTASSIUM SERPL-SCNC: 4.6 MMOL/L — SIGNIFICANT CHANGE UP (ref 3.5–5.3)
PROTHROM AB SERPL-ACNC: 15.5 SEC — HIGH (ref 10.6–13.6)
PROTHROM AB SERPL-ACNC: 16.2 SEC — HIGH (ref 10.6–13.6)
RBC # BLD: 2.77 M/UL — LOW (ref 4.2–5.8)
RBC # BLD: 2.8 M/UL — LOW (ref 4.2–5.8)
RBC # BLD: 2.9 M/UL — LOW (ref 4.2–5.8)
RBC # FLD: 13.7 % — SIGNIFICANT CHANGE UP (ref 10.3–14.5)
RBC # FLD: 13.7 % — SIGNIFICANT CHANGE UP (ref 10.3–14.5)
RBC # FLD: 13.9 % — SIGNIFICANT CHANGE UP (ref 10.3–14.5)
RETICS #: 11 K/UL — LOW (ref 25–125)
RETICS/RBC NFR: 0.4 % — LOW (ref 0.5–2.5)
SODIUM SERPL-SCNC: 143 MMOL/L — SIGNIFICANT CHANGE UP (ref 135–145)
SPECIMEN SOURCE: SIGNIFICANT CHANGE UP
WBC # BLD: 10.53 K/UL — HIGH (ref 3.8–10.5)
WBC # BLD: 10.7 K/UL — HIGH (ref 3.8–10.5)
WBC # BLD: 10.92 K/UL — HIGH (ref 3.8–10.5)
WBC # FLD AUTO: 10.53 K/UL — HIGH (ref 3.8–10.5)
WBC # FLD AUTO: 10.7 K/UL — HIGH (ref 3.8–10.5)
WBC # FLD AUTO: 10.92 K/UL — HIGH (ref 3.8–10.5)

## 2021-03-06 PROCEDURE — 99222 1ST HOSP IP/OBS MODERATE 55: CPT

## 2021-03-06 PROCEDURE — 71045 X-RAY EXAM CHEST 1 VIEW: CPT | Mod: 26

## 2021-03-06 PROCEDURE — 99233 SBSQ HOSP IP/OBS HIGH 50: CPT | Mod: GC

## 2021-03-06 PROCEDURE — 90935 HEMODIALYSIS ONE EVALUATION: CPT

## 2021-03-06 RX ORDER — MIDODRINE HYDROCHLORIDE 2.5 MG/1
1 TABLET ORAL
Qty: 90 | Refills: 0
Start: 2021-03-06 | End: 2021-04-04

## 2021-03-06 RX ORDER — DESMOPRESSIN ACETATE 0.1 MG/1
25 TABLET ORAL ONCE
Refills: 0 | Status: DISCONTINUED | OUTPATIENT
Start: 2021-03-06 | End: 2021-03-06

## 2021-03-06 RX ORDER — FOLIC ACID 0.8 MG
1 TABLET ORAL
Qty: 30 | Refills: 0
Start: 2021-03-06 | End: 2021-04-04

## 2021-03-06 RX ORDER — CEFAZOLIN SODIUM 1 G
2 VIAL (EA) INJECTION
Qty: 1 | Refills: 0
Start: 2021-03-06

## 2021-03-06 RX ORDER — DOXAZOSIN MESYLATE 4 MG
1 TABLET ORAL
Qty: 0 | Refills: 0 | DISCHARGE
Start: 2021-03-06

## 2021-03-06 RX ORDER — SODIUM BICARBONATE 1 MEQ/ML
1 SYRINGE (ML) INTRAVENOUS
Qty: 0 | Refills: 0 | DISCHARGE

## 2021-03-06 RX ORDER — DOXAZOSIN MESYLATE 4 MG
1 TABLET ORAL
Qty: 0 | Refills: 0 | DISCHARGE

## 2021-03-06 RX ORDER — FUROSEMIDE 40 MG
1 TABLET ORAL
Qty: 0 | Refills: 0 | DISCHARGE

## 2021-03-06 RX ORDER — ERYTHROPOIETIN 10000 [IU]/ML
10000 INJECTION, SOLUTION INTRAVENOUS; SUBCUTANEOUS ONCE
Refills: 0 | Status: COMPLETED | OUTPATIENT
Start: 2021-03-06 | End: 2021-03-06

## 2021-03-06 RX ORDER — MIDODRINE HYDROCHLORIDE 2.5 MG/1
7.5 TABLET ORAL EVERY 8 HOURS
Refills: 0 | Status: DISCONTINUED | OUTPATIENT
Start: 2021-03-06 | End: 2021-03-09

## 2021-03-06 RX ORDER — SODIUM CHLORIDE 9 MG/ML
500 INJECTION INTRAMUSCULAR; INTRAVENOUS; SUBCUTANEOUS ONCE
Refills: 0 | Status: COMPLETED | OUTPATIENT
Start: 2021-03-06 | End: 2021-03-06

## 2021-03-06 RX ORDER — DESMOPRESSIN ACETATE 0.1 MG/1
25 TABLET ORAL ONCE
Refills: 0 | Status: COMPLETED | OUTPATIENT
Start: 2021-03-06 | End: 2021-03-06

## 2021-03-06 RX ORDER — ALBUMIN HUMAN 25 %
50 VIAL (ML) INTRAVENOUS ONCE
Refills: 0 | Status: COMPLETED | OUTPATIENT
Start: 2021-03-06 | End: 2021-03-06

## 2021-03-06 RX ORDER — AMLODIPINE BESYLATE 2.5 MG/1
1 TABLET ORAL
Qty: 0 | Refills: 0 | DISCHARGE

## 2021-03-06 RX ADMIN — ERYTHROPOIETIN 10000 UNIT(S): 10000 INJECTION, SOLUTION INTRAVENOUS; SUBCUTANEOUS at 17:19

## 2021-03-06 RX ADMIN — SEVELAMER CARBONATE 800 MILLIGRAM(S): 2400 POWDER, FOR SUSPENSION ORAL at 12:43

## 2021-03-06 RX ADMIN — SENNA PLUS 2 TABLET(S): 8.6 TABLET ORAL at 21:51

## 2021-03-06 RX ADMIN — DESMOPRESSIN ACETATE 225 MICROGRAM(S): 0.1 TABLET ORAL at 17:29

## 2021-03-06 RX ADMIN — Medication 50 MILLIGRAM(S): at 14:14

## 2021-03-06 RX ADMIN — POLYETHYLENE GLYCOL 3350 17 GRAM(S): 17 POWDER, FOR SOLUTION ORAL at 12:42

## 2021-03-06 RX ADMIN — SODIUM CHLORIDE 6000 MILLILITER(S): 9 INJECTION INTRAMUSCULAR; INTRAVENOUS; SUBCUTANEOUS at 17:36

## 2021-03-06 RX ADMIN — Medication 1 APPLICATION(S): at 14:30

## 2021-03-06 RX ADMIN — NYSTATIN CREAM 1 APPLICATION(S): 100000 CREAM TOPICAL at 07:39

## 2021-03-06 RX ADMIN — Medication 4 MILLIGRAM(S): at 07:38

## 2021-03-06 RX ADMIN — PANTOPRAZOLE SODIUM 40 MILLIGRAM(S): 20 TABLET, DELAYED RELEASE ORAL at 08:30

## 2021-03-06 RX ADMIN — Medication 1 SPRAY(S): at 08:34

## 2021-03-06 RX ADMIN — SEVELAMER CARBONATE 800 MILLIGRAM(S): 2400 POWDER, FOR SUSPENSION ORAL at 07:42

## 2021-03-06 RX ADMIN — MIDODRINE HYDROCHLORIDE 7.5 MILLIGRAM(S): 2.5 TABLET ORAL at 21:51

## 2021-03-06 RX ADMIN — Medication 50 MILLILITER(S): at 10:23

## 2021-03-06 RX ADMIN — MIDODRINE HYDROCHLORIDE 5 MILLIGRAM(S): 2.5 TABLET ORAL at 14:13

## 2021-03-06 RX ADMIN — NYSTATIN CREAM 1 APPLICATION(S): 100000 CREAM TOPICAL at 19:08

## 2021-03-06 RX ADMIN — Medication 100 MILLIGRAM(S): at 21:50

## 2021-03-06 RX ADMIN — Medication 1 MILLIGRAM(S): at 12:42

## 2021-03-06 RX ADMIN — Medication 1 SPRAY(S): at 19:08

## 2021-03-06 RX ADMIN — Medication 10 MILLIGRAM(S): at 07:38

## 2021-03-06 NOTE — CONSULT NOTE ADULT - SUBJECTIVE AND OBJECTIVE BOX
YAIR FELDMAN  76y  Male    76M PMH Hemophilia A, HTN, gout c/b allopurinol-induced DRESS Syndrome, CKD (on high dose steroids) who p/w acute on chronic renal failure c/b hemodynamically unstable Afib w RVR and shock c/b thrombocytopenia, requiring emergent initation of HD. Hospital course c/b MSSA bacteremia. Pt is s/p HD today, that was stopped early for hypotension noted at HD unit. Critical care consult called for hypotension SBP in the 80's. Exam performed at [facility]. ROS negative for denying dizziness, chest pain, SOB, n/v/c/d, numbness, or tingling. PE remarkable for A&Ox3, no diaphoresis, RRR, CTABL on room air, abdomen benign, LE without edema. Patient was given NS bolus and 1U pRBC by primary team. Repeat exam remarkable for SBP in 120's, HR 90's NSR on monitor. Rectal temp nonfebrile.           PAST MEDICAL/SURGICAL HISTORY  PAST MEDICAL & SURGICAL HISTORY:  History of BPH    Gout    Hemophilia A    HTN (hypertension)    Uses cochlear implant        REVIEW OF SYSTEMS:  CONSTITUTIONAL: No fever, weight loss, or fatigue  EYES: No eye pain, visual disturbances, or discharge  ENMT:  No difficulty hearing, tinnitus, vertigo; No sinus or throat pain  NECK: No pain or stiffness  BREASTS: No pain, masses, or nipple discharge  RESPIRATORY: No cough, wheezing, chills or hemoptysis; No shortness of breath  CARDIOVASCULAR: No chest pain, palpitations, dizziness, or leg swelling  GASTROINTESTINAL: No abdominal or epigastric pain. No nausea, vomiting, or hematemesis; No diarrhea or constipation. No melena or hematochezia.  GENITOURINARY: No dysuria, frequency, hematuria, or incontinence  NEUROLOGICAL: No headaches, memory loss, loss of strength, numbness, or tremors  SKIN: No itching, burning, rashes, or lesions   LYMPH NODES: No enlarged glands  ENDOCRINE: No heat or cold intolerance; No hair loss  MUSCULOSKELETAL: No joint pain or swelling; No muscle, back, or extremity pain  PSYCHIATRIC: No depression, anxiety, mood swings, or difficulty sleeping  HEME/LYMPH: No easy bruising, or bleeding gums  ALLERY AND IMMUNOLOGIC: No hives or eczema    T(C): 36.6 (03-06-21 @ 20:43), Max: 36.9 (03-06-21 @ 09:20)  HR: 98 (03-06-21 @ 20:43) (94 - 112)  BP: 121/68 (03-06-21 @ 20:43) (97/55 - 128/54)  RR: 18 (03-06-21 @ 20:43) (16 - 19)  SpO2: 98% (03-06-21 @ 20:43) (95% - 98%)  Wt(kg): --Vital Signs Last 24 Hrs  T(C): 36.6 (06 Mar 2021 20:43), Max: 36.9 (06 Mar 2021 09:20)  T(F): 97.9 (06 Mar 2021 20:43), Max: 98.5 (06 Mar 2021 09:20)  HR: 98 (06 Mar 2021 20:43) (94 - 112)  BP: 121/68 (06 Mar 2021 20:43) (97/55 - 128/54)  BP(mean): --  RR: 18 (06 Mar 2021 20:43) (16 - 19)  SpO2: 98% (06 Mar 2021 20:43) (95% - 98%)    PHYSICAL EXAM:  GENERAL: NAD, well-groomed, well-developed  HEAD:  Atraumatic, Normocephalic  EYES: EOMI, PERRLA, conjunctiva and sclera clear  ENMT: No tonsillar erythema, exudates, or enlargement; Moist mucous membranes, Good dentition, No lesions  NECK: Supple, No JVD, Normal thyroid  NERVOUS SYSTEM:  Alert & Oriented X3, Good concentration; Motor Strength 5/5 B/L upper and lower extremities; DTRs 2+ intact and symmetric  CHEST/LUNG: Clear to percussion bilaterally; No rales, rhonchi, wheezing, or rubs  HEART: Regular rate and rhythm; No murmurs, rubs, or gallops  ABDOMEN: Soft, Nontender, Nondistended; Bowel sounds present  EXTREMITIES:  2+ Peripheral Pulses, No clubbing, cyanosis, or edema  LYMPH: No lymphadenopathy noted  SKIN: No rashes or lesions    Consultant(s) Notes Reviewed:  [x ] YES  [ ] NO  Care Discussed with Consultants/Other Providers [ x] YES  [ ] NO    LABS:  CBC   03-06-21 @ 18:32  Hematcorit 25.2  Hemoglobin 7.8  Mean Cell Hemoglobin 28.2  Platelet Count-Automated 90  RBC Count 2.77  Red Cell Distrib Width 13.7  Wbc Count 10.92  03-06-21 @ 15:39  Hematcorit 25.9  Hemoglobin 8.1  Mean Cell Hemoglobin 27.9  Platelet Count-Automated 84  RBC Count 2.90  Red Cell Distrib Width 13.7  Wbc Count 10.70  03-06-21 @ 08:05  Hematcorit 24.3  Hemoglobin 7.8  Mean Cell Hemoglobin 27.9  Platelet Count-Automated 99  RBC Count 2.80  Red Cell Distrib Width 13.9  Wbc Count 10.53      BMP  03-06-21 @ 08:05  Anion Gap. Serum 11  Blood Urea Nitrogen,Serm 95  Calcium, Total Serum 8.7  Carbon Dioxide, Serum 31  Chloride, Serum 101  Creatinine, Serum 4.14  eGFR in  15  eGFR in Non Afican American 13  Gloucose, serum 107  Potassium, Serum 4.6  Sodium, Serum 143              03-05-21 @ 07:14  Anion Gap. Serum 14  Blood Urea Nitrogen,Serm 136  Calcium, Total Serum 9.1  Carbon Dioxide, Serum 29  Chloride, Serum 99  Creatinine, Serum 4.97  eGFR in  12  eGFR in Non Afican American 11  Gloucose, serum 113  Potassium, Serum 4.7  Sodium, Serum 142              03-04-21 @ 07:01  Anion Gap. Serum 12  Blood Urea Nitrogen,Serm 131  Calcium, Total Serum 8.8  Carbon Dioxide, Serum 32  Chloride, Serum 97  Creatinine, Serum 5.27  eGFR in  11  eGFR in Non Afican American 10  Gloucose, serum 116  Potassium, Serum 4.5  Sodium, Serum 141                  CMP  03-06-21 @ 08:05  Sally Aminotransferase(ALT/SGPT)--  Albumin, Serum --  Alkaline Phosphatase, Serum --  Anion Gap, Serum 11  Aspartate Aminotransferase (AST/SGOT)--  Bilirubin Total, Serum --  Blood Urea Nitrogen, Serum 95  Calcium,Total Serum 8.7  Carbon Dioxide, Serum 31  Chloride, Serum 101  Creatinine, Serum 4.14  eGFR if  15  eGFR if Non African American 13  Glucose, Serum 107  Potassium, Serum 4.6  Protein Total, Serum --  Sodium, Serum 143                      03-05-21 @ 07:14  Sally Aminotransferase(ALT/SGPT)--  Albumin, Serum --  Alkaline Phosphatase, Serum --  Anion Gap, Serum 14  Aspartate Aminotransferase (AST/SGOT)--  Bilirubin Total, Serum --  Blood Urea Nitrogen, Serum 136  Calcium,Total Serum 9.1  Carbon Dioxide, Serum 29  Chloride, Serum 99  Creatinine, Serum 4.97  eGFR if  12  eGFR if Non African American 11  Glucose, Serum 113  Potassium, Serum 4.7  Protein Total, Serum --  Sodium, Serum 142                      03-04-21 @ 07:01  Sally Aminotransferase(ALT/SGPT)--  Albumin, Serum --  Alkaline Phosphatase, Serum --  Anion Gap, Serum 12  Aspartate Aminotransferase (AST/SGOT)--  Bilirubin Total, Serum --  Blood Urea Nitrogen, Serum 131  Calcium,Total Serum 8.8  Carbon Dioxide, Serum 32  Chloride, Serum 97  Creatinine, Serum 5.27  eGFR if  11  eGFR if Non African American 10  Glucose, Serum 116  Potassium, Serum 4.5  Protein Total, Serum --  Sodium, Serum 141                          PT/INR  PT/INR  03-06-21 @ 18:32  INR 1.37  Prothrombin Time Comment --  Prothrobin Time, [id number]  PT/INR  03-06-21 @ 08:05  INR 1.31  Prothrombin Time Comment --  Prothrobin Time, [id number]  PT/INR  03-05-21 @ 07:14  INR 1.18  Prothrombin Time Comment --  Prothrobin Time, [id number]  PT/INR  03-04-21 @ 07:01  INR 1.31  Prothrombin Time Comment --  Prothrobin Time, [id number]      Amylase/Lipase            RADIOLOGY & ADDITIONAL TESTS:    Imaging Personally Reviewed:  [ ] YES  [ ] NO

## 2021-03-06 NOTE — PROGRESS NOTE ADULT - SUBJECTIVE AND OBJECTIVE BOX
O/N Events:  STEPHANIE overnight/ HDS afeb  Subjective:  seen and examined while on HD/ bp dropped from 120 to 80 mmhg/ pull was stopped   overall has no complaints, in no distress     VITALS  Vital Signs Last 24 Hrs  T(C): 36.9 (06 Mar 2021 14:39), Max: 36.9 (06 Mar 2021 09:20)  T(F): 98.4 (06 Mar 2021 14:39), Max: 98.5 (06 Mar 2021 09:20)  HR: 99 (06 Mar 2021 14:39) (84 - 99)  BP: 119/62 (06 Mar 2021 14:39) (116/56 - 130/42)  RR: 17 (06 Mar 2021 14:39) (17 - 18)  SpO2: 96% (06 Mar 2021 14:39) (95% - 97%)    PHYSICAL EXAM  General: A&Ox 3; NAD  Neck: Supple; no JVD  Respiratory: CTA B/L  Cardiovascular: Regular rhythm/rate; S1/S2  Gastrointestinal: Soft; NTND  Extremities: Warm, 2 + pitting edema up to knees b/l   Neurological: non focal   Access: RIJ HD cath c/d/i     MEDICATIONS  (STANDING):  ceFAZolin   IVPB 1000 milliGRAM(s) IV Intermittent every 24 hours  doxazosin 4 milliGRAM(s) Oral daily  epoetin jewel-epbx (RETACRIT) Injectable 50111 Unit(s) SubCutaneous once  folic acid 1 milliGRAM(s) Oral daily  midodrine. 5 milliGRAM(s) Oral every 8 hours  nystatin Cream 1 Application(s) Topical every 12 hours  pantoprazole    Tablet 40 milliGRAM(s) Oral before breakfast  petrolatum white Ointment 1 Application(s) Topical every 24 hours  polyethylene glycol 3350 17 Gram(s) Oral daily  PPD  5 Tuberculin Unit(s) Injectable 5 Unit(s) IntraDermal once  predniSONE   Tablet 50 milliGRAM(s) Oral daily  senna 2 Tablet(s) Oral at bedtime  sevelamer carbonate 800 milliGRAM(s) Oral three times a day with meals  sodium chloride 0.65% Nasal 1 Spray(s) Both Nostrils two times a day    MEDICATIONS  (PRN):      LABS                        8.1    10.70 )-----------( 84       ( 06 Mar 2021 15:39 )             25.9     03-06    143  |  101  |  95<H>  ----------------------------<  107<H>  4.6   |  31  |  4.14<H>    Ca    8.7      06 Mar 2021 08:05  Phos  4.6     03-06  Mg     2.1     03-06      PT/INR - ( 06 Mar 2021 08:05 )   PT: 15.5 sec;   INR: 1.31       PTT - ( 06 Mar 2021 08:05 )  PTT:28.6 sec

## 2021-03-06 NOTE — PROGRESS NOTE ADULT - PROBLEM SELECTOR PLAN 1
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  off HD for a while though decision was made to resume RRT given perisitent uremic symptoms and downtrending UOP   s/p 1st session HD 3/5 with 1L UF   - Sec session today will defer UF given soft bp and acceptable volume status  - please repeat CBC given sig downtrend for the past few days/ iron panel noted/ Hapto/LDH and retic count unremarkable for hemolysis Folate deficiency is being replaced   please administer [id number] sq Epo-->  - holding off on discharge for now awaiting repeat CBC also per heme needs Factor VIII level back prior to discharge but otherwise all set from HD stand point has an outpt HD unit with a spot on Monday  Will follow 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  off HD for a while though decision was made to resume RRT given perisistent uremic symptoms and downtrending UOP   s/p 1st session HD 3/5 with 1L UF   - Sec session today will defer UF given soft bp and acceptable volume status  - please repeat CBC given sig downtrend for the past few days/ iron panel noted/ Hapto/LDH and retic count unremarkable for hemolysis Folate deficiency is being replaced   please administer [id number] sq Epo-->  - holding off on discharge for now awaiting repeat CBC also per heme needs Factor VIII level back prior to discharge but otherwise all set from HD stand point has an outpt HD unit with a spot on Monday  Will follow

## 2021-03-06 NOTE — CONSULT NOTE ADULT - ATTENDING COMMENTS
see above
This patient was evaluated with the resident, management decisions were made, see above for the details, I agree with the A/p.  You may reconsult us as needed.  -hypotension, resolved  -ESRD on HD
Initial attending contact date 2/22/21     . See fellow note written above for details. I reviewed the fellow documentation. I have personally seen and examined this patient. I reviewed vitals, labs, medications, cardiac studies, and additional imaging. I agree with the above fellow's findings and plans as written above
I have reviewed the medical record, including laboratory and radiographic studies, interviewed and examined the patient and discussed the plan with Dr. Alberts, the ID Resident.  Agree with above.  I attempted to interview him with a [place] interpretor ([employer]) but he was not answering questions.  Will continue to follow with you – ID Team 1.

## 2021-03-06 NOTE — CONSULT NOTE ADULT - CONSULT REQUESTED DATE/TIME
06-Mar-2021 22:32
21-Feb-2021 13:46
22-Feb-2021 16:16
21-Feb-2021 14:30
22-Feb-2021 15:00
22-Feb-2021 17:10
25-Feb-2021 05:40
01-Mar-2021 12:17
05-Mar-2021 10:47

## 2021-03-06 NOTE — PROGRESS NOTE ADULT - SUBJECTIVE AND OBJECTIVE BOX
Patient is a 76y old  Male who presents with a chief complaint of Weakness; Worsening CKD (05 Mar 2021 14:22)      INTERVAL HPI/OVERNIGHT EVENTS:  Patient was seen and examined at bedside. As per nurse and patient, no o/n events, patient resting comfortably. No complaints at this time. Patient denies: fever, chills, dizziness, weakness, HA, Changes in vision, CP, palpitations, SOB, cough, N/V/D/C, dysuria, changes in bowel movements, LE edema.      PAST MEDICAL & SURGICAL HISTORY:  History of BPH    Gout    Hemophilia A    HTN (hypertension)    Uses cochlear implant        SOCIAL HISTORY  Alcohol:  Tobacco:  Illicit substance use:      FAMILY HISTORY:    REVIEW OF SYSTEMS:  CONSTITUTIONAL: No fever, weight loss, or fatigue  EYES: No eye pain, visual disturbances, or discharge  ENMT:  No difficulty hearing, tinnitus, vertigo; No sinus or throat pain  NECK: No pain or stiffness  RESPIRATORY: No cough, wheezing, chills or hemoptysis; No shortness of breath  CARDIOVASCULAR: No chest pain, palpitations, dizziness, or leg swelling  GASTROINTESTINAL: No abdominal or epigastric pain. No nausea, vomiting, or hematemesis; No diarrhea or constipation. No melena or hematochezia.  GENITOURINARY: No dysuria, frequency, hematuria, or incontinence  NEUROLOGICAL: No headaches, memory loss, loss of strength, numbness, or tremors  SKIN: No itching, burning, rashes, or lesions   LYMPH NODES: No enlarged glands  ENDOCRINE: No heat or cold intolerance; No hair loss  MUSCULOSKELETAL: No joint pain or swelling; No muscle, back, or extremity pain  PSYCHIATRIC: No depression, anxiety, mood swings, or difficulty sleeping  HEME/LYMPH: No easy bruising, or bleeding gums  ALLERY AND IMMUNOLOGIC: No hives or eczema    T(C): 36.7 (03-06-21 @ 11:50), Max: 36.9 (03-06-21 @ 09:20)  HR: 98 (03-06-21 @ 11:50) (84 - 98)  BP: 116/56 (03-06-21 @ 11:50) (116/56 - 142/68)  RR: 18 (03-06-21 @ 11:50) (18 - 18)  SpO2: 97% (03-06-21 @ 11:50) (95% - 97%)  Wt(kg): --  I&O's Summary    05 Mar 2021 07:01  -  06 Mar 2021 07:00  --------------------------------------------------------  IN: 0 mL / OUT: 1700 mL / NET: -1700 mL    06 Mar 2021 07:01  -  06 Mar 2021 13:27  --------------------------------------------------------  IN: 0 mL / OUT: 300 mL / NET: -300 mL        PHYSICAL EXAM:  GENERAL: NAD, well-groomed, well-developed  HEAD:  Atraumatic, Normocephalic  EYES: EOMI, PERRLA, conjunctiva and sclera clear  ENMT: No tonsillar erythema, exudates, or enlargement; Moist mucous membranes, Good dentition, No lesions  NECK: Supple, No JVD, Normal thyroid  NERVOUS SYSTEM:  Alert & Oriented X3, Good concentration; Motor Strength 5/5 B/L upper and lower extremities; DTRs 2+ intact and symmetric  CHEST/LUNG: Clear to percussion bilaterally; No rales, rhonchi, wheezing, or rubs  HEART: Regular rate and rhythm; No murmurs, rubs, or gallops  ABDOMEN: Soft, Nontender, Nondistended; Bowel sounds present  EXTREMITIES:  2+ Peripheral Pulses, No clubbing, cyanosis, or edema  LYMPH: No lymphadenopathy noted  SKIN: No rashes or lesions        LABS:                        7.8    10.53 )-----------( 99       ( 06 Mar 2021 08:05 )             24.3     03-06    143  |  101  |  95<H>  ----------------------------<  107<H>  4.6   |  31  |  4.14<H>    Ca    8.7      06 Mar 2021 08:05  Phos  4.6     03-06  Mg     2.1     03-06      PT/INR - ( 06 Mar 2021 08:05 )   PT: 15.5 sec;   INR: 1.31          PTT - ( 06 Mar 2021 08:05 )  PTT:28.6 sec    CAPILLARY BLOOD GLUCOSE                MEDICATIONS  (STANDING):  ceFAZolin   IVPB 1000 milliGRAM(s) IV Intermittent every 24 hours  doxazosin 4 milliGRAM(s) Oral daily  epoetin jewel-epbx (RETACRIT) Injectable 85776 Unit(s) SubCutaneous once  folic acid 1 milliGRAM(s) Oral daily  midodrine. 5 milliGRAM(s) Oral every 8 hours  nystatin Cream 1 Application(s) Topical every 12 hours  pantoprazole    Tablet 40 milliGRAM(s) Oral before breakfast  petrolatum white Ointment 1 Application(s) Topical every 24 hours  polyethylene glycol 3350 17 Gram(s) Oral daily  PPD  5 Tuberculin Unit(s) Injectable 5 Unit(s) IntraDermal once  predniSONE   Tablet 50 milliGRAM(s) Oral daily  senna 2 Tablet(s) Oral at bedtime  sevelamer carbonate 800 milliGRAM(s) Oral three times a day with meals  sodium chloride 0.65% Nasal 1 Spray(s) Both Nostrils two times a day  torsemide 10 milliGRAM(s) Oral two times a day    MEDICATIONS  (PRN):      RADIOLOGY & ADDITIONAL TESTS:    Imaging Personally Reviewed:  [ ] YES  [ ] NO    Consultant(s) Notes Reviewed:  [ ] YES  [ ] NO    Care Discussed with Consultants/Other Providers [ ] YES  [ ] NO

## 2021-03-06 NOTE — PROGRESS NOTE ADULT - ASSESSMENT
A/p  76 year old male with Hx of hemophilia/ HTN/ recent gout complicated by Allopurinol- induced DRESS syndrome admitted w/ severe RU attributed to AIN and started on HD

## 2021-03-06 NOTE — CONSULT NOTE ADULT - CONSULT REASON
Volume overload with concern for CHF
Rehab evaluation
Hemophilia A
Hypotension
MSSA bacteremia
hypotension
request for temporary vs tunneled dialysis catheter placement
RU

## 2021-03-06 NOTE — CONSULT NOTE ADULT - REASON FOR ADMISSION
Weakness; Worsening CKD

## 2021-03-06 NOTE — PROGRESS NOTE ADULT - ATTENDING COMMENTS
Dispo: pending DC to rehab Dispo: pending factor VIII level and further heme recs, otherwise DC to [facility]

## 2021-03-06 NOTE — PROVIDER CONTACT NOTE (OTHER) - ACTION/TREATMENT ORDERED:
ICU consult and Renal consult in; 500cc bolus given with improvement in BP, PRBCs ordered, 1U administered. Desmopressin given within 15min, bleeding stopped. Patient to remain on floor

## 2021-03-06 NOTE — PROGRESS NOTE ADULT - PROBLEM SELECTOR PLAN 3
Prior history of significant bleeding in past following ERCP. Follows w Dr Grace ([facility]).  -Heme following - follow recs   -Daily INR/PT/PTT and daily Factor VIII   -active T&S (3/5)   -f/u Dr Grace outpt     #folic acid deficiency - pt found to be folate deficient  -c/w 1 mg folic acid qd     #Anemia - Hgb 11.5 on arrival with unclear baseline. Currently Hgb 7.8, no signs/symptoms of bleeding.  - active T&S (3/5)  - to receive epo during HD Prior history of significant bleeding in past following ERCP. Follows w Dr Grace ([facility]).  -Heme following - follow recs   -Daily INR/PT/PTT and daily Factor VIII - may require factor VIII, therefore will follow up with heme re further recs  -active T&S (3/5)   -f/u Dr Grace outpt     #folic acid deficiency - pt found to be folate deficient  -c/w 1 mg folic acid qd     #Anemia - Hgb 11.5 on arrival with unclear baseline. Currently Hgb 7.8, no signs/symptoms of bleeding.  - active T&S (3/5)  - to receive epo during HD

## 2021-03-06 NOTE — CONSULT NOTE ADULT - ASSESSMENT
76M PMH Hemophilia A, HTN, gout c/b allopurinol-induced DRESS Syndrome, CKD (on high dose steroids) who p/w acute on chronic renal failure c/b hemodynamically unstable Afib w RVR and shock c/b thrombocytopenia, requiring emergent initiation of HD. Hospital course c/b MSSA bacteremia. Pt is s/p HD today, that was stopped early for hypotension noted at HD unit. Critical care consult called for hypotension SBP in the 80's.    # Hypotension  - hypotension s/p HD  - ROS and PE unremarkable  - pt now s/p NS bolus. Also 1U pRBC, there was initial concern for a bleed since pt is a hemophiliac   - Initially pt was SBP 80's 's, now s/p IVF is 's HR 90's NSR  - rectal temp afebrile  Recommendations  - hypotension likely 2/2 fluid balance 2/2 HD  - would monitor closely for signs of bleeding, would check a rectal exam  - if another episode of hypotension please check rectal temp, obtain STAT CBC. If febrile obtain full workup including: BCx, UA, UCx, CXR  - since pt is ESRD be cautious of fluid overload since IVF and pRBC were given. If any desaturation obtain STAT CXR and consider diuresis  - dispo: pt safe to remain on RMF    d/w ICU attending

## 2021-03-06 NOTE — PROGRESS NOTE ADULT - NUTRITIONAL ASSESSMENT
This patient has been assessed with a concern for Malnutrition and has been determined to have a diagnosis/diagnoses of Moderate protein-calorie malnutrition.    This patient is being managed with:   Diet Renal Restrictions-  For patients receiving Renal Replacement - No Protein Restr No Conc K No Conc Phos Low Sodium  Dysphagia 3 Soft Thin Liquids ([id number])  Supplement Feeding Modality:  Oral  Nepro Cans or Servings Per Day:  1       Frequency:  Daily  Entered: Mar  4 2021 10:18AM    Diet Renal Restrictions-  For patients receiving Renal Replacement - No Protein Restr No Conc K No Conc Phos Low Sodium  Entered: Feb 25 2021 12:36PM    The following pending diet order is being considered for treatment of Moderate protein-calorie malnutrition:null

## 2021-03-06 NOTE — PROGRESS NOTE ADULT - PROBLEM SELECTOR PLAN 1
With MSSA bacteremia  Cefazolin 2g, 2g, 3g 3x weekly dosed after HD  - Duration of antibiotics is 6 weeks ( 3/1 - 4/12 )  - ID recs appreciated, will need outpt follow up

## 2021-03-06 NOTE — PROVIDER CONTACT NOTE (OTHER) - BACKGROUND
Patient hx of hemophilia, got Factor VIII today in AM before HD; patient got dialyzed 2.5hr with drop in BP intradialysis and albumin given during HD session

## 2021-03-06 NOTE — PROGRESS NOTE ADULT - PROBLEM SELECTOR PLAN 2
HD session today   -c/w renvela 800 TID  -c/w PO torsemide 10 mg BID, confirm dose as outpt with renal   -renal recs appreciated   - strict Is/Os, daily weights, renal diet, renally dose meds HD session today   -c/w renvela 800 TID  -c/w PO torsemide 10 mg BID, confirm  if needed as outpt with renal   -renal recs appreciated   - strict Is/Os, daily weights, renal diet, renally dose meds

## 2021-03-06 NOTE — PROGRESS NOTE ADULT - ATTENDING COMMENTS
d/w primary team and hematology, pt to stay for Factor monitoring. Dialysis tolerated today w clearance an no volume removal. Tunneled catheter in place and has outpt HD unit already set up. Per heme ok to give epo, giving today for anemia

## 2021-03-06 NOTE — PROGRESS NOTE ADULT - PROBLEM SELECTOR PLAN 4
Cards and CTSG say no intervention or changes in mgmt on pericarditis/pericardial fluid  - repeat EKG for any chest pain

## 2021-03-06 NOTE — CONSULT NOTE ADULT - PROVIDER SPECIALTY LIST ADULT
Rehab Medicine
Critical Care
Intervent Radiology
Critical Care
Cardiology
Infectious Disease
Nephrology
Heme/Onc
Heme/Onc

## 2021-03-06 NOTE — PROGRESS NOTE ADULT - PROBLEM SELECTOR PLAN 6
DC home [facility]  Outpt BP check    #Dress Syndrome  - Tapered to 50mg prednisone for seven days today, will taper by reducing prednisone by 10 mg each week  -outpt followup

## 2021-03-06 NOTE — CONSULT NOTE ADULT - CONSULT REQUESTED BY NAME
Dr. Gann
Dr. Gann
house staff
Pilo Gann
Rachelle
Dr. Gann
primary team
Caroline Rasheed MD
Dr. Oviedo

## 2021-03-06 NOTE — PROGRESS NOTE ADULT - NUTRITIONAL ASSESSMENT
This patient has been assessed with a concern for Malnutrition and has been determined to have a diagnosis/diagnoses of Moderate protein-calorie malnutrition.    This patient is being managed with:   Diet Renal Restrictions-  For patients receiving Renal Replacement - No Protein Restr No Conc K No Conc Phos Low Sodium  Dysphagia 3 Soft Thin Liquids ([id number])  Supplement Feeding Modality:  Oral  Nepro Cans or Servings Per Day:  1       Frequency:  Daily  Entered: Mar  4 2021 10:18AM    Diet Renal Restrictions-  For patients receiving Renal Replacement - No Protein Restr No Conc K No Conc Phos Low Sodium  Entered: Feb 25 2021 12:36PM    The following pending diet order is being considered for treatment of Moderate protein-calorie malnutrition:null

## 2021-03-07 LAB
ANION GAP SERPL CALC-SCNC: 9 MMOL/L — SIGNIFICANT CHANGE UP (ref 5–17)
APTT BLD: 29.6 SEC — SIGNIFICANT CHANGE UP (ref 27.5–35.5)
APTT BLD: 31.1 SEC — SIGNIFICANT CHANGE UP (ref 27.5–35.5)
BASOPHILS # BLD AUTO: 0.01 K/UL — SIGNIFICANT CHANGE UP (ref 0–0.2)
BASOPHILS NFR BLD AUTO: 0.1 % — SIGNIFICANT CHANGE UP (ref 0–2)
BUN SERPL-MCNC: 62 MG/DL — HIGH (ref 7–23)
CALCIUM SERPL-MCNC: 8.6 MG/DL — SIGNIFICANT CHANGE UP (ref 8.4–10.5)
CHLORIDE SERPL-SCNC: 103 MMOL/L — SIGNIFICANT CHANGE UP (ref 96–108)
CO2 SERPL-SCNC: 29 MMOL/L — SIGNIFICANT CHANGE UP (ref 22–31)
CREAT SERPL-MCNC: 3.35 MG/DL — HIGH (ref 0.5–1.3)
EOSINOPHIL # BLD AUTO: 0 K/UL — SIGNIFICANT CHANGE UP (ref 0–0.5)
EOSINOPHIL # BLD AUTO: 0 K/UL — SIGNIFICANT CHANGE UP (ref 0–0.5)
EOSINOPHIL # BLD AUTO: 0.03 K/UL — SIGNIFICANT CHANGE UP (ref 0–0.5)
EOSINOPHIL NFR BLD AUTO: 0 % — SIGNIFICANT CHANGE UP (ref 0–6)
EOSINOPHIL NFR BLD AUTO: 0 % — SIGNIFICANT CHANGE UP (ref 0–6)
EOSINOPHIL NFR BLD AUTO: 0.3 % — SIGNIFICANT CHANGE UP (ref 0–6)
GLUCOSE SERPL-MCNC: 118 MG/DL — HIGH (ref 70–99)
HAPTOGLOB SERPL-MCNC: 235 MG/DL — HIGH (ref 34–200)
HCT VFR BLD CALC: 23.9 % — LOW (ref 39–50)
HCT VFR BLD CALC: 24.9 % — LOW (ref 39–50)
HCT VFR BLD CALC: 25.7 % — LOW (ref 39–50)
HCT VFR BLD CALC: 26.2 % — LOW (ref 39–50)
HGB BLD-MCNC: 7.8 G/DL — LOW (ref 13–17)
HGB BLD-MCNC: 7.9 G/DL — LOW (ref 13–17)
HGB BLD-MCNC: 8.1 G/DL — LOW (ref 13–17)
HGB BLD-MCNC: 8.5 G/DL — LOW (ref 13–17)
IMM GRANULOCYTES NFR BLD AUTO: 0.5 % — SIGNIFICANT CHANGE UP (ref 0–1.5)
IMM GRANULOCYTES NFR BLD AUTO: 0.5 % — SIGNIFICANT CHANGE UP (ref 0–1.5)
IMM GRANULOCYTES NFR BLD AUTO: 0.7 % — SIGNIFICANT CHANGE UP (ref 0–1.5)
INR BLD: 1.26 — HIGH (ref 0.88–1.16)
INR BLD: 1.33 — HIGH (ref 0.88–1.16)
LYMPHOCYTES # BLD AUTO: 0.34 K/UL — LOW (ref 1–3.3)
LYMPHOCYTES # BLD AUTO: 0.42 K/UL — LOW (ref 1–3.3)
LYMPHOCYTES # BLD AUTO: 0.6 K/UL — LOW (ref 1–3.3)
LYMPHOCYTES # BLD AUTO: 3 % — LOW (ref 13–44)
LYMPHOCYTES # BLD AUTO: 3.6 % — LOW (ref 13–44)
LYMPHOCYTES # BLD AUTO: 5.4 % — LOW (ref 13–44)
MAGNESIUM SERPL-MCNC: 2 MG/DL — SIGNIFICANT CHANGE UP (ref 1.6–2.6)
MCHC RBC-ENTMCNC: 28 PG — SIGNIFICANT CHANGE UP (ref 27–34)
MCHC RBC-ENTMCNC: 28.1 PG — SIGNIFICANT CHANGE UP (ref 27–34)
MCHC RBC-ENTMCNC: 28.5 PG — SIGNIFICANT CHANGE UP (ref 27–34)
MCHC RBC-ENTMCNC: 28.6 PG — SIGNIFICANT CHANGE UP (ref 27–34)
MCHC RBC-ENTMCNC: 31.5 GM/DL — LOW (ref 32–36)
MCHC RBC-ENTMCNC: 31.7 GM/DL — LOW (ref 32–36)
MCHC RBC-ENTMCNC: 32.4 GM/DL — SIGNIFICANT CHANGE UP (ref 32–36)
MCHC RBC-ENTMCNC: 32.6 GM/DL — SIGNIFICANT CHANGE UP (ref 32–36)
MCV RBC AUTO: 87.5 FL — SIGNIFICANT CHANGE UP (ref 80–100)
MCV RBC AUTO: 87.9 FL — SIGNIFICANT CHANGE UP (ref 80–100)
MCV RBC AUTO: 88.3 FL — SIGNIFICANT CHANGE UP (ref 80–100)
MCV RBC AUTO: 89.2 FL — SIGNIFICANT CHANGE UP (ref 80–100)
MONOCYTES # BLD AUTO: 0.2 K/UL — SIGNIFICANT CHANGE UP (ref 0–0.9)
MONOCYTES # BLD AUTO: 0.42 K/UL — SIGNIFICANT CHANGE UP (ref 0–0.9)
MONOCYTES # BLD AUTO: 0.58 K/UL — SIGNIFICANT CHANGE UP (ref 0–0.9)
MONOCYTES NFR BLD AUTO: 1.8 % — LOW (ref 2–14)
MONOCYTES NFR BLD AUTO: 3.6 % — SIGNIFICANT CHANGE UP (ref 2–14)
MONOCYTES NFR BLD AUTO: 5.3 % — SIGNIFICANT CHANGE UP (ref 2–14)
NEUTROPHILS # BLD AUTO: 10.65 K/UL — HIGH (ref 1.8–7.4)
NEUTROPHILS # BLD AUTO: 10.8 K/UL — HIGH (ref 1.8–7.4)
NEUTROPHILS # BLD AUTO: 9.74 K/UL — HIGH (ref 1.8–7.4)
NEUTROPHILS NFR BLD AUTO: 88.4 % — HIGH (ref 43–77)
NEUTROPHILS NFR BLD AUTO: 92.2 % — HIGH (ref 43–77)
NEUTROPHILS NFR BLD AUTO: 94.4 % — HIGH (ref 43–77)
NRBC # BLD: 0 /100 WBCS — SIGNIFICANT CHANGE UP (ref 0–0)
PHOSPHATE SERPL-MCNC: 4.6 MG/DL — HIGH (ref 2.5–4.5)
PLATELET # BLD AUTO: 110 K/UL — LOW (ref 150–400)
PLATELET # BLD AUTO: 74 K/UL — LOW (ref 150–400)
PLATELET # BLD AUTO: 87 K/UL — LOW (ref 150–400)
PLATELET # BLD AUTO: 92 K/UL — LOW (ref 150–400)
POTASSIUM SERPL-MCNC: 4.4 MMOL/L — SIGNIFICANT CHANGE UP (ref 3.5–5.3)
POTASSIUM SERPL-SCNC: 4.4 MMOL/L — SIGNIFICANT CHANGE UP (ref 3.5–5.3)
PROTHROM AB SERPL-ACNC: 15 SEC — HIGH (ref 10.6–13.6)
PROTHROM AB SERPL-ACNC: 15.8 SEC — HIGH (ref 10.6–13.6)
RBC # BLD: 2.73 M/UL — LOW (ref 4.2–5.8)
RBC # BLD: 2.82 M/UL — LOW (ref 4.2–5.8)
RBC # BLD: 2.88 M/UL — LOW (ref 4.2–5.8)
RBC # BLD: 2.98 M/UL — LOW (ref 4.2–5.8)
RBC # FLD: 14.1 % — SIGNIFICANT CHANGE UP (ref 10.3–14.5)
RBC # FLD: 14.1 % — SIGNIFICANT CHANGE UP (ref 10.3–14.5)
RBC # FLD: 14.3 % — SIGNIFICANT CHANGE UP (ref 10.3–14.5)
RBC # FLD: 14.4 % — SIGNIFICANT CHANGE UP (ref 10.3–14.5)
SODIUM SERPL-SCNC: 141 MMOL/L — SIGNIFICANT CHANGE UP (ref 135–145)
WBC # BLD: 10.62 K/UL — HIGH (ref 3.8–10.5)
WBC # BLD: 11.01 K/UL — HIGH (ref 3.8–10.5)
WBC # BLD: 11.28 K/UL — HIGH (ref 3.8–10.5)
WBC # BLD: 11.71 K/UL — HIGH (ref 3.8–10.5)
WBC # FLD AUTO: 10.62 K/UL — HIGH (ref 3.8–10.5)
WBC # FLD AUTO: 11.01 K/UL — HIGH (ref 3.8–10.5)
WBC # FLD AUTO: 11.28 K/UL — HIGH (ref 3.8–10.5)
WBC # FLD AUTO: 11.71 K/UL — HIGH (ref 3.8–10.5)

## 2021-03-07 PROCEDURE — 71250 CT THORAX DX C-: CPT | Mod: 26

## 2021-03-07 PROCEDURE — 99233 SBSQ HOSP IP/OBS HIGH 50: CPT

## 2021-03-07 PROCEDURE — 99233 SBSQ HOSP IP/OBS HIGH 50: CPT | Mod: GC

## 2021-03-07 RX ORDER — DESMOPRESSIN ACETATE 0.1 MG/1
25 TABLET ORAL ONCE
Refills: 0 | Status: COMPLETED | OUTPATIENT
Start: 2021-03-07 | End: 2021-03-07

## 2021-03-07 RX ORDER — TRANEXAMIC ACID 100 MG/ML
850 INJECTION, SOLUTION INTRAVENOUS ONCE
Refills: 0 | Status: COMPLETED | OUTPATIENT
Start: 2021-03-07 | End: 2021-03-07

## 2021-03-07 RX ORDER — DIPHENHYDRAMINE HYDROCHLORIDE AND LIDOCAINE HYDROCHLORIDE AND ALUMINUM HYDROXIDE AND MAGNESIUM HYDRO
10 KIT EVERY 6 HOURS
Refills: 0 | Status: DISCONTINUED | OUTPATIENT
Start: 2021-03-07 | End: 2021-03-09

## 2021-03-07 RX ADMIN — SEVELAMER CARBONATE 800 MILLIGRAM(S): 2400 POWDER, FOR SUSPENSION ORAL at 18:16

## 2021-03-07 RX ADMIN — Medication 1 APPLICATION(S): at 12:58

## 2021-03-07 RX ADMIN — Medication 50 MILLIGRAM(S): at 07:01

## 2021-03-07 RX ADMIN — PANTOPRAZOLE SODIUM 40 MILLIGRAM(S): 20 TABLET, DELAYED RELEASE ORAL at 07:02

## 2021-03-07 RX ADMIN — DESMOPRESSIN ACETATE 225 MICROGRAM(S): 0.1 TABLET ORAL at 15:10

## 2021-03-07 RX ADMIN — Medication 4 MILLIGRAM(S): at 07:03

## 2021-03-07 RX ADMIN — SEVELAMER CARBONATE 800 MILLIGRAM(S): 2400 POWDER, FOR SUSPENSION ORAL at 12:58

## 2021-03-07 RX ADMIN — NYSTATIN CREAM 1 APPLICATION(S): 100000 CREAM TOPICAL at 18:16

## 2021-03-07 RX ADMIN — DIPHENHYDRAMINE HYDROCHLORIDE AND LIDOCAINE HYDROCHLORIDE AND ALUMINUM HYDROXIDE AND MAGNESIUM HYDRO 10 MILLILITER(S): KIT at 21:49

## 2021-03-07 RX ADMIN — SENNA PLUS 2 TABLET(S): 8.6 TABLET ORAL at 21:48

## 2021-03-07 RX ADMIN — Medication 100 MILLIGRAM(S): at 19:03

## 2021-03-07 RX ADMIN — TRANEXAMIC ACID 217 MILLIGRAM(S): 100 INJECTION, SOLUTION INTRAVENOUS at 21:48

## 2021-03-07 RX ADMIN — MIDODRINE HYDROCHLORIDE 7.5 MILLIGRAM(S): 2.5 TABLET ORAL at 07:02

## 2021-03-07 RX ADMIN — MIDODRINE HYDROCHLORIDE 7.5 MILLIGRAM(S): 2.5 TABLET ORAL at 14:40

## 2021-03-07 RX ADMIN — SEVELAMER CARBONATE 800 MILLIGRAM(S): 2400 POWDER, FOR SUSPENSION ORAL at 08:17

## 2021-03-07 RX ADMIN — MIDODRINE HYDROCHLORIDE 7.5 MILLIGRAM(S): 2.5 TABLET ORAL at 21:48

## 2021-03-07 RX ADMIN — POLYETHYLENE GLYCOL 3350 17 GRAM(S): 17 POWDER, FOR SOLUTION ORAL at 12:58

## 2021-03-07 RX ADMIN — Medication 1 MILLIGRAM(S): at 12:58

## 2021-03-07 RX ADMIN — Medication 1 SPRAY(S): at 18:16

## 2021-03-07 RX ADMIN — Medication 1 SPRAY(S): at 07:02

## 2021-03-07 RX ADMIN — NYSTATIN CREAM 1 APPLICATION(S): 100000 CREAM TOPICAL at 07:03

## 2021-03-07 NOTE — PROGRESS NOTE ADULT - SUBJECTIVE AND OBJECTIVE BOX
INTERVAL HPI/OVERNIGHT EVENTS:    SUBJECTIVE: Patient seen and examined at bedside.    OBJECTIVE:    VITAL SIGNS:  ICU Vital Signs Last 24 Hrs  T(C): 36.6 (06 Mar 2021 20:43), Max: 36.9 (06 Mar 2021 09:20)  T(F): 97.9 (06 Mar 2021 20:43), Max: 98.5 (06 Mar 2021 09:20)  HR: 98 (06 Mar 2021 20:43) (94 - 112)  BP: 121/68 (06 Mar 2021 20:43) (97/55 - 128/54)  BP(mean): --  ABP: --  ABP(mean): --  RR: 18 (06 Mar 2021 20:43) (16 - 19)  SpO2: 98% (06 Mar 2021 20:43) (95% - 98%)        03-05 @ 07:01  -  03-06 @ 07:00  --------------------------------------------------------  IN: 0 mL / OUT: 1700 mL / NET: -1700 mL    03-06 @ 07:01  -  03-07 @ 05:59  --------------------------------------------------------  IN: 0 mL / OUT: 800 mL / NET: -800 mL      CAPILLARY BLOOD GLUCOSE          PHYSICAL EXAM:        MEDICATIONS:  MEDICATIONS  (STANDING):  ceFAZolin   IVPB 1000 milliGRAM(s) IV Intermittent every 24 hours  doxazosin 4 milliGRAM(s) Oral daily  folic acid 1 milliGRAM(s) Oral daily  midodrine. 7.5 milliGRAM(s) Oral every 8 hours  nystatin Cream 1 Application(s) Topical every 12 hours  pantoprazole    Tablet 40 milliGRAM(s) Oral before breakfast  petrolatum white Ointment 1 Application(s) Topical every 24 hours  polyethylene glycol 3350 17 Gram(s) Oral daily  PPD  5 Tuberculin Unit(s) Injectable 5 Unit(s) IntraDermal once  predniSONE   Tablet 50 milliGRAM(s) Oral daily  senna 2 Tablet(s) Oral at bedtime  sevelamer carbonate 800 milliGRAM(s) Oral three times a day with meals  sodium chloride 0.65% Nasal 1 Spray(s) Both Nostrils two times a day  sodium chloride 0.9% Bolus 500 milliLiter(s) IV Bolus once    MEDICATIONS  (PRN):      ALLERGIES:  Allergies    allopurinol (Other)    Intolerances        LABS:                        7.9    10.62 )-----------( 92       ( 07 Mar 2021 05:31 )             24.9     03-06    143  |  101  |  95<H>  ----------------------------<  107<H>  4.6   |  31  |  4.14<H>    Ca    8.7      06 Mar 2021 08:05  Phos  4.6     03-06  Mg     2.1     03-06      PT/INR - ( 07 Mar 2021 05:31 )   PT: 15.0 sec;   INR: 1.26          PTT - ( 07 Mar 2021 05:31 )  PTT:29.6 sec      RADIOLOGY & ADDITIONAL TESTS: Reviewed.

## 2021-03-07 NOTE — PROGRESS NOTE ADULT - SUBJECTIVE AND OBJECTIVE BOX
O/N Events:  continued to have oozing from PC with downtrending H&H required DDAVP and additional pRBC and Factor transfusion  HDS/ Blood pressure maintained in 120-130/70 mmhg without tachycardia   Na and K wnl     VITALS  Vital Signs Last 24 Hrs  T(C): 36.9 (07 Mar 2021 12:18), Max: 37.1 (07 Mar 2021 07:01)  T(F): 98.4 (07 Mar 2021 12:18), Max: 98.8 (07 Mar 2021 07:01)  HR: 89 (07 Mar 2021 12:18) (73 - 112)  BP: 136/68 (07 Mar 2021 12:18) (97/55 - 136/68)  RR: 17 (07 Mar 2021 12:18) (16 - 19)  SpO2: 97% (07 Mar 2021 12:18) (96% - 98%)    PHYSICAL EXAM  General: A&Ox 3; NAD  Respiratory: CTA B/L  Cardiovascular: Regular rhythm/rate; S1/S2  Gastrointestinal: Soft; NTND  Extremities: Warm, 2 + pitting edema up to knees b/l   Neurological: non focal   Access: RIJ HD cath with saturated dressing and continuous oozing     MEDICATIONS  (STANDING):  ceFAZolin   IVPB 1000 milliGRAM(s) IV Intermittent every 24 hours  doxazosin 4 milliGRAM(s) Oral daily  folic acid 1 milliGRAM(s) Oral daily  midodrine. 7.5 milliGRAM(s) Oral every 8 hours  nystatin Cream 1 Application(s) Topical every 12 hours  pantoprazole    Tablet 40 milliGRAM(s) Oral before breakfast  petrolatum white Ointment 1 Application(s) Topical every 24 hours  polyethylene glycol 3350 17 Gram(s) Oral daily  PPD  5 Tuberculin Unit(s) Injectable 5 Unit(s) IntraDermal once  predniSONE   Tablet 50 milliGRAM(s) Oral daily  senna 2 Tablet(s) Oral at bedtime  sevelamer carbonate 800 milliGRAM(s) Oral three times a day with meals  sodium chloride 0.65% Nasal 1 Spray(s) Both Nostrils two times a day      LABS                        8.5    11.28 )-----------( 74       ( 07 Mar 2021 12:47 )             26.2     03-07    141  |  103  |  62<H>  ----------------------------<  118<H>  4.4   |  29  |  3.35<H>    Ca    8.6      07 Mar 2021 08:23  Phos  4.6     03-07  Mg     2.0     03-07    PT/INR - ( 07 Mar 2021 08:23 )   PT: 15.8 sec;   INR: 1.33          PTT - ( 07 Mar 2021 08:23 )  PTT:31.1 sec

## 2021-03-07 NOTE — PROGRESS NOTE ADULT - PROBLEM SELECTOR PLAN 6
DC home [facility]  Outpt BP check    #Dress Syndrome  - Tapered to 50mg prednisone for seven days today, will taper by reducing prednisone by 10 mg each week  -outpt followup

## 2021-03-07 NOTE — PROGRESS NOTE ADULT - PROBLEM SELECTOR PLAN 2
HD session today   -c/w renvela 800 TID  -c/w PO torsemide 10 mg BID, confirm  if needed as outpt with renal   -renal recs appreciated   - strict Is/Os, daily weights, renal diet, renally dose meds

## 2021-03-07 NOTE — PROGRESS NOTE ADULT - NUTRITIONAL ASSESSMENT
This patient has been assessed with a concern for Malnutrition and has been determined to have a diagnosis/diagnoses of Moderate protein-calorie malnutrition.    This patient is being managed with:   Diet Renal Restrictions-  For patients receiving Renal Replacement - No Protein Restr No Conc K No Conc Phos Low Sodium  Dysphagia 3 Soft Thin Liquids ([id number])  Supplement Feeding Modality:  Oral  Nepro Cans or Servings Per Day:  1       Frequency:  Daily  Entered: Mar  4 2021 10:18AM    Diet Renal Restrictions-  For patients receiving Renal Replacement - No Protein Restr No Conc K No Conc Phos Low Sodium  Entered: Feb 25 2021 12:36PM    The following pending diet order is being considered for treatment of Moderate protein-calorie malnutrition:null

## 2021-03-07 NOTE — PROGRESS NOTE ADULT - PROBLEM SELECTOR PLAN 1
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  off HD for a while though decision was made to resume RRT given persistent uremic symptoms and downtrending UOP   s/p HD 3/5 - 3/6  stable from nephrologic stand point but now with continuos oozing from tunneled HD cath site requiring multiple blood transfusions and factor VIII  - CT chest non con for r/o hematoma  - please avoid hypotension sbp< 110 mmhg to maintain adequate kidney perfusion given chance for recovery   - 3rd session HD tmr/ UF TBD based oh hemodynamics and bleeding control   Epo w/HD  - bleeding less likely related to uremia though if uncontrolled can consider another dose of desmopressin 0.3 mcg/kg once   Will follow

## 2021-03-07 NOTE — PROGRESS NOTE ADULT - NUTRITIONAL ASSESSMENT
This patient has been assessed with a concern for Malnutrition and has been determined to have a diagnosis/diagnoses of Moderate protein-calorie malnutrition.    This patient is being managed with:   Diet Renal Restrictions-  For patients receiving Renal Replacement - No Protein Restr No Conc K No Conc Phos Low Sodium  Dysphagia 3 Soft Thin Liquids ([id number])  Supplement Feeding Modality:  Oral  Nepro Cans or Servings Per Day:  1       Frequency:  Daily  Entered: Mar  4 2021 10:18AM    Diet Renal Restrictions-  For patients receiving Renal Replacement - No Protein Restr No Conc K No Conc Phos Low Sodium  Entered: Feb 25 2021 12:36PM    The following pending diet order is being considered for treatment of Moderate protein-calorie malnutrition:null

## 2021-03-07 NOTE — PROGRESS NOTE ADULT - ATTENDING COMMENTS
75yo gentleman with a PMH of Hemophilia A, HTN, Gout c/b allopurinol-induced DRESS Syndrome and CKD (on high-dose steroids) who p/w acute on chronic renal failure c/b HD unstable Afib w/RVR and shock c/b thrombocytopenia, requiring emergent initiation of HD.  Hospital course c/b MSSA bacteremia with slow clearance of cultures, pericardium presumed to be the source.  Transferred to the [facility] on 3/1 for ongoing volume overload, coordination of outpatient HD and clearance of blood cultures.  BCx neg x >72 hours.  S/P Permacath 3/5 with HD after Factor VIII replacement.  3/6 evening and overnight, patient found to have persistent oozing from port site. Initially with slight hypotension that improved with fluids, of note HD on 3/6 with no removal of lfuids. In setting of oozing and hypotension, pt received 1 unit of PRBC and DDAVP. Still with persistent oozing overnight and received second unit of PRBC and factor VIII. Today, will with oozing from port site still present.  - IR consulted to evaluate permcath site as still with oozing present, frequent site check, CT chest to eval for bleeding (cannot use contrast in setting of ARF). Maintain active type and screen, trend CBC q8-12 hr, maintain hgb >7, heme on board, appreciate recs  -- Midodrine increased to 7.5mg q8hr, will attempt to titrate down after next HD session if BP tolerated   -- Torsemide D/C 3/6  -- c/w Cefazolin as per ID w/HD; upon discharge 2g/2g/3g after HD  -- c/w Pred 50mg x 7 daysand then by 10mg weekly until discontinued  -- DVT PPx - SCDs given hemophilia and thrombocytopenia

## 2021-03-07 NOTE — PROGRESS NOTE ADULT - PROBLEM SELECTOR PLAN 3
Prior history of significant bleeding in past following ERCP. Follows w Dr Grace ([facility]).  -Heme following - follow recs   -Daily INR/PT/PTT and daily Factor VIII - may require factor VIII, therefore will follow up with heme re further recs  -active T&S (3/5)   -f/u Dr Grace outpt     #folic acid deficiency - pt found to be folate deficient  -c/w 1 mg folic acid qd     #Anemia - Hgb 11.5 on arrival with unclear baseline. Currently Hgb 7.8, no signs/symptoms of bleeding.  - active T&S (3/5)  - to receive epo during HD

## 2021-03-07 NOTE — CHART NOTE - NSCHARTNOTEFT_GEN_A_CORE
At around 5am, went to check on patient and saw collection of blood behind right side of back. HD port site slowly oozing blood. Port site likely got caught between upper arm and chest while sleeping and manipulated w/ movement. Pressure was applied with gauze. Vital signs stable w/ /73, HR 73, RR 18. Bleeding failed to stop with pressure. At around 5am, went to check on patient and saw collection of blood behind right side on sheets. HD port site on right anterior chest slowly oozing blood w/ bandage soaked with blood. Port site likely got caught between upper arm and chest while patient was sleeping and manipulated it w/ movement. Pressure was applied with gauze. Vital signs stable w/ /73, HR 73, RR 18. Bleeding failed to stop with pressure. Consulted heme/onc and gave factor VIII. Vascular surgery consulted to assess HD port site. Pressure dressing applied to HD port site. Stat CBC showed Hgb 7.9 despite receiving 1UpRBC on 3/6 (pre-transfusion hgb 7.8). Ordered 1UpRBC for symptomatic anemia. Patient remains hemodynamically stable and will continue to monitor HD port site for worsening bleed.

## 2021-03-07 NOTE — PROGRESS NOTE ADULT - SUBJECTIVE AND OBJECTIVE BOX
INTERVAL HPI/OVERNIGHT EVENTS:  events from 3/6 reviewed. Patient developed hypotension on 3/6 during Hd, was unable to tolerate fluid removal. Then notable 1 unit Hb drop. FVIII level>100%. In evening developed tachycardia and oozing from permacath site. Received DDAVP. D/w primary house staff, advised to get CT chest to rule out bleeding from permacath site. Overnight continued to be oozing, rec total 2 units PRBC, today Hb>8 (post transfusion).    VITAL SIGNS:  T(F): 98.5 (03-07-21 @ 08:02)  HR: 77 (03-07-21 @ 08:02)  BP: 134/62 (03-07-21 @ 08:02)  RR: 18 (03-07-21 @ 08:02)  SpO2: 97% (03-07-21 @ 08:02)  Wt(kg): --  I&O's Summary    06 Mar 2021 07:01  -  07 Mar 2021 07:00  --------------------------------------------------------  IN: 0 mL / OUT: 800 mL / NET: -800 mL        MEDICATIONS  (STANDING):  ceFAZolin   IVPB 1000 milliGRAM(s) IV Intermittent every 24 hours  doxazosin 4 milliGRAM(s) Oral daily  folic acid 1 milliGRAM(s) Oral daily  midodrine. 7.5 milliGRAM(s) Oral every 8 hours  nystatin Cream 1 Application(s) Topical every 12 hours  pantoprazole    Tablet 40 milliGRAM(s) Oral before breakfast  petrolatum white Ointment 1 Application(s) Topical every 24 hours  polyethylene glycol 3350 17 Gram(s) Oral daily  PPD  5 Tuberculin Unit(s) Injectable 5 Unit(s) IntraDermal once  predniSONE   Tablet 50 milliGRAM(s) Oral daily  senna 2 Tablet(s) Oral at bedtime  sevelamer carbonate 800 milliGRAM(s) Oral three times a day with meals  sodium chloride 0.65% Nasal 1 Spray(s) Both Nostrils two times a day    MEDICATIONS  (PRN):      Allergies    allopurinol (Other)    Intolerances        LABS:  CBC Full  -  ( 07 Mar 2021 08:23 )  WBC Count : 11.01 K/uL  RBC Count : 2.88 M/uL  Hemoglobin : 8.1 g/dL  Hematocrit : 25.7 %  Platelet Count - Automated : 87 K/uL  Mean Cell Volume : 89.2 fl  Mean Cell Hemoglobin : 28.1 pg  Mean Cell Hemoglobin Concentration : 31.5 gm/dL  Auto Neutrophil # : 9.74 K/uL  Auto Lymphocyte # : 0.60 K/uL  Auto Monocyte # : 0.58 K/uL  Auto Eosinophil # : 0.03 K/uL  Auto Basophil # : 0.01 K/uL  Auto Neutrophil % : 88.4 %  Auto Lymphocyte % : 5.4 %  Auto Monocyte % : 5.3 %  Auto Eosinophil % : 0.3 %  Auto Basophil % : 0.1 %    03-07    141  |  103  |  62<H>  ----------------------------<  118<H>  4.4   |  29  |  3.35<H>    Ca    8.6      07 Mar 2021 08:23  Phos  4.6     03-07  Mg     2.0     03-07      PT/INR - ( 07 Mar 2021 08:23 )   PT: 15.8 sec;   INR: 1.33          PTT - ( 07 Mar 2021 08:23 )  PTT:31.1 sec      Iron Total, Serum: 63 ug/dL (03-02 @ 07:57)  Iron - Total Binding Capacity.: 151 ug/dL (03-02 @ 07:57)  Ferritin, Serum: 730 ng/mL (03-02 @ 07:57)    INR: 1.33 (03-07-21 @ 08:23)  Activated Partial Thromboplastin Time: 31.1 sec (03-07-21 @ 08:23)  INR: 1.26 (03-07-21 @ 05:31)  Activated Partial Thromboplastin Time: 29.6 sec (03-07-21 @ 05:31)  INR: 1.37 (03-06-21 @ 18:32)  Activated Partial Thromboplastin Time: 29.9 sec (03-06-21 @ 18:32)  INR: 1.31 (03-06-21 @ 08:05)  Activated Partial Thromboplastin Time: 28.6 sec (03-06-21 @ 08:05)  INR: 1.18 (03-05-21 @ 07:14)  Activated Partial Thromboplastin Time: 36.0 sec (03-05-21 @ 07:14)      RADIOLOGY & ADDITIONAL TESTS: Reviewed.

## 2021-03-07 NOTE — PROGRESS NOTE ADULT - ASSESSMENT
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 Hematologist is Dr. Grace. s/p HD catheter placement (02/22), complicated by hypotension during dialysis and MSSA bacteremia.       Hemophilia A, mild epistaxis.   Mild coagulopathy - Factor VIII 35% and Factor VII 24%, possible inhibitor  MSSA bacteremia  Renal failure 2/2 DRESS syndrome from allopurinol  Thrombocytopenia probably secondary to sepsis/bacteremia vs dialysis membrane, improving  Anemia secondary to chronic renal disease, folate deficiency; stable   Leukocytosis, infectious workup in progress  Uremic pericarditis      #) Hemophilia A  - s/p 25 U/kg recombinant Factor VIII (02/22) for purpose of HD catheter placement. No significant bleeding observed.    - Continue to follow-up with Factor VIII once daily due to possible procedures.  - Factor VIII level is 37% today. He is going for HD catheter placement today. Received recombinant factor VIII 3000 Units prior to procedure. Please check factor VIII level post procedure.   - s/p 3,000 units rFVIII on 3/5, 3/6 and AM of 3/7  -Factor VIII levels all >100%  -s/p DDAVP 0.3 mcg/kg x1 on 3/6 4PM  -s/p 2 units PRBC on 3/6 &3/7 total   - Maintain active T+S, transfuse if Hb < 7 or symptomatic   -given constant oozing, and Hb around 8 despite 2 units PRBC rec URGENT CT chest/neck and possibly abdomen to rule out bleeding  -patient's factor VIII level is therapeutic so doubt cause of bleed at this time is hemophilia  -also of note possibly mild inhibitor noted on 2 hr mixing study in the past, if develops HD instability or massive bleed found please contact hematology for further recommendations        D/w hospitalist  and resident Dr. Thomas.  Discussed with Dr. 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 Hematologist is Dr. Grace. s/p HD catheter placement (02/22), complicated by hypotension during dialysis and MSSA bacteremia.       Hemophilia A, mild epistaxis.   Mild coagulopathy - Factor VIII 35% and Factor VII 24%, possible inhibitor  MSSA bacteremia  Renal failure 2/2 DRESS syndrome from allopurinol  Thrombocytopenia probably secondary to sepsis/bacteremia vs dialysis membrane, improving  Anemia secondary to chronic renal disease, folate deficiency; stable   Leukocytosis, infectious workup in progress  Uremic pericarditis      #) Hemophilia A  - s/p 25 U/kg recombinant Factor VIII (02/22) for purpose of HD catheter placement. No significant bleeding observed.    - Continue to follow-up with Factor VIII once daily due to possible procedures.  - Factor VIII level is 37% today. He is going for HD catheter placement today. Received recombinant factor VIII 3000 Units prior to procedure. Please check factor VIII level post procedure.   - s/p 3,000 units rFVIII on 3/5, 3/6 and AM of 3/7  -Factor VIII levels all >100%  -s/p DDAVP 0.3 mcg/kg x1 on 3/6 4PM  -s/p 2 units PRBC on 3/6 &3/7 total   - Maintain active T+S, transfuse if Hb < 7 or symptomatic   -given constant oozing, and Hb around 8 despite 2 units PRBC rec URGENT CT chest/neck and possibly abdomen to rule out bleeding  -patient's factor VIII level is therapeutic so doubt cause of bleed at this time is hemophilia  -also of note possibly mild inhibitor noted on 2 hr mixing study in the past, however his FVIII acitivity is >100% therefore inhibitor is not playing a part  -suspect bleeding is anatomical in etiology, please consult IR and vascular as appropriate  -if persistent bleeding can give DDAVP 0.3 mcg/kg IV x1 and 1 unit platelets (since platelet count<50,000) for possible uremia contributing to platelet dysfunction - uremic bleeding needs days to resolve after numbers normalize    D/w hospitalist  and resident Dr. Thomas.  Discussed with Dr. 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 Hematologist is Dr. Grace. s/p HD catheter placement (02/22), complicated by hypotension during dialysis and MSSA bacteremia.       Hemophilia A, mild epistaxis.   Mild coagulopathy - Factor VIII 35% and Factor VII 24%, possible inhibitor  MSSA bacteremia  Renal failure 2/2 DRESS syndrome from allopurinol  Thrombocytopenia probably secondary to sepsis/bacteremia vs dialysis membrane, improving  Anemia secondary to chronic renal disease, folate deficiency; stable   Leukocytosis, infectious workup in progress  Uremic pericarditis      #) Hemophilia A  - s/p 25 U/kg recombinant Factor VIII (02/22) for purpose of HD catheter placement. No significant bleeding observed.    - Continue to follow-up with Factor VIII once daily due to possible procedures.  - Factor VIII level is 37% today. He is going for HD catheter placement today. Received recombinant factor VIII 3000 Units prior to procedure. Please check factor VIII level post procedure.   - s/p 3,000 units rFVIII on 3/5, 3/6 and AM of 3/7  -Factor VIII levels all >100%  -s/p DDAVP 0.3 mcg/kg x1 on 3/6 4PM  -s/p 2 units PRBC on 3/6 &3/7 total   - Maintain active T+S, transfuse if Hb < 7 or symptomatic   -given constant oozing, and Hb around 8 despite 2 units PRBC rec URGENT CT chest/neck and possibly abdomen to rule out bleeding  -patient's factor VIII level is therapeutic so doubt cause of bleed at this time is hemophilia  -also of note possibly mild inhibitor noted on 2 hr mixing study in the past, however his FVIII acitivity is >100% therefore inhibitor is not playing a part  -suspect bleeding is anatomical in etiology, please consult IR and vascular as appropriate  -if persistent bleeding can give DDAVP 0.3 mcg/kg IV x1 and 1 unit platelets (since platelet count<50,000) for possible uremia contributing to platelet dysfunction - uremic bleeding needs days to resolve after numbers normalize  -also give Tranexamic acid x1 for persistent bleeding    D/w hospitalist  and resident Dr. Thomas.  Discussed with Dr. Fry

## 2021-03-08 PROBLEM — Z00.00 ENCOUNTER FOR PREVENTIVE HEALTH EXAMINATION: Status: ACTIVE | Noted: 2021-03-08

## 2021-03-08 LAB
ALBUMIN SERPL ELPH-MCNC: 2.7 G/DL — LOW (ref 3.3–5)
ALBUMIN SERPL ELPH-MCNC: 3.5 G/DL — SIGNIFICANT CHANGE UP (ref 3.3–5)
ALP SERPL-CCNC: 179 U/L — HIGH (ref 40–120)
ALP SERPL-CCNC: 47 U/L — SIGNIFICANT CHANGE UP (ref 40–120)
ALT FLD-CCNC: 31 U/L — SIGNIFICANT CHANGE UP (ref 10–45)
ALT FLD-CCNC: <5 U/L — LOW (ref 10–45)
ANION GAP SERPL CALC-SCNC: 8 MMOL/L — SIGNIFICANT CHANGE UP (ref 5–17)
ANION GAP SERPL CALC-SCNC: 9 MMOL/L — SIGNIFICANT CHANGE UP (ref 5–17)
APTT BLD: 24.5 SEC — LOW (ref 27.5–35.5)
APTT BLD: 33.1 SEC — SIGNIFICANT CHANGE UP (ref 27.5–35.5)
AST SERPL-CCNC: 12 U/L — SIGNIFICANT CHANGE UP (ref 10–40)
AST SERPL-CCNC: 22 U/L — SIGNIFICANT CHANGE UP (ref 10–40)
BASOPHILS # BLD AUTO: 0.03 K/UL — SIGNIFICANT CHANGE UP (ref 0–0.2)
BASOPHILS NFR BLD AUTO: 0.2 % — SIGNIFICANT CHANGE UP (ref 0–2)
BILIRUB SERPL-MCNC: 0.5 MG/DL — SIGNIFICANT CHANGE UP (ref 0.2–1.2)
BILIRUB SERPL-MCNC: 0.6 MG/DL — SIGNIFICANT CHANGE UP (ref 0.2–1.2)
BLD GP AB SCN SERPL QL: NEGATIVE — SIGNIFICANT CHANGE UP
BUN SERPL-MCNC: 28 MG/DL — HIGH (ref 7–23)
BUN SERPL-MCNC: 76 MG/DL — HIGH (ref 7–23)
CALCIUM SERPL-MCNC: 8.4 MG/DL — SIGNIFICANT CHANGE UP (ref 8.4–10.5)
CALCIUM SERPL-MCNC: 9.3 MG/DL — SIGNIFICANT CHANGE UP (ref 8.4–10.5)
CHLORIDE SERPL-SCNC: 100 MMOL/L — SIGNIFICANT CHANGE UP (ref 96–108)
CHLORIDE SERPL-SCNC: 101 MMOL/L — SIGNIFICANT CHANGE UP (ref 96–108)
CO2 SERPL-SCNC: 24 MMOL/L — SIGNIFICANT CHANGE UP (ref 22–31)
CO2 SERPL-SCNC: 28 MMOL/L — SIGNIFICANT CHANGE UP (ref 22–31)
CREAT SERPL-MCNC: 0.94 MG/DL — SIGNIFICANT CHANGE UP (ref 0.5–1.3)
CREAT SERPL-MCNC: 3.64 MG/DL — HIGH (ref 0.5–1.3)
EOSINOPHIL # BLD AUTO: 0 K/UL — SIGNIFICANT CHANGE UP (ref 0–0.5)
EOSINOPHIL NFR BLD AUTO: 0 % — SIGNIFICANT CHANGE UP (ref 0–6)
FACT VIII ACT/NOR PPP: 66 % — SIGNIFICANT CHANGE UP (ref 51–138)
FACT VIII ACT/NOR PPP: 91 % — SIGNIFICANT CHANGE UP (ref 51–138)
GLUCOSE SERPL-MCNC: 203 MG/DL — HIGH (ref 70–99)
GLUCOSE SERPL-MCNC: 207 MG/DL — HIGH (ref 70–99)
HCT VFR BLD CALC: 24 % — LOW (ref 39–50)
HCT VFR BLD CALC: 38.8 % — LOW (ref 39–50)
HGB BLD-MCNC: 13.2 G/DL — SIGNIFICANT CHANGE UP (ref 13–17)
HGB BLD-MCNC: 7.7 G/DL — LOW (ref 13–17)
IMM GRANULOCYTES NFR BLD AUTO: 1.2 % — SIGNIFICANT CHANGE UP (ref 0–1.5)
INR BLD: 1.15 — SIGNIFICANT CHANGE UP (ref 0.88–1.16)
INR BLD: 1.26 — HIGH (ref 0.88–1.16)
LYMPHOCYTES # BLD AUTO: 1.84 K/UL — SIGNIFICANT CHANGE UP (ref 1–3.3)
LYMPHOCYTES # BLD AUTO: 14.1 % — SIGNIFICANT CHANGE UP (ref 13–44)
MAGNESIUM SERPL-MCNC: 2 MG/DL — SIGNIFICANT CHANGE UP (ref 1.6–2.6)
MCHC RBC-ENTMCNC: 28.3 PG — SIGNIFICANT CHANGE UP (ref 27–34)
MCHC RBC-ENTMCNC: 30.2 PG — SIGNIFICANT CHANGE UP (ref 27–34)
MCHC RBC-ENTMCNC: 32.1 GM/DL — SIGNIFICANT CHANGE UP (ref 32–36)
MCHC RBC-ENTMCNC: 34 GM/DL — SIGNIFICANT CHANGE UP (ref 32–36)
MCV RBC AUTO: 88.2 FL — SIGNIFICANT CHANGE UP (ref 80–100)
MCV RBC AUTO: 88.8 FL — SIGNIFICANT CHANGE UP (ref 80–100)
MONOCYTES # BLD AUTO: 0.6 K/UL — SIGNIFICANT CHANGE UP (ref 0–0.9)
MONOCYTES NFR BLD AUTO: 4.6 % — SIGNIFICANT CHANGE UP (ref 2–14)
NEUTROPHILS # BLD AUTO: 10.41 K/UL — HIGH (ref 1.8–7.4)
NEUTROPHILS NFR BLD AUTO: 79.9 % — HIGH (ref 43–77)
NRBC # BLD: 0 /100 WBCS — SIGNIFICANT CHANGE UP (ref 0–0)
NRBC # BLD: 0 /100 WBCS — SIGNIFICANT CHANGE UP (ref 0–0)
PHOSPHATE SERPL-MCNC: 3.3 MG/DL — SIGNIFICANT CHANGE UP (ref 2.5–4.5)
PLATELET # BLD AUTO: 119 K/UL — LOW (ref 150–400)
PLATELET # BLD AUTO: 97 K/UL — LOW (ref 150–400)
POTASSIUM SERPL-MCNC: 3.8 MMOL/L — SIGNIFICANT CHANGE UP (ref 3.5–5.3)
POTASSIUM SERPL-MCNC: 4.5 MMOL/L — SIGNIFICANT CHANGE UP (ref 3.5–5.3)
POTASSIUM SERPL-SCNC: 3.8 MMOL/L — SIGNIFICANT CHANGE UP (ref 3.5–5.3)
POTASSIUM SERPL-SCNC: 4.5 MMOL/L — SIGNIFICANT CHANGE UP (ref 3.5–5.3)
PROT SERPL-MCNC: 5 G/DL — LOW (ref 6–8.3)
PROT SERPL-MCNC: 9.2 G/DL — HIGH (ref 6–8.3)
PROTHROM AB SERPL-ACNC: 13.7 SEC — HIGH (ref 10.6–13.6)
PROTHROM AB SERPL-ACNC: 14.9 SEC — HIGH (ref 10.6–13.6)
RBC # BLD: 2.72 M/UL — LOW (ref 4.2–5.8)
RBC # BLD: 4.37 M/UL — SIGNIFICANT CHANGE UP (ref 4.2–5.8)
RBC # FLD: 13.2 % — SIGNIFICANT CHANGE UP (ref 10.3–14.5)
RBC # FLD: 14.4 % — SIGNIFICANT CHANGE UP (ref 10.3–14.5)
RH IG SCN BLD-IMP: POSITIVE — SIGNIFICANT CHANGE UP
SODIUM SERPL-SCNC: 133 MMOL/L — LOW (ref 135–145)
SODIUM SERPL-SCNC: 137 MMOL/L — SIGNIFICANT CHANGE UP (ref 135–145)
WBC # BLD: 13.04 K/UL — HIGH (ref 3.8–10.5)
WBC # BLD: 13.57 K/UL — HIGH (ref 3.8–10.5)
WBC # FLD AUTO: 13.04 K/UL — HIGH (ref 3.8–10.5)
WBC # FLD AUTO: 13.57 K/UL — HIGH (ref 3.8–10.5)

## 2021-03-08 PROCEDURE — 99233 SBSQ HOSP IP/OBS HIGH 50: CPT | Mod: GC

## 2021-03-08 RX ORDER — TRANEXAMIC ACID 100 MG/ML
1300 INJECTION, SOLUTION INTRAVENOUS EVERY 24 HOURS
Refills: 0 | Status: DISCONTINUED | OUTPATIENT
Start: 2021-03-08 | End: 2021-03-09

## 2021-03-08 RX ORDER — TRANEXAMIC ACID 100 MG/ML
1000 INJECTION, SOLUTION INTRAVENOUS EVERY 12 HOURS
Refills: 0 | Status: DISCONTINUED | OUTPATIENT
Start: 2021-03-08 | End: 2021-03-08

## 2021-03-08 RX ADMIN — MIDODRINE HYDROCHLORIDE 7.5 MILLIGRAM(S): 2.5 TABLET ORAL at 14:16

## 2021-03-08 RX ADMIN — Medication 1 MILLIGRAM(S): at 19:01

## 2021-03-08 RX ADMIN — Medication 1 SPRAY(S): at 07:01

## 2021-03-08 RX ADMIN — NYSTATIN CREAM 1 APPLICATION(S): 100000 CREAM TOPICAL at 07:01

## 2021-03-08 RX ADMIN — Medication 100 MILLIGRAM(S): at 19:00

## 2021-03-08 RX ADMIN — TRANEXAMIC ACID 1300 MILLIGRAM(S): 100 INJECTION, SOLUTION INTRAVENOUS at 21:15

## 2021-03-08 RX ADMIN — MIDODRINE HYDROCHLORIDE 7.5 MILLIGRAM(S): 2.5 TABLET ORAL at 21:16

## 2021-03-08 RX ADMIN — PANTOPRAZOLE SODIUM 40 MILLIGRAM(S): 20 TABLET, DELAYED RELEASE ORAL at 07:01

## 2021-03-08 RX ADMIN — Medication 4 MILLIGRAM(S): at 07:00

## 2021-03-08 RX ADMIN — SEVELAMER CARBONATE 800 MILLIGRAM(S): 2400 POWDER, FOR SUSPENSION ORAL at 07:38

## 2021-03-08 RX ADMIN — SEVELAMER CARBONATE 800 MILLIGRAM(S): 2400 POWDER, FOR SUSPENSION ORAL at 14:17

## 2021-03-08 RX ADMIN — MIDODRINE HYDROCHLORIDE 7.5 MILLIGRAM(S): 2.5 TABLET ORAL at 07:00

## 2021-03-08 RX ADMIN — Medication 50 MILLIGRAM(S): at 07:00

## 2021-03-08 NOTE — PROGRESS NOTE ADULT - PROBLEM SELECTOR PLAN 1
With MSSA bacteremia  -cefazolin 1g qd while inpatient   -Cefazolin 2g, 2g, 3g 3x weekly dosed after HD  - Duration of antibiotics is 6 weeks ( 3/1 - 4/12 )  - ID recs appreciated, will need outpt follow up

## 2021-03-08 NOTE — PROGRESS NOTE ADULT - PROBLEM SELECTOR PLAN 3
Prior history of significant bleeding in past following ERCP. Follows w Dr Grace ([facility]). S/p multiple factor 8 replacements due to oozing blood from THC site during this admission.  -Heme following - follow recs   -1300 mg tranexamic acid qd (3/8-3/12) per hematology  -Daily coags and Factor VIII  -active T&S (3/5)   -f/u Dr Grace outpt     #folic acid deficiency - pt found to be folate deficient  -c/w 1 mg folic acid qd     #Anemia - Hgb 11.5 on arrival with unclear baseline. Currently Hgb 7.8. THC catheter site oozing blood. Hemodynamically stable, with CT chest showing no internal bleeding. s/p 2u pRBC. s/p multiple factor 8 replacements.   -heme recs appreciated   - active T&S (3/8)

## 2021-03-08 NOTE — PROGRESS NOTE ADULT - SUBJECTIVE AND OBJECTIVE BOX
INTERVAL HPI/OVERNIGHT EVENTS: Over the weekend, had worsening bleeding from PermCath site, associated with tachycardia and a 1 unit drop in Hb. s/p DDAVP on 3/6. He required 2 additional units of PRBC for persistent bleeding. Factor VIII level >90% on 3/6 and 3/7. Also received 3000 units rFVIII on 3/5, 3/6, and 3/7 and 1 unit of platelets.   This morning, as per nurse and primary team, bleeding has resolved. Factor VIII level today is 66%.     VITAL SIGNS:  Vital Signs Last 24 Hrs  T(C): 36.8 (08 Mar 2021 13:15), Max: 37.4 (08 Mar 2021 06:08)  T(F): 98.2 (08 Mar 2021 13:15), Max: 99.3 (08 Mar 2021 06:08)  HR: 90 (08 Mar 2021 13:15) (90 - 100)  BP: 119/63 (08 Mar 2021 13:15) (119/63 - 132/58)  RR: 16 (08 Mar 2021 13:15) (16 - 18)  SpO2: 98% (08 Mar 2021 13:15) (94% - 98%)    MEDICATIONS  (STANDING):  ceFAZolin   IVPB 1000 milliGRAM(s) IV Intermittent every 24 hours  doxazosin 4 milliGRAM(s) Oral daily  folic acid 1 milliGRAM(s) Oral daily  midodrine. 7.5 milliGRAM(s) Oral every 8 hours  nystatin Cream 1 Application(s) Topical every 12 hours  pantoprazole    Tablet 40 milliGRAM(s) Oral before breakfast  petrolatum white Ointment 1 Application(s) Topical every 24 hours  polyethylene glycol 3350 17 Gram(s) Oral daily  PPD  5 Tuberculin Unit(s) Injectable 5 Unit(s) IntraDermal once  predniSONE   Tablet 50 milliGRAM(s) Oral daily  senna 2 Tablet(s) Oral at bedtime  sevelamer carbonate 800 milliGRAM(s) Oral three times a day with meals  sodium chloride 0.65% Nasal 1 Spray(s) Both Nostrils two times a day  tranexamic  acid 1300 milliGRAM(s) Oral every 24 hours    MEDICATIONS  (PRN):  FIRST- Mouthwash  BLM 10 milliLiter(s) Swish and Spit every 6 hours PRN Mouth Care      Allergies    allopurinol (Other)    Intolerances        LABS:                        7.7    13.57 )-----------( 97       ( 08 Mar 2021 10:41 )             24.0       03-08    137  |  101  |  76<H>  ----------------------------<  207<H>  4.5   |  28  |  3.64<H>    Ca    8.4      08 Mar 2021 10:41  Phos  3.3     03-08  Mg     2.0     03-08    TPro  5.0<L>  /  Alb  2.7<L>  /  TBili  0.6  /  DBili  x   /  AST  12  /  ALT  <5<L>  /  AlkPhos  179<H>  03-08    PT/INR - ( 08 Mar 2021 10:41 )   PT: 14.9 sec;   INR: 1.26       PTT - ( 08 Mar 2021 10:41 )  PTT:33.1 sec    Factor VIII Assay (03.08.21 @ 10:41)   Factor VIII Assay: 66%     RADIOLOGY:     < from: CT Chest No Cont (03.07.21 @ 11:32) >  Impression:  Bilateral small pleural effusions, with interval decrease in extent.  No consolidation.    < end of copied text >           INTERVAL HPI/OVERNIGHT EVENTS: Over the weekend, had worsening bleeding from PermCath site, associated with tachycardia and a 1 unit drop in Hb. s/p DDAVP on 3/6. He required 2 additional units of PRBC for persistent bleeding. Factor VIII level >90% on 3/6 and 3/7. Also received 3000 units rFVIII on 3/5, 3/6, and 3/7 and 1 unit of platelets.   This morning, as per nurse and primary team, bleeding has resolved. Factor VIII level today is 66%. No further bleeding noted on exam today.     VITAL SIGNS:  Vital Signs Last 24 Hrs  T(C): 36.8 (08 Mar 2021 13:15), Max: 37.4 (08 Mar 2021 06:08)  T(F): 98.2 (08 Mar 2021 13:15), Max: 99.3 (08 Mar 2021 06:08)  HR: 90 (08 Mar 2021 13:15) (90 - 100)  BP: 119/63 (08 Mar 2021 13:15) (119/63 - 132/58)  RR: 16 (08 Mar 2021 13:15) (16 - 18)  SpO2: 98% (08 Mar 2021 13:15) (94% - 98%)    MEDICATIONS  (STANDING):  ceFAZolin   IVPB 1000 milliGRAM(s) IV Intermittent every 24 hours  doxazosin 4 milliGRAM(s) Oral daily  folic acid 1 milliGRAM(s) Oral daily  midodrine. 7.5 milliGRAM(s) Oral every 8 hours  nystatin Cream 1 Application(s) Topical every 12 hours  pantoprazole    Tablet 40 milliGRAM(s) Oral before breakfast  petrolatum white Ointment 1 Application(s) Topical every 24 hours  polyethylene glycol 3350 17 Gram(s) Oral daily  PPD  5 Tuberculin Unit(s) Injectable 5 Unit(s) IntraDermal once  predniSONE   Tablet 50 milliGRAM(s) Oral daily  senna 2 Tablet(s) Oral at bedtime  sevelamer carbonate 800 milliGRAM(s) Oral three times a day with meals  sodium chloride 0.65% Nasal 1 Spray(s) Both Nostrils two times a day  tranexamic  acid 1300 milliGRAM(s) Oral every 24 hours    MEDICATIONS  (PRN):  FIRST- Mouthwash  BLM 10 milliLiter(s) Swish and Spit every 6 hours PRN Mouth Care      Allergies    allopurinol (Other)    Intolerances        LABS:                        7.7    13.57 )-----------( 97       ( 08 Mar 2021 10:41 )             24.0       03-08    137  |  101  |  76<H>  ----------------------------<  207<H>  4.5   |  28  |  3.64<H>    Ca    8.4      08 Mar 2021 10:41  Phos  3.3     03-08  Mg     2.0     03-08    TPro  5.0<L>  /  Alb  2.7<L>  /  TBili  0.6  /  DBili  x   /  AST  12  /  ALT  <5<L>  /  AlkPhos  179<H>  03-08    PT/INR - ( 08 Mar 2021 10:41 )   PT: 14.9 sec;   INR: 1.26       PTT - ( 08 Mar 2021 10:41 )  PTT:33.1 sec    Factor VIII Assay (03.08.21 @ 10:41)   Factor VIII Assay: 66%     RADIOLOGY:     < from: CT Chest No Cont (03.07.21 @ 11:32) >  Impression:  Bilateral small pleural effusions, with interval decrease in extent.  No consolidation.    < end of copied text >

## 2021-03-08 NOTE — PROGRESS NOTE ADULT - NSHPATTENDINGPLANDISCUSS_GEN_ALL_CORE
patient, housestaff
housestaff
housestaff
medicine, NM
patient, resident team and patient's daughter (Char).
medicine
pt's daughter
housestaff
housestaff
team
team, patient, nurse
patient and primary team.
patient, housestaff
patient and resident team.
patient, consultants and resident team.
IM
patient w/wife on phone and resident team.
IM
patient and housestaff
team, nurse, patient
housestaff

## 2021-03-08 NOTE — PROGRESS NOTE ADULT - PROBLEM SELECTOR PLAN 6
Has been normotensive/hypotensive during hospital stay, likely in setting of prolonged infection.    #Dress Syndrome  - prednisone 50 mg (3/6-3/12), reduce by 10 mg each week until tapered to 0   -outpt followup

## 2021-03-08 NOTE — PROGRESS NOTE ADULT - PROBLEM SELECTOR PROBLEM 9
Hemophilia A
Hemophilia A
BPH (benign prostatic hyperplasia)
BPH (benign prostatic hyperplasia)
Hemophilia A
BPH (benign prostatic hyperplasia)
Hemophilia A
BPH (benign prostatic hyperplasia)
Hemophilia A
Nutrition, metabolism, and development symptoms
Hemophilia A
BPH (benign prostatic hyperplasia)
Hemophilia A
Hemophilia A
BPH (benign prostatic hyperplasia)
BPH (benign prostatic hyperplasia)

## 2021-03-08 NOTE — PROGRESS NOTE ADULT - PROBLEM SELECTOR PLAN 7
- Slightly distended abdomen on physical exam and CT showing ascitic fluid in abdomen  - Likely 2/2 acute renal failure and fluid shift  - Continue diuresis as above.
#History of BPH.  Plan: Continue Doxazosin daily  - Monitor UOP and Bladder scan for any concerns of retention.    #gout  Previously on allopurinol for gout prophylaxis; however, patient acquired DRESS Syndrome from the therapy  - Monitor symptoms and avoid NSAIDs for pain.
#Tachycardia/ Afib w/RVR - RESOLVED.   -had episode of hemodynamically unstable afib rvr, requiring 1-day stay in MICU; resolved and has not had any further episodes  - hold on anticoagulation given bleeding risks
#Tachycardia/ Afib w/RVR   - HR constantly in 110s per daughter  - Sinus tachycardia on EKG   - ~1 week ago had episode of afib rvr, requiring 1-day stay in MICU; resolved and has not had any further episodes  - hold on anticoagulation given bleeding risks
Previously on allopurinol for gout prophylaxis; however, patient acquired DRESS Syndrome from the therapy  - Monitor symptoms and avoid NSAIDs for pain
#History of BPH.  Plan: Continue Doxazosin daily  - Monitor UOP and Bladder scan for any concerns of retention.    #gout  Previously on allopurinol for gout prophylaxis; however, patient acquired DRESS Syndrome from the therapy  - Monitor symptoms and avoid NSAIDs for pain.
#Tachycardia/ Afib w/RVR - RESOLVED.   -had episode of hemodynamically unstable afib rvr, requiring 1-day stay in MICU; resolved and has not had any further episodes  - hold on anticoagulation given bleeding risks
RESOLVED.   -had episode of hemodynamically unstable afib rvr, requiring 1-day stay in MICU; resolved and has not had any further episodes  - hold on anticoagulation given bleeding risks
RESOLVED.   -had episode of hemodynamically unstable afib rvr, requiring 1-day stay in MICU; resolved and has not had any further episodes  - hold on anticoagulation given bleeding risks
#History of BPH.  Plan: Continue Doxazosin daily  - Monitor UOP and Bladder scan for any concerns of retention.    #gout  Previously on allopurinol for gout prophylaxis; however, patient acquired DRESS Syndrome from the therapy  - Monitor symptoms and avoid NSAIDs for pain.
Previously on allopurinol for gout prophylaxis; however, patient acquired DRESS Syndrome from the therapy  - Monitor symptoms and avoid NSAIDs for pain
RESOLVED.   -had episode of hemodynamically unstable afib rvr, requiring 1-day stay in MICU; resolved and has not had any further episodes  - hold on anticoagulation given bleeding risks
#Tachycardia/ Afib w/RVR - RESOLVED.   -had episode of hemodynamically unstable afib rvr, requiring 1-day stay in MICU; resolved and has not had any further episodes  - hold on anticoagulation given bleeding risks
Previously on allopurinol for gout prophylaxis; however, patient acquired DRESS Syndrome from the therapy  - Monitor symptoms and avoid NSAIDs for pain
Previously on allopurinol for gout prophylaxis; however, patient acquired DRESS Syndrome from the therapy  - Monitor symptoms and avoid NSAIDs for pain
- Slightly distended abdomen on physical exam and CT showing ascitic fluid in abdomen  - Likely 2/2 acute renal failure and fluid shift  - Continue diuresis as above.
#Tachycardia/ Afib w/RVR   - HR constantly in 110s per daughter  - Sinus tachycardia on EKG   - ~1 week ago had episode of afib rvr, requiring 1-day stay in MICU; resolved and has not had any further episodes  - hold on anticoagulation given bleeding risks
#History of BPH.  Plan: Continue Doxazosin daily  - Monitor UOP and Bladder scan for any concerns of retention.    #gout  Previously on allopurinol for gout prophylaxis; however, patient acquired DRESS Syndrome from the therapy  - Monitor symptoms and avoid NSAIDs for pain.

## 2021-03-08 NOTE — PROGRESS NOTE ADULT - NUTRITIONAL ASSESSMENT
This patient has been assessed with a concern for Malnutrition and has been determined to have a diagnosis/diagnoses of Moderate protein-calorie malnutrition.    This patient is being managed with:   Diet Renal Restrictions-  For patients receiving Renal Replacement - No Protein Restr No Conc K No Conc Phos Low Sodium  Dysphagia 3 Soft Thin Liquids ([id number])  Supplement Feeding Modality:  Oral  Nepro Cans or Servings Per Day:  1       Frequency:  Daily  Entered: Mar  4 2021 10:18AM    Diet Renal Restrictions-  For patients receiving Renal Replacement - No Protein Restr No Conc K No Conc Phos Low Sodium  Entered: Feb 25 2021 12:36PM    The following pending diet order is being considered for treatment of Moderate protein-calorie malnutrition:null  This patient has been assessed with a concern for Malnutrition and has been determined to have a diagnosis/diagnoses of Moderate protein-calorie malnutrition.    This patient is being managed with:   Diet Renal Restrictions-  For patients receiving Renal Replacement - No Protein Restr No Conc K No Conc Phos Low Sodium  Dysphagia 3 Soft Thin Liquids ([id number])  Supplement Feeding Modality:  Oral  Nepro Cans or Servings Per Day:  1       Frequency:  Daily  Entered: Mar  4 2021 10:18AM    Diet Renal Restrictions-  For patients receiving Renal Replacement - No Protein Restr No Conc K No Conc Phos Low Sodium  Entered: Feb 25 2021 12:36PM    The following pending diet order is being considered for treatment of Moderate protein-calorie malnutrition:null

## 2021-03-08 NOTE — PROGRESS NOTE ADULT - PROBLEM SELECTOR PROBLEM 8
BPH (benign prostatic hyperplasia)
BPH (benign prostatic hyperplasia)
History of BPH
Acute renal failure
BPH (benign prostatic hyperplasia)
Acute renal failure
Ascites
History of BPH
Ascites
History of BPH
History of BPH
Ascites

## 2021-03-08 NOTE — PROGRESS NOTE ADULT - ASSESSMENT
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 Hematologist is Dr. Grace. s/p HD catheter placement (02/22), complicated by hypotension during dialysis and MSSA bacteremia.       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  - Received 3000 units rFVIII on 3/5 for HD catheter placement.   - Bleeding from HD catheter site after procedure requiring PRBC and platelet transfusion. Factor VIII levels all >90% on 3/6 and 3/7 so less likely to be due to hemophilia. CT chest with no evidence of bleeding. Vascular consulted to assess the HD port site.   - s/p DDAVP 0.3 mcg/kg x1 on 3/6 4PM  - s/p 3,000 units rFVIII on 3/6 and AM of 3/7  - s/p 2 units PRBC on 3/6 & 3/7 and 1 unit of platelet on 3/6.   - Maintain active T+S, transfuse if Hb < 7 or symptomatic   - Please give 2000 units rFVIII today given factor VIII is 66%   - Start tranexamic acid oral 1300mg qd x 5 days, renally dosed.    - also of note possibly mild inhibitor noted on 2 hr mixing study in the past, however his FVIII acitivity is >100% therefore inhibitor is not playing a part  - Give 1 unit of platelet for platelet count <50,000 given uremic platelet dysfunction.     Discussed with Dr. 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 Hematologist is Dr. Grace. s/p HD catheter placement (02/22), complicated by hypotension during dialysis and MSSA bacteremia.       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  - Received 3000 units rFVIII on 3/5 for HD catheter placement.   - Bleeding from HD catheter site after procedure requiring PRBC and platelet transfusion. Factor VIII levels all >90% on 3/6 and 3/7 so less likely to be due to hemophilia. CT chest with no evidence of bleeding. Vascular consulted to assess the HD port site, recommended pressure dressing.   - s/p DDAVP 0.3 mcg/kg x1 on 3/6 4PM  - s/p 3,000 units rFVIII on 3/6 and AM of 3/7  - s/p 2 units PRBC on 3/6 & 3/7 and 1 unit of platelet on 3/6.   - Maintain active T+S, transfuse if Hb < 7 or symptomatic   - Please give 2000 units rFVIII today given factor VIII is 66%   - Start tranexamic acid oral 1300mg qd x 5 days, renally dosed.    - also of note possibly mild inhibitor noted on 2 hr mixing study in the past, however his FVIII acitivity is >100% therefore inhibitor is not playing a part  - Give 1 unit of platelet for platelet count <50,000 given uremic platelet dysfunction.     Discussed with Dr. Fry

## 2021-03-08 NOTE — PROGRESS NOTE ADULT - SUBJECTIVE AND OBJECTIVE BOX
INTERVAL HPI/OVERNIGHT EVENTS: STEPHANIE    SUBJECTIVE: Patient seen and examined at bedside. No lightheadedness, dizziness, SOB. No fever/chills. Urinating normally. No CP, cough, n/v/d. ROS otherwise negative     OBJECTIVE:    VITAL SIGNS:  ICU Vital Signs Last 24 Hrs  T(C): 36.6 (06 Mar 2021 20:43), Max: 36.9 (06 Mar 2021 09:20)  T(F): 97.9 (06 Mar 2021 20:43), Max: 98.5 (06 Mar 2021 09:20)  HR: 98 (06 Mar 2021 20:43) (94 - 112)  BP: 121/68 (06 Mar 2021 20:43) (97/55 - 128/54)  BP(mean): --  ABP: --  ABP(mean): --  RR: 18 (06 Mar 2021 20:43) (16 - 19)  SpO2: 98% (06 Mar 2021 20:43) (95% - 98%)        03-05 @ 07:01  -  03-06 @ 07:00  --------------------------------------------------------  IN: 0 mL / OUT: 1700 mL / NET: -1700 mL    03-06 @ 07:01  -  03-07 @ 05:59  --------------------------------------------------------  IN: 0 mL / OUT: 800 mL / NET: -800 mL      CAPILLARY BLOOD GLUCOSE          PHYSICAL EXAM:  General: WDWN, NAD, resting comfortably in bed  HEENT: NC/AT; anicteric sclera; MMM  Neck: supple  Cardiovascular: +S1/S2; RRR  Respiratory: CTA B/L; no W/R/R  Chest: THC catheter with bandage over it, no oozing or bleeding appreciated   Gastrointestinal: soft, +distension. NT; +BSx4  Extremities: WWP; trace pitting edema   Vascular: 2+ radial, DP/PT pulses B/L  Neurological: AAOx3, moves all 4 extremities         MEDICATIONS:  MEDICATIONS  (STANDING):  ceFAZolin   IVPB 1000 milliGRAM(s) IV Intermittent every 24 hours  doxazosin 4 milliGRAM(s) Oral daily  folic acid 1 milliGRAM(s) Oral daily  midodrine. 7.5 milliGRAM(s) Oral every 8 hours  nystatin Cream 1 Application(s) Topical every 12 hours  pantoprazole    Tablet 40 milliGRAM(s) Oral before breakfast  petrolatum white Ointment 1 Application(s) Topical every 24 hours  polyethylene glycol 3350 17 Gram(s) Oral daily  PPD  5 Tuberculin Unit(s) Injectable 5 Unit(s) IntraDermal once  predniSONE   Tablet 50 milliGRAM(s) Oral daily  senna 2 Tablet(s) Oral at bedtime  sevelamer carbonate 800 milliGRAM(s) Oral three times a day with meals  sodium chloride 0.65% Nasal 1 Spray(s) Both Nostrils two times a day  sodium chloride 0.9% Bolus 500 milliLiter(s) IV Bolus once    MEDICATIONS  (PRN):      ALLERGIES:  Allergies    allopurinol (Other)    Intolerances        LABS:                        7.9    10.62 )-----------( 92       ( 07 Mar 2021 05:31 )             24.9     03-06    143  |  101  |  95<H>  ----------------------------<  107<H>  4.6   |  31  |  4.14<H>    Ca    8.7      06 Mar 2021 08:05  Phos  4.6     03-06  Mg     2.1     03-06      PT/INR - ( 07 Mar 2021 05:31 )   PT: 15.0 sec;   INR: 1.26          PTT - ( 07 Mar 2021 05:31 )  PTT:29.6 sec      RADIOLOGY & ADDITIONAL TESTS: Reviewed.

## 2021-03-08 NOTE — PROGRESS NOTE ADULT - SUBJECTIVE AND OBJECTIVE BOX
Patient is a 76y Male seen and evaluated at bedside during HD. Catheter dressing saturated with blood. F/u haem regarding haemophilia management. IHD session 3 today. Plan for outpt HD placement.     Meds:    ceFAZolin   IVPB 1000 every 24 hours  doxazosin 4 daily  FIRST- Mouthwash  BLM 10 every 6 hours PRN  folic acid 1 daily  midodrine. 7.5 every 8 hours  nystatin Cream 1 every 12 hours  pantoprazole    Tablet 40 before breakfast  petrolatum white Ointment 1 every 24 hours  polyethylene glycol 3350 17 daily  PPD  5 Tuberculin Unit(s) Injectable 5 once  predniSONE   Tablet 50 daily  senna 2 at bedtime  sevelamer carbonate 800 three times a day with meals  sodium chloride 0.65% Nasal 1 two times a day      T(C): , Max: 37.4 (03-08-21 @ 06:08)  T(F): , Max: 99.3 (03-08-21 @ 06:08)  HR: 100 (03-08-21 @ 10:15)  BP: 131/58 (03-08-21 @ 10:15)  BP(mean): --  RR: 16 (03-08-21 @ 10:15)  SpO2: 98% (03-08-21 @ 10:15)  Wt(kg): --    03-07 @ 07:01  -  03-08 @ 07:00  --------------------------------------------------------  IN: 0 mL / OUT: 350 mL / NET: -350 mL    Review of Systems:  RESPIRATORY: No shortness of breath  CARDIOVASCULAR: No chest pain   MUSCULOSKELETAL: No leg oedema    PHYSICAL EXAM:  GENERAL: well-developed, well nourished, alert, NAD on RA  CHEST/LUNG: Clear to auscultation bilaterally  HEART: normal S1S2, RRR  ABDOMEN: Soft, Nontender  EXTREMITIES: No oedema   ACCESS: RIJ tunneled cath 3/5- blood soaked dressing      LABS:                        7.7    13.57 )-----------( 97       ( 08 Mar 2021 10:41 )             24.0     03-08    137  |  101  |  76<H>  ----------------------------<  207<H>  4.5   |  28  |  3.64<H>    Ca    8.4      08 Mar 2021 10:41  Phos  3.3     03-08  Mg     2.0     03-08    TPro  5.0<L>  /  Alb  2.7<L>  /  TBili  0.6  /  DBili  x   /  AST  12  /  ALT  <5<L>  /  AlkPhos  179<H>  03-08      PT/INR - ( 08 Mar 2021 10:41 )   PT: 14.9 sec;   INR: 1.26          PTT - ( 08 Mar 2021 10:41 )  PTT:33.1 sec          RADIOLOGY & ADDITIONAL STUDIES:

## 2021-03-08 NOTE — PROVIDER CONTACT NOTE (CRITICAL VALUE NOTIFICATION) - ACTION/TREATMENT ORDERED:
No intervention at this time. per MD 13.2 reading is inaccurate reading.
No immediate intervention. Will continue to monitor.

## 2021-03-08 NOTE — PROGRESS NOTE ADULT - PROBLEM SELECTOR PROBLEM 1
Acute kidney injury
Bacteremia
ESRD (end stage renal disease)
Acute kidney injury
Bacteremia
RU (acute kidney injury)
Acute renal failure
Bacteremia
RU (acute kidney injury)
Bacteremia
Acute renal failure
Acute renal failure
Acute renal failure superimposed on stage 5 chronic kidney disease, not on chronic dialysis, unspecified acute renal failure type
Bacteremia

## 2021-03-08 NOTE — PROGRESS NOTE ADULT - ASSESSMENT
76M PMHx Hemophilia A, HTN, Gout, p/w RU in setting of DRESS from allopurinol on steroids   hypervolemic, hyperkalemic, uremic, and acidotic without improvement   started on hemodialysis 2/23     #RU secondary to ATN vs AIN on steroids   #uremic pericarditis  #thrombocytopenia - could be 2/2 sepsis vs reaction to dialyzer - will use Revaclear with next HD instead of Optiflux    Long steroid taper given prolonged use- reduce by 10mg/week    Staph bacteraemia on cx 2/28; cultures negative 3/1  S/p tunneled cath and long term HD 3/5- plan for outpt placement  Haem for bleeding management  HD session #3 today    Dialyzer: Revaclear 300         QB: 300 mL/min         QD: 500 mL/min      K bath: 2  Goal UF: 1L                Duration: 180 min     Volume, electrolyte and bicarb management per HD  BP: controlled on midodrine 7.5q8  BMD: renvela 800 TID  Anaemia- no IV iron given septicaemia, haem/onc involved for haemophilia    Daily weights, strict I&Os, renally dose meds, renal diet

## 2021-03-08 NOTE — PROGRESS NOTE ADULT - ASSESSMENT
76M PMH Hemophilia A, HTN, gout c/b allopurinol-induced DRESS Syndrome, CKD (on high dose steroids) who p/w acute on chronic renal failure c/b hemodynamically unstable Afib w RVR and shock c/b thrombocytopenia, requiring emergent initation of HD. Hospital course c/b MSSA bacteremia and oozing of blood from THC site.

## 2021-03-08 NOTE — PROVIDER CONTACT NOTE (CRITICAL VALUE NOTIFICATION) - NS PROVIDER READ BACK TO LAB
yes
Eliptical Excision Additional Text (Leave Blank If You Do Not Want): The margin was drawn around the clinically apparent lesion.  An elliptical shape was then drawn on the skin incorporating the lesion and margins.  Incisions were then made along these lines to the appropriate tissue plane and the lesion was extirpated.

## 2021-03-08 NOTE — PROVIDER CONTACT NOTE (CRITICAL VALUE NOTIFICATION) - TEST AND RESULT REPORTED:
Platelet 19
Hgb 7.7, drop from 13.2
Blood Culture Anaerobic Bottle Positive gram positive cocci in clusters
Troponin   0.15
Troponin - 0.10

## 2021-03-08 NOTE — PROGRESS NOTE ADULT - PROBLEM SELECTOR PLAN 2
HD session today   -c/w renvela 800 TID  -DC torsemide per renal   -renal recs appreciated   - strict Is/Os, daily weights, renal diet, renally dose meds

## 2021-03-08 NOTE — PROGRESS NOTE ADULT - ATTENDING COMMENTS
Patient was seen and examined with the resident team today.  I agree with Dr. Thomas's assessment and plan with the following exceptions/additions:     Briefly, this is a 75yo gentleman with a PMH of Hemophilia A, HTN, Gout c/b allopurinol-induced DRESS Syndrome and CKD (on high-dose steroids) who p/w acute on chronic renal failure c/b HD unstable Afib w/RVR and shock c/b thrombocytopenia, requiring emergent initiation of HD.  Hospital course c/b MSSA bacteremia with slow clearance of cultures, pericardium presumed to be the source.  Transferred to the [facility] on 3/1 for ongoing volume overload, coordination of outpatient HD and clearance of blood cultures.  Was nearing discharge w/TDC placement on 3/5, when he developed post-procedure acute blood loss anemia and hypotension w/HD, requiring extensive blood products.  Now clinically improving but still has not demonstrated a stable Hb.  Also appears more deconditioned after this weekend's events.     -- per H/O, will get tranexamic acid PO x 5 days and Factor VIII x1 today   -- Torsemide held in light of hypotension  -- c/w Cefazolin as per ID w/HD; upon discharge 2g/2g/3g after HD until 4/12 and will need to ensure labs sent to Dr. Barnard's office  -- c/w Pred 50mg x7 days (started on 3/6) and then decrease by 10mg weekly until discontinued  -- family will discuss PMD the management of his outpatient Pred taper   -- c/w Midodrine 7.55mg TID in light of recent hypotension  -- please ensure PT works with patient and wasn't moving around much this weekend    -- DVT PPx - SCDs given hemophilia and thrombocytopenia  -- Dispo - would like to observe x 24 hrs to show stable Hb; home w/services w/outpatient HD once medically cleared    Amarilys Velasco  [phone]

## 2021-03-08 NOTE — PROGRESS NOTE ADULT - PROBLEM SELECTOR PROBLEM 7
Ascites
BPH (benign prostatic hyperplasia)
Gout
BPH (benign prostatic hyperplasia)
Ascites
Gout
BPH (benign prostatic hyperplasia)
Tachycardia
BPH (benign prostatic hyperplasia)
Tachycardia
Gout
Tachycardia
Gout
Tachycardia

## 2021-03-08 NOTE — PROGRESS NOTE ADULT - PROBLEM SELECTOR PLAN 9
F: none   E: replete PRN  N: renal, DASH  Dvt ppx: SCD d/t hemophilia   GI ppx: none  DISPO: RMF   Code status: Full

## 2021-03-09 ENCOUNTER — TRANSCRIPTION ENCOUNTER (OUTPATIENT)
Age: 76
End: 2021-03-09

## 2021-03-09 VITALS
SYSTOLIC BLOOD PRESSURE: 100 MMHG | HEART RATE: 83 BPM | OXYGEN SATURATION: 96 % | DIASTOLIC BLOOD PRESSURE: 58 MMHG | RESPIRATION RATE: 18 BRPM | TEMPERATURE: 98 F

## 2021-03-09 DIAGNOSIS — Z91.89 OTHER SPECIFIED PERSONAL RISK FACTORS, NOT ELSEWHERE CLASSIFIED: ICD-10-CM

## 2021-03-09 LAB
ALBUMIN SERPL ELPH-MCNC: 2.6 G/DL — LOW (ref 3.3–5)
ALP SERPL-CCNC: 169 U/L — HIGH (ref 40–120)
ALT FLD-CCNC: <5 U/L — LOW (ref 10–45)
ANION GAP SERPL CALC-SCNC: 10 MMOL/L — SIGNIFICANT CHANGE UP (ref 5–17)
AST SERPL-CCNC: 14 U/L — SIGNIFICANT CHANGE UP (ref 10–40)
BILIRUB SERPL-MCNC: 0.6 MG/DL — SIGNIFICANT CHANGE UP (ref 0.2–1.2)
BUN SERPL-MCNC: 48 MG/DL — HIGH (ref 7–23)
CALCIUM SERPL-MCNC: 8.4 MG/DL — SIGNIFICANT CHANGE UP (ref 8.4–10.5)
CHLORIDE SERPL-SCNC: 100 MMOL/L — SIGNIFICANT CHANGE UP (ref 96–108)
CO2 SERPL-SCNC: 28 MMOL/L — SIGNIFICANT CHANGE UP (ref 22–31)
CREAT SERPL-MCNC: 2.55 MG/DL — HIGH (ref 0.5–1.3)
CULTURE RESULTS: SIGNIFICANT CHANGE UP
CULTURE RESULTS: SIGNIFICANT CHANGE UP
FACT VIII ACT/NOR PPP: 66 % — SIGNIFICANT CHANGE UP (ref 51–138)
GLUCOSE SERPL-MCNC: 97 MG/DL — SIGNIFICANT CHANGE UP (ref 70–99)
HCT VFR BLD CALC: 22.9 % — LOW (ref 39–50)
HGB BLD-MCNC: 7.4 G/DL — LOW (ref 13–17)
MAGNESIUM SERPL-MCNC: 1.8 MG/DL — SIGNIFICANT CHANGE UP (ref 1.6–2.6)
MCHC RBC-ENTMCNC: 28.8 PG — SIGNIFICANT CHANGE UP (ref 27–34)
MCHC RBC-ENTMCNC: 32.3 GM/DL — SIGNIFICANT CHANGE UP (ref 32–36)
MCV RBC AUTO: 89.1 FL — SIGNIFICANT CHANGE UP (ref 80–100)
NRBC # BLD: 0 /100 WBCS — SIGNIFICANT CHANGE UP (ref 0–0)
ORGANISM # SPEC MICROSCOPIC CNT: SIGNIFICANT CHANGE UP
ORGANISM # SPEC MICROSCOPIC CNT: SIGNIFICANT CHANGE UP
PHOSPHATE SERPL-MCNC: 2.6 MG/DL — SIGNIFICANT CHANGE UP (ref 2.5–4.5)
PLATELET # BLD AUTO: 105 K/UL — LOW (ref 150–400)
POTASSIUM SERPL-MCNC: 3.8 MMOL/L — SIGNIFICANT CHANGE UP (ref 3.5–5.3)
POTASSIUM SERPL-SCNC: 3.8 MMOL/L — SIGNIFICANT CHANGE UP (ref 3.5–5.3)
PROT SERPL-MCNC: 4.8 G/DL — LOW (ref 6–8.3)
RBC # BLD: 2.57 M/UL — LOW (ref 4.2–5.8)
RBC # FLD: 13.8 % — SIGNIFICANT CHANGE UP (ref 10.3–14.5)
SODIUM SERPL-SCNC: 138 MMOL/L — SIGNIFICANT CHANGE UP (ref 135–145)
SPECIMEN SOURCE: SIGNIFICANT CHANGE UP
SPECIMEN SOURCE: SIGNIFICANT CHANGE UP
WBC # BLD: 12.38 K/UL — HIGH (ref 3.8–10.5)
WBC # FLD AUTO: 12.38 K/UL — HIGH (ref 3.8–10.5)

## 2021-03-09 PROCEDURE — 99239 HOSP IP/OBS DSCHRG MGMT >30: CPT

## 2021-03-09 RX ORDER — MIDODRINE HYDROCHLORIDE 2.5 MG/1
3 TABLET ORAL
Qty: 0 | Refills: 0 | DISCHARGE
Start: 2021-03-09

## 2021-03-09 RX ORDER — MIDODRINE HYDROCHLORIDE 2.5 MG/1
1.5 TABLET ORAL
Qty: 135 | Refills: 0
Start: 2021-03-09 | End: 2021-04-07

## 2021-03-09 RX ORDER — TRANEXAMIC ACID 100 MG/ML
2 INJECTION, SOLUTION INTRAVENOUS
Qty: 0 | Refills: 0 | DISCHARGE
Start: 2021-03-09

## 2021-03-09 RX ADMIN — SEVELAMER CARBONATE 800 MILLIGRAM(S): 2400 POWDER, FOR SUSPENSION ORAL at 12:53

## 2021-03-09 RX ADMIN — TRANEXAMIC ACID 1300 MILLIGRAM(S): 100 INJECTION, SOLUTION INTRAVENOUS at 14:06

## 2021-03-09 RX ADMIN — Medication 1 SPRAY(S): at 06:32

## 2021-03-09 RX ADMIN — Medication 1 APPLICATION(S): at 14:07

## 2021-03-09 RX ADMIN — Medication 50 MILLIGRAM(S): at 06:31

## 2021-03-09 RX ADMIN — PANTOPRAZOLE SODIUM 40 MILLIGRAM(S): 20 TABLET, DELAYED RELEASE ORAL at 06:31

## 2021-03-09 RX ADMIN — MIDODRINE HYDROCHLORIDE 7.5 MILLIGRAM(S): 2.5 TABLET ORAL at 06:31

## 2021-03-09 RX ADMIN — MIDODRINE HYDROCHLORIDE 7.5 MILLIGRAM(S): 2.5 TABLET ORAL at 14:06

## 2021-03-09 RX ADMIN — SEVELAMER CARBONATE 800 MILLIGRAM(S): 2400 POWDER, FOR SUSPENSION ORAL at 08:49

## 2021-03-09 RX ADMIN — NYSTATIN CREAM 1 APPLICATION(S): 100000 CREAM TOPICAL at 06:32

## 2021-03-09 RX ADMIN — Medication 4 MILLIGRAM(S): at 06:31

## 2021-03-09 RX ADMIN — Medication 1 MILLIGRAM(S): at 12:53

## 2021-03-09 NOTE — PROGRESS NOTE ADULT - SUBJECTIVE AND OBJECTIVE BOX
INTERVAL HPI/OVERNIGHT EVENTS: No further bleeding noted from PC site. Has dry blood in left nostril. Factor VIII 66% today.     VITAL SIGNS:  Vital Signs Last 24 Hrs  T(C): 37.7 (09 Mar 2021 06:20), Max: 37.7 (09 Mar 2021 06:20)  T(F): 99.8 (09 Mar 2021 06:20), Max: 99.8 (09 Mar 2021 06:20)  HR: 89 (09 Mar 2021 06:20) (87 - 95)  BP: 121/65 (09 Mar 2021 06:20) (119/63 - 133/58)  RR: 18 (09 Mar 2021 06:20) (16 - 18)  SpO2: 94% (09 Mar 2021 06:20) (94% - 99%)      MEDICATIONS  (STANDING):  ceFAZolin   IVPB 1000 milliGRAM(s) IV Intermittent every 24 hours  doxazosin 4 milliGRAM(s) Oral daily  folic acid 1 milliGRAM(s) Oral daily  midodrine. 7.5 milliGRAM(s) Oral every 8 hours  nystatin Cream 1 Application(s) Topical every 12 hours  pantoprazole    Tablet 40 milliGRAM(s) Oral before breakfast  petrolatum white Ointment 1 Application(s) Topical every 24 hours  polyethylene glycol 3350 17 Gram(s) Oral daily  PPD  5 Tuberculin Unit(s) Injectable 5 Unit(s) IntraDermal once  predniSONE   Tablet 50 milliGRAM(s) Oral daily  senna 2 Tablet(s) Oral at bedtime  sevelamer carbonate 800 milliGRAM(s) Oral three times a day with meals  sodium chloride 0.65% Nasal 1 Spray(s) Both Nostrils two times a day  tranexamic  acid 1300 milliGRAM(s) Oral every 24 hours    MEDICATIONS  (PRN):  FIRST- Mouthwash  BLM 10 milliLiter(s) Swish and Spit every 6 hours PRN Mouth Care    Allergies    allopurinol (Other)    Intolerances      LABS:                                   7.4    12.38 )-----------( 105      ( 09 Mar 2021 07:03 )             22.9     03-09    138  |  100  |  48<H>  ----------------------------<  97  3.8   |  28  |  2.55<H>    Ca    8.4      09 Mar 2021 07:03  Phos  2.6     03-09  Mg     1.8     03-09    TPro  4.8<L>  /  Alb  2.6<L>  /  TBili  0.6  /  DBili  x   /  AST  14  /  ALT  <5<L>  /  AlkPhos  169<H>  03-09    PT/INR - ( 08 Mar 2021 10:41 )   PT: 14.9 sec;   INR: 1.26        PTT - ( 08 Mar 2021 10:41 )  PTT:33.1 sec    Factor VIII Assay (03.09.21 @ 07:03)   Factor VIII Assay: 66: The presence of direct thrombin inhibitors (argatroban, refludan) may   falsely decrease activity. %     RADIOLOGY:     < from: CT Chest No Cont (03.07.21 @ 11:32) >  Impression:  Bilateral small pleural effusions, with interval decrease in extent.  No consolidation.    < end of copied text >

## 2021-03-09 NOTE — DISCHARGE NOTE NURSING/CASE MANAGEMENT/SOCIAL WORK - PATIENT PORTAL LINK FT
You can access the FollowMyHealth Patient Portal offered by [facility] by registering at the following website: [email or web address]. By joining [facility]’s FollowMyHealth portal, you will also be able to view your health information using other applications (apps) compatible with our system.

## 2021-03-09 NOTE — PROGRESS NOTE ADULT - PROVIDER SPECIALTY LIST ADULT
Heme/Onc
Hospitalist
Infectious Disease
MICU
MICU
Nephrology
Cardiology
Heme/Onc
Heme/Onc
Infectious Disease
Infectious Disease
Internal Medicine
Internal Medicine
Nephrology
Rehab Medicine
Cardiology
Heme/Onc
Infectious Disease
Internal Medicine
Internal Medicine
Nephrology
Cardiology
Heme/Onc
Heme/Onc
Infectious Disease
Internal Medicine
Nephrology
Nephrology
Rehab Medicine
Rehab Medicine
Internal Medicine
Nephrology
Nephrology
Internal Medicine
Hospitalist

## 2021-03-09 NOTE — CHART NOTE - NSCHARTNOTEFT_GEN_A_CORE
Admitting Diagnosis:   Patient is a 76y old  Male who presents with a chief complaint of Weakness; Worsening CKD (08 Mar 2021 14:28)      PAST MEDICAL & SURGICAL HISTORY:  History of BPH    Gout    Hemophilia A    HTN (hypertension)    Uses cochlear implant        Current Nutrition Order:renal diet       PO Intake: Good (%) [   ]  Fair (50-75%) [ x ] Poor (<25%) [   ]    GI Issues: BM3/9    Pain: not reported this am    Skin Integrity :Unstageable PU shoulder b/l heel and buttocks DTI's     Labs:   03-09    138  |  100  |  48<H>  ----------------------------<  97  3.8   |  28  |  2.55<H>    Ca    8.4      09 Mar 2021 07:03  Phos  2.6     03-09  Mg     1.8     03-09    TPro  4.8<L>  /  Alb  2.6<L>  /  TBili  0.6  /  DBili  x   /  AST  14  /  ALT  <5<L>  /  AlkPhos  169<H>  03-09    CAPILLARY BLOOD GLUCOSE          Medications:  MEDICATIONS  (STANDING):  ceFAZolin   IVPB 1000 milliGRAM(s) IV Intermittent every 24 hours  doxazosin 4 milliGRAM(s) Oral daily  folic acid 1 milliGRAM(s) Oral daily  midodrine. 7.5 milliGRAM(s) Oral every 8 hours  nystatin Cream 1 Application(s) Topical every 12 hours  pantoprazole    Tablet 40 milliGRAM(s) Oral before breakfast  petrolatum white Ointment 1 Application(s) Topical every 24 hours  polyethylene glycol 3350 17 Gram(s) Oral daily  PPD  5 Tuberculin Unit(s) Injectable 5 Unit(s) IntraDermal once  predniSONE   Tablet 50 milliGRAM(s) Oral daily  senna 2 Tablet(s) Oral at bedtime  sevelamer carbonate 800 milliGRAM(s) Oral three times a day with meals  sodium chloride 0.65% Nasal 1 Spray(s) Both Nostrils two times a day  tranexamic  acid 1300 milliGRAM(s) Oral every 24 hours    MEDICATIONS  (PRN):  FIRST- Mouthwash  BLM 10 milliLiter(s) Swish and Spit every 6 hours PRN Mouth Care      Weight:77.6kg(2/26) no recent weights due to "no bedscale) noted admitted weight:84.5kg   Daily     Daily     Weight Change: uncertain of weight changes    Estimated energy needs: Based on IBW due to fluctuation weights.IBW:69.8kg [id number](PU and HD demands):1745-2094kcal and x1.4-1.6gmprotein due to HD and PU:98-112gmprotein and fluids per team    Subjective: 73y/o male with history of Hemophillia A,HTN,Gout c/b allopurinol induced DRESS syndrome,CKD requiring emergent HD.Ongoing HD.Also with MSSA bacteremia.Eating 60-75% of renal diet.Would benefit from addition of Nepro x1 (425kcal and 19.8gmprotein)       Previous Nutrition Diagnosis:Increased nutrient needs r/t increased demand for kcal and protein AEB: HD and PU demands    Active [  x ]  Resolved [   ]    If resolved, new PES:     Goal:Meet >75% of EER consistently    Recommendations:1.Updated weights 2./Add 1 Nepro    Education: will review with patient prior to D/C    Risk Level: High [   ] Moderate [ x  ] Low [   ]

## 2021-03-09 NOTE — DISCHARGE NOTE NURSING/CASE MANAGEMENT/SOCIAL WORK - NSDCFUADDAPPT_GEN_ALL_CORE_FT
You currently have an appointment with Dr Briceño of hematology/oncology, which we hav escheduled for you. However, we know you are in contact with Dr Grace, please follow up with him if possible within 1 week of discharge.

## 2021-03-09 NOTE — PROGRESS NOTE ADULT - ASSESSMENT
per Internal Medicine    77 yo M PMH Hemophilia A, HTN, gout c/b allopurinol-induced DRESS Syndrome, CKD (on high dose steroids) who p/w acute on chronic renal failure c/b hemodynamically unstable Afib w RVR and shock c/b thrombocytopenia, requiring emergent initation of HD. Hospital course c/b MSSA bacteremia and oozing of blood from THC site.    Problem/Plan - 1:  ·  Problem: Bacteremia.  Plan: With MSSA bacteremia  -cefazolin 1g qd while inpatient   -Cefazolin 2g, 2g, 3g 3x weekly dosed after HD  - Duration of antibiotics is 6 weeks ( 3/1 - 4/12 )  - ID recs appreciated, will need outpt follow up.     Problem/Plan - 2:  ·  Problem: Acute renal failure.  Plan: HD session today   -c/w renvela 800 TID  -DC torsemide per renal   -renal recs appreciated   - strict Is/Os, daily weights, renal diet, renally dose meds.     Problem/Plan - 3:  ·  Problem: Hemophilia A.  Plan: Prior history of significant bleeding in past following ERCP. Follows w Dr Grace ([facility] S/p multiple factor 8 replacements due to oozing blood from THC site during this admission.  -Heme following - follow recs   -1300 mg tranexamic acid qd (3/8-3/12) per hematology  -Daily coags and Factor VIII  -active T&S (3/5)   -f/u Dr Grace outpt     #folic acid deficiency - pt found to be folate deficient  -c/w 1 mg folic acid qd     #Anemia - Hgb 11.5 on arrival with unclear baseline. Currently Hgb 7.8. THC catheter site oozing blood. Hemodynamically stable, with CT chest showing no internal bleeding. s/p 2u pRBC. s/p multiple factor 8 replacements.   -heme recs appreciated   - active T&S (3/8).     Problem/Plan - 4:  ·  Problem: Pericarditis.  Plan: Cards and CTSG say no intervention or changes in mgmt on pericarditis/pericardial fluid  - repeat EKG for any chest pain.     Problem/Plan - 5:  ·  Problem: Blood pressure instability.  Plan: -c/w 7.5 mg TID midodrine, can titrate down as outpt.     Problem/Plan - 6:  Problem: HTN (hypertension). Plan: Has been normotensive/hypotensive during hospital stay, likely in setting of prolonged infection.    #Dress Syndrome  - prednisone 50 mg (3/6-3/12), reduce by 10 mg each week until tapered to 0   -outpt followup.    Problem/Plan - 7:  ·  Problem: Tachycardia.  Plan: RESOLVED.   -had episode of hemodynamically unstable afib rvr, requiring 1-day stay in MICU; resolved and has not had any further episodes  - hold on anticoagulation given bleeding risks.     Problem/Plan - 8:  ·  Problem: BPH (benign prostatic hyperplasia).  Plan: #History of BPH  - Continue Doxazosin daily  - Monitor UOP and Bladder scan for any concerns of retention.    #Gout  Previously on allopurinol for gout prophylaxis; however, patient acquired DRESS from the therapy  - Monitor symptoms and avoid NSAIDs for pain.     Problem/Plan - 9:  ·  Problem: Nutrition, metabolism, and development symptoms.  Plan: F: none   E: replete PRN  N: renal, DASH  Dvt ppx: SCD d/t hemophilia   GI ppx: none  DISPO: [facility]   Code status: Full.

## 2021-03-09 NOTE — PROGRESS NOTE ADULT - ASSESSMENT
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 Hematologist is Dr. Grace. s/p HD catheter placement (02/22), complicated by hypotension during dialysis and MSSA bacteremia.       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  - Received 3000 units rFVIII on 3/5 for HD catheter placement.   - Bleeding from HD catheter site after procedure requiring PRBC and platelet transfusion. Factor VIII levels all >90% on 3/6 and 3/7 so less likely to be due to hemophilia. CT chest with no evidence of bleeding. Vascular consulted to assess the HD port site, recommended pressure dressing. Bleeding is due to underlying hemophilia A and platelet dysfunction from uremia.  - s/p DDAVP 0.3 mcg/kg x1 on 3/6 4PM  - s/p 3,000 units rFVIII on 3/6 and AM of 3/7  - s/p 2 units PRBC on 3/6 & 3/7 and 1 unit of platelet on 3/6.   - Maintain active T+S, transfuse if Hb < 7 or symptomatic   - s/p 2000 units rFVIII on 3/8 for factor VIII level of 66%. Repeat factor VIII level is 66% today with no further bleeding noted. Does not require additional rFVIII today.   - Discharge on tranexamic acid oral 1300mg qd x 5 days total, renally dosed.    - also of note possibly mild inhibitor noted on 2 hr mixing study in the past, however his FVIII acitivity is >100% therefore inhibitor is not playing a part  - Give 1 unit of platelet for platelet count <50,000 given uremic platelet dysfunction.   - On discharge have patient follow up with Dr. Fischer within 1 week.     Discussed with Dr. 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 Hematologist is Dr. Grace. s/p HD catheter placement (02/22), complicated by hypotension during dialysis and MSSA bacteremia.       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  - Received 3000 units rFVIII on 3/5 for HD catheter placement.   - Bleeding from HD catheter site after procedure requiring PRBC and platelet transfusion. Factor VIII levels all >90% on 3/6 and 3/7 so less likely to be due to hemophilia. CT chest with no evidence of bleeding. Vascular consulted to assess the HD port site, recommended pressure dressing. Bleeding is due to underlying hemophilia A and platelet dysfunction from uremia.  - s/p DDAVP 0.3 mcg/kg x1 on 3/6 4PM  - s/p 3,000 units rFVIII on 3/6 and AM of 3/7  - s/p 2 units PRBC on 3/6 & 3/7 and 1 unit of platelet on 3/6.   - Maintain active T+S, transfuse if Hb < 7 or symptomatic   - s/p 2000 units rFVIII on 3/8 for factor VIII level of 66%. Repeat factor VIII level is 66% today with no further bleeding noted. Does not require additional rFVIII today.   - Discharge on tranexamic acid oral 1300mg qd x 5 days total, renally dosed.    - also of note possibly mild inhibitor noted on 2 hr mixing study in the past, however his FVIII acitivity is >100% therefore inhibitor is not playing a part  - Give 1 unit of platelet for platelet count <50,000 given uremic platelet dysfunction.   - On discharge have patient follow up with Dr. Briceño on wednesday/thursday within 1 week. Appointment scheduled for 3/16 11:30 AM.     Discussed with Dr. 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 Hematologist is Dr. Grace. s/p HD catheter placement (02/22), complicated by hypotension during dialysis and MSSA bacteremia.       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  - Received 3000 units rFVIII on 3/5 for HD catheter placement.   - Bleeding from HD catheter site after procedure requiring PRBC and platelet transfusion. Factor VIII levels all >90% on 3/6 and 3/7 so less likely to be due to hemophilia. CT chest with no evidence of bleeding. Vascular consulted to assess the HD port site, recommended pressure dressing. Bleeding is due to underlying hemophilia A and platelet dysfunction from uremia.  - s/p DDAVP 0.3 mcg/kg x1 on 3/6 4PM  - s/p 3,000 units rFVIII on 3/6 and AM of 3/7  - s/p 2 units PRBC on 3/6 & 3/7 and 1 unit of platelet on 3/6.   - Maintain active T+S, transfuse if Hb < 7 or symptomatic   - s/p 2000 units rFVIII on 3/8 for factor VIII level of 66%. Repeat factor VIII level is 66% today with no further bleeding noted. Does not require additional rFVIII today.   - Discharge on tranexamic acid oral 1300mg qd x 5 days total, renally dosed.    - also of note possibly mild inhibitor noted on 2 hr mixing study in the past, however his FVIII acitivity is >100% therefore inhibitor is not playing a part  - Give 1 unit of platelet for platelet count <50,000 given uremic platelet dysfunction.   - On discharge have patient follow up with Dr. Grace at [facility] (His primary hematologist). Spoke with the daughter who has been in contact with Dr. Grace since the admission and will be scheduling an appointment with him within a week.      Discussed with Dr. Fry

## 2021-03-09 NOTE — PROGRESS NOTE ADULT - SUBJECTIVE AND OBJECTIVE BOX
Physical Medicine and Rehabilitation Progress Note:    Patient is a 76y old  Male who presents with a chief complaint of Weakness; Worsening CKD (09 Mar 2021 12:55)      HPI:  Mr. Don is a 76 year old male with a past medical history of CKD (recent Cr 6.6 at [facility]), Hemophilia A, HTN, Gout, and DRESS Syndrome who presents with weakness for 1 week. Mr. Don states that he was in his usual state of health (performing all ADLs) until January 1st when he had an acute gout attack. He was treated with allopurinol x 3 weeks where he developed a rash associated with decreased appetite and developed DRESS Syndrome. Subsequently, he was treated with prednisone 60 mg daily until February 7th. On February 14th, he was hospitalized at OSH for hyponatremia and RU (unclear Cr). He was discharged with nephrology follow up. Since that time, he has experienced increasing weakness and swelling in his stomach and legs. The swelling has made it difficult to ambulate due to pain. He denies any fevers/chills, nausea/vomiting, SOB or changes in bowel or urinary habits.     In the ED, he was afebrile (98.7 F), , /80, RR 18, and O2 saturation 95%. Labs were significant for WBC 28.30, Hgb 11.5, K 5.6, CO2 20  , Cr 6.59, albumin 2.7, alk phos 158, BNP 06370. CXR showed pleural effusion left greater than right with dependent edema consistent with volume overload. Imaging significant for a CT abdomen small to moderate bilateral pleural effusions, greater on the left. Mild pericardial effusion. Anasarca greater in left lower chest and trace ascites. LE dopplers negative for DVT. He was given Lasix 80 mg IV and Lokelma 10 g. He was admitted for acute on chronic renal failure and likely induction of hemodialysis.    (21 Feb 2021 03:38)                            7.4    12.38 )-----------( 105      ( 09 Mar 2021 07:03 )             22.9       03-09    138  |  100  |  48<H>  ----------------------------<  97  3.8   |  28  |  2.55<H>    Ca    8.4      09 Mar 2021 07:03  Phos  2.6     03-09  Mg     1.8     03-09    TPro  4.8<L>  /  Alb  2.6<L>  /  TBili  0.6  /  DBili  x   /  AST  14  /  ALT  <5<L>  /  AlkPhos  169<H>  03-09    Vital Signs Last 24 Hrs  T(C): 36.9 (09 Mar 2021 12:59), Max: 37.7 (09 Mar 2021 06:20)  T(F): 98.4 (09 Mar 2021 12:59), Max: 99.8 (09 Mar 2021 06:20)  HR: 83 (09 Mar 2021 12:59) (83 - 95)  BP: 100/58 (09 Mar 2021 12:59) (100/58 - 133/58)  BP(mean): --  RR: 18 (09 Mar 2021 12:59) (17 - 18)  SpO2: 96% (09 Mar 2021 12:59) (94% - 99%)    MEDICATIONS  (STANDING):  ceFAZolin   IVPB 1000 milliGRAM(s) IV Intermittent every 24 hours  doxazosin 4 milliGRAM(s) Oral daily  folic acid 1 milliGRAM(s) Oral daily  midodrine. 7.5 milliGRAM(s) Oral every 8 hours  nystatin Cream 1 Application(s) Topical every 12 hours  pantoprazole    Tablet 40 milliGRAM(s) Oral before breakfast  petrolatum white Ointment 1 Application(s) Topical every 24 hours  polyethylene glycol 3350 17 Gram(s) Oral daily  PPD  5 Tuberculin Unit(s) Injectable 5 Unit(s) IntraDermal once  predniSONE   Tablet 50 milliGRAM(s) Oral daily  senna 2 Tablet(s) Oral at bedtime  sevelamer carbonate 800 milliGRAM(s) Oral three times a day with meals  sodium chloride 0.65% Nasal 1 Spray(s) Both Nostrils two times a day  tranexamic  acid 1300 milliGRAM(s) Oral every 24 hours    MEDICATIONS  (PRN):  FIRST- Mouthwash  BLM 10 milliLiter(s) Swish and Spit every 6 hours PRN Mouth Care    Currently Undergoing Physical/ Occupational Therapy at bedside.    Functional Status Assessment:       Therapeutic Interventions      Bed Mobility  Bed Mobility Training Rolling/Turning: contact guard;  1 person assist;  verbal cues;  bed rails  Bed Mobility Training Scooting: contact guard;  1 person assist;  verbal cues  Bed Mobility Training Supine-to-Sit: minimum assist (75% patient effort);  1 person assist;  verbal cues;  bed rails  Bed Mobility Training Limitations: decreased ability to use arms for pushing/pulling;  decreased ability to use legs for bridging/pushing;  impaired ability to control trunk for mobility;  decreased strength;  impaired postural control    Sit-Stand Transfer Training  Transfer Training Sit-to-Stand Transfer: minimum assist (75% patient effort);  1 person assist;  verbal cues;  full weight-bearing   rolling walker  Transfer Training Stand-to-Sit Transfer: minimum assist (75% patient effort);  1 person assist;  verbal cues;  full weight-bearing   rolling walker  Sit-to-Stand Transfer Training Transfer Safety Analysis: decreased balance;  impaired balance;  decreased strength;  impaired postural control;  rolling walker    Gait Training  Gait Training: contact guard;  verbal cues;  1 person assist;  full weight-bearing   rolling walker;  10 feet;  from bed to bathroom  Gait Analysis: swing-through gait   decreased valeria;  decreased step length;  decreased stride length;  decreased strength;  impaired balance;  impaired postural control;  10 feet;  from bed to bathroom;  rolling walker          PM&R Impression: as above    Current Disposition Plan Recommendations:    subacute rehab placement, patient is declining

## 2021-03-10 LAB — FACT XIII ACT/NOR PPP CHRO: 85 % — SIGNIFICANT CHANGE UP (ref 51–163)

## 2021-03-10 RX ORDER — TRANEXAMIC ACID 100 MG/ML
2 INJECTION, SOLUTION INTRAVENOUS
Qty: 6 | Refills: 0
Start: 2021-03-10 | End: 2021-03-12

## 2021-03-16 DIAGNOSIS — N40.0 BENIGN PROSTATIC HYPERPLASIA WITHOUT LOWER URINARY TRACT SYMPTOMS: ICD-10-CM

## 2021-03-16 DIAGNOSIS — I32 PERICARDITIS IN DISEASES CLASSIFIED ELSEWHERE: ICD-10-CM

## 2021-03-16 DIAGNOSIS — R18.8 OTHER ASCITES: ICD-10-CM

## 2021-03-16 DIAGNOSIS — A41.01 SEPSIS DUE TO METHICILLIN SUSCEPTIBLE STAPHYLOCOCCUS AUREUS: ICD-10-CM

## 2021-03-16 DIAGNOSIS — R65.21 SEVERE SEPSIS WITH SEPTIC SHOCK: ICD-10-CM
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
## 2021-03-16 DIAGNOSIS — I24.8 OTHER FORMS OF ACUTE ISCHEMIC HEART DISEASE: ICD-10-CM

## 2021-03-16 DIAGNOSIS — L76.21 POSTPROCEDURAL HEMORRHAGE OF SKIN AND SUBCUTANEOUS TISSUE FOLLOWING A DERMATOLOGIC PROCEDURE: ICD-10-CM

## 2021-03-16 DIAGNOSIS — D69.59 OTHER SECONDARY THROMBOCYTOPENIA: ICD-10-CM

## 2021-03-16 DIAGNOSIS — Z96.21 COCHLEAR IMPLANT STATUS: ICD-10-CM

## 2021-03-16 DIAGNOSIS — M10.9 GOUT, UNSPECIFIED: ICD-10-CM

## 2021-03-16 DIAGNOSIS — J81.0 ACUTE PULMONARY EDEMA: ICD-10-CM

## 2021-03-16 DIAGNOSIS — E87.2 ACIDOSIS: ICD-10-CM

## 2021-03-16 DIAGNOSIS — I48.91 UNSPECIFIED ATRIAL FIBRILLATION: ICD-10-CM

## 2021-03-16 DIAGNOSIS — E87.5 HYPERKALEMIA: ICD-10-CM

## 2021-03-16 DIAGNOSIS — D63.1 ANEMIA IN CHRONIC KIDNEY DISEASE: ICD-10-CM

## 2021-03-16 DIAGNOSIS — N18.4 CHRONIC KIDNEY DISEASE, STAGE 4 (SEVERE): ICD-10-CM

## 2021-03-16 DIAGNOSIS — N17.0 ACUTE KIDNEY FAILURE WITH TUBULAR NECROSIS: ICD-10-CM

## 2021-03-16 DIAGNOSIS — R52 PAIN, UNSPECIFIED: ICD-10-CM

## 2021-03-16 DIAGNOSIS — I12.9 HYPERTENSIVE CHRONIC KIDNEY DISEASE WITH STAGE 1 THROUGH STAGE 4 CHRONIC KIDNEY DISEASE, OR UNSPECIFIED CHRONIC KIDNEY DISEASE: ICD-10-CM

## 2021-03-16 DIAGNOSIS — D66 HEREDITARY FACTOR VIII DEFICIENCY: ICD-10-CM

## 2021-03-16 DIAGNOSIS — E44.0 MODERATE PROTEIN-CALORIE MALNUTRITION: ICD-10-CM

## 2021-03-16 DIAGNOSIS — D72.12 DRUG RASH WITH EOSINOPHILIA AND SYSTEMIC SYMPTOMS SYNDROME: ICD-10-CM

## 2021-03-16 PROCEDURE — 84484 ASSAY OF TROPONIN QUANT: CPT

## 2021-03-16 PROCEDURE — 85670 THROMBIN TIME PLASMA: CPT

## 2021-03-16 PROCEDURE — 76937 US GUIDE VASCULAR ACCESS: CPT

## 2021-03-16 PROCEDURE — 85730 THROMBOPLASTIN TIME PARTIAL: CPT

## 2021-03-16 PROCEDURE — 85291 CLOT FACTOR XIII FIBRIN SCRN: CPT

## 2021-03-16 PROCEDURE — 76882 US LMTD JT/FCL EVL NVASC XTR: CPT

## 2021-03-16 PROCEDURE — 78802 RP LOCLZJ TUM WHBDY 1 D IMG: CPT

## 2021-03-16 PROCEDURE — 83540 ASSAY OF IRON: CPT

## 2021-03-16 PROCEDURE — 93970 EXTREMITY STUDY: CPT

## 2021-03-16 PROCEDURE — 71250 CT THORAX DX C-: CPT

## 2021-03-16 PROCEDURE — 86022 PLATELET ANTIBODIES: CPT

## 2021-03-16 PROCEDURE — 87340 HEPATITIS B SURFACE AG IA: CPT

## 2021-03-16 PROCEDURE — 82728 ASSAY OF FERRITIN: CPT

## 2021-03-16 PROCEDURE — 93005 ELECTROCARDIOGRAM TRACING: CPT

## 2021-03-16 PROCEDURE — 84100 ASSAY OF PHOSPHORUS: CPT

## 2021-03-16 PROCEDURE — 87040 BLOOD CULTURE FOR BACTERIA: CPT

## 2021-03-16 PROCEDURE — 87186 SC STD MICRODIL/AGAR DIL: CPT

## 2021-03-16 PROCEDURE — 74176 CT ABD & PELVIS W/O CONTRAST: CPT

## 2021-03-16 PROCEDURE — 82550 ASSAY OF CK (CPK): CPT

## 2021-03-16 PROCEDURE — 86923 COMPATIBILITY TEST ELECTRIC: CPT

## 2021-03-16 PROCEDURE — 85025 COMPLETE CBC W/AUTO DIFF WBC: CPT

## 2021-03-16 PROCEDURE — 82607 VITAMIN B-12: CPT

## 2021-03-16 PROCEDURE — 87635 SARS-COV-2 COVID-19 AMP PRB: CPT

## 2021-03-16 PROCEDURE — 80053 COMPREHEN METABOLIC PANEL: CPT

## 2021-03-16 PROCEDURE — U0005: CPT

## 2021-03-16 PROCEDURE — 86900 BLOOD TYPING SEROLOGIC ABO: CPT

## 2021-03-16 PROCEDURE — 80074 ACUTE HEPATITIS PANEL: CPT

## 2021-03-16 PROCEDURE — 85230 CLOT FACTOR VII PROCONVERTIN: CPT

## 2021-03-16 PROCEDURE — P9047: CPT

## 2021-03-16 PROCEDURE — P9016: CPT

## 2021-03-16 PROCEDURE — 83550 IRON BINDING TEST: CPT

## 2021-03-16 PROCEDURE — 86901 BLOOD TYPING SEROLOGIC RH(D): CPT

## 2021-03-16 PROCEDURE — 85379 FIBRIN DEGRADATION QUANT: CPT

## 2021-03-16 PROCEDURE — 85240 CLOT FACTOR VIII AHG 1 STAGE: CPT

## 2021-03-16 PROCEDURE — 85384 FIBRINOGEN ACTIVITY: CPT

## 2021-03-16 PROCEDURE — 83615 LACTATE (LD) (LDH) ENZYME: CPT

## 2021-03-16 PROCEDURE — 82746 ASSAY OF FOLIC ACID SERUM: CPT

## 2021-03-16 PROCEDURE — 83735 ASSAY OF MAGNESIUM: CPT

## 2021-03-16 PROCEDURE — 86706 HEP B SURFACE ANTIBODY: CPT

## 2021-03-16 PROCEDURE — 97116 GAIT TRAINING THERAPY: CPT

## 2021-03-16 PROCEDURE — 36415 COLL VENOUS BLD VENIPUNCTURE: CPT

## 2021-03-16 PROCEDURE — A9556: CPT

## 2021-03-16 PROCEDURE — 71045 X-RAY EXAM CHEST 1 VIEW: CPT

## 2021-03-16 PROCEDURE — P9037: CPT

## 2021-03-16 PROCEDURE — U0003: CPT

## 2021-03-16 PROCEDURE — 99152 MOD SED SAME PHYS/QHP 5/>YRS: CPT

## 2021-03-16 PROCEDURE — 97161 PT EVAL LOW COMPLEX 20 MIN: CPT

## 2021-03-16 PROCEDURE — 85290 CLOT FACTOR XIII FIBRIN STAB: CPT

## 2021-03-16 PROCEDURE — 93306 TTE W/DOPPLER COMPLETE: CPT

## 2021-03-16 PROCEDURE — 99153 MOD SED SAME PHYS/QHP EA: CPT

## 2021-03-16 PROCEDURE — 85611 PROTHROMBIN TEST: CPT

## 2021-03-16 PROCEDURE — 77001 FLUOROGUIDE FOR VEIN DEVICE: CPT

## 2021-03-16 PROCEDURE — 83880 ASSAY OF NATRIURETIC PEPTIDE: CPT

## 2021-03-16 PROCEDURE — 85732 THROMBOPLASTIN TIME PARTIAL: CPT

## 2021-03-16 PROCEDURE — 96374 THER/PROPH/DIAG INJ IV PUSH: CPT

## 2021-03-16 PROCEDURE — 36558 INSERT TUNNELED CV CATH: CPT

## 2021-03-16 PROCEDURE — 83010 ASSAY OF HAPTOGLOBIN QUANT: CPT

## 2021-03-16 PROCEDURE — C1769: CPT

## 2021-03-16 PROCEDURE — 85027 COMPLETE CBC AUTOMATED: CPT

## 2021-03-16 PROCEDURE — 76770 US EXAM ABDO BACK WALL COMP: CPT

## 2021-03-16 PROCEDURE — 82553 CREATINE MB FRACTION: CPT

## 2021-03-16 PROCEDURE — 99285 EMERGENCY DEPT VISIT HI MDM: CPT | Mod: 25

## 2021-03-16 PROCEDURE — 93312 ECHO TRANSESOPHAGEAL: CPT

## 2021-03-16 PROCEDURE — 86850 RBC ANTIBODY SCREEN: CPT

## 2021-03-16 PROCEDURE — C1750: CPT

## 2021-03-16 PROCEDURE — 86480 TB TEST CELL IMMUN MEASURE: CPT

## 2021-03-16 PROCEDURE — 85610 PROTHROMBIN TIME: CPT

## 2021-03-16 PROCEDURE — 87150 DNA/RNA AMPLIFIED PROBE: CPT

## 2021-03-16 PROCEDURE — 80048 BASIC METABOLIC PNL TOTAL CA: CPT

## 2021-03-16 PROCEDURE — 36430 TRANSFUSION BLD/BLD COMPNT: CPT

## 2021-03-16 PROCEDURE — 90935 HEMODIALYSIS ONE EVALUATION: CPT

## 2021-03-16 PROCEDURE — 85049 AUTOMATED PLATELET COUNT: CPT

## 2021-03-16 PROCEDURE — 86803 HEPATITIS C AB TEST: CPT

## 2021-03-16 PROCEDURE — 93308 TTE F-UP OR LMTD: CPT

## 2021-03-16 PROCEDURE — 81001 URINALYSIS AUTO W/SCOPE: CPT

## 2021-03-16 PROCEDURE — 85045 AUTOMATED RETICULOCYTE COUNT: CPT

## 2021-03-30 ENCOUNTER — APPOINTMENT (OUTPATIENT)
Dept: NEPHROLOGY | Facility: CLINIC | Age: 76
End: 2021-03-30

## 2021-04-01 ENCOUNTER — APPOINTMENT (OUTPATIENT)
Dept: INFECTIOUS DISEASE | Facility: CLINIC | Age: 76
End: 2021-04-01
Payer: MEDICARE

## 2021-04-01 DIAGNOSIS — Z82.49 FAMILY HISTORY OF ISCHEMIC HEART DISEASE AND OTHER DISEASES OF THE CIRCULATORY SYSTEM: ICD-10-CM

## 2021-04-01 DIAGNOSIS — Z87.39 PERSONAL HISTORY OF OTHER DISEASES OF THE MUSCULOSKELETAL SYSTEM AND CONNECTIVE TISSUE: ICD-10-CM

## 2021-04-01 DIAGNOSIS — T50.905S ADVERSE EFFECT OF UNSPECIFIED DRUGS, MEDICAMENTS AND BIOLOGICAL SUBSTANCES, SEQUELA: ICD-10-CM

## 2021-04-01 DIAGNOSIS — Z78.9 OTHER SPECIFIED HEALTH STATUS: ICD-10-CM

## 2021-04-01 DIAGNOSIS — Z86.79 PERSONAL HISTORY OF OTHER DISEASES OF THE CIRCULATORY SYSTEM: ICD-10-CM

## 2021-04-01 DIAGNOSIS — Z87.438 PERSONAL HISTORY OF OTHER DISEASES OF MALE GENITAL ORGANS: ICD-10-CM

## 2021-04-01 PROCEDURE — 99214 OFFICE O/P EST MOD 30 MIN: CPT | Mod: 95

## 2021-04-01 RX ORDER — ALLOPURINOL 300 MG/1
300 TABLET ORAL
Qty: 30 | Refills: 0 | Status: DISCONTINUED | COMMUNITY
Start: 2021-01-07

## 2021-04-01 NOTE — REASON FOR VISIT
[Follow-Up: _____] : a [unfilled] follow-up visit [Home] : at home, [unfilled] , at the time of the visit. [Medical Office: ([facility])___] : at the medical office located in  [Verbal consent obtained from patient] : the patient, [unfilled] [Family Member] : family member [FreeTextEntry1] : MSSA bacteremia, pericarditis

## 2021-04-01 NOTE — HISTORY OF PRESENT ILLNESS
[FreeTextEntry1] : 76M h/o ESRD on HD, Hemophilia A, DRESS Syndrome (on Prednisone taper), recent admission for MSSA bacteremia and uremic pericarditis on IV cefazolin p/w management of MSSA bacteremia. \par \par He was recently admitted from 2/21 - 3/9 at [facility] for acute on chronic renal failure requiring emergent initiation of HD.  He also had hemodynamically unstable Afib RVR c/b thrombocytopenia.  He was also found to have persistent MSSA bacteremia of unclear source (2/22 - 2/28).   BCx cleared since 3/1. The possible source was skin since patient was having DRESS syndrome rash which created multiple lesions and patient was scratching them.  TTE 2/22 and GUANAKO 2/26 showed no valvular disease, negative for vegetation.  Gallium scan 3/2 showed uptake in pericardium without uptake in pleural fluid which was concerning for seeding of MSSA of the pericardium, especially since patient was bacteremic for 8 days.  Per NM attending, no abscess seen, uniform uptake.  Patient was asymptomatic from uremic pericarditis symptom at that time.  Gallium scan also shows two focal areas in left buttock that could be a skin lesion or abscess. There is no obvious superficial infection or palpable induration or fluctuance and US on 3/3 shows no fluid collection of the area. Per radiology the uptake in the left buttock could be attributed to dye that attached to stool.  He was discharged home with empiric 6 weeks of IV cefazolin course (3/1 - 4/12).  \par \par Today patient attended the video visit with his daughter who is PA at [facility].  She said patient only received Cefazolin at HD until 3/15 since that was the instruction given to HD center.  He is off abx for 2 weeks now and doing better than before.  He has no fever/chills, CP, SOB, n/v/d, abdominal pain.  He had 4-6 times loose stool/day when he was on cefazolin which is now resolved since abx stopped.  He is still weak but much improved from hospital stay, able to ambulate with a walker.  He is very deconditioned from ~3 weeks of hospital stay so he is getting PT to bring back his strength.  \par \par Per daughter, patient was seen by cardiologist recently and was told that he is doing well. He is also followed by hematologist closely who will do blood draw.  \par

## 2021-04-01 NOTE — PHYSICAL EXAM
[General Appearance - Alert] : alert [General Appearance - In No Acute Distress] : in no acute distress [Sclera] : the sclera and conjunctiva were normal [Outer Ear] : the ears and nose were normal in appearance [Neck Appearance] : the appearance of the neck was normal [] : no respiratory distress [Affect] : the affect was normal

## 2021-04-01 NOTE — ASSESSMENT
[FreeTextEntry1] : 76M h/o ESRD on HD, Hemophilia A, DRESS Syndrome (on Prednisone taper), recent admission for MSSA bacteremia and uremic pericarditis on IV cefazolin p/w management of MSSA bacteremia. \par \par Patient received only 2 weeks of IV cefazolin (3/1 - 3/15) and currently off abx for 2 weeks now.  He was supposed to be on cefazolin for 6 weeks (until 4/12). Currently patient is asymptomatic, clinically improving off abx.  He does not have any hardware (no cardiac device, no prosthesis) except for cochlear implant.   I discussed the option of repeating BCx now and monitor off abx with close follow up, vs restarting IV cefazolin and give remaining 4 weeks course now.  Since patient had side effects from cefazolin (4-6 loose stool/day with bowel incontinence), daughter prefers watchful waiting.  I discussed the risks of recurrence of MSSA bacteremia, which is relatively high, and informed her to notify me if patient has any symptoms c/w sepsis.  We will obtain BCx now (daughter will arrange it to be done at HD center or at home visit blood draw).  Follow up in 2-3 weeks.

## 2021-04-03 ENCOUNTER — NON-APPOINTMENT (OUTPATIENT)
Age: 76
End: 2021-04-03

## 2021-04-19 ENCOUNTER — APPOINTMENT (OUTPATIENT)
Dept: HEMATOLOGY ONCOLOGY | Facility: CLINIC | Age: 76
End: 2021-04-19

## 2021-04-22 ENCOUNTER — APPOINTMENT (OUTPATIENT)
Dept: INFECTIOUS DISEASE | Facility: CLINIC | Age: 76
End: 2021-04-22
Payer: MEDICARE

## 2021-04-22 PROCEDURE — 99203 OFFICE O/P NEW LOW 30 MIN: CPT | Mod: 95

## 2021-04-22 PROCEDURE — 99213 OFFICE O/P EST LOW 20 MIN: CPT | Mod: 95

## 2021-04-22 NOTE — HISTORY OF PRESENT ILLNESS
[FreeTextEntry1] : 76M h/o ESRD on HD, Hemophilia A, DRESS Syndrome (on Prednisone taper), recent admission for MSSA bacteremia and uremic pericarditis s/p IV cefazolin x 2 weeks (end 3/15/21) p/w management of MSSA bacteremia.  Daughter Milla (PA at [facility]) was on the video visit.  \par \par He was recently admitted from 2/21 - 3/9 at [facility] for acute on chronic renal failure requiring emergent initiation of HD.  He also had hemodynamically unstable Afib RVR c/b thrombocytopenia.  He was also found to have persistent MSSA bacteremia of unclear source (2/22 - 2/28).   BCx cleared since 3/1. The possible source was skin since patient was having DRESS syndrome rash which created multiple lesions and patient was scratching them.  TTE 2/22 and GUANAKO 2/26 showed no valvular disease, negative for vegetation.  Gallium scan 3/2 showed uptake in pericardium without uptake in pleural fluid which was concerning for seeding of MSSA of the pericardium, especially since patient was bacteremic for 8 days.  Per NM attending, no abscess seen, uniform uptake.  Patient was asymptomatic from uremic pericarditis symptom at that time.  Gallium scan also shows two focal areas in left buttock that could be a skin lesion or abscess. There is no obvious superficial infection or palpable induration or fluctuance and US on 3/3 shows no fluid collection of the area. Per radiology the uptake in the left buttock could be attributed to dye that attached to stool.  He was discharged home with empiric 6 weeks of IV cefazolin course (3/1 - 4/12).  \par \par Last visit was 4/1.  During that time, it turned out that cefazolin was only continued for 2 weeks and was stopped on 3/15, and he was off abx for 2 weeks.  Patient was clinically doing better off abx, and after discussion with daughter and patient, patient was to be monitored off abx and repeat BCx.   He had an episode of temp of 99.5 on 4/3 and called ID office.  BCx was repeated, which was no growth.  The episode was asymptomatic, and it resolved in 30min.  \par \par Since then patient is doing well.  Still weak and lack of appetite, but daughter said patient is doing well, denied fever/chills, n/v, abdominal pain, CP, SOB.  Patient continues to get dialysis.  He had diarrhea 4-6 times/day with bowel incontinence for several days recently and patient got GI appointment for tomorrow.  However now his diarrhea seems to have resolved, and he didn't have BM yesterday.

## 2021-04-22 NOTE — REASON FOR VISIT
[Follow-Up: _____] : a [unfilled] follow-up visit [Home] : at home, [unfilled] , at the time of the visit. [Medical Office: ([facility])___] : at the medical office located in  [Family Member] : family member [Verbal consent obtained from patient] : the patient, [unfilled] [FreeTextEntry1] : MSSA bacteremia, pericarditis

## 2021-04-22 NOTE — DATA REVIEWED
[FreeTextEntry1] : Lab reviewed from 4/19/21\par 11.7 (N81.2% - not flagged as high) >12.2<163\par \par 4/2 BCx no growth \par

## 2021-07-14 ENCOUNTER — APPOINTMENT (OUTPATIENT)
Dept: VASCULAR SURGERY | Facility: CLINIC | Age: 76
End: 2021-07-14
Payer: MEDICARE

## 2021-07-14 VITALS — DIASTOLIC BLOOD PRESSURE: 74 MMHG | SYSTOLIC BLOOD PRESSURE: 121 MMHG | HEART RATE: 114 BPM

## 2021-07-14 PROCEDURE — 93986 DUP-SCAN HEMO COMPL UNI STD: CPT

## 2021-07-14 PROCEDURE — 99213 OFFICE O/P EST LOW 20 MIN: CPT | Mod: 25

## 2021-07-15 RX ORDER — PNV NO.95/FERROUS FUM/FOLIC AC 28MG-0.8MG
TABLET ORAL
Refills: 0 | Status: ACTIVE | COMMUNITY

## 2021-07-15 NOTE — HISTORY OF PRESENT ILLNESS
[FreeTextEntry1] : 75 y/o M w/h/o hemophilia A, closely monitored by Dr. Mane Grace (hematology) presents here for initial consultation, referred by Dr. Gustavo Gallardo for an evaluation for a possible AVF creation. Pt is accompanied by his daughter who is a PA in GI bariatrics at [facility] (friend of Sheryl Wells). She reports her father had an acute exacerbation of severe gout of his R great toe w/open wounds and was hospitalized at [facility] in February, after he was  started on Allopurinol x 3 weeks. He subsequently developed DRESS syndrome, Allopurinol was discontinued and he was started on steroids. Patient later was transferred to [facility], under Dr. Mayer service. Pt's daughter mentions that he developed systemic edema w/progressive increase in his creatinine and rapid decline in kidney function requiring HD via R perm Cath initially. After Permacath insertion patient developed a bleed and required 2 Units of PRBCs as well as platelets. Per patients wife she mentions pts had HD initially 3 times a week and recently stepped down to 2 days a week. They are hoping for his kidney function to recover before he needs further interventions. Patient states that he is tired, has no appetite and he lost ~30 lbs. 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 \par He is accompanied by his wife and daughter. \par \par Dr. Mane Grace Is patients Hematologist. \par [phone]\par [address] [place] [address]

## 2021-07-15 NOTE — ASSESSMENT
[Arterial/Venous Disease] : arterial/venous disease [Medication Management] : medication management [FreeTextEntry1] : 77 y/o M w/recent severe acute gout exacerbation, s/p Allopurinol, complicated by acute DRESS syndrome w/progressive rise in creatinine levels requiring HD via R perm cath is here for initial evaluation of AVF creation. Patient is right hand dominant,  venous mapping of the left arm completed in the office today revealing cephalic and basilic veins optimal for dialysis conduit. Discussed findings and treatment options, AVF vs AVG, given history of hemophilia. Since patient demonstrated progressive renal function recovery requiring step down in HD sessions from 3 times per week to twice a week, I recommended we continue to monitor pt. If renal function continues to improve he may not require further interventions. \par Should he remain on long term HD to contact the office and will further options.

## 2021-07-15 NOTE — PHYSICAL EXAM
[Respiratory Effort] : normal respiratory effort [Normal Rate and Rhythm] : normal rate and rhythm [No Rash or Lesion] : No rash or lesion [Alert] : alert [Oriented to Person] : oriented to person [Oriented to Place] : oriented to place [Oriented to Time] : oriented to time [Calm] : calm [Normal Breath Sounds] : Normal breath sounds [Normal Heart Sounds] : normal heart sounds [2+] : left 2+ [JVD] : no jugular venous distention  [Carotid Bruits] : no carotid bruits [Ankle Swelling (On Exam)] : not present [Varicose Veins Of Lower Extremities] : not present [] : not present [de-identified] : Thin habitus, ambulates w/walker [de-identified] : NCAT, Anicteric [de-identified] : Supple  [de-identified] : R chest permacath in place [de-identified] : FROM walks w/rolling walker.  [de-identified] : Grossly intact.

## 2021-07-15 NOTE — ADDENDUM
[FreeTextEntry1] : This note was written by Lissette Lau on 07/14/2021 acting as scribe for Sarita Quinn M.D.\par \par \par I, Dr. Bruce Stein, personally performed the evaluation and management (E/M) services for this new patient.  That E/M includes conducting the initial examination, assessing all conditions, and establishing the plan of care.  Today, my ACP, KAMRYN Ramirez, was here to observe my evaluation and management services for this patient to be followed going forward.\par \par \par \par \par

## 2021-07-15 NOTE — PROCEDURE
[FreeTextEntry1] : LUE vein mapping reveals: cephalic and basilic veins adequate for dialysis conduit. Venous flow signals in the subclavian vein are patent.

## 2021-07-15 NOTE — REVIEW OF SYSTEMS
[Negative] : Heme/Lymph [Feeling Tired] : feeling tired [Recent Weight Loss (___ Lbs)] : recent [unfilled] ~Ulb weight loss [Leg Claudication] : intermittent leg claudication [As Noted in HPI] : as noted in HPI [Limb Pain] : limb pain [Depression] : depression [Lower Ext Edema] : no extremity edema

## 2021-07-20 NOTE — PROGRESS NOTE ADULT - PROBLEM SELECTOR PLAN 2
[address] [address]  Phone: [phone]    July 20, 2021        Shirley Edward  [address] [facility] [id number]          To whom it may concern:    RE: Shirley Edward    Patient was seen and treated today at our clinic and missed work.    Please contact me for questions or concerns.      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

## 2021-08-25 ENCOUNTER — INPATIENT (INPATIENT)
Facility: HOSPITAL | Age: 76
LOS: 14 days | Discharge: HOME HEALTH | DRG: 871 | End: 2021-09-09
Attending: STUDENT IN AN ORGANIZED HEALTH CARE EDUCATION/TRAINING PROGRAM | Admitting: STUDENT IN AN ORGANIZED HEALTH CARE EDUCATION/TRAINING PROGRAM
Payer: MEDICARE

## 2021-08-25 VITALS
HEIGHT: 68 IN | SYSTOLIC BLOOD PRESSURE: 117 MMHG | WEIGHT: 128.09 LBS | DIASTOLIC BLOOD PRESSURE: 83 MMHG | RESPIRATION RATE: 16 BRPM | TEMPERATURE: 99 F | OXYGEN SATURATION: 98 % | HEART RATE: 97 BPM

## 2021-08-25 DIAGNOSIS — N39.0 URINARY TRACT INFECTION, SITE NOT SPECIFIED: ICD-10-CM

## 2021-08-25 DIAGNOSIS — M54.9 DORSALGIA, UNSPECIFIED: ICD-10-CM

## 2021-08-25 DIAGNOSIS — Z96.21 COCHLEAR IMPLANT STATUS: Chronic | ICD-10-CM

## 2021-08-25 DIAGNOSIS — A41.9 SEPSIS, UNSPECIFIED ORGANISM: ICD-10-CM

## 2021-08-25 DIAGNOSIS — R74.8 ABNORMAL LEVELS OF OTHER SERUM ENZYMES: ICD-10-CM

## 2021-08-25 DIAGNOSIS — N18.6 END STAGE RENAL DISEASE: ICD-10-CM

## 2021-08-25 DIAGNOSIS — R63.8 OTHER SYMPTOMS AND SIGNS CONCERNING FOOD AND FLUID INTAKE: ICD-10-CM

## 2021-08-25 LAB
ALBUMIN SERPL ELPH-MCNC: 3.6 G/DL — SIGNIFICANT CHANGE UP (ref 3.3–5)
ALP SERPL-CCNC: 386 U/L — HIGH (ref 40–120)
ALT FLD-CCNC: 21 U/L — SIGNIFICANT CHANGE UP (ref 10–45)
ANION GAP SERPL CALC-SCNC: 14 MMOL/L — SIGNIFICANT CHANGE UP (ref 5–17)
APPEARANCE UR: CLEAR — SIGNIFICANT CHANGE UP
APTT BLD: 47.4 SEC — HIGH (ref 27.5–35.5)
AST SERPL-CCNC: 27 U/L — SIGNIFICANT CHANGE UP (ref 10–40)
BACTERIA # UR AUTO: ABNORMAL /HPF
BASE EXCESS BLDV CALC-SCNC: 1.7 MMOL/L — SIGNIFICANT CHANGE UP (ref -2–3)
BASOPHILS # BLD AUTO: 0.04 K/UL — SIGNIFICANT CHANGE UP (ref 0–0.2)
BASOPHILS NFR BLD AUTO: 0.2 % — SIGNIFICANT CHANGE UP (ref 0–2)
BILIRUB SERPL-MCNC: 1.2 MG/DL — SIGNIFICANT CHANGE UP (ref 0.2–1.2)
BILIRUB UR-MCNC: ABNORMAL
BUN SERPL-MCNC: 39 MG/DL — HIGH (ref 7–23)
CALCIUM SERPL-MCNC: 10.3 MG/DL — SIGNIFICANT CHANGE UP (ref 8.4–10.5)
CHLORIDE SERPL-SCNC: 94 MMOL/L — LOW (ref 96–108)
CO2 BLDV-SCNC: 28.6 MMOL/L — HIGH (ref 22–26)
CO2 SERPL-SCNC: 26 MMOL/L — SIGNIFICANT CHANGE UP (ref 22–31)
COLOR SPEC: YELLOW — SIGNIFICANT CHANGE UP
COMMENT - URINE: SIGNIFICANT CHANGE UP
CREAT SERPL-MCNC: 2.86 MG/DL — HIGH (ref 0.5–1.3)
DIFF PNL FLD: ABNORMAL
EOSINOPHIL # BLD AUTO: 0.01 K/UL — SIGNIFICANT CHANGE UP (ref 0–0.5)
EOSINOPHIL NFR BLD AUTO: 0.1 % — SIGNIFICANT CHANGE UP (ref 0–6)
EPI CELLS # UR: SIGNIFICANT CHANGE UP /HPF (ref 0–5)
GLUCOSE SERPL-MCNC: 119 MG/DL — HIGH (ref 70–99)
GLUCOSE UR QL: NEGATIVE — SIGNIFICANT CHANGE UP
HCO3 BLDV-SCNC: 27 MMOL/L — SIGNIFICANT CHANGE UP (ref 22–29)
HCT VFR BLD CALC: 39.7 % — SIGNIFICANT CHANGE UP (ref 39–50)
HGB BLD-MCNC: 13 G/DL — SIGNIFICANT CHANGE UP (ref 13–17)
IMM GRANULOCYTES NFR BLD AUTO: 0.6 % — SIGNIFICANT CHANGE UP (ref 0–1.5)
INR BLD: 1.2 — HIGH (ref 0.88–1.16)
KETONES UR-MCNC: NEGATIVE — SIGNIFICANT CHANGE UP
LACTATE SERPL-SCNC: 1.7 MMOL/L — SIGNIFICANT CHANGE UP (ref 0.5–2)
LEUKOCYTE ESTERASE UR-ACNC: ABNORMAL
LYMPHOCYTES # BLD AUTO: 0.82 K/UL — LOW (ref 1–3.3)
LYMPHOCYTES # BLD AUTO: 5.1 % — LOW (ref 13–44)
MCHC RBC-ENTMCNC: 27.6 PG — SIGNIFICANT CHANGE UP (ref 27–34)
MCHC RBC-ENTMCNC: 32.7 GM/DL — SIGNIFICANT CHANGE UP (ref 32–36)
MCV RBC AUTO: 84.3 FL — SIGNIFICANT CHANGE UP (ref 80–100)
MONOCYTES # BLD AUTO: 0.94 K/UL — HIGH (ref 0–0.9)
MONOCYTES NFR BLD AUTO: 5.8 % — SIGNIFICANT CHANGE UP (ref 2–14)
NEUTROPHILS # BLD AUTO: 14.2 K/UL — HIGH (ref 1.8–7.4)
NEUTROPHILS NFR BLD AUTO: 88.2 % — HIGH (ref 43–77)
NITRITE UR-MCNC: NEGATIVE — SIGNIFICANT CHANGE UP
NRBC # BLD: 0 /100 WBCS — SIGNIFICANT CHANGE UP (ref 0–0)
PCO2 BLDV: 45 MMHG — SIGNIFICANT CHANGE UP (ref 42–55)
PH BLDV: 7.39 — SIGNIFICANT CHANGE UP (ref 7.32–7.43)
PH UR: 6 — SIGNIFICANT CHANGE UP (ref 5–8)
PLATELET # BLD AUTO: 196 K/UL — SIGNIFICANT CHANGE UP (ref 150–400)
PO2 BLDV: <35 MMHG — SIGNIFICANT CHANGE UP (ref 25–45)
POTASSIUM SERPL-MCNC: 4.4 MMOL/L — SIGNIFICANT CHANGE UP (ref 3.5–5.3)
POTASSIUM SERPL-SCNC: 4.4 MMOL/L — SIGNIFICANT CHANGE UP (ref 3.5–5.3)
PROT SERPL-MCNC: 6.9 G/DL — SIGNIFICANT CHANGE UP (ref 6–8.3)
PROT UR-MCNC: 100 MG/DL
PROTHROM AB SERPL-ACNC: 14.3 SEC — HIGH (ref 10.6–13.6)
RBC # BLD: 4.71 M/UL — SIGNIFICANT CHANGE UP (ref 4.2–5.8)
RBC # FLD: 14.6 % — HIGH (ref 10.3–14.5)
RBC CASTS # UR COMP ASSIST: < 5 /HPF — SIGNIFICANT CHANGE UP
SAO2 % BLDV: 37.5 % — LOW (ref 67–88)
SODIUM SERPL-SCNC: 134 MMOL/L — LOW (ref 135–145)
SP GR SPEC: 1.02 — SIGNIFICANT CHANGE UP (ref 1–1.03)
UROBILINOGEN FLD QL: 2 E.U./DL
WBC # BLD: 16.1 K/UL — HIGH (ref 3.8–10.5)
WBC # FLD AUTO: 16.1 K/UL — HIGH (ref 3.8–10.5)
WBC UR QL: ABNORMAL /HPF

## 2021-08-25 PROCEDURE — 71045 X-RAY EXAM CHEST 1 VIEW: CPT | Mod: 26

## 2021-08-25 PROCEDURE — 99284 EMERGENCY DEPT VISIT MOD MDM: CPT | Mod: CS

## 2021-08-25 PROCEDURE — 99223 1ST HOSP IP/OBS HIGH 75: CPT | Mod: GC

## 2021-08-25 RX ORDER — SEVELAMER CARBONATE 2400 MG/1
1 POWDER, FOR SUSPENSION ORAL
Qty: 0 | Refills: 0 | DISCHARGE

## 2021-08-25 RX ORDER — LANOLIN ALCOHOL/MO/W.PET/CERES
3 CREAM (GRAM) TOPICAL AT BEDTIME
Refills: 0 | Status: DISCONTINUED | OUTPATIENT
Start: 2021-08-25 | End: 2021-09-09

## 2021-08-25 RX ORDER — ACETAMINOPHEN 500 MG
650 TABLET ORAL EVERY 6 HOURS
Refills: 0 | Status: DISCONTINUED | OUTPATIENT
Start: 2021-08-26 | End: 2021-08-26

## 2021-08-25 RX ORDER — VANCOMYCIN HCL 1 G
750 VIAL (EA) INTRAVENOUS ONCE
Refills: 0 | Status: COMPLETED | OUTPATIENT
Start: 2021-08-25 | End: 2021-08-25

## 2021-08-25 RX ORDER — PIPERACILLIN AND TAZOBACTAM 4; .5 G/20ML; G/20ML
3.38 INJECTION, POWDER, LYOPHILIZED, FOR SOLUTION INTRAVENOUS ONCE
Refills: 0 | Status: COMPLETED | OUTPATIENT
Start: 2021-08-25 | End: 2021-08-25

## 2021-08-25 RX ORDER — CEFTRIAXONE 500 MG/1
2000 INJECTION, POWDER, FOR SOLUTION INTRAMUSCULAR; INTRAVENOUS EVERY 24 HOURS
Refills: 0 | Status: DISCONTINUED | OUTPATIENT
Start: 2021-08-25 | End: 2021-08-26

## 2021-08-25 RX ORDER — ACETAMINOPHEN 500 MG
975 TABLET ORAL ONCE
Refills: 0 | Status: COMPLETED | OUTPATIENT
Start: 2021-08-25 | End: 2021-08-25

## 2021-08-25 RX ORDER — ONDANSETRON 8 MG/1
4 TABLET, FILM COATED ORAL EVERY 8 HOURS
Refills: 0 | Status: DISCONTINUED | OUTPATIENT
Start: 2021-08-25 | End: 2021-08-31

## 2021-08-25 RX ORDER — SODIUM CHLORIDE 9 MG/ML
500 INJECTION INTRAMUSCULAR; INTRAVENOUS; SUBCUTANEOUS ONCE
Refills: 0 | Status: COMPLETED | OUTPATIENT
Start: 2021-08-25 | End: 2021-08-25

## 2021-08-25 RX ADMIN — Medication 650 MILLIGRAM(S): at 22:36

## 2021-08-25 RX ADMIN — PIPERACILLIN AND TAZOBACTAM 200 GRAM(S): 4; .5 INJECTION, POWDER, LYOPHILIZED, FOR SOLUTION INTRAVENOUS at 17:54

## 2021-08-25 RX ADMIN — Medication 250 MILLIGRAM(S): at 18:49

## 2021-08-25 RX ADMIN — Medication 975 MILLIGRAM(S): at 18:48

## 2021-08-25 RX ADMIN — CEFTRIAXONE 100 MILLIGRAM(S): 500 INJECTION, POWDER, FOR SOLUTION INTRAMUSCULAR; INTRAVENOUS at 23:44

## 2021-08-25 RX ADMIN — PIPERACILLIN AND TAZOBACTAM 3.38 GRAM(S): 4; .5 INJECTION, POWDER, LYOPHILIZED, FOR SOLUTION INTRAVENOUS at 19:47

## 2021-08-25 RX ADMIN — SODIUM CHLORIDE 500 MILLILITER(S): 9 INJECTION INTRAMUSCULAR; INTRAVENOUS; SUBCUTANEOUS at 17:54

## 2021-08-25 NOTE — H&P ADULT - ASSESSMENT
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

## 2021-08-25 NOTE — ED ADULT NURSE NOTE - OBJECTIVE STATEMENT
Pt is 77 yo M BIBEMS w c/o fever to 101 today. Pt endorses intermittent lower back pain. Daughter at bedside provided PMH. Pt has Rt upper chest port for HD, last one 8/24. Usual HD sessions on Mondays and Fridays. Pt denies cp, sob, palpitations, n/v/d. Pt produces urine. Pt A&Ox4, ambulatory with walker, speaking in clear/full sentences, no acute distress, vital signs stable.

## 2021-08-25 NOTE — H&P ADULT - PROBLEM SELECTOR PLAN 2
(See above) Pt presenting with sepsis found to have positive UA.   - Denies urinary sxs.  - discussed with daughter, given history of BPH, concern for underlying urinary retention as nidus for ascending urinary tract infection   Denies constipation.  Plan:   - as above   - will bladder scan to r/o retention

## 2021-08-25 NOTE — ED ADULT TRIAGE NOTE - CHIEF COMPLAINT QUOTE
Pt BIBEMS for evaluation of fever x 1 day associated w/ lower back pain. Pt is on dialysis Monday and Friday but received dialysis yesterday. Denies chills, CP, SOB, urinary sx, abdominal pain, NVD.

## 2021-08-25 NOTE — H&P ADULT - NSICDXPASTMEDICALHX_GEN_ALL_CORE_FT
PAST MEDICAL HISTORY:  Chronic kidney disease (CKD)     DRESS syndrome     Gout     Hemophilia A     History of BPH     HTN (hypertension)

## 2021-08-25 NOTE — H&P ADULT - NSHPPHYSICALEXAM_GEN_ALL_CORE
.  VITAL SIGNS:  T(C): 38.1 (08-25-21 @ 18:35), Max: 38.1 (08-25-21 @ 18:35)  T(F): 100.6 (08-25-21 @ 18:35), Max: 100.6 (08-25-21 @ 18:35)  HR: 115 (08-25-21 @ 18:35) (97 - 115)  BP: 159/87 (08-25-21 @ 18:35) (117/83 - 159/87)  BP(mean): --  RR: 16 (08-25-21 @ 18:35) (16 - 16)  SpO2: 98% (08-25-21 @ 18:35) (98% - 98%)  Wt(kg): --    PHYSICAL EXAM:    Constitutional: AAOx3, pt is rigoring, appears uncomfortable  Head: NC/AT  Neck: supple; no JVD  Respiratory: CTA B/L; no W/R/R, no retractions  Cardiac: +S1/S2; RRR; no M/Rubs/G; PMI non-displaced  Gastrointestinal: abdomen soft, NT/ND; no rebound or guarding; +BSx4  ; no suprapubic tenderness, HD cath bandaged but no surrounding erythema or tenderness.  Back: no CVA tenderness  Extremities: WWP, no clubbing or cyanosis; no peripheral edema, 2+ d/p pulses b/l .  VITAL SIGNS:  T(C): 38.1 (08-25-21 @ 18:35), Max: 38.1 (08-25-21 @ 18:35)  T(F): 100.6 (08-25-21 @ 18:35), Max: 100.6 (08-25-21 @ 18:35)  HR: 115 (08-25-21 @ 18:35) (97 - 115)  BP: 159/87 (08-25-21 @ 18:35) (117/83 - 159/87)  BP(mean): --  RR: 16 (08-25-21 @ 18:35) (16 - 16)  SpO2: 98% (08-25-21 @ 18:35) (98% - 98%)  Wt(kg): --    PHYSICAL EXAM:    Constitutional: AAOx3, pt is rigoring, appears uncomfortable  Head: NC/AT  Neck: supple; no JVD  Respiratory: CTA B/L; no W/R/R, no retractions  Cardiac: +S1/S2; RRR; no M/Rubs/G; PMI non-displaced  Gastrointestinal: abdomen soft, NT/ND; no rebound or guarding; +BSx4  ; no suprapubic tenderness, HD cath bandaged but no surrounding erythema or tenderness.  Back: no CVA tenderness, no spinal tenderness, negative straight leg raise  Extremities: WWP, no clubbing or cyanosis; no peripheral edema, 2+ d/p pulses b/l  neuro: AAOx3, strength 5/5 in b/l LE's and UE's,no focal deficits

## 2021-08-25 NOTE — ED PROVIDER NOTE - OBJECTIVE STATEMENT
Daughter Milla   76M PMH Hemophilia A, HTN, gout c/b allopurinol-induced DRESS Syndrome, CKD on dialysis MF, presenting with fever for two days. Daughter states rigors yesterday during dialysis and today temperature measured 101 under axilla at home. Pt denies any focal chest pain, sob, abd pain, Denies associated NVD, lightheaded, diaphoresis, palpitations, cough/rhinorrhea, black/bloody stool, LE pain/swelling, focal weakness/numbness, abd pain, urinary complaints. Chronic lower back pain but no flank pain.

## 2021-08-25 NOTE — H&P ADULT - NSHPLABSRESULTS_GEN_ALL_CORE
.  LABS:                         13.0   16.10 )-----------( 196      ( 25 Aug 2021 16:47 )             39.7         134<L>  |  94<L>  |  39<H>  ----------------------------<  119<H>  4.4   |  26  |  2.86<H>    Ca    10.3      25 Aug 2021 16:47    TPro  6.9  /  Alb  3.6  /  TBili  1.2  /  DBili  x   /  AST  27  /  ALT  21  /  AlkPhos  386<H>      PT/INR - ( 25 Aug 2021 16:47 )   PT: 14.3 sec;   INR: 1.20          PTT - ( 25 Aug 2021 16:47 )  PTT:47.4 sec  Urinalysis Basic - ( 25 Aug 2021 17:18 )    Color: Yellow / Appearance: Clear / S.025 / pH: x  Gluc: x / Ketone: NEGATIVE  / Bili: Small / Urobili: 2.0 E.U./dL   Blood: x / Protein: 100 mg/dL / Nitrite: NEGATIVE   Leuk Esterase: Moderate / RBC: < 5 /HPF / WBC Many /HPF   Sq Epi: x / Non Sq Epi: 0-5 /HPF / Bacteria: Many /HPF            Lactate, Blood: 1.7 mmol/L ( @ 16:46)      RADIOLOGY, EKG & ADDITIONAL TESTS: Reviewed.

## 2021-08-25 NOTE — H&P ADULT - PROBLEM SELECTOR PLAN 6
F: s/p 500 cc, tolerating PO  E: replete K<4, Mg<2  N: Dash/TLC    VTE Prophylaxis: SCDS (hemophilia no need for anticoag)  GI: not needed  C: Full Code  D: RMF

## 2021-08-25 NOTE — H&P ADULT - PROBLEM SELECTOR PLAN 3
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 Cr 2.86 on admission.   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 Cr 2.86 on admission.  No signs of fluid O/L on exam (no crackles, JVD, peripheral edema).  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 Cr 2.86 on admission.  No signs of fluid O/L on exam (no crackles, JVD, peripheral edema).  -Dr Hu is aware that pt is here  -No urgent need for dialysis  -call nephro in the am

## 2021-08-25 NOTE — H&P ADULT - ATTENDING COMMENTS
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      #Sepsis: Pt w/ complex medical hx of ESRD and MSSA bacteremia 3/2021, unclear source (gluteal abscess vs pericardium) per ID- completed cefazolin course 4/12. Now presents with sepsis and new onset LBP. UA positive for wbc/LE however no urinary complaints. Lower back pain is acute w radiation to RLE. +4/5 RLE MS. Sensation intact, pt denies bowel/bladder incontince. 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 Per ortho attending- unlikely abscess, no acute surgical intervention,    Plan to c/w vanc/ceft; ideally will need MRI lumbar however daughter is confident implants are non compatible- will need collateral and reach out to company to confirm. If MRI unable to be completed will need repeat CT lumbar w/ contrast followed by dialysis vs IR guided biopsy.

## 2021-08-25 NOTE — H&P ADULT - PROBLEM SELECTOR PLAN 5
F: s/p 500 cc, tolerating PO  E: replete K<4, Mg<2  N: Dash/TLC    VTE Prophylaxis: None pt has hemophilia   GI: not needed  C: Full Code  D: RMF F: s/p 500 cc, tolerating PO  E: replete K<4, Mg<2  N: Dash/TLC    VTE Prophylaxis: SCDS (hemophilia no need for anticoag)  GI: not needed  C: Full Code  D: RMF . Last known level 169 (3/21) could be 2/2 to renal bone disease versus hepatobiliary  -measure GGT in am

## 2021-08-25 NOTE — H&P ADULT - HISTORY OF PRESENT ILLNESS
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 Hx obtained from daughter. At HD session yesterday, pt developed shaking chills. HD session was stopped and pt went home. At home, he continued to have shaking chills and was febrile with tmax 101. He had an associated decrease in appetite and lower extremity weakness causing difficulty walking. Denies n/v/abdominal pain. Daughter states that since DRESS syndrome induced CKD, pt has been doing much better requiring 2 sessions of HD per week, and is on no medication.   Also reports low back pain for 4-5 days, worsened by movement. Daughter arranged at home PT and heating pads which helped with the pain.     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 Hx obtained from daughter. At HD session yesterday, pt developed shaking chills. HD session was stopped and pt went home. At home, he continued to have shaking chills and was febrile with tmax 101. He had an associated decrease in appetite and lower extremity weakness causing difficulty walking. Denies n/v/abdominal pain. Daughter states that since DRESS syndrome induced CKD, pt has been doing much better requiring 2 sessions of HD per week, and is on no medication.   Also reports low back pain for 4-5 days, worsened by movement. Daughter arranged at home PT and heating pads which helped with the pain.     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 Hx obtained from daughter. At HD session yesterday, pt developed shaking chills. HD session was stopped and pt went home. At home, he continued to have shaking chills and was febrile with tmax 101. He had an associated decrease in appetite and lower extremity weakness causing difficulty walking. Denies n/v/abdominal pain. Daughter states that since DRESS syndrome induced CKD, pt has been doing much better requiring 2 sessions of HD per week, and is on no medication.   Also reports low back pain for 4-5 days, worsened by movement. Daughter arranged at home PT and heating pads which helped with the pain.     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 Hx obtained from daughter. At HD session yesterday, pt developed shaking chills. HD session was stopped and pt went home. At home, he continued to have shaking chills and was febrile with tmax 101. He had an associated decrease in appetite and lower extremity weakness causing difficulty walking. Denies n/v/abdominal pain. Daughter states that since DRESS syndrome induced CKD, pt has been doing much better requiring 2 sessions of HD per week, and is on no medication.   Also reports low back pain for 4-5 days, worsened by movement. Daughter arranged at home PT and heating pads which helped with the pain. Pt reports pain only when standing/walking. Pt not in pain at the moment.    In the ED,  VS: T98.8   , HR87   , /83    , RR16    , SpO2  98   % (RA)  Labs: WBC 16.10, auto neut #14.20, 88.2% neut, PT14.3, INR1.2, PTT47.4, BUN 39, Cr 2.86,   Urine: pyuria and bacteriuria  EKG: NSR  CXR: Normal  Imaging: None  Interventions: 750mg vanc, 3.375g zosyn

## 2021-08-25 NOTE — H&P ADULT - PROBLEM SELECTOR PLAN 1
On admission meets 2/4 SIRS criteria; HR 96, WBC 16.10 with neutrophilic predominance. Positive UA. Negative CXR. HD cath looks non infected with no surrounding erythema or tenderness. Likely 2/2 UTI. Rigors on exam, r/o bacteremia.  S/p 750mg vanc, 3.375g zosyn in the ED.  s/p 500 cc NS  -2g ceftriaxone q24 hour; if continues to spike fever through Ceftriaxone, will escalate to cover gram+ves (hx of MSSA bacteremia)  -f/u UC  -f/u BC  -zofran prn for nausea  -tylenol prn for fever On admission meets 2/4 SIRS criteria; HR 96, WBC 16.10 with neutrophilic predominance. Positive UA. Negative CXR. HD cath looks non infected with no surrounding erythema or tenderness.   - Likely 2/2 UTI. Rigors on exam with concern for bacteremia.  S/p 750mg vanc, 3.375g zosyn in the ED.  s/p 500 cc NS    Plan:   -2g ceftriaxone q24 hour; if continues to spike fever through Ceftriaxone, will escalate to cover gram+ves (hx of MSSA bacteremia)  -f/u UC  -f/u BC  -zofran prn for nausea  -tylenol prn for fever On admission meets 2/4 SIRS criteria; HR 96, WBC 16.10 with neutrophilic predominance. Positive UA. Negative CXR. HD cath looks non infected with no surrounding erythema or tenderness. No spinal tenderness.  - Likely 2/2 UTI. Rigors on exam with concern for bacteremia.  S/p 750mg vanc, 3.375g zosyn in the ED.  s/p 500 cc NS    Plan:   -2g ceftriaxone q24 hour; if continues to spike fever through Ceftriaxone, will escalate to cover gram+ves (hx of MSSA bacteremia)  -f/u UC  -f/u BC  -zofran prn for nausea  -tylenol prn for fever On admission meets 2/4 SIRS criteria; HR 96, WBC 16.10 with neutrophilic predominance. Positive UA. Negative CXR. HD cath looks non infected with no surrounding erythema or tenderness. No spinal tenderness.  - Likely 2/2 UTI. Rigors on exam with concern for bacteremia.  S/p 750mg vanc, 3.375g zosyn in the ED.  s/p 500 cc NS    Plan:   -2g ceftriaxone q24 hour; if continues to spike fever through Ceftriaxone, will escalate to cover gram+ves (hx of MSSA bacteremia)  -f/u UC  -f/u BC  -zofran prn for nausea  -tylenol prn for fever    *Attending Attestation below

## 2021-08-25 NOTE — ED PROVIDER NOTE - CLINICAL SUMMARY MEDICAL DECISION MAKING FREE TEXT BOX
Pt w cc fever/rigors, no localizing symptoms on exam, UA+, noted leukocytosis, tachycardia, will treat w broadspectrum abx given dialysis pt, admit. no CVAT so pyelo less likely at this time, will admit for cont'd monitoring.

## 2021-08-25 NOTE — H&P ADULT - NSHPSOCIALHISTORY_GEN_ALL_CORE
Wife is living with him to help out-they are .  Ambulates with rolling walker since admission in 3/2021, started driving his car again and feeling more independent.  independent ADLs.  non smoker  no alcohol  no recreational drug use

## 2021-08-25 NOTE — H&P ADULT - PROBLEM SELECTOR PLAN 4
. Last known level 169 (3/21) could be 2/2 to renal bone disease versus hepatobiliary  -measure GGT in am Low back pain with ambulation for 4-5 days. PT and warm compresses at home alleviated his pain. He does not report pain at bedside today. No spinal tenderness, strength 5/5 in b/l LE's, negative straight leg raise. Likely muscular in nature.  -tylenol prn for mild pain  -pt consult Low back pain with ambulation for 4-5 days. PT and warm compresses at home alleviated his pain. He does not report pain at bedside today. No spinal tenderness, strength 5/5 in b/l LE's, negative straight leg raise. Likely muscular in nature.  -tylenol prn for mild pain  -pt consult    *Attending Attestation below

## 2021-08-26 DIAGNOSIS — M46.40 DISCITIS, UNSPECIFIED, SITE UNSPECIFIED: ICD-10-CM

## 2021-08-26 DIAGNOSIS — M86.9 OSTEOMYELITIS, UNSPECIFIED: ICD-10-CM

## 2021-08-26 LAB
ANION GAP SERPL CALC-SCNC: 11 MMOL/L — SIGNIFICANT CHANGE UP (ref 5–17)
APTT BLD: 36 SECS — SIGNIFICANT CHANGE UP (ref 27.5–36.5)
APTT BLD: 46.7 SEC — HIGH (ref 27.5–35.5)
BASOPHILS # BLD AUTO: 0.05 K/UL — SIGNIFICANT CHANGE UP (ref 0–0.2)
BASOPHILS NFR BLD AUTO: 0.3 % — SIGNIFICANT CHANGE UP (ref 0–2)
BLD GP AB SCN SERPL QL: NEGATIVE — SIGNIFICANT CHANGE UP
BUN SERPL-MCNC: 44 MG/DL — HIGH (ref 7–23)
CALCIUM SERPL-MCNC: 9.7 MG/DL — SIGNIFICANT CHANGE UP (ref 8.4–10.5)
CHLORIDE SERPL-SCNC: 98 MMOL/L — SIGNIFICANT CHANGE UP (ref 96–108)
CO2 SERPL-SCNC: 23 MMOL/L — SIGNIFICANT CHANGE UP (ref 22–31)
COVID-19 SPIKE DOMAIN AB INTERP: NEGATIVE — SIGNIFICANT CHANGE UP
COVID-19 SPIKE DOMAIN ANTIBODY RESULT: 0.4 U/ML — SIGNIFICANT CHANGE UP
CREAT SERPL-MCNC: 3.19 MG/DL — HIGH (ref 0.5–1.3)
CRP SERPL-MCNC: 265.2 MG/L — HIGH (ref 0–4)
EOSINOPHIL # BLD AUTO: 0.1 K/UL — SIGNIFICANT CHANGE UP (ref 0–0.5)
EOSINOPHIL NFR BLD AUTO: 0.5 % — SIGNIFICANT CHANGE UP (ref 0–6)
ERYTHROCYTE [SEDIMENTATION RATE] IN BLOOD: 119 MM/HR — HIGH
FACT VIII ACT/NOR PPP: 58 % — SIGNIFICANT CHANGE UP (ref 51–138)
GGT SERPL-CCNC: 140 U/L — HIGH (ref 9–50)
GLUCOSE SERPL-MCNC: 106 MG/DL — HIGH (ref 70–99)
GRAM STN FLD: SIGNIFICANT CHANGE UP
HBV SURFACE AB SER-ACNC: SIGNIFICANT CHANGE UP
HBV SURFACE AG SER-ACNC: SIGNIFICANT CHANGE UP
HCT VFR BLD CALC: 37.2 % — LOW (ref 39–50)
HCV AB S/CO SERPL IA: 0.05 S/CO — SIGNIFICANT CHANGE UP
HCV AB SERPL-IMP: SIGNIFICANT CHANGE UP
HGB BLD-MCNC: 11.9 G/DL — LOW (ref 13–17)
IMM GRANULOCYTES NFR BLD AUTO: 0.9 % — SIGNIFICANT CHANGE UP (ref 0–1.5)
INR BLD: 1.27 — HIGH (ref 0.88–1.16)
LYMPHOCYTES # BLD AUTO: 0.86 K/UL — LOW (ref 1–3.3)
LYMPHOCYTES # BLD AUTO: 4.7 % — LOW (ref 13–44)
MAGNESIUM SERPL-MCNC: 1.8 MG/DL — SIGNIFICANT CHANGE UP (ref 1.6–2.6)
MCHC RBC-ENTMCNC: 27.2 PG — SIGNIFICANT CHANGE UP (ref 27–34)
MCHC RBC-ENTMCNC: 32 GM/DL — SIGNIFICANT CHANGE UP (ref 32–36)
MCV RBC AUTO: 84.9 FL — SIGNIFICANT CHANGE UP (ref 80–100)
METHOD TYPE: SIGNIFICANT CHANGE UP
MONOCYTES # BLD AUTO: 1.13 K/UL — HIGH (ref 0–0.9)
MONOCYTES NFR BLD AUTO: 6.1 % — SIGNIFICANT CHANGE UP (ref 2–14)
MSSA DNA SPEC QL NAA+PROBE: SIGNIFICANT CHANGE UP
NEUTROPHILS # BLD AUTO: 16.13 K/UL — HIGH (ref 1.8–7.4)
NEUTROPHILS NFR BLD AUTO: 87.5 % — HIGH (ref 43–77)
NRBC # BLD: 0 /100 WBCS — SIGNIFICANT CHANGE UP (ref 0–0)
PHOSPHATE SERPL-MCNC: 3.8 MG/DL — SIGNIFICANT CHANGE UP (ref 2.5–4.5)
PLATELET # BLD AUTO: 185 K/UL — SIGNIFICANT CHANGE UP (ref 150–400)
POTASSIUM SERPL-MCNC: 4.2 MMOL/L — SIGNIFICANT CHANGE UP (ref 3.5–5.3)
POTASSIUM SERPL-SCNC: 4.2 MMOL/L — SIGNIFICANT CHANGE UP (ref 3.5–5.3)
PROTHROM AB SERPL-ACNC: 15.1 SEC — HIGH (ref 10.6–13.6)
PT 50/50: 12.3 SECS — SIGNIFICANT CHANGE UP (ref 10.6–14.7)
RBC # BLD: 4.38 M/UL — SIGNIFICANT CHANGE UP (ref 4.2–5.8)
RBC # FLD: 14.6 % — HIGH (ref 10.3–14.5)
RH IG SCN BLD-IMP: POSITIVE — SIGNIFICANT CHANGE UP
SARS-COV-2 IGG+IGM SERPL QL IA: 0.4 U/ML — SIGNIFICANT CHANGE UP
SARS-COV-2 IGG+IGM SERPL QL IA: NEGATIVE — SIGNIFICANT CHANGE UP
SODIUM SERPL-SCNC: 132 MMOL/L — LOW (ref 135–145)
THROMBIN TIME: 20.6 SEC — SIGNIFICANT CHANGE UP (ref 17.6–24)
WBC # BLD: 18.43 K/UL — HIGH (ref 3.8–10.5)
WBC # FLD AUTO: 18.43 K/UL — HIGH (ref 3.8–10.5)

## 2021-08-26 PROCEDURE — 90935 HEMODIALYSIS ONE EVALUATION: CPT

## 2021-08-26 PROCEDURE — 99223 1ST HOSP IP/OBS HIGH 75: CPT | Mod: 25

## 2021-08-26 PROCEDURE — 72131 CT LUMBAR SPINE W/O DYE: CPT | Mod: 26

## 2021-08-26 PROCEDURE — 99222 1ST HOSP IP/OBS MODERATE 55: CPT

## 2021-08-26 PROCEDURE — 99221 1ST HOSP IP/OBS SF/LOW 40: CPT

## 2021-08-26 PROCEDURE — 99233 SBSQ HOSP IP/OBS HIGH 50: CPT | Mod: GC

## 2021-08-26 RX ORDER — POLYETHYLENE GLYCOL 3350 17 G/17G
17 POWDER, FOR SOLUTION ORAL AT BEDTIME
Refills: 0 | Status: DISCONTINUED | OUTPATIENT
Start: 2021-08-26 | End: 2021-08-28

## 2021-08-26 RX ORDER — NAFCILLIN 10 G/100ML
2 INJECTION, POWDER, FOR SOLUTION INTRAVENOUS ONCE
Refills: 0 | Status: COMPLETED | OUTPATIENT
Start: 2021-08-26 | End: 2021-08-26

## 2021-08-26 RX ORDER — SODIUM CHLORIDE 9 MG/ML
500 INJECTION INTRAMUSCULAR; INTRAVENOUS; SUBCUTANEOUS ONCE
Refills: 0 | Status: DISCONTINUED | OUTPATIENT
Start: 2021-08-26 | End: 2021-08-26

## 2021-08-26 RX ORDER — TRANEXAMIC ACID 100 MG/ML
600 INJECTION, SOLUTION INTRAVENOUS ONCE
Refills: 0 | Status: COMPLETED | OUTPATIENT
Start: 2021-08-27 | End: 2021-08-27

## 2021-08-26 RX ORDER — MAGNESIUM SULFATE 500 MG/ML
2 VIAL (ML) INJECTION ONCE
Refills: 0 | Status: COMPLETED | OUTPATIENT
Start: 2021-08-26 | End: 2021-08-26

## 2021-08-26 RX ORDER — NAFCILLIN 10 G/100ML
INJECTION, POWDER, FOR SOLUTION INTRAVENOUS
Refills: 0 | Status: DISCONTINUED | OUTPATIENT
Start: 2021-08-26 | End: 2021-08-31

## 2021-08-26 RX ORDER — NAFCILLIN 10 G/100ML
2 INJECTION, POWDER, FOR SOLUTION INTRAVENOUS EVERY 4 HOURS
Refills: 0 | Status: DISCONTINUED | OUTPATIENT
Start: 2021-08-26 | End: 2021-08-31

## 2021-08-26 RX ORDER — ACETAMINOPHEN 500 MG
650 TABLET ORAL EVERY 6 HOURS
Refills: 0 | Status: DISCONTINUED | OUTPATIENT
Start: 2021-08-26 | End: 2021-09-09

## 2021-08-26 RX ORDER — CEFAZOLIN SODIUM 1 G
1000 VIAL (EA) INJECTION ONCE
Refills: 0 | Status: DISCONTINUED | OUTPATIENT
Start: 2021-08-26 | End: 2021-08-26

## 2021-08-26 RX ORDER — VANCOMYCIN HCL 1 G
750 VIAL (EA) INTRAVENOUS ONCE
Refills: 0 | Status: COMPLETED | OUTPATIENT
Start: 2021-08-26 | End: 2021-08-26

## 2021-08-26 RX ORDER — SENNA PLUS 8.6 MG/1
2 TABLET ORAL AT BEDTIME
Refills: 0 | Status: DISCONTINUED | OUTPATIENT
Start: 2021-08-26 | End: 2021-08-28

## 2021-08-26 RX ORDER — CEFAZOLIN SODIUM 1 G
VIAL (EA) INJECTION
Refills: 0 | Status: DISCONTINUED | OUTPATIENT
Start: 2021-08-26 | End: 2021-08-26

## 2021-08-26 RX ADMIN — Medication 650 MILLIGRAM(S): at 10:20

## 2021-08-26 RX ADMIN — SENNA PLUS 2 TABLET(S): 8.6 TABLET ORAL at 22:55

## 2021-08-26 RX ADMIN — NAFCILLIN 200 GRAM(S): 10 INJECTION, POWDER, FOR SOLUTION INTRAVENOUS at 19:57

## 2021-08-26 RX ADMIN — Medication 650 MILLIGRAM(S): at 09:22

## 2021-08-26 RX ADMIN — Medication 650 MILLIGRAM(S): at 20:02

## 2021-08-26 RX ADMIN — POLYETHYLENE GLYCOL 3350 17 GRAM(S): 17 POWDER, FOR SOLUTION ORAL at 22:55

## 2021-08-26 RX ADMIN — Medication 50 GRAM(S): at 11:56

## 2021-08-26 RX ADMIN — Medication 250 MILLIGRAM(S): at 04:28

## 2021-08-26 RX ADMIN — Medication 650 MILLIGRAM(S): at 21:02

## 2021-08-26 NOTE — PHYSICAL THERAPY INITIAL EVALUATION ADULT - ADDITIONAL COMMENTS
Pt and pt's daughter, Tori reported pt lives in an apartment with elevator access. Using a rollator at baseline and has recently been walking without rollator at home. Pt recently starting driving again as well. Pt has HHA 6hr/7days and has help with showering and other ADLs. Recently, daughter noticed pt's RLE was weaker and pt posture was more forward with RW. Pt was getting PT services and performs stair climbing as well; PT was stopped and OT was supposed to take over per daughter however was unable to before admission. Pt last fall was prior to March 2021 hospitalization, per daughter.

## 2021-08-26 NOTE — CONSULT NOTE ADULT - SUBJECTIVE AND OBJECTIVE BOX
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 Hx obtained from daughter. At HD session yesterday, pt developed shaking chills. HD session was stopped and pt went home. At home, he continued to have shaking chills and was febrile with tmax 101. He had an associated decrease in appetite and lower extremity weakness causing difficulty walking. Denies n/v/abdominal pain. Daughter states that since DRESS syndrome induced CKD, pt has been doing much better requiring 2 sessions of HD per week, and is on no medication.   Also reports low back pain for 4-5 days, worsened by movement. Daughter arranged at home PT and heating pads which helped with the pain. Pt reports pain only when standing/walking. Pt not in pain at the moment.    In the ED,  VS: T98.8   , HR87   , /83    , RR16    , SpO2  98   % (RA)  Labs: WBC 16.10, auto neut #14.20, 88.2% neut, PT14.3, INR1.2, PTT47.4, BUN 39, Cr 2.86,   Urine: pyuria and bacteriuria  EKG: NSR  CXR: Normal  Imaging: None  Interventions: 750mg vanc, 3.375g zosyn    SUBJECTIVE: Patient seen and examined at bedside. Denies fever, headache, nausea, vomiting, chest pain, shortness of breath, abdominal pain, changes in bowel movements or urination.     OBJECTIVE:    VITAL SIGNS:  ICU Vital Signs Last 24 Hrs  T(C): 36.7 (26 Aug 2021 15:40), Max: 39.6 (25 Aug 2021 20:46)  T(F): 98 (26 Aug 2021 15:40), Max: 103.2 (25 Aug 2021 20:46)  HR: 88 (26 Aug 2021 15:40) (83 - 115)  BP: 129/77 (26 Aug 2021 15:40) (110/65 - 160/90)  BP(mean): 83 (26 Aug 2021 15:05) (83 - 83)  ABP: --  ABP(mean): --  RR: 16 (26 Aug 2021 15:40) (16 - 18)  SpO2: 97% (26 Aug 2021 15:40) (97% - 99%)        CAPILLARY BLOOD GLUCOSE          PHYSICAL EXAM:  General: NAD, resting comfortably  HEENT: NC/AT; PERRL, clear conjunctiva  Neck: supple  Respiratory: CTA b/l  Cardiovascular: +S1/S2; RRR, chest HD port, clean dry intact  Abdomen: soft, NT/ND; +BS x4  Extremities: WWP, 2+ peripheral pulses b/l; no LE edema  Skin: normal color and turgor; no rash  Neurological: AAOx3    MEDICATIONS:  MEDICATIONS  (STANDING):  nafcillin  IVPB      nafcillin  IVPB 2 Gram(s) IV Intermittent once  nafcillin  IVPB 2 Gram(s) IV Intermittent every 4 hours  polyethylene glycol 3350 17 Gram(s) Oral at bedtime  senna 2 Tablet(s) Oral at bedtime    MEDICATIONS  (PRN):  acetaminophen   Tablet .. 650 milliGRAM(s) Oral every 6 hours PRN Temp greater or equal to 38C (100.4F), Moderate Pain (4 - 6)  melatonin 3 milliGRAM(s) Oral at bedtime PRN Insomnia  ondansetron Injectable 4 milliGRAM(s) IV Push every 8 hours PRN Nausea and/or Vomiting      ALLERGIES:  Allergies    allopurinol (Other)    Intolerances        LABS:                        11.9   18.43 )-----------( 185      ( 26 Aug 2021 07:19 )             37.2     08-    132<L>  |  98  |  44<H>  ----------------------------<  106<H>  4.2   |  23  |  3.19<H>    Ca    9.7      26 Aug 2021 07:18  Phos  3.8     08-  Mg     1.8     -    TPro  6.9  /  Alb  3.6  /  TBili  1.2  /  DBili  x   /  AST  27  /  ALT  21  /  AlkPhos  386<H>  08-25    PT/INR - ( 26 Aug 2021 14:35 )   PT: 15.1 sec;   INR: 1.27          PTT - ( 26 Aug 2021 14:35 )  PTT:46.7 sec  Urinalysis Basic - ( 25 Aug 2021 17:18 )    Color: Yellow / Appearance: Clear / S.025 / pH: x  Gluc: x / Ketone: NEGATIVE  / Bili: Small / Urobili: 2.0 E.U./dL   Blood: x / Protein: 100 mg/dL / Nitrite: NEGATIVE   Leuk Esterase: Moderate / RBC: < 5 /HPF / WBC Many /HPF   Sq Epi: x / Non Sq Epi: 0-5 /HPF / Bacteria: Many /HPF            Culture - Blood (collected 21 @ 19:50)  Source: .Blood Blood-Peripheral  Gram Stain (21 @ 12:10):    Aerobic Bottle: Gram Positive Cocci in Clusters    Result called to and read back by_ Betty SANDOVAL  2021 09:27:42    Anaerobic Bottle: Gram Positive Cocci in Clusters    Floor previously notified.  Preliminary Report (21 @ 09:28):    Culture in progress    Culture - Blood (collected 21 @ 19:50)  Source: .Blood Blood-Peripheral  Gram Stain (21 @ 09:29):    Aerobic Bottle: Gram Positive Cocci in Clusters    Result called to and read back byUmu Hernández RN  2021 09:26:02    ***Blood Panel PCR results on this specimen are available    approximately 3 hours after the Gram stain result.***    Gram stain, PCR, and/or culture results may not always    correspond due to difference in methodologies.    ************************************************************    This PCR assay was performed using [employer].    The following targets are tested for: Enterococcus,    vancomycin resistant enterococci, Listeria monocytogenes,    coagulase negative staphylococci, S. aureus,    methicillin resistant S. aureus, Streptococcus agalactiae    (Group B), S. pneumoniae, S. pyogenes (Group A),    Acinetobacter baumannii, Enterobacter cloacae, E. coli,    Klebsiella oxytoca, K. pneumoniae, Proteus sp.,    Serratia marcescens, Haemophilus influenzae,    Neisseria meningitidis, Pseudomonas aeruginosa, Candida    albicans, C. glabrata, C krusei, C parapsilosis,    C. tropicalis and theKPC resistance gene.    Anaerobic Bottle: Gram Positive Cocci in Clusters    Floor previously notified.  Preliminary Report (21 @ 09:20):    Culture in progress  Organism: Blood Culture PCR (21 @ 10:22)  Organism: Blood Culture PCR (21 @ 10:22)      -  Staphylococcus aureus: Detec Any isolate of Staphylococcus aureus from a blood culture is NOT considered a contaminant.      Method Type: PCR            RADIOLOGY & ADDITIONAL TESTS: Reviewed.   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 Hx obtained from daughter. At HD session yesterday, pt developed shaking chills. HD session was stopped and pt went home. At home, he continued to have shaking chills and was febrile with tmax 101. He had an associated decrease in appetite and lower extremity weakness causing difficulty walking. Denies n/v/abdominal pain. Daughter states that since DRESS syndrome induced CKD, pt has been doing much better requiring 2 sessions of HD per week, and is on no medication.   Also reports low back pain for 4-5 days, worsened by movement. Daughter arranged at home PT and heating pads which helped with the pain. Pt reports pain only when standing/walking. Pt not in pain at the moment.    In the ED,  VS: T98.8   , HR87   , /83    , RR16    , SpO2  98   % (RA)  Labs: WBC 16.10, auto neut #14.20, 88.2% neut, PT14.3, INR1.2, PTT47.4, BUN 39, Cr 2.86,   Urine: pyuria and bacteriuria  EKG: NSR  CXR: Normal  Imaging: None  Interventions: 750mg vanc, 3.375g zosyn    SUBJECTIVE: Patient seen and examined at bedside. Denies fever, headache, nausea, vomiting, chest pain, shortness of breath, abdominal pain, changes in bowel movements or urination.     OBJECTIVE:    VITAL SIGNS:  ICU Vital Signs Last 24 Hrs  T(C): 36.7 (26 Aug 2021 15:40), Max: 39.6 (25 Aug 2021 20:46)  T(F): 98 (26 Aug 2021 15:40), Max: 103.2 (25 Aug 2021 20:46)  HR: 88 (26 Aug 2021 15:40) (83 - 115)  BP: 129/77 (26 Aug 2021 15:40) (110/65 - 160/90)  BP(mean): 83 (26 Aug 2021 15:05) (83 - 83)  ABP: --  ABP(mean): --  RR: 16 (26 Aug 2021 15:40) (16 - 18)  SpO2: 97% (26 Aug 2021 15:40) (97% - 99%)        CAPILLARY BLOOD GLUCOSE      PHYSICAL EXAM:  General: NAD, resting comfortably  HEENT: NC/AT; PERRL, clear conjunctiva  Neck: supple  Respiratory: CTA b/l  Cardiovascular: +S1/S2; RRR, chest HD port, clean dry intact  Abdomen: soft, NT/ND; +BS x4  Extremities: WWP, 2+ peripheral pulses b/l; no LE edema  Skin: normal color and turgor; no rash  Neurological: AAOx3    MEDICATIONS:  MEDICATIONS  (STANDING):  nafcillin  IVPB      nafcillin  IVPB 2 Gram(s) IV Intermittent once  nafcillin  IVPB 2 Gram(s) IV Intermittent every 4 hours  polyethylene glycol 3350 17 Gram(s) Oral at bedtime  senna 2 Tablet(s) Oral at bedtime    MEDICATIONS  (PRN):  acetaminophen   Tablet .. 650 milliGRAM(s) Oral every 6 hours PRN Temp greater or equal to 38C (100.4F), Moderate Pain (4 - 6)  melatonin 3 milliGRAM(s) Oral at bedtime PRN Insomnia  ondansetron Injectable 4 milliGRAM(s) IV Push every 8 hours PRN Nausea and/or Vomiting      ALLERGIES:  Allergies    allopurinol (Other)    Intolerances        LABS:                        11.9   18.43 )-----------( 185      ( 26 Aug 2021 07:19 )             37.2     08-    132<L>  |  98  |  44<H>  ----------------------------<  106<H>  4.2   |  23  |  3.19<H>    Ca    9.7      26 Aug 2021 07:18  Phos  3.8     08-  Mg     1.8     -    TPro  6.9  /  Alb  3.6  /  TBili  1.2  /  DBili  x   /  AST  27  /  ALT  21  /  AlkPhos  386<H>  08-25    PT/INR - ( 26 Aug 2021 14:35 )   PT: 15.1 sec;   INR: 1.27          PTT - ( 26 Aug 2021 14:35 )  PTT:46.7 sec  Urinalysis Basic - ( 25 Aug 2021 17:18 )    Color: Yellow / Appearance: Clear / S.025 / pH: x  Gluc: x / Ketone: NEGATIVE  / Bili: Small / Urobili: 2.0 E.U./dL   Blood: x / Protein: 100 mg/dL / Nitrite: NEGATIVE   Leuk Esterase: Moderate / RBC: < 5 /HPF / WBC Many /HPF   Sq Epi: x / Non Sq Epi: 0-5 /HPF / Bacteria: Many /HPF            Culture - Blood (collected 21 @ 19:50)  Source: .Blood Blood-Peripheral  Gram Stain (21 @ 12:10):    Aerobic Bottle: Gram Positive Cocci in Clusters    Result called to and read back by_ Betty SANDOVAL  2021 09:27:42    Anaerobic Bottle: Gram Positive Cocci in Clusters    Floor previously notified.  Preliminary Report (21 @ 09:28):    Culture in progress    Culture - Blood (collected 21 @ 19:50)  Source: .Blood Blood-Peripheral  Gram Stain (21 @ 09:29):    Aerobic Bottle: Gram Positive Cocci in Clusters    Result called to and read back byUmu Hernández RN  2021 09:26:02    ***Blood Panel PCR results on this specimen are available    approximately 3 hours after the Gram stain result.***    Gram stain, PCR, and/or culture results may not always    correspond due to difference in methodologies.    ************************************************************    This PCR assay was performed using [employer].    The following targets are tested for: Enterococcus,    vancomycin resistant enterococci, Listeria monocytogenes,    coagulase negative staphylococci, S. aureus,    methicillin resistant S. aureus, Streptococcus agalactiae    (Group B), S. pneumoniae, S. pyogenes (Group A),    Acinetobacter baumannii, Enterobacter cloacae, E. coli,    Klebsiella oxytoca, K. pneumoniae, Proteus sp.,    Serratia marcescens, Haemophilus influenzae,    Neisseria meningitidis, Pseudomonas aeruginosa, Candida    albicans, C. glabrata, C krusei, C parapsilosis,    C. tropicalis and theKPC resistance gene.    Anaerobic Bottle: Gram Positive Cocci in Clusters    Floor previously notified.  Preliminary Report (21 @ 09:20):    Culture in progress  Organism: Blood Culture PCR (21 @ 10:22)  Organism: Blood Culture PCR (21 @ 10:22)      -  Staphylococcus aureus: Detec Any isolate of Staphylococcus aureus from a blood culture is NOT considered a contaminant.      Method Type: PCR            RADIOLOGY & ADDITIONAL TESTS: Reviewed.

## 2021-08-26 NOTE — CONSULT NOTE ADULT - ASSESSMENT
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 Fay

## 2021-08-26 NOTE — CONSULT NOTE ADULT - ATTENDING COMMENTS
L5/S1 discitis.  no back pain when assessed this afternoon.  neurologically intact L2-S1 bilaterally.  given high risk of bleeding with hemophilia A, would not perform any interventional biopsy, as there is positive blood cultures identified, and this is likely the organism.  would consider covering the recent infection from 3/2021 as well.  antibiotics per ID.  no surgery indicated.  please re-consult if develops any neurologic deficits or concerning symptoms.

## 2021-08-26 NOTE — CONSULT NOTE ADULT - ASSESSMENT
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 Found to have Bcx growing MSSA. CT lumbar spine performed o/n, findings diagnostic for discitis-osteomyelitis. Pending dialysis catheter exchange. Hematology Oncology for preoperative optimization prior to HD cath placement.     # Hemophilia A  For preoperative optimization prior to HD cath placement it is imperative trend the patient's Factor VIII level. Currently factor VIII level 58.  Please check STAT: PT/INR, PTT, factor VIII level and mixing studies  At this time no indication for factor replacement since he is not oozing or bleeding.    Of note, patient had an HD catheter replaced in March in which the patient received rFVIII and DDAVP mono-procedure and post-procedure for HD catheter placement. On that admission, of note possibly mild inhibitor noted on 2 hr mixing study in the past    Factor VIII replacement is calculated by the following in IU: (pt's weight x correction needed)/2  Alternatively DDAVP is an option, but that is in patient's who have previously demonstrated a response to DDAVP  Dosin.3 mcg/kg once (maximum recommended dose: 20 to 30 mcg). 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 Found to have Bcx growing MSSA. CT lumbar spine performed o/n, findings diagnostic for discitis-osteomyelitis. Pending dialysis catheter exchange. Hematology Oncology for preoperative optimization prior to HD cath placement.     # Hemophilia A  For preoperative optimization prior to HD cath placement it is imperative trend the patient's Factor VIII level. Currently factor VIII level 58.  Please check STAT: PT/INR, PTT, factor VIII level and mixing studies    Of note, patient had an HD catheter replaced in March in which the patient received rFVIII and DDAVP mono-procedure and post-procedure for HD catheter placement. On that admission, of note possibly mild inhibitor noted on 2 hr mixing study in the past    Factor VIII replacement is calculated by the following in IU.   Factor VIII units required = [(desired peak factor VIII level - patient's baseline factor VIII level) × body weight (kg)]/2  If Mr. Don, given history of prior bleeding with procedure, would aim for a desired peak factor VIII level of 100, resulting in approximately 1250 units of factor VIII    Alternatively DDAVP is an option, but that is in patient's who have previously demonstrated a response to DDAVP  Dosin.3 mcg/kg once (maximum recommended dose: 20 to 30 mcg). If used for prevention of surgical bleeding, initial (preoperative) dose should be timed to provide maximal coverage at the time of greatest bleeding risk (typically 30 to 60 minutes before the procedure)    Plan:  - Trend factor VIII level daily  - Based on current level, would give 1250 units of factor VIII in morning prior to catheter removal (recombinant factor VIII (Advate available at [facility] per blood bank), half life approx 12 hours)  - However, if possible would confirm AM 2-hour mixing study and factor VIII level factor, for optimal recombinant Factor VIII dosing due to possible presence of inhibitor and level of bethesda units  - Optimal dosing of factor VIII to be discussed in AM    Discussed with Hematology & Oncology Fellow and pending discussion with Attending Physician Dr. Bernal. Recommendations are considered final after attending attestation.                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 Found to have Bcx growing MSSA. CT lumbar spine performed o/n, findings diagnostic for discitis-osteomyelitis. Pending dialysis catheter exchange. Hematology Oncology for preoperative optimization prior to HD cath placement.     Hemophilia A  For preoperative optimization prior to HD cath placement it is imperative trend the patient's Factor VIII level. Currently factor VIII level 58.  Please check STAT: PT/INR, PTT, factor VIII level and mixing studies    Of note, patient had an HD catheter replaced in March in which the patient received rFVIII and DDAVP mono-procedure and post-procedure for HD catheter placement. On that admission, of note possibly mild inhibitor noted on 2 hr mixing study in the past    Factor VIII replacement is calculated by the following in IU.   Factor VIII units required = [(desired peak factor VIII level - patient's baseline factor VIII level) × body weight (kg)]/2  If Mr. Don, given history of prior bleeding with procedure, would aim for a desired peak factor VIII level of 100, resulting in approximately 1250 units of factor VIII    Alternatively DDAVP is an option, but that is in patient's who have previously demonstrated a response to DDAVP  Dosin.3 mcg/kg once (maximum recommended dose: 20 to 30 mcg). If used for prevention of surgical bleeding, initial (preoperative) dose should be timed to provide maximal coverage at the time of greatest bleeding risk (typically 30 to 60 minutes before the procedure)    Plan:  - Trend factor VIII level daily  - Based on current level, would give 1250 units of factor VIII in morning prior to catheter removal (recombinant factor VIII (Advate available at [facility] per blood bank), half life approx 12 hours) and Amicar 10 mg/kg once daily starting tomorrow morning  - However, if possible would confirm AM 2-hour mixing study and factor VIII level factor, for optimal recombinant Factor VIII dosing due to possible presence of inhibitor and level of bethesda units  - order inhibitor assay ([place] units) STAT  - Optimal dosing of factor VIII to be discussed in AM    Discussed with Hematology & Oncology Fellow and pending discussion with Attending Physician Dr. Bernal. Recommendations are considered final after attending attestation.       D/w  (Hematology/Oncology Service attending) covering for . 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 Found to have Bcx growing MSSA. CT lumbar spine performed o/n, findings diagnostic for discitis-osteomyelitis. Pending dialysis catheter exchange. Hematology Oncology for preoperative optimization prior to HD cath placement.     Hemophilia A  For preoperative optimization prior to HD cath placement it is imperative trend the patient's Factor VIII level. Currently factor VIII level 58.  Please check STAT: PT/INR, PTT, factor VIII level and mixing studies    Of note, patient had an HD catheter replaced in March in which the patient received rFVIII and DDAVP mono-procedure and post-procedure for HD catheter placement. On that admission, of note possibly mild inhibitor noted on 2 hr mixing study in the past    Factor VIII replacement is calculated by the following in IU.   Factor VIII units required = [(desired peak factor VIII level - patient's baseline factor VIII level) × body weight (kg)]/2  If Mr. Don, given history of prior bleeding with procedure, would aim for a desired peak factor VIII level of 100, resulting in approximately 1250 units of factor VIII    Alternatively DDAVP is an option, but that is in patient's who have previously demonstrated a response to DDAVP  Dosin.3 mcg/kg once (maximum recommended dose: 20 to 30 mcg). If used for prevention of surgical bleeding, initial (preoperative) dose should be timed to provide maximal coverage at the time of greatest bleeding risk (typically 30 to 60 minutes before the procedure)    Plan:  - Trend factor VIII level daily  - Based on current level, would give 1250 units of factor VIII in morning prior to catheter removal (recombinant factor VIII (Advate available at [facility] per blood bank), half life approx 12 hours) and Tranexamic acid 10 mg/kg once daily starting tomorrow morning  - However, if possible would confirm AM 2-hour mixing study and factor VIII level factor, for optimal recombinant Factor VIII dosing due to possible presence of inhibitor and level of bethesda units  - order inhibitor assay ([place] units) STAT  - Optimal dosing of factor VIII to be discussed in AM    Discussed with Hematology & Oncology Fellow and pending discussion with Attending Physician Dr. Bernal. Recommendations are considered final after attending attestation.       D/w  (Hematology/Oncology Service attending) covering for .

## 2021-08-26 NOTE — PROGRESS NOTE ADULT - PROBLEM SELECTOR PLAN 1
Low back pain with ambulation for 4-5 days. Per daughter, PT and warm compresses at home alleviated his pain. He does not report pain at bedside today. No spinal tenderness, strength 5/5 in b/l LE's, negative straight leg raise. On admission pt met 3/4 SIRS criteria; HR 96, WBC 16.10 with neutrophilic predominance, Tmax 103.2F. Positive UA. Negative CXR. Tmax 103.2F yesterday, 100.4 this AM. CT lumbar spine performed o/n, findings diagnostic for discitis-osteomyelitis.  Patient is s/p 750mg vanc, 3.375g zosyn in the ED, s/p 500 cc NS. ID consulted this morning. Ortho following.    - continue 2g ceftriaxone q24  - f/u ID recs  - Ortho following: no biopsy given patient's medical history of hemophilia A Low back pain with ambulation for 4-5 days. Per daughter, PT and warm compresses at home alleviated his pain. He does not report pain at bedside today. No spinal tenderness, strength 5/5 in b/l LE's, negative straight leg raise. On admission pt met 3/4 SIRS criteria; HR 96, WBC 16.10 with neutrophilic predominance, Tmax 103.2F. Positive UA. Negative CXR. Bcx grew gram positive cocci in clusters. CT lumbar spine performed o/n, findings diagnostic for discitis-osteomyelitis. Patient is s/p 750mg vanc, 3.375g zosyn in the ED, s/p 500 cc NS. ID consulted this morning. Ortho following.    - continue 2g ceftriaxone q24  - f/u ID recs  - Ortho following: no biopsy given patient's medical history of hemophilia A Low back pain with ambulation for 4-5 days. Per daughter, PT and warm compresses at home alleviated his pain. He does not report pain at bedside today. No spinal tenderness, strength 5/5 in b/l LE's, negative straight leg raise. On admission pt met 3/4 SIRS criteria; HR 96, WBC 16.10 with neutrophilic predominance, Tmax 103.2F. Positive UA. Negative CXR. Bcx grew MSSA. CT lumbar spine performed o/n, findings diagnostic for discitis-osteomyelitis. Patient is s/p 750mg vanc, 3.375g zosyn in the ED, s/p 500 cc NS. ID consulted this morning. Ortho following.  - continue ceftriaxone, start cefazolin given Bcx results  - f/u ID recs for abx  - Ortho following: no biopsy given patient's medical history of hemophilia A

## 2021-08-26 NOTE — CONSULT NOTE ADULT - ASSESSMENT
Assessment/Plan:     #ESRD on HD MF @[facility]  usual Rx 3h 0.5L   Last HD 8/24  -electrolytes at goal   -euvolemic on examination  -No urgent indication for HD today  -HD as per schedule on 8/27  dry wt TBD    #HTN   Intermittently elevated, could be in the setting of pain  -Perform med rec and clarify home regimen of meds.    #access   RIJ TDC    #anemia  Hb at goal   no indication for EPO or IV Iron at this time   transfusion as per primary team     #renal bone disease   Ca at goal  Phos at goal    Fercho Masterson D.O.  PGY-4, Nephrology Fellow   C: [phone]   P: [phone]  Assessment/Plan:     #ESRD on HD MF @[facility]  usual Rx 3h 0.5L   Last HD 8/24  -electrolytes at goal   -euvolemic on examination  dry wt TBD    #HTN   Intermittently elevated, could be in the setting of pain  -Perform med rec and clarify home regimen of meds.    #access   RIJ TDC    #anemia  Hb at goal   no indication for EPO or IV Iron at this time   transfusion as per primary team     #renal bone disease   Ca at goal  Phos at goal    Fercho Masterson D.O.  PGY-4, Nephrology Fellow   C: [phone]   P: [phone]  Assessment/Plan:     #ESRD on HD MF @[facility]  usual Rx 3h 0.5L   Last HD 8/24  -electrolytes at goal   -euvolemic on examination  dry wt TBD    #Fevers  Fevers can be likely in the setting of sepsis 2/2 UTI or possible discitis. However pt also has catheter in place. Will follow up pending cultures; may need catheter removal.     #HTN   Intermittently elevated, could be in the setting of pain  -Perform med rec and clarify home regimen of meds.    #access   RIJ TDC    #anemia  Hb at goal   no indication for EPO or IV Iron at this time   transfusion as per primary team     #renal bone disease   Ca at goal  Phos at goal    Fercho Masterson D.O.  PGY-4, Nephrology Fellow   C: [phone]   P: [phone]  Assessment/Plan:     *Addendum 1:26 PM  -Pt found to be growing Staph aureus in blood culture  -Will do HD today with anticipation of HD catheter removal.  -Spoke w/ team, they should contact hematology as pt has history of Hemophilia A; and with catheter insertion previously lost significant blood according to daughter.  In addition, need to contact Vascular / IR for line removal and replacement next week when cultures clear.   -ID on board    #ESRD on HD MF @[facility]  usual Rx 3h 0.5L   Last HD 8/24  -electrolytes at goal   -euvolemic on examination  dry wt TBD    #Fevers  Fevers can be likely in the setting of sepsis 2/2 UTI or possible discitis. However pt also has catheter in place. Will follow up pending cultures; may need catheter removal.     #HTN   Intermittently elevated, could be in the setting of pain  -Perform med rec and clarify home regimen of meds.    #access   RIJ TDC    #anemia  Hb at goal   no indication for EPO or IV Iron at this time   transfusion as per primary team     #renal bone disease   Ca at goal  Phos at goal    Fercho Masterson D.O.  PGY-4, Nephrology Fellow   C: [phone]   P: [phone]

## 2021-08-26 NOTE — PROGRESS NOTE ADULT - SUBJECTIVE AND OBJECTIVE BOX
INTERVAL HPI/OVERNIGHT EVENTS:    SUBJECTIVE: Patient seen and examined at bedside.     OBJECTIVE:    VITAL SIGNS:  ICU Vital Signs Last 24 Hrs  T(C): 37.4 (26 Aug 2021 06:30), Max: 39.6 (25 Aug 2021 20:46)  T(F): 99.4 (26 Aug 2021 06:30), Max: 103.2 (25 Aug 2021 20:46)  HR: 85 (26 Aug 2021 06:30) (85 - 115)  BP: 110/65 (26 Aug 2021 06:30) (110/65 - 160/90)  BP(mean): --  ABP: --  ABP(mean): --  RR: 18 (26 Aug 2021 06:30) (16 - 18)  SpO2: 99% (26 Aug 2021 06:30) (98% - 99%)        CAPILLARY BLOOD GLUCOSE          PHYSICAL EXAM:    General: NAD  HEENT: NC/AT; PERRL, clear conjunctiva  Neck: supple  Respiratory: CTA b/l  Cardiovascular: +S1/S2; RRR  Abdomen: soft, NT/ND; +BS x4  Extremities: WWP, 2+ peripheral pulses b/l; no LE edema  Skin: normal color and turgor; no rash  Neurological:    MEDICATIONS:  MEDICATIONS  (STANDING):  cefTRIAXone   IVPB 2000 milliGRAM(s) IV Intermittent every 24 hours  sodium chloride 0.9% Bolus 500 milliLiter(s) IV Bolus once    MEDICATIONS  (PRN):  acetaminophen   Tablet .. 650 milliGRAM(s) Oral every 6 hours PRN Temp greater or equal to 38.5C (101.3F), Mild Pain (1 - 3)  melatonin 3 milliGRAM(s) Oral at bedtime PRN Insomnia  ondansetron Injectable 4 milliGRAM(s) IV Push every 8 hours PRN Nausea and/or Vomiting      ALLERGIES:  Allergies    allopurinol (Other)    Intolerances        LABS:                        13.0   16.10 )-----------( 196      ( 25 Aug 2021 16:47 )             39.7     08-    134<L>  |  94<L>  |  39<H>  ----------------------------<  119<H>  4.4   |  26  |  2.86<H>    Ca    10.3      25 Aug 2021 16:47    TPro  6.9  /  Alb  3.6  /  TBili  1.2  /  DBili  x   /  AST  27  /  ALT  21  /  AlkPhos  386<H>  08-25    PT/INR - ( 25 Aug 2021 16:47 )   PT: 14.3 sec;   INR: 1.20          PTT - ( 25 Aug 2021 16:47 )  PTT:47.4 sec  Urinalysis Basic - ( 25 Aug 2021 17:18 )    Color: Yellow / Appearance: Clear / S.025 / pH: x  Gluc: x / Ketone: NEGATIVE  / Bili: Small / Urobili: 2.0 E.U./dL   Blood: x / Protein: 100 mg/dL / Nitrite: NEGATIVE   Leuk Esterase: Moderate / RBC: < 5 /HPF / WBC Many /HPF   Sq Epi: x / Non Sq Epi: 0-5 /HPF / Bacteria: Many /HPF        RADIOLOGY & ADDITIONAL TESTS: Reviewed. OVERNIGHT EVENTS: NAEO    SUBJECTIVE / INTERVAL HPI: Patient seen and examined at bedside this morning. No complaints at this time, no urinary sx, states his back pain has improved. Otherwise, patient denying chest pain, SOB, palpitations, cough. Patient denies fever, chills, HA, Dizziness, change in vision/hearing, N/V, abdominal pain, diarrhea, constipation, hematochezia/melena, dysuria, hematuria, new onset weakness/numbness, LE pain and/or swelling.    Remaining ROS negative     PHYSICAL EXAM:    General: NAD, resting comfortably in bed  HEENT: NC/AT; PERRL, anicteric sclera; MMM  Neck: supple  Cardiovascular: +S1/S2, RRR, no m/r/g  Respiratory: CTA B/L; no W/R/R  Gastrointestinal: soft, NT/ND; +BS, no rebound or guarding  : no suprapubic tenderness, HD cath bandaged but no surrounding erythema or tenderness  Back: skin intact, no CVA tenderness, no ecchymosis, no TTP over spinous processes of vertebra.  Extremities: WWP; no edema, clubbing or cyanosis  Vascular: 2+ radial, DP/PT pulses B/L  Neurological: AAOx3; no focal deficits. Strength 5/5 in b/l LEs and UEs  Psychiatric: pleasant mood and affect  Dermatologic: no appreciable wounds or damage to the skin    VITAL SIGNS:  Vital Signs Last 24 Hrs  T(C): 38 (26 Aug 2021 08:57), Max: 39.6 (25 Aug 2021 20:46)  T(F): 100.4 (26 Aug 2021 08:57), Max: 103.2 (25 Aug 2021 20:46)  HR: 93 (26 Aug 2021 08:57) (85 - 115)  BP: 137/73 (26 Aug 2021 08:57) (110/65 - 160/90)  BP(mean): --  RR: 18 (26 Aug 2021 08:57) (16 - 18)  SpO2: 97% (26 Aug 2021 08:57) (97% - 99%)    MEDICATIONS:  MEDICATIONS  (STANDING):  cefTRIAXone   IVPB 2000 milliGRAM(s) IV Intermittent every 24 hours  magnesium sulfate  IVPB 2 Gram(s) IV Intermittent once  polyethylene glycol 3350 17 Gram(s) Oral at bedtime  senna 2 Tablet(s) Oral at bedtime  sodium chloride 0.9% Bolus 500 milliLiter(s) IV Bolus once    MEDICATIONS  (PRN):  acetaminophen   Tablet .. 650 milliGRAM(s) Oral every 6 hours PRN Temp greater or equal to 38C (100.4F), Moderate Pain (4 - 6)  melatonin 3 milliGRAM(s) Oral at bedtime PRN Insomnia  ondansetron Injectable 4 milliGRAM(s) IV Push every 8 hours PRN Nausea and/or Vomiting    ALLERGIES:  allopurinol (Other)    LABS:                        11.9   18.43 )-----------( 185      ( 26 Aug 2021 07:19 )             37.2     08    132<L>  |  98  |  44<H>  ----------------------------<  106<H>  4.2   |  23  |  3.19<H>    Ca    9.7      26 Aug 2021 07:18  Phos  3.8       Mg     1.8         TPro  6.9  /  Alb  3.6  /  TBili  1.2  /  DBili  x   /  AST  27  /  ALT  21  /  AlkPhos  386<H>  08-    PT/INR - ( 25 Aug 2021 16:47 )   PT: 14.3 sec;   INR: 1.20     PTT - ( 25 Aug 2021 16:47 )  PTT:47.4 sec  Urinalysis Basic - ( 25 Aug 2021 17:18 )    Color: Yellow / Appearance: Clear / S.025 / pH: x  Gluc: x / Ketone: NEGATIVE  / Bili: Small / Urobili: 2.0 E.U./dL   Blood: x / Protein: 100 mg/dL / Nitrite: NEGATIVE   Leuk Esterase: Moderate / RBC: < 5 /HPF / WBC Many /HPF   Sq Epi: x / Non Sq Epi: 0-5 /HPF / Bacteria: Many /HPF    RADIOLOGY & ADDITIONAL TESTS: Reviewed.

## 2021-08-26 NOTE — PROGRESS NOTE ADULT - ATTENDING COMMENTS
Pt. seen and examined by me earlier today; I have read Dr. Leal's note, I agree w/ her findings and plan of care as documented; cont. work-up and mgmt per ID, Spine, Renal, and Heme-Onc recs; f/u cultures

## 2021-08-26 NOTE — CONSULT NOTE ADULT - ATTENDING COMMENTS
possible discitis , blood with gpc-- if SA will likely need HD catheter dc  volume, lytes are ok -- but may consider HD today if needs catheter removal to allow pt to be catheter free several days post  abx per team/ID possible discitis , blood with gpc-- if SA will likely need HD catheter dc  exit site ok   volume, lytes are ok -- but may consider HD today if needs catheter removal to allow pt to be catheter free several days post  abx per team/ID

## 2021-08-26 NOTE — PROGRESS NOTE ADULT - SUBJECTIVE AND OBJECTIVE BOX
Patient was seen and evaluated on dialysis.     Dialyzer: Optiflux [id number]  QB: 400 mL/min  QD: 500 mL/min  K bath: 3  TDW 55Kg  Duration: 200 min    Patient is tolerating the procedure well. No acute complaints voiced  Continue full hemodialysis treatment as prescribed.

## 2021-08-26 NOTE — CONSULT NOTE ADULT - ASSESSMENT
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 CT back, showed on 8/26 new erosive end-plate and perivertebral fat changes at L5 and S1 levels with high suspicion for discitis and OM. 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 Ucx from 8/25 grow Enterobacter Colacae, but patient denies any urinary symptoms. Bcx from 8/25 grows MSSA 3/4 bottles. Patient got being treated with CTX and Vanc on 8/25 Zosyn 3.375, Vanc 750 and CTX 2g and one more dose of Vanc on 8/26. WBC is increasing from 16 to 18.4 this AM, but patient subjectively feel better, and his back pain is already improving.    - Daily surveillance blood cultures until MSSA bacteremia clears  - F/u blood culture susceptibilities  - Nafcillin 2g q4 for best MSSA coverage, as recurrent MSSA bacteremia; known fluid overload during past admission, unlike then, patient now is on HD and may tolerate treatment better  - M/p source of infection is the HD cath, please remove cath - will need hem/onc on board due to hemophillia  - Place new permanent HD cath only after surveillance cultures are negative >72hr; meanwhile can place temporary HD cath.  - Please obtain TTE for endocarditis evaluation, as MSSA is prone to seeding on heart valves. Patient may require following GUANAKO  - Please obtain gallium scan  - Ucx from 8/25 grow Enterobacter Colacae, but patient denies any urinary symptoms. no need to treat asymptomatic bacteruria    ID Team 1 will continue to follow. Please see below attending attestation for finalized recommendations.    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 CT back, showed on 8/26 new erosive end-plate and perivertebral fat changes at L5 and S1 levels with high suspicion for discitis and OM. 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 Ucx from 8/25 grow Enterobacter Colacae, but patient denies any urinary symptoms. Bcx from 8/25 grows MSSA 3/4 bottles. Patient got being treated with CTX and Vanc on 8/25 Zosyn 3.375, Vanc 750 and CTX 2g and one more dose of Vanc on 8/26. WBC is increasing from 16 to 18.4 this AM, but patient subjectively feel better, and his back pain is already improving.    - Daily surveillance blood cultures until MSSA bacteremia clears  - F/u blood culture susceptibilities  - Nafcillin 2g q4 for best MSSA coverage, as recurrent MSSA bacteremia; known fluid overload during past admission, unlike then, patient now is on HD and may tolerate treatment better  - M/p source of infection is the HD cath, please remove cath - will need hem/onc on board due to hemophillia  - Place new permanent HD cath only after surveillance cultures are negative >72hr; meanwhile can place temporary HD cath.  - Please obtain TTE for endocarditis evaluation, as MSSA is prone to seeding on heart valves. Patient may require following GUANAKO  - Ucx from 8/25 grow Enterobacter Colacae, but patient denies any urinary symptoms. no need to treat asymptomatic bacteruria    ID Team 1 will continue to follow. Please see below attending attestation for finalized recommendations.    Marbin Penny MD PGY2 Internal Medicine  Infectious Disease Consult Service, Team 1

## 2021-08-26 NOTE — CONSULT NOTE ADULT - SUBJECTIVE AND OBJECTIVE BOX
Patient is a 76y old  Male who presents with a chief complaint of sepsis likely 2/2 UTI (25 Aug 2021 19:08)      Consult reason:  ED vitals: Tmax 103.2   HR 110s-->80-90s (on exam)   RR 16  /90  SpO2 99 RA   Labs signficant: WBC 16, ESR/CRP: 119/265, lactate 1.7, positive UA, BUN/Cr 39/2.86,   Imaging/EKG: CXR clear. CT Lspine showing possible epidural abscess vs. 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 Hx obtained from daughter. At HD session yesterday, pt developed shaking chills. HD session was stopped and pt went home. At home, he continued to have shaking chills and was febrile with tmax 101. He had an associated decrease in appetite and lower extremity weakness causing difficulty walking. Denies n/v/abdominal pain. Daughter states that since DRESS syndrome induced CKD, pt has been doing much better requiring 2 sessions of HD per week, and is on no medication.   Also reports low back pain for 4-5 days, worsened by movement. Daughter arranged at home PT and heating pads which helped with the pain. Pt reports pain only when standing/walking. Pt not in pain at the moment.    In the ED,  VS: T98.8   , HR87   , /83    , RR16    , SpO2  98   % (RA)  Labs: WBC 16.10, auto neut #14.20, 88.2% neut, PT14.3, INR1.2, PTT47.4, BUN 39, Cr 2.86,   Urine: pyuria and bacteriuria  EKG: NSR  CXR: Normal  Imaging: None  Interventions: 750mg vanc, 3.375g zosyn     (25 Aug 2021 19:08)      Allergies    allopurinol (Other)    Intolerances      Home Medications:  midodrine 2.5 mg oral tablet: 3 tab(s) orally every 8 hours, As Needed (25 Aug 2021 19:38)      SOCIAL HX:     Smoking          ETOH/Illicit drugs          Occupation    PAST MEDICAL & SURGICAL HISTORY:  HTN (hypertension)    Hemophilia A    Gout    History of BPH    Chronic kidney disease (CKD)    DRESS syndrome    Uses cochlear implant        FAMILY HISTORY:  FH: HTN (hypertension)    :    No known cardiovascular or pulmonary family history     ROS:  See HPI     PHYSICAL EXAM    ICU Vital Signs Last 24 Hrs  T(C): 39.6 (25 Aug 2021 20:46), Max: 39.6 (25 Aug 2021 20:46)  T(F): 103.2 (25 Aug 2021 20:46), Max: 103.2 (25 Aug 2021 20:46)  HR: 113 (25 Aug 2021 23:36) (97 - 115)  BP: 160/90 (25 Aug 2021 23:36) (117/83 - 160/90)  BP(mean): --  ABP: --  ABP(mean): --  RR: 17 (25 Aug 2021 23:36) (16 - 17)  SpO2: 99% (25 Aug 2021 23:36) (98% - 99%)      General: Not in distress  HEENT:  VANESA              Lungs: Bilateral BS  Cardiovascular: Regular rate and rhythm, no m/r/g  Gastrointestinal: Soft, Positive BS  Musculoskeletal: No clubbing.  Moves all extremities.  No LE edema   Skin: Warm.  Intact  Neurological: AOx3         LABS:                          13.0   16.10 )-----------( 196      ( 25 Aug 2021 16:47 )             39.7                                               08-    134<L>  |  94<L>  |  39<H>  ----------------------------<  119<H>  4.4   |  26  |  2.86<H>    Ca    10.3      25 Aug 2021 16:47    TPro  6.9  /  Alb  3.6  /  TBili  1.2  /  DBili  x   /  AST  27  /  ALT  21  /  AlkPhos  386<H>  08-25      PT/INR - ( 25 Aug 2021 16:47 )   PT: 14.3 sec;   INR: 1.20          PTT - ( 25 Aug 2021 16:47 )  PTT:47.4 sec                                       Urinalysis Basic - ( 25 Aug 2021 17:18 )    Color: Yellow / Appearance: Clear / S.025 / pH: x  Gluc: x / Ketone: NEGATIVE  / Bili: Small / Urobili: 2.0 E.U./dL   Blood: x / Protein: 100 mg/dL / Nitrite: NEGATIVE   Leuk Esterase: Moderate / RBC: < 5 /HPF / WBC Many /HPF   Sq Epi: x / Non Sq Epi: 0-5 /HPF / Bacteria: Many /HPF                                                  LIVER FUNCTIONS - ( 25 Aug 2021 16:47 )  Alb: 3.6 g/dL / Pro: 6.9 g/dL / ALK PHOS: 386 U/L / ALT: 21 U/L / AST: 27 U/L / GGT: x                                                                                                                                           CXR:    ECHO:    MEDICATIONS  (STANDING):  cefTRIAXone   IVPB 2000 milliGRAM(s) IV Intermittent every 24 hours  sodium chloride 0.9% Bolus 500 milliLiter(s) IV Bolus once  vancomycin  IVPB 750 milliGRAM(s) IV Intermittent once    MEDICATIONS  (PRN):  acetaminophen   Tablet .. 650 milliGRAM(s) Oral every 6 hours PRN Temp greater or equal to 38.5C (101.3F), Mild Pain (1 - 3)  melatonin 3 milliGRAM(s) Oral at bedtime PRN Insomnia  ondansetron Injectable 4 milliGRAM(s) IV Push every 8 hours PRN Nausea and/or Vomiting         Patient is a 76y old  Male who presents with a chief complaint of sepsis likely 2/2 UTI (25 Aug 2021 19:08)      Consult reason:  ED vitals: Tmax 103.2   HR 110s-->80-90s (on exam)   RR 16  /90  SpO2 99 RA   Labs signficant: WBC 16, ESR/CRP: 119/265, lactate 1.7, positive UA, BUN/Cr 39/2.86,   Imaging/EKG: CXR clear. CT Lspine showing possible epidural abscess vs. 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 Hx obtained from daughter. At HD session yesterday, pt developed shaking chills. HD session was stopped and pt went home. At home, he continued to have shaking chills and was febrile with tmax 101. He had an associated decrease in appetite and lower extremity weakness causing difficulty walking. Denies n/v/abdominal pain. Daughter states that since DRESS syndrome induced CKD, pt has been doing much better requiring 2 sessions of HD per week, and is on no medication.   Also reports low back pain for 4-5 days, worsened by movement. Daughter arranged at home PT and heating pads which helped with the pain. Pt reports pain only when standing/walking. Pt not in pain at the moment.    In the ED,  VS: T98.8   , HR87   , /83    , RR16    , SpO2  98   % (RA)  Labs: WBC 16.10, auto neut #14.20, 88.2% neut, PT14.3, INR1.2, PTT47.4, BUN 39, Cr 2.86,   Urine: pyuria and bacteriuria  EKG: NSR  CXR: Normal  Imaging: None  Interventions: 750mg vanc, 3.375g zosyn    Additional HPI:  Per daughter pt has had no other complaints or issues outside of LBP, no fecal or urinary incontinence and no inability to walk. Rest of history same as above per daughter.      (25 Aug 2021 19:08)      Allergies    allopurinol (Other)    Intolerances      Home Medications:  midodrine 2.5 mg oral tablet: 3 tab(s) orally every 8 hours, As Needed (25 Aug 2021 19:38)      SOCIAL HX:     Smoking          ETOH/Illicit drugs          Occupation    PAST MEDICAL & SURGICAL HISTORY:  HTN (hypertension)    Hemophilia A    Gout    History of BPH    Chronic kidney disease (CKD)    DRESS syndrome    Uses cochlear implant        FAMILY HISTORY:  FH: HTN (hypertension)    :    No known cardiovascular or pulmonary family history     ROS:  See HPI     PHYSICAL EXAM    ICU Vital Signs Last 24 Hrs  T(C): 39.6 (25 Aug 2021 20:46), Max: 39.6 (25 Aug 2021 20:46)  T(F): 103.2 (25 Aug 2021 20:46), Max: 103.2 (25 Aug 2021 20:46)  HR: 113 (25 Aug 2021 23:36) (97 - 115)  BP: 160/90 (25 Aug 2021 23:36) (117/83 - 160/90)  BP(mean): --  ABP: --  ABP(mean): --  RR: 17 (25 Aug 2021 23:36) (16 - 17)  SpO2: 99% (25 Aug 2021 23:36) (98% - 99%)      General: Not in distress  HEENT:  VANESA              Lungs: Bilateral BS  Cardiovascular: Regular rate and rhythm, no m/r/g  Gastrointestinal: Soft, Positive BS  Musculoskeletal: No clubbing.  Moves all extremities.  No LE edema   Skin: Warm.  Intact  Neurological: AOx3         LABS:                          13.0   16.10 )-----------( 196      ( 25 Aug 2021 16:47 )             39.7                                               08-25    134<L>  |  94<L>  |  39<H>  ----------------------------<  119<H>  4.4   |  26  |  2.86<H>    Ca    10.3      25 Aug 2021 16:47    TPro  6.9  /  Alb  3.6  /  TBili  1.2  /  DBili  x   /  AST  27  /  ALT  21  /  AlkPhos  386<H>  08-25      PT/INR - ( 25 Aug 2021 16:47 )   PT: 14.3 sec;   INR: 1.20          PTT - ( 25 Aug 2021 16:47 )  PTT:47.4 sec                                       Urinalysis Basic - ( 25 Aug 2021 17:18 )    Color: Yellow / Appearance: Clear / S.025 / pH: x  Gluc: x / Ketone: NEGATIVE  / Bili: Small / Urobili: 2.0 E.U./dL   Blood: x / Protein: 100 mg/dL / Nitrite: NEGATIVE   Leuk Esterase: Moderate / RBC: < 5 /HPF / WBC Many /HPF   Sq Epi: x / Non Sq Epi: 0-5 /HPF / Bacteria: Many /HPF                                                  LIVER FUNCTIONS - ( 25 Aug 2021 16:47 )  Alb: 3.6 g/dL / Pro: 6.9 g/dL / ALK PHOS: 386 U/L / ALT: 21 U/L / AST: 27 U/L / GGT: x                                                                                                                                           CXR:    ECHO:    MEDICATIONS  (STANDING):  cefTRIAXone   IVPB 2000 milliGRAM(s) IV Intermittent every 24 hours  sodium chloride 0.9% Bolus 500 milliLiter(s) IV Bolus once  vancomycin  IVPB 750 milliGRAM(s) IV Intermittent once    MEDICATIONS  (PRN):  acetaminophen   Tablet .. 650 milliGRAM(s) Oral every 6 hours PRN Temp greater or equal to 38.5C (101.3F), Mild Pain (1 - 3)  melatonin 3 milliGRAM(s) Oral at bedtime PRN Insomnia  ondansetron Injectable 4 milliGRAM(s) IV Push every 8 hours PRN Nausea and/or Vomiting         Patient is a 76y old  Male who presents with a chief complaint of sepsis likely 2/2 UTI (25 Aug 2021 19:08)      Consult reason:  ED vitals: Tmax 103.2   HR 110s-->80-90s (on exam)   RR 16  /90  SpO2 99 RA   Labs signficant: WBC 16, ESR/CRP: 119/265, lactate 1.7, positive UA, BUN/Cr 39/2.86,   Imaging/EKG: CXR clear. CT Lspine showing possible epidural abscess vs. 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 Hx obtained from daughter. At HD session yesterday, pt developed shaking chills. HD session was stopped and pt went home. At home, he continued to have shaking chills and was febrile with tmax 101. He had an associated decrease in appetite and lower extremity weakness causing difficulty walking. Denies n/v/abdominal pain. Daughter states that since DRESS syndrome induced CKD, pt has been doing much better requiring 2 sessions of HD per week, and is on no medication.   Also reports low back pain for 4-5 days, worsened by movement. Daughter arranged at home PT and heating pads which helped with the pain. Pt reports pain only when standing/walking. Pt not in pain at the moment.    In the ED,  VS: T98.8   , HR87   , /83    , RR16    , SpO2  98   % (RA)  Labs: WBC 16.10, auto neut #14.20, 88.2% neut, PT14.3, INR1.2, PTT47.4, BUN 39, Cr 2.86,   Urine: pyuria and bacteriuria  EKG: NSR  CXR: Normal  Imaging: None  Interventions: 750mg vanc, 3.375g zosyn    Additional HPI:  Per daughter pt has had no other complaints or issues outside of LBP, no fecal or urinary incontinence and no new inability to walk, but pt has had weak legs since his admission back in March, but no acute change. Rest of history same as above per daughter.      (25 Aug 2021 19:08)      Allergies    allopurinol (Other)    Intolerances      Home Medications:  midodrine 2.5 mg oral tablet: 3 tab(s) orally every 8 hours, As Needed (25 Aug 2021 19:38)      SOCIAL HX:     Smoking          ETOH/Illicit drugs          Occupation    PAST MEDICAL & SURGICAL HISTORY:  HTN (hypertension)    Hemophilia A    Gout    History of BPH    Chronic kidney disease (CKD)    DRESS syndrome    Uses cochlear implant        FAMILY HISTORY:  FH: HTN (hypertension)    :    No known cardiovascular or pulmonary family history     ROS:  See HPI     PHYSICAL EXAM    ICU Vital Signs Last 24 Hrs  T(C): 39.6 (25 Aug 2021 20:46), Max: 39.6 (25 Aug 2021 20:46)  T(F): 103.2 (25 Aug 2021 20:46), Max: 103.2 (25 Aug 2021 20:46)  HR: 113 (25 Aug 2021 23:36) (97 - 115)  BP: 160/90 (25 Aug 2021 23:36) (117/83 - 160/90)  BP(mean): --  ABP: --  ABP(mean): --  RR: 17 (25 Aug 2021 23:36) (16 - 17)  SpO2: 99% (25 Aug 2021 23:36) (98% - 99%)      General: Not in distress  HEENT:  VANESA              Lungs: Bilateral BS  Cardiovascular: Regular rate and rhythm, no m/r/g  Gastrointestinal: Soft, Positive BS  Musculoskeletal: No clubbing.  Moves all extremities.  No LE edema   Skin: Warm.  Intact  Neurological: AOx3         LABS:                          13.0   16.10 )-----------( 196      ( 25 Aug 2021 16:47 )             39.7                                               08-25    134<L>  |  94<L>  |  39<H>  ----------------------------<  119<H>  4.4   |  26  |  2.86<H>    Ca    10.3      25 Aug 2021 16:47    TPro  6.9  /  Alb  3.6  /  TBili  1.2  /  DBili  x   /  AST  27  /  ALT  21  /  AlkPhos  386<H>  08-25      PT/INR - ( 25 Aug 2021 16:47 )   PT: 14.3 sec;   INR: 1.20          PTT - ( 25 Aug 2021 16:47 )  PTT:47.4 sec                                       Urinalysis Basic - ( 25 Aug 2021 17:18 )    Color: Yellow / Appearance: Clear / S.025 / pH: x  Gluc: x / Ketone: NEGATIVE  / Bili: Small / Urobili: 2.0 E.U./dL   Blood: x / Protein: 100 mg/dL / Nitrite: NEGATIVE   Leuk Esterase: Moderate / RBC: < 5 /HPF / WBC Many /HPF   Sq Epi: x / Non Sq Epi: 0-5 /HPF / Bacteria: Many /HPF                                                  LIVER FUNCTIONS - ( 25 Aug 2021 16:47 )  Alb: 3.6 g/dL / Pro: 6.9 g/dL / ALK PHOS: 386 U/L / ALT: 21 U/L / AST: 27 U/L / GGT: x                                                                                                                                           CXR:    ECHO:    MEDICATIONS  (STANDING):  cefTRIAXone   IVPB 2000 milliGRAM(s) IV Intermittent every 24 hours  sodium chloride 0.9% Bolus 500 milliLiter(s) IV Bolus once  vancomycin  IVPB 750 milliGRAM(s) IV Intermittent once    MEDICATIONS  (PRN):  acetaminophen   Tablet .. 650 milliGRAM(s) Oral every 6 hours PRN Temp greater or equal to 38.5C (101.3F), Mild Pain (1 - 3)  melatonin 3 milliGRAM(s) Oral at bedtime PRN Insomnia  ondansetron Injectable 4 milliGRAM(s) IV Push every 8 hours PRN Nausea and/or Vomiting

## 2021-08-26 NOTE — PROGRESS NOTE ADULT - PROBLEM SELECTOR PLAN 3
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 Cr 2.86 on admission. Cr 3.19 this AM. 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 Cr 2.86 on admission. Cr 3.19 this AM. No signs of fluid O/L on exam. Bcx grew MSSA.  - Patient will undergo dialysis today  - Contact Heme (patient has hemophilia A) as patient's catheter will need to be removed post-dialysis given +Bcx, f/u recs

## 2021-08-26 NOTE — CONSULT NOTE ADULT - ATTENDING COMMENTS
A 75 y/o female with ESRD on HD with shivers after last HD, (+)UA, (+)lower back pain, fever, leukocytosis on ceftriaxone  -UTI  -sepsis without shock  -D dependent  -Lower back pain  >broaden antibiotics, f/u C&S to de escalate  >CT spine done by ED, f/u read  See above for the details.  This patient can be managed on the medical floors, hemodynamically stable, please re consult as needed.

## 2021-08-26 NOTE — CONSULT NOTE ADULT - ASSESSMENT
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      Recommendations: NPO at midnight prior to procedure with sedation/anesthesia. Follow recommendations of hematology regarding preprocedure/postprocedure care.        Communicated with:  Dr Yanez, primary team

## 2021-08-26 NOTE — PROGRESS NOTE ADULT - PROBLEM SELECTOR PLAN 4
. Last known level 169 (3/21) could be 2/2 to renal bone disease versus hepatobiliary  - consider GGT and repeat ALP

## 2021-08-26 NOTE — PROGRESS NOTE ADULT - ASSESSMENT
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 UTI

## 2021-08-26 NOTE — CONSULT NOTE ADULT - ATTENDING COMMENTS
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  Per daughter patient was not feeling well and having LBP in the past several days PTA. Also had fever so patient came to the hospital. Upon admission he was found to be bacteremic with MSSA. CT lumbar showed L5-S1 OM. Suspect source of infection is HD cath.   Switch abx to nafcillin 2g IV q4h while in house.  Obtain TTE and GUANAKO to r/o endocarditis.  Remove HD cath - please d/w heme and nephrology for timing.  DO not place new HD cath until bacteremia clears >72h.  Patient doesn't have hardware other than chochlear implant.  Expect minimum 4 weeks of IV abx.     Team 1 will follow you.  Case d/w primary team.    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  Per daughter patient was not feeling well and having LBP in the past several days PTA. Also had fever so patient came to the hospital. Upon admission he was found to be bacteremic with MSSA. CT lumbar showed L5-S1 OM. Suspect source of infection is HD cath.   Switch abx to nafcillin 2g IV q4h while in house.  Obtain TTE and GUANAKO to r/o endocarditis.  Remove HD cath - please d/w heme and nephrology for timing.  DO not place new HD cath until bacteremia clears >72h.  Patient doesn't have hardware other than chochlear implant.  Expect minimum 4 weeks of IV abx.   No need to treat asymptomatic bacteriuria.  Obtain surveillance BCx daily.     Team 1 will follow you.  Case d/w primary team.    Joyce Barnard MD, MS  Infectious Disease attending  work cell [phone]

## 2021-08-26 NOTE — CONSULT NOTE ADULT - SUBJECTIVE AND OBJECTIVE BOX
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 Per chart review and daughter at bedside (PA at [facility]) that provided the history: Patient was admitted at [facility] on March for MSSA bacteremia, transferred to [facility] and back then treated with Cefazolin daily as inpatient, the decision was not to start on Nafcillin as patient was fluid over loaded and started HD during that admission. Surveillance blood cultures from , ,  and  grew MSSA. Surveillance blood cultures from 3/1 and 3/4 have no growth to date. TTE () showed no valvular disease and GUANAKO () had no vegetations on heart valves. Gallium scan (3/2) shows uptake in pericardium without uptake in pleural fluid.  The past  during dialysis patient had developed chills. At home had fever of 101 measured by his wife which made her concerned and sent him to the ED for evaluation, patient was complaining of lower back pain lasting for 5 days and the new onset of fevers.      In the ED,  VS: T98.8   , HR87   , /83    , RR16    , SpO2  98   % (RA)  Labs: WBC 16.10, auto neut #14.20, 88.2% neut, PT14.3, INR1.2, PTT47.4, BUN 39, Cr 2.86,   Urine: pyuria and bacteriuria  EKG: NSR  CXR: Normal  Interventions: 750mg vanc, 3.375g zosyn    SUBJECTIVE/ interval events: Patient completed CT of his back, that showed on  new erosive end-plate and perivertebral fat changes at L5 and S1 levels with high suspicion for discitis and OM. 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 Ucx from  grow Enterobacter Colacae, but patient denies any urinary symptoms. Bcx from  grows MSSA 3/4 bottles. Patient got being treated with CTX and Vanc on  Zosyn 3.375, Vanc 750 and CTX 2g and one more dose of Vanc on . WBC is increasing from 16 to 18.4 this AM, but patient subjectively feel better, and his back pain is already improving. Patient denies h/n/v/d, fever, chills, cp, palpitations, sob, abd pain, leg swelling, rashes, dysuria, and changes in BM.     Vital Signs Last 12 Hrs  T(F): 98 (21 @ 15:40), Max: 100.4 (21 @ 08:57)  HR: 88 (21 @ 15:40) (83 - 93)  BP: 129/77 (21 @ 15:40) (110/65 - 137/73)  BP(mean): 83 (21 @ 15:05) (83 - 83)  RR: 16 (21 @ 15:40) (16 - 18)  SpO2: 97% (21 @ 15:40) (97% - 99%)  I&O's Summary    PHYSICAL EXAM:  Constitutional: NAD, comfortable in bed. THC cath appreciated w/o edema/ erythema   HEENT: NC/AT, PERRLA, EOMI, no conjunctival pallor or scleral icterus, MMM  Neck: Supple, no JVD  Respiratory: CTA B/L. No w/r/r.   Cardiovascular: RRR, normal S1 and S2, no m/r/g.   Gastrointestinal: +BS, soft NTND, no guarding or rebound tenderness, no palpable masses   Extremities: wwp; no cyanosis, clubbing or edema.   Vascular: Pulses equal and strong throughout.   Neurological: AAOx3, no CN deficits, strength and sensation intact throughout.   Skin: No gross skin abnormalities or rashes    LABS:                        11.9   18.43 )-----------( 185      ( 26 Aug 2021 07:19 )             37.2         132<L>  |  98  |  44<H>  ----------------------------<  106<H>  4.2   |  23  |  3.19<H>    Ca    9.7      26 Aug 2021 07:18  Phos  3.8     -  Mg     1.8         TPro  6.9  /  Alb  3.6  /  TBili  1.2  /  DBili  x   /  AST  27  /  ALT  21  /  AlkPhos  386<H>      PT/INR - ( 26 Aug 2021 14:35 )   PT: 15.1 sec;   INR: 1.27          PTT - ( 26 Aug 2021 14:35 )  PTT:46.7 sec  Urinalysis Basic - ( 25 Aug 2021 17:18 )    Color: Yellow / Appearance: Clear / S.025 / pH: x  Gluc: x / Ketone: NEGATIVE  / Bili: Small / Urobili: 2.0 E.U./dL   Blood: x / Protein: 100 mg/dL / Nitrite: NEGATIVE   Leuk Esterase: Moderate / RBC: < 5 /HPF / WBC Many /HPF   Sq Epi: x / Non Sq Epi: 0-5 /HPF / Bacteria: Many /HPF    MEDICATIONS  (STANDING):  nafcillin  IVPB      nafcillin  IVPB 2 Gram(s) IV Intermittent once  nafcillin  IVPB 2 Gram(s) IV Intermittent every 4 hours  polyethylene glycol 3350 17 Gram(s) Oral at bedtime  senna 2 Tablet(s) Oral at bedtime    MEDICATIONS  (PRN):  acetaminophen   Tablet .. 650 milliGRAM(s) Oral every 6 hours PRN Temp greater or equal to 38C (100.4F), Moderate Pain (4 - 6)  melatonin 3 milliGRAM(s) Oral at bedtime PRN Insomnia  ondansetron Injectable 4 milliGRAM(s) IV Push every 8 hours PRN Nausea and/or Vomiting

## 2021-08-26 NOTE — PHYSICAL THERAPY INITIAL EVALUATION ADULT - GENERAL OBSERVATIONS, REHAB EVAL
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 Also reports low back pain for 4-5 days, worsened by movement.

## 2021-08-26 NOTE — PROGRESS NOTE ADULT - PROBLEM SELECTOR PLAN 2
Pt presenting meeting 3/4 SIRS criteria (HR 96, WBC 16.10 with neutrophilic predominance, Tmax 103.2F) found to have positive UA. Denies any urinary symptoms. Discussed with daughter, given history of BPH, concern for underlying urinary retention as nidus for ascending urinary tract infection. Denies constipation.    - continue abx  - as above, f/u ID recs Pt presenting meeting 3/4 SIRS criteria (HR 96, WBC 16.10 with neutrophilic predominance, Tmax 103.2F) found to have positive UA. Denies any urinary symptoms. Discussed with daughter, given history of BPH, concern for underlying urinary retention as nidus for ascending urinary tract infection. Denies constipation.  - continue abx  - as above, f/u ID recs

## 2021-08-26 NOTE — PHYSICAL THERAPY INITIAL EVALUATION ADULT - PERTINENT HX OF CURRENT PROBLEM, REHAB EVAL
PT IE Completed. Pt receieved semi-supine on RA, permacath, heplock, reporting his back pain feels better, on RA, [place] IRA; bilateral hearing aids, pt agreeable to PT, daughter Tori at bedside, RN Cassandra notified. Pt returned to semi-supine with transport in hallway, RN Cassandra notified.

## 2021-08-26 NOTE — CONSULT NOTE ADULT - SUBJECTIVE AND OBJECTIVE BOX
Orthopaedic Surgery Consult Note    For Surgeon: Dr. 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 At home, he continued to have shaking chills and was febrile with tmax 101. Has also had lower back pain for 4-5 days, worsened by movement. Daughter arranged at home PT and heating pads which helped with the pain. Pt reports pain is greatly improved and is non-radiating, not associated with numbness/tingling. No bowel/bladder changes.    Allergies    allopurinol (Other)    Intolerances        PAST MEDICAL & SURGICAL HISTORY:  HTN (hypertension)    Hemophilia A    Gout    History of BPH    Chronic kidney disease (CKD)    DRESS syndrome    Uses cochlear implant        MEDICATIONS  (STANDING):  cefTRIAXone   IVPB 2000 milliGRAM(s) IV Intermittent every 24 hours  sodium chloride 0.9% Bolus 500 milliLiter(s) IV Bolus once    MEDICATIONS  (PRN):  acetaminophen   Tablet .. 650 milliGRAM(s) Oral every 6 hours PRN Temp greater or equal to 38.5C (101.3F), Mild Pain (1 - 3)  melatonin 3 milliGRAM(s) Oral at bedtime PRN Insomnia  ondansetron Injectable 4 milliGRAM(s) IV Push every 8 hours PRN Nausea and/or Vomiting      Vital Signs Last 24 Hrs  T(C): 37.4 (26 Aug 2021 06:30), Max: 39.6 (25 Aug 2021 20:46)  T(F): 99.4 (26 Aug 2021 06:30), Max: 103.2 (25 Aug 2021 20:46)  HR: 85 (26 Aug 2021 06:30) (85 - 115)  BP: 110/65 (26 Aug 2021 06:30) (110/65 - 160/90)  BP(mean): --  RR: 18 (26 Aug 2021 06:30) (16 - 18)  SpO2: 99% (26 Aug 2021 06:30) (98% - 99%)    Physical Exam:  General: NAD, resting comfortably in bed  Back: Skin intact, no ecchymosis; no TTP over spinous processes of vertebra  B/l LE:  Able to straight leg raise  SILT L1-S2  Iliopsoas 5/5  Quadriceps 5/5  EHL/FHL/AT/GS 5/5  DP/PT palpable    Imaging:  CT Lumbar spine without contrast:  IMPRESSION:  1.  Since 2/21/2021, interval worsening severe height loss L5-S1, with new erosive endplate changes and prevertebral fat stranding. 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 Recommend correlation with ESR/CRP and consider further evaluation with MRI lumbar with and without contrast.  2.  Dilated stool-filled rectum, consistent fecal impaction.      A/P: 76yMale with PMH CKD on dialysis, MSSA bacteremia (3/2021), cochlear implant who presents with fever following dialysis and LBP x 4-5 days.  -Activities as tolerated  -Continue IV abx  -MRI with/without contrast - patient states cochlear implant is MRI-compatible, obtain collateral and confirm with [facility](?). If not compatible do CT-guided biopsy.  -Discussed with Dr. Lama    Ortho Pager [phone]   Orthopaedic Surgery Consult Note    For Surgeon: Dr. 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 At home, he continued to have shaking chills and was febrile with tmax 101. Has also had lower back pain for 4-5 days, worsened by movement. Daughter arranged at home PT and heating pads which helped with the pain. Pt reports pain is greatly improved and is non-radiating, not associated with numbness/tingling. No bowel/bladder changes.    Allergies    allopurinol (Other)    Intolerances        PAST MEDICAL & SURGICAL HISTORY:  HTN (hypertension)    Hemophilia A    Gout    History of BPH    Chronic kidney disease (CKD)    DRESS syndrome    Uses cochlear implant        MEDICATIONS  (STANDING):  cefTRIAXone   IVPB 2000 milliGRAM(s) IV Intermittent every 24 hours  sodium chloride 0.9% Bolus 500 milliLiter(s) IV Bolus once    MEDICATIONS  (PRN):  acetaminophen   Tablet .. 650 milliGRAM(s) Oral every 6 hours PRN Temp greater or equal to 38.5C (101.3F), Mild Pain (1 - 3)  melatonin 3 milliGRAM(s) Oral at bedtime PRN Insomnia  ondansetron Injectable 4 milliGRAM(s) IV Push every 8 hours PRN Nausea and/or Vomiting      Vital Signs Last 24 Hrs  T(C): 37.4 (26 Aug 2021 06:30), Max: 39.6 (25 Aug 2021 20:46)  T(F): 99.4 (26 Aug 2021 06:30), Max: 103.2 (25 Aug 2021 20:46)  HR: 85 (26 Aug 2021 06:30) (85 - 115)  BP: 110/65 (26 Aug 2021 06:30) (110/65 - 160/90)  BP(mean): --  RR: 18 (26 Aug 2021 06:30) (16 - 18)  SpO2: 99% (26 Aug 2021 06:30) (98% - 99%)    Physical Exam:  General: NAD, resting comfortably in bed  Back: Skin intact, no ecchymosis; no TTP over spinous processes of vertebra  B/l LE:  Able to straight leg raise  SILT L1-S2  Iliopsoas 5/5  Quadriceps 5/5  EHL/FHL/AT/GS 5/5  DP/PT palpable    Imaging:  CT Lumbar spine without contrast:  IMPRESSION:  1.  Since 2/21/2021, interval worsening severe height loss L5-S1, with new erosive endplate changes and prevertebral fat stranding. 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 Recommend correlation with ESR/CRP and consider further evaluation with MRI lumbar with and without contrast.  2.  Dilated stool-filled rectum, consistent fecal impaction.      A/P: 76yMale with PMH CKD on dialysis, MSSA bacteremia (3/2021), cochlear implant who presents with fever following dialysis and LBP x 4-5 days.  Likely L5/S1 discitis.  neurologically intact.  no back pain at rest today.  no indication for surgery.  -Activities as tolerated  -Continue IV abx  -Discussed with Dr. Lama    Ortho Pager [phone]

## 2021-08-26 NOTE — CONSULT NOTE ADULT - SUBJECTIVE AND OBJECTIVE BOX
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      FH: HTN (hypertension)    Handoff    MEWS Score    HTN (hypertension)    Hemophilia A    Gout    History of BPH    Chronic kidney disease (CKD)    DRESS syndrome    Sepsis secondary to UTI    Sepsis    Acute UTI    ESRD on dialysis    Nutrition, metabolism, and development symptoms    Elevated alkaline phosphatase level    Back pain    Osteomyelitis    Discitis    Uses cochlear implant    FEVER    90+    [email or web address]        Meds:acetaminophen   Tablet .. 650 milliGRAM(s) Oral every 6 hours PRN  melatonin 3 milliGRAM(s) Oral at bedtime PRN  ondansetron Injectable 4 milliGRAM(s) IV Push every 8 hours PRN  polyethylene glycol 3350 17 Gram(s) Oral at bedtime  senna 2 Tablet(s) Oral at bedtime  sodium chloride 0.9% Bolus 500 milliLiter(s) IV Bolus once      Allergies:allopurinol (Other)        Labs:                           11.9   18.43 )-----------( 185      ( 26 Aug 2021 07:19 )             37.2     PT/INR - ( 26 Aug 2021 14:35 )   PT: 15.1 sec;   INR: 1.27          PTT - ( 26 Aug 2021 14:35 )  PTT:46.7 sec  08-26    132<L>  |  98  |  44<H>  ----------------------------<  106<H>  4.2   |  23  |  3.19<H>    Ca    9.7      26 Aug 2021 07:18  Phos  3.8     08-26  Mg     1.8     08-26    TPro  6.9  /  Alb  3.6  /  TBili  1.2  /  DBili  x   /  AST  27  /  ALT  21  /  AlkPhos  386<H>  08-25

## 2021-08-26 NOTE — CONSULT NOTE ADULT - SUBJECTIVE AND OBJECTIVE BOX
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 Per history from daughter after HD session on tuesday patient had developed chills. When he went home hi wife noticed that the patient had a fever to approximately 101 which made her concerned and had him sent to the ED for evaluation. The patient has recently started HD back in March in the setting of Dress syndrome 2/2 allopurinol. His HD sessions have been reduced outside of the hospital; he is now on a twice a week schedule of Monday and Friday. He receives dialysis care in [place] with the sessions being about 3 hours usually and fluid removal of 0.5L, unclear what baseline dry weight is. At my time of evaluation, pt without any acute complaints.     In the ED,  VS: T98.8   , HR87   , /83    , RR16    , SpO2  98   % (RA)  Labs: WBC 16.10, auto neut #14.20, 88.2% neut, PT14.3, INR1.2, PTT47.4, BUN 39, Cr 2.86,   Urine: pyuria and bacteriuria  EKG: NSR  CXR: Normal  Imaging: None  Interventions: 750mg vanc, 3.375g zosyn     (25 Aug 2021 19:08)   Nephrology consulted for dialysis.     PAST MEDICAL & SURGICAL HISTORY:  HTN (hypertension)    Hemophilia A    Gout    History of BPH    Chronic kidney disease (CKD)    DRESS syndrome    Uses cochlear implant          Allergies:  allopurinol (Other)      Home Medications:   acetaminophen   Tablet .. 650 milliGRAM(s) Oral every 6 hours PRN  cefTRIAXone   IVPB 2000 milliGRAM(s) IV Intermittent every 24 hours  magnesium sulfate  IVPB 2 Gram(s) IV Intermittent once  melatonin 3 milliGRAM(s) Oral at bedtime PRN  ondansetron Injectable 4 milliGRAM(s) IV Push every 8 hours PRN  polyethylene glycol 3350 17 Gram(s) Oral at bedtime  senna 2 Tablet(s) Oral at bedtime  sodium chloride 0.9% Bolus 500 milliLiter(s) IV Bolus once      Hospital Medications:   MEDICATIONS  (STANDING):  cefTRIAXone   IVPB 2000 milliGRAM(s) IV Intermittent every 24 hours  magnesium sulfate  IVPB 2 Gram(s) IV Intermittent once  polyethylene glycol 3350 17 Gram(s) Oral at bedtime  senna 2 Tablet(s) Oral at bedtime  sodium chloride 0.9% Bolus 500 milliLiter(s) IV Bolus once      SOCIAL HISTORY:  Denies ETOh, Smoking,     Family History:  FAMILY HISTORY:  FH: HTN (hypertension)          VITALS:  T(F): 100.4 (21 @ 08:57), Max: 103.2 (21 @ 20:46)  HR: 93 (21 @ 08:57)  BP: 137/73 (21 @ 08:57)  RR: 18 (21 @ 08:57)  SpO2: 97% (21 @ 08:57)  Wt(kg): --    Height (cm): 172.7 ( @ 02:42)  Weight (kg): 58.1 ( @ 02:42)  BMI (kg/m2): 19.5 ( @ 02:42)  BSA (m2): 1.69 ( @ 02:42)  CAPILLARY BLOOD GLUCOSE          Review of Systems:  10 point ROS negative except as per HPI    PHYSICAL EXAM:  GENERAL: Alert, awake, oriented x3 on RA   HEENT: MMM, no JVP  CHEST/LUNG: Bilateral clear breath sounds  HEART: Regular rate and rhythm, no murmur, no gallops, no rub   ABDOMEN: Soft, nontender, non distended  EXTREMITIES: no pedal edema, WWP  Neurology: AAOx3, no asterixis   ACCESS: R C c/d/i    LABS:      132<L>  |  98  |  44<H>  ----------------------------<  106<H>  4.2   |  23  |  3.19<H>    Ca    9.7      26 Aug 2021 07:18  Phos  3.8       Mg     1.8         TPro  6.9  /  Alb  3.6  /  TBili  1.2  /  DBili      /  AST  27  /  ALT  21  /  AlkPhos  386<H>      Creatinine Trend: 3.19 <--, 2.86 <--                        11.9   18.43 )-----------( 185      ( 26 Aug 2021 07:19 )             37.2     Urine Studies:  Urinalysis Basic - ( 25 Aug 2021 17:18 )    Color: Yellow / Appearance: Clear / S.025 / pH:   Gluc:  / Ketone: NEGATIVE  / Bili: Small / Urobili: 2.0 E.U./dL   Blood:  / Protein: 100 mg/dL / Nitrite: NEGATIVE   Leuk Esterase: Moderate / RBC: < 5 /HPF / WBC Many /HPF   Sq Epi:  / Non Sq Epi: 0-5 /HPF / Bacteria: Many /HPF

## 2021-08-27 DIAGNOSIS — R82.79 OTHER ABNORMAL FINDINGS ON MICROBIOLOGICAL EXAMINATION OF URINE: ICD-10-CM

## 2021-08-27 DIAGNOSIS — R78.81 BACTEREMIA: ICD-10-CM

## 2021-08-27 DIAGNOSIS — A41.01 SEPSIS DUE TO METHICILLIN SUSCEPTIBLE STAPHYLOCOCCUS AUREUS: ICD-10-CM

## 2021-08-27 DIAGNOSIS — D66 HEREDITARY FACTOR VIII DEFICIENCY: ICD-10-CM

## 2021-08-27 DIAGNOSIS — M86.9 OSTEOMYELITIS, UNSPECIFIED: ICD-10-CM

## 2021-08-27 DIAGNOSIS — N17.9 ACUTE KIDNEY FAILURE, UNSPECIFIED: ICD-10-CM

## 2021-08-27 LAB
-  AMPICILLIN/SULBACTAM: SIGNIFICANT CHANGE UP
-  AMPICILLIN: SIGNIFICANT CHANGE UP
-  CEFAZOLIN: SIGNIFICANT CHANGE UP
-  CEFEPIME: SIGNIFICANT CHANGE UP
-  CEFTRIAXONE: SIGNIFICANT CHANGE UP
-  CIPROFLOXACIN: SIGNIFICANT CHANGE UP
-  ERTAPENEM: SIGNIFICANT CHANGE UP
-  GENTAMICIN: SIGNIFICANT CHANGE UP
-  MEROPENEM: SIGNIFICANT CHANGE UP
-  PIPERACILLIN/TAZOBACTAM: SIGNIFICANT CHANGE UP
-  TOBRAMYCIN: SIGNIFICANT CHANGE UP
-  TRIMETHOPRIM/SULFAMETHOXAZOLE: SIGNIFICANT CHANGE UP
ALBUMIN SERPL ELPH-MCNC: 2.6 G/DL — LOW (ref 3.3–5)
ALP SERPL-CCNC: 307 U/L — HIGH (ref 40–120)
ALT FLD-CCNC: 14 U/L — SIGNIFICANT CHANGE UP (ref 10–45)
ANION GAP SERPL CALC-SCNC: 11 MMOL/L — SIGNIFICANT CHANGE UP (ref 5–17)
APTT 50/50 2HOUR INCUB: 43.2 SEC — HIGH (ref 27.5–36.5)
APTT 50/50 2HOUR INCUB: SIGNIFICANT CHANGE UP SEC (ref 27.5–36.5)
APTT BLD: 35.1 SECS — SIGNIFICANT CHANGE UP (ref 27.5–36.5)
APTT BLD: 44.1 SEC — HIGH (ref 27.5–35.5)
AST SERPL-CCNC: 20 U/L — SIGNIFICANT CHANGE UP (ref 10–40)
BASOPHILS # BLD AUTO: 0.05 K/UL — SIGNIFICANT CHANGE UP (ref 0–0.2)
BASOPHILS NFR BLD AUTO: 0.4 % — SIGNIFICANT CHANGE UP (ref 0–2)
BILIRUB SERPL-MCNC: 1.9 MG/DL — HIGH (ref 0.2–1.2)
BUN SERPL-MCNC: 25 MG/DL — HIGH (ref 7–23)
CALCIUM SERPL-MCNC: 9.3 MG/DL — SIGNIFICANT CHANGE UP (ref 8.4–10.5)
CHLORIDE SERPL-SCNC: 95 MMOL/L — LOW (ref 96–108)
CO2 SERPL-SCNC: 27 MMOL/L — SIGNIFICANT CHANGE UP (ref 22–31)
CREAT SERPL-MCNC: 2.17 MG/DL — HIGH (ref 0.5–1.3)
CULTURE RESULTS: SIGNIFICANT CHANGE UP
EOSINOPHIL # BLD AUTO: 0.19 K/UL — SIGNIFICANT CHANGE UP (ref 0–0.5)
EOSINOPHIL NFR BLD AUTO: 1.6 % — SIGNIFICANT CHANGE UP (ref 0–6)
FACT VIII ACT/NOR PPP: 53 % — SIGNIFICANT CHANGE UP (ref 51–138)
GLUCOSE SERPL-MCNC: 89 MG/DL — SIGNIFICANT CHANGE UP (ref 70–99)
HBV SURFACE AB SER-ACNC: <3 MIU/ML — LOW
HCT VFR BLD CALC: 35.6 % — LOW (ref 39–50)
HGB BLD-MCNC: 11.6 G/DL — LOW (ref 13–17)
IMM GRANULOCYTES NFR BLD AUTO: 0.9 % — SIGNIFICANT CHANGE UP (ref 0–1.5)
INR BLD: 1.22 — HIGH (ref 0.88–1.16)
LYMPHOCYTES # BLD AUTO: 0.84 K/UL — LOW (ref 1–3.3)
LYMPHOCYTES # BLD AUTO: 7.2 % — LOW (ref 13–44)
MAGNESIUM SERPL-MCNC: 2.2 MG/DL — SIGNIFICANT CHANGE UP (ref 1.6–2.6)
MCHC RBC-ENTMCNC: 27.1 PG — SIGNIFICANT CHANGE UP (ref 27–34)
MCHC RBC-ENTMCNC: 32.6 GM/DL — SIGNIFICANT CHANGE UP (ref 32–36)
MCV RBC AUTO: 83.2 FL — SIGNIFICANT CHANGE UP (ref 80–100)
METHOD TYPE: SIGNIFICANT CHANGE UP
MONOCYTES # BLD AUTO: 0.87 K/UL — SIGNIFICANT CHANGE UP (ref 0–0.9)
MONOCYTES NFR BLD AUTO: 7.4 % — SIGNIFICANT CHANGE UP (ref 2–14)
NEUTROPHILS # BLD AUTO: 9.67 K/UL — HIGH (ref 1.8–7.4)
NEUTROPHILS NFR BLD AUTO: 82.5 % — HIGH (ref 43–77)
NRBC # BLD: 0 /100 WBCS — SIGNIFICANT CHANGE UP (ref 0–0)
ORGANISM # SPEC MICROSCOPIC CNT: SIGNIFICANT CHANGE UP
ORGANISM # SPEC MICROSCOPIC CNT: SIGNIFICANT CHANGE UP
PAT CTL 2H: 42.4 SEC — HIGH (ref 27.5–36.5)
PHOSPHATE SERPL-MCNC: 3.1 MG/DL — SIGNIFICANT CHANGE UP (ref 2.5–4.5)
PLATELET # BLD AUTO: 188 K/UL — SIGNIFICANT CHANGE UP (ref 150–400)
POTASSIUM SERPL-MCNC: 3.4 MMOL/L — LOW (ref 3.5–5.3)
POTASSIUM SERPL-SCNC: 3.4 MMOL/L — LOW (ref 3.5–5.3)
PROT SERPL-MCNC: 5.4 G/DL — LOW (ref 6–8.3)
PROTHROM AB SERPL-ACNC: 14.5 SEC — HIGH (ref 10.6–13.6)
PT 50/50: 12.3 SECS — SIGNIFICANT CHANGE UP (ref 10.6–14.7)
RBC # BLD: 4.28 M/UL — SIGNIFICANT CHANGE UP (ref 4.2–5.8)
RBC # FLD: 14.5 % — SIGNIFICANT CHANGE UP (ref 10.3–14.5)
SODIUM SERPL-SCNC: 133 MMOL/L — LOW (ref 135–145)
SPECIMEN SOURCE: SIGNIFICANT CHANGE UP
THROMBIN TIME: 20.3 SEC — SIGNIFICANT CHANGE UP (ref 17.6–24)
VWF:RCO ACT/NOR PPP PL AGG: >390 % — HIGH (ref 45–133)
WBC # BLD: 11.72 K/UL — HIGH (ref 3.8–10.5)
WBC # FLD AUTO: 11.72 K/UL — HIGH (ref 3.8–10.5)

## 2021-08-27 PROCEDURE — 93306 TTE W/DOPPLER COMPLETE: CPT | Mod: 26

## 2021-08-27 PROCEDURE — 99232 SBSQ HOSP IP/OBS MODERATE 35: CPT

## 2021-08-27 PROCEDURE — 99447 NTRPROF PH1/NTRNET/EHR 11-20: CPT

## 2021-08-27 PROCEDURE — 99233 SBSQ HOSP IP/OBS HIGH 50: CPT | Mod: GC

## 2021-08-27 PROCEDURE — 36589 REMOVAL TUNNELED CV CATH: CPT

## 2021-08-27 RX ADMIN — NAFCILLIN 200 GRAM(S): 10 INJECTION, POWDER, FOR SOLUTION INTRAVENOUS at 00:15

## 2021-08-27 RX ADMIN — NAFCILLIN 200 GRAM(S): 10 INJECTION, POWDER, FOR SOLUTION INTRAVENOUS at 04:18

## 2021-08-27 RX ADMIN — SENNA PLUS 2 TABLET(S): 8.6 TABLET ORAL at 21:38

## 2021-08-27 RX ADMIN — NAFCILLIN 200 GRAM(S): 10 INJECTION, POWDER, FOR SOLUTION INTRAVENOUS at 21:38

## 2021-08-27 RX ADMIN — NAFCILLIN 200 GRAM(S): 10 INJECTION, POWDER, FOR SOLUTION INTRAVENOUS at 17:11

## 2021-08-27 RX ADMIN — NAFCILLIN 200 GRAM(S): 10 INJECTION, POWDER, FOR SOLUTION INTRAVENOUS at 12:36

## 2021-08-27 RX ADMIN — POLYETHYLENE GLYCOL 3350 17 GRAM(S): 17 POWDER, FOR SOLUTION ORAL at 21:38

## 2021-08-27 RX ADMIN — NAFCILLIN 200 GRAM(S): 10 INJECTION, POWDER, FOR SOLUTION INTRAVENOUS at 07:29

## 2021-08-27 RX ADMIN — TRANEXAMIC ACID 212 MILLIGRAM(S): 100 INJECTION, SOLUTION INTRAVENOUS at 08:50

## 2021-08-27 NOTE — PROGRESS NOTE ADULT - PROBLEM SELECTOR PLAN 1
POA, d/t MSSA bacteremia, c/b L5-S1 discitis/OM; cont. nafcillin per ID recs; f/u surveillence cultures; no e/o IE on TTE, but will need GUANAKO to r/o; plan for HD cath removal today; appreciate Ortho/Spine recs, no need for biopsy (risks would outweigh benefits), and unable to obtain MRI d/t non-compatible cochlear implant; cont. PT, WBAT

## 2021-08-27 NOTE — PROGRESS NOTE ADULT - SUBJECTIVE AND OBJECTIVE BOX
Subjective:       Patient ID: Serge Gillespie is a 44 y.o. male.    Chief Complaint: Diarrhea and Abdominal Cramping    Patient here with complaint of post prandial diarrhea for the past 4 years. He has to be careful when eating because he never knows when he's going to have to find a BR after he eats. He recalls similar episodes many years ago when diagnosed by one MD who said he had Ulcerative colitis, but this was not supported on assessment by another physician some time later. He was previously treated with Pentasa. He has not been treated for 19 years. His last colonoscopy was nearly 20 years ago.    He denies BRBPR or melena. There has been no anorexia or weight loss. There is no aversion to the sight or smell of food. There is no nausea or vomiting and no early satiety. There is no FH of any GI problems. There is no chronic history of NSAID use. He says this seems to have occurred in the midst of beginning to start testing various bourbons. BMs may be anything from none to 5 times/ day.    Review of Systems   Constitutional: Negative.  Negative for activity change, appetite change, chills, diaphoresis, fatigue, fever and unexpected weight change.   HENT: Negative for congestion, ear discharge, facial swelling, hearing loss, nosebleeds, postnasal drip, sinus pressure, sneezing, tinnitus, trouble swallowing and voice change.    Eyes: Positive for visual disturbance. Negative for photophobia and redness.   Respiratory: Negative for cough, chest tightness, shortness of breath and wheezing.    Cardiovascular: Negative for chest pain and palpitations.   Gastrointestinal: Positive for diarrhea. Negative for abdominal distention, abdominal pain, blood in stool, constipation, nausea, rectal pain and vomiting.   Genitourinary: Negative for difficulty urinating, discharge, dysuria, flank pain, frequency, hematuria, scrotal swelling, testicular pain and urgency.   Musculoskeletal: Negative for arthralgias, back pain, gait  problem, joint swelling, myalgias and neck stiffness.   Skin: Positive for color change. Negative for pallor, rash and wound.        Rosasia   Neurological: Negative for dizziness, tremors, seizures, syncope, facial asymmetry, speech difficulty, weakness, light-headedness, numbness and headaches.   Hematological: Negative for adenopathy. Does not bruise/bleed easily.   Psychiatric/Behavioral: Negative for agitation, confusion, hallucinations, sleep disturbance and suicidal ideas.       Objective:      Physical Exam   Constitutional: He is oriented to person, place, and time. He appears well-developed and well-nourished. No distress.   Morbid obesity   HENT:   Head: Normocephalic and atraumatic.   Nose: Nose normal.   Mouth/Throat: Oropharynx is clear and moist. No oropharyngeal exudate.   Eyes: Pupils are equal, round, and reactive to light. Conjunctivae are normal. No scleral icterus.   Neck: Normal range of motion. Neck supple. No thyromegaly present.   Cardiovascular: Normal rate and regular rhythm. Exam reveals no gallop and no friction rub.   No murmur heard.  Pulmonary/Chest: Effort normal and breath sounds normal. No respiratory distress. He has no wheezes. He has no rales.   Abdominal: Soft. Bowel sounds are normal. He exhibits no distension and no mass. There is no tenderness. There is no rebound and no guarding.   Musculoskeletal: He exhibits no edema or tenderness.   Lymphadenopathy:     He has no cervical adenopathy.   Neurological: He is alert and oriented to person, place, and time. He exhibits normal muscle tone. Coordination normal.   Skin: Skin is warm. No rash noted. He is not diaphoretic.   Psychiatric: He has a normal mood and affect. His behavior is normal. Judgment and thought content normal.   Vitals reviewed.      Assessment:   Probable IBS  Morbid obesity  Plan:   High fiber diet  Limited Red meat  Align Probiotic  Weight loss  RTC in 1 month.   May need to consider repeat colonoscopy   **INCOMPLETE NOTE    OVERNIGHT EVENTS:    SUBJECTIVE:  Patient seen and examined at bedside.    Vital Signs Last 12 Hrs  T(F): 98.1 (21 @ 08:44), Max: 99.3 (21 @ 05:26)  HR: 93 (21 @ 08:44) (89 - 93)  BP: 114/66 (21 @ 08:44) (107/64 - 117/70)  BP(mean): --  RR: 14 (21 @ 08:44) (14 - 19)  SpO2: 97% (21 @ 08:26) (97% - 97%)  I&O's Summary    26 Aug 2021 07:01  -  27 Aug 2021 07:00  --------------------------------------------------------  IN: 600 mL / OUT: 1000 mL / NET: -400 mL        PHYSICAL EXAM:  Constitutional: NAD, comfortable in bed.  HEENT: NC/AT, PERRLA, EOMI, no conjunctival pallor or scleral icterus, MMM  Neck: Supple, no JVD  Respiratory: CTA B/L. No w/r/r.   Cardiovascular: RRR, normal S1 and S2, no m/r/g.   Gastrointestinal: +BS, soft NTND, no guarding or rebound tenderness, no palpable masses   Extremities: wwp; no cyanosis, clubbing or edema.   Vascular: Pulses equal and strong throughout.   Neurological: AAOx3, no CN deficits, strength and sensation intact throughout.   Skin: No gross skin abnormalities or rashes        LABS:                        11.6   11.72 )-----------( 188      ( 27 Aug 2021 07:43 )             35.6         133<L>  |  95<L>  |  25<H>  ----------------------------<  89  3.4<L>   |  27  |  2.17<H>    Ca    9.3      27 Aug 2021 07:43  Phos  3.1       Mg     2.2         TPro  5.4<L>  /  Alb  2.6<L>  /  TBili  1.9<H>  /  DBili  x   /  AST  20  /  ALT  14  /  AlkPhos  307<H>      PT/INR - ( 27 Aug 2021 07:43 )   PT: 14.5 sec;   INR: 1.22          PTT - ( 27 Aug 2021 07:43 )  PTT:44.1 sec  Urinalysis Basic - ( 25 Aug 2021 17:18 )    Color: Yellow / Appearance: Clear / S.025 / pH: x  Gluc: x / Ketone: NEGATIVE  / Bili: Small / Urobili: 2.0 E.U./dL   Blood: x / Protein: 100 mg/dL / Nitrite: NEGATIVE   Leuk Esterase: Moderate / RBC: < 5 /HPF / WBC Many /HPF   Sq Epi: x / Non Sq Epi: 0-5 /HPF / Bacteria: Many /HPF          RADIOLOGY & ADDITIONAL TESTS:    MEDICATIONS  (STANDING):  nafcillin  IVPB      nafcillin  IVPB 2 Gram(s) IV Intermittent every 4 hours  polyethylene glycol 3350 17 Gram(s) Oral at bedtime  senna 2 Tablet(s) Oral at bedtime    MEDICATIONS  (PRN):  acetaminophen   Tablet .. 650 milliGRAM(s) Oral every 6 hours PRN Temp greater or equal to 38C (100.4F), Moderate Pain (4 - 6)  melatonin 3 milliGRAM(s) Oral at bedtime PRN Insomnia  ondansetron Injectable 4 milliGRAM(s) IV Push every 8 hours PRN Nausea and/or Vomiting     SUBJECTIVE/ interval events:  Patient seen and examined at bedside. Patient hs no new complains, his back pain is stable and unchanged. Patient denies h/n/v/d, fever, chills, cp, palpitations, sob, abd pain, leg swelling, rashes, dysuria, and changes in BM.     Vital Signs Last 12 Hrs  T(F): 98.1 (21 @ 08:44), Max: 99.3 (21 @ 05:26)  HR: 93 (21 @ 08:44) (89 - 93)  BP: 114/66 (21 @ 08:44) (107/64 - 117/70)  BP(mean): --  RR: 14 (21 @ 08:44) (14 - 19)  SpO2: 97% (21 @ 08:26) (97% - 97%)  I&O's Summary    26 Aug 2021 07:01  -  27 Aug 2021 07:00  --------------------------------------------------------  IN: 600 mL / OUT: 1000 mL / NET: -400 mL    PHYSICAL EXAM:  Constitutional: NAD, comfortable in bed. THC cath appreciated w/o edema/ erythema   HEENT: NC/AT, PERRLA, EOMI, no conjunctival pallor or scleral icterus, MMM  Neck: Supple, no JVD  Respiratory: CTA B/L. No w/r/r.   Cardiovascular: RRR, normal S1 and S2, no m/r/g.   Gastrointestinal: +BS, soft NTND, no guarding or rebound tenderness, no palpable masses   Extremities: wwp; no cyanosis, clubbing or edema.   Vascular: Pulses equal and strong throughout.   Neurological: AAOx3, no CN deficits, strength and sensation intact throughout.   Skin: No gross skin abnormalities or rashes    LABS:                        11.6   11.72 )-----------( 188      ( 27 Aug 2021 07:43 )             35.6         133<L>  |  95<L>  |  25<H>  ----------------------------<  89  3.4<L>   |  27  |  2.17<H>    Ca    9.3      27 Aug 2021 07:43  Phos  3.1       Mg     2.2         TPro  5.4<L>  /  Alb  2.6<L>  /  TBili  1.9<H>  /  DBili  x   /  AST  20  /  ALT  14  /  AlkPhos  307<H>      PT/INR - ( 27 Aug 2021 07:43 )   PT: 14.5 sec;   INR: 1.22          PTT - ( 27 Aug 2021 07:43 )  PTT:44.1 sec  Urinalysis Basic - ( 25 Aug 2021 17:18 )    Color: Yellow / Appearance: Clear / S.025 / pH: x  Gluc: x / Ketone: NEGATIVE  / Bili: Small / Urobili: 2.0 E.U./dL   Blood: x / Protein: 100 mg/dL / Nitrite: NEGATIVE   Leuk Esterase: Moderate / RBC: < 5 /HPF / WBC Many /HPF   Sq Epi: x / Non Sq Epi: 0-5 /HPF / Bacteria: Many /HPF          RADIOLOGY & ADDITIONAL TESTS:    MEDICATIONS  (STANDING):  nafcillin  IVPB      nafcillin  IVPB 2 Gram(s) IV Intermittent every 4 hours  polyethylene glycol 3350 17 Gram(s) Oral at bedtime  senna 2 Tablet(s) Oral at bedtime    MEDICATIONS  (PRN):  acetaminophen   Tablet .. 650 milliGRAM(s) Oral every 6 hours PRN Temp greater or equal to 38C (100.4F), Moderate Pain (4 - 6)  melatonin 3 milliGRAM(s) Oral at bedtime PRN Insomnia  ondansetron Injectable 4 milliGRAM(s) IV Push every 8 hours PRN Nausea and/or Vomiting

## 2021-08-27 NOTE — PROGRESS NOTE ADULT - ASSESSMENT
Assessment/Plan:     #ESRD on HD MF @[facility]  usual Rx 3h 0.5L   -electrolytes at goal   -euvolemic on examination  -No acute indication for HD  dry wt TBD    #Fevers  Fevers can be likely in the setting of sepsis 2/2 UTI or possible discitis. However pt also has catheter in place. Will follow up pending cultures; may need catheter removal. Found to have MSSA bactremia, now on naficillin tx. Given pt has HD catheter; pt will have removed after receiving TXA and factor VIII. New line to be placed next week as long as cultures remain negative.  -Id on board    #HTN   Intermittently elevated, could be in the setting of pain      #access   RIJ TDC    #anemia  Hb at goal   no indication for EPO or IV Iron at this time   transfusion as per primary team     #renal bone disease   Ca at goal  Phos at goal    Fecrho Masterson D.O.  PGY-4, Nephrology Fellow   C: [phone]   P: [phone]

## 2021-08-27 NOTE — PROGRESS NOTE ADULT - SUBJECTIVE AND OBJECTIVE BOX
Patient is a 76y old  Male who presents with a chief complaint of sepsis likely 2/2 UTI (27 Aug 2021 17:03)      INTERVAL HPI/OVERNIGHT EVENTS:    Pt. seen and examined earlier today  Pt.'s daughter present at bedside  Pt. feeling better, reports increased energy and less LBP  Fevers resolved; no chills/rigors  No bleeding sx    Review of Systems: 12 point review of systems otherwise negative    MEDICATIONS  (STANDING):  nafcillin  IVPB      nafcillin  IVPB 2 Gram(s) IV Intermittent every 4 hours  polyethylene glycol 3350 17 Gram(s) Oral at bedtime  senna 2 Tablet(s) Oral at bedtime    MEDICATIONS  (PRN):  acetaminophen   Tablet .. 650 milliGRAM(s) Oral every 6 hours PRN Temp greater or equal to 38C (100.4F), Moderate Pain (4 - 6)  melatonin 3 milliGRAM(s) Oral at bedtime PRN Insomnia  ondansetron Injectable 4 milliGRAM(s) IV Push every 8 hours PRN Nausea and/or Vomiting      Allergies    allopurinol (Other)    Intolerances          Vital Signs Last 24 Hrs  T(C): 36.3 (27 Aug 2021 17:17), Max: 37.9 (26 Aug 2021 19:54)  T(F): 97.4 (27 Aug 2021 17:17), Max: 100.3 (26 Aug 2021 19:54)  HR: 103 (27 Aug 2021 17:17) (88 - 112)  BP: 130/75 (27 Aug 2021 17:17) (107/64 - 142/68)  BP(mean): --  RR: 18 (27 Aug 2021 17:17) (14 - 19)  SpO2: 97% (27 Aug 2021 17:17) (96% - 98%)  CAPILLARY BLOOD GLUCOSE          - @ 07:01  -   @ 07:00  --------------------------------------------------------  IN: 600 mL / OUT: 1000 mL / NET: -400 mL        Physical Exam:  (earlier today)  Daily     Daily Weight in k.5 (26 Aug 2021 19:00)  General:  non-toxic and comfortable-appearing in NAD  HEENT:  +HD cath  CV:  no murmur  Extremities:  no edema B/L LE  Skin:  WWP  Neuro:  AAOx3, strength 5/5 B/L LE, intact sensation to light touch  [facility]    LABS:                        11.6   11.72 )-----------( 188      ( 27 Aug 2021 07:43 )             35.6         133<L>  |  95<L>  |  25<H>  ----------------------------<  89  3.4<L>   |  27  |  2.17<H>    Ca    9.3      27 Aug 2021 07:43  Phos  3.1       Mg     2.2         TPro  5.4<L>  /  Alb  2.6<L>  /  TBili  1.9<H>  /  DBili  x   /  AST  20  /  ALT  14  /  AlkPhos  307<H>      PT/INR - ( 27 Aug 2021 07:43 )   PT: 14.5 sec;   INR: 1.22          PTT - ( 27 Aug 2021 07:43 )  PTT:44.1 sec

## 2021-08-27 NOTE — PROGRESS NOTE ADULT - PROBLEM SELECTOR PLAN 4
patient with PMH of Hemophilia A. +Bcx from 8/25 MSSA bacteremia, heme consulted as patient's catheter needs to be removed.  - as above, TXA and factor VIII given this AM prior to HD cath removal  - maintain active type and screen  - trend factor VIII levels daily, PT/INR, PTT

## 2021-08-27 NOTE — PROGRESS NOTE ADULT - PROBLEM SELECTOR PLAN 5
On admission pt met 3/4 SIRS criteria (HR 96, WBC 16.10 with neutrophilic predominance, Tmax 103.2F) found to have positive UA. Denies any urinary symptoms. Discussed with daughter, given history of BPH, concern for underlying urinary retention as nidus for ascending urinary tract infection. Denies constipation. ID consulted. Ucx from 8/25 grew Enterobacter Colacae.    - per ID, no need to treat asymptomatic bacteruria at this time  - monitor for any new urinary symptoms

## 2021-08-27 NOTE — CONSULT NOTE ADULT - SUBJECTIVE AND OBJECTIVE BOX
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 Found to have Bcx growing MSSA. CT lumbar spine performed o/n, findings diagnostic for discitis-osteomyelitis. IR consulted for dialysis catheter removal.    Clinical History: SEPSIS SECONDARY TO UTI    FH: HTN (hypertension)    Handoff    MEWS Score    HTN (hypertension)    Hemophilia A    Gout    History of BPH    Chronic kidney disease (CKD)    DRESS syndrome    Sepsis secondary to UTI    Sepsis    Acute UTI    ESRD on dialysis    Nutrition, metabolism, and development symptoms    Elevated alkaline phosphatase level    Back pain    Osteomyelitis    Discitis    MSSA bacteremia    Positive urine culture    Osteomyelitis    Hemophilia A    Uses cochlear implant    FEVER    90+    [email or web address]        Meds:acetaminophen   Tablet .. 650 milliGRAM(s) Oral every 6 hours PRN  melatonin 3 milliGRAM(s) Oral at bedtime PRN  nafcillin  IVPB      nafcillin  IVPB 2 Gram(s) IV Intermittent every 4 hours  ondansetron Injectable 4 milliGRAM(s) IV Push every 8 hours PRN  polyethylene glycol 3350 17 Gram(s) Oral at bedtime  senna 2 Tablet(s) Oral at bedtime      Allergies:allopurinol (Other)        Labs:                           11.6   11.72 )-----------( 188      ( 27 Aug 2021 07:43 )             35.6     PT/INR - ( 27 Aug 2021 07:43 )   PT: 14.5 sec;   INR: 1.22          PTT - ( 27 Aug 2021 07:43 )  PTT:44.1 sec  08-27    133<L>  |  95<L>  |  25<H>  ----------------------------<  89  3.4<L>   |  27  |  2.17<H>    Ca    9.3      27 Aug 2021 07:43  Phos  3.1     08-27  Mg     2.2     08-27    TPro  5.4<L>  /  Alb  2.6<L>  /  TBili  1.9<H>  /  DBili  x   /  AST  20  /  ALT  14  /  AlkPhos  307<H>  08-27          Imaging Findings: reviewed

## 2021-08-27 NOTE — CONSULT NOTE ADULT - SUBJECTIVE AND OBJECTIVE BOX
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 Hx obtained from daughter. At HD session yesterday, pt developed shaking chills. HD session was stopped and pt went home. At home, he continued to have shaking chills and was febrile with tmax 101. He had an associated decrease in appetite and lower extremity weakness causing difficulty walking. Denies n/v/abdominal pain. Daughter states that since DRESS syndrome induced CKD, pt has been doing much better requiring 2 sessions of HD per week, and is on no medication.   Also reports low back pain for 4-5 days, worsened by movement. Daughter arranged at home PT and heating pads which helped with the pain. Pt reports pain only when standing/walking. Pt not in pain at the moment.    In the ED,  VS: T98.8   , HR87   , /83    , RR16    , SpO2  98   % (RA)  Labs: WBC 16.10, auto neut #14.20, 88.2% neut, PT14.3, INR1.2, PTT47.4, BUN 39, Cr 2.86,   Urine: pyuria and bacteriuria  EKG: NSR  CXR: Normal  Imaging: None  Interventions: 750mg vanc, 3.375g zosyn     (25 Aug 2021 19:08)      SURGICAL ADDENDUM:    PAST MEDICAL & SURGICAL HISTORY:  HTN (hypertension)    Hemophilia A    Gout    History of BPH    Chronic kidney disease (CKD)    DRESS syndrome    Uses cochlear implant        MEDICATIONS  (STANDING):  nafcillin  IVPB      nafcillin  IVPB 2 Gram(s) IV Intermittent every 4 hours  polyethylene glycol 3350 17 Gram(s) Oral at bedtime  senna 2 Tablet(s) Oral at bedtime    MEDICATIONS  (PRN):  acetaminophen   Tablet .. 650 milliGRAM(s) Oral every 6 hours PRN Temp greater or equal to 38C (100.4F), Moderate Pain (4 - 6)  melatonin 3 milliGRAM(s) Oral at bedtime PRN Insomnia  ondansetron Injectable 4 milliGRAM(s) IV Push every 8 hours PRN Nausea and/or Vomiting      Allergies    allopurinol (Other)    Intolerances        SOCIAL HISTORY:    FAMILY HISTORY:  FH: HTN (hypertension)        Vital Signs Last 24 Hrs  T(C): 36.7 (27 Aug 2021 08:44), Max: 37.9 (26 Aug 2021 19:54)  T(F): 98.1 (27 Aug 2021 08:44), Max: 100.3 (26 Aug 2021 19:54)  HR: 93 (27 Aug 2021 08:44) (88 - 112)  BP: 114/66 (27 Aug 2021 08:44) (107/64 - 142/68)  BP(mean): --  RR: 14 (27 Aug 2021 08:44) (14 - 19)  SpO2: 97% (27 Aug 2021 08:26) (96% - 98%)    PHYSICAL EXAM      LABS:                        11.6   11.72 )-----------( 188      ( 27 Aug 2021 07:43 )             35.6     08-27    133<L>  |  95<L>  |  25<H>  ----------------------------<  89  3.4<L>   |  27  |  2.17<H>    Ca    9.3      27 Aug 2021 07:43  Phos  3.1     08-27  Mg     2.2     08-27    TPro  5.4<L>  /  Alb  2.6<L>  /  TBili  1.9<H>  /  DBili  x   /  AST  20  /  ALT  14  /  AlkPhos  307<H>  08-27    PT/INR - ( 27 Aug 2021 07:43 )   PT: 14.5 sec;   INR: 1.22          PTT - ( 27 Aug 2021 07:43 )  PTT:44.1 sec  Urinalysis Basic - ( 25 Aug 2021 17:18 )    Color: Yellow / Appearance: Clear / S.025 / pH: x  Gluc: x / Ketone: NEGATIVE  / Bili: Small / Urobili: 2.0 E.U./dL   Blood: x / Protein: 100 mg/dL / Nitrite: NEGATIVE   Leuk Esterase: Moderate / RBC: < 5 /HPF / WBC Many /HPF   Sq Epi: x / Non Sq Epi: 0-5 /HPF / Bacteria: Many /HPF        RADIOLOGY & ADDITIONAL STUDIES: HPI:  76M PMH Hemophilia A, HTN, 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 Hx obtained from daughter. At HD session yesterday, pt developed shaking chills. HD session was stopped and pt went home. At home, he continued to have shaking chills and was febrile with tmax 101. He had an associated decrease in appetite and lower extremity weakness causing difficulty walking. Denies n/v/abdominal pain. Daughter states that since DRESS syndrome induced CKD, pt has been doing much better requiring 2 sessions of HD per week, and is on no medication.   Also reports low back pain for 4-5 days, worsened by movement. Daughter arranged at home PT and heating pads which helped with the pain. Pt reports pain only when standing/walking. Pt not in pain at the moment.    In the ED,  VS: T98.8   , HR87   , /83    , RR16    , SpO2  98   % (RA)  Labs: WBC 16.10, auto neut #14.20, 88.2% neut, PT14.3, INR1.2, PTT47.4, BUN 39, Cr 2.86,   Urine: pyuria and bacteriuria  EKG: NSR  CXR: Normal  Imaging: None  Interventions: 750mg vanc, 3.375g zosyn     (25 Aug 2021 19:08)      SURGICAL ADDENDUM:    PAST MEDICAL & SURGICAL HISTORY:  HTN (hypertension)    Hemophilia A    Gout    History of BPH    Chronic kidney disease (CKD)    DRESS syndrome    Uses cochlear implant        MEDICATIONS  (STANDING):  nafcillin  IVPB      nafcillin  IVPB 2 Gram(s) IV Intermittent every 4 hours  polyethylene glycol 3350 17 Gram(s) Oral at bedtime  senna 2 Tablet(s) Oral at bedtime    MEDICATIONS  (PRN):  acetaminophen   Tablet .. 650 milliGRAM(s) Oral every 6 hours PRN Temp greater or equal to 38C (100.4F), Moderate Pain (4 - 6)  melatonin 3 milliGRAM(s) Oral at bedtime PRN Insomnia  ondansetron Injectable 4 milliGRAM(s) IV Push every 8 hours PRN Nausea and/or Vomiting      Allergies    allopurinol (Other)    Intolerances        SOCIAL HISTORY:    FAMILY HISTORY:  FH: HTN (hypertension)        Vital Signs Last 24 Hrs  T(C): 36.7 (27 Aug 2021 08:44), Max: 37.9 (26 Aug 2021 19:54)  T(F): 98.1 (27 Aug 2021 08:44), Max: 100.3 (26 Aug 2021 19:54)  HR: 93 (27 Aug 2021 08:44) (88 - 112)  BP: 114/66 (27 Aug 2021 08:44) (107/64 - 142/68)  BP(mean): --  RR: 14 (27 Aug 2021 08:44) (14 - 19)  SpO2: 97% (27 Aug 2021 08:26) (96% - 98%)    PHYSICAL EXAM  NAD  nonlabored breathing   Pulses: UE palpable radial and ulnar arteries      LABS:                        11.6   11.72 )-----------( 188      ( 27 Aug 2021 07:43 )             35.6     08-27    133<L>  |  95<L>  |  25<H>  ----------------------------<  89  3.4<L>   |  27  |  2.17<H>    Ca    9.3      27 Aug 2021 07:43  Phos  3.1     08-27  Mg     2.2     08-27    TPro  5.4<L>  /  Alb  2.6<L>  /  TBili  1.9<H>  /  DBili  x   /  AST  20  /  ALT  14  /  AlkPhos  307<H>  08-27    PT/INR - ( 27 Aug 2021 07:43 )   PT: 14.5 sec;   INR: 1.22          PTT - ( 27 Aug 2021 07:43 )  PTT:44.1 sec  Urinalysis Basic - ( 25 Aug 2021 17:18 )    Color: Yellow / Appearance: Clear / S.025 / pH: x  Gluc: x / Ketone: NEGATIVE  / Bili: Small / Urobili: 2.0 E.U./dL   Blood: x / Protein: 100 mg/dL / Nitrite: NEGATIVE   Leuk Esterase: Moderate / RBC: < 5 /HPF / WBC Many /HPF   Sq Epi: x / Non Sq Epi: 0-5 /HPF / Bacteria: Many /HPF        RADIOLOGY & ADDITIONAL STUDIES:

## 2021-08-27 NOTE — PROGRESS NOTE ADULT - ASSESSMENT
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 Found to have Bcx growing MSSA. CT lumbar spine performed o/n, findings diagnostic for discitis-osteomyelitis. Pending dialysis catheter exchange. Hematology Oncology for preoperative optimization prior to HD cath placement.     Hemophilia A  For preoperative optimization prior to HD cath placement it is imperative trend the patient's Factor VIII level. Currently factor VIII level 58.  Please check STAT: PT/INR, PTT, factor VIII level and mixing studies    Of note, patient had an HD catheter replaced in March in which the patient received rFVIII and DDAVP mono-procedure and post-procedure for HD catheter placement. On that admission, of note possibly mild inhibitor noted on 2 hr mixing study in the past    Factor VIII replacement is calculated by the following in IU.   Factor VIII units required = [(desired peak factor VIII level - patient's baseline factor VIII level) × body weight (kg)]/2  If Mr. Don, given history of prior bleeding with procedure, would aim for a desired peak factor VIII level of 100, resulting in approximately 1250 units of factor VIII    Alternatively DDAVP is an option, but that is in patient's who have previously demonstrated a response to DDAVP  Dosin.3 mcg/kg once (maximum recommended dose: 20 to 30 mcg). If used for prevention of surgical bleeding, initial (preoperative) dose should be timed to provide maximal coverage at the time of greatest bleeding risk (typically 30 to 60 minutes before the procedure)    Plan:  - Trend factor VIII level daily  - mixing studies showing 2hr prolongation indicating presence of inhibitor  - s/p 1250 units of recombinant factor VIII on  at 8AM  - started on Tranexamic acid 10 mg/kg once daily on , can dc on  if no bleeding  - pending inhibitor assay ([place] units)  - give another dose of 1,250 units recombinant factor VIII tomorrow at 8AM        D/w  (Hematology/Oncology Service attending) covering for . 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 Found to have Bcx growing MSSA. CT lumbar spine performed o/n, findings diagnostic for discitis-osteomyelitis. Pending dialysis catheter exchange. Hematology Oncology for preoperative optimization prior to HD cath placement.     Hemophilia A  For preoperative optimization prior to HD cath placement it is imperative trend the patient's Factor VIII level. Currently factor VIII level 58.  Please check STAT: PT/INR, PTT, factor VIII level and mixing studies    Of note, patient had an HD catheter replaced in March in which the patient received rFVIII and DDAVP mono-procedure and post-procedure for HD catheter placement. On that admission, of note possibly mild inhibitor noted on 2 hr mixing study in the past    Factor VIII replacement is calculated by the following in IU.   Factor VIII units required = [(desired peak factor VIII level - patient's baseline factor VIII level) × body weight (kg)]/2  If Mr. Don, given history of prior bleeding with procedure, would aim for a desired peak factor VIII level of 100, resulting in approximately 1250 units of factor VIII    Alternatively DDAVP is an option, but that is in patient's who have previously demonstrated a response to DDAVP  Dosin.3 mcg/kg once (maximum recommended dose: 20 to 30 mcg). If used for prevention of surgical bleeding, initial (preoperative) dose should be timed to provide maximal coverage at the time of greatest bleeding risk (typically 30 to 60 minutes before the procedure)    Plan:  - Trend factor VIII level daily  - mixing studies showing 2hr prolongation indicating presence of inhibitor  - s/p 1250 units of recombinant factor VIII on  at 8AM  - s/p Tranexamic acid 10 mg/kg once on   - pending inhibitor assay ([place] units)  - give another dose of 1,250 units recombinant factor VIII tomorrow at 8AM        D/w  (Hematology/Oncology Service attending) covering for .

## 2021-08-27 NOTE — PROGRESS NOTE ADULT - PROBLEM SELECTOR PLAN 2
Low back pain with ambulation for 4-5 days. Per daughter, PT and warm compresses at home alleviated his pain. He does not report pain at bedside today. No spinal tenderness, strength 5/5 in b/l LE's, negative straight leg raise. CT lumbar spine performed, findings diagnostic for discitis-osteomyelitis. ID consulted, ortho following.    - as above. 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 No acute ortho intervention at this time and no further imaging indicated

## 2021-08-27 NOTE — PROGRESS NOTE ADULT - ATTENDING COMMENTS
Patient feeling well, back pain improving.  TTE showed MV prolapse.  Need GUANAKO to r/o endocarditis.  Plan for HD cath removal today.  Cont nafcillin.    Team 1 will follow you.  Dr. Ruelas is covering me this weekend.  Case d/w primary team.    Joyce Barnard MD, MS  Infectious Disease attending  work cell [phone]

## 2021-08-27 NOTE — PROGRESS NOTE ADULT - ASSESSMENT
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 CT back, showed on 8/26 new erosive end-plate and perivertebral fat changes at L5 and S1 levels with high suspicion for discitis and OM. 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 Ucx from 8/25 grow Enterobacter Colacae, but patient denies any urinary symptoms. Bcx from 8/25 grows MSSA 3/4 bottles. Patient got being treated with CTX and Vanc on 8/25 Zosyn 3.375, Vanc 750 and CTX 2g and one more dose of Vanc on 8/26. WBC is increasing from 16 to 18.4 this AM, but patient subjectively feel better, and his back pain is already improving.    - Daily surveillance blood cultures until MSSA bacteremia clears  - F/u blood culture susceptibilities  - Nafcillin 2g q4 for best MSSA coverage, as recurrent MSSA bacteremia; known fluid overload during past admission, unlike then, patient now is on HD and may tolerate treatment better  - M/p source of infection is the HD cath, please remove cath - will need hem/onc on board due to hemophillia  - Place new permanent HD cath only after surveillance cultures are negative >72hr; meanwhile can place temporary HD cath.  - Please obtain TTE for endocarditis evaluation, as MSSA is prone to seeding on heart valves. Patient may require following GUANAKO  - Ucx from 8/25 grow Enterobacter Colacae, but patient denies any urinary symptoms. no need to treat asymptomatic bacteruria    ID Team 1 will continue to follow. Please see below attending attestation for finalized recommendations.    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 CT back, showed on 8/26 new erosive end-plate and perivertebral fat changes at L5 and S1 levels with high suspicion for discitis and OM. 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 Ucx from 8/25 grow Enterobacter Colacae, but patient denies any urinary symptoms. Bcx from 8/25 grows MSSA 3/4 bottles. TTE 8/27 with normal ventricles size and function, mild MVR and calcifications, no vegetations.    - Daily surveillance blood cultures until MSSA bacteremia clears, please make sure is collected today  - F/u blood culture susceptibilities  - C/w Nafcillin 2g q4 for best MSSA coverage  - S/p permacath removal today with no complication  - Place new permanent HD cath only after surveillance cultures are negative >72hr; meanwhile can place temporary HD cath.  - TTE w/o vegetations/ please obtain GUANAKO  - As patient w/o cardiac hardware no indication for gallium scan      ID Team 1 will continue to follow. Please see below attending attestation for finalized recommendations.    Marbin Penny MD PGY2 Internal Medicine  Infectious Disease Consult Service, Team 1

## 2021-08-27 NOTE — PROGRESS NOTE ADULT - PROBLEM SELECTOR PLAN 3
cont. mgmt per Heme-Onc; will receive Factor VIII and tranexamic acid prior to HD cath removal today; monitor daily coags and Factor VIII level; inhibitor assay pending

## 2021-08-27 NOTE — PROGRESS NOTE ADULT - PROBLEM SELECTOR PLAN 3
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 Cr 2.86 on admission. Patient underwent dialysis yesterday, Cr this morning 2.17. Electrolytes, Ca, Phos, Hb at goal, euvolemic on exam. Heme consulted as patient's catheter needs to be removed given +Bcx MSSA bacteremia. Renal following.    - per Heme recs, TXA and factor VIII given this morning prior to HD cath removal  - per renal, no acute indication for HD at this time, new line to be placed next week if cx negative > 72h

## 2021-08-27 NOTE — PROGRESS NOTE ADULT - SUBJECTIVE AND OBJECTIVE BOX
SUBJECTIVE: Patient seen and examined. Pain controlled.  No issues overnight. No HA, f/c/n/v/cp/sob. Per family cochlear implant is not compatible with MRI; MRI will be deferred and treatment continued for suspected osteomyelitis/discitis    OBJECTIVE:  NAD  Vital Signs Last 24 Hrs  T(C): 36.7 (27 Aug 2021 08:44), Max: 37.9 (26 Aug 2021 19:54)  T(F): 98.1 (27 Aug 2021 08:44), Max: 100.3 (26 Aug 2021 19:54)  HR: 93 (27 Aug 2021 08:44) (83 - 112)  BP: 114/66 (27 Aug 2021 08:44) (107/64 - 142/68)  BP(mean): 83 (26 Aug 2021 15:05) (83 - 83)  RR: 14 (27 Aug 2021 08:44) (14 - 19)  SpO2: 97% (27 Aug 2021 08:26) (96% - 98%)    Physical Exam:   General: Resting comfortably, NAD  LLE: No gross deformity. SILT. TA/EHL/FHL/GS motor intact 5/5. Warm well perfused; capillary refill <3 seconds   RLE: No gross deformity. SILT. TA/EHL/FHL/GS motor intact 5/5. Warm well perfused; capillary refill <3 seconds              Labs:               11.6   11.72 )-----------( 188      ( 27 Aug 2021 07:43 )             35.6     08-27    133<L>  |  95<L>  |  25<H>  ----------------------------<  89  3.4<L>   |  27  |  2.17<H>    Ca    9.3      27 Aug 2021 07:43  Phos  3.1     08-27  Mg     2.2     08-27    TPro  5.4<L>  /  Alb  2.6<L>  /  TBili  1.9<H>  /  DBili  x   /  AST  20  /  ALT  14  /  AlkPhos  307<H>  08-27      A/P :  Pt is a 75yo Male with L5-S1 osteomyelitis/discitis. Continue treatment per primary team and ID. Given positive blood cultures and in the setting of hemophilia, would not recommend IR biopsy. No further imaging indicated. No surgical intervention indicated at this time.   -  Pain control  -  F/u ID recs  -  Weight bearing status: WBAT   -  Recommend Physical Therapy  -  Orthopedics team signing off

## 2021-08-27 NOTE — PROGRESS NOTE ADULT - PROBLEM SELECTOR PLAN 6
on admission. Last known level 169 (3/21) could be 2/2 to renal bone disease versus hepatobiliary.  this AM.  - trend ALP

## 2021-08-27 NOTE — CONSULT NOTE ADULT - ASSESSMENT
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 Found to have Bcx growing MSSA. CT lumbar spine performed o/n, findings diagnostic for discitis-osteomyelitis. IR consulted for dialysis catheter removal in setting of bacteremia. Case reviewed with Dr. Tamez, plan for removal with local anesthesia after factor VIII and tranexamic acid infusion.     Communicated with: primary team

## 2021-08-27 NOTE — PROGRESS NOTE ADULT - SUBJECTIVE AND OBJECTIVE BOX
OVERNIGHT EVENTS: NAEO    SUBJECTIVE / INTERVAL HPI: Patient seen and examined at bedside this morning. Still endorsing mild back pain, though states he was able to sleep well last night. Pt aware that his HD catheter will be removed today. Otherwise, patient denying chest pain, SOB, palpitations, cough. Patient denies fever, chills, HA, Dizziness, change in vision/hearing, N/V, abdominal pain, diarrhea, constipation, hematochezia/melena, dysuria, hematuria, new onset weakness/numbness, LE pain and/or swelling.  Remaining ROS negative     OBJECTIVE    VITAL SIGNS:  Vital Signs Last 24 Hrs  T(C): 36.7 (27 Aug 2021 08:44), Max: 37.9 (26 Aug 2021 19:54)  T(F): 98.1 (27 Aug 2021 08:44), Max: 100.3 (26 Aug 2021 19:54)  HR: 93 (27 Aug 2021 08:44) (83 - 112)  BP: 114/66 (27 Aug 2021 08:44) (107/64 - 142/68)  BP(mean): 83 (26 Aug 2021 15:05) (83 - 83)  RR: 14 (27 Aug 2021 08:44) (14 - 19)  SpO2: 97% (27 Aug 2021 08:26) (96% - 98%)    PHYSICAL EXAM:  General: NAD, resting comfortably in bed  HEENT: NC/AT; PERRL, anicteric sclera; MMM  Neck: supple  Cardiovascular: +S1/S2, RRR, no m/r/g  Respiratory: CTA B/L; no W/R/R  Gastrointestinal: soft, NT/ND; +BS, no rebound or guarding  : no suprapubic tenderness, HD cath bandaged but no surrounding erythema or tenderness  Back: skin intact, no CVA tenderness, no ecchymosis, no TTP over spinous processes of vertebra.  Extremities: WWP; no edema, clubbing or cyanosis  Vascular: 2+ radial, DP/PT pulses B/L  Neurological: AAOx3; no focal deficits. Strength 5/5 in b/l LEs and UEs  Psychiatric: pleasant mood and affect  Dermatologic: no appreciable wounds or damage to the skin    MEDICATIONS:  MEDICATIONS  (STANDING):  nafcillin  IVPB      nafcillin  IVPB 2 Gram(s) IV Intermittent every 4 hours  polyethylene glycol 3350 17 Gram(s) Oral at bedtime  senna 2 Tablet(s) Oral at bedtime    MEDICATIONS  (PRN):  acetaminophen   Tablet .. 650 milliGRAM(s) Oral every 6 hours PRN Temp greater or equal to 38C (100.4F), Moderate Pain (4 - 6)  melatonin 3 milliGRAM(s) Oral at bedtime PRN Insomnia  ondansetron Injectable 4 milliGRAM(s) IV Push every 8 hours PRN Nausea and/or Vomiting    ALLERGIES:  allopurinol (Other)    LABS:                        11.6   11.72 )-----------( 188      ( 27 Aug 2021 07:43 )             35.6         133<L>  |  95<L>  |  25<H>  ----------------------------<  89  3.4<L>   |  27  |  2.17<H>    Ca    9.3      27 Aug 2021 07:43  Phos  3.1       Mg     2.2         TPro  5.4<L>  /  Alb  2.6<L>  /  TBili  1.9<H>  /  DBili  x   /  AST  20  /  ALT  14  /  AlkPhos  307<H>      PT/INR - ( 27 Aug 2021 07:43 )   PT: 14.5 sec;   INR: 1.22       PTT - ( 27 Aug 2021 07:43 )  PTT:44.1 sec    CAPILLARY BLOOD GLUCOSE    Urinalysis Basic - ( 25 Aug 2021 17:18 )    Color: Yellow / Appearance: Clear / S.025 / pH: x  Gluc: x / Ketone: NEGATIVE  / Bili: Small / Urobili: 2.0 E.U./dL   Blood: x / Protein: 100 mg/dL / Nitrite: NEGATIVE   Leuk Esterase: Moderate / RBC: < 5 /HPF / WBC Many /HPF   Sq Epi: x / Non Sq Epi: 0-5 /HPF / Bacteria: Many /HPF    I&O's Summary    26 Aug 2021 07:01  -  27 Aug 2021 07:00  --------------------------------------------------------  IN: 600 mL / OUT: 1000 mL / NET: -400 mL      RADIOLOGY & ADDITIONAL TESTS: Reviewed.

## 2021-08-27 NOTE — PROGRESS NOTE ADULT - SUBJECTIVE AND OBJECTIVE BOX
Patient is a 76y Male seen and evaluated at bedside; no acute complaints this morning. Aware that the Hd catheter will be removed, no need for dialysis today. Still remains with some back pain today.       Meds:    acetaminophen   Tablet .. 650 every 6 hours PRN  melatonin 3 at bedtime PRN  nafcillin  IVPB    nafcillin  IVPB 2 every 4 hours  ondansetron Injectable 4 every 8 hours PRN  polyethylene glycol 3350 17 at bedtime  senna 2 at bedtime      T(C): , Max: 37.9 (21 @ 19:54)  T(F): , Max: 100.3 (21 @ 19:54)  HR: 93 (21 @ 08:44)  BP: 114/66 (21 @ 08:44)  BP(mean): 83 (21 @ 15:05)  RR: 14 (21 @ 08:44)  SpO2: 97% (21 @ 08:26)  Wt(kg): --     @ 07:01  -   @ 07:00  --------------------------------------------------------  IN: 600 mL / OUT: 1000 mL / NET: -400 mL          Review of Systems:  10 point ROS negative except as per HPI      PHYSICAL EXAM:  GENERAL: Alert, awake, oriented x3 on RA   HEENT: MMM, no JVP  CHEST/LUNG: Bilateral clear breath sounds  HEART: Regular rate and rhythm, no murmur, no gallops, no rub   ABDOMEN: Soft, nontender, non distended  EXTREMITIES: no pedal edema, WWP  Neurology: AAOx3, no asterixis   ACCESS: R TDC c/d/i      LABS:                        11.6   11.72 )-----------( 188      ( 27 Aug 2021 07:43 )             35.6     08-    133<L>  |  95<L>  |  25<H>  ----------------------------<  89  3.4<L>   |  27  |  2.17<H>    Ca    9.3      27 Aug 2021 07:43  Phos  3.1       Mg     2.2         TPro  5.4<L>  /  Alb  2.6<L>  /  TBili  1.9<H>  /  DBili  x   /  AST  20  /  ALT  14  /  AlkPhos  307<H>      Hepatitis B Surface Antibody: Nonreact ( @ 16:10)  Hepatitis C Virus S/CO Ratio: 0.05 S/CO ( @ 16:10)    PT/INR - ( 27 Aug 2021 07:43 )   PT: 14.5 sec;   INR: 1.22          PTT - ( 27 Aug 2021 07:43 )  PTT:44.1 sec  Urinalysis Basic - ( 25 Aug 2021 17:18 )    Color: Yellow / Appearance: Clear / S.025 / pH: x  Gluc: x / Ketone: NEGATIVE  / Bili: Small / Urobili: 2.0 E.U./dL   Blood: x / Protein: 100 mg/dL / Nitrite: NEGATIVE   Leuk Esterase: Moderate / RBC: < 5 /HPF / WBC Many /HPF   Sq Epi: x / Non Sq Epi: 0-5 /HPF / Bacteria: Many /HPF            RADIOLOGY & ADDITIONAL STUDIES:

## 2021-08-27 NOTE — PROGRESS NOTE ADULT - ASSESSMENT
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

## 2021-08-27 NOTE — PROGRESS NOTE ADULT - PROBLEM SELECTOR PLAN 7
F: s/p 500 cc, tolerating PO  E: replete K<4, Mg<2  N: NPO    VTE Prophylaxis: SCDS (hemophilia no need for anticoag)  GI: not needed  C: Full Code  D: RMF

## 2021-08-27 NOTE — PROGRESS NOTE ADULT - PROBLEM SELECTOR PLAN 2
h/o DRESS syndrome; on HD twice a week; cont. mgmt per Renal; will have HD catheter removed today; will need HD catheter replaced next week when bacteremia clears; monitor BMP, volume status; renally-dose rx

## 2021-08-27 NOTE — PROCEDURE NOTE - PLAN
Plan for placement of new HD catheter next week after cultures removal negative for 72 hours. Given Hemophilia A risk factor, pt will need another factor infusion on day of procedure.

## 2021-08-27 NOTE — PROGRESS NOTE ADULT - PROBLEM SELECTOR PLAN 1
On admission pt met 3/4 SIRS criteria; HR 96, WBC 16.10 with neutrophilic predominance, Tmax 103.2F. Positive UA. Negative CXR. Patient empirically treated in the ED with 750mg vanc and 3.375g zosyn on 8/25, then CTX 2g, then one more dose of Vanc on 8/26. ID consulted.  Bcx from 8/25 grew MSSA 3/4 bottles (likely recurrent as pt was diagnosed with MSSA bacteremia in March). This morning, T 98.1F, WBC trending down to 11.72. Heme also consulted given history of hemophilia A. IR consulted as HD cath needs to be removed, patient placed NPO overnight.    - daily surveillance bcx until MSSA bacteremia clears, f/u bcx susceptibilities   - start Nafcillin 2g q4 for best MSSA coverage per ID  - per heme recs, gave TXA and factor VIII this AM prior to HD cath removal  - place new permanent HD cath only after cx are negative > 72h  - Obtain TTE for endocarditis evaluation as MSSA prone to seeding on heart valves, f/u results

## 2021-08-27 NOTE — CONSULT NOTE ADULT - ASSESSMENT
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 currently admited for bactermia in the setting of osteomylitis. As a result his permacath dialysis access was removed. We were consulted for possible AV graft fistula. Right now his primary team does not have a set date for his next dialysis session.   Plan  -continue to follow kidney function

## 2021-08-27 NOTE — PROGRESS NOTE ADULT - ATTENDING COMMENTS
plan for PC dc today after clotting factors-- does not need dialysis today   next HD planned likely monday after new HD line placed

## 2021-08-28 DIAGNOSIS — R19.7 DIARRHEA, UNSPECIFIED: ICD-10-CM

## 2021-08-28 LAB
-  CEFAZOLIN: SIGNIFICANT CHANGE UP
-  CEFAZOLIN: SIGNIFICANT CHANGE UP
-  CLINDAMYCIN: SIGNIFICANT CHANGE UP
-  CLINDAMYCIN: SIGNIFICANT CHANGE UP
-  ERYTHROMYCIN: SIGNIFICANT CHANGE UP
-  ERYTHROMYCIN: SIGNIFICANT CHANGE UP
-  LINEZOLID: SIGNIFICANT CHANGE UP
-  LINEZOLID: SIGNIFICANT CHANGE UP
-  OXACILLIN: SIGNIFICANT CHANGE UP
-  OXACILLIN: SIGNIFICANT CHANGE UP
-  RIFAMPIN: SIGNIFICANT CHANGE UP
-  RIFAMPIN: SIGNIFICANT CHANGE UP
-  TRIMETHOPRIM/SULFAMETHOXAZOLE: SIGNIFICANT CHANGE UP
-  TRIMETHOPRIM/SULFAMETHOXAZOLE: SIGNIFICANT CHANGE UP
-  VANCOMYCIN: SIGNIFICANT CHANGE UP
-  VANCOMYCIN: SIGNIFICANT CHANGE UP
ALBUMIN SERPL ELPH-MCNC: 2.7 G/DL — LOW (ref 3.3–5)
ALP SERPL-CCNC: 308 U/L — HIGH (ref 40–120)
ALT FLD-CCNC: 16 U/L — SIGNIFICANT CHANGE UP (ref 10–45)
ANION GAP SERPL CALC-SCNC: 15 MMOL/L — SIGNIFICANT CHANGE UP (ref 5–17)
APTT BLD: 36.9 SEC — HIGH (ref 27.5–35.5)
AST SERPL-CCNC: 23 U/L — SIGNIFICANT CHANGE UP (ref 10–40)
BASOPHILS # BLD AUTO: 0.05 K/UL — SIGNIFICANT CHANGE UP (ref 0–0.2)
BASOPHILS NFR BLD AUTO: 0.4 % — SIGNIFICANT CHANGE UP (ref 0–2)
BILIRUB SERPL-MCNC: 2.2 MG/DL — HIGH (ref 0.2–1.2)
BUN SERPL-MCNC: 42 MG/DL — HIGH (ref 7–23)
CALCIUM SERPL-MCNC: 9 MG/DL — SIGNIFICANT CHANGE UP (ref 8.4–10.5)
CHLORIDE SERPL-SCNC: 94 MMOL/L — LOW (ref 96–108)
CO2 SERPL-SCNC: 23 MMOL/L — SIGNIFICANT CHANGE UP (ref 22–31)
CREAT SERPL-MCNC: 3.21 MG/DL — HIGH (ref 0.5–1.3)
CULTURE RESULTS: SIGNIFICANT CHANGE UP
CULTURE RESULTS: SIGNIFICANT CHANGE UP
EOSINOPHIL # BLD AUTO: 0.33 K/UL — SIGNIFICANT CHANGE UP (ref 0–0.5)
EOSINOPHIL NFR BLD AUTO: 2.7 % — SIGNIFICANT CHANGE UP (ref 0–6)
GLUCOSE SERPL-MCNC: 118 MG/DL — HIGH (ref 70–99)
GRAM STN FLD: SIGNIFICANT CHANGE UP
HCT VFR BLD CALC: 36.2 % — LOW (ref 39–50)
HGB BLD-MCNC: 11.6 G/DL — LOW (ref 13–17)
IMM GRANULOCYTES NFR BLD AUTO: 0.5 % — SIGNIFICANT CHANGE UP (ref 0–1.5)
INR BLD: 1.26 — HIGH (ref 0.88–1.16)
LYMPHOCYTES # BLD AUTO: 0.72 K/UL — LOW (ref 1–3.3)
LYMPHOCYTES # BLD AUTO: 6 % — LOW (ref 13–44)
MAGNESIUM SERPL-MCNC: 2.1 MG/DL — SIGNIFICANT CHANGE UP (ref 1.6–2.6)
MCHC RBC-ENTMCNC: 27 PG — SIGNIFICANT CHANGE UP (ref 27–34)
MCHC RBC-ENTMCNC: 32 GM/DL — SIGNIFICANT CHANGE UP (ref 32–36)
MCV RBC AUTO: 84.4 FL — SIGNIFICANT CHANGE UP (ref 80–100)
METHOD TYPE: SIGNIFICANT CHANGE UP
METHOD TYPE: SIGNIFICANT CHANGE UP
MONOCYTES # BLD AUTO: 0.81 K/UL — SIGNIFICANT CHANGE UP (ref 0–0.9)
MONOCYTES NFR BLD AUTO: 6.7 % — SIGNIFICANT CHANGE UP (ref 2–14)
NEUTROPHILS # BLD AUTO: 10.12 K/UL — HIGH (ref 1.8–7.4)
NEUTROPHILS NFR BLD AUTO: 83.7 % — HIGH (ref 43–77)
NRBC # BLD: 0 /100 WBCS — SIGNIFICANT CHANGE UP (ref 0–0)
ORGANISM # SPEC MICROSCOPIC CNT: SIGNIFICANT CHANGE UP
PHOSPHATE SERPL-MCNC: 4.3 MG/DL — SIGNIFICANT CHANGE UP (ref 2.5–4.5)
PLATELET # BLD AUTO: 238 K/UL — SIGNIFICANT CHANGE UP (ref 150–400)
POTASSIUM SERPL-MCNC: 3.1 MMOL/L — LOW (ref 3.5–5.3)
POTASSIUM SERPL-SCNC: 3.1 MMOL/L — LOW (ref 3.5–5.3)
PROT SERPL-MCNC: 5.6 G/DL — LOW (ref 6–8.3)
PROTHROM AB SERPL-ACNC: 15 SEC — HIGH (ref 10.6–13.6)
RBC # BLD: 4.29 M/UL — SIGNIFICANT CHANGE UP (ref 4.2–5.8)
RBC # FLD: 14.7 % — HIGH (ref 10.3–14.5)
SODIUM SERPL-SCNC: 132 MMOL/L — LOW (ref 135–145)
SPECIMEN SOURCE: SIGNIFICANT CHANGE UP
SPECIMEN SOURCE: SIGNIFICANT CHANGE UP
WBC # BLD: 12.09 K/UL — HIGH (ref 3.8–10.5)
WBC # FLD AUTO: 12.09 K/UL — HIGH (ref 3.8–10.5)

## 2021-08-28 PROCEDURE — 99232 SBSQ HOSP IP/OBS MODERATE 35: CPT

## 2021-08-28 PROCEDURE — 99233 SBSQ HOSP IP/OBS HIGH 50: CPT | Mod: GC

## 2021-08-28 RX ADMIN — NAFCILLIN 200 GRAM(S): 10 INJECTION, POWDER, FOR SOLUTION INTRAVENOUS at 17:00

## 2021-08-28 RX ADMIN — NAFCILLIN 200 GRAM(S): 10 INJECTION, POWDER, FOR SOLUTION INTRAVENOUS at 09:03

## 2021-08-28 RX ADMIN — NAFCILLIN 200 GRAM(S): 10 INJECTION, POWDER, FOR SOLUTION INTRAVENOUS at 13:07

## 2021-08-28 RX ADMIN — NAFCILLIN 200 GRAM(S): 10 INJECTION, POWDER, FOR SOLUTION INTRAVENOUS at 21:43

## 2021-08-28 RX ADMIN — NAFCILLIN 200 GRAM(S): 10 INJECTION, POWDER, FOR SOLUTION INTRAVENOUS at 05:18

## 2021-08-28 RX ADMIN — NAFCILLIN 200 GRAM(S): 10 INJECTION, POWDER, FOR SOLUTION INTRAVENOUS at 01:36

## 2021-08-28 NOTE — PROGRESS NOTE ADULT - ATTENDING COMMENTS
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 Will r/o endocarditis. C/w abx, bcx NGTD for 12hrs so far. ID recs appreciated. Also w/ discitis for which ortho is consulted. Renal following for mgmt of ESRD, heme following due to hx of hemophilia A.

## 2021-08-28 NOTE — PROGRESS NOTE ADULT - PROBLEM SELECTOR PLAN 3
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 Cr 2.86 on admission. Patient underwent dialysis yesterday, Cr this morning 2.17. Electrolytes, Ca, Phos, Hb at goal, euvolemic on exam. Heme consulted as patient's catheter needs to be removed given +Bcx MSSA bacteremia. Renal following.    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 Cr 2.86 on admission. Patient underwent dialysis yesterday, Cr this morning 2.17. Electrolytes, Ca, Phos, Hb at goal, euvolemic on exam. Heme consulted as patient's catheter needs to be removed given +Bcx MSSA bacteremia. Renal following.    - per Heme recs, TXA and factor VIII given prior to HD cath removal  - per renal, no acute indication for HD at this time, new line to be placed next week if cx negative > 72h

## 2021-08-28 NOTE — PROGRESS NOTE ADULT - PROBLEM SELECTOR PLAN 1
On admission pt met 3/4 SIRS criteria; HR 96, WBC 16.10 with neutrophilic predominance, Tmax 103.2F. Positive UA. Negative CXR. Patient empirically treated in the ED with 750mg vanc and 3.375g zosyn on 8/25, then CTX 2g, then one more dose of Vanc on 8/26. ID consulted.  Bcx from 8/25 grew MSSA 3/4 bottles (likely recurrent as pt was diagnosed with MSSA bacteremia in March). This morning, T 98.1F, WBC trending down to 11.72. Heme also consulted given history of hemophilia A. IR consulted as HD cath needs to be removed, patient placed NPO overnight.    - daily surveillance bcx until MSSA bacteremia clears, f/u bcx susceptibilities   - start Nafcillin 2g q4 for best MSSA coverage per ID  - per heme recs, gave TXA and factor VIII this AM prior to HD cath removal  - place new permanent HD cath only after cx are negative > 72h  - Obtain TTE for endocarditis evaluation as MSSA prone to seeding on heart valves, f/u results On admission pt met 3/4 SIRS criteria; HR 96, WBC 16.10 with neutrophilic predominance, Tmax 103.2F. Positive UA. Negative CXR. Patient empirically treated in the ED with 750mg vanc and 3.375g zosyn on 8/25, then CTX 2g, then one more dose of Vanc on 8/26. ID consulted.  Bcx from 8/25 grew MSSA 3/4 bottles (likely recurrent as pt was diagnosed with MSSA bacteremia in March). This morning, T 98.1F, WBC trending down to 11.72. Heme also consulted given history of hemophilia A. IR consulted as HD cath needs to be removed, patient placed NPO overnight.    - daily surveillance bcx; most recent bcx NGTD x 12hrs  - start Nafcillin 2g q4 for best MSSA coverage per ID  - per heme recs, s/p TXA and factor VIII   - place new permanent HD cath only after cx are negative > 72h  - TTE w/o vegetations; will obtain GUANAKO On admission pt met 3/4 SIRS criteria; HR 96, WBC 16.10 with neutrophilic predominance, Tmax 103.2F. Positive UA. Negative CXR. Patient empirically treated in the ED with 750mg vanc and 3.375g zosyn on 8/25, then CTX 2g, then one more dose of Vanc on 8/26. ID consulted.  Bcx from 8/25 grew MSSA 3/4 bottles (likely recurrent as pt was diagnosed with MSSA bacteremia in March). This morning, T 98.1F, WBC trending down to 11.72. Heme also consulted given history of hemophilia A. IR consulted as HD cath needs to be removed, patient placed NPO overnight.    - daily surveillance bcx; most recent bcx NGTD x 12hrs  - c/w Nafcillin 2g q4 for best MSSA coverage per ID  - per heme recs, s/p TXA and factor VIII   - GUANAKO on Monday, NPO Sunday  - place new permanent HD cath only after cx are negative > 72h  - TTE w/o vegetations; will obtain GUANAKO

## 2021-08-28 NOTE — PROGRESS NOTE ADULT - PROBLEM SELECTOR PLAN 4
patient with PMH of Hemophilia A. +Bcx from 8/25 MSSA bacteremia, heme consulted as patient's catheter needs to be removed.  - as above, TXA and factor VIII given this AM prior to HD cath removal  - maintain active type and screen  - trend factor VIII levels daily, PT/INR, PTT patient with PMH of Hemophilia A. +Bcx from 8/25 MSSA bacteremia, heme consulted as patient's catheter needs to be removed.  - s/p TXA and factor VIII  - no e/o bleed; hgb stable  - maintain active type and screen  - trend factor VIII levels daily, PT/INR, PTT

## 2021-08-28 NOTE — PROGRESS NOTE ADULT - PROBLEM SELECTOR PLAN 8
F: s/p 500 cc, tolerating PO  E: replete K<4, Mg<2  N: renal diet    VTE Prophylaxis: SCDS (hemophilia no need for anticoag)  GI: not needed  C: Full Code  D: RMF

## 2021-08-28 NOTE — PROGRESS NOTE ADULT - SUBJECTIVE AND OBJECTIVE BOX
INTERVAL HPI/OVERNIGHT EVENTS:    SUBJECTIVE: Patient seen and examined at bedside.     OBJECTIVE:    VITAL SIGNS:  ICU Vital Signs Last 24 Hrs  T(C): 36.6 (28 Aug 2021 08:35), Max: 37.6 (27 Aug 2021 21:40)  T(F): 97.8 (28 Aug 2021 08:35), Max: 99.6 (27 Aug 2021 21:40)  HR: 86 (28 Aug 2021 08:35) (76 - 103)  BP: 117/74 (28 Aug 2021 08:35) (115/73 - 130/75)  BP(mean): --  ABP: --  ABP(mean): --  RR: 18 (28 Aug 2021 08:35) (18 - 19)  SpO2: 97% (28 Aug 2021 08:35) (96% - 97%)        08-27 @ 07:01  -  08-28 @ 07:00  --------------------------------------------------------  IN: 100 mL / OUT: 0 mL / NET: 100 mL      CAPILLARY BLOOD GLUCOSE          PHYSICAL EXAM:    General: NAD  HEENT: NC/AT; PERRL, clear conjunctiva  Neck: supple  Respiratory: CTA b/l  Cardiovascular: +S1/S2; RRR  Abdomen: soft, NT/ND; +BS x4  Extremities: WWP, 2+ peripheral pulses b/l; no LE edema  Skin: normal color and turgor; no rash  Neurological:    MEDICATIONS:  MEDICATIONS  (STANDING):  nafcillin  IVPB      nafcillin  IVPB 2 Gram(s) IV Intermittent every 4 hours    MEDICATIONS  (PRN):  acetaminophen   Tablet .. 650 milliGRAM(s) Oral every 6 hours PRN Temp greater or equal to 38C (100.4F), Moderate Pain (4 - 6)  melatonin 3 milliGRAM(s) Oral at bedtime PRN Insomnia  ondansetron Injectable 4 milliGRAM(s) IV Push every 8 hours PRN Nausea and/or Vomiting      ALLERGIES:  Allergies    allopurinol (Other)    Intolerances        LABS:                        11.6   12.09 )-----------( 238      ( 28 Aug 2021 09:35 )             36.2     08-28    132<L>  |  94<L>  |  42<H>  ----------------------------<  118<H>  3.1<L>   |  23  |  3.21<H>    Ca    9.0      28 Aug 2021 09:35  Phos  4.3     08-28  Mg     2.1     08-28    TPro  5.6<L>  /  Alb  2.7<L>  /  TBili  2.2<H>  /  DBili  x   /  AST  23  /  ALT  16  /  AlkPhos  308<H>  08-28    PT/INR - ( 28 Aug 2021 09:35 )   PT: 15.0 sec;   INR: 1.26          PTT - ( 28 Aug 2021 09:35 )  PTT:36.9 sec      RADIOLOGY & ADDITIONAL TESTS: Reviewed. INTERVAL HPI/OVERNIGHT EVENTS: patient w/ episode of watery diarrhea in the AM. Denies CP, dyspnea, f/c/n/v/c.     SUBJECTIVE: Patient seen and examined at bedside.     VITAL SIGNS:  ICU Vital Signs Last 24 Hrs  T(C): 36.6 (28 Aug 2021 08:35), Max: 37.6 (27 Aug 2021 21:40)  T(F): 97.8 (28 Aug 2021 08:35), Max: 99.6 (27 Aug 2021 21:40)  HR: 86 (28 Aug 2021 08:35) (76 - 103)  BP: 117/74 (28 Aug 2021 08:35) (115/73 - 130/75)  BP(mean): --  ABP: --  ABP(mean): --  RR: 18 (28 Aug 2021 08:35) (18 - 19)  SpO2: 97% (28 Aug 2021 08:35) (96% - 97%)        08-27 @ 07:01  -  08-28 @ 07:00  --------------------------------------------------------  IN: 100 mL / OUT: 0 mL / NET: 100 mL      CAPILLARY BLOOD GLUCOSE          PHYSICAL EXAM:    General: NAD  HEENT: NC/AT; PERRL, clear conjunctiva  Neck: supple  Respiratory: CTA b/l  Cardiovascular: +S1/S2; RRR  Abdomen: soft, NT/ND; +BS x4  Extremities: WWP, 2+ peripheral pulses b/l; no LE edema  Skin: normal color and turgor; no rash  Neurological: no focal deficits    MEDICATIONS:  MEDICATIONS  (STANDING):  nafcillin  IVPB      nafcillin  IVPB 2 Gram(s) IV Intermittent every 4 hours    MEDICATIONS  (PRN):  acetaminophen   Tablet .. 650 milliGRAM(s) Oral every 6 hours PRN Temp greater or equal to 38C (100.4F), Moderate Pain (4 - 6)  melatonin 3 milliGRAM(s) Oral at bedtime PRN Insomnia  ondansetron Injectable 4 milliGRAM(s) IV Push every 8 hours PRN Nausea and/or Vomiting      ALLERGIES:  Allergies    allopurinol (Other)    Intolerances        LABS:                        11.6   12.09 )-----------( 238      ( 28 Aug 2021 09:35 )             36.2     08-28    132<L>  |  94<L>  |  42<H>  ----------------------------<  118<H>  3.1<L>   |  23  |  3.21<H>    Ca    9.0      28 Aug 2021 09:35  Phos  4.3     08-28  Mg     2.1     08-28    TPro  5.6<L>  /  Alb  2.7<L>  /  TBili  2.2<H>  /  DBili  x   /  AST  23  /  ALT  16  /  AlkPhos  308<H>  08-28    PT/INR - ( 28 Aug 2021 09:35 )   PT: 15.0 sec;   INR: 1.26          PTT - ( 28 Aug 2021 09:35 )  PTT:36.9 sec      RADIOLOGY & ADDITIONAL TESTS: Reviewed.  < from: TTE Echo Complete w/o Contrast w/ Doppler (08.27.21 @ 11:21) >  --------------------------------------------------------------------------------  CONCLUSIONS:     1. Normal left and right ventricular size and systolic function.   2. The mitral valve is mildly thickened. There is mild bileaflet valve prolapse (anterior > posterior). There is trace mitral regurgitation.   3. Trace aortic regurgitation.   4. No evidence of pulmonary hypertension.   5. No pericardial effusion.    --------------------------------------------------------------------------------  2D AND M-MODE MEASUREMENTS (normal ranges within parentheses):    Left Ventricle:         Normal - Men Normal - Women  IVSd (2D):      1.05 cm  (0.6-1.0)     (0.6-0.9)  LVPWd (2D):     1.13 cm  (0.6-1.0)     (0.6-0.9)  LVIDd (2D):     4.01 cm  (4.2-5.8)     (3.8-5.2)  LVIDs (2D):     2.59 cm  LV EF:         55 %      (>55%)  Aorta/Left Atrium:         Normal - Men Normal - Women  Aortic Root (2D):  3.30 cm  (2.4-3.7)    < end of copied text >    < from: CT Lumbar Spine No Cont (08.26.21 @ 00:40) >  New from February 2021 CT, there is loss of vacuum change at L5-S1 and new widespread cortical thinning of the inferior L5 and superior S1 endplates. Soft tissue windows do indeed show inflammatory paravertebral fat stranding (series 5, image 90) and right asymmetric tissue at the neural foramen that may be phlegmon. Abscess not excluded on noncontrast imaging. Epidural space involvement is furthermore questioned on the right at L5-S1 on sagittal images 21-22; and re-angled axial series 5, images 89-91 as well, though streak artifact limits confidence.    FINDINGS ABOVE ARE VIRTUALLY DIAGNOSTIC FOR DISCITIS-OSTEOMYELITIS given the clinical scenario and development of findings in the last 6 months.      < end of copied text >    Culture - Blood in AM (08.27.21 @ 23:15)    Specimen Source: .Blood Blood    Culture Results:   No growth at 12 hours    Culture - Blood (08.25.21 @ 19:50)    -  Trimethoprim/Sulfamethoxazole: S <=0.5/9.5    -  Vancomycin: S 2    -  Erythromycin: S <=0.25    -  Linezolid: S 2    -  Oxacillin: S <=0.25    -  RIF- Rifampin: S <=1 Should not be used as monotherapy    Gram Stain:   Aerobic Bottle: Gram Positive Cocci in Clusters  Result called to and read back byUmu Hernández RN  08/26/2021 09:27:42  Anaerobic Bottle: Gram Positive Cocci in Clusters  Floor previously notified.    -  Cefazolin: S <=4    -  Clindamycin: S <=0.25    Specimen Source: .Blood Blood-Peripheral    Organism: Staphylococcus aureus    Culture Results:   Growth in aerobic and anaerobic bottles: Staphylococcus aureus    Organism Identification: Staphylococcus aureus    Method Type: ELIOT     INTERVAL HPI/OVERNIGHT EVENTS: patient w/ episode of watery diarrhea in the AM.     SUBJECTIVE: Patient seen and examined at bedside. Patient reports improving of back pain. Denies changes in vision, lightheadedness CP, dyspnea, f/c/n/v/c.     VITAL SIGNS:  ICU Vital Signs Last 24 Hrs  T(C): 36.6 (28 Aug 2021 08:35), Max: 37.6 (27 Aug 2021 21:40)  T(F): 97.8 (28 Aug 2021 08:35), Max: 99.6 (27 Aug 2021 21:40)  HR: 86 (28 Aug 2021 08:35) (76 - 103)  BP: 117/74 (28 Aug 2021 08:35) (115/73 - 130/75)  BP(mean): --  ABP: --  ABP(mean): --  RR: 18 (28 Aug 2021 08:35) (18 - 19)  SpO2: 97% (28 Aug 2021 08:35) (96% - 97%)        08-27 @ 07:01  -  08-28 @ 07:00  --------------------------------------------------------  IN: 100 mL / OUT: 0 mL / NET: 100 mL      CAPILLARY BLOOD GLUCOSE          PHYSICAL EXAM:      General: NAD, resting comfortably in bed  HEENT: NC/AT; PERRL, anicteric sclera; MMM  Neck: supple, no JVd  Chest: mild erythema at upper right chest, no bruising, hematoma or pain upon palpation,  Cardiovascular: +S1/S2, RRR, no m/r/g  Respiratory: CTA B/L; no W/R/R  Gastrointestinal: soft, NT/ND; +BS, no rebound or guarding  : no suprapubic tenderness, HD cath bandaged but no surrounding erythema or tenderness  Back: skin intact, no CVA tenderness, no ecchymosis, no TTP over spinous processes of vertebra.  Extremities: WWP; no edema, clubbing or cyanosis  Vascular: 2+ radial, DP/PT pulses B/L  Neurological: AAOx3; no focal deficits. Strength 5/5 in b/l LEs and UEs  Psychiatric: pleasant mood and affect  Dermatologic: no appreciable wounds or damage to the skin      MEDICATIONS:  MEDICATIONS  (STANDING):  nafcillin  IVPB      nafcillin  IVPB 2 Gram(s) IV Intermittent every 4 hours    MEDICATIONS  (PRN):  acetaminophen   Tablet .. 650 milliGRAM(s) Oral every 6 hours PRN Temp greater or equal to 38C (100.4F), Moderate Pain (4 - 6)  melatonin 3 milliGRAM(s) Oral at bedtime PRN Insomnia  ondansetron Injectable 4 milliGRAM(s) IV Push every 8 hours PRN Nausea and/or Vomiting      ALLERGIES:  Allergies    allopurinol (Other)    Intolerances        LABS:                        11.6   12.09 )-----------( 238      ( 28 Aug 2021 09:35 )             36.2     08-28    132<L>  |  94<L>  |  42<H>  ----------------------------<  118<H>  3.1<L>   |  23  |  3.21<H>    Ca    9.0      28 Aug 2021 09:35  Phos  4.3     08-28  Mg     2.1     08-28    TPro  5.6<L>  /  Alb  2.7<L>  /  TBili  2.2<H>  /  DBili  x   /  AST  23  /  ALT  16  /  AlkPhos  308<H>  08-28    PT/INR - ( 28 Aug 2021 09:35 )   PT: 15.0 sec;   INR: 1.26          PTT - ( 28 Aug 2021 09:35 )  PTT:36.9 sec      RADIOLOGY & ADDITIONAL TESTS: Reviewed.  < from: TTE Echo Complete w/o Contrast w/ Doppler (08.27.21 @ 11:21) >  --------------------------------------------------------------------------------  CONCLUSIONS:     1. Normal left and right ventricular size and systolic function.   2. The mitral valve is mildly thickened. There is mild bileaflet valve prolapse (anterior > posterior). There is trace mitral regurgitation.   3. Trace aortic regurgitation.   4. No evidence of pulmonary hypertension.   5. No pericardial effusion.    --------------------------------------------------------------------------------  2D AND M-MODE MEASUREMENTS (normal ranges within parentheses):    Left Ventricle:         Normal - Men Normal - Women  IVSd (2D):      1.05 cm  (0.6-1.0)     (0.6-0.9)  LVPWd (2D):     1.13 cm  (0.6-1.0)     (0.6-0.9)  LVIDd (2D):     4.01 cm  (4.2-5.8)     (3.8-5.2)  LVIDs (2D):     2.59 cm  LV EF:         55 %      (>55%)  Aorta/Left Atrium:         Normal - Men Normal - Women  Aortic Root (2D):  3.30 cm  (2.4-3.7)    < end of copied text >    < from: CT Lumbar Spine No Cont (08.26.21 @ 00:40) >  New from February 2021 CT, there is loss of vacuum change at L5-S1 and new widespread cortical thinning of the inferior L5 and superior S1 endplates. Soft tissue windows do indeed show inflammatory paravertebral fat stranding (series 5, image 90) and right asymmetric tissue at the neural foramen that may be phlegmon. Abscess not excluded on noncontrast imaging. Epidural space involvement is furthermore questioned on the right at L5-S1 on sagittal images 21-22; and re-angled axial series 5, images 89-91 as well, though streak artifact limits confidence.    FINDINGS ABOVE ARE VIRTUALLY DIAGNOSTIC FOR DISCITIS-OSTEOMYELITIS given the clinical scenario and development of findings in the last 6 months.      < end of copied text >    Culture - Blood in AM (08.27.21 @ 23:15)    Specimen Source: .Blood Blood    Culture Results:   No growth at 12 hours    Culture - Blood (08.25.21 @ 19:50)    -  Trimethoprim/Sulfamethoxazole: S <=0.5/9.5    -  Vancomycin: S 2    -  Erythromycin: S <=0.25    -  Linezolid: S 2    -  Oxacillin: S <=0.25    -  RIF- Rifampin: S <=1 Should not be used as monotherapy    Gram Stain:   Aerobic Bottle: Gram Positive Cocci in Clusters  Result called to and read back byUmu Hernández RN  08/26/2021 09:27:42  Anaerobic Bottle: Gram Positive Cocci in Clusters  Floor previously notified.    -  Cefazolin: S <=4    -  Clindamycin: S <=0.25    Specimen Source: .Blood Blood-Peripheral    Organism: Staphylococcus aureus    Culture Results:   Growth in aerobic and anaerobic bottles: Staphylococcus aureus    Organism Identification: Staphylococcus aureus    Method Type: ELIOT

## 2021-08-28 NOTE — PROGRESS NOTE ADULT - PROBLEM SELECTOR PLAN 7
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

## 2021-08-28 NOTE — PROGRESS NOTE ADULT - SUBJECTIVE AND OBJECTIVE BOX
INTERVAL HPI/OVERNIGHT EVENTS:    Patient was seen and examined at bedside.  No events    CONSTITUTIONAL:  Negative fever or chills, feels well, good appetite  EYES:  Negative  blurry vision or double vision  CARDIOVASCULAR:  Negative for chest pain or palpitations  RESPIRATORY:  Negative for cough, wheezing, or SOB   GASTROINTESTINAL:  Negative for nausea, vomiting, diarrhea, constipation, or abdominal pain  GENITOURINARY:  Negative frequency, urgency or dysuria  NEUROLOGIC:  No headache, confusion, dizziness, lightheadedness      ANTIBIOTICS/RELEVANT:    MEDICATIONS  (STANDING):  nafcillin  IVPB      nafcillin  IVPB 2 Gram(s) IV Intermittent every 4 hours    MEDICATIONS  (PRN):  acetaminophen   Tablet .. 650 milliGRAM(s) Oral every 6 hours PRN Temp greater or equal to 38C (100.4F), Moderate Pain (4 - 6)  melatonin 3 milliGRAM(s) Oral at bedtime PRN Insomnia  ondansetron Injectable 4 milliGRAM(s) IV Push every 8 hours PRN Nausea and/or Vomiting        Vital Signs Last 24 Hrs  T(C): 36.6 (28 Aug 2021 08:35), Max: 37.6 (27 Aug 2021 21:40)  T(F): 97.8 (28 Aug 2021 08:35), Max: 99.6 (27 Aug 2021 21:40)  HR: 86 (28 Aug 2021 08:35) (76 - 103)  BP: 117/74 (28 Aug 2021 08:35) (115/73 - 130/75)  BP(mean): --  RR: 18 (28 Aug 2021 08:35) (18 - 19)  SpO2: 97% (28 Aug 2021 08:35) (96% - 97%)    PHYSICAL EXAM:  Constitutional:  non-toxic, no distress  Eyes:VANESA, EOMI  Ear/Nose/Throat: no oral lesion  Neck:  supple  Respiratory: CTA libertad  Cardiovascular: S1S2 RRR, no murmurs  Gastrointestinal:soft, (+) BS, no HSM  Extremities:no edema  Vascular: DP Pulse:	right normal; left normal      LABS:                        11.6   12.09 )-----------( 238      ( 28 Aug 2021 09:35 )             36.2     08-28    132<L>  |  94<L>  |  42<H>  ----------------------------<  118<H>  3.1<L>   |  23  |  3.21<H>    Ca    9.0      28 Aug 2021 09:35  Phos  4.3     08-28  Mg     2.1     08-28    TPro  5.6<L>  /  Alb  2.7<L>  /  TBili  2.2<H>  /  DBili  x   /  AST  23  /  ALT  16  /  AlkPhos  308<H>  08-28    PT/INR - ( 28 Aug 2021 09:35 )   PT: 15.0 sec;   INR: 1.26          PTT - ( 28 Aug 2021 09:35 )  PTT:36.9 sec      MICROBIOLOGY:    Culture - Blood (08.25.21 @ 19:50)    -  Trimethoprim/Sulfamethoxazole: S <=0.5/9.5    -  Vancomycin: S 2    -  Clindamycin: S <=0.25    -  Erythromycin: S <=0.25    -  Linezolid: S 2    -  Oxacillin: S <=0.25    -  RIF- Rifampin: S <=1 Should not be used as monotherapy    Gram Stain:   Aerobic Bottle: Gram Positive Cocci in Clusters  Result called to and read back byUmu Hernández RN  08/26/2021 09:27:42  Anaerobic Bottle: Gram Positive Cocci in Clusters  Floor previously notified.    -  Cefazolin: S <=4    Specimen Source: .Blood Blood-Peripheral    Organism: Staphylococcus aureus    Culture Results:   Growth in aerobic and anaerobic bottles: Staphylococcus aureus    Organism Identification: Staphylococcus aureus    Method Type: ELIOT    Culture - Blood in AM (08.27.21 @ 23:15)    Specimen Source: .Blood Blood    Culture Results:   No growth at 12 hours        RADIOLOGY & ADDITIONAL STUDIES:

## 2021-08-28 NOTE — PROGRESS NOTE ADULT - ASSESSMENT
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 Blood culture from 8/27 with NGTD    Recommend:  1.  Continue Nafcillin 2 grams IV q4hrs  2. Follow up blood culture from 8/27  3.  Check another blood culture today  4.  Check GUANAKO    ID team 1 will follow. I will cover the service tonight and this weekend

## 2021-08-29 LAB
ALBUMIN SERPL ELPH-MCNC: 2.6 G/DL — LOW (ref 3.3–5)
ALP SERPL-CCNC: 284 U/L — HIGH (ref 40–120)
ALT FLD-CCNC: 15 U/L — SIGNIFICANT CHANGE UP (ref 10–45)
ANION GAP SERPL CALC-SCNC: 15 MMOL/L — SIGNIFICANT CHANGE UP (ref 5–17)
ANION GAP SERPL CALC-SCNC: 16 MMOL/L — SIGNIFICANT CHANGE UP (ref 5–17)
ANION GAP SERPL CALC-SCNC: 17 MMOL/L — SIGNIFICANT CHANGE UP (ref 5–17)
AST SERPL-CCNC: 20 U/L — SIGNIFICANT CHANGE UP (ref 10–40)
BASOPHILS # BLD AUTO: 0.05 K/UL — SIGNIFICANT CHANGE UP (ref 0–0.2)
BASOPHILS NFR BLD AUTO: 0.4 % — SIGNIFICANT CHANGE UP (ref 0–2)
BILIRUB SERPL-MCNC: 2.2 MG/DL — HIGH (ref 0.2–1.2)
BUN SERPL-MCNC: 46 MG/DL — HIGH (ref 7–23)
BUN SERPL-MCNC: 48 MG/DL — HIGH (ref 7–23)
BUN SERPL-MCNC: 51 MG/DL — HIGH (ref 7–23)
CALCIUM SERPL-MCNC: 8.7 MG/DL — SIGNIFICANT CHANGE UP (ref 8.4–10.5)
CALCIUM SERPL-MCNC: 8.7 MG/DL — SIGNIFICANT CHANGE UP (ref 8.4–10.5)
CALCIUM SERPL-MCNC: 9 MG/DL — SIGNIFICANT CHANGE UP (ref 8.4–10.5)
CHLORIDE SERPL-SCNC: 92 MMOL/L — LOW (ref 96–108)
CHLORIDE SERPL-SCNC: 93 MMOL/L — LOW (ref 96–108)
CHLORIDE SERPL-SCNC: 95 MMOL/L — LOW (ref 96–108)
CO2 SERPL-SCNC: 22 MMOL/L — SIGNIFICANT CHANGE UP (ref 22–31)
CO2 SERPL-SCNC: 23 MMOL/L — SIGNIFICANT CHANGE UP (ref 22–31)
CO2 SERPL-SCNC: 23 MMOL/L — SIGNIFICANT CHANGE UP (ref 22–31)
CREAT SERPL-MCNC: 3.49 MG/DL — HIGH (ref 0.5–1.3)
CREAT SERPL-MCNC: 3.5 MG/DL — HIGH (ref 0.5–1.3)
CREAT SERPL-MCNC: 3.61 MG/DL — HIGH (ref 0.5–1.3)
EOSINOPHIL # BLD AUTO: 0.56 K/UL — HIGH (ref 0–0.5)
EOSINOPHIL NFR BLD AUTO: 4.7 % — SIGNIFICANT CHANGE UP (ref 0–6)
GLUCOSE SERPL-MCNC: 104 MG/DL — HIGH (ref 70–99)
GLUCOSE SERPL-MCNC: 130 MG/DL — HIGH (ref 70–99)
GLUCOSE SERPL-MCNC: 99 MG/DL — SIGNIFICANT CHANGE UP (ref 70–99)
HCT VFR BLD CALC: 37.6 % — LOW (ref 39–50)
HGB BLD-MCNC: 12.4 G/DL — LOW (ref 13–17)
IMM GRANULOCYTES NFR BLD AUTO: 0.7 % — SIGNIFICANT CHANGE UP (ref 0–1.5)
LYMPHOCYTES # BLD AUTO: 1.02 K/UL — SIGNIFICANT CHANGE UP (ref 1–3.3)
LYMPHOCYTES # BLD AUTO: 8.6 % — LOW (ref 13–44)
MAGNESIUM SERPL-MCNC: 2.2 MG/DL — SIGNIFICANT CHANGE UP (ref 1.6–2.6)
MCHC RBC-ENTMCNC: 27.7 PG — SIGNIFICANT CHANGE UP (ref 27–34)
MCHC RBC-ENTMCNC: 33 GM/DL — SIGNIFICANT CHANGE UP (ref 32–36)
MCV RBC AUTO: 83.9 FL — SIGNIFICANT CHANGE UP (ref 80–100)
MONOCYTES # BLD AUTO: 0.82 K/UL — SIGNIFICANT CHANGE UP (ref 0–0.9)
MONOCYTES NFR BLD AUTO: 6.9 % — SIGNIFICANT CHANGE UP (ref 2–14)
NEUTROPHILS # BLD AUTO: 9.28 K/UL — HIGH (ref 1.8–7.4)
NEUTROPHILS NFR BLD AUTO: 78.7 % — HIGH (ref 43–77)
NRBC # BLD: 0 /100 WBCS — SIGNIFICANT CHANGE UP (ref 0–0)
PHOSPHATE SERPL-MCNC: 5 MG/DL — HIGH (ref 2.5–4.5)
PLATELET # BLD AUTO: 272 K/UL — SIGNIFICANT CHANGE UP (ref 150–400)
POTASSIUM SERPL-MCNC: 3 MMOL/L — LOW (ref 3.5–5.3)
POTASSIUM SERPL-MCNC: 3.3 MMOL/L — LOW (ref 3.5–5.3)
POTASSIUM SERPL-MCNC: 3.9 MMOL/L — SIGNIFICANT CHANGE UP (ref 3.5–5.3)
POTASSIUM SERPL-SCNC: 3 MMOL/L — LOW (ref 3.5–5.3)
POTASSIUM SERPL-SCNC: 3.3 MMOL/L — LOW (ref 3.5–5.3)
POTASSIUM SERPL-SCNC: 3.9 MMOL/L — SIGNIFICANT CHANGE UP (ref 3.5–5.3)
PROT SERPL-MCNC: 5.6 G/DL — LOW (ref 6–8.3)
RBC # BLD: 4.48 M/UL — SIGNIFICANT CHANGE UP (ref 4.2–5.8)
RBC # FLD: 14.9 % — HIGH (ref 10.3–14.5)
SODIUM SERPL-SCNC: 131 MMOL/L — LOW (ref 135–145)
SODIUM SERPL-SCNC: 132 MMOL/L — LOW (ref 135–145)
SODIUM SERPL-SCNC: 133 MMOL/L — LOW (ref 135–145)
WBC # BLD: 11.81 K/UL — HIGH (ref 3.8–10.5)
WBC # FLD AUTO: 11.81 K/UL — HIGH (ref 3.8–10.5)

## 2021-08-29 PROCEDURE — 99233 SBSQ HOSP IP/OBS HIGH 50: CPT | Mod: GC

## 2021-08-29 PROCEDURE — 99232 SBSQ HOSP IP/OBS MODERATE 35: CPT

## 2021-08-29 RX ORDER — POTASSIUM CHLORIDE 20 MEQ
20 PACKET (EA) ORAL ONCE
Refills: 0 | Status: COMPLETED | OUTPATIENT
Start: 2021-08-29 | End: 2021-08-29

## 2021-08-29 RX ADMIN — NAFCILLIN 200 GRAM(S): 10 INJECTION, POWDER, FOR SOLUTION INTRAVENOUS at 18:00

## 2021-08-29 RX ADMIN — NAFCILLIN 200 GRAM(S): 10 INJECTION, POWDER, FOR SOLUTION INTRAVENOUS at 10:34

## 2021-08-29 RX ADMIN — NAFCILLIN 200 GRAM(S): 10 INJECTION, POWDER, FOR SOLUTION INTRAVENOUS at 21:55

## 2021-08-29 RX ADMIN — NAFCILLIN 200 GRAM(S): 10 INJECTION, POWDER, FOR SOLUTION INTRAVENOUS at 06:07

## 2021-08-29 RX ADMIN — NAFCILLIN 200 GRAM(S): 10 INJECTION, POWDER, FOR SOLUTION INTRAVENOUS at 01:15

## 2021-08-29 RX ADMIN — Medication 20 MILLIEQUIVALENT(S): at 14:45

## 2021-08-29 RX ADMIN — NAFCILLIN 200 GRAM(S): 10 INJECTION, POWDER, FOR SOLUTION INTRAVENOUS at 14:45

## 2021-08-29 NOTE — PROGRESS NOTE ADULT - PROBLEM SELECTOR PLAN 3
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 Cr 2.86 on admission. Patient underwent dialysis yesterday, Cr this morning 2.17. Electrolytes, Ca, Phos, Hb at goal, euvolemic on exam. Heme consulted as patient's catheter needs to be removed given +Bcx MSSA bacteremia. Renal following.    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 Cr 2.86 on admission. Patient underwent dialysis yesterday, Cr this morning 2.17. Electrolytes, Ca, Phos, Hb at goal, euvolemic on exam. Heme consulted as patient's catheter needs to be removed given +Bcx MSSA bacteremia. Renal following.    - per Heme recs, TXA and factor VIII given prior to HD cath removal  - per renal, no acute indication for HD at this time, new line to be placed next week if cx negative > 72h  - will discuss w/ nephrology re: electrolyte repletion given patient's labile renal function  - given bacteremia and no acute need for HD, will hold off on temp HD catheter for now and reassess tomorrow; if urgent HD needed, then will contact vasc surg for femoral temp HD cath  - IR consult tomorrow for permacath (however will need to wait 72hrs until bcx clear before replacement)  - will repeat BMP today to monitor lytes

## 2021-08-29 NOTE — PROGRESS NOTE ADULT - SUBJECTIVE AND OBJECTIVE BOX
Subjective: Patient seen at bedside in no acute distress.     ROS:   Denies Headache, blurred vision, Chest Pain, SOB, Abdominal pain, nausea or vomiting     Social   nafcillin  IVPB   nafcillin  IVPB 2  nafcillin  IVPB   nafcillin  IVPB 2      Allergies    allopurinol (Other)    Intolerances        Vital Signs Last 24 Hrs  T(C): 36.4 (29 Aug 2021 11:00), Max: 36.7 (28 Aug 2021 16:18)  T(F): 97.5 (29 Aug 2021 11:00), Max: 98 (28 Aug 2021 16:18)  HR: 87 (29 Aug 2021 11:00) (87 - 98)  BP: 111/75 (29 Aug 2021 11:00) (110/74 - 130/74)  BP(mean): --  RR: 17 (29 Aug 2021 11:00) (16 - 18)  SpO2: 98% (29 Aug 2021 11:00) (97% - 98%)  I&O's Summary    28 Aug 2021 07:01  -  29 Aug 2021 07:00  --------------------------------------------------------  IN: 0 mL / OUT: 175 mL / NET: -175 mL        PHYSICAL EXAM  NAD  nonlabored breathing   Pulses: Bilateral palpable radial and ulnar arteries     LABS:                        12.4   11.81 )-----------( 272      ( 29 Aug 2021 07:35 )             37.6     08-29    133<L>  |  95<L>  |  46<H>  ----------------------------<  99  3.0<L>   |  23  |  3.50<H>    Ca    9.0      29 Aug 2021 07:35  Phos  5.0     08-29  Mg     2.2     08-29    TPro  5.6<L>  /  Alb  2.6<L>  /  TBili  2.2<H>  /  DBili  x   /  AST  20  /  ALT  15  /  AlkPhos  284<H>  08-29    PT/INR - ( 28 Aug 2021 09:35 )   PT: 15.0 sec;   INR: 1.26          PTT - ( 28 Aug 2021 09:35 )  PTT:36.9 sec    Radiology and Additional Studies:    A/P: 76y YO Male      76M PMH Hemophilia A, HTN, 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 currently admited for bactermia in the setting of osteomylitis. As a result his permacath dialysis access was removed. Vascular consulted for possible AV graft fistula.     Plan;  - F/u AV access planning  -continue to follow kidney function   -Care per primary team

## 2021-08-29 NOTE — PROGRESS NOTE ADULT - ASSESSMENT
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 Found to have Bcx growing MSSA. CT lumbar spine performed o/n, findings diagnostic for discitis-osteomyelitis. Pending dialysis catheter exchange. Hematology Oncology for preoperative optimization prior to HD cath placement.     Hemophilia A  For preoperative optimization prior to HD cath placement it is imperative trend the patient's Factor VIII level. Currently factor VIII level 58.    Of note, patient had an HD catheter replaced in March in which the patient received rFVIII and DDAVP mono-procedure and post-procedure for HD catheter placement. On that admission, of note possibly mild inhibitor noted on 2 hr mixing study in the past    Factor VIII replacement is calculated by the following in IU.   Factor VIII units required = [(desired peak factor VIII level - patient's baseline factor VIII level) × body weight (kg)]/2  If Mr. Don, given history of prior bleeding with procedure, would aim for a desired peak factor VIII level of 100, resulting in approximately 1250 units of factor VIII    Alternatively DDAVP is an option, but that is in patient's who have previously demonstrated a response to DDAVP  Dosin.3 mcg/kg once (maximum recommended dose: 20 to 30 mcg). If used for prevention of surgical bleeding, initial (preoperative) dose should be timed to provide maximal coverage at the time of greatest bleeding risk (typically 30 to 60 minutes before the procedure)    Plan:  - Trend factor VIII level, PT/INR, PTT daily  - mixing studies showing 2hr prolongation indicating presence of inhibitor  - s/p 1250 units of recombinant factor VIII on  and Tranexamic acid 10 mg/kg once on  prior to permacath removal w/o bleeding complications  - pending inhibitor assay ([place] units)  - he is still bacteremic so questionable when he will undergo new HD cath placement  - due to his history prior to any procedure would ideally want to have an inhibitor assay to quantitatively asses inhibitor strength  - sometimes inhibitors can be overcome by rFVIII however if [place] units too high he will need a bypass agent prior to any invasive procedures, like FEIBA, Novoseven or recombinant porcine FVIII  - please contact Heme fellow prior to any invasive procedure for recommendations  - anticipate inhibitor assay could result Monday or Tuesday        D/w  (Hematology/Oncology Service attending).

## 2021-08-29 NOTE — PROGRESS NOTE ADULT - SUBJECTIVE AND OBJECTIVE BOX
INTERVAL HPI/OVERNIGHT EVENTS:  Pt was seen and examined at the bedside. He was observed to be lying down comfortably.       VITAL SIGNS:  T(F): 97.5 (08-29-21 @ 11:00)  HR: 87 (08-29-21 @ 11:00)  BP: 111/75 (08-29-21 @ 11:00)  RR: 17 (08-29-21 @ 11:00)  SpO2: 98% (08-29-21 @ 11:00)  Wt(kg): --  I&O's Summary    28 Aug 2021 07:01  -  29 Aug 2021 07:00  --------------------------------------------------------  IN: 0 mL / OUT: 175 mL / NET: -175 mL      PHYSICAL EXAM:  Constitutional: NAD, well-groomed, well-developed  HEENT: PERRLA, EOMI, Normal Hearing, MMM  Neck: No LAD, No JVD  Back: Normal spine flexure, No CVA tenderness  Respiratory: CTAB  Cardiovascular: S1 and S2, RRR, no M/G/R  Gastrointestinal: BS+, soft, NT/ND  Extremities: No peripheral edema  Vascular: 2+ peripheral pulses  Neurological: A/O x 3, no focal deficits  Psychiatric: Normal mood, normal affect  Musculoskeletal: 5/5 strength b/l upper and lower extremities  Skin: No rashes        MEDICATIONS  (STANDING):  nafcillin  IVPB      nafcillin  IVPB 2 Gram(s) IV Intermittent every 4 hours    MEDICATIONS  (PRN):  acetaminophen   Tablet .. 650 milliGRAM(s) Oral every 6 hours PRN Temp greater or equal to 38C (100.4F), Moderate Pain (4 - 6)  melatonin 3 milliGRAM(s) Oral at bedtime PRN Insomnia  ondansetron Injectable 4 milliGRAM(s) IV Push every 8 hours PRN Nausea and/or Vomiting      Allergies    allopurinol (Other)    Intolerances        LABS:  CBC Full  -  ( 29 Aug 2021 07:35 )  WBC Count : 11.81 K/uL  RBC Count : 4.48 M/uL  Hemoglobin : 12.4 g/dL  Hematocrit : 37.6 %  Platelet Count - Automated : 272 K/uL  Mean Cell Volume : 83.9 fl  Mean Cell Hemoglobin : 27.7 pg  Mean Cell Hemoglobin Concentration : 33.0 gm/dL  Auto Neutrophil # : 9.28 K/uL  Auto Lymphocyte # : 1.02 K/uL  Auto Monocyte # : 0.82 K/uL  Auto Eosinophil # : 0.56 K/uL  Auto Basophil # : 0.05 K/uL  Auto Neutrophil % : 78.7 %  Auto Lymphocyte % : 8.6 %  Auto Monocyte % : 6.9 %  Auto Eosinophil % : 4.7 %  Auto Basophil % : 0.4 %    08-29    133<L>  |  95<L>  |  46<H>  ----------------------------<  99  3.0<L>   |  23  |  3.50<H>    Ca    9.0      29 Aug 2021 07:35  Phos  5.0     08-29  Mg     2.2     08-29    TPro  5.6<L>  /  Alb  2.6<L>  /  TBili  2.2<H>  /  DBili  x   /  AST  20  /  ALT  15  /  AlkPhos  284<H>  08-29    PT/INR - ( 28 Aug 2021 09:35 )   PT: 15.0 sec;   INR: 1.26          PTT - ( 28 Aug 2021 09:35 )  PTT:36.9 sec        INR: 1.26 (08-28-21 @ 09:35)  Activated Partial Thromboplastin Time: 36.9 sec (08-28-21 @ 09:35)  INR: 1.22 (08-27-21 @ 07:43)  APTT 50/50: 35.1 secs (08-27-21 @ 07:43)  Activated Partial Thromboplastin Time: 44.1 sec (08-27-21 @ 07:43)      RADIOLOGY & ADDITIONAL TESTS: Reviewed.

## 2021-08-29 NOTE — PROGRESS NOTE ADULT - SUBJECTIVE AND OBJECTIVE BOX
INTERVAL HPI/OVERNIGHT EVENTS:    Patient was seen and examined at bedside    + diarrhea today     CONSTITUTIONAL:  Negative fever or chills, feels well, good appetite  EYES:  Negative  blurry vision or double vision  CARDIOVASCULAR:  Negative for chest pain or palpitations  RESPIRATORY:  Negative for cough, wheezing, or SOB   GASTROINTESTINAL:  Negative for nausea, vomiting, diarrhea, constipation, or abdominal pain  GENITOURINARY:  Negative frequency, urgency or dysuria  NEUROLOGIC:  No headache, confusion, dizziness, lightheadedness      ANTIBIOTICS/RELEVANT:    MEDICATIONS  (STANDING):  nafcillin  IVPB      nafcillin  IVPB 2 Gram(s) IV Intermittent every 4 hours    MEDICATIONS  (PRN):  acetaminophen   Tablet .. 650 milliGRAM(s) Oral every 6 hours PRN Temp greater or equal to 38C (100.4F), Moderate Pain (4 - 6)  melatonin 3 milliGRAM(s) Oral at bedtime PRN Insomnia  ondansetron Injectable 4 milliGRAM(s) IV Push every 8 hours PRN Nausea and/or Vomiting        Vital Signs Last 24 Hrs  T(C): 36.4 (29 Aug 2021 11:00), Max: 36.7 (28 Aug 2021 16:18)  T(F): 97.5 (29 Aug 2021 11:00), Max: 98 (28 Aug 2021 16:18)  HR: 87 (29 Aug 2021 11:00) (87 - 98)  BP: 111/75 (29 Aug 2021 11:00) (110/74 - 130/74)  BP(mean): --  RR: 17 (29 Aug 2021 11:00) (16 - 18)  SpO2: 98% (29 Aug 2021 11:00) (97% - 98%)    PHYSICAL EXAM:  Constitutional:Well-developed, well nourished  Eyes:VANESA, EOMI  Ear/Nose/Throat: no oral lesion, no sinus tenderness on percussion	  Neck:   supple  Respiratory: CTA libertad  Cardiovascular: S1S2 RRR, no murmurs  Gastrointestinal:soft, (+) BS, no HSM  Extremities:no e/e/c  Vascular: DP Pulse:	right normal; left normal      LABS:                        12.4   11.81 )-----------( 272      ( 29 Aug 2021 07:35 )             37.6     08-29    133<L>  |  95<L>  |  46<H>  ----------------------------<  99  3.0<L>   |  23  |  3.50<H>    Ca    9.0      29 Aug 2021 07:35  Phos  5.0     08-29  Mg     2.2     08-29    TPro  5.6<L>  /  Alb  2.6<L>  /  TBili  2.2<H>  /  DBili  x   /  AST  20  /  ALT  15  /  AlkPhos  284<H>  08-29    PT/INR - ( 28 Aug 2021 09:35 )   PT: 15.0 sec;   INR: 1.26          PTT - ( 28 Aug 2021 09:35 )  PTT:36.9 sec      MICROBIOLOGY:    Culture - Blood in AM (08.27.21 @ 23:15)    Gram Stain:   Aerobic Bottle: Gram Positive Cocci in Clusters  Result called to and read back byUmu cabrera RN (4UR)  08/28/2021  22:35:24    Specimen Source: .Blood Blood    Culture Results:   Growth in aerobic bottle: Staphylococcus aureus  See previous culture for susceptibility results        RADIOLOGY & ADDITIONAL STUDIES:

## 2021-08-29 NOTE — PROGRESS NOTE ADULT - SUBJECTIVE AND OBJECTIVE BOX
INTERVAL EVENTS: Still w/ multiple episodes of diarrhea. Denies CP, dyspnea, palpitations, presyncope, syncope, f/c/n/v.     REVIEW OF SYSTEMS:  Constitutional:     [X] negative [ ] fevers [ ] chills [ ] weight loss [ ] weight gain  HEENT:                  [X] negative [ ] dry eyes [ ] eye irritation [ ] postnasal drip [ ] nasal congestion  CV:                         [X] negative  [ ] chest pain [ ] orthopnea [ ] palpitations [ ] murmur  Resp:                     [X] negative [ ] cough [ ] shortness of breath [ ] wheezing [ ] sputum [ ] hemoptysis  GI:                          [ ] negative [ ] nausea [ ] vomiting [X] diarrhea [ ] constipation [ ] abd pain [ ] dysphagia   :                        [X] negative [ ] dysuria [ ] nocturia [ ] hematuria [ ] increased urinary frequency  MSK:                      [ ] negative [X] back pain [ ] myalgias [ ] arthralgias [ ] fracture  Skin:                       [X] negative [ ] rash [ ] itch  Neuro:                   [X] negative [ ] headache [ ] dizziness [ ] syncope [ ] weakness [ ] numbness  Psych:                    [X] negative [ ] anxiety [ ] depression  Endo:                     [X] negative [ ] diabetes [ ] thyroid problem  Heme/Lymph:      [X] negative [ ] anemia [ ] bleeding problem  Allergic/Immune: [X] negative [ ] itchy eyes [ ] nasal discharge [ ] hives [ ] angioedema    [X] All other systems negative or otherwise described above.  [ ] Unable to assess ROS because ________.    PAST MEDICAL & SURGICAL HISTORY:  HTN (hypertension)    Hemophilia A    Gout    History of BPH    Chronic kidney disease (CKD)    DRESS syndrome    Uses cochlear implant      MEDICATIONS  (STANDING):  nafcillin  IVPB      nafcillin  IVPB 2 Gram(s) IV Intermittent every 4 hours    MEDICATIONS  (PRN):  acetaminophen   Tablet .. 650 milliGRAM(s) Oral every 6 hours PRN Temp greater or equal to 38C (100.4F), Moderate Pain (4 - 6)  melatonin 3 milliGRAM(s) Oral at bedtime PRN Insomnia  ondansetron Injectable 4 milliGRAM(s) IV Push every 8 hours PRN Nausea and/or Vomiting    ICU Vital Signs Last 24 Hrs  T(C): 36.7 (29 Aug 2021 06:25), Max: 36.7 (28 Aug 2021 16:18)  T(F): 98 (29 Aug 2021 06:25), Max: 98 (28 Aug 2021 16:18)  HR: 92 (29 Aug 2021 06:25) (91 - 98)  BP: 116/69 (29 Aug 2021 06:25) (110/74 - 130/74)  BP(mean): --  ABP: --  ABP(mean): --  RR: 18 (29 Aug 2021 06:25) (16 - 18)  SpO2: 98% (29 Aug 2021 06:25) (97% - 98%)    Daily     Daily     PHYSICAL EXAM:  GEN: Awake, alert. NAD.   HEENT: NCAT, PERRL, EOMI. Mucosa moist. No JVD.  RESP: CTA b/l  CV: RRR. Normal S1/S2. No m/r/g. R chest bandaged, no e/o induration, tenderness, fluctuance erythema, purulence  ABD: Soft. NT/ND. BS+  EXT: Warm. No edema, clubbing, or cyanosis.   NEURO: No focal deficits.     LABS:                        12.4   11.81 )-----------( 272      ( 29 Aug 2021 07:35 )             37.6     08-29    133<L>  |  95<L>  |  46<H>  ----------------------------<  99  3.0<L>   |  23  |  3.50<H>    Ca    9.0      29 Aug 2021 07:35  Phos  5.0     08-29  Mg     2.2     08-29    TPro  5.6<L>  /  Alb  2.6<L>  /  TBili  2.2<H>  /  DBili  x   /  AST  20  /  ALT  15  /  AlkPhos  284<H>  08-29      PT/INR - ( 28 Aug 2021 09:35 )   PT: 15.0 sec;   INR: 1.26          PTT - ( 28 Aug 2021 09:35 )  PTT:36.9 sec    I&O's Summary    28 Aug 2021 07:01  -  29 Aug 2021 07:00  --------------------------------------------------------  IN: 0 mL / OUT: 175 mL / NET: -175 mL      BNP    RADIOLOGY & ADDITIONAL STUDIES:    < from: TTE Echo Complete w/o Contrast w/ Doppler (08.27.21 @ 11:21) >  --------------------------------------------------------------------------------  CONCLUSIONS:     1. Normal left and right ventricular size and systolic function.   2. The mitral valve is mildly thickened. There is mild bileaflet valve prolapse (anterior > posterior). There is trace mitral regurgitation.   3. Trace aortic regurgitation.   4. No evidence of pulmonary hypertension.   5. No pericardial effusion.    --------------------------------------------------------------------------------  2D AND M-MODE MEASUREMENTS (normal ranges within parentheses):    Left Ventricle:         Normal - Men Normal - Women  IVSd (2D):      1.05 cm  (0.6-1.0)     (0.6-0.9)  LVPWd (2D):     1.13 cm  (0.6-1.0)     (0.6-0.9)  LVIDd (2D):     4.01 cm  (4.2-5.8)     (3.8-5.2)  LVIDs (2D):     2.59 cm  LV EF:         55 %      (>55%)  Aorta/Left Atrium:         Normal - Men Normal - Women  Aortic Root (2D):  3.30 cm  (2.4-3.7)    < end of copied text >    < from: CT Lumbar Spine No Cont (08.26.21 @ 00:40) >  New from February 2021 CT, there is loss of vacuum change at L5-S1 and new widespread cortical thinning of the inferior L5 and superior S1 endplates. Soft tissue windows do indeed show inflammatory paravertebral fat stranding (series 5, image 90) and right asymmetric tissue at the neural foramen that may be phlegmon. Abscess not excluded on noncontrast imaging. Epidural space involvement is furthermore questioned on the right at L5-S1 on sagittal images 21-22; and re-angled axial series 5, images 89-91 as well, though streak artifact limits confidence.    FINDINGS ABOVE ARE VIRTUALLY DIAGNOSTIC FOR DISCITIS-OSTEOMYELITIS given the clinical scenario and development of findings in the last 6 months.      < end of copied text >    Culture - Blood in AM (08.27.21 @ 23:15)    Gram Stain:   Aerobic Bottle: Gram Positive Cocci in Clusters  Result called to and read back by_ DEXTER cabrera RN (4UR)  08/28/2021  22:35:24    Specimen Source: .Blood Blood    Culture Results:   Growth in aerobic bottle: Staphylococcus aureus  See previous culture for susceptibility results        Culture - Blood (08.25.21 @ 19:50)    -  Trimethoprim/Sulfamethoxazole: S <=0.5/9.5    -  Vancomycin: S 2    -  Erythromycin: S <=0.25    -  Linezolid: S 2    -  Oxacillin: S <=0.25    -  RIF- Rifampin: S <=1 Should not be used as monotherapy    Gram Stain:   Aerobic Bottle: Gram Positive Cocci in Clusters  Result called to and read back byUmu Hernández RN  08/26/2021 09:27:42  Anaerobic Bottle: Gram Positive Cocci in Clusters  Floor previously notified.    -  Cefazolin: S <=4    -  Clindamycin: S <=0.25    Specimen Source: .Blood Blood-Peripheral    Organism: Staphylococcus aureus    Culture Results:   Growth in aerobic and anaerobic bottles: Staphylococcus aureus    Organism Identification: Staphylococcus aureus    Method Type: ELIOT       INTERVAL EVENTS: Still w/ multiple episodes of diarrhea. Denies CP, dyspnea, palpitations, presyncope, syncope, f/c/n/v.     REVIEW OF SYSTEMS:  Constitutional:     [X] negative [ ] fevers [ ] chills [ ] weight loss [ ] weight gain  HEENT:                  [X] negative [ ] dry eyes [ ] eye irritation [ ] postnasal drip [ ] nasal congestion  CV:                         [X] negative  [ ] chest pain [ ] orthopnea [ ] palpitations [ ] murmur  Resp:                     [X] negative [ ] cough [ ] shortness of breath [ ] wheezing [ ] sputum [ ] hemoptysis  GI:                          [ ] negative [ ] nausea [ ] vomiting [X] diarrhea [ ] constipation [ ] abd pain [ ] dysphagia   :                        [X] negative [ ] dysuria [ ] nocturia [ ] hematuria [ ] increased urinary frequency  MSK:                      [ ] negative [X] back pain [ ] myalgias [ ] arthralgias [ ] fracture  Skin:                       [X] negative [ ] rash [ ] itch  Neuro:                   [X] negative [ ] headache [ ] dizziness [ ] syncope [ ] weakness [ ] numbness  Psych:                    [X] negative [ ] anxiety [ ] depression  Endo:                     [X] negative [ ] diabetes [ ] thyroid problem  Heme/Lymph:      [X] negative [ ] anemia [ ] bleeding problem  Allergic/Immune: [X] negative [ ] itchy eyes [ ] nasal discharge [ ] hives [ ] angioedema    [X] All other systems negative or otherwise described above.  [ ] Unable to assess ROS because ________.    PAST MEDICAL & SURGICAL HISTORY:  HTN (hypertension)    Hemophilia A    Gout    History of BPH    Chronic kidney disease (CKD)    DRESS syndrome    Uses cochlear implant      MEDICATIONS  (STANDING):  nafcillin  IVPB      nafcillin  IVPB 2 Gram(s) IV Intermittent every 4 hours    MEDICATIONS  (PRN):  acetaminophen   Tablet .. 650 milliGRAM(s) Oral every 6 hours PRN Temp greater or equal to 38C (100.4F), Moderate Pain (4 - 6)  melatonin 3 milliGRAM(s) Oral at bedtime PRN Insomnia  ondansetron Injectable 4 milliGRAM(s) IV Push every 8 hours PRN Nausea and/or Vomiting    ICU Vital Signs Last 24 Hrs  T(C): 36.7 (29 Aug 2021 06:25), Max: 36.7 (28 Aug 2021 16:18)  T(F): 98 (29 Aug 2021 06:25), Max: 98 (28 Aug 2021 16:18)  HR: 92 (29 Aug 2021 06:25) (91 - 98)  BP: 116/69 (29 Aug 2021 06:25) (110/74 - 130/74)  BP(mean): --  ABP: --  ABP(mean): --  RR: 18 (29 Aug 2021 06:25) (16 - 18)  SpO2: 98% (29 Aug 2021 06:25) (97% - 98%)    Daily     Daily     PHYSICAL EXAM:  GEN: Awake, alert. NAD.   HEENT: NCAT, PERRL, EOMI. Mucosa moist. No JVD.  RESP: CTA b/l  CV: RRR. Normal S1/S2. No m/r/g. R chest bandaged, no e/o induration, tenderness, fluctuance erythema, purulence  ABD: Soft. NT/ND. BS+  EXT: Warm. No edema, clubbing, or cyanosis.   NEURO: No focal deficits.     LABS:                        12.4   11.81 )-----------( 272      ( 29 Aug 2021 07:35 )             37.6     08-29    133<L>  |  95<L>  |  46<H>  ----------------------------<  99  3.0<L>   |  23  |  3.50<H>    Ca    9.0      29 Aug 2021 07:35  Phos  5.0     08-29  Mg     2.2     08-29    TPro  5.6<L>  /  Alb  2.6<L>  /  TBili  2.2<H>  /  DBili  x   /  AST  20  /  ALT  15  /  AlkPhos  284<H>  08-29      PT/INR - ( 28 Aug 2021 09:35 )   PT: 15.0 sec;   INR: 1.26          PTT - ( 28 Aug 2021 09:35 )  PTT:36.9 sec    I&O's Summary    28 Aug 2021 07:01  -  29 Aug 2021 07:00  --------------------------------------------------------  IN: 0 mL / OUT: 175 mL / NET: -175 mL      BNP    RADIOLOGY & ADDITIONAL STUDIES:    < from: TTE Echo Complete w/o Contrast w/ Doppler (08.27.21 @ 11:21) >  --------------------------------------------------------------------------------  CONCLUSIONS:     1. Normal left and right ventricular size and systolic function.   2. The mitral valve is mildly thickened. There is mild bileaflet valve prolapse (anterior > posterior). There is trace mitral regurgitation.   3. Trace aortic regurgitation.   4. No evidence of pulmonary hypertension.   5. No pericardial effusion.    --------------------------------------------------------------------------------  2D AND M-MODE MEASUREMENTS (normal ranges within parentheses):    Left Ventricle:         Normal - Men Normal - Women  IVSd (2D):      1.05 cm  (0.6-1.0)     (0.6-0.9)  LVPWd (2D):     1.13 cm  (0.6-1.0)     (0.6-0.9)  LVIDd (2D):     4.01 cm  (4.2-5.8)     (3.8-5.2)  LVIDs (2D):     2.59 cm  LV EF:         55 %      (>55%)  Aorta/Left Atrium:         Normal - Men Normal - Women  Aortic Root (2D):  3.30 cm  (2.4-3.7)    < end of copied text >    < from: CT Lumbar Spine No Cont (08.26.21 @ 00:40) >  New from February 2021 CT, there is loss of vacuum change at L5-S1 and new widespread cortical thinning of the inferior L5 and superior S1 endplates. Soft tissue windows do indeed show inflammatory paravertebral fat stranding (series 5, image 90) and right asymmetric tissue at the neural foramen that may be phlegmon. Abscess not excluded on noncontrast imaging. Epidural space involvement is furthermore questioned on the right at L5-S1 on sagittal images 21-22; and re-angled axial series 5, images 89-91 as well, though streak artifact limits confidence.    FINDINGS ABOVE ARE VIRTUALLY DIAGNOSTIC FOR DISCITIS-OSTEOMYELITIS given the clinical scenario and development of findings in the last 6 months.      < end of copied text >    Culture - Blood in AM (08.27.21 @ 23:15)    Gram Stain:   Aerobic Bottle: Gram Positive Cocci in Clusters  Result called to and read back by_ DEXTER cabrera RN (4UR)  08/28/2021  22:35:24    Specimen Source: .Blood Blood    Culture Results:   Growth in aerobic bottle: Staphylococcus aureus  See previous culture for susceptibility results        Culture - Blood (08.25.21 @ 19:50)    -  Trimethoprim/Sulfamethoxazole: S <=0.5/9.5    -  Vancomycin: S 2    -  Erythromycin: S <=0.25    -  Linezolid: S 2    -  Oxacillin: S <=0.25    -  RIF- Rifampin: S <=1 Should not be used as monotherapy    Gram Stain:   Aerobic Bottle: Gram Positive Cocci in Clusters  Result called to and read back byUmu Hernández RN  08/26/2021 09:27:42  Anaerobic Bottle: Gram Positive Cocci in Clusters  Floor previously notified.    -  Cefazolin: S <=4    -  Clindamycin: S <=0.25    Specimen Source: .Blood Blood-Peripheral    Organism: Staphylococcus aureus    Culture Results:   Growth in aerobic and anaerobic bottles: Staphylococcus aureus    Organism Identification: Staphylococcus aureus    Method Type: ELIOT    Culture - Catheter (08.27.21 @ 21:12)    Specimen Source: .Catheter HD Catheter    Culture Results:   No growth to date

## 2021-08-29 NOTE — PROGRESS NOTE ADULT - PROBLEM SELECTOR PLAN 1
On admission pt met 3/4 SIRS criteria; HR 96, WBC 16.10 with neutrophilic predominance, Tmax 103.2F. Positive UA. Negative CXR. Patient empirically treated in the ED with 750mg vanc and 3.375g zosyn on 8/25, then CTX 2g, then one more dose of Vanc on 8/26. ID consulted.  Bcx from 8/25 grew MSSA 3/4 bottles (likely recurrent as pt was diagnosed with MSSA bacteremia in March). This morning, T 98.1F, WBC trending down to 11.72. Heme also consulted given history of hemophilia A. IR consulted as HD cath needs to be removed, patient placed NPO overnight.    - daily surveillance bcx; most recent bcx w/ GPC; will obtain repeat cx today  - c/w Nafcillin 2g q4, appreciate ID recs  - per heme recs, s/p TXA and factor VIII   - GUANAKO on Monday, NPO Sunday  - place new permanent HD cath only after cx are negative > 72h  - TTE w/o vegetations; will obtain GUANAKO

## 2021-08-29 NOTE — PROGRESS NOTE ADULT - PROBLEM SELECTOR PLAN 4
patient with PMH of Hemophilia A. +Bcx from 8/25 MSSA bacteremia, heme consulted as patient's catheter needs to be removed.  - s/p TXA and factor VIII  - no e/o bleed; hgb stable  - maintain active type and screen  - trend factor VIII levels daily, PT/INR, PTT

## 2021-08-29 NOTE — PROGRESS NOTE ADULT - PROBLEM SELECTOR PLAN 7
Multiple episodes of watery diarrhea o/n; afebrile, w/o leukocytosis  -will monitor for s/s of infection  -miralax/senna discontinued yesterday  -obtaining GI PCR; will consider cdiff if diarrhea persists

## 2021-08-29 NOTE — PROGRESS NOTE ADULT - ASSESSMENT
Assessment/Plan:     #ESRD on HD MF @[facility]  usual Rx 3h 0.5L   -electrolytes at goal   -euvolemic on examination  -No acute indication for HD  dry wt TBD    #Fevers  patient  Assessment/Plan:     #ESRD on HD MF @[facility]  usual Rx 3h 0.5L   -electrolytes at goal   -euvolemic on examination  -No acute indication for HD  dry wt TBD  replete K to 3.5- 20 KCL PO  repeat BMP this afternoon    #Fevers  patient growing staph aureus   -repeat blood cx from 8/27 prelim still positive for staph aureus  -cannot get new HD line until neg x 72 hours  -to undergo GUANAKO tomorrow  -f/u with vascular regarding access    #anemia  Hb at goal   no indication for EPO or IV Iron at this time   transfusion as per primary team     #renal bone disease   Ca at goal  Phos at goal    Lia Gracia D.O.  PGY 4 Nephrology Fellow  C [phone]  P. [phone]    #

## 2021-08-29 NOTE — PROGRESS NOTE ADULT - SUBJECTIVE AND OBJECTIVE BOX
Patient is a 76y Male seen and evaluated at bedside. no acute events overnight patient states that he is feeling well but is still having multiple episodes of diarrhea denies any CP SOB N V or fevers remains hemodynamically stable no need for HD today   K 3.0  Na 133      Meds:    acetaminophen   Tablet .. 650 every 6 hours PRN  melatonin 3 at bedtime PRN  nafcillin  IVPB    nafcillin  IVPB 2 every 4 hours  ondansetron Injectable 4 every 8 hours PRN      T(C): , Max: 36.7 (08-28-21 @ 16:18)  T(F): , Max: 98 (08-28-21 @ 16:18)  HR: 87 (08-29-21 @ 11:00)  BP: 111/75 (08-29-21 @ 11:00)  BP(mean): --  RR: 17 (08-29-21 @ 11:00)  SpO2: 98% (08-29-21 @ 11:00)  Wt(kg): --    08-28 @ 07:01  -  08-29 @ 07:00  --------------------------------------------------------  IN: 0 mL / OUT: 175 mL / NET: -175 mL          Review of Systems:  CONSTITUTIONAL: + fatigue  RESPIRATORY: No shortness of breath, cough,   CARDIOVASCULAR: No chest pain or shortness of breath  GASTROINTESTINAL: + diarrhea  GENITOURINARY: No dysuria or urinary burning, No difficulty passing urine, No hematuria  NEUROLOGICAL: No headaches or blurred vision  SKIN: No skin rashes   MUSCULOSKELETAL: + backpain      PHYSICAL EXAM:  GENERAL: well-developed, well nourished, alert, no acute distress at present  CHEST/LUNG: Clear to auscultation bilaterally no rales, rhonchi or wheezing  HEART: normal S1S2, RRR  ABDOMEN: Soft, Nontender, +BS,   EXTREMITIES: No clubbing, cyanosis, or edema         LABS:                        12.4   11.81 )-----------( 272      ( 29 Aug 2021 07:35 )             37.6     08-29    133<L>  |  95<L>  |  46<H>  ----------------------------<  99  3.0<L>   |  23  |  3.50<H>    Ca    9.0      29 Aug 2021 07:35  Phos  5.0     08-29  Mg     2.2     08-29    TPro  5.6<L>  /  Alb  2.6<L>  /  TBili  2.2<H>  /  DBili  x   /  AST  20  /  ALT  15  /  AlkPhos  284<H>  08-29      PT/INR - ( 28 Aug 2021 09:35 )   PT: 15.0 sec;   INR: 1.26          PTT - ( 28 Aug 2021 09:35 )  PTT:36.9 sec          RADIOLOGY & ADDITIONAL STUDIES:

## 2021-08-29 NOTE — PROGRESS NOTE ADULT - ASSESSMENT
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 Blood culture from 8/27 with growth   Recommend:  1.  Continue Nafcillin 2 grams IV q4hrs  2. Follow up blood culture from 8/27  3.  Check another blood culture today  4.  Check GUANAKO    ID team 1 will follow. I will cover the service tonight and this weekend. Dr. Barnard will return on 8/30

## 2021-08-29 NOTE — PROGRESS NOTE ADULT - ATTENDING COMMENTS
I find ESRD, staph baceremia. K 3.0. Suggest: Keep K 3.5 (give 20 meq K). Follow cultures. Await new dialysis access. No need fo rHD today.

## 2021-08-29 NOTE — PROGRESS NOTE ADULT - PROBLEM SELECTOR PLAN 5
On admission pt met 3/4 SIRS criteria (HR 96, WBC 16.10 with neutrophilic predominance, Tmax 103.2F) found to have positive UA. Denies any urinary symptoms. Discussed with daughter, given history of BPH, concern for underlying urinary retention as nidus for ascending urinary tract infection. Denies constipation. ID consulted. Ucx from 8/25 grew Enterobacter cloacae.    - per ID, no need to treat asymptomatic bacteruria at this time  - monitor for any new urinary symptoms

## 2021-08-29 NOTE — PROGRESS NOTE ADULT - PROBLEM SELECTOR PLAN 2
Low back pain with ambulation for 4-5 days. Per daughter, PT and warm compresses at home alleviated his pain. He does not report pain at bedside today. No spinal tenderness, strength 5/5 in b/l LE's, negative straight leg raise. CT lumbar spine performed, findings diagnostic for discitis-osteomyelitis. ID consulted, ortho following.    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 No acute ortho intervention at this time and no further imaging indicated

## 2021-08-30 DIAGNOSIS — D64.9 ANEMIA, UNSPECIFIED: ICD-10-CM

## 2021-08-30 DIAGNOSIS — E87.8 OTHER DISORDERS OF ELECTROLYTE AND FLUID BALANCE, NOT ELSEWHERE CLASSIFIED: ICD-10-CM

## 2021-08-30 DIAGNOSIS — E87.1 HYPO-OSMOLALITY AND HYPONATREMIA: ICD-10-CM

## 2021-08-30 LAB
ANION GAP SERPL CALC-SCNC: 17 MMOL/L — SIGNIFICANT CHANGE UP (ref 5–17)
APTT BLD: 44.5 SEC — HIGH (ref 27.5–35.5)
BLD GP AB SCN SERPL QL: NEGATIVE — SIGNIFICANT CHANGE UP
BUN SERPL-MCNC: 49 MG/DL — HIGH (ref 7–23)
CALCIUM SERPL-MCNC: 8.8 MG/DL — SIGNIFICANT CHANGE UP (ref 8.4–10.5)
CHLORIDE SERPL-SCNC: 93 MMOL/L — LOW (ref 96–108)
CO2 SERPL-SCNC: 22 MMOL/L — SIGNIFICANT CHANGE UP (ref 22–31)
CREAT SERPL-MCNC: 3.68 MG/DL — HIGH (ref 0.5–1.3)
CULTURE RESULTS: NO GROWTH — SIGNIFICANT CHANGE UP
CULTURE RESULTS: SIGNIFICANT CHANGE UP
FACT VIII ACT/NOR PPP: 104 % — SIGNIFICANT CHANGE UP (ref 51–138)
FACT VIII ACT/NOR PPP: 75 % — SIGNIFICANT CHANGE UP (ref 51–138)
FACT VIII ACT/NOR PPP: 77 % — SIGNIFICANT CHANGE UP (ref 51–138)
GLUCOSE SERPL-MCNC: 100 MG/DL — HIGH (ref 70–99)
GRAM STN FLD: SIGNIFICANT CHANGE UP
HCT VFR BLD CALC: 42 % — SIGNIFICANT CHANGE UP (ref 39–50)
HGB BLD-MCNC: 13.6 G/DL — SIGNIFICANT CHANGE UP (ref 13–17)
INR BLD: 1.27 — HIGH (ref 0.88–1.16)
MAGNESIUM SERPL-MCNC: 2.4 MG/DL — SIGNIFICANT CHANGE UP (ref 1.6–2.6)
MCHC RBC-ENTMCNC: 27 PG — SIGNIFICANT CHANGE UP (ref 27–34)
MCHC RBC-ENTMCNC: 32.4 GM/DL — SIGNIFICANT CHANGE UP (ref 32–36)
MCV RBC AUTO: 83.3 FL — SIGNIFICANT CHANGE UP (ref 80–100)
NRBC # BLD: 0 /100 WBCS — SIGNIFICANT CHANGE UP (ref 0–0)
OSMOLALITY SERPL: 288 MOSM/KG — SIGNIFICANT CHANGE UP (ref 280–301)
PHOSPHATE SERPL-MCNC: 5.1 MG/DL — HIGH (ref 2.5–4.5)
PLATELET # BLD AUTO: 389 K/UL — SIGNIFICANT CHANGE UP (ref 150–400)
POTASSIUM SERPL-MCNC: 3.2 MMOL/L — LOW (ref 3.5–5.3)
POTASSIUM SERPL-SCNC: 3.2 MMOL/L — LOW (ref 3.5–5.3)
PROTHROM AB SERPL-ACNC: 15.1 SEC — HIGH (ref 10.6–13.6)
RBC # BLD: 5.04 M/UL — SIGNIFICANT CHANGE UP (ref 4.2–5.8)
RBC # FLD: 15.5 % — HIGH (ref 10.3–14.5)
RH IG SCN BLD-IMP: POSITIVE — SIGNIFICANT CHANGE UP
SODIUM SERPL-SCNC: 132 MMOL/L — LOW (ref 135–145)
SPECIMEN SOURCE: SIGNIFICANT CHANGE UP
SPECIMEN SOURCE: SIGNIFICANT CHANGE UP
VWF:RCO ACT/NOR PPP PL AGG: >390 % — HIGH (ref 45–133)
WBC # BLD: 14.57 K/UL — HIGH (ref 3.8–10.5)
WBC # FLD AUTO: 14.57 K/UL — HIGH (ref 3.8–10.5)

## 2021-08-30 PROCEDURE — 99232 SBSQ HOSP IP/OBS MODERATE 35: CPT

## 2021-08-30 PROCEDURE — 99233 SBSQ HOSP IP/OBS HIGH 50: CPT

## 2021-08-30 PROCEDURE — 93312 ECHO TRANSESOPHAGEAL: CPT | Mod: 26

## 2021-08-30 PROCEDURE — 76377 3D RENDER W/INTRP POSTPROCES: CPT | Mod: 26

## 2021-08-30 PROCEDURE — 99223 1ST HOSP IP/OBS HIGH 75: CPT

## 2021-08-30 PROCEDURE — 99232 SBSQ HOSP IP/OBS MODERATE 35: CPT | Mod: GC

## 2021-08-30 RX ORDER — POTASSIUM CHLORIDE 20 MEQ
20 PACKET (EA) ORAL ONCE
Refills: 0 | Status: COMPLETED | OUTPATIENT
Start: 2021-08-30 | End: 2021-08-30

## 2021-08-30 RX ADMIN — NAFCILLIN 200 GRAM(S): 10 INJECTION, POWDER, FOR SOLUTION INTRAVENOUS at 11:59

## 2021-08-30 RX ADMIN — NAFCILLIN 200 GRAM(S): 10 INJECTION, POWDER, FOR SOLUTION INTRAVENOUS at 06:23

## 2021-08-30 RX ADMIN — Medication 20 MILLIEQUIVALENT(S): at 15:18

## 2021-08-30 RX ADMIN — NAFCILLIN 200 GRAM(S): 10 INJECTION, POWDER, FOR SOLUTION INTRAVENOUS at 22:16

## 2021-08-30 RX ADMIN — NAFCILLIN 200 GRAM(S): 10 INJECTION, POWDER, FOR SOLUTION INTRAVENOUS at 15:19

## 2021-08-30 RX ADMIN — NAFCILLIN 200 GRAM(S): 10 INJECTION, POWDER, FOR SOLUTION INTRAVENOUS at 02:00

## 2021-08-30 RX ADMIN — NAFCILLIN 200 GRAM(S): 10 INJECTION, POWDER, FOR SOLUTION INTRAVENOUS at 18:33

## 2021-08-30 NOTE — PROGRESS NOTE ADULT - PROBLEM SELECTOR PLAN 4
patient with PMH of Hemophilia A. +Bcx from 8/25 MSSA bacteremia, heme consulted as patient's catheter needs to be removed.  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 Cr 2.86 on admission. Patient underwent dialysis yesterday, Cr this morning 2.17. Electrolytes, Ca, Phos, Hb at goal, euvolemic on exam. Heme consulted as patient's catheter needs to be removed given +Bcx MSSA bacteremia. Renal following.  - ESRD on HD  @[facility] usual Rx 3h 0.5L   - per Heme recs, TXA and factor VIII given prior to HD cath removal and procedure without complications  - per renal, no acute indication for HD at this time, new line to be placed next week if cx negative > 72h  - f/u with vascular regarding access  - trending daily Creatinine, BUN, electrolyte abnormalities, HCO3  - No acute indication for HD, euvolemic on examination

## 2021-08-30 NOTE — CONSULT NOTE ADULT - ASSESSMENT
Assesment:  76y Male        Plan:  Problem 1:      Problem 2:      Problem 3:      Problem 4:    I have reviewed clinical labs tests and reports, radiology tests and reports, as well as old patient medical records, and discussed with the refering physician.     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 At HD session 8/24/21, patient developed shaking chills. HD session was stopped and patient went home. At home, he continued to have shaking chills and was febrile with tmax 101. He had an associated decrease in appetite and lower extremity weakness causing difficulty walking. Denies n/v/abdominal pain. Per patient's daughter states that since DRESS syndrome induced CKD, pt has been doing much better requiring 2 sessions of HD per week, and is on no medication. Also reports low back pain for 4-5 days, worsened by movement.  Patient found to be bacteremic with MSSA and to have osteomyelitis of lumbar spine.  HD cath removed 8/27/21.  Patient underwent GUANAKO on 8/30/21 which showed mitral valve prolapse of the anterior leaflet. There is an irregularly shaped linear echodensity measuring 0.9 cm x 0.5 cm on the atrial north of the anterior leaflet of the mitral valve, highly suspicious for a vegetation. There is mild to moderate, eccentric, posteriorly directed mitral regurgitation and could not rule out abscess formation.  CT surgery consulted for evaluation of mitral valve endocarditis.    Plan:  Problem 1: Mitral valve endocarditis  Patient discussed with Dr. Herring  Recommend medical management at this time with IV antibiotics  Recommend repeat surveillance blood cultures daily  Repeat transthoracic echo on 9/7/21 to evaluate vegetation    Problem 2: Osteomyelitis  Appreciate Ortho consult  Continue IV antibiotics    Problem 3: Chronic kidney disease 2/2 dress syndrome  HD per renal    Problem 4: Hypertension  continue hypertensives  management per primary team    I have reviewed clinical labs tests and reports, radiology tests and reports, as well as old patient medical records, and discussed with the refering physician.

## 2021-08-30 NOTE — PROGRESS NOTE ADULT - PROBLEM SELECTOR PLAN 6
on admission. Last known level 169 (3/21) could be 2/2 to renal bone disease versus hepatobiliary.  this AM.  - trend ALP On admission pt met 3/4 SIRS criteria (HR 96, WBC 16.10 with neutrophilic predominance, Tmax 103.2F) found to have positive UA. Denies any urinary symptoms. Discussed with daughter, given history of BPH, concern for underlying urinary retention as nidus for ascending urinary tract infection. Denies constipation. ID consulted. Ucx from 8/25 grew Enterobacter Colacae.  - per ID, no need to treat asymptomatic bacteruria at this time  - monitor for any new urinary symptoms

## 2021-08-30 NOTE — PROGRESS NOTE ADULT - PROBLEM SELECTOR PLAN 3
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 Cr 2.86 on admission. Patient underwent dialysis yesterday, Cr this morning 2.17. Electrolytes, Ca, Phos, Hb at goal, euvolemic on exam. Heme consulted as patient's catheter needs to be removed given +Bcx MSSA bacteremia. Renal following.    - per Heme recs, TXA and factor VIII given prior to HD cath removal  - per renal, no acute indication for HD at this time, new line to be placed next week if cx negative > 72h CT lumbar spine performed, findings diagnostic for discitis-osteomyelitis. ID consulted, ortho following.  - treatment as above

## 2021-08-30 NOTE — PROGRESS NOTE ADULT - PROBLEM SELECTOR PLAN 8
F: s/p 500 cc, tolerating PO  E: replete K<4, Mg<2  N: renal diet    VTE Prophylaxis: SCDS (hemophilia no need for anticoag)  GI: not needed  C: Full Code  D: RMF  on admission. Last known level 169 (3/21) could be 2/2 to renal bone disease versus hepatobiliary.  this AM.  - trend ALP

## 2021-08-30 NOTE — PROGRESS NOTE ADULT - PROBLEM SELECTOR PLAN 2
Low back pain with ambulation for 4-5 days. Per daughter, PT and warm compresses at home alleviated his pain. He does not report pain at bedside today. No spinal tenderness, strength 5/5 in b/l LE's, negative straight leg raise. CT lumbar spine performed, findings diagnostic for discitis-osteomyelitis. ID consulted, ortho following.    - as above. 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 No acute ortho intervention at this time and no further imaging indicated Low back pain with ambulation for 4-5 days. Per daughter, PT and warm compresses at home alleviated his pain. He does not report pain at bedside today. No spinal tenderness, strength 5/5 in b/l LE's, negative straight leg raise. CT lumbar spine performed, findings diagnostic for discitis-osteomyelitis. ID consulted, ortho following.  - as above. 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 No acute ortho intervention at this time and no further imaging indicated

## 2021-08-30 NOTE — PROGRESS NOTE ADULT - PROBLEM SELECTOR PLAN 5
On admission pt met 3/4 SIRS criteria (HR 96, WBC 16.10 with neutrophilic predominance, Tmax 103.2F) found to have positive UA. Denies any urinary symptoms. Discussed with daughter, given history of BPH, concern for underlying urinary retention as nidus for ascending urinary tract infection. Denies constipation. ID consulted. Ucx from 8/25 grew Enterobacter Colacae.    - per ID, no need to treat asymptomatic bacteruria at this time  - monitor for any new urinary symptoms # hyponatremia  Today Na 132  - f/u urine lytes    # hypokalemia  - as per Nephro, replete for goal of 3.5

## 2021-08-30 NOTE — PROGRESS NOTE ADULT - PROBLEM SELECTOR PLAN 7
One episode of watery diarrhea today; afebrile, w/o leukocytosis  -will monitor for s/s of infection  -miralax/senna discontinued

## 2021-08-30 NOTE — PROGRESS NOTE ADULT - SUBJECTIVE AND OBJECTIVE BOX
INTERVAL HPI/OVERNIGHT EVENTS:    SUBJECTIVE: Patient seen and examined at bedside.     OBJECTIVE:    VITAL SIGNS:  ICU Vital Signs Last 24 Hrs  T(C): 36.8 (30 Aug 2021 06:27), Max: 36.8 (30 Aug 2021 06:27)  T(F): 98.2 (30 Aug 2021 06:27), Max: 98.2 (30 Aug 2021 06:27)  HR: 98 (30 Aug 2021 06:27) (82 - 98)  BP: 115/72 (30 Aug 2021 06:27) (111/75 - 117/70)  BP(mean): --  ABP: --  ABP(mean): --  RR: 18 (30 Aug 2021 06:27) (17 - 18)  SpO2: 99% (30 Aug 2021 06:27) (98% - 99%)        CAPILLARY BLOOD GLUCOSE          PHYSICAL EXAM:    General: NAD  HEENT: NC/AT; PERRL, clear conjunctiva  Neck: supple  Respiratory: CTA b/l  Cardiovascular: +S1/S2; RRR  Abdomen: soft, NT/ND; +BS x4  Extremities: WWP, 2+ peripheral pulses b/l; no LE edema  Skin: normal color and turgor; no rash  Neurological:    MEDICATIONS:  MEDICATIONS  (STANDING):  nafcillin  IVPB      nafcillin  IVPB 2 Gram(s) IV Intermittent every 4 hours    MEDICATIONS  (PRN):  acetaminophen   Tablet .. 650 milliGRAM(s) Oral every 6 hours PRN Temp greater or equal to 38C (100.4F), Moderate Pain (4 - 6)  melatonin 3 milliGRAM(s) Oral at bedtime PRN Insomnia  ondansetron Injectable 4 milliGRAM(s) IV Push every 8 hours PRN Nausea and/or Vomiting      ALLERGIES:  Allergies    allopurinol (Other)    Intolerances        LABS:                        13.6   14.57 )-----------( 389      ( 30 Aug 2021 07:43 )             42.0     08-30    132<L>  |  93<L>  |  49<H>  ----------------------------<  100<H>  3.2<L>   |  22  |  3.68<H>    Ca    8.8      30 Aug 2021 07:43  Phos  5.1     08-30  Mg     2.4     08-30    TPro  5.6<L>  /  Alb  2.6<L>  /  TBili  2.2<H>  /  DBili  x   /  AST  20  /  ALT  15  /  AlkPhos  284<H>  08-29    PT/INR - ( 28 Aug 2021 09:35 )   PT: 15.0 sec;   INR: 1.26          PTT - ( 28 Aug 2021 09:35 )  PTT:36.9 sec      RADIOLOGY & ADDITIONAL TESTS: Reviewed. INTERVAL HPI/OVERNIGHT EVENTS: NAOE    SUBJECTIVE: Patient seen and examined at bedside. Patient refers improving and no other episode of diarrhea since Saturday morning. Denies     OBJECTIVE:    VITAL SIGNS:  ICU Vital Signs Last 24 Hrs  T(C): 36.8 (30 Aug 2021 06:27), Max: 36.8 (30 Aug 2021 06:27)  T(F): 98.2 (30 Aug 2021 06:27), Max: 98.2 (30 Aug 2021 06:27)  HR: 98 (30 Aug 2021 06:27) (82 - 98)  BP: 115/72 (30 Aug 2021 06:27) (111/75 - 117/70)  BP(mean): --  ABP: --  ABP(mean): --  RR: 18 (30 Aug 2021 06:27) (17 - 18)  SpO2: 99% (30 Aug 2021 06:27) (98% - 99%)        CAPILLARY BLOOD GLUCOSE          PHYSICAL EXAM:    General: NAD  HEENT: NC/AT; PERRL, clear conjunctiva  Neck: supple  Respiratory: CTA b/l  Cardiovascular: +S1/S2; RRR  Abdomen: soft, NT/ND; +BS x4  Extremities: WWP, 2+ peripheral pulses b/l; no LE edema  Skin: normal color and turgor; no rash  Neurological:    MEDICATIONS:  MEDICATIONS  (STANDING):  nafcillin  IVPB      nafcillin  IVPB 2 Gram(s) IV Intermittent every 4 hours    MEDICATIONS  (PRN):  acetaminophen   Tablet .. 650 milliGRAM(s) Oral every 6 hours PRN Temp greater or equal to 38C (100.4F), Moderate Pain (4 - 6)  melatonin 3 milliGRAM(s) Oral at bedtime PRN Insomnia  ondansetron Injectable 4 milliGRAM(s) IV Push every 8 hours PRN Nausea and/or Vomiting      ALLERGIES:  Allergies    allopurinol (Other)    Intolerances        LABS:                        13.6   14.57 )-----------( 389      ( 30 Aug 2021 07:43 )             42.0     08-30    132<L>  |  93<L>  |  49<H>  ----------------------------<  100<H>  3.2<L>   |  22  |  3.68<H>    Ca    8.8      30 Aug 2021 07:43  Phos  5.1     08-30  Mg     2.4     08-30    TPro  5.6<L>  /  Alb  2.6<L>  /  TBili  2.2<H>  /  DBili  x   /  AST  20  /  ALT  15  /  AlkPhos  284<H>  08-29    PT/INR - ( 28 Aug 2021 09:35 )   PT: 15.0 sec;   INR: 1.26          PTT - ( 28 Aug 2021 09:35 )  PTT:36.9 sec      RADIOLOGY & ADDITIONAL TESTS: Reviewed. INTERVAL HPI/OVERNIGHT EVENTS: NAOE    SUBJECTIVE: Patient seen and examined at bedside. Patient refers improving and no other episode of diarrhea since Saturday morning. Denies fever, chill, changes in appetite, cough, SOB, chesta pain, palpitations, abdominal pain, nausea, vomiting, diarrhea, constipation, dysuria, changes on urinary frequency or color.    OBJECTIVE:    VITAL SIGNS:  ICU Vital Signs Last 24 Hrs  T(C): 36.8 (30 Aug 2021 06:27), Max: 36.8 (30 Aug 2021 06:27)  T(F): 98.2 (30 Aug 2021 06:27), Max: 98.2 (30 Aug 2021 06:27)  HR: 98 (30 Aug 2021 06:27) (82 - 98)  BP: 115/72 (30 Aug 2021 06:27) (111/75 - 117/70)  BP(mean): --  ABP: --  ABP(mean): --  RR: 18 (30 Aug 2021 06:27) (17 - 18)  SpO2: 99% (30 Aug 2021 06:27) (98% - 99%)        CAPILLARY BLOOD GLUCOSE          PHYSICAL EXAM:    General: NAD  HEENT: NC/AT; PERRL, clear conjunctiva  Neck: supple  Respiratory: CTA b/l  Cardiovascular: +S1/S2; RRR  Abdomen: soft, NT/ND; +BS x4  Extremities: WWP, 2+ peripheral pulses b/l; no LE edema  Skin: normal color and turgor; no rash  Neurological:    MEDICATIONS:  MEDICATIONS  (STANDING):  nafcillin  IVPB      nafcillin  IVPB 2 Gram(s) IV Intermittent every 4 hours    MEDICATIONS  (PRN):  acetaminophen   Tablet .. 650 milliGRAM(s) Oral every 6 hours PRN Temp greater or equal to 38C (100.4F), Moderate Pain (4 - 6)  melatonin 3 milliGRAM(s) Oral at bedtime PRN Insomnia  ondansetron Injectable 4 milliGRAM(s) IV Push every 8 hours PRN Nausea and/or Vomiting      ALLERGIES:  Allergies    allopurinol (Other)    Intolerances        LABS:                        13.6   14.57 )-----------( 389      ( 30 Aug 2021 07:43 )             42.0     08-30    132<L>  |  93<L>  |  49<H>  ----------------------------<  100<H>  3.2<L>   |  22  |  3.68<H>    Ca    8.8      30 Aug 2021 07:43  Phos  5.1     08-30  Mg     2.4     08-30    TPro  5.6<L>  /  Alb  2.6<L>  /  TBili  2.2<H>  /  DBili  x   /  AST  20  /  ALT  15  /  AlkPhos  284<H>  08-29    PT/INR - ( 28 Aug 2021 09:35 )   PT: 15.0 sec;   INR: 1.26          PTT - ( 28 Aug 2021 09:35 )  PTT:36.9 sec      RADIOLOGY & ADDITIONAL TESTS: Reviewed.

## 2021-08-30 NOTE — PROGRESS NOTE ADULT - PROBLEM SELECTOR PLAN 10
Normocytic anemia: Hb at goal today 13.6  As per Nephrology:  - no indication for EPO or IV Iron at this time

## 2021-08-30 NOTE — PROGRESS NOTE ADULT - SUBJECTIVE AND OBJECTIVE BOX
Hematology Oncology Progress Note    INTERVAL HPI/OVERNIGHT EVENTS: No acute events    SUBJECTIVE: Patient seen and examined at bedside. Still complaining of bilateral LE swelling. Has some swelling in her hands.    OBJECTIVE:    VITAL SIGNS:  ICU Vital Signs Last 24 Hrs  T(C): 36.6 (30 Aug 2021 08:39), Max: 36.8 (30 Aug 2021 06:27)  T(F): 97.8 (30 Aug 2021 08:39), Max: 98.2 (30 Aug 2021 06:27)  HR: 122 (30 Aug 2021 10:44) (82 - 122)  BP: 97/64 (30 Aug 2021 10:44) (97/64 - 127/77)  BP(mean): 75 (30 Aug 2021 10:44) (75 - 89)  ABP: --  ABP(mean): --  RR: 18 (30 Aug 2021 08:39) (18 - 18)  SpO2: 97% (30 Aug 2021 10:44) (97% - 99%)        CAPILLARY BLOOD GLUCOSE          PHYSICAL EXAM:  General: NAD, resting comfortably  HEENT: NC/AT; PERRL, clear conjunctiva  Neck: supple  Respiratory: CTA b/l  Cardiovascular: +S1/S2; RRR, interval removal of chest HD port, clean dry intact on badnage  Abdomen: soft, NT/ND; +BS x4  Extremities: WWP, 2+ peripheral pulses b/l; no LE edema  Skin: normal color and turgor; no rash, scattered ecchymosis on right hand and left forearm  Neurological: AAOx3    MEDICATIONS:  MEDICATIONS  (STANDING):  nafcillin  IVPB      nafcillin  IVPB 2 Gram(s) IV Intermittent every 4 hours    MEDICATIONS  (PRN):  acetaminophen   Tablet .. 650 milliGRAM(s) Oral every 6 hours PRN Temp greater or equal to 38C (100.4F), Moderate Pain (4 - 6)  melatonin 3 milliGRAM(s) Oral at bedtime PRN Insomnia  ondansetron Injectable 4 milliGRAM(s) IV Push every 8 hours PRN Nausea and/or Vomiting      ALLERGIES:  Allergies    allopurinol (Other)    Intolerances        LABS:                        13.6   14.57 )-----------( 389      ( 30 Aug 2021 07:43 )             42.0     08-30    132<L>  |  93<L>  |  49<H>  ----------------------------<  100<H>  3.2<L>   |  22  |  3.68<H>    Ca    8.8      30 Aug 2021 07:43  Phos  5.1     08-30  Mg     2.4     08-30    TPro  5.6<L>  /  Alb  2.6<L>  /  TBili  2.2<H>  /  DBili  x   /  AST  20  /  ALT  15  /  AlkPhos  284<H>  08-29    PT/INR - ( 30 Aug 2021 07:43 )   PT: 15.1 sec;   INR: 1.27          PTT - ( 30 Aug 2021 07:43 )  PTT:44.5 sec          Culture - Blood (collected 08-29-21 @ 16:04)  Source: .Blood Blood  Preliminary Report (08-30-21 @ 05:01):    No growth at 12 hours    Culture - Blood (collected 08-29-21 @ 16:04)  Source: .Blood Blood  Preliminary Report (08-30-21 @ 05:01):    No growth at 12 hours    Culture - Blood (collected 08-27-21 @ 23:15)  Source: .Blood Blood  Gram Stain (08-28-21 @ 22:35):    Aerobic Bottle: Gram Positive Cocci in Clusters    Result called to and read back byUmu cabrera RN (4UR)  08/28/2021    22:35:24  Preliminary Report (08-29-21 @ 11:50):    Growth in aerobic bottle: Staphylococcus aureus    See previous culture for susceptibility results            RADIOLOGY & ADDITIONAL TESTS: Reviewed.

## 2021-08-30 NOTE — PROGRESS NOTE ADULT - PROBLEM SELECTOR PLAN 1
On admission pt met 3/4 SIRS criteria; HR 96, WBC 16.10 with neutrophilic predominance, Tmax 103.2F. Positive UA. Negative CXR. Patient empirically treated in the ED with 750mg vanc and 3.375g zosyn on 8/25, then CTX 2g, then one more dose of Vanc on 8/26. ID consulted.  Bcx from 8/25 grew MSSA 3/4 bottles (likely recurrent as pt was diagnosed with MSSA bacteremia in March). This morning, T 98.1F, WBC trending down to 11.72. Heme also consulted given history of hemophilia A. IR consulted as HD cath needs to be removed, patient placed NPO overnight.    - daily surveillance bcx; most recent bcx NGTD x 12hrs  - c/w Nafcillin 2g q4 for best MSSA coverage per ID  - per heme recs, s/p TXA and factor VIII   - GUANAKO on Monday, NPO Sunday  - place new permanent HD cath only after cx are negative > 72h  - TTE w/o vegetations; will obtain GUANAKO On admission pt met 3/4 SIRS criteria; HR 96, WBC 16.10 with neutrophilic predominance, Tmax 103.2F. Positive UA. Negative CXR. Patient empirically treated in the ED with 750mg vanc and 3.375g zosyn on 8/25, then CTX 2g, then one more dose of Vanc on 8/26. ID consulted. Bcx from 8/25 grew MSSA 3/4 bottles (likely recurrent as pt was diagnosed with MSSA bacteremia in March).  - Permacath removed on 8/27/21, as per ID  - daily surveillance bcx;   - Cath culture 8/27/21: preliminary negative   - Initial Bc 8/27/21: MSSA   - Bcx 8/29/21: 12hrs  - c/w Nafcillin 2g q4 for best MSSA coverage per ID  - TTE w/o vegetations  - f/u GUANAKO 8/30/21, cleared by hem/onc for procedure  - place new permanent HD cath only after cx are negative > 72h  - F/u ID and hemo/onc for placement of picc line for prolonged antibiotic treatment

## 2021-08-30 NOTE — PROGRESS NOTE ADULT - PROBLEM SELECTOR PLAN 9
One episode of watery diarrheaon Saturday;   -will monitor for s/s of infection  -miralax/senna discontinued  - no new episode of diarrhea since then   - GI PCR: 8/30/21 Negative

## 2021-08-30 NOTE — CONSULT NOTE ADULT - ATTENDING COMMENTS
mitral valve vegetation is less than 1.5 cms and not very mobile. mitral valve regurgitation is not severe. due to several comorbidities medical management should be tried first. repeat echo in 1 week .

## 2021-08-30 NOTE — PROGRESS NOTE ADULT - ASSESSMENT
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 Found to have Bcx growing MSSA. CT lumbar spine performed o/n, findings diagnostic for discitis-osteomyelitis. Pending dialysis catheter exchange. Hematology Oncology for preoperative optimization prior to HD cath placement.     Hemophilia A  For preoperative optimization prior to HD cath placement it is imperative trend the patient's Factor VIII level. Of note, patient had an HD catheter replaced in March in which the patient received rFVIII and DDAVP mono-procedure and post-procedure for HD catheter placement. On that admission, of note possibly mild inhibitor noted on 2 hr mixing study in the past    Factor VIII replacement is calculated by the following in IU.   Factor VIII units required = [(desired peak factor VIII level - patient's baseline factor VIII level) × body weight (kg)]/2  If Mr. Don, given history of prior bleeding with procedure, would aim for a desired peak factor VIII level of 100, resulting in approximately 1250 units of factor VIII    Alternatively DDAVP is an option, but that is in patient's who have previously demonstrated a response to DDAVP  Dosin.3 mcg/kg once (maximum recommended dose: 20 to 30 mcg). If used for prevention of surgical bleeding, initial (preoperative) dose should be timed to provide maximal coverage at the time of greatest bleeding risk (typically 30 to 60 minutes before the procedure)    Plan:  - Trend factor VIII level, PT/INR, PTT daily  - mixing studies showing 2hr prolongation indicating presence of inhibitor  - s/p 1250 units of recombinant factor VIII on  and Tranexamic acid 10 mg/kg once on  prior to permacath removal w/o bleeding complications  - Based on Factor VIII level of 75 on , for pre-procedure GUANAKO recommend giving 750 units of recombinant factor VIII on    - pending inhibitor assay (Newton units)  - he is still bacteremic so questionable when he will undergo new HD cath placement  - due to his history prior to any procedure would ideally want to have an inhibitor assay to quantitatively asses inhibitor strength  - sometimes inhibitors can be overcome by rFVIII however if Newton units too high he will need a bypass agent prior to any invasive procedures, like FEIBA, Novoseven or recombinant porcine FVIII  - please contact Heme fellow prior to any invasive procedure for recommendations  - anticipate inhibitor assay could result Monday or Tuesday    Discussed with Hematology & Oncology Fellow and Pending discussion with Attending Physician Dr. Payan. Recommendations are considered final after attending attestation.

## 2021-08-30 NOTE — PROGRESS NOTE ADULT - SUBJECTIVE AND OBJECTIVE BOX
**INCOMPLETE NOTE    OVERNIGHT EVENTS:    SUBJECTIVE:  Patient seen and examined at bedside.    Vital Signs Last 12 Hrs  T(F): 97.8 (08-29-21 @ 20:46), Max: 97.8 (08-29-21 @ 20:46)  HR: 94 (08-29-21 @ 20:46) (94 - 94)  BP: 115/71 (08-29-21 @ 20:46) (115/71 - 115/71)  BP(mean): --  RR: 18 (08-29-21 @ 20:46) (18 - 18)  SpO2: 99% (08-29-21 @ 20:46) (99% - 99%)  I&O's Summary    28 Aug 2021 07:01  -  29 Aug 2021 07:00  --------------------------------------------------------  IN: 0 mL / OUT: 175 mL / NET: -175 mL        PHYSICAL EXAM:  Constitutional: NAD, comfortable in bed.  HEENT: NC/AT, PERRLA, EOMI, no conjunctival pallor or scleral icterus, MMM  Neck: Supple, no JVD  Respiratory: CTA B/L. No w/r/r.   Cardiovascular: RRR, normal S1 and S2, no m/r/g.   Gastrointestinal: +BS, soft NTND, no guarding or rebound tenderness, no palpable masses   Extremities: wwp; no cyanosis, clubbing or edema.   Vascular: Pulses equal and strong throughout.   Neurological: AAOx3, no CN deficits, strength and sensation intact throughout.   Skin: No gross skin abnormalities or rashes        LABS:                        12.4   11.81 )-----------( 272      ( 29 Aug 2021 07:35 )             37.6     08-29    132<L>  |  93<L>  |  51<H>  ----------------------------<  104<H>  3.9   |  23  |  3.49<H>    Ca    8.7      29 Aug 2021 19:01  Phos  5.0     08-29  Mg     2.2     08-29    TPro  5.6<L>  /  Alb  2.6<L>  /  TBili  2.2<H>  /  DBili  x   /  AST  20  /  ALT  15  /  AlkPhos  284<H>  08-29    PT/INR - ( 28 Aug 2021 09:35 )   PT: 15.0 sec;   INR: 1.26          PTT - ( 28 Aug 2021 09:35 )  PTT:36.9 sec        RADIOLOGY & ADDITIONAL TESTS:    MEDICATIONS  (STANDING):  nafcillin  IVPB      nafcillin  IVPB 2 Gram(s) IV Intermittent every 4 hours    MEDICATIONS  (PRN):  acetaminophen   Tablet .. 650 milliGRAM(s) Oral every 6 hours PRN Temp greater or equal to 38C (100.4F), Moderate Pain (4 - 6)  melatonin 3 milliGRAM(s) Oral at bedtime PRN Insomnia  ondansetron Injectable 4 milliGRAM(s) IV Push every 8 hours PRN Nausea and/or Vomiting   **INCOMPLETE NOTE    SUBJECTIVE/ interval changes: STEPHANIE over night, BCx 8/29 NGTD, today worsening WBC and renal function. For GUANAKO today. Patient seen and examined at bedside. Patient has no new complaints, back pain is improving, denies h/n/v/d, fever, chills, cp, palpitations, sob, abd pain, leg swelling, rashes, dysuria, and changes in BM.     Vital Signs Last 12 Hrs  T(F): 97.8 (08-29-21 @ 20:46), Max: 97.8 (08-29-21 @ 20:46)  HR: 94 (08-29-21 @ 20:46) (94 - 94)  BP: 115/71 (08-29-21 @ 20:46) (115/71 - 115/71)  BP(mean): --  RR: 18 (08-29-21 @ 20:46) (18 - 18)  SpO2: 99% (08-29-21 @ 20:46) (99% - 99%)  I&O's Summary    28 Aug 2021 07:01  -  29 Aug 2021 07:00  --------------------------------------------------------  IN: 0 mL / OUT: 175 mL / NET: -175 mL    PHYSICAL EXAM:  Constitutional: NAD, comfortable in bed.  HEENT: NC/AT, PERRLA, EOMI, no conjunctival pallor or scleral icterus, MMM  Neck: Supple, no JVD  Respiratory: CTA B/L. No w/r/r.   Cardiovascular: RRR, normal S1 and S2, no m/r/g.   Gastrointestinal: +BS, soft NTND, no guarding or rebound tenderness, no palpable masses   Extremities: wwp; no cyanosis, clubbing or edema.   Vascular: Pulses equal and strong throughout.   Neurological: AAOx3, no CN deficits, strength and sensation intact throughout.   Skin: No gross skin abnormalities or rashes    LABS:                        12.4   11.81 )-----------( 272      ( 29 Aug 2021 07:35 )             37.6     08-29    132<L>  |  93<L>  |  51<H>  ----------------------------<  104<H>  3.9   |  23  |  3.49<H>    Ca    8.7      29 Aug 2021 19:01  Phos  5.0     08-29  Mg     2.2     08-29    TPro  5.6<L>  /  Alb  2.6<L>  /  TBili  2.2<H>  /  DBili  x   /  AST  20  /  ALT  15  /  AlkPhos  284<H>  08-29    PT/INR - ( 28 Aug 2021 09:35 )   PT: 15.0 sec;   INR: 1.26       PTT - ( 28 Aug 2021 09:35 )  PTT:36.9 sec    MEDICATIONS  (STANDING):  nafcillin  IVPB      nafcillin  IVPB 2 Gram(s) IV Intermittent every 4 hours    MEDICATIONS  (PRN):  acetaminophen   Tablet .. 650 milliGRAM(s) Oral every 6 hours PRN Temp greater or equal to 38C (100.4F), Moderate Pain (4 - 6)  melatonin 3 milliGRAM(s) Oral at bedtime PRN Insomnia  ondansetron Injectable 4 milliGRAM(s) IV Push every 8 hours PRN Nausea and/or Vomiting   SUBJECTIVE/ interval changes: STEPHANIE over night, BCx 8/29 NGTD, today worsening WBC and renal function. For GUANAKO today. Patient seen and examined at bedside. Patient has no new complaints, back pain is improving, denies h/n/v/d, fever, chills, cp, palpitations, sob, abd pain, leg swelling, rashes, dysuria, and changes in BM.     Vital Signs Last 12 Hrs  T(F): 97.8 (08-29-21 @ 20:46), Max: 97.8 (08-29-21 @ 20:46)  HR: 94 (08-29-21 @ 20:46) (94 - 94)  BP: 115/71 (08-29-21 @ 20:46) (115/71 - 115/71)  BP(mean): --  RR: 18 (08-29-21 @ 20:46) (18 - 18)  SpO2: 99% (08-29-21 @ 20:46) (99% - 99%)  I&O's Summary    28 Aug 2021 07:01  -  29 Aug 2021 07:00  --------------------------------------------------------  IN: 0 mL / OUT: 175 mL / NET: -175 mL    PHYSICAL EXAM:  Constitutional: NAD, comfortable in bed.  HEENT: NC/AT, PERRLA, EOMI, no conjunctival pallor or scleral icterus, MMM  Neck: Supple, no JVD  Respiratory: CTA B/L. No w/r/r.   Cardiovascular: RRR, normal S1 and S2, no m/r/g.   Gastrointestinal: +BS, soft NTND, no guarding or rebound tenderness, no palpable masses   Extremities: wwp; no cyanosis, clubbing or edema.   Vascular: Pulses equal and strong throughout.   Neurological: AAOx3, no CN deficits, strength and sensation intact throughout.   Skin: No gross skin abnormalities or rashes    LABS:                        12.4   11.81 )-----------( 272      ( 29 Aug 2021 07:35 )             37.6     08-29    132<L>  |  93<L>  |  51<H>  ----------------------------<  104<H>  3.9   |  23  |  3.49<H>    Ca    8.7      29 Aug 2021 19:01  Phos  5.0     08-29  Mg     2.2     08-29    TPro  5.6<L>  /  Alb  2.6<L>  /  TBili  2.2<H>  /  DBili  x   /  AST  20  /  ALT  15  /  AlkPhos  284<H>  08-29    PT/INR - ( 28 Aug 2021 09:35 )   PT: 15.0 sec;   INR: 1.26       PTT - ( 28 Aug 2021 09:35 )  PTT:36.9 sec    MEDICATIONS  (STANDING):  nafcillin  IVPB      nafcillin  IVPB 2 Gram(s) IV Intermittent every 4 hours    MEDICATIONS  (PRN):  acetaminophen   Tablet .. 650 milliGRAM(s) Oral every 6 hours PRN Temp greater or equal to 38C (100.4F), Moderate Pain (4 - 6)  melatonin 3 milliGRAM(s) Oral at bedtime PRN Insomnia  ondansetron Injectable 4 milliGRAM(s) IV Push every 8 hours PRN Nausea and/or Vomiting

## 2021-08-30 NOTE — PROGRESS NOTE ADULT - ATTENDING COMMENTS
Pt with MSSA bacteremia, Hemophilia, OM of spine is  scheduled for GUANAKO after medical optimisation as per hemeonc. Factor 8 given . Nephrology following and planning to consider stopping HD .    Monitor for sepsis,cont antibiotics, f/u survellance c/s and ID recommendations  Monitor for fluid overload, Hyperkalemia, metabolic acidosis/mental status changes and consult nephro as needed  Monitor for bleeding post GUANAKO , monitor Hb/hct and trasfuse as needeed. Hemeonc f/u as needed  Discharge planning with iv antibiotics  Discuss GOC

## 2021-08-30 NOTE — PROGRESS NOTE ADULT - ATTENDING COMMENTS
BCx 8/29 so far ngtd.  Awaiting GUANAKO.  Cont nafcillin.  He will require minimum 4 weeks of IV abx.      Team 1 will follow you.  Case d/w primary team.    Joyce Barnard MD, MS  Infectious Disease attending  work cell [phone]

## 2021-08-30 NOTE — CONSULT NOTE ADULT - SUBJECTIVE AND OBJECTIVE BOX
Surgeon:    Requesting Physician:    HISTORY OF PRESENT ILLNESS (Need 4):  76y Male    PAST MEDICAL & SURGICAL HISTORY:  HTN (hypertension)    Hemophilia A    Gout    History of BPH    Chronic kidney disease (CKD)    DRESS syndrome    Uses cochlear implant        MEDICATIONS  (STANDING):  nafcillin  IVPB      nafcillin  IVPB 2 Gram(s) IV Intermittent every 4 hours    MEDICATIONS  (PRN):  acetaminophen   Tablet .. 650 milliGRAM(s) Oral every 6 hours PRN Temp greater or equal to 38C (100.4F), Moderate Pain (4 - 6)  melatonin 3 milliGRAM(s) Oral at bedtime PRN Insomnia  ondansetron Injectable 4 milliGRAM(s) IV Push every 8 hours PRN Nausea and/or Vomiting      Allergies    allopurinol (Other)    Intolerances        SOCIAL HISTORY:  Smoker:  YES / NO        PACK YEARS:                         WHEN QUIT?  ETOH use:  YES / NO               FREQUENCY / QUANTITY:  Ilicit Drug use:  YES / NO  Occupation:  Assisted device use (Cane / Walker):  Live with:    FAMILY HISTORY:  FH: HTN (hypertension)        Review of Systems (Need 10):  CONSTITUTIONAL: Denies fevers / chills, sweats, fatigue, weight loss, weight gain                                       NEURO:  Denies parathesias, seizures, syncope, confusion                                                                                  EYES:  Denies blurry vision, discharge, pain, loss of vision                                                                                    ENMT:  Denies difficulty hearing, vertigo, dysphagia, epistaxis, recent dental work                                       CV:  Denies chest pain, palpitations, WEINSTEIN, orthopnea                                                                                           RESPIRATORY:  Denies wWheezing, SOB, cough / sputum, hemoptysis                                                               GI:  Denies nausea, vomiting, diarrhea, constipation, melena                                                                          : Denies hematuria, dysuria, urgency, incontinence                                                                                          MUSKULOSKELETAL:  Denies arthritis, joint swelling, muscle weakness                                                             SKIN/BREAST:  Denies rash, itching, hair loss, masses                                                                                              PSYCH:  Denies depression, anxiety, suicidal ideation                                                                                                HEME/LYMPH:  Denies bruises easily, enlarged lymph nodes, tender lymph nodes                                          ENDOCRINE:  Denies cold intolerance, heat intolerance, polydipsia                                                                      Vital Signs Last 24 Hrs  T(C): 36.4 (30 Aug 2021 15:29), Max: 36.8 (30 Aug 2021 06:27)  T(F): 97.5 (30 Aug 2021 15:29), Max: 98.2 (30 Aug 2021 06:27)  HR: 107 (30 Aug 2021 15:29) (94 - 122)  BP: 108/67 (30 Aug 2021 15:29) (97/62 - 127/77)  BP(mean): 75 (30 Aug 2021 10:44) (75 - 89)  RR: 18 (30 Aug 2021 15:29) (18 - 18)  SpO2: 98% (30 Aug 2021 15:29) (97% - 99%)    Physical Exam (Need 8)  CONSTITUTIONAL:                                                                          WNL  NEURO:                                                                                             WNL                      EYES:                                                                                                  WNL  ENMT:                                                                                                WNL  CV:                                                                                                      WNL  RESPIRATORY:                                                                                  WNL  GI:                                                                                                       WNL  : SANON + / -                                                                                 WNL  MUSKULOSKELETAL:                                                                       WNL  SKIN / BREAST:                                                                                 WNL                                                          LABS:                        13.6   14.57 )-----------( 389      ( 30 Aug 2021 07:43 )             42.0     08-30    132<L>  |  93<L>  |  49<H>  ----------------------------<  100<H>  3.2<L>   |  22  |  3.68<H>    Ca    8.8      30 Aug 2021 07:43  Phos  5.1     08-30  Mg     2.4     08-30    TPro  5.6<L>  /  Alb  2.6<L>  /  TBili  2.2<H>  /  DBili  x   /  AST  20  /  ALT  15  /  AlkPhos  284<H>  08-29    PT/INR - ( 30 Aug 2021 07:43 )   PT: 15.1 sec;   INR: 1.27          PTT - ( 30 Aug 2021 07:43 )  PTT:44.5 sec            RADIOLOGY & ADDITIONAL STUDIES:  CAROTID U/S:    CXR:    CT Scan:    EKG:    TTE / GUANAKO:    Cardiac Cath: Surgeon: Nima    Requesting Physician: Dorie    HISTORY OF PRESENT ILLNESS (Need 4):  76M PMH Hemophilia A, HTN, 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 At HD session 8/24/21, patient developed shaking chills. HD session was stopped and patient went home. At home, he continued to have shaking chills and was febrile with tmax 101. He had an associated decrease in appetite and lower extremity weakness causing difficulty walking. Denies n/v/abdominal pain. Per patient's daughter states that since DRESS syndrome induced CKD, pt has been doing much better requiring 2 sessions of HD per week, and is on no medication. Also reports low back pain for 4-5 days, worsened by movement.  Patient found to be bacteremic with MSSA and to have osteomyelitis of lumbar spine.  HD cath removed 8/27/21.  Patient underwent GUANAKO on 8/30/21 which showed mitral valve prolapse of the anterior leaflet. There is an irregularly shaped linear echodensity measuring 0.9 cm x 0.5 cm on the atrial north of the anterior leaflet of the mitral valve, highly suspicious for a vegetation. There is mild to moderate, eccentric, posteriorly directed mitral regurgitation and could not rule out abscess formation.  CT surgery consulted for evaluation of mitral valve endocarditis.      PAST MEDICAL & SURGICAL HISTORY:  HTN (hypertension)    Hemophilia A    Gout    History of BPH    Chronic kidney disease (CKD)    DRESS syndrome    Uses cochlear implant        MEDICATIONS  (STANDING):  nafcillin  IVPB      nafcillin  IVPB 2 Gram(s) IV Intermittent every 4 hours    MEDICATIONS  (PRN):  acetaminophen   Tablet .. 650 milliGRAM(s) Oral every 6 hours PRN Temp greater or equal to 38C (100.4F), Moderate Pain (4 - 6)  melatonin 3 milliGRAM(s) Oral at bedtime PRN Insomnia  ondansetron Injectable 4 milliGRAM(s) IV Push every 8 hours PRN Nausea and/or Vomiting      Allergies    allopurinol (Other)    Intolerances        SOCIAL HISTORY:  Smoker:  YES / NO        PACK YEARS:                         WHEN QUIT?  ETOH use:  YES / NO               FREQUENCY / QUANTITY:  Ilicit Drug use:  YES / NO  Occupation:  Assisted device use (Cane / Walker):  Live with:    FAMILY HISTORY:  FH: HTN (hypertension)        Review of Systems (Need 10):  CONSTITUTIONAL: Denies fevers / chills, sweats, fatigue, weight loss, weight gain                                       NEURO:  Denies parathesias, seizures, syncope, confusion                                                                                  EYES:  Denies blurry vision, discharge, pain, loss of vision                                                                                    ENMT:  Denies difficulty hearing, vertigo, dysphagia, epistaxis, recent dental work                                       CV:  Denies chest pain, palpitations, WEINSTEIN, orthopnea                                                                                           RESPIRATORY:  Denies wWheezing, SOB, cough / sputum, hemoptysis                                                               GI:  Denies nausea, vomiting, diarrhea, constipation, melena                                                                          : Denies hematuria, dysuria, urgency, incontinence                                                                                          MUSKULOSKELETAL:  Denies arthritis, joint swelling, muscle weakness                                                             SKIN/BREAST:  Denies rash, itching, hair loss, masses                                                                                              PSYCH:  Denies depression, anxiety, suicidal ideation                                                                                                HEME/LYMPH:  Denies bruises easily, enlarged lymph nodes, tender lymph nodes                                          ENDOCRINE:  Denies cold intolerance, heat intolerance, polydipsia                                                                      Vital Signs Last 24 Hrs  T(C): 36.4 (30 Aug 2021 15:29), Max: 36.8 (30 Aug 2021 06:27)  T(F): 97.5 (30 Aug 2021 15:29), Max: 98.2 (30 Aug 2021 06:27)  HR: 107 (30 Aug 2021 15:29) (94 - 122)  BP: 108/67 (30 Aug 2021 15:29) (97/62 - 127/77)  BP(mean): 75 (30 Aug 2021 10:44) (75 - 89)  RR: 18 (30 Aug 2021 15:29) (18 - 18)  SpO2: 98% (30 Aug 2021 15:29) (97% - 99%)    Physical Exam (Need 8)  GENERAL: well-developed, well nourished, alert, no acute distress at present  Head: Cholclear implant in place  CHEST/LUNG: Clear to auscultation bilaterally no rales, rhonchi or wheezing  HEART: normal S1S2, RRR  ABDOMEN: Soft, Nontender  EXTREMITIES: No clubbing, cyanosis, or edema                                                           LABS:                        13.6   14.57 )-----------( 389      ( 30 Aug 2021 07:43 )             42.0     08-30    132<L>  |  93<L>  |  49<H>  ----------------------------<  100<H>  3.2<L>   |  22  |  3.68<H>    Ca    8.8      30 Aug 2021 07:43  Phos  5.1     08-30  Mg     2.4     08-30    TPro  5.6<L>  /  Alb  2.6<L>  /  TBili  2.2<H>  /  DBili  x   /  AST  20  /  ALT  15  /  AlkPhos  284<H>  08-29    PT/INR - ( 30 Aug 2021 07:43 )   PT: 15.1 sec;   INR: 1.27          PTT - ( 30 Aug 2021 07:43 )  PTT:44.5 sec            RADIOLOGY & ADDITIONAL STUDIES:  CXR: < from: Xray Chest 1 View AP/PA (08.25.21 @ 17:41) >  The cardiac silhouette is not enlarged.    No pneumothorax is visualized.    No pneumomediastinum is visualized.    No pleural effusions visualized.    No focal airspace consolidation is visualized.    No acute fracture is visualized.    < end of copied text >    CT Scan: c< from: CT Lumbar Spine No Cont (08.26.21 @ 00:40) >  1.  Since 2/21/2021, interval worsening severe height loss L5-S1, with new erosive endplate changes and prevertebral fat stranding. 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 Recommend correlation with ESR/CRP and consider further evaluationwith MRI lumbar with and without contrast.  2.  Dilated stool-filled rectum, consistent fecal impaction.    < end of copied text >    EKG: NSR    TTE / GUANAKO: < from: TTE Echo Complete w/o Contrast w/ Doppler (08.27.21 @ 11:21) >  1. Normal left and right ventricular size and systolic function.   2. The mitral valve is mildly thickened. There is mild bileaflet valve prolapse (anterior > posterior). There is trace mitral regurgitation.   3. Trace aortic regurgitation.   4. No evidence of pulmonary hypertension.   5. No pericardial effusion.    < end of copied text >  < from: GUANAKO Echo Doppler w/ Cont (08.30.21 @ 12:04) >   1. Technically difficult study.   2. Normal left and right ventricular size and systolic function.   3. No LA/RA/LISA/RAA thrombus seen.   4. No pericardial effusion.   5. There is mitral valve prolapse of the anterior leaflet.There is an irregularly shaped linear echodensity measuring 0.9 cm x 0.5 cm on the atrial north of the anterior leaflet of the mitral valve, highly suspicious for a vegetation. There is mild to moderate, eccentric, posteriorly directed mitral regurgitation.   6. The aortic valve is tricuspid and mildly calcified. There is trace aortic regurgitation.   7. There is thickening of the intervalvular fibrosa with systolic expansion - cannot rule out abscess formation.   8. There is severe non-mobile plaque seen in the visualized portion of the descending aorta. There is severe non-mobile plaque seen in the visualized portion of the aortic arch.    < end of copied text >

## 2021-08-30 NOTE — PROGRESS NOTE ADULT - ASSESSMENT
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 Pending GUANAKO.     #ESRD on HD MF @[facility]  usual Rx 3h 0.5L   -electrolytes at goal   -euvolemic on examination  -No acute indication for HD  dry wt TBD  replete K to 3.5- 20 KCL PO    #Fevers  patient growing staph aureus   -repeat blood cx from 8/27 still positive for staph aureus  -BCx from 8/29 negative this far   -cannot get new HD line until neg x 72 hours  -to undergo GUANAKO tomorrow  -f/u with vascular regarding access    #anemia  Hb at goal   no indication for EPO or IV Iron at this time   transfusion as per primary team     #renal bone disease   Ca and Phos acceptable, trend both daily     Thank you for the opportunity to participate in the care of your patient. The nephrology service remains available to assist with any questions or concerns. Please feel free to reach us by paging the on-call nephrology fellow for urgent issues or as below.     Ramón Dumont M.D.   PGY-5, Nephrology Fellow   C: [phone]   P: [phone]

## 2021-08-30 NOTE — PROGRESS NOTE ADULT - SUBJECTIVE AND OBJECTIVE BOX
SUBJECTIVE: Patient seen and examined bedside.    nafcillin  IVPB      nafcillin  IVPB 2 Gram(s) IV Intermittent every 4 hours      Vital Signs Last 24 Hrs  T(C): 36.4 (30 Aug 2021 15:29), Max: 36.8 (30 Aug 2021 06:27)  T(F): 97.5 (30 Aug 2021 15:29), Max: 98.2 (30 Aug 2021 06:27)  HR: 107 (30 Aug 2021 15:29) (94 - 122)  BP: 108/67 (30 Aug 2021 15:29) (97/62 - 127/77)  BP(mean): 75 (30 Aug 2021 10:44) (75 - 89)  RR: 18 (30 Aug 2021 15:29) (18 - 18)  SpO2: 98% (30 Aug 2021 15:29) (97% - 99%)  I&O's Detail      General: NAD, resting comfortably in his chair  C/V: NSR  Pulm: Nonlabored breathing, no respiratory distress  Abd: soft  Extrem: WWP, palpable radial and ulnar A        LABS:                        13.6   14.57 )-----------( 389      ( 30 Aug 2021 07:43 )             42.0     08-30    132<L>  |  93<L>  |  49<H>  ----------------------------<  100<H>  3.2<L>   |  22  |  3.68<H>    Ca    8.8      30 Aug 2021 07:43  Phos  5.1     08-30  Mg     2.4     08-30    TPro  5.6<L>  /  Alb  2.6<L>  /  TBili  2.2<H>  /  DBili  x   /  AST  20  /  ALT  15  /  AlkPhos  284<H>  08-29    PT/INR - ( 30 Aug 2021 07:43 )   PT: 15.1 sec;   INR: 1.27          PTT - ( 30 Aug 2021 07:43 )  PTT:44.5 sec      RADIOLOGY & ADDITIONAL STUDIES:

## 2021-08-30 NOTE — PROGRESS NOTE ADULT - SUBJECTIVE AND OBJECTIVE BOX
Patient is a 76y Male seen and evaluated   hypokalemic   BP is acceptable   ambulating   no CP SOB   no back pain   seen ambulating w/PT   no hemodialysis today     Meds:    acetaminophen   Tablet .. 650 every 6 hours PRN  melatonin 3 at bedtime PRN  nafcillin  IVPB    nafcillin  IVPB 2 every 4 hours  ondansetron Injectable 4 every 8 hours PRN      T(C): , Max: 36.8 (08-30-21 @ 06:27)  T(F): , Max: 98.2 (08-30-21 @ 06:27)  HR: 122 (08-30-21 @ 10:44)  BP: 97/64 (08-30-21 @ 10:44)  BP(mean): 75 (08-30-21 @ 10:44)  RR: 18 (08-30-21 @ 08:39)  SpO2: 97% (08-30-21 @ 10:44)  Wt(kg): --              Review of Systems:  RESPIRATORY: No shortness of breath, cough,   CARDIOVASCULAR: No chest pain or shortness of breath  NEUROLOGICAL: No headaches or blurred vision  SKIN: No skin rashes   MUSCULOSKELETAL: no back pain      PHYSICAL EXAM:  GENERAL: well-developed, well nourished, alert, no acute distress at present  CHEST/LUNG: Clear to auscultation bilaterally no rales, rhonchi or wheezing  HEART: normal S1S2, RRR  ABDOMEN: Soft, Nontender  EXTREMITIES: No clubbing, cyanosis, or edema       LABS:                        13.6   14.57 )-----------( 389      ( 30 Aug 2021 07:43 )             42.0     08-30    132<L>  |  93<L>  |  49<H>  ----------------------------<  100<H>  3.2<L>   |  22  |  3.68<H>    Ca    8.8      30 Aug 2021 07:43  Phos  5.1     08-30  Mg     2.4     08-30    TPro  5.6<L>  /  Alb  2.6<L>  /  TBili  2.2<H>  /  DBili  x   /  AST  20  /  ALT  15  /  AlkPhos  284<H>  08-29      PT/INR - ( 30 Aug 2021 07:43 )   PT: 15.1 sec;   INR: 1.27          PTT - ( 30 Aug 2021 07:43 )  PTT:44.5 sec          RADIOLOGY & ADDITIONAL STUDIES:

## 2021-08-30 NOTE — PROGRESS NOTE ADULT - ATTENDING COMMENTS
ok to continue to hold on dialysis as fxn ,lytes stable  replete k  follow urine output , lytes   if renal fxn stable remains stable off dialysis and urine output adequate will reconsider need to replace line

## 2021-08-30 NOTE — PROGRESS NOTE ADULT - ASSESSMENT
Spoke to primary team today. 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 currently admited for bactermia in the setting of osteomylitis. As a result his permacath dialysis access was removed. We were consulted for possible AV graft fistula. Right now his primary team does anticipate any urgent need for dialysis.   Plan  -continue to follow kidney function

## 2021-08-31 DIAGNOSIS — E43 UNSPECIFIED SEVERE PROTEIN-CALORIE MALNUTRITION: ICD-10-CM

## 2021-08-31 DIAGNOSIS — E80.6 OTHER DISORDERS OF BILIRUBIN METABOLISM: ICD-10-CM

## 2021-08-31 DIAGNOSIS — I38 ENDOCARDITIS, VALVE UNSPECIFIED: ICD-10-CM

## 2021-08-31 DIAGNOSIS — D66 HEREDITARY FACTOR VIII DEFICIENCY: ICD-10-CM

## 2021-08-31 DIAGNOSIS — E87.6 HYPOKALEMIA: ICD-10-CM

## 2021-08-31 LAB
ALBUMIN SERPL ELPH-MCNC: 2.9 G/DL — LOW (ref 3.3–5)
ALP SERPL-CCNC: 252 U/L — HIGH (ref 40–120)
ALT FLD-CCNC: 12 U/L — SIGNIFICANT CHANGE UP (ref 10–45)
ANION GAP SERPL CALC-SCNC: 19 MMOL/L — HIGH (ref 5–17)
APTT BLD: 45.3 SEC — HIGH (ref 27.5–35.5)
AST SERPL-CCNC: 14 U/L — SIGNIFICANT CHANGE UP (ref 10–40)
BASOPHILS # BLD AUTO: 0.07 K/UL — SIGNIFICANT CHANGE UP (ref 0–0.2)
BASOPHILS NFR BLD AUTO: 0.5 % — SIGNIFICANT CHANGE UP (ref 0–2)
BILIRUB DIRECT SERPL-MCNC: 2.1 MG/DL — HIGH (ref 0–0.2)
BILIRUB INDIRECT FLD-MCNC: 0.6 MG/DL — SIGNIFICANT CHANGE UP (ref 0.2–1)
BILIRUB SERPL-MCNC: 2.7 MG/DL — HIGH (ref 0.2–1.2)
BILIRUB SERPL-MCNC: 2.7 MG/DL — HIGH (ref 0.2–1.2)
BUN SERPL-MCNC: 52 MG/DL — HIGH (ref 7–23)
CALCIUM SERPL-MCNC: 8.6 MG/DL — SIGNIFICANT CHANGE UP (ref 8.4–10.5)
CHLORIDE SERPL-SCNC: 94 MMOL/L — LOW (ref 96–108)
CO2 SERPL-SCNC: 23 MMOL/L — SIGNIFICANT CHANGE UP (ref 22–31)
CREAT SERPL-MCNC: 4.05 MG/DL — HIGH (ref 0.5–1.3)
EOSINOPHIL # BLD AUTO: 0.63 K/UL — HIGH (ref 0–0.5)
EOSINOPHIL NFR BLD AUTO: 4.8 % — SIGNIFICANT CHANGE UP (ref 0–6)
FACT VIII ACT/NOR PPP: 76 % — SIGNIFICANT CHANGE UP (ref 51–138)
FACT VIII ACT/NOR PPP: 78 % — SIGNIFICANT CHANGE UP (ref 51–138)
GLUCOSE SERPL-MCNC: 84 MG/DL — SIGNIFICANT CHANGE UP (ref 70–99)
HCT VFR BLD CALC: 37.4 % — LOW (ref 39–50)
HGB BLD-MCNC: 12.4 G/DL — LOW (ref 13–17)
IMM GRANULOCYTES NFR BLD AUTO: 1.1 % — SIGNIFICANT CHANGE UP (ref 0–1.5)
INR BLD: 1.4 — HIGH (ref 0.88–1.16)
LYMPHOCYTES # BLD AUTO: 0.69 K/UL — LOW (ref 1–3.3)
LYMPHOCYTES # BLD AUTO: 5.3 % — LOW (ref 13–44)
MAGNESIUM SERPL-MCNC: 2.6 MG/DL — SIGNIFICANT CHANGE UP (ref 1.6–2.6)
MCHC RBC-ENTMCNC: 27.1 PG — SIGNIFICANT CHANGE UP (ref 27–34)
MCHC RBC-ENTMCNC: 33.2 GM/DL — SIGNIFICANT CHANGE UP (ref 32–36)
MCV RBC AUTO: 81.7 FL — SIGNIFICANT CHANGE UP (ref 80–100)
MONOCYTES # BLD AUTO: 0.64 K/UL — SIGNIFICANT CHANGE UP (ref 0–0.9)
MONOCYTES NFR BLD AUTO: 4.9 % — SIGNIFICANT CHANGE UP (ref 2–14)
NEUTROPHILS # BLD AUTO: 10.94 K/UL — HIGH (ref 1.8–7.4)
NEUTROPHILS NFR BLD AUTO: 83.4 % — HIGH (ref 43–77)
NRBC # BLD: 0 /100 WBCS — SIGNIFICANT CHANGE UP (ref 0–0)
PHOSPHATE SERPL-MCNC: 5.3 MG/DL — HIGH (ref 2.5–4.5)
PLATELET # BLD AUTO: 334 K/UL — SIGNIFICANT CHANGE UP (ref 150–400)
POTASSIUM SERPL-MCNC: 3.1 MMOL/L — LOW (ref 3.5–5.3)
POTASSIUM SERPL-SCNC: 3.1 MMOL/L — LOW (ref 3.5–5.3)
PROT SERPL-MCNC: 5.5 G/DL — LOW (ref 6–8.3)
PROTHROM AB SERPL-ACNC: 16.5 SEC — HIGH (ref 10.6–13.6)
RBC # BLD: 4.58 M/UL — SIGNIFICANT CHANGE UP (ref 4.2–5.8)
RBC # FLD: 15.8 % — HIGH (ref 10.3–14.5)
SODIUM SERPL-SCNC: 136 MMOL/L — SIGNIFICANT CHANGE UP (ref 135–145)
WBC # BLD: 13.12 K/UL — HIGH (ref 3.8–10.5)
WBC # FLD AUTO: 13.12 K/UL — HIGH (ref 3.8–10.5)

## 2021-08-31 PROCEDURE — 99233 SBSQ HOSP IP/OBS HIGH 50: CPT | Mod: GC

## 2021-08-31 PROCEDURE — 99232 SBSQ HOSP IP/OBS MODERATE 35: CPT

## 2021-08-31 RX ORDER — DAPTOMYCIN 500 MG/10ML
500 INJECTION, POWDER, LYOPHILIZED, FOR SOLUTION INTRAVENOUS ONCE
Refills: 0 | Status: COMPLETED | OUTPATIENT
Start: 2021-08-31 | End: 2021-08-31

## 2021-08-31 RX ORDER — DAPTOMYCIN 500 MG/10ML
INJECTION, POWDER, LYOPHILIZED, FOR SOLUTION INTRAVENOUS
Refills: 0 | Status: DISCONTINUED | OUTPATIENT
Start: 2021-08-31 | End: 2021-09-02

## 2021-08-31 RX ORDER — POTASSIUM CHLORIDE 20 MEQ
20 PACKET (EA) ORAL ONCE
Refills: 0 | Status: COMPLETED | OUTPATIENT
Start: 2021-08-31 | End: 2021-08-31

## 2021-08-31 RX ORDER — DAPTOMYCIN 500 MG/10ML
500 INJECTION, POWDER, LYOPHILIZED, FOR SOLUTION INTRAVENOUS
Refills: 0 | Status: DISCONTINUED | OUTPATIENT
Start: 2021-09-02 | End: 2021-09-02

## 2021-08-31 RX ADMIN — NAFCILLIN 200 GRAM(S): 10 INJECTION, POWDER, FOR SOLUTION INTRAVENOUS at 11:55

## 2021-08-31 RX ADMIN — DAPTOMYCIN 500 MILLIGRAM(S): 500 INJECTION, POWDER, LYOPHILIZED, FOR SOLUTION INTRAVENOUS at 15:06

## 2021-08-31 RX ADMIN — Medication 20 MILLIEQUIVALENT(S): at 11:55

## 2021-08-31 RX ADMIN — NAFCILLIN 200 GRAM(S): 10 INJECTION, POWDER, FOR SOLUTION INTRAVENOUS at 02:00

## 2021-08-31 RX ADMIN — NAFCILLIN 200 GRAM(S): 10 INJECTION, POWDER, FOR SOLUTION INTRAVENOUS at 06:09

## 2021-08-31 NOTE — DIETITIAN NUTRITION RISK NOTIFICATION - ADDITIONAL COMMENTS/DIETITIAN RECOMMENDATIONS
1. Continue DASH diet +add Nepro QD (425kcal/19gpro)   2. Trend pre/post HD wts   3. Encourage intake   4. Monitor lytes, glucose, skin   5. RD to remain available prn

## 2021-08-31 NOTE — PROGRESS NOTE ADULT - ATTENDING COMMENTS
still pos culture  appears well - reports urinating well and both volume and lytes good-- still no acute need for HD   replete K   follow lytes   may need to get temporary HD line in next 1-2 days

## 2021-08-31 NOTE — PROGRESS NOTE ADULT - PROBLEM SELECTOR PLAN 2
h/o DRESS syndrome; had been on HD twice a week prior to admission; HD catheter removed 8/27 in setting of bacteremia; Renal following, will monitor BMP, volume status off dialysis for now; renally-dose rx

## 2021-08-31 NOTE — PROGRESS NOTE ADULT - PROBLEM SELECTOR PLAN 11
F: s/p 500 cc, tolerating PO  E: replete K<4, Mg<2  N: renal diet    VTE Prophylaxis: SCDS (hemophilia no need for anticoag)  GI: not needed  C: Full Code  D: RMF F: s/p 500 cc, tolerating PO  E: repleteas er nephro K goal > 3.5  N: DASH/TLC    VTE Prophylaxis: SCDS (hemophilia no need for anticoag)  GI: not needed  C: Full Code  D: RMF

## 2021-08-31 NOTE — PROGRESS NOTE ADULT - ASSESSMENT
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 GUANAKO concerning for vegetation, CTSx rec medical management and repeat GUANAKO. Last BCx 8/29 remain +ve.     #ESRD on HD MF @[facility]  usual Rx 3h 0.5L   -electrolytes at goal   -euvolemic on examination  -No acute indication for HD  dry wt TBD  replete K to 3.5, 20 KCL PO   possibly uremic w/anorexia nausea and strange taste  will likely require temporary catheter 9/2 for hemodialysis 9/3 and 9/4 then remove     #Fevers  growing staph aureus   -repeat blood cx from 8/29 still positive for staph aureus  -cannot get new HD line until neg x 72 hours  -GUANAKO concerning for vegetation and repeat 9/7     #anemia  Hb at goal   no indication for EPO or IV Iron at this time   transfusion as per primary team     #renal bone disease   Ca and Phos acceptable, trend both daily     Thank you for the opportunity to participate in the care of your patient. The nephrology service remains available to assist with any questions or concerns. Please feel free to reach us by paging the on-call nephrology fellow for urgent issues or as below.     Ramón Dumont M.D.   PGY-5, Nephrology Fellow   C: [phone]   P: [phone]

## 2021-08-31 NOTE — PROGRESS NOTE ADULT - PROBLEM SELECTOR PLAN 2
Low back pain with ambulation for 4-5 days. Per daughter, PT and warm compresses at home alleviated his pain. He does not report pain at bedside today. No spinal tenderness, strength 5/5 in b/l LE's, negative straight leg raise. CT lumbar spine performed, findings diagnostic for discitis-osteomyelitis. ID consulted, ortho following.  - as above. 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 No acute ortho intervention at this time and no further imaging indicated As per ID, evaluation of possible endocarditis due to Bcx +MSSA, osteomyelitis and discitis.   -TTE 8/27/21: The mitral valve is mildly thickened. There is mild bileaflet valve prolapse (anterior > posterior). There is trace mitral regurgitation. Normal left and right ventricular size and systolic function.    - GUANAKO 8/30/21:       - No LA/RA/LISA/RAA thrombus seen.       - There is mitral valve prolapse of the anterior leaflet. There is an irregularly shaped linear echodensity measuring 0.9 cm x 0.5 cm on the atrial north of the anterior leaflet of the mitral valve, highly suspicious for a vegetation.      - There is thickening of the intervalvular fibrosa with systolic expansion - cannot rule out abscess formation.      - There is severe non-mobile plaque seen in the visualized portion of the descending aorta. There is severe non-mobile plaque seen in the visualized portion of the aortic arch.      - Compared to the previous GUANAKO performed on 2/26/2021, the mitral valve vegetation is new. The aortic root (intervalvular fibrosa) thickening is more prominent.    Plan:  - repeat GUANAKO vs TTE on Tuesday July 7, 2021.   - continue to monitor surveillance blood culture   - continue Nafcillin 2g q4 for best MSSA coverage per ID started on 8/26/21  - F/u ID and CT surgery recs

## 2021-08-31 NOTE — PROGRESS NOTE ADULT - PROBLEM SELECTOR PLAN 3
CT lumbar spine performed, findings diagnostic for discitis-osteomyelitis. ID consulted, ortho following.  - treatment as above Low back pain with ambulation for 4-5 days. Per daughter, PT and warm compresses at home alleviated his pain. He does not report pain at bedside today. No spinal tenderness, strength 5/5 in b/l LE's, negative straight leg raise. CT lumbar spine performed, findings diagnostic for discitis-osteomyelitis. ID consulted, ortho following.  - as above. 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 No acute ortho intervention at this time and no further imaging indicated

## 2021-08-31 NOTE — DIETITIAN INITIAL EVALUATION ADULT. - OTHER CALCULATIONS
ABW used for calculations as pt between % of IBW (83%), adjusted for age, severe PCM, fluids per team

## 2021-08-31 NOTE — PROGRESS NOTE ADULT - SUBJECTIVE AND OBJECTIVE BOX
INTERVAL HPI/OVERNIGHT EVENTS:    SUBJECTIVE: Patient seen and examined at bedside.     OBJECTIVE:    VITAL SIGNS:  ICU Vital Signs Last 24 Hrs  T(C): 37.2 (31 Aug 2021 06:20), Max: 37.2 (31 Aug 2021 06:20)  T(F): 99 (31 Aug 2021 06:20), Max: 99 (31 Aug 2021 06:20)  HR: 101 (31 Aug 2021 06:20) (100 - 111)  BP: 118/69 (31 Aug 2021 06:20) (97/62 - 134/84)  BP(mean): --  ABP: --  ABP(mean): --  RR: 18 (31 Aug 2021 06:20) (18 - 18)  SpO2: 96% (31 Aug 2021 06:20) (94% - 98%)        CAPILLARY BLOOD GLUCOSE          PHYSICAL EXAM:    General: NAD, resting comfortably in bed  HEENT: NC/AT; PERRL, anicteric sclera; MMM  Neck: supple, no JVd  Chest: mild erythema at upper right chest, no bruising, hematoma or pain upon palpation,  Cardiovascular: +S1/S2, RRR, no appreciated  murmur  Respiratory: CTA B/L; no W/R/R  Gastrointestinal: soft, NT/ND; +BS, no rebound or guarding  : no suprapubic tenderness, HD cath bandaged but no surrounding erythema or tenderness  Back: skin intact, no CVA tenderness, no ecchymosis, no TTP over spinous processes of vertebra.  Extremities: WWP; no edema, clubbing or cyanosis  Vascular: 2+ radial, DP/PT pulses B/L  Neurological: AAOx3; no focal deficits. Strength 5/5 in b/l LEs and UEs  Psychiatric: pleasant mood and affect  Dermatologic: no appreciable wounds, damage to the skin, splinter hemorrhaages, osler nodes, janeway lesions     MEDICATIONS:  MEDICATIONS  (STANDING):  nafcillin  IVPB      nafcillin  IVPB 2 Gram(s) IV Intermittent every 4 hours  potassium chloride    Tablet ER 20 milliEquivalent(s) Oral once    MEDICATIONS  (PRN):  acetaminophen   Tablet .. 650 milliGRAM(s) Oral every 6 hours PRN Temp greater or equal to 38C (100.4F), Moderate Pain (4 - 6)  melatonin 3 milliGRAM(s) Oral at bedtime PRN Insomnia  ondansetron Injectable 4 milliGRAM(s) IV Push every 8 hours PRN Nausea and/or Vomiting      ALLERGIES:  Allergies    allopurinol (Other)    Intolerances        LABS:                        12.4   13.12 )-----------( 334      ( 31 Aug 2021 06:45 )             37.4     08-31    136  |  94<L>  |  52<H>  ----------------------------<  84  3.1<L>   |  23  |  4.05<H>    Ca    8.6      31 Aug 2021 06:45  Phos  5.3     08-31  Mg     2.6     08-31    TPro  5.5<L>  /  Alb  2.9<L>  /  TBili  2.7<H>  /  DBili  2.1<H>  /  AST  14  /  ALT  12  /  AlkPhos  252<H>  08-31    PT/INR - ( 31 Aug 2021 06:45 )   PT: 16.5 sec;   INR: 1.40          PTT - ( 31 Aug 2021 06:45 )  PTT:45.3 sec      RADIOLOGY & ADDITIONAL TESTS: Reviewed. INTERVAL HPI/OVERNIGHT EVENTS: NAOE    SUBJECTIVE: Patient seen and examined at bedside.  Patient reports one episode of soft stool today.  Denies fevers, chills, pain in the back and in the area of the permacath removal, chest pain, palpitations, cough, abdominal pain, nausea, vomiting, dysuria nor changes in urinary frequency.     OBJECTIVE:    VITAL SIGNS:  ICU Vital Signs Last 24 Hrs  T(C): 37.2 (31 Aug 2021 06:20), Max: 37.2 (31 Aug 2021 06:20)  T(F): 99 (31 Aug 2021 06:20), Max: 99 (31 Aug 2021 06:20)  HR: 101 (31 Aug 2021 06:20) (100 - 111)  BP: 118/69 (31 Aug 2021 06:20) (97/62 - 134/84)  BP(mean): --  ABP: --  ABP(mean): --  RR: 18 (31 Aug 2021 06:20) (18 - 18)  SpO2: 96% (31 Aug 2021 06:20) (94% - 98%)        CAPILLARY BLOOD GLUCOSE          PHYSICAL EXAM:    General: NAD, resting comfortably in bed  HEENT: NC/AT; PERRL, anicteric sclera; MMM  Neck: supple, no JVd  Chest: improving erythema at upper right chest from permacath removal, no bruising, hematoma or pain upon palpation  Cardiovascular: +S1/S2, RRR, no appreciated  murmur  Respiratory: CTA B/L; no W/R/R  Gastrointestinal: soft, NT/ND; +BS, no rebound or guarding  : no suprapubic tenderness, HD cath bandaged but no surrounding erythema or tenderness  Back: skin intact, no CVA tenderness, no ecchymosis, no TTP over spinous processes of vertebra.  Extremities: WWP; no edema, clubbing or cyanosis  Vascular: 2+ radial, DP/PT pulses B/L  Neurological: AAOx3; no focal deficits. Strength 5/5 in b/l LEs and UEs  Psychiatric: pleasant mood and affect  Dermatologic: no appreciable wounds, damage to the skin, splinter hemorrhaages, osler nodes, janeway lesions     MEDICATIONS:  MEDICATIONS  (STANDING):  nafcillin  IVPB      nafcillin  IVPB 2 Gram(s) IV Intermittent every 4 hours  potassium chloride    Tablet ER 20 milliEquivalent(s) Oral once    MEDICATIONS  (PRN):  acetaminophen   Tablet .. 650 milliGRAM(s) Oral every 6 hours PRN Temp greater or equal to 38C (100.4F), Moderate Pain (4 - 6)  melatonin 3 milliGRAM(s) Oral at bedtime PRN Insomnia  ondansetron Injectable 4 milliGRAM(s) IV Push every 8 hours PRN Nausea and/or Vomiting      ALLERGIES:  Allergies    allopurinol (Other)    Intolerances        LABS:                        12.4   13.12 )-----------( 334      ( 31 Aug 2021 06:45 )             37.4     08-31    136  |  94<L>  |  52<H>  ----------------------------<  84  3.1<L>   |  23  |  4.05<H>    Ca    8.6      31 Aug 2021 06:45  Phos  5.3     08-31  Mg     2.6     08-31    TPro  5.5<L>  /  Alb  2.9<L>  /  TBili  2.7<H>  /  DBili  2.1<H>  /  AST  14  /  ALT  12  /  AlkPhos  252<H>  08-31    PT/INR - ( 31 Aug 2021 06:45 )   PT: 16.5 sec;   INR: 1.40          PTT - ( 31 Aug 2021 06:45 )  PTT:45.3 sec      RADIOLOGY & ADDITIONAL TESTS: Reviewed.

## 2021-08-31 NOTE — PROGRESS NOTE ADULT - PROBLEM SELECTOR PLAN 9
One episode of watery diarrheaon Saturday;   -will monitor for s/s of infection  -miralax/senna discontinued  - no new episode of diarrhea since then   - GI PCR: 8/30/21 Negative One episode of watery diarrhea on Saturday 8/28/21, 1 episode of small loss stool today  - will monitor for s/s of infection  - miralax/senna discontinued  - GI PCR: 8/30/21 Negative

## 2021-08-31 NOTE — PROGRESS NOTE ADULT - SUBJECTIVE AND OBJECTIVE BOX
**INCOMPLETE NOTE    OVERNIGHT EVENTS:    SUBJECTIVE:  Patient seen and examined at bedside.    Vital Signs Last 12 Hrs  T(F): 99 (08-31-21 @ 06:20), Max: 99 (08-31-21 @ 06:20)  HR: 101 (08-31-21 @ 06:20) (100 - 101)  BP: 118/69 (08-31-21 @ 06:20) (118/69 - 134/84)  BP(mean): --  RR: 18 (08-31-21 @ 06:20) (18 - 18)  SpO2: 96% (08-31-21 @ 06:20) (94% - 96%)  I&O's Summary      PHYSICAL EXAM:  Constitutional: NAD, comfortable in bed.  HEENT: NC/AT, PERRLA, EOMI, no conjunctival pallor or scleral icterus, MMM  Neck: Supple, no JVD  Respiratory: CTA B/L. No w/r/r.   Cardiovascular: RRR, normal S1 and S2, no m/r/g.   Gastrointestinal: +BS, soft NTND, no guarding or rebound tenderness, no palpable masses   Extremities: wwp; no cyanosis, clubbing or edema.   Vascular: Pulses equal and strong throughout.   Neurological: AAOx3, no CN deficits, strength and sensation intact throughout.   Skin: No gross skin abnormalities or rashes        LABS:                        12.4   13.12 )-----------( 334      ( 31 Aug 2021 06:45 )             37.4     08-30    132<L>  |  93<L>  |  49<H>  ----------------------------<  100<H>  3.2<L>   |  22  |  3.68<H>    Ca    8.8      30 Aug 2021 07:43  Phos  5.1     08-30  Mg     2.4     08-30    TPro  5.6<L>  /  Alb  2.6<L>  /  TBili  2.2<H>  /  DBili  x   /  AST  20  /  ALT  15  /  AlkPhos  284<H>  08-29    PT/INR - ( 31 Aug 2021 06:45 )   PT: 16.5 sec;   INR: 1.40          PTT - ( 31 Aug 2021 06:45 )  PTT:45.3 sec        RADIOLOGY & ADDITIONAL TESTS:    MEDICATIONS  (STANDING):  nafcillin  IVPB      nafcillin  IVPB 2 Gram(s) IV Intermittent every 4 hours    MEDICATIONS  (PRN):  acetaminophen   Tablet .. 650 milliGRAM(s) Oral every 6 hours PRN Temp greater or equal to 38C (100.4F), Moderate Pain (4 - 6)  melatonin 3 milliGRAM(s) Oral at bedtime PRN Insomnia  ondansetron Injectable 4 milliGRAM(s) IV Push every 8 hours PRN Nausea and/or Vomiting   **INCOMPLETE NOTE    SUBJECTIVE/ interval events: CT surg recommended medical management with repeat TTE on 9/7 and daily Bcx, last BCx still growing MSSA 8/29. patient is afebrile, WBC is downtrending. Patient seen and examined at bedside. Patient admits that back pain is resolved today/ ON. Complains of diarrhea, w/o change, soft stool 3-4 times a day, unintentional with urinations. Patient denies h/n/v/d, fever, chills, cp, palpitations, sob, abd pain, leg swelling, rashes, dysuria.     Vital Signs Last 12 Hrs  T(F): 99 (08-31-21 @ 06:20), Max: 99 (08-31-21 @ 06:20)  HR: 101 (08-31-21 @ 06:20) (100 - 101)  BP: 118/69 (08-31-21 @ 06:20) (118/69 - 134/84)  BP(mean): --  RR: 18 (08-31-21 @ 06:20) (18 - 18)  SpO2: 96% (08-31-21 @ 06:20) (94% - 96%)  I&O's Summary    PHYSICAL EXAM:  Constitutional: NAD, comfortable in bed.  HEENT: NC/AT, PERRLA, EOMI, no conjunctival pallor or scleral icterus, MMM  Neck: Supple, no JVD  Respiratory: CTA B/L. No w/r/r.   Cardiovascular: RRR, normal S1 and S2, no m/r/g.   Gastrointestinal: +BS, soft NTND, no guarding or rebound tenderness, no palpable masses   Extremities: wwp; no cyanosis, clubbing or edema.   Vascular: Pulses equal and strong throughout.   Neurological: AAOx3, no CN deficits, strength and sensation intact throughout.   Skin: No gross skin abnormalities or rashes    LABS:                        12.4   13.12 )-----------( 334      ( 31 Aug 2021 06:45 )             37.4     08-30    132<L>  |  93<L>  |  49<H>  ----------------------------<  100<H>  3.2<L>   |  22  |  3.68<H>    Ca    8.8      30 Aug 2021 07:43  Phos  5.1     08-30  Mg     2.4     08-30    TPro  5.6<L>  /  Alb  2.6<L>  /  TBili  2.2<H>  /  DBili  x   /  AST  20  /  ALT  15  /  AlkPhos  284<H>  08-29    PT/INR - ( 31 Aug 2021 06:45 )   PT: 16.5 sec;   INR: 1.40          PTT - ( 31 Aug 2021 06:45 )  PTT:45.3 sec        RADIOLOGY & ADDITIONAL TESTS:    MEDICATIONS  (STANDING):  nafcillin  IVPB      nafcillin  IVPB 2 Gram(s) IV Intermittent every 4 hours    MEDICATIONS  (PRN):  acetaminophen   Tablet .. 650 milliGRAM(s) Oral every 6 hours PRN Temp greater or equal to 38C (100.4F), Moderate Pain (4 - 6)  melatonin 3 milliGRAM(s) Oral at bedtime PRN Insomnia  ondansetron Injectable 4 milliGRAM(s) IV Push every 8 hours PRN Nausea and/or Vomiting   SUBJECTIVE/ interval events: CT surg recommended medical management with repeat TTE on 9/7 and daily Bcx, last BCx still growing MSSA 8/29. patient is afebrile, WBC is downtrending. Patient seen and examined at bedside. Patient admits that back pain is resolved today/ ON. Complains of diarrhea, w/o change, soft stool 3-4 times a day, unintentional with urinations. Patient denies h/n/v/d, fever, chills, cp, palpitations, sob, abd pain, leg swelling, rashes, dysuria.     Vital Signs Last 12 Hrs  T(F): 99 (08-31-21 @ 06:20), Max: 99 (08-31-21 @ 06:20)  HR: 101 (08-31-21 @ 06:20) (100 - 101)  BP: 118/69 (08-31-21 @ 06:20) (118/69 - 134/84)  BP(mean): --  RR: 18 (08-31-21 @ 06:20) (18 - 18)  SpO2: 96% (08-31-21 @ 06:20) (94% - 96%)  I&O's Summary    PHYSICAL EXAM:  Constitutional: NAD, comfortable in bed.  HEENT: NC/AT, PERRLA, EOMI, no conjunctival pallor or scleral icterus, MMM  Neck: Supple, no JVD  Respiratory: CTA B/L. No w/r/r.   Cardiovascular: RRR, normal S1 and S2, no m/r/g.   Gastrointestinal: +BS, soft NTND, no guarding or rebound tenderness, no palpable masses   Extremities: wwp; no cyanosis, clubbing or edema.   Vascular: Pulses equal and strong throughout.   Neurological: AAOx3, no CN deficits, strength and sensation intact throughout.   Skin: No gross skin abnormalities or rashes    LABS:                        12.4   13.12 )-----------( 334      ( 31 Aug 2021 06:45 )             37.4     08-30    132<L>  |  93<L>  |  49<H>  ----------------------------<  100<H>  3.2<L>   |  22  |  3.68<H>    Ca    8.8      30 Aug 2021 07:43  Phos  5.1     08-30  Mg     2.4     08-30    TPro  5.6<L>  /  Alb  2.6<L>  /  TBili  2.2<H>  /  DBili  x   /  AST  20  /  ALT  15  /  AlkPhos  284<H>  08-29    PT/INR - ( 31 Aug 2021 06:45 )   PT: 16.5 sec;   INR: 1.40          PTT - ( 31 Aug 2021 06:45 )  PTT:45.3 sec        RADIOLOGY & ADDITIONAL TESTS:    MEDICATIONS  (STANDING):  nafcillin  IVPB      nafcillin  IVPB 2 Gram(s) IV Intermittent every 4 hours    MEDICATIONS  (PRN):  acetaminophen   Tablet .. 650 milliGRAM(s) Oral every 6 hours PRN Temp greater or equal to 38C (100.4F), Moderate Pain (4 - 6)  melatonin 3 milliGRAM(s) Oral at bedtime PRN Insomnia  ondansetron Injectable 4 milliGRAM(s) IV Push every 8 hours PRN Nausea and/or Vomiting

## 2021-08-31 NOTE — PROGRESS NOTE ADULT - ATTENDING COMMENTS
Still bacteremic from 8/25-8/29.  GUANAKO showed 0.9 x 0.5cm MV vegetation, ?abscess.  Case d/w Dr. Herring - patient doesn't have e/o HF and no significant valvular disfunction.  Dr. Herring doesn't think the finding is c/w abscess (far from vegetation).  He is high risk for surgery and if he failed medical management then he will be a candidate for MVR.  Per heme, hemophilia will be managed if patient going to OR.  Cont nafcillin.  obtain gallium scan to r/o perivalvular abscess.  Repeat daily BCx until clearance >48h.  Check EKG.  Repeat GUANAKO on 9/7.    Team 1 will follow you.  Case d/w primary team.    Joyce Barnard MD, MS  Infectious Disease attending  work cell [phone] Still bacteremic from 8/25-8/29.  GUANAKO showed 0.9 x 0.5cm MV vegetation, ?abscess.  Case d/w Dr. Herring - patient doesn't have e/o HF and no significant valvular disfunction.  Dr. Herring doesn't think the finding is c/w abscess (far from vegetation).  He is high risk for surgery and if he failed medical management then he will be a candidate for MVR.  Per heme, hemophilia will be managed if patient going to OR.  Since he is getting cholestatis side effect from nafcillin, stop nafcillin and switch to daptomycin 500mg IV q48h.  Avoiding cefazolin patient still bacteremic (c/f innoculum effect) and patient had diarrhea from cefazolin and had difficulty tolerating in February.  obtain gallium scan to r/o perivalvular abscess.  Repeat daily BCx until clearance >48h.  Check EKG.  Repeat GUANAKO on 9/7.    Team 1 will follow you.  Case d/w primary team.    Joyce Barnard MD, MS  Infectious Disease attending  work cell [phone]

## 2021-08-31 NOTE — OCCUPATIONAL THERAPY INITIAL EVALUATION ADULT - ADDITIONAL COMMENTS
Patient reports living with his son and ex-wife in an elevator access apartment building with no YURIDIA. Patient states he uses a rollator at baseline and received some assistance from wife with ADL's more recently 2/2 increased weakness. As per CM, patient has a HHA 6 hrs/7days a week who assists with ADL's.

## 2021-08-31 NOTE — PROGRESS NOTE ADULT - PROBLEM SELECTOR PLAN 4
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 Cr 2.86 on admission. Patient underwent dialysis yesterday, Cr this morning 2.17. Electrolytes, Ca, Phos, Hb at goal, euvolemic on exam. Heme consulted as patient's catheter needs to be removed given +Bcx MSSA bacteremia. Renal following.  - ESRD on HD  @[facility] usual Rx 3h 0.5L   - per Heme recs, TXA and factor VIII given prior to HD cath removal and procedure without complications  - per renal, no acute indication for HD at this time, new line to be placed next week if cx negative > 72h  - f/u with vascular regarding access  - trending daily Creatinine, BUN, electrolyte abnormalities, HCO3  - No acute indication for HD, euvolemic on examination CT lumbar spine performed, findings diagnostic for discitis-osteomyelitis. ID consulted, ortho following.  - treatment as above

## 2021-08-31 NOTE — DIETITIAN INITIAL EVALUATION ADULT. - PROBLEM SELECTOR PLAN 1
On admission meets 2/4 SIRS criteria; HR 96, WBC 16.10 with neutrophilic predominance. Positive UA. Negative CXR. HD cath looks non infected with no surrounding erythema or tenderness. No spinal tenderness.  - Likely 2/2 UTI. Rigors on exam with concern for bacteremia.  S/p 750mg vanc, 3.375g zosyn in the ED.  s/p 500 cc NS    Plan:   -2g ceftriaxone q24 hour; if continues to spike fever through Ceftriaxone, will escalate to cover gram+ves (hx of MSSA bacteremia)  -f/u UC  -f/u BC  -zofran prn for nausea  -tylenol prn for fever    *Attending Attestation below

## 2021-08-31 NOTE — PROGRESS NOTE ADULT - ASSESSMENT
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 At HD session 8/24/21, patient developed shaking chills. HD session was stopped and patient went home. At home, he continued to have shaking chills and was febrile with tmax 101. He had an associated decrease in appetite and lower extremity weakness causing difficulty walking. Denies n/v/abdominal pain. Per patient's daughter states that since DRESS syndrome induced CKD, pt has been doing much better requiring 2 sessions of HD per week, and is on no medication. Also reports low back pain for 4-5 days, worsened by movement.  Patient found to be bacteremic with MSSA and to have osteomyelitis of lumbar spine.  HD cath removed 8/27/21.  Patient underwent GUANAKO on 8/30/21 which showed mitral valve prolapse of the anterior leaflet. There is an irregularly shaped linear echodensity measuring 0.9 cm x 0.5 cm on the atrial north of the anterior leaflet of the mitral valve, highly suspicious for a vegetation. There is mild to moderate, eccentric, posteriorly directed mitral regurgitation and could not rule out abscess formation.  CT surgery consulted for evaluation of mitral valve endocarditis.    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

## 2021-08-31 NOTE — OCCUPATIONAL THERAPY INITIAL EVALUATION ADULT - MODIFIED CLINICAL TEST OF SENSORY INTEGRATION IN BALANCE TEST
Patient functionally ambulated from bed<>bathroom 10 x 2 feet and in the hallway 20 x 2 feet with RW and CGA- patient noted with decreased coordination and weight shifting during turns

## 2021-08-31 NOTE — PROGRESS NOTE ADULT - PROBLEM SELECTOR PLAN 5
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 Cr 2.86 on admission. Patient underwent dialysis yesterday, Cr this morning 2.17. Electrolytes, Ca, Phos, Hb at goal, euvolemic on exam. Heme consulted as patient's catheter needs to be removed given +Bcx MSSA bacteremia. Renal following.  - ESRD on HD  @[email or web address] usual Rx 3h 0.5L   - per Heme recs, TXA and factor VIII given prior to HD cath removal and procedure without complications  - trending daily Creatinine, BUN, electrolyte abnormalities, HCO3    As per nephro 8/31/21,  - possibly uremic w/anorexia nausea and strange taste  - will likely require temporary catheter 9/2 for hemodialysis 9/3 and 9/4 then remove   - place new permanent HD cath only after cx are negative > 72h  - F/u ID and hemo/onc for placement of picc line for prolonged antibiotic treatment  - f/u with vascular regarding access

## 2021-08-31 NOTE — PROGRESS NOTE ADULT - PROBLEM SELECTOR PLAN 1
On admission pt met 3/4 SIRS criteria; HR 96, WBC 16.10 with neutrophilic predominance, Tmax 103.2F. Positive UA. Negative CXR. Patient empirically treated in the ED with 750mg vanc and 3.375g zosyn on 8/25, then CTX 2g, then one more dose of Vanc on 8/26. ID consulted. Bcx from 8/25 grew MSSA 3/4 bottles (likely recurrent as pt was diagnosed with MSSA bacteremia in March).  - daily surveillance bcx  - Initial Bc 8/27/21: MSSA   - Bcx 8/25/21: 12hrs > Bcx 8/25        - Permacath removed on 8/27/21, as per ID  - Cath culture 8/27/21: preliminary negative     - c/w Nafcillin 2g q4 for best MSSA coverage per ID  - TTE w/o vegetations  - f/u GUANAKO 8/30/21, cleared by hem/onc for procedure  - place new permanent HD cath only after cx are negative > 72h  - F/u ID and hemo/onc for placement of picc line for prolonged antibiotic treatment On admission pt met 3/4 SIRS criteria; HR 96, WBC 16.10 with neutrophilic predominance, Tmax 103.2F. Positive UA. Negative CXR. Patient empirically treated in the ED with 750mg vanc and 3.375g zosyn on 8/25, then CTX 2g, then one more dose of Vanc on 8/26. ID consulted. Bcx from 8/25 grew MSSA 3/4 bottles (likely recurrent as pt was diagnosed with MSSA bacteremia in March).  - daily surveillance bcx  - Initial Bc 8/25/21: MSSA 2/2 bottles   - Bcx 8/27/21: 1 of 1 bottles + MSSA > Bcx 8/29/21: 1 of 2 bottles + MSSA  - Permacath removed on 8/27/21, as per ID  - Cath culture 8/27/21: final result no growth  - continue Nafcillin 2g q4 for best MSSA coverage per ID started on 8/26/21

## 2021-08-31 NOTE — PROGRESS NOTE ADULT - PROBLEM SELECTOR PLAN 1
POA, d/t MSSA bacteremia, c/b L5-S1 discitis/OM; GUANAKO 8/30 shows 0.9 cm x 0.5 cm echodensity on the atrial side of the anterior leaflet of the mitral valve, highly suspicious for a vegetation, can't r/o abscess; appreciate ID, CTS, and Ortho recs; cont. daptomycin; f/u surveillance cultures; plan for gallium scan, repeat TTE 9/7; no need for spine biopsy, per Ortho (risks would outweigh benefits), and unable to obtain MRI d/t non-compatible cochlear implant; cont. PT, WBAT

## 2021-08-31 NOTE — PROGRESS NOTE ADULT - SUBJECTIVE AND OBJECTIVE BOX
Patient is a 76y old  Male who presents with a chief complaint of sepsis likely 2/2 UTI (31 Aug 2021 14:55)      INTERVAL HPI/OVERNIGHT EVENTS:    Pt. seen and examined earlier today  Pt. c/o minimal LBP not requiring PRN analgesics  Pt. denies F/C, CP, SOB, bleeding sx    Review of Systems: 12 point review of systems otherwise negative    MEDICATIONS  (STANDING):  DAPTOmycin IVPB        MEDICATIONS  (PRN):  acetaminophen   Tablet .. 650 milliGRAM(s) Oral every 6 hours PRN Temp greater or equal to 38C (100.4F), Moderate Pain (4 - 6)  melatonin 3 milliGRAM(s) Oral at bedtime PRN Insomnia      Allergies    allopurinol (Other)    Intolerances          Vital Signs Last 24 Hrs  T(C): 36.4 (31 Aug 2021 15:10), Max: 37.2 (31 Aug 2021 06:20)  T(F): 97.5 (31 Aug 2021 15:10), Max: 99 (31 Aug 2021 06:20)  HR: 102 (31 Aug 2021 15:10) (71 - 116)  BP: 123/79 (31 Aug 2021 15:10) (93/68 - 134/84)  BP(mean): --  RR: 18 (31 Aug 2021 15:10) (18 - 18)  SpO2: 98% (31 Aug 2021 15:10) (94% - 98%)  CAPILLARY BLOOD GLUCOSE            Physical Exam:  (earlier today)  Daily     Daily   General:  non-toxic and comfortable-appearing in NAD  HEENT:  MMM  CV:  no murmur  Extremities:  no edema B/L LE  Skin:  WWP  Neuro:  AAOx3  [facility]    LABS:                        12.4   13.12 )-----------( 334      ( 31 Aug 2021 06:45 )             37.4     08-31    136  |  94<L>  |  52<H>  ----------------------------<  84  3.1<L>   |  23  |  4.05<H>    Ca    8.6      31 Aug 2021 06:45  Phos  5.3     08-31  Mg     2.6     08-31    TPro  5.5<L>  /  Alb  2.9<L>  /  TBili  2.7<H>  /  DBili  2.1<H>  /  AST  14  /  ALT  12  /  AlkPhos  252<H>  08-31    PT/INR - ( 31 Aug 2021 06:45 )   PT: 16.5 sec;   INR: 1.40          PTT - ( 31 Aug 2021 06:45 )  PTT:45.3 sec

## 2021-08-31 NOTE — PROGRESS NOTE ADULT - PROBLEM SELECTOR PLAN 10
Normocytic anemia: Hb at goal today 13.6  As per Nephrology:  - no indication for EPO or IV Iron at this time Normocytic anemia: Hb at goal today   As per Nephrology:  - no indication for EPO or IV Iron at this time

## 2021-08-31 NOTE — DIETITIAN INITIAL EVALUATION ADULT. - PROBLEM SELECTOR PLAN 2
Pt presenting with sepsis found to have positive UA.   - Denies urinary sxs.  - discussed with daughter, given history of BPH, concern for underlying urinary retention as nidus for ascending urinary tract infection   Denies constipation.  Plan:   - as above   - will bladder scan to r/o retention

## 2021-08-31 NOTE — PROGRESS NOTE ADULT - SUBJECTIVE AND OBJECTIVE BOX
Patient discussed on with Dr. Herring    Operation / Date: Mitral valve endocarditis    SUBJECTIVE ASSESSMENT:  76y Male seen and examined at bedside.  patient with no complaints.  Denies fevers, chills, chest pain, shortness of breath.        Vital Signs Last 24 Hrs  T(C): 37.2 (31 Aug 2021 06:20), Max: 37.2 (31 Aug 2021 06:20)  T(F): 99 (31 Aug 2021 06:20), Max: 99 (31 Aug 2021 06:20)  HR: 101 (31 Aug 2021 06:20) (100 - 107)  BP: 118/69 (31 Aug 2021 06:20) (108/67 - 134/84)  BP(mean): --  RR: 18 (31 Aug 2021 06:20) (18 - 18)  SpO2: 96% (31 Aug 2021 06:20) (94% - 98%)  I&O's Detail      PHYSICAL EXAM:    GENERAL: well-developed, well nourished, alert, no acute distress at present  Head: Cholclear implant in place  CHEST/LUNG: Clear to auscultation bilaterally no rales, rhonchi or wheezing  HEART: normal S1S2, RRR  ABDOMEN: Soft, Nontender  EXTREMITIES: No clubbing, cyanosis, or edema       LABS:                        12.4   13.12 )-----------( 334      ( 31 Aug 2021 06:45 )             37.4       COUMADIN:  No. REASON: .    PT/INR - ( 31 Aug 2021 06:45 )   PT: 16.5 sec;   INR: 1.40          PTT - ( 31 Aug 2021 06:45 )  PTT:45.3 sec    08-31    136  |  94<L>  |  52<H>  ----------------------------<  84  3.1<L>   |  23  |  4.05<H>    Ca    8.6      31 Aug 2021 06:45  Phos  5.3     08-31  Mg     2.6     08-31    TPro  5.5<L>  /  Alb  2.9<L>  /  TBili  2.7<H>  /  DBili  2.1<H>  /  AST  14  /  ALT  12  /  AlkPhos  252<H>  08-31          MEDICATIONS  (STANDING):  nafcillin  IVPB      nafcillin  IVPB 2 Gram(s) IV Intermittent every 4 hours  potassium chloride    Tablet ER 20 milliEquivalent(s) Oral once    MEDICATIONS  (PRN):  acetaminophen   Tablet .. 650 milliGRAM(s) Oral every 6 hours PRN Temp greater or equal to 38C (100.4F), Moderate Pain (4 - 6)  melatonin 3 milliGRAM(s) Oral at bedtime PRN Insomnia  ondansetron Injectable 4 milliGRAM(s) IV Push every 8 hours PRN Nausea and/or Vomiting        RADIOLOGY & ADDITIONAL TESTS:

## 2021-08-31 NOTE — DIETITIAN INITIAL EVALUATION ADULT. - MALNUTRITION
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

## 2021-08-31 NOTE — PROGRESS NOTE ADULT - PROBLEM SELECTOR PLAN 6
On admission pt met 3/4 SIRS criteria (HR 96, WBC 16.10 with neutrophilic predominance, Tmax 103.2F) found to have positive UA. Denies any urinary symptoms. Discussed with daughter, given history of BPH, concern for underlying urinary retention as nidus for ascending urinary tract infection. Denies constipation. ID consulted. Ucx from 8/25 grew Enterobacter Colacae.  - per ID, no need to treat asymptomatic bacteruria at this time  - monitor for any new urinary symptoms # hyponatremia  Today Na 136  - serum osm 288  - f/u urine lytes    # hypokalemia  - as per Nephro, replete for goal of 3.5

## 2021-08-31 NOTE — DIETITIAN INITIAL EVALUATION ADULT. - OTHER INFO
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 Pt reports decreased appetite over past few months 2/2 not feeling well. Pt reports wt loss as well from ~150lbs to admit wt 128lbs (reflects 22lb/14.6% wt loss). Pt currently on DASH diet, reports fair appetite currently. RD discussed current diet as well as encouraged intake. Pt expressed understanding. 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 Recommend addition of Nepro QD (425kcal/19gprotein). Pt denies N/V/D/C at present. Last BM 8/29. No pain noted. Please see full recs below. Will continue to follow per RD protocol.

## 2021-08-31 NOTE — DIETITIAN INITIAL EVALUATION ADULT. - PROBLEM SELECTOR PLAN 3
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 Cr 2.86 on admission.  No signs of fluid O/L on exam (no crackles, JVD, peripheral edema).  -Dr Hu is aware that pt is here  -No urgent need for dialysis  -call nephro in the am

## 2021-08-31 NOTE — PROGRESS NOTE ADULT - SUBJECTIVE AND OBJECTIVE BOX
Patient is a 76y Male seen and evaluated at bedside.   GUANAKO concerning for vegetation, CTSx rec medical management and repeat GUANAKO   last BCx 8/29 remain +ve   hypokalemic   BP is acceptable   no CP SOB   no back pain   no hemodialysis today     Meds:    acetaminophen   Tablet .. 650 every 6 hours PRN  melatonin 3 at bedtime PRN  nafcillin  IVPB    nafcillin  IVPB 2 every 4 hours  ondansetron Injectable 4 every 8 hours PRN  potassium chloride    Tablet ER 20 once      T(C): , Max: 37.2 (08-31-21 @ 06:20)  T(F): , Max: 99 (08-31-21 @ 06:20)  HR: 101 (08-31-21 @ 06:20)  BP: 118/69 (08-31-21 @ 06:20)  BP(mean): 75 (08-30-21 @ 10:44)  RR: 18 (08-31-21 @ 06:20)  SpO2: 96% (08-31-21 @ 06:20)  Wt(kg): --    Review of Systems:  RESPIRATORY: No shortness of breath, cough, +strange taste +anorexia  CARDIOVASCULAR: No chest pain or shortness of breath  GI: +nausea, no vomit or abdo pain   NEUROLOGICAL: No headaches or blurred vision  SKIN: No skin rashes   MUSCULOSKELETAL: no back pain      PHYSICAL EXAM:  GENERAL: well-developed, well nourished, alert, no acute distress at present  CHEST/LUNG: Clear to auscultation bilaterally no rales, rhonchi or wheezing  HEART: normal S1S2, RRR  ABDOMEN: Soft, Nontender  EXTREMITIES: No clubbing, cyanosis, or edema         LABS:                        12.4   13.12 )-----------( 334      ( 31 Aug 2021 06:45 )             37.4     08-31    136  |  94<L>  |  52<H>  ----------------------------<  84  3.1<L>   |  23  |  4.05<H>    Ca    8.6      31 Aug 2021 06:45  Phos  5.3     08-31  Mg     2.6     08-31    TPro  5.5<L>  /  Alb  2.9<L>  /  TBili  2.7<H>  /  DBili  2.1<H>  /  AST  14  /  ALT  12  /  AlkPhos  252<H>  08-31    Osmolality, Serum: 288 mosm/kg [280 - 301] (08-30 @ 15:20)    PT/INR - ( 31 Aug 2021 06:45 )   PT: 16.5 sec;   INR: 1.40          PTT - ( 31 Aug 2021 06:45 )  PTT:45.3 sec          RADIOLOGY & ADDITIONAL STUDIES:

## 2021-08-31 NOTE — DIETITIAN INITIAL EVALUATION ADULT. - PROBLEM SELECTOR PLAN 4
Low back pain with ambulation for 4-5 days. PT and warm compresses at home alleviated his pain. He does not report pain at bedside today. No spinal tenderness, strength 5/5 in b/l LE's, negative straight leg raise. Likely muscular in nature.  -tylenol prn for mild pain  -pt consult    *Attending Attestation below

## 2021-08-31 NOTE — OCCUPATIONAL THERAPY INITIAL EVALUATION ADULT - DIAGNOSIS, OT EVAL
Patient presents with decreased overall balance, activity tolerance, lateral movements impacting independence with functional activities.

## 2021-08-31 NOTE — OCCUPATIONAL THERAPY INITIAL EVALUATION ADULT - IMPAIRED TRANSFERS: TOILET, REHAB EVAL
impaired balance/decreased flexibility/impaired motor control/impaired postural control/decreased strength

## 2021-08-31 NOTE — DIETITIAN INITIAL EVALUATION ADULT. - PROBLEM SELECTOR PLAN 5
. Last known level 169 (3/21) could be 2/2 to renal bone disease versus hepatobiliary  -measure GGT in am

## 2021-08-31 NOTE — PROGRESS NOTE ADULT - ASSESSMENT
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 Found to have discitis and OM. Ucx from 8/25 grow Enterobacter Colacae, asymptomatic. Bcx from 8/25 grows MSSA 3/4 bottles. TTE 8/27 with normal. BCx 8/29 NGTD, today worsening WBC and renal function. GUANAKO 8/30 showed 0.9 cm x 0.5 cm on the atrial side of the anterior leaflet of the mitral valve, highly suspicious for a vegetation and could not rule out abscess formation. CT surg recommended medical management with repeat TTE on 9/7 and daily Bcx. Today last BCx still growing MSSA 8/29. patient is afebrile, WBC is downtrending    INCOMPLETE    ID Team 1 will continue to follow. Please see below attending attestation for finalized recommendations.    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 Found to have discitis and OM. Ucx from 8/25 grow Enterobacter Colacae, asymptomatic. Bcx from 8/25 grows MSSA 3/4 bottles. TTE 8/27 with normal. BCx 8/29 NGTD, today worsening WBC and renal function. GUANAKO 8/30 showed 0.9 cm x 0.5 cm on the atrial side of the anterior leaflet of the mitral valve, highly suspicious for a vegetation and could not rule out abscess formation. CT surg recommended medical management with repeat TTE on 9/7 and daily Bcx. Today last BCx still growing MSSA 8/29. patient is afebrile, WBC is downtrending    - Continue Nafcillin 2 grams IV q4hrs  - Aggressive potassium repletion while on Nafcillin   - GUANAKO w/ vegetation and suspected abscess, discussed with CTS, will obtain gallium scan to confirm abscess  - f/u gallium scan, if abscess present will need surgical intervention; if neg will proceed with medical management and repeat TTE on 9/7  - Please obtain EKG  - Daily Bcx    ID Team 1 will continue to follow. Please see below attending attestation for finalized recommendations.    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 Found to have discitis and OM. Ucx from 8/25 grow Enterobacter Colacae, asymptomatic. Bcx from 8/25 grows MSSA 3/4 bottles. TTE 8/27 with normal. BCx 8/29 NGTD, today worsening WBC and renal function. GUNAAKO 8/30 showed 0.9 cm x 0.5 cm on the atrial side of the anterior leaflet of the mitral valve, highly suspicious for a vegetation and could not rule out abscess formation. CT surg recommended medical management with repeat TTE on 9/7 and daily Bcx. Today last BCx still growing MSSA 8/29. patient is afebrile, WBC is downtrending    - Discontinue Nafcillin 2 grams IV q4hrs, as patient's new cholesteatosis may be a drug reaction  - Start on Dapto 500mg q48h   - Aggressive potassium repletion while on Nafcillin   - GUANAKO w/ vegetation and suspected abscess, discussed with CTS, will obtain gallium scan to confirm abscess  - f/u gallium scan, if abscess present will need surgical intervention; if neg will proceed with medical management and repeat TTE on 9/7  - Please obtain EKG  - Daily Bcx    ID Team 1 will continue to follow. Please see below attending attestation for finalized recommendations.    Marbin Penny MD PGY2 Internal Medicine  Infectious Disease Consult Service, Team 1

## 2021-09-01 LAB
-  CEFAZOLIN: SIGNIFICANT CHANGE UP
-  CLINDAMYCIN: SIGNIFICANT CHANGE UP
-  ERYTHROMYCIN: SIGNIFICANT CHANGE UP
-  LINEZOLID: SIGNIFICANT CHANGE UP
-  OXACILLIN: SIGNIFICANT CHANGE UP
-  RIFAMPIN: SIGNIFICANT CHANGE UP
-  TRIMETHOPRIM/SULFAMETHOXAZOLE: SIGNIFICANT CHANGE UP
-  VANCOMYCIN: SIGNIFICANT CHANGE UP
ALBUMIN SERPL ELPH-MCNC: 2.9 G/DL — LOW (ref 3.3–5)
ALP SERPL-CCNC: 224 U/L — HIGH (ref 40–120)
ALT FLD-CCNC: 9 U/L — LOW (ref 10–45)
ANION GAP SERPL CALC-SCNC: 16 MMOL/L — SIGNIFICANT CHANGE UP (ref 5–17)
APTT BLD: 47.6 SEC — HIGH (ref 27.5–35.5)
AST SERPL-CCNC: 9 U/L — LOW (ref 10–40)
BASOPHILS # BLD AUTO: 0.06 K/UL — SIGNIFICANT CHANGE UP (ref 0–0.2)
BASOPHILS NFR BLD AUTO: 0.4 % — SIGNIFICANT CHANGE UP (ref 0–2)
BILIRUB SERPL-MCNC: 1.1 MG/DL — SIGNIFICANT CHANGE UP (ref 0.2–1.2)
BUN SERPL-MCNC: 51 MG/DL — HIGH (ref 7–23)
CALCIUM SERPL-MCNC: 9.2 MG/DL — SIGNIFICANT CHANGE UP (ref 8.4–10.5)
CHLORIDE SERPL-SCNC: 95 MMOL/L — LOW (ref 96–108)
CK SERPL-CCNC: <7 U/L — LOW (ref 30–200)
CO2 SERPL-SCNC: 22 MMOL/L — SIGNIFICANT CHANGE UP (ref 22–31)
CONFIRM APTT STACLOT: POSITIVE
CREAT SERPL-MCNC: 3.8 MG/DL — HIGH (ref 0.5–1.3)
DRVVT SCREEN TO CONFIRM RATIO: ABNORMAL
EOSINOPHIL # BLD AUTO: 0.87 K/UL — HIGH (ref 0–0.5)
EOSINOPHIL NFR BLD AUTO: 5.3 % — SIGNIFICANT CHANGE UP (ref 0–6)
FACT II INHIB PPP-ACNC: 90 % — SIGNIFICANT CHANGE UP (ref 74–142)
FACT IX PPP CHRO-ACNC: 130 % — SIGNIFICANT CHANGE UP (ref 69–167)
FACT V ACT/NOR PPP: 105 % — SIGNIFICANT CHANGE UP (ref 70–143)
FACT VII ACT/NOR PPP: 47 % — LOW (ref 53–149)
FACT VIII ACT/NOR PPP: 0 BU — SIGNIFICANT CHANGE UP (ref 0–0.5)
FACT VIII ACT/NOR PPP: 81 % — SIGNIFICANT CHANGE UP (ref 51–138)
FACT X ACT/NOR PPP: 57 % — LOW (ref 68–149)
FACT XII ACT/NOR PPP: 69 % — LOW (ref 73–148)
FACT XIIA PPP-ACNC: 74 % — SIGNIFICANT CHANGE UP (ref 51–180)
FACTOR TESTED: SIGNIFICANT CHANGE UP
GLUCOSE SERPL-MCNC: 99 MG/DL — SIGNIFICANT CHANGE UP (ref 70–99)
GRAM STN FLD: SIGNIFICANT CHANGE UP
HCT VFR BLD CALC: 35.1 % — LOW (ref 39–50)
HGB BLD-MCNC: 11.8 G/DL — LOW (ref 13–17)
IMM GRANULOCYTES NFR BLD AUTO: 1.1 % — SIGNIFICANT CHANGE UP (ref 0–1.5)
INR BLD: 1.36 — HIGH (ref 0.88–1.16)
LA NT DPL PPP QL: 39.9 SEC — SIGNIFICANT CHANGE UP
LYMPHOCYTES # BLD AUTO: 1.08 K/UL — SIGNIFICANT CHANGE UP (ref 1–3.3)
LYMPHOCYTES # BLD AUTO: 6.6 % — LOW (ref 13–44)
MAGNESIUM SERPL-MCNC: 2.4 MG/DL — SIGNIFICANT CHANGE UP (ref 1.6–2.6)
MCHC RBC-ENTMCNC: 27 PG — SIGNIFICANT CHANGE UP (ref 27–34)
MCHC RBC-ENTMCNC: 33.6 GM/DL — SIGNIFICANT CHANGE UP (ref 32–36)
MCV RBC AUTO: 80.3 FL — SIGNIFICANT CHANGE UP (ref 80–100)
METHOD TYPE: SIGNIFICANT CHANGE UP
MONOCYTES # BLD AUTO: 0.81 K/UL — SIGNIFICANT CHANGE UP (ref 0–0.9)
MONOCYTES NFR BLD AUTO: 4.9 % — SIGNIFICANT CHANGE UP (ref 2–14)
NEUTROPHILS # BLD AUTO: 13.41 K/UL — HIGH (ref 1.8–7.4)
NEUTROPHILS NFR BLD AUTO: 81.7 % — HIGH (ref 43–77)
NRBC # BLD: 0 /100 WBCS — SIGNIFICANT CHANGE UP (ref 0–0)
PHOSPHATE SERPL-MCNC: 4.7 MG/DL — HIGH (ref 2.5–4.5)
PLATELET # BLD AUTO: 361 K/UL — SIGNIFICANT CHANGE UP (ref 150–400)
POTASSIUM SERPL-MCNC: 3.1 MMOL/L — LOW (ref 3.5–5.3)
POTASSIUM SERPL-SCNC: 3.1 MMOL/L — LOW (ref 3.5–5.3)
PROT SERPL-MCNC: 5.8 G/DL — LOW (ref 6–8.3)
PROTHROM AB SERPL-ACNC: 16.1 SEC — HIGH (ref 10.6–13.6)
RBC # BLD: 4.37 M/UL — SIGNIFICANT CHANGE UP (ref 4.2–5.8)
RBC # FLD: 16 % — HIGH (ref 10.3–14.5)
SODIUM SERPL-SCNC: 133 MMOL/L — LOW (ref 135–145)
WBC # BLD: 16.41 K/UL — HIGH (ref 3.8–10.5)
WBC # FLD AUTO: 16.41 K/UL — HIGH (ref 3.8–10.5)

## 2021-09-01 PROCEDURE — 99232 SBSQ HOSP IP/OBS MODERATE 35: CPT

## 2021-09-01 PROCEDURE — 99233 SBSQ HOSP IP/OBS HIGH 50: CPT | Mod: GC

## 2021-09-01 PROCEDURE — 78830 RP LOCLZJ TUM SPECT W/CT 1: CPT | Mod: 26

## 2021-09-01 PROCEDURE — 78802 RP LOCLZJ TUM WHBDY 1 D IMG: CPT | Mod: 26

## 2021-09-01 RX ORDER — POTASSIUM CHLORIDE 20 MEQ
20 PACKET (EA) ORAL ONCE
Refills: 0 | Status: COMPLETED | OUTPATIENT
Start: 2021-09-01 | End: 2021-09-01

## 2021-09-01 RX ADMIN — Medication 650 MILLIGRAM(S): at 23:56

## 2021-09-01 RX ADMIN — Medication 20 MILLIEQUIVALENT(S): at 09:53

## 2021-09-01 RX ADMIN — Medication 650 MILLIGRAM(S): at 22:54

## 2021-09-01 NOTE — PROGRESS NOTE ADULT - ASSESSMENT
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    Gallium scan 9/1/21:    Impression:    1. No significant uptake within the mitral valve region.    2. Uptake within the lower lumbar spine likely corresponding to area of abnormality on prior CT lumbar spine 8/26/2021 at L5-S1 which is suggestive of discitis osteomyelitis.    3. Prominent uptake within the posterior mediastinum appears to correspond to mediastinal and right hilar lymph nodes which are nonspecific and may reflect infectious or inflammatory etiology. 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    Gallium scan 9/1/21:  Uptake within the lower lumbar spine likely corresponding to area of abnormality on prior CT lumbar spine 8/26/2021 at L5-S1 which is suggestive of discitis osteomyelitis.  No significant uptake within the mitral valve region.    2.     3. Prominent uptake within the posterior mediastinum appears to correspond to mediastinal and right hilar lymph nodes which are nonspecific and may reflect infectious or inflammatory etiology. 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

## 2021-09-01 NOTE — PROGRESS NOTE ADULT - PROBLEM SELECTOR PLAN 7
On admission pt met 3/4 SIRS criteria (HR 96, WBC 16.10 with neutrophilic predominance, Tmax 103.2F) found to have positive UA. Denies any urinary symptoms. Discussed with daughter, given history of BPH, concern for underlying urinary retention as nidus for ascending urinary tract infection. Denies constipation. ID consulted. Ucx from 8/25 grew Enterobacter Colacae.  - per ID, no need to treat asymptomatic bacteruria at this time  - monitor for any new urinary symptoms # hyponatremia  - serum osm 288  - f/u urine lytes    # hypokalemia  - as per Nephro, replete for goal of 3.5

## 2021-09-01 NOTE — PROGRESS NOTE ADULT - PROBLEM SELECTOR PLAN 10
Normocytic anemia: Hb at goal today 13.6  As per Nephrology:  - no indication for EPO or IV Iron at this time One episode of watery diarrhea on Saturday 8/28/21, 1 episode of small loss stool 8/31/21  - will monitor for s/s of infection  - miralax/senna discontinued  - GI PCR: 8/30/21 Negative

## 2021-09-01 NOTE — PROGRESS NOTE ADULT - SUBJECTIVE AND OBJECTIVE BOX
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 Hx obtained from daughter. At HD session yesterday, pt developed shaking chills. HD session was stopped and pt went home. At home, he continued to have shaking chills and was febrile with tmax 101. He had an associated decrease in appetite and lower extremity weakness causing difficulty walking. Denies n/v/abdominal pain. Daughter states that since DRESS syndrome induced CKD, pt has been doing much better requiring 2 sessions of HD per week, and is on no medication.   Also reports low back pain for 4-5 days, worsened by movement. Daughter arranged at home PT and heating pads which helped with the pain. Pt reports pain only when standing/walking. Pt not in pain at the moment.    In the ED,  VS: T98.8   , HR87   , /83    , RR16    , SpO2  98   % (RA)  Labs: WBC 16.10, auto neut #14.20, 88.2% neut, PT14.3, INR1.2, PTT47.4, BUN 39, Cr 2.86,   Urine: pyuria and bacteriuria  EKG: NSR  CXR: Normal  Imaging: None  Interventions: 750mg vanc, 3.375g zosyn     (25 Aug 2021 19:08)    PAST MEDICAL & SURGICAL HISTORY:  HTN (hypertension)  Hemophilia A  Gout  History of BPH  Chronic kidney disease (CKD)  DRESS syndrome  Uses cochlear implant    SOCIAL HISTORY:    ALLERGIES:  allopurinol (Other)    MEDICATIONS:  STANDING MEDICATIONS  DAPTOmycin IVPB        PRN MEDICATIONS  acetaminophen   Tablet .. 650 milliGRAM(s) Oral every 6 hours PRN  melatonin 3 milliGRAM(s) Oral at bedtime PRN    VITALS:   T(F): 98.4  HR: 101  BP: 123/82  RR: 18  SpO2: 97%    LABS:                        11.8   16.41 )-----------( 361      ( 01 Sep 2021 07:53 )             35.1     09-01    133<L>  |  95<L>  |  51<H>  ----------------------------<  99  3.1<L>   |  22  |  3.80<H>    Ca    9.2      01 Sep 2021 07:53  Phos  4.7     09-01  Mg     2.4     09-01    TPro  5.8<L>  /  Alb  2.9<L>  /  TBili  1.1  /  DBili  x   /  AST  9<L>  /  ALT  9<L>  /  AlkPhos  224<H>  09-01    PT/INR - ( 01 Sep 2021 07:53 )   PT: 16.1 sec;   INR: 1.36       PTT - ( 01 Sep 2021 07:53 )  PTT:47.6 sec    Creatine Kinase, Serum: <7 U/L *L* (09-01-21 @ 07:53)    Culture - Blood (collected 31 Aug 2021 16:06)  Source: .Blood Blood  Preliminary Report (01 Sep 2021 05:02):    No growth at 12 hours    GI PCR Panel, Stool (collected 29 Aug 2021 22:14)  Source: .Stool Feces  Final Report (30 Aug 2021 02:21):    GI PCR Results: NOT detected    *******Please Note:*******    GI panel PCR evaluates for:    Campylobacter, Plesiomonas shigelloides, Salmonella,    Vibrio, Yersinia enterocolitica, Enteroaggregative    Escherichia coli (EAEC), Enteropathogenic E.coli (EPEC),    Enterotoxigenic E. coli (ETEC) lt/st, Shiga-like    toxin-producing E. coli (STEC) stx1/stx2,    Shigella/ Enteroinvasive E. coli (EIEC), Cryptosporidium,    Cyclospora cayetanensis, Entamoeba histolytica,    Giardia lamblia, Adenovirus F 40/41, Astrovirus,    Norovirus GI/GII, Rotavirus A, Sapovirus    Culture - Blood (collected 29 Aug 2021 16:04)  Source: .Blood Blood  Preliminary Report (31 Aug 2021 17:01):    No growth at 2 days.    Culture - Blood (collected 29 Aug 2021 16:04)  Source: .Blood Blood  Gram Stain (30 Aug 2021 17:52):    Aerobic Bottle: Gram Positive Cocci in Clusters    Result called to and read back by_ Mr. Dagoberto Pardo RN(4UR) 08/30/2021    17:52:24  Preliminary Report (31 Aug 2021 09:41):    Growth in aerobic bottle: Staphylococcus aureus    Presumptive Methicillin susceptible    Confirmation to follow within 24 hrs.    Susceptibility to follow.    Anaerobic Bottle: No growth to date.    Change in culture status will be immediately reported.    CARDIAC MARKERS ( 01 Sep 2021 07:53 )  x     / x     / <7 U/L / x     / x        RADIOLOGY:    PHYSICAL EXAM:  GEN: No acute distress  HEENT:   LUNGS: Clear to auscultation bilaterally   HEART: S1/S2 present. RRR.   ABD: Soft, non-tender, non-distended. Bowel sounds present  EXT:  NEURO: AAOX3     LENGTH OF HOSPITAL STAY: 7d    SUBJECTIVE: No acute overnight events. No complaints.     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 Hx obtained from daughter. At HD session yesterday, pt developed shaking chills. HD session was stopped and pt went home. At home, he continued to have shaking chills and was febrile with tmax 101. He had an associated decrease in appetite and lower extremity weakness causing difficulty walking. Denies n/v/abdominal pain. Daughter states that since DRESS syndrome induced CKD, pt has been doing much better requiring 2 sessions of HD per week, and is on no medication.   Also reports low back pain for 4-5 days, worsened by movement. Daughter arranged at home PT and heating pads which helped with the pain. Pt reports pain only when standing/walking. Pt not in pain at the moment.    In the ED,  VS: T98.8   , HR87   , /83    , RR16    , SpO2  98   % (RA)  Labs: WBC 16.10, auto neut #14.20, 88.2% neut, PT14.3, INR1.2, PTT47.4, BUN 39, Cr 2.86,   Urine: pyuria and bacteriuria  EKG: NSR  CXR: Normal  Imaging: None  Interventions: 750mg vanc, 3.375g zosyn     (25 Aug 2021 19:08)    PAST MEDICAL & SURGICAL HISTORY:  HTN (hypertension)  Hemophilia A  Gout  History of BPH  Chronic kidney disease (CKD)  DRESS syndrome  Uses cochlear implant    ALLERGIES:  allopurinol (Other)    MEDICATIONS:  STANDING MEDICATIONS  DAPTOmycin IVPB        PRN MEDICATIONS  acetaminophen   Tablet .. 650 milliGRAM(s) Oral every 6 hours PRN  melatonin 3 milliGRAM(s) Oral at bedtime PRN    VITALS:   T(F): 98.4  HR: 101  BP: 123/82  RR: 18  SpO2: 97%    LABS:                        11.8   16.41 )-----------( 361      ( 01 Sep 2021 07:53 )             35.1     09-01    133<L>  |  95<L>  |  51<H>  ----------------------------<  99  3.1<L>   |  22  |  3.80<H>    Ca    9.2      01 Sep 2021 07:53  Phos  4.7     09-01  Mg     2.4     09-01    TPro  5.8<L>  /  Alb  2.9<L>  /  TBili  1.1  /  DBili  x   /  AST  9<L>  /  ALT  9<L>  /  AlkPhos  224<H>  09-01    PT/INR - ( 01 Sep 2021 07:53 )   PT: 16.1 sec;   INR: 1.36       PTT - ( 01 Sep 2021 07:53 )  PTT:47.6 sec    Creatine Kinase, Serum: <7 U/L *L* (09-01-21 @ 07:53)    Culture - Blood (collected 31 Aug 2021 16:06)  Source: .Blood Blood  Preliminary Report (01 Sep 2021 05:02):    No growth at 12 hours    GI PCR Panel, Stool (collected 29 Aug 2021 22:14)  Source: .Stool Feces  Final Report (30 Aug 2021 02:21):    GI PCR Results: NOT detected    *******Please Note:*******    GI panel PCR evaluates for:    Campylobacter, Plesiomonas shigelloides, Salmonella,    Vibrio, Yersinia enterocolitica, Enteroaggregative    Escherichia coli (EAEC), Enteropathogenic E.coli (EPEC),    Enterotoxigenic E. coli (ETEC) lt/st, Shiga-like    toxin-producing E. coli (STEC) stx1/stx2,    Shigella/ Enteroinvasive E. coli (EIEC), Cryptosporidium,    Cyclospora cayetanensis, Entamoeba histolytica,    Giardia lamblia, Adenovirus F 40/41, Astrovirus,    Norovirus GI/GII, Rotavirus A, Sapovirus    Culture - Blood (collected 29 Aug 2021 16:04)  Source: .Blood Blood  Preliminary Report (31 Aug 2021 17:01):    No growth at 2 days.    Culture - Blood (collected 29 Aug 2021 16:04)  Source: .Blood Blood  Gram Stain (30 Aug 2021 17:52):    Aerobic Bottle: Gram Positive Cocci in Clusters    Result called to and read back by_ Mr. Dagoberto Pardo RN(4UR) 08/30/2021    17:52:24  Preliminary Report (31 Aug 2021 09:41):    Growth in aerobic bottle: Staphylococcus aureus    Presumptive Methicillin susceptible    Confirmation to follow within 24 hrs.    Susceptibility to follow.    Anaerobic Bottle: No growth to date.    Change in culture status will be immediately reported.    CARDIAC MARKERS ( 01 Sep 2021 07:53 )  x     / x     / <7 U/L / x     / x        RADIOLOGY:  Reviewed    PHYSICAL EXAM:  GEN: No acute distress  HEENT: NCAT  LUNGS: Clear to auscultation bilaterally   HEART: S1/S2 present. RRR.   ABD: Soft, non-tender, non-distended. Bowel sounds present  EXT: No pitting edema  NEURO: AAOX3

## 2021-09-01 NOTE — PROGRESS NOTE ADULT - SUBJECTIVE AND OBJECTIVE BOX
Patient is a 76y Male seen and evaluated at bedside.   GUANAKO concerning for vegetation, CTSx rec medical management and repeat GUANAKO   last BCx 8/29 remain +ve   hypokalemic   BP is acceptable   no complaints  Cr is downtrending   no hemodialysis today     Meds:    acetaminophen   Tablet .. 650 every 6 hours PRN  DAPTOmycin IVPB    melatonin 3 at bedtime PRN      T(C): , Max: 37.1 (08-31-21 @ 21:37)  T(F): , Max: 98.8 (08-31-21 @ 21:37)  HR: 101 (09-01-21 @ 08:50)  BP: 123/82 (09-01-21 @ 08:50)  BP(mean): --  RR: 18 (09-01-21 @ 08:50)  SpO2: 97% (09-01-21 @ 08:50)  Wt(kg): --    08-31 @ 07:01  -  09-01 @ 07:00  --------------------------------------------------------  IN: 0 mL / OUT: 200 mL / NET: -200 mL          Review of Systems:  GEN: no longer complaining of anorexia, strange taste  RESPIRATORY: No shortness of breath, cough   CARDIOVASCULAR: No chest pain or shortness of breath  GI: no nausea, no vomit or abdo pain   NEUROLOGICAL: No headaches or blurred vision  SKIN: No skin rashes   MUSCULOSKELETAL: no back pain      PHYSICAL EXAM:  GENERAL: well-developed, well nourished, alert, no acute distress at present  CHEST/LUNG: Clear to auscultation bilaterally no rales, rhonchi or wheezing  HEART: normal S1S2, RRR  ABDOMEN: Soft, Nontender  EXTREMITIES: No clubbing, cyanosis, or edema         LABS:                        11.8   16.41 )-----------( 361      ( 01 Sep 2021 07:53 )             35.1     09-01    133<L>  |  95<L>  |  51<H>  ----------------------------<  99  3.1<L>   |  22  |  3.80<H>    Ca    9.2      01 Sep 2021 07:53  Phos  4.7     09-01  Mg     2.4     09-01    TPro  5.8<L>  /  Alb  2.9<L>  /  TBili  1.1  /  DBili  x   /  AST  9<L>  /  ALT  9<L>  /  AlkPhos  224<H>  09-01      PT/INR - ( 01 Sep 2021 07:53 )   PT: 16.1 sec;   INR: 1.36          PTT - ( 01 Sep 2021 07:53 )  PTT:47.6 sec          RADIOLOGY & ADDITIONAL STUDIES:           Patient is a 76y Male seen and evaluated at bedside.   GUANAKO concerning for vegetation, CTSx rec medical management and repeat GUANAKO   last BCx 8/29 remain +ve   hypokalemic   BP is acceptable   no complaints  Cr is downtrending   no hemodialysis today     Meds:    acetaminophen   Tablet .. 650 every 6 hours PRN  DAPTOmycin IVPB    melatonin 3 at bedtime PRN      T(C): , Max: 37.1 (08-31-21 @ 21:37)  T(F): , Max: 98.8 (08-31-21 @ 21:37)  HR: 101 (09-01-21 @ 08:50)  BP: 123/82 (09-01-21 @ 08:50)  BP(mean): --  RR: 18 (09-01-21 @ 08:50)  SpO2: 97% (09-01-21 @ 08:50)  Wt(kg): --    08-31 @ 07:01  -  09-01 @ 07:00  --------------------------------------------------------  IN: 0 mL / OUT: 200 mL / NET: -200 mL          Review of Systems:  GEN: no longer complaining of anorexia, strange taste  RESPIRATORY: No shortness of breath, cough   CARDIOVASCULAR: No chest pain or shortness of breath  GI: no nausea, no vomit or abdo pain   NEUROLOGICAL: No headaches or blurred vision  SKIN: No skin rashes   MUSCULOSKELETAL: no back pain      PHYSICAL EXAM:  GENERAL: alert, no acute distress at present  CHEST/LUNG: Clear to auscultation bilaterally no rales, rhonchi or wheezing  HEART: normal S1S2, RRR  ABDOMEN: Soft, Nontender  EXTREMITIES: No clubbing, cyanosis, or edema         LABS:                        11.8   16.41 )-----------( 361      ( 01 Sep 2021 07:53 )             35.1     09-01    133<L>  |  95<L>  |  51<H>  ----------------------------<  99  3.1<L>   |  22  |  3.80<H>    Ca    9.2      01 Sep 2021 07:53  Phos  4.7     09-01  Mg     2.4     09-01    TPro  5.8<L>  /  Alb  2.9<L>  /  TBili  1.1  /  DBili  x   /  AST  9<L>  /  ALT  9<L>  /  AlkPhos  224<H>  09-01      PT/INR - ( 01 Sep 2021 07:53 )   PT: 16.1 sec;   INR: 1.36          PTT - ( 01 Sep 2021 07:53 )  PTT:47.6 sec          RADIOLOGY & ADDITIONAL STUDIES:

## 2021-09-01 NOTE — PROGRESS NOTE ADULT - PROBLEM SELECTOR PLAN 12
F: s/p 500 cc, tolerating PO  E: repleteas er nephro K goal > 3.5  N: DASH/TLC    VTE Prophylaxis: SCDS (hemophilia no need for anticoag)  GI: not needed  C: Full Code  D: RMF

## 2021-09-01 NOTE — PROGRESS NOTE ADULT - PROBLEM SELECTOR PLAN 1
POA, d/t MSSA bacteremia, c/b L5-S1 discitis/OM; GUANAKO 8/30 shows 0.9 cm x 0.5 cm echodensity on the atrial side of the anterior leaflet of the mitral valve, highly suspicious for a vegetation, can't r/o abscess; appreciate ID, CTS, and Ortho recs; cont. daptomycin; f/u surveillance cultures, gallium scan, repeat TTE 9/7; no need for spine biopsy, per Ortho (risks would outweigh benefits), and unable to obtain MRI d/t non-compatible cochlear implant; cont. PT, WBAT; trend WBC, noted to be increasing

## 2021-09-01 NOTE — PROGRESS NOTE ADULT - ASSESSMENT
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 Found to have BCx growing MSSA, complicated by mitral valve endocarditis and discitis-osteomyelitis, currently on medical management, awaiting repeat TTE on . Hematology Oncology was initially consulted for preoperative optimization prior to HD cath placement. 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     Hemophilia A  For preoperative optimization prior to HD cath placement it is imperative trend the patient's Factor VIII level. Of note, patient had an HD catheter replaced in March in which the patient received rFVIII and DDAVP mono-procedure and post-procedure for HD catheter placement. On that admission, mild inhibitor noted on 2 hr mixing study.     Factor VIII replacement is calculated by the following in IU.   Factor VIII units required = [(desired peak factor VIII level - patient's baseline factor VIII level) × body weight (kg)]/2  If Mr. Don, given history of prior bleeding with procedure, would aim for a desired peak factor VIII level of 100, resulting in approximately 1250 units of factor VIII    Alternatively DDAVP is an option, but that is in patient's who have previously demonstrated a response to DDAVP  Dosin.3 mcg/kg once (maximum recommended dose: 20 to 30 mcg). If used for prevention of surgical bleeding, initial (preoperative) dose should be timed to provide maximal coverage at the time of greatest bleeding risk (typically 30 to 60 minutes before the procedure)    Plan:  - Trend factor VIII level, PT/INR, PTT daily  - mixing studies showing 2hr prolongation indicating an inhibitor. Lupus anticoagulant was strongly positive, suggesting a possible diagnosis of APLS. Send anti-beta2 glycoprotein IgG/IgM, anticardiolipin Ab IgG/IgM. Diagnosis of APLS will require an elevated antibody testing on 2 or more occasions, 12 weeks apart.    - s/p 1250 units of recombinant factor VIII on  and Tranexamic acid 10 mg/kg once on  prior to permacath removal w/o bleeding complications  - s/p 750 units of recombinant factor VIII on    - pending inhibitor assay (Eagle units). Send Factor VIII inhibitor testing   - sometimes inhibitors can be overcome by rFVIII however if Eagle units too high he will need a bypass agent prior to any invasive procedures, like FEIBA, NovoSeven or recombinant porcine FVIII  - please contact Heme fellow prior to any invasive procedure for recommendations    To discuss with Dr. Harden

## 2021-09-01 NOTE — PROGRESS NOTE ADULT - ATTENDING COMMENTS
See the Fellow's note written above, for details. I reviewed the fellow documentation.  I have personally seen and examined this patient today. I have reviewed vitals, labs, medications, and imaging. I agree with the fellow's findings and plans as written above with the following additions/amendments:    Patient seen and examined at bedside. Feeling okay, denies any chills, fever, CP, palpitations. Still not eating well but no particular complaints     .  VITAL SIGNS:  T(F): 98.4 (09-01-21 @ 08:50), Max: 98.8 (08-31-21 @ 21:37)  HR: 101 (09-01-21 @ 08:50) (74 - 102)  BP: 123/82 (09-01-21 @ 08:50) (114/76 - 125/77)  BP(mean): --  RR: 18 (09-01-21 @ 08:50) (18 - 18)  SpO2: 97% (09-01-21 @ 08:50) (94% - 98%)    PHYSICAL EXAM:  Constitutional: Thin, Resting comfortably in bed; NAD  HEENT:  EOMI, anicteric sclera; MMM  Respiratory: CTA B/L; no W/R/R, no accessory muscle use  Cardiac: +S1/S2; RRR; no M/R/G  Gastrointestinal: soft, NT/ND; no rebound or guarding; +BS  Extremities: WWP, no clubbing or cyanosis; no peripheral edema  Dermatologic: skin warm, dry and intact; no rashes, wounds, or scars  Access: removed, site without purulence    Anemia - at goal  MBD - phos, pth at goal, continue low phos diet  No acute indication for HD, monitoring closely, awaiting line placement when possible.

## 2021-09-01 NOTE — PROGRESS NOTE ADULT - ASSESSMENT
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 Admitted for bacteremia in the setting of osteomyelitis and discitis. No Permcath dialysis access at the moment. 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   Vascular surgery following to determine placement of new HD catheter.    Per conversation with Dr. Mcallister at the time of this note, there is no need of placement of a dialysis catheter at the moment. Renal function has been stable. Per nephrology note there is no need for dialysis today and per infectious disease we can't get new HD line until blood cultures are negative for 72 hours.     We will continue to follow patient. Please, contact us for questions and concerns.

## 2021-09-01 NOTE — PROGRESS NOTE ADULT - SUBJECTIVE AND OBJECTIVE BOX
SUBJECTIVE: Patient seen and examined bedside.    DAPTOmycin IVPB          Vital Signs Last 24 Hrs  T(C): 36.9 (01 Sep 2021 08:50), Max: 37.1 (31 Aug 2021 21:37)  T(F): 98.4 (01 Sep 2021 08:50), Max: 98.8 (31 Aug 2021 21:37)  HR: 101 (01 Sep 2021 08:50) (74 - 102)  BP: 123/82 (01 Sep 2021 08:50) (114/76 - 125/77)  BP(mean): --  RR: 18 (01 Sep 2021 08:50) (18 - 18)  SpO2: 97% (01 Sep 2021 08:50) (94% - 98%)  I&O's Detail    31 Aug 2021 07:01  -  01 Sep 2021 07:00  --------------------------------------------------------  IN:  Total IN: 0 mL    OUT:    Voided (mL): 200 mL  Total OUT: 200 mL    Total NET: -200 mL          General: NAD, resting comfortably in bed  C/V: NSR  Pulm: Nonlabored breathing, no respiratory distress  Abd: soft, NT/ND.  Extrem: WWP, no edema, SCDs in place        LABS:                        11.8   16.41 )-----------( 361      ( 01 Sep 2021 07:53 )             35.1     09-01    133<L>  |  95<L>  |  51<H>  ----------------------------<  99  3.1<L>   |  22  |  3.80<H>    Ca    9.2      01 Sep 2021 07:53  Phos  4.7     09-01  Mg     2.4     09-01    TPro  5.8<L>  /  Alb  2.9<L>  /  TBili  1.1  /  DBili  x   /  AST  9<L>  /  ALT  9<L>  /  AlkPhos  224<H>  09-01    PT/INR - ( 01 Sep 2021 07:53 )   PT: 16.1 sec;   INR: 1.36          PTT - ( 01 Sep 2021 07:53 )  PTT:47.6 sec      RADIOLOGY & ADDITIONAL STUDIES:   SUBJECTIVE: Patient seen and examined bedside. No acute events overnight. Patient has not been febrile.     DAPTOmycin IVPB          Vital Signs Last 24 Hrs  T(C): 36.9 (01 Sep 2021 08:50), Max: 37.1 (31 Aug 2021 21:37)  T(F): 98.4 (01 Sep 2021 08:50), Max: 98.8 (31 Aug 2021 21:37)  HR: 101 (01 Sep 2021 08:50) (74 - 102)  BP: 123/82 (01 Sep 2021 08:50) (114/76 - 125/77)  BP(mean): --  RR: 18 (01 Sep 2021 08:50) (18 - 18)  SpO2: 97% (01 Sep 2021 08:50) (94% - 98%)  I&O's Detail    31 Aug 2021 07:01  -  01 Sep 2021 07:00  --------------------------------------------------------  IN:  Total IN: 0 mL    OUT:    Voided (mL): 200 mL  Total OUT: 200 mL    Total NET: -200 mL      General: NAD, resting comfortably in bed  C/V: NSR  Pulm: Nonlabored breathing, no respiratory distress  Abd: soft, NT/ND.  Extrem: WWP, no edema, SCDs in place      LABS:                        11.8   16.41 )-----------( 361      ( 01 Sep 2021 07:53 )             35.1     09-01    133<L>  |  95<L>  |  51<H>  ----------------------------<  99  3.1<L>   |  22  |  3.80<H>    Ca    9.2      01 Sep 2021 07:53  Phos  4.7     09-01  Mg     2.4     09-01    TPro  5.8<L>  /  Alb  2.9<L>  /  TBili  1.1  /  DBili  x   /  AST  9<L>  /  ALT  9<L>  /  AlkPhos  224<H>  09-01    PT/INR - ( 01 Sep 2021 07:53 )   PT: 16.1 sec;   INR: 1.36          PTT - ( 01 Sep 2021 07:53 )  PTT:47.6 sec      RADIOLOGY & ADDITIONAL STUDIES:

## 2021-09-01 NOTE — PROGRESS NOTE ADULT - PROBLEM SELECTOR PLAN 6
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 Cr 2.86 on admission. Patient underwent dialysis yesterday, Cr this morning 2.17. Electrolytes, Ca, Phos, Hb at goal, euvolemic on exam. Heme consulted as patient's catheter needs to be removed given +Bcx MSSA bacteremia. Renal following.  - ESRD on HD  @[facility] usual Rx 3h 0.5L   - per Heme recs, TXA and factor VIII given prior to HD cath removal and procedure without complications  - trending daily Creatinine, BUN, electrolyte abnormalities, HCO3    As per nephro 9/1/21,  - No acute indication for HD  - replete K to 3.5, 20 KCL PO   - possibly uremic w/anorexia nausea and strange taste though no complaints today   - f/u with vascular regarding access

## 2021-09-01 NOTE — PROGRESS NOTE ADULT - ASSESSMENT
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 Found to have discitis and OM. Ucx from 8/25 grow Enterobacter Colacae, asymptomatic. Bcx from 8/25 grows MSSA 3/4 bottles. TTE 8/27 with normal. BCx 8/29 NGTD, today worsening WBC and renal function. GUANAKO 8/30 showed 0.9 cm x 0.5 cm on the atrial side of the anterior leaflet of the mitral valve, highly suspicious for a vegetation and could not rule out abscess formation. CT surg recommended medical management with repeat TTE on 9/7 and daily Bcx. Today last BCx 8/31 NGTD. Patient is afebrile, WBC is up trending now    - C/W Dapto 500mg q48h   - GUANAKO w/ vegetation and suspected abscess, discussed with CTS, will obtain gallium scan to confirm abscess  - f/u gallium scan, if abscess present will need surgical intervention; if neg will proceed with medical management and repeat TTE on 9/7  - Please obtain EKG  - Daily Bcx, if NG >72hr can place new HD if indicated    ID Team 1 will continue to follow. Please see below attending attestation for finalized recommendations.    Marbin Penny MD PGY2 Internal Medicine  Infectious Disease Consult Service, Team 1

## 2021-09-01 NOTE — PROGRESS NOTE ADULT - ATTENDING COMMENTS
Feeling ok, no complaints.  WBC going up. BCx so far ngtd since 8/31.  Awaiting gallium scan to r/o perivalvular abscess.  Cont daptomycin for MSSA NVE of MV.  Plan for repeat GUANAKO on 9/7 to reassess the vegetation and valvular function.    Team 1 will follow you.  Dr. Schneider will take over the care tomorrow.  Case d/w primary team.    Joyce Barnard MD, MS  Infectious Disease attending  work cell [phone]

## 2021-09-01 NOTE — PROGRESS NOTE ADULT - PROBLEM SELECTOR PLAN 1
On admission pt met 3/4 SIRS criteria; HR 96, WBC 16.10 with neutrophilic predominance, Tmax 103.2F. Positive UA. Negative CXR. Patient empirically treated in the ED with 750mg vanc and 3.375g zosyn on 8/25, then CTX 2g, then one more dose of Vanc on 8/26. ID consulted. Bcx from 8/25 grew MSSA 3/4 bottles (likely recurrent as pt was diagnosed with MSSA bacteremia in March).  - daily surveillance bcx  - Initial Bc 8/25/21: MSSA 2/2 bottles   - Bcx 8/27/21: 1 of 1 bottles + MSSA > Bcx 8/29/21: 1 of 2 bottles + MSSA  - Permacath removed on 8/27/21, as per ID  - Cath culture 8/27/21: final result no growth  - continue Nafcillin 2g q4 for best MSSA coverage per ID started on 8/26/21 On admission pt met 3/4 SIRS criteria; HR 96, WBC 16.10 with neutrophilic predominance, Tmax 103.2F. Positive UA. Negative CXR. Patient empirically treated in the ED with 750mg vanc and 3.375g zosyn on 8/25, then CTX 2g, then one more dose of Vanc on 8/26. ID consulted. Bcx from 8/25 grew MSSA 3/4 bottles (likely recurrent as pt was diagnosed with MSSA bacteremia in March).  - daily surveillance bcx  - Initial Bc 8/25/21: MSSA 2/2 bottles   - Permacath removed on 8/27/21, as per ID  - Cath culture 8/27/21: final result no growth  - continue Nafcillin 2g q4 for best MSSA coverage per ID started on 8/26/21  - C--- On admission pt met 3/4 SIRS criteria; HR 96, WBC 16.10 with neutrophilic predominance, Tmax 103.2F. Positive UA. Negative CXR. Patient empirically treated in the ED with 750mg vanc and 3.375g zosyn on 8/25, then CTX 2g, then one more dose of Vanc on 8/26. ID consulted. Bcx from 8/25 grew MSSA 3/4 bottles (likely recurrent as pt was diagnosed with MSSA bacteremia in March).  - daily surveillance bcx  - Initial Bc 8/25/21: MSSA 2/2 bottles   - Permacath removed on 8/27/21, as per ID  - Cath culture 8/27/21: final result no growth  - continue Nafcillin 2g q4 for best MSSA coverage per ID started on 8/26/21  - Continue surveillance culture

## 2021-09-01 NOTE — PROGRESS NOTE ADULT - PROBLEM SELECTOR PLAN 3
Low back pain with ambulation for 4-5 days. Per daughter, PT and warm compresses at home alleviated his pain. He does not report pain at bedside today. No spinal tenderness, strength 5/5 in b/l LE's, negative straight leg raise. CT lumbar spine performed, findings diagnostic for discitis-osteomyelitis. ID consulted, ortho following.  - as above. 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 No acute ortho intervention at this time and no further imaging indicated Low back pain with ambulation for 4-5 days. Per daughter, PT and warm compresses at home alleviated his pain. He does not report pain at bedside today. No spinal tenderness, strength 5/5 in b/l LE's, negative straight leg raise. CT lumbar spine performed, findings diagnostic for discitis-osteomyelitis. ID consulted, ortho following.  - as above. 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 No acute ortho intervention at this time and no further imaging indicated.  - Gallium scan 9/1/21:  Uptake within the lower lumbar spine likely corresponding to area of abnormality on prior CT lumbar spine 8/26/2021 at L5-S1 which is suggestive of discitis osteomyelitis.

## 2021-09-01 NOTE — PROGRESS NOTE ADULT - PROBLEM SELECTOR PLAN 2
As per ID, evaluation of possible endocarditis due to Bcx +MSSA, osteomyelitis and discitis.   -TTE 8/27/21: The mitral valve is mildly thickened. There is mild bileaflet valve prolapse (anterior > posterior). There is trace mitral regurgitation. Normal left and right ventricular size and systolic function.    - GUANAKO 8/30/21:       - No LA/RA/LISA/RAA thrombus seen.       - There is mitral valve prolapse of the anterior leaflet. There is an irregularly shaped linear echodensity measuring 0.9 cm x 0.5 cm on the atrial north of the anterior leaflet of the mitral valve, highly suspicious for a vegetation.      - There is thickening of the intervalvular fibrosa with systolic expansion - cannot rule out abscess formation.      - There is severe non-mobile plaque seen in the visualized portion of the descending aorta. There is severe non-mobile plaque seen in the visualized portion of the aortic arch.      - Compared to the previous GUANAKO performed on 2/26/2021, the mitral valve vegetation is new. The aortic root (intervalvular fibrosa) thickening is more prominent.    Plan:  - repeat GUANAKO vs TTE on Tuesday July 7, 2021.   - continue to monitor surveillance blood culture   - continue Nafcillin 2g q4 for best MSSA coverage per ID started on 8/26/21  - F/u ID and CT surgery recs As per ID, evaluation of possible endocarditis due to Bcx +MSSA, osteomyelitis and discitis.   -TTE 8/27/21: The mitral valve is mildly thickened. There is mild bileaflet valve prolapse (anterior > posterior). There is trace mitral regurgitation. Normal left and right ventricular size and systolic function.  - GUANAKO 8/30/21:       - No LA/RA/LISA/RAA thrombus seen.       - There is mitral valve prolapse of the anterior leaflet. There is an irregularly shaped linear echodensity measuring 0.9 cm x 0.5 cm on the atrial north of the anterior leaflet of the mitral valve, highly suspicious for a vegetation.      - There is thickening of the intervalvular fibrosa with systolic expansion - cannot rule out abscess formation.      - Compared to the previous GUANAKO performed on 2/26/2021, the mitral valve vegetation is new.       Plan:  - repeat GUANAKO vs TTE on Tuesday July 7, 2021.   - F/u ID and CT surgery recs As per ID, evaluation of possible endocarditis due to Bcx +MSSA, osteomyelitis and discitis.   -TTE 8/27/21: The mitral valve is mildly thickened. There is mild bileaflet valve prolapse (anterior > posterior). There is trace mitral regurgitation. Normal left and right ventricular size and systolic function.  - GUANAKO 8/30/21:       - No LA/RA/LISA/RAA thrombus seen.       - There is mitral valve prolapse of the anterior leaflet. There is an irregularly shaped linear echodensity measuring 0.9 cm x 0.5 cm on the atrial north of the anterior leaflet of the mitral valve, highly suspicious for a vegetation.      - There is thickening of the intervalvular fibrosa with systolic expansion - cannot rule out abscess formation.      - Compared to the previous GUANAKO performed on 2/26/2021, the mitral valve vegetation is new.  - Gallium scan 9/1/21: No significant uptake within the mitral valve region.  Plan:  - repeat GUANAKO vs TTE on Tuesday July 7, 2021.   - F/u ID and CT surgery recs

## 2021-09-01 NOTE — PROGRESS NOTE ADULT - PROBLEM SELECTOR PLAN 9
One episode of watery diarrhea on Saturday 8/28/21, 1 episode of small loss stool today  - will monitor for s/s of infection  - miralax/senna discontinued  - GI PCR: 8/30/21 Negative  on admission. Last known level 169 (3/21) could be 2/2 to renal bone disease versus hepatobiliary.  this AM.  - decreasing trend

## 2021-09-01 NOTE — PROGRESS NOTE ADULT - SUBJECTIVE AND OBJECTIVE BOX
INTERVAL HPI/OVERNIGHT EVENTS:    SUBJECTIVE: Patient seen and examined at bedside.     OBJECTIVE:    VITAL SIGNS:  ICU Vital Signs Last 24 Hrs  T(C): 36.9 (01 Sep 2021 05:24), Max: 37.1 (31 Aug 2021 21:37)  T(F): 98.5 (01 Sep 2021 05:24), Max: 98.8 (31 Aug 2021 21:37)  HR: 102 (01 Sep 2021 05:24) (71 - 116)  BP: 125/77 (01 Sep 2021 05:24) (93/68 - 125/77)  BP(mean): --  ABP: --  ABP(mean): --  RR: 18 (01 Sep 2021 05:24) (18 - 18)  SpO2: 96% (01 Sep 2021 05:24) (94% - 98%)        CAPILLARY BLOOD GLUCOSE          PHYSICAL EXAM:    General: NAD  HEENT: NC/AT; PERRL, clear conjunctiva  Neck: supple  Respiratory: CTA b/l  Cardiovascular: +S1/S2; RRR  Abdomen: soft, NT/ND; +BS x4  Extremities: WWP, 2+ peripheral pulses b/l; no LE edema  Skin: normal color and turgor; no rash  Neurological:    MEDICATIONS:  MEDICATIONS  (STANDING):  DAPTOmycin IVPB        MEDICATIONS  (PRN):  acetaminophen   Tablet .. 650 milliGRAM(s) Oral every 6 hours PRN Temp greater or equal to 38C (100.4F), Moderate Pain (4 - 6)  melatonin 3 milliGRAM(s) Oral at bedtime PRN Insomnia      ALLERGIES:  Allergies    allopurinol (Other)    Intolerances        LABS:                        12.4   13.12 )-----------( 334      ( 31 Aug 2021 06:45 )             37.4     08-31    136  |  94<L>  |  52<H>  ----------------------------<  84  3.1<L>   |  23  |  4.05<H>    Ca    8.6      31 Aug 2021 06:45  Phos  5.3     08-31  Mg     2.6     08-31    TPro  5.5<L>  /  Alb  2.9<L>  /  TBili  2.7<H>  /  DBili  2.1<H>  /  AST  14  /  ALT  12  /  AlkPhos  252<H>  08-31    PT/INR - ( 31 Aug 2021 06:45 )   PT: 16.5 sec;   INR: 1.40          PTT - ( 31 Aug 2021 06:45 )  PTT:45.3 sec      RADIOLOGY & ADDITIONAL TESTS: Reviewed. INTERVAL HPI/OVERNIGHT EVENTS: NAOE    SUBJECTIVE: Patient seen and examined at bedside. Patient reports presence of uncomfortable taste and some mouth ulcers since starting midodrine several months ago that make eating uncomfortable. Rest of ROS unremarkable.     OBJECTIVE:    VITAL SIGNS:  ICU Vital Signs Last 24 Hrs  T(C): 36.9 (01 Sep 2021 05:24), Max: 37.1 (31 Aug 2021 21:37)  T(F): 98.5 (01 Sep 2021 05:24), Max: 98.8 (31 Aug 2021 21:37)  HR: 102 (01 Sep 2021 05:24) (71 - 116)  BP: 125/77 (01 Sep 2021 05:24) (93/68 - 125/77)  BP(mean): --  ABP: --  ABP(mean): --  RR: 18 (01 Sep 2021 05:24) (18 - 18)  SpO2: 96% (01 Sep 2021 05:24) (94% - 98%)        CAPILLARY BLOOD GLUCOSE          PHYSICAL EXAM:    General: NAD, resting comfortably in bed  HEENT: NC/AT; PERRL, anicteric sclera; MMM with some small ulcers resembling canker sore   Neck: supple, no JVd  Chest: no erythema at upper right chest from permacath removal, no bruising, hematoma nor pain upon palpation  Cardiovascular: +S1/S2, RRR, no appreciated  murmur  Respiratory: CTA B/L; no W/R/R  Gastrointestinal: soft, NT/ND; +BS, no rebound or guarding  : no suprapubic tenderness, HD cath bandaged but no surrounding erythema or tenderness  Back: skin intact, no CVA tenderness, no ecchymosis, no TTP over spinous processes of vertebra.  Extremities: WWP; no edema, clubbing or cyanosis  Vascular: 2+ radial, DP/PT pulses B/L  Neurological: AAOx3; no focal deficits. Strength 5/5 in b/l LEs and UEs  Psychiatric: pleasant mood and affect  Dermatologic: no appreciable wounds, damage to the skin, splinter hemorrhaages, osler nodes,    MEDICATIONS:  MEDICATIONS  (STANDING):  DAPTOmycin IVPB        MEDICATIONS  (PRN):  acetaminophen   Tablet .. 650 milliGRAM(s) Oral every 6 hours PRN Temp greater or equal to 38C (100.4F), Moderate Pain (4 - 6)  melatonin 3 milliGRAM(s) Oral at bedtime PRN Insomnia      ALLERGIES:  Allergies    allopurinol (Other)    Intolerances        LABS:                        12.4   13.12 )-----------( 334      ( 31 Aug 2021 06:45 )             37.4     08-31    136  |  94<L>  |  52<H>  ----------------------------<  84  3.1<L>   |  23  |  4.05<H>    Ca    8.6      31 Aug 2021 06:45  Phos  5.3     08-31  Mg     2.6     08-31    TPro  5.5<L>  /  Alb  2.9<L>  /  TBili  2.7<H>  /  DBili  2.1<H>  /  AST  14  /  ALT  12  /  AlkPhos  252<H>  08-31    PT/INR - ( 31 Aug 2021 06:45 )   PT: 16.5 sec;   INR: 1.40          PTT - ( 31 Aug 2021 06:45 )  PTT:45.3 sec      RADIOLOGY & ADDITIONAL TESTS: Reviewed.

## 2021-09-01 NOTE — PROGRESS NOTE ADULT - PROBLEM SELECTOR PLAN 11
F: s/p 500 cc, tolerating PO  E: repleteas er nephro K goal > 3.5  N: DASH/TLC    VTE Prophylaxis: SCDS (hemophilia no need for anticoag)  GI: not needed  C: Full Code  D: RMF Normocytic anemia: Hb at goal today 13.6  As per Nephrology:  - no indication for EPO or IV Iron at this time

## 2021-09-01 NOTE — PROGRESS NOTE ADULT - SUBJECTIVE AND OBJECTIVE BOX
Patient is a 76y old  Male who presents with a chief complaint of sepsis likely 2/2 UTI (01 Sep 2021 12:07)      INTERVAL HPI/OVERNIGHT EVENTS:    Pt. seen and examined earlier today  Pt. had no complaints  Pt. denies F/C, CP, SOB, N/V, bleeding sx    Review of Systems: 12 point review of systems otherwise negative    MEDICATIONS  (STANDING):  DAPTOmycin IVPB        MEDICATIONS  (PRN):  acetaminophen   Tablet .. 650 milliGRAM(s) Oral every 6 hours PRN Temp greater or equal to 38C (100.4F), Moderate Pain (4 - 6)  melatonin 3 milliGRAM(s) Oral at bedtime PRN Insomnia      Allergies    allopurinol (Other)    Intolerances          Vital Signs Last 24 Hrs  T(C): 36.9 (01 Sep 2021 08:50), Max: 37.1 (31 Aug 2021 21:37)  T(F): 98.4 (01 Sep 2021 08:50), Max: 98.8 (31 Aug 2021 21:37)  HR: 101 (01 Sep 2021 08:50) (74 - 102)  BP: 123/82 (01 Sep 2021 08:50) (114/76 - 125/77)  BP(mean): --  RR: 18 (01 Sep 2021 08:50) (18 - 18)  SpO2: 97% (01 Sep 2021 08:50) (94% - 97%)  CAPILLARY BLOOD GLUCOSE          08-31 @ 07:01  -  09-01 @ 07:00  --------------------------------------------------------  IN: 0 mL / OUT: 200 mL / NET: -200 mL        Physical Exam:  (earlier today)  Daily     Daily   General:  non-toxic and comfortable-appearing in NAD  HEENT:  MMM  CV:  no murmur  Extremities:  no edema B/L LE  Skin:  WWP  Neuro:  AAOx3    LABS:                        11.8   16.41 )-----------( 361      ( 01 Sep 2021 07:53 )             35.1     09-01    133<L>  |  95<L>  |  51<H>  ----------------------------<  99  3.1<L>   |  22  |  3.80<H>    Ca    9.2      01 Sep 2021 07:53  Phos  4.7     09-01  Mg     2.4     09-01    TPro  5.8<L>  /  Alb  2.9<L>  /  TBili  1.1  /  DBili  x   /  AST  9<L>  /  ALT  9<L>  /  AlkPhos  224<H>  09-01    PT/INR - ( 01 Sep 2021 07:53 )   PT: 16.1 sec;   INR: 1.36          PTT - ( 01 Sep 2021 07:53 )  PTT:47.6 sec

## 2021-09-01 NOTE — PROGRESS NOTE ADULT - ASSESSMENT
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 GUANAKO concerning for vegetation, CTSx rec medical management and repeat GUANAKO. Last BCx 8/29 remain +ve.     #[facility]  usual Rx 3h 0.5L   -electrolytes at goal   -euvolemic on examination  -No acute indication for HD  dry wt TBD  replete K to 3.5, 20 KCL PO   possibly uremic w/anorexia nausea and strange taste though no complaints today     #Fevers  growing staph aureus   -repeat blood cx from 8/29 positive for staph aureus  -cannot get new HD line until neg x 72 hours  -GUANAKO concerning for vegetation pending repeat 9/7     #anemia  Hb at goal   no indication for EPO or IV Iron at this time   transfusion as per primary team     #renal bone disease   Ca and Phos acceptable, trend both daily     Thank you for the opportunity to participate in the care of your patient. The nephrology service remains available to assist with any questions or concerns. Please feel free to reach us by paging the on-call nephrology fellow for urgent issues or as below.     Ramón Dumont M.D.   PGY-5, Nephrology Fellow   C: [phone]   P: [phone]

## 2021-09-01 NOTE — PROGRESS NOTE ADULT - SUBJECTIVE AND OBJECTIVE BOX
**INCOMPLETE NOTE    OVERNIGHT EVENTS:    SUBJECTIVE:  Patient seen and examined at bedside.    Vital Signs Last 12 Hrs  T(F): 98.4 (09-01-21 @ 08:50), Max: 98.8 (08-31-21 @ 21:37)  HR: 101 (09-01-21 @ 08:50) (74 - 102)  BP: 123/82 (09-01-21 @ 08:50) (114/76 - 125/77)  BP(mean): --  RR: 18 (09-01-21 @ 08:50) (18 - 18)  SpO2: 97% (09-01-21 @ 08:50) (94% - 97%)  I&O's Summary    31 Aug 2021 07:01  -  01 Sep 2021 07:00  --------------------------------------------------------  IN: 0 mL / OUT: 200 mL / NET: -200 mL        PHYSICAL EXAM:  Constitutional: NAD, comfortable in bed.  HEENT: NC/AT, PERRLA, EOMI, no conjunctival pallor or scleral icterus, MMM  Neck: Supple, no JVD  Respiratory: CTA B/L. No w/r/r.   Cardiovascular: RRR, normal S1 and S2, no m/r/g.   Gastrointestinal: +BS, soft NTND, no guarding or rebound tenderness, no palpable masses   Extremities: wwp; no cyanosis, clubbing or edema.   Vascular: Pulses equal and strong throughout.   Neurological: AAOx3, no CN deficits, strength and sensation intact throughout.   Skin: No gross skin abnormalities or rashes        LABS:                        11.8   16.41 )-----------( 361      ( 01 Sep 2021 07:53 )             35.1     09-01    133<L>  |  95<L>  |  51<H>  ----------------------------<  99  3.1<L>   |  22  |  3.80<H>    Ca    9.2      01 Sep 2021 07:53  Phos  4.7     09-01  Mg     2.4     09-01    TPro  5.8<L>  /  Alb  2.9<L>  /  TBili  1.1  /  DBili  x   /  AST  9<L>  /  ALT  9<L>  /  AlkPhos  224<H>  09-01    PT/INR - ( 01 Sep 2021 07:53 )   PT: 16.1 sec;   INR: 1.36          PTT - ( 01 Sep 2021 07:53 )  PTT:47.6 sec        RADIOLOGY & ADDITIONAL TESTS:    MEDICATIONS  (STANDING):  DAPTOmycin IVPB        MEDICATIONS  (PRN):  acetaminophen   Tablet .. 650 milliGRAM(s) Oral every 6 hours PRN Temp greater or equal to 38C (100.4F), Moderate Pain (4 - 6)  melatonin 3 milliGRAM(s) Oral at bedtime PRN Insomnia     SUBJECTIVE:  Patient seen and examined at bedside. Patient has no new complaints. Patient denies h/n/v/d, fever, chills, cp, palpitations, sob, abd pain, leg swelling, rashes, dysuria, and changes in BM.     Vital Signs Last 12 Hrs  T(F): 98.4 (09-01-21 @ 08:50), Max: 98.8 (08-31-21 @ 21:37)  HR: 101 (09-01-21 @ 08:50) (74 - 102)  BP: 123/82 (09-01-21 @ 08:50) (114/76 - 125/77)  BP(mean): --  RR: 18 (09-01-21 @ 08:50) (18 - 18)  SpO2: 97% (09-01-21 @ 08:50) (94% - 97%)  I&O's Summary    31 Aug 2021 07:01  -  01 Sep 2021 07:00  --------------------------------------------------------  IN: 0 mL / OUT: 200 mL / NET: -200 mL    PHYSICAL EXAM:  Constitutional: NAD, comfortable in bed.  HEENT: NC/AT, PERRLA, EOMI, no conjunctival pallor or scleral icterus, MMM  Neck: Supple, no JVD  Respiratory: CTA B/L. No w/r/r.   Cardiovascular: RRR, normal S1 and S2, no m/r/g.   Gastrointestinal: +BS, soft NTND, no guarding or rebound tenderness, no palpable masses   Extremities: wwp; no cyanosis, clubbing or edema.   Vascular: Pulses equal and strong throughout.   Neurological: AAOx3, no CN deficits, strength and sensation intact throughout.   Skin: No gross skin abnormalities or rashes    LABS:                        11.8   16.41 )-----------( 361      ( 01 Sep 2021 07:53 )             35.1     09-01    133<L>  |  95<L>  |  51<H>  ----------------------------<  99  3.1<L>   |  22  |  3.80<H>    Ca    9.2      01 Sep 2021 07:53  Phos  4.7     09-01  Mg     2.4     09-01    TPro  5.8<L>  /  Alb  2.9<L>  /  TBili  1.1  /  DBili  x   /  AST  9<L>  /  ALT  9<L>  /  AlkPhos  224<H>  09-01    PT/INR - ( 01 Sep 2021 07:53 )   PT: 16.1 sec;   INR: 1.36          PTT - ( 01 Sep 2021 07:53 )  PTT:47.6 sec        RADIOLOGY & ADDITIONAL TESTS:    MEDICATIONS  (STANDING):  DAPTOmycin IVPB        MEDICATIONS  (PRN):  acetaminophen   Tablet .. 650 milliGRAM(s) Oral every 6 hours PRN Temp greater or equal to 38C (100.4F), Moderate Pain (4 - 6)  melatonin 3 milliGRAM(s) Oral at bedtime PRN Insomnia

## 2021-09-01 NOTE — PROGRESS NOTE ADULT - PROBLEM SELECTOR PLAN 4
CT lumbar spine performed, findings diagnostic for discitis-osteomyelitis. ID consulted, ortho following.  - treatment as above

## 2021-09-02 ENCOUNTER — TRANSCRIPTION ENCOUNTER (OUTPATIENT)
Age: 76
End: 2021-09-02

## 2021-09-02 LAB
ALBUMIN SERPL ELPH-MCNC: 2.7 G/DL — LOW (ref 3.3–5)
ALP SERPL-CCNC: 181 U/L — HIGH (ref 40–120)
ALT FLD-CCNC: 7 U/L — LOW (ref 10–45)
ANION GAP SERPL CALC-SCNC: 14 MMOL/L — SIGNIFICANT CHANGE UP (ref 5–17)
APTT BLD: 49.3 SEC — HIGH (ref 27.5–35.5)
AST SERPL-CCNC: 8 U/L — LOW (ref 10–40)
BASOPHILS # BLD AUTO: 0.06 K/UL — SIGNIFICANT CHANGE UP (ref 0–0.2)
BASOPHILS NFR BLD AUTO: 0.4 % — SIGNIFICANT CHANGE UP (ref 0–2)
BILIRUB SERPL-MCNC: 0.7 MG/DL — SIGNIFICANT CHANGE UP (ref 0.2–1.2)
BLD GP AB SCN SERPL QL: NEGATIVE — SIGNIFICANT CHANGE UP
BUN SERPL-MCNC: 46 MG/DL — HIGH (ref 7–23)
CALCIUM SERPL-MCNC: 9.5 MG/DL — SIGNIFICANT CHANGE UP (ref 8.4–10.5)
CHLORIDE SERPL-SCNC: 101 MMOL/L — SIGNIFICANT CHANGE UP (ref 96–108)
CO2 SERPL-SCNC: 22 MMOL/L — SIGNIFICANT CHANGE UP (ref 22–31)
CREAT SERPL-MCNC: 3.37 MG/DL — HIGH (ref 0.5–1.3)
EOSINOPHIL # BLD AUTO: 0.76 K/UL — HIGH (ref 0–0.5)
EOSINOPHIL NFR BLD AUTO: 5.6 % — SIGNIFICANT CHANGE UP (ref 0–6)
FACT VIII ACT/NOR PPP: 57 % — SIGNIFICANT CHANGE UP (ref 51–138)
GLUCOSE SERPL-MCNC: 90 MG/DL — SIGNIFICANT CHANGE UP (ref 70–99)
HCT VFR BLD CALC: 32.8 % — LOW (ref 39–50)
HGB BLD-MCNC: 11 G/DL — LOW (ref 13–17)
IMM GRANULOCYTES NFR BLD AUTO: 0.9 % — SIGNIFICANT CHANGE UP (ref 0–1.5)
INR BLD: 1.26 — HIGH (ref 0.88–1.16)
LYMPHOCYTES # BLD AUTO: 1.25 K/UL — SIGNIFICANT CHANGE UP (ref 1–3.3)
LYMPHOCYTES # BLD AUTO: 9.2 % — LOW (ref 13–44)
MAGNESIUM SERPL-MCNC: 2.4 MG/DL — SIGNIFICANT CHANGE UP (ref 1.6–2.6)
MCHC RBC-ENTMCNC: 27.5 PG — SIGNIFICANT CHANGE UP (ref 27–34)
MCHC RBC-ENTMCNC: 33.5 GM/DL — SIGNIFICANT CHANGE UP (ref 32–36)
MCV RBC AUTO: 82 FL — SIGNIFICANT CHANGE UP (ref 80–100)
MONOCYTES # BLD AUTO: 0.79 K/UL — SIGNIFICANT CHANGE UP (ref 0–0.9)
MONOCYTES NFR BLD AUTO: 5.8 % — SIGNIFICANT CHANGE UP (ref 2–14)
NEUTROPHILS # BLD AUTO: 10.55 K/UL — HIGH (ref 1.8–7.4)
NEUTROPHILS NFR BLD AUTO: 78.1 % — HIGH (ref 43–77)
NRBC # BLD: 0 /100 WBCS — SIGNIFICANT CHANGE UP (ref 0–0)
PHOSPHATE SERPL-MCNC: 4.1 MG/DL — SIGNIFICANT CHANGE UP (ref 2.5–4.5)
PLATELET # BLD AUTO: 328 K/UL — SIGNIFICANT CHANGE UP (ref 150–400)
POTASSIUM SERPL-MCNC: 3.2 MMOL/L — LOW (ref 3.5–5.3)
POTASSIUM SERPL-SCNC: 3.2 MMOL/L — LOW (ref 3.5–5.3)
PROT SERPL-MCNC: 5.4 G/DL — LOW (ref 6–8.3)
PROTHROM AB SERPL-ACNC: 14.9 SEC — HIGH (ref 10.6–13.6)
RBC # BLD: 4 M/UL — LOW (ref 4.2–5.8)
RBC # FLD: 16.4 % — HIGH (ref 10.3–14.5)
RH IG SCN BLD-IMP: POSITIVE — SIGNIFICANT CHANGE UP
SODIUM SERPL-SCNC: 137 MMOL/L — SIGNIFICANT CHANGE UP (ref 135–145)
WBC # BLD: 13.53 K/UL — HIGH (ref 3.8–10.5)
WBC # FLD AUTO: 13.53 K/UL — HIGH (ref 3.8–10.5)

## 2021-09-02 PROCEDURE — 99232 SBSQ HOSP IP/OBS MODERATE 35: CPT

## 2021-09-02 PROCEDURE — 99233 SBSQ HOSP IP/OBS HIGH 50: CPT | Mod: GC

## 2021-09-02 RX ORDER — DAPTOMYCIN 500 MG/10ML
500 INJECTION, POWDER, LYOPHILIZED, FOR SOLUTION INTRAVENOUS
Refills: 0 | Status: DISCONTINUED | OUTPATIENT
Start: 2021-09-02 | End: 2021-09-09

## 2021-09-02 RX ORDER — LIDOCAINE 4 G/100G
10 CREAM TOPICAL THREE TIMES A DAY
Refills: 0 | Status: DISCONTINUED | OUTPATIENT
Start: 2021-09-02 | End: 2021-09-03

## 2021-09-02 RX ORDER — POTASSIUM CHLORIDE 20 MEQ
20 PACKET (EA) ORAL ONCE
Refills: 0 | Status: COMPLETED | OUTPATIENT
Start: 2021-09-02 | End: 2021-09-02

## 2021-09-02 RX ADMIN — Medication 20 MILLIEQUIVALENT(S): at 09:32

## 2021-09-02 RX ADMIN — Medication 20 MILLIEQUIVALENT(S): at 13:20

## 2021-09-02 RX ADMIN — LIDOCAINE 10 MILLILITER(S): 4 CREAM TOPICAL at 15:07

## 2021-09-02 RX ADMIN — DAPTOMYCIN 120 MILLIGRAM(S): 500 INJECTION, POWDER, LYOPHILIZED, FOR SOLUTION INTRAVENOUS at 18:32

## 2021-09-02 NOTE — PROGRESS NOTE ADULT - ASSESSMENT
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

## 2021-09-02 NOTE — PROGRESS NOTE ADULT - PROBLEM SELECTOR PLAN 1
POA, d/t MSSA bacteremia, c/b L5-S1 discitis/OM; GUANAKO 8/30 shows 0.9 cm x 0.5 cm echodensity on the atrial side of the anterior leaflet of the mitral valve, highly suspicious for a vegetation, can't r/o abscess; however, no significant uptake in mitral valve on gallium scan, which makes abscess less likely; appreciate ID, CTS, and Ortho recs; cont. medical mgmt w/ daptomycin; f/u surveillance cultures, repeat TTE 9/7; no need for spine biopsy, per Ortho (risks would outweigh benefits), and unable to obtain MRI d/t non-compatible cochlear implant; cont. PT, WBAT; trend WBC, now down-trending

## 2021-09-02 NOTE — DISCHARGE NOTE NURSING/CASE MANAGEMENT/SOCIAL WORK - PATIENT PORTAL LINK FT
You can access the FollowMyHealth Patient Portal offered by [facility] by registering at the following website: [email or web address]. By joining [employer]’s FollowMyHealth portal, you will also be able to view your health information using other applications (apps) compatible with our system.

## 2021-09-02 NOTE — PROGRESS NOTE ADULT - ASSESSMENT
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 Found to have discitis and OM. Ucx from 8/25 grow Enterobacter Colacae, asymptomatic. Bcx from 8/25 grows MSSA 3/4 bottles. TTE 8/27 with normal. BCx 8/29 NGTD, today worsening WBC and renal function. GUANAKO 8/30 showed 0.9 cm x 0.5 cm on the atrial side of the anterior leaflet of the mitral valve, highly suspicious for a vegetation and could not rule out abscess formation. CT surg recommended medical management with repeat TTE on 9/7 and daily Bcx. Today BCx 8/31, 9/1 NGTD. Gallium with no MV uptake, lower lumber and R.hilar uptake. afebrile with decrease leukocytosis    Incomplete***    - C/W Dapto 500mg q48h   - GUANAKO w/ vegetation and suspected abscess, Gallium with no MV uptake  - As gallium scan w/o MV uptake, will proceed with medical management and repeat TTE on 9/7  - Daily Bcx, if NG >72hr can place new HD if indicated    ID Team 1 will continue to follow. Please see below attending attestation for finalized recommendations.    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 Found to have discitis and OM. Ucx from 8/25 grow Enterobacter Colacae, asymptomatic. Bcx from 8/25 grows MSSA 3/4 bottles. TTE 8/27 with normal. BCx 8/29 NGTD, today worsening WBC and renal function. GUANAKO 8/30 showed 0.9 cm x 0.5 cm on the atrial side of the anterior leaflet of the mitral valve, highly suspicious for a vegetation and could not rule out abscess formation. CT surg recommended medical management with repeat TTE on 9/7 and daily Bcx. Today BCx 8/31, 9/1 NGTD. Gallium with no MV uptake, lower lumber and R.hilar uptake. afebrile with decrease leukocytosis    - C/W Dapto 500mg q48h   - GUANAKO w/ vegetation and suspected abscess, Gallium with no MV uptake  - As gallium scan w/o MV uptake, will proceed with medical management, please clarify with CTS repeat imaging with GUANAKO and not TTE, as the vegetation was visualized only with GUANAKO.  - can place new HD if indicated as > 72 NGTD  - Can stop daily Bcx    ID Team 1 will continue to follow. Please see below attending attestation for finalized recommendations.    Marbin Penny MD PGY2 Internal Medicine  Infectious Disease Consult Service, Team 1

## 2021-09-02 NOTE — PROGRESS NOTE ADULT - PROBLEM SELECTOR PLAN 5
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  - pending inhibitor assay ([place] units).   - send Factor VIII inhibitor testing Patient with history of Hemophilia A, Hem/Onc consulted:  - s/p 1250 units of recombinant factor VIII on 8/27 and Tranexamic acid 10 mg/kg once on 8/27 prior to permacath removal w/o bleeding complications  - Lupus anticoagulant was strongly positive  - f/u anti-beta2 glycoprotein IgG/IgM, anticardiolipin Ab IgG/IgM.  - f/u pending inhibitor assay ([place] units).   - f/u Factor VIII inhibitor testing  - f/u Hem/onc recs

## 2021-09-02 NOTE — PROGRESS NOTE ADULT - PROBLEM SELECTOR PLAN 4
Patient received vaccines. Advise 15 minute wait. Patient received VIS information   UCx +Enterobacter; however, Pt. asymptomatic; Pt. does not have a UTI; no need for abx

## 2021-09-02 NOTE — DISCHARGE NOTE NURSING/CASE MANAGEMENT/SOCIAL WORK - NSDCPEFALRISK_GEN_ALL_CORE
For information on Fall & injury Prevention, visit [email or web address]

## 2021-09-02 NOTE — PROGRESS NOTE ADULT - PROBLEM SELECTOR PLAN 3
Low back pain with ambulation for 4-5 days. Per daughter, PT and warm compresses at home alleviated his pain. He does not report pain at bedside today. No spinal tenderness, strength 5/5 in b/l LE's, negative straight leg raise. CT lumbar spine performed, findings diagnostic for discitis-osteomyelitis. ID consulted, ortho following.  - as above. 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 No acute ortho intervention at this time and no further imaging indicated.  - Gallium scan 9/1/21:  Uptake within the lower lumbar spine likely corresponding to area of abnormality on prior CT lumbar spine 8/26/2021 at L5-S1 which is suggestive of discitis osteomyelitis. Low back pain with ambulation for 4-5 days. Per daughter, PT and warm compresses at home alleviated his pain. He does not report pain at bedside today. No spinal tenderness, strength 5/5 in b/l LE's, negative straight leg raise. CT lumbar spine performed, findings diagnostic for discitis-osteomyelitis. ID consulted, ortho following.  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 No acute ortho intervention at this time and no further imaging indicated.  - Gallium scan 9/1/21:  Uptake within the lower lumbar spine likely corresponding to area of abnormality on prior CT lumbar spine 8/26/2021 at L5-S1 which is suggestive of discitis osteomyelitis.  - treatment as above

## 2021-09-02 NOTE — PROGRESS NOTE ADULT - PROBLEM SELECTOR PLAN 6
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 Cr 2.86 on admission. Patient underwent dialysis yesterday, Cr this morning 2.17. Electrolytes, Ca, Phos, Hb at goal, euvolemic on exam. Heme consulted as patient's catheter needs to be removed given +Bcx MSSA bacteremia. Renal following.  - ESRD on HD  @[facility] usual Rx 3h 0.5L   - per Heme recs, TXA and factor VIII given prior to HD cath removal and procedure without complications  - trending daily Creatinine, BUN, electrolyte abnormalities, HCO3    As per nephro 9/1/21,  - No acute indication for HD  - replete K to 3.5, 20 KCL PO   - possibly uremic w/anorexia nausea and strange taste though no complaints today   - f/u with vascular regarding access

## 2021-09-02 NOTE — PROGRESS NOTE ADULT - ATTENDING COMMENTS
See the Fellow's note written above, for details. I reviewed the fellow documentation.  I have personally seen and examined this patient today. I have reviewed vitals, labs, medications, and imaging. I agree with the fellow's findings and plans as written above with the following additions/amendments:    Patient seen and examined at bedside. Not eating well, states that it is because of cold food and will eat when daughter brings better food. Encouraged to try and eat at least a little bit more    .  VITAL SIGNS:  T(F): 98.2 (09-02-21 @ 15:25), Max: 99.3 (09-01-21 @ 21:41)  HR: 108 (09-02-21 @ 15:25) (97 - 108)  BP: 113/68 (09-02-21 @ 15:25) (113/68 - 134/66)  BP(mean): --  RR: 18 (09-02-21 @ 15:25) (18 - 18)  SpO2: 100% (09-02-21 @ 15:25) (96% - 100%)    PHYSICAL EXAM:  Constitutional: Thin, Resting comfortably in bed; NAD  HEENT:  EOMI, anicteric sclera; MMM  Respiratory: CTA B/L; no W/R/R, no accessory muscle use  Cardiac: +S1/S2; RRR; no M/R/G  Gastrointestinal: soft, NT/ND; no rebound or guarding; +BS  Extremities: WWP, no clubbing or cyanosis; no peripheral edema  Dermatologic: skin warm, dry and intact; no rashes, wounds, or scars  Access: removed, site without purulence    Anemia - at goal  MBD - phos, pth at goal, continue low phos diet  No acute indication for HD however anorexia and poor appetite concerning but could be complicated by bacteremia. Monitoring closely, awaiting line placement when possible

## 2021-09-02 NOTE — PROGRESS NOTE ADULT - SUBJECTIVE AND OBJECTIVE BOX
SUBJECTIVE: Patient seen and examined bedside.    DAPTOmycin IVPB 500 milliGRAM(s) IV Intermittent every 48 hours  DAPTOmycin IVPB          Vital Signs Last 24 Hrs  T(C): 36.6 (02 Sep 2021 05:14), Max: 37.4 (01 Sep 2021 21:41)  T(F): 97.9 (02 Sep 2021 05:14), Max: 99.3 (01 Sep 2021 21:41)  HR: 99 (02 Sep 2021 05:14) (99 - 103)  BP: 129/73 (02 Sep 2021 05:14) (120/63 - 134/66)  BP(mean): --  RR: 18 (02 Sep 2021 05:14) (18 - 18)  SpO2: 96% (02 Sep 2021 05:14) (96% - 98%)  I&O's Detail      General: NAD, resting comfortably in bed  C/V: NSR  Pulm: Nonlabored breathing, no respiratory distress  Abd: soft, NT/ND.  Extrem: WWP, no edema, SCDs in place        LABS:                        11.8   16.41 )-----------( 361      ( 01 Sep 2021 07:53 )             35.1     09-01    133<L>  |  95<L>  |  51<H>  ----------------------------<  99  3.1<L>   |  22  |  3.80<H>    Ca    9.2      01 Sep 2021 07:53  Phos  4.7     09-01  Mg     2.4     09-01    TPro  5.8<L>  /  Alb  2.9<L>  /  TBili  1.1  /  DBili  x   /  AST  9<L>  /  ALT  9<L>  /  AlkPhos  224<H>  09-01    PT/INR - ( 01 Sep 2021 07:53 )   PT: 16.1 sec;   INR: 1.36          PTT - ( 01 Sep 2021 07:53 )  PTT:47.6 sec      RADIOLOGY & ADDITIONAL STUDIES:

## 2021-09-02 NOTE — PROGRESS NOTE ADULT - PROBLEM SELECTOR PLAN 2
As per ID, evaluation of possible endocarditis due to Bcx +MSSA, osteomyelitis and discitis.   -TTE 8/27/21: The mitral valve is mildly thickened. There is mild bileaflet valve prolapse (anterior > posterior). There is trace mitral regurgitation. Normal left and right ventricular size and systolic function.  - GUANAKO 8/30/21:       - No LA/RA/LISA/RAA thrombus seen.       - There is mitral valve prolapse of the anterior leaflet. There is an irregularly shaped linear echodensity measuring 0.9 cm x 0.5 cm on the atrial north of the anterior leaflet of the mitral valve, highly suspicious for a vegetation.      - There is thickening of the intervalvular fibrosa with systolic expansion - cannot rule out abscess formation.      - Compared to the previous GUANAKO performed on 2/26/2021, the mitral valve vegetation is new.  - Gallium scan 9/1/21: No significant uptake within the mitral valve region.  Plan:  - repeat GUANAKO vs TTE on Tuesday July 7, 2021.   - F/u ID and CT surgery recs

## 2021-09-02 NOTE — PROGRESS NOTE ADULT - PROBLEM SELECTOR PLAN 7
# hyponatremia  - serum osm 288  - f/u urine lytes    # hypokalemia  - as per Nephro, replete for goal of 3.5 # hypokalemia  - as per Nephro, replete for goal of 3.5

## 2021-09-02 NOTE — PROGRESS NOTE ADULT - PROBLEM SELECTOR PLAN 9
on admission. Last known level 169 (3/21) could be 2/2 to renal bone disease versus hepatobiliary.  this AM.  - decreasing trend  on admission. Last known level 169 (3/21) could be 2/2 to renal bone disease versus hepatobiliary.  this AM.  - continuously in decreasing trend

## 2021-09-02 NOTE — PROGRESS NOTE ADULT - ASSESSMENT
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 GUANAKO concerning for vegetation, CTSx rec medical management and repeat GUANAKO. Last BCx 8/29 remain +ve.     #ESRD on HD MF @[facility]  usual Rx 3h 0.5L   -electrolytes at goal   -euvolemic on examination  -No acute indication for HD as Cr downtrending   dry wt TBD  replete K to 3.5, 20 KCL PO   possibly uremic w/anorexia nausea and strange taste     #Fevers  growing staph aureus   -repeat blood cx from 8/29 positive for staph aureus  -cannot get new HD line until neg x 72 hours  -GUANAKO concerning for vegetation pending repeat 9/7     #anemia  Hb at goal   no indication for EPO or IV Iron at this time   transfusion as per primary team     #renal bone disease   Ca and Phos acceptable, trend both daily     Thank you for the opportunity to participate in the care of your patient. The nephrology service remains available to assist with any questions or concerns. Please feel free to reach us by paging the on-call nephrology fellow for urgent issues or as below.     Ramón Dumont M.D.   PGY-5, Nephrology Fellow   C: [phone]   P: [phone]

## 2021-09-02 NOTE — PROGRESS NOTE ADULT - SUBJECTIVE AND OBJECTIVE BOX
INTERVAL HPI/OVERNIGHT EVENTS: NAOE    SUBJECTIVE: Patient seen and examined at bedside. Denies any pain, bleeding, dizziness, chest pain, palpitations, SOB, fevers and chills. Rest of ROS unremarkable.     OBJECTIVE:    VITAL SIGNS:  ICU Vital Signs Last 24 Hrs  T(C): 36.9 (01 Sep 2021 05:24), Max: 37.1 (31 Aug 2021 21:37)  T(F): 98.5 (01 Sep 2021 05:24), Max: 98.8 (31 Aug 2021 21:37)  HR: 102 (01 Sep 2021 05:24) (71 - 116)  BP: 125/77 (01 Sep 2021 05:24) (93/68 - 125/77)  BP(mean): --  ABP: --  ABP(mean): --  RR: 18 (01 Sep 2021 05:24) (18 - 18)  SpO2: 96% (01 Sep 2021 05:24) (94% - 98%)        CAPILLARY BLOOD GLUCOSE          PHYSICAL EXAM:    General: NAD, resting comfortably in bed  HEENT: NC/AT; PERRL, anicteric sclera; MMM with some small ulcers resembling canker sore   Neck: supple, no JVd  Chest: no erythema at upper right chest from permacath removal, no bruising, hematoma nor pain upon palpation  Cardiovascular: +S1/S2, RRR, no appreciated  murmur  Respiratory: CTA B/L; no W/R/R  Gastrointestinal: soft, NT/ND; +BS, no rebound or guarding  : no suprapubic tenderness, HD cath bandaged but no surrounding erythema or tenderness  Back: skin intact, no CVA tenderness, no ecchymosis,   Extremities: WWP; no edema, clubbing or cyanosis  Vascular: 2+ radial, DP/PT pulses B/L  Neurological: AAOx3; no focal deficits. Strength 5/5 in b/l LEs and UEs  Psychiatric: pleasant mood and affect  Dermatologic: no appreciable wounds, damage to the skin, splinter hemorrhaages, osler nodes,    MEDICATIONS:  MEDICATIONS  (STANDING):  DAPTOmycin IVPB        MEDICATIONS  (PRN):  acetaminophen   Tablet .. 650 milliGRAM(s) Oral every 6 hours PRN Temp greater or equal to 38C (100.4F), Moderate Pain (4 - 6)  melatonin 3 milliGRAM(s) Oral at bedtime PRN Insomnia      ALLERGIES:  Allergies    allopurinol (Other)    Intolerances        LABS:                        12.4   13.12 )-----------( 334      ( 31 Aug 2021 06:45 )             37.4     08-31    136  |  94<L>  |  52<H>  ----------------------------<  84  3.1<L>   |  23  |  4.05<H>    Ca    8.6      31 Aug 2021 06:45  Phos  5.3     08-31  Mg     2.6     08-31    TPro  5.5<L>  /  Alb  2.9<L>  /  TBili  2.7<H>  /  DBili  2.1<H>  /  AST  14  /  ALT  12  /  AlkPhos  252<H>  08-31    PT/INR - ( 31 Aug 2021 06:45 )   PT: 16.5 sec;   INR: 1.40          PTT - ( 31 Aug 2021 06:45 )  PTT:45.3 sec      RADIOLOGY & ADDITIONAL TESTS: Reviewed.

## 2021-09-02 NOTE — PROGRESS NOTE ADULT - SUBJECTIVE AND OBJECTIVE BOX
**INCOMPLETE NOTE    SUBJECTIVE: Gallium with no MV uptake, lower lumber and R.hilar uptake. afebrile with decrease leukocytosis. Patient seen and examined at bedside. Has no complaints, his back pain has resolved and his diarrhea is improving. Patient denies h/n/v/d, fever, chills, cp, palpitations, sob, abd pain, leg swelling, rashes, dysuria, and changes in BM.     Vital Signs Last 12 Hrs  T(F): 98.4 (09-02-21 @ 09:22), Max: 99.3 (09-01-21 @ 21:41)  HR: 97 (09-02-21 @ 09:22) (97 - 103)  BP: 125/76 (09-02-21 @ 09:22) (120/63 - 134/66)  BP(mean): --  RR: 18 (09-02-21 @ 09:22) (18 - 18)  SpO2: 97% (09-02-21 @ 09:22) (96% - 97%)  I&O's Summary    PHYSICAL EXAM:  Constitutional: NAD, comfortable in bed.  HEENT: NC/AT, PERRLA, EOMI, no conjunctival pallor or scleral icterus, MMM  Neck: Supple, no JVD  Respiratory: CTA B/L. No w/r/r.   Cardiovascular: RRR, normal S1 and S2, no m/r/g.   Gastrointestinal: +BS, soft NTND, no guarding or rebound tenderness, no palpable masses   Extremities: wwp; no cyanosis, clubbing or edema.   Vascular: Pulses equal and strong throughout.   Neurological: AAOx3, no CN deficits, strength and sensation intact throughout.   Skin: No gross skin abnormalities or rashes    LABS:                        11.0   13.53 )-----------( 328      ( 02 Sep 2021 08:19 )             32.8     09-02    137  |  101  |  46<H>  ----------------------------<  90  3.2<L>   |  22  |  3.37<H>    Ca    9.5      02 Sep 2021 08:19  Phos  4.1     09-02  Mg     2.4     09-02    TPro  5.4<L>  /  Alb  2.7<L>  /  TBili  0.7  /  DBili  x   /  AST  8<L>  /  ALT  7<L>  /  AlkPhos  181<H>  09-02    PT/INR - ( 02 Sep 2021 08:19 )   PT: 14.9 sec;   INR: 1.26          PTT - ( 02 Sep 2021 08:19 )  PTT:49.3 sec        RADIOLOGY & ADDITIONAL TESTS:    MEDICATIONS  (STANDING):  DAPTOmycin IVPB 500 milliGRAM(s) IV Intermittent every 48 hours  DAPTOmycin IVPB      potassium chloride    Tablet ER 20 milliEquivalent(s) Oral once    MEDICATIONS  (PRN):  acetaminophen   Tablet .. 650 milliGRAM(s) Oral every 6 hours PRN Temp greater or equal to 38C (100.4F), Moderate Pain (4 - 6)  melatonin 3 milliGRAM(s) Oral at bedtime PRN Insomnia     SUBJECTIVE: Gallium with no MV uptake, lower lumber and R.hilar uptake. afebrile with decrease leukocytosis. Patient seen and examined at bedside. Has no complaints, his back pain has resolved and his diarrhea is improving. Patient denies h/n/v/d, fever, chills, cp, palpitations, sob, abd pain, leg swelling, rashes, dysuria, and changes in BM.     Vital Signs Last 12 Hrs  T(F): 98.4 (09-02-21 @ 09:22), Max: 99.3 (09-01-21 @ 21:41)  HR: 97 (09-02-21 @ 09:22) (97 - 103)  BP: 125/76 (09-02-21 @ 09:22) (120/63 - 134/66)  BP(mean): --  RR: 18 (09-02-21 @ 09:22) (18 - 18)  SpO2: 97% (09-02-21 @ 09:22) (96% - 97%)  I&O's Summary    PHYSICAL EXAM:  Constitutional: NAD, comfortable in bed.  HEENT: NC/AT, PERRLA, EOMI, no conjunctival pallor or scleral icterus, MMM  Neck: Supple, no JVD  Respiratory: CTA B/L. No w/r/r.   Cardiovascular: RRR, normal S1 and S2, no m/r/g.   Gastrointestinal: +BS, soft NTND, no guarding or rebound tenderness, no palpable masses   Extremities: wwp; no cyanosis, clubbing or edema.   Vascular: Pulses equal and strong throughout.   Neurological: AAOx3, no CN deficits, strength and sensation intact throughout.   Skin: No gross skin abnormalities or rashes    LABS:                        11.0   13.53 )-----------( 328      ( 02 Sep 2021 08:19 )             32.8     09-02    137  |  101  |  46<H>  ----------------------------<  90  3.2<L>   |  22  |  3.37<H>    Ca    9.5      02 Sep 2021 08:19  Phos  4.1     09-02  Mg     2.4     09-02    TPro  5.4<L>  /  Alb  2.7<L>  /  TBili  0.7  /  DBili  x   /  AST  8<L>  /  ALT  7<L>  /  AlkPhos  181<H>  09-02    PT/INR - ( 02 Sep 2021 08:19 )   PT: 14.9 sec;   INR: 1.26          PTT - ( 02 Sep 2021 08:19 )  PTT:49.3 sec        RADIOLOGY & ADDITIONAL TESTS:    MEDICATIONS  (STANDING):  DAPTOmycin IVPB 500 milliGRAM(s) IV Intermittent every 48 hours  DAPTOmycin IVPB      potassium chloride    Tablet ER 20 milliEquivalent(s) Oral once    MEDICATIONS  (PRN):  acetaminophen   Tablet .. 650 milliGRAM(s) Oral every 6 hours PRN Temp greater or equal to 38C (100.4F), Moderate Pain (4 - 6)  melatonin 3 milliGRAM(s) Oral at bedtime PRN Insomnia

## 2021-09-02 NOTE — PROGRESS NOTE ADULT - SUBJECTIVE AND OBJECTIVE BOX
Patient is a 76y old  Male who presents with a chief complaint of sepsis likely 2/2 UTI (02 Sep 2021 14:03)      INTERVAL HPI/OVERNIGHT EVENTS:    Pt. seen and examined earlier today  Pt. feels well, has no complaints  Didn't eat much breakfast    Review of Systems: 12 point review of systems otherwise negative    MEDICATIONS  (STANDING):  DAPTOmycin IVPB 500 milliGRAM(s) IV Intermittent every 48 hours  lidocaine 2% Viscous 10 milliLiter(s) Swish and Spit three times a day    MEDICATIONS  (PRN):  acetaminophen   Tablet .. 650 milliGRAM(s) Oral every 6 hours PRN Temp greater or equal to 38C (100.4F), Moderate Pain (4 - 6)  melatonin 3 milliGRAM(s) Oral at bedtime PRN Insomnia      Allergies    allopurinol (Other)    Intolerances          Vital Signs Last 24 Hrs  T(C): 36.9 (02 Sep 2021 09:22), Max: 37.4 (01 Sep 2021 21:41)  T(F): 98.4 (02 Sep 2021 09:22), Max: 99.3 (01 Sep 2021 21:41)  HR: 97 (02 Sep 2021 09:22) (97 - 103)  BP: 125/76 (02 Sep 2021 09:22) (120/63 - 134/66)  BP(mean): --  RR: 18 (02 Sep 2021 09:22) (18 - 18)  SpO2: 97% (02 Sep 2021 09:22) (96% - 98%)  CAPILLARY BLOOD GLUCOSE            Physical Exam:  (earlier today)  Daily     Daily   General:  non-toxic and comfortable-appearing in NAD  HEENT:  MMM  CV:  no murmur  Extremities:  no edema B/L LE  Skin:  WWP  Neuro:  AAOx3    LABS:                        11.0   13.53 )-----------( 328      ( 02 Sep 2021 08:19 )             32.8     09-02    137  |  101  |  46<H>  ----------------------------<  90  3.2<L>   |  22  |  3.37<H>    Ca    9.5      02 Sep 2021 08:19  Phos  4.1     09-02  Mg     2.4     09-02    TPro  5.4<L>  /  Alb  2.7<L>  /  TBili  0.7  /  DBili  x   /  AST  8<L>  /  ALT  7<L>  /  AlkPhos  181<H>  09-02    PT/INR - ( 02 Sep 2021 08:19 )   PT: 14.9 sec;   INR: 1.26          PTT - ( 02 Sep 2021 08:19 )  PTT:49.3 sec

## 2021-09-02 NOTE — PROGRESS NOTE ADULT - PROBLEM SELECTOR PLAN 1
On admission pt met 3/4 SIRS criteria; HR 96, WBC 16.10 with neutrophilic predominance, Tmax 103.2F. Positive UA. Negative CXR. Patient empirically treated in the ED with 750mg vanc and 3.375g zosyn on 8/25, then CTX 2g, then one more dose of Vanc on 8/26. ID consulted. Bcx from 8/25 grew MSSA 3/4 bottles (likely recurrent as pt was diagnosed with MSSA bacteremia in March).  - daily surveillance bcx  - Initial Bc 8/25/21: MSSA 2/2 bottles   - Permacath removed on 8/27/21, as per ID  - Cath culture 8/27/21: final result no growth  - continue Nafcillin 2g q4 for best MSSA coverage per ID started on 8/26/21  - Continue surveillance culture On admission pt met 3/4 SIRS criteria; HR 96, WBC 16.10 with neutrophilic predominance, Tmax 103.2F. Positive UA. Negative CXR. Patient empirically treated in the ED with 750mg vanc and 3.375g zosyn on 8/25, then CTX 2g, then one more dose of Vanc on 8/26. ID consulted. Bcx from 8/25 grew MSSA 3/4 bottles (likely recurrent as pt was diagnosed with MSSA bacteremia in March).  - Initial Bc 8/25/21: MSSA 2/2 bottles   - Permacath removed on 8/27/21, as per ID  - Cath culture 8/27/21: final result no growth  - continue Nafcillin 2g q4 for best MSSA coverage per ID started on 8/26/21  - Continue daily surveillance culture

## 2021-09-02 NOTE — PROGRESS NOTE ADULT - PROBLEM SELECTOR PLAN 10
One episode of watery diarrhea on Saturday 8/28/21, 1 episode of small loss stool 8/31/21  - will monitor for s/s of infection  - miralax/senna discontinued  - GI PCR: 8/30/21 Negative Resolved: One episode of watery diarrhea on Saturday 8/28/21, 1 episode of small loss stool 8/31/21  - GI PCR: 8/30/21 Negative  - will monitor for s/s of infection  - miralax/senna discontinued

## 2021-09-02 NOTE — PROGRESS NOTE ADULT - SUBJECTIVE AND OBJECTIVE BOX
Patient is a 76y Male seen and evaluated at bedside.   GUNAAKO concerning for vegetation, CTSx rec medical management and repeat GUANAKO   last +BCx 8/29    hypokalemic   BP is acceptable   no complaints  Cr is downtrending   no hemodialysis today     Meds:    acetaminophen   Tablet .. 650 every 6 hours PRN  DAPTOmycin IVPB 500 every 48 hours  lidocaine 2% Viscous 10 three times a day  melatonin 3 at bedtime PRN      T(C): , Max: 37.4 (09-01-21 @ 21:41)  T(F): , Max: 99.3 (09-01-21 @ 21:41)  HR: 97 (09-02-21 @ 09:22)  BP: 125/76 (09-02-21 @ 09:22)  BP(mean): --  RR: 18 (09-02-21 @ 09:22)  SpO2: 97% (09-02-21 @ 09:22)  Wt(kg): --        Review of Systems:  GEN: +anorexia, +strange taste  RESPIRATORY: No shortness of breath, cough   CARDIOVASCULAR: No chest pain or shortness of breath  GI: no nausea, no vomit or abdo pain   NEUROLOGICAL: No headaches or blurred vision  MUSCULOSKELETAL: no back pain      PHYSICAL EXAM:  GENERAL: alert, no acute distress at present  CHEST/LUNG: Clear to auscultation bilaterally no rales, rhonchi or wheezing  HEART: normal S1S2, RRR  ABDOMEN: Soft, Nontender  EXTREMITIES: No clubbing, cyanosis, or edema; no asterixis       LABS:                        11.0   13.53 )-----------( 328      ( 02 Sep 2021 08:19 )             32.8     09-02    137  |  101  |  46<H>  ----------------------------<  90  3.2<L>   |  22  |  3.37<H>    Ca    9.5      02 Sep 2021 08:19  Phos  4.1     09-02  Mg     2.4     09-02    TPro  5.4<L>  /  Alb  2.7<L>  /  TBili  0.7  /  DBili  x   /  AST  8<L>  /  ALT  7<L>  /  AlkPhos  181<H>  09-02      PT/INR - ( 02 Sep 2021 08:19 )   PT: 14.9 sec;   INR: 1.26          PTT - ( 02 Sep 2021 08:19 )  PTT:49.3 sec          RADIOLOGY & ADDITIONAL STUDIES:

## 2021-09-03 LAB
ALBUMIN SERPL ELPH-MCNC: 2.7 G/DL — LOW (ref 3.3–5)
ALP SERPL-CCNC: 157 U/L — HIGH (ref 40–120)
ALT FLD-CCNC: 7 U/L — LOW (ref 10–45)
ANION GAP SERPL CALC-SCNC: 12 MMOL/L — SIGNIFICANT CHANGE UP (ref 5–17)
APTT BLD: 49.2 SEC — HIGH (ref 27.5–35.5)
AST SERPL-CCNC: 9 U/L — LOW (ref 10–40)
BASOPHILS # BLD AUTO: 0.07 K/UL — SIGNIFICANT CHANGE UP (ref 0–0.2)
BASOPHILS NFR BLD AUTO: 0.5 % — SIGNIFICANT CHANGE UP (ref 0–2)
BILIRUB SERPL-MCNC: 0.6 MG/DL — SIGNIFICANT CHANGE UP (ref 0.2–1.2)
BUN SERPL-MCNC: 45 MG/DL — HIGH (ref 7–23)
CALCIUM SERPL-MCNC: 9.7 MG/DL — SIGNIFICANT CHANGE UP (ref 8.4–10.5)
CHLORIDE SERPL-SCNC: 105 MMOL/L — SIGNIFICANT CHANGE UP (ref 96–108)
CO2 SERPL-SCNC: 21 MMOL/L — LOW (ref 22–31)
CREAT SERPL-MCNC: 2.96 MG/DL — HIGH (ref 0.5–1.3)
CULTURE RESULTS: SIGNIFICANT CHANGE UP
EOSINOPHIL # BLD AUTO: 0.59 K/UL — HIGH (ref 0–0.5)
EOSINOPHIL NFR BLD AUTO: 4.3 % — SIGNIFICANT CHANGE UP (ref 0–6)
FACT VIII ACT/NOR PPP: 50 % — LOW (ref 51–138)
FACT VIII ACT/NOR PPP: 55 % — SIGNIFICANT CHANGE UP (ref 51–138)
FACT VIII ACT/NOR PPP: 96 % — SIGNIFICANT CHANGE UP (ref 51–138)
FACT XIII ACT/NOR PPP CHRO: 87 % — SIGNIFICANT CHANGE UP (ref 51–163)
GLUCOSE SERPL-MCNC: 79 MG/DL — SIGNIFICANT CHANGE UP (ref 70–99)
GRAM STN FLD: SIGNIFICANT CHANGE UP
HCT VFR BLD CALC: 30.5 % — LOW (ref 39–50)
HCT VFR BLD CALC: 30.9 % — LOW (ref 39–50)
HGB BLD-MCNC: 10.3 G/DL — LOW (ref 13–17)
HGB BLD-MCNC: 9.9 G/DL — LOW (ref 13–17)
IMM GRANULOCYTES NFR BLD AUTO: 1.1 % — SIGNIFICANT CHANGE UP (ref 0–1.5)
INR BLD: 1.14 — SIGNIFICANT CHANGE UP (ref 0.88–1.16)
LYMPHOCYTES # BLD AUTO: 1.4 K/UL — SIGNIFICANT CHANGE UP (ref 1–3.3)
LYMPHOCYTES # BLD AUTO: 10.1 % — LOW (ref 13–44)
MAGNESIUM SERPL-MCNC: 2.1 MG/DL — SIGNIFICANT CHANGE UP (ref 1.6–2.6)
MCHC RBC-ENTMCNC: 26.7 PG — LOW (ref 27–34)
MCHC RBC-ENTMCNC: 27.5 PG — SIGNIFICANT CHANGE UP (ref 27–34)
MCHC RBC-ENTMCNC: 32.5 GM/DL — SIGNIFICANT CHANGE UP (ref 32–36)
MCHC RBC-ENTMCNC: 33.3 GM/DL — SIGNIFICANT CHANGE UP (ref 32–36)
MCV RBC AUTO: 82.2 FL — SIGNIFICANT CHANGE UP (ref 80–100)
MCV RBC AUTO: 82.4 FL — SIGNIFICANT CHANGE UP (ref 80–100)
MONOCYTES # BLD AUTO: 0.8 K/UL — SIGNIFICANT CHANGE UP (ref 0–0.9)
MONOCYTES NFR BLD AUTO: 5.8 % — SIGNIFICANT CHANGE UP (ref 2–14)
NEUTROPHILS # BLD AUTO: 10.86 K/UL — HIGH (ref 1.8–7.4)
NEUTROPHILS NFR BLD AUTO: 78.2 % — HIGH (ref 43–77)
NRBC # BLD: 0 /100 WBCS — SIGNIFICANT CHANGE UP (ref 0–0)
NRBC # BLD: 0 /100 WBCS — SIGNIFICANT CHANGE UP (ref 0–0)
PHOSPHATE SERPL-MCNC: 2.9 MG/DL — SIGNIFICANT CHANGE UP (ref 2.5–4.5)
PLATELET # BLD AUTO: 327 K/UL — SIGNIFICANT CHANGE UP (ref 150–400)
PLATELET # BLD AUTO: 341 K/UL — SIGNIFICANT CHANGE UP (ref 150–400)
POTASSIUM SERPL-MCNC: 4 MMOL/L — SIGNIFICANT CHANGE UP (ref 3.5–5.3)
POTASSIUM SERPL-SCNC: 4 MMOL/L — SIGNIFICANT CHANGE UP (ref 3.5–5.3)
PROT SERPL-MCNC: 5.5 G/DL — LOW (ref 6–8.3)
PROTHROM AB SERPL-ACNC: 13.6 SEC — SIGNIFICANT CHANGE UP (ref 10.6–13.6)
RBC # BLD: 3.71 M/UL — LOW (ref 4.2–5.8)
RBC # BLD: 3.75 M/UL — LOW (ref 4.2–5.8)
RBC # BLD: 3.75 M/UL — LOW (ref 4.2–5.8)
RBC # FLD: 16.4 % — HIGH (ref 10.3–14.5)
RBC # FLD: 16.5 % — HIGH (ref 10.3–14.5)
RETICS #: 14.3 K/UL — LOW (ref 25–125)
RETICS/RBC NFR: 0.4 % — LOW (ref 0.5–2.5)
SODIUM SERPL-SCNC: 138 MMOL/L — SIGNIFICANT CHANGE UP (ref 135–145)
SPECIMEN SOURCE: SIGNIFICANT CHANGE UP
WBC # BLD: 13.87 K/UL — HIGH (ref 3.8–10.5)
WBC # BLD: 14.67 K/UL — HIGH (ref 3.8–10.5)
WBC # FLD AUTO: 13.87 K/UL — HIGH (ref 3.8–10.5)
WBC # FLD AUTO: 14.67 K/UL — HIGH (ref 3.8–10.5)

## 2021-09-03 PROCEDURE — 99233 SBSQ HOSP IP/OBS HIGH 50: CPT

## 2021-09-03 PROCEDURE — 99233 SBSQ HOSP IP/OBS HIGH 50: CPT | Mod: GC

## 2021-09-03 PROCEDURE — 99232 SBSQ HOSP IP/OBS MODERATE 35: CPT

## 2021-09-03 NOTE — PROGRESS NOTE ADULT - ASSESSMENT
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 Found to have discitis and OM. Ucx from 8/25 grow Enterobacter Colacae, asymptomatic. Bcx from 8/25 grows MSSA 3/4 bottles. TTE 8/27 with normal. BCx 8/29 NGTD, today worsening WBC and renal function. GUANAKO 8/30 showed 0.9 cm x 0.5 cm vegetation. CT surg recommended medical management with repeat TTE on 9/7 and daily Bcx. TodayPatient remains afebrile, WBC stable in the 13s. BCx from 8/31 now grows gram positive cocci, BCx 9/1,9/2 NGTD.     - C/W Dapto 500mg q48h   - As gallium scan w/o MV uptake, will proceed with medical management, please clarify with CTS repeat imaging with GUANAKO and not TTE, as the vegetation was visualized only with GUANAKO.  - can place new HD if indicated / PICC line as > 72 NGTD  - Can stop daily Bcx    ID Team 1 will continue to follow. Please see below attending attestation for finalized recommendations.    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 Found to have discitis and OM. Ucx from 8/25 grow Enterobacter Colacae, asymptomatic. Bcx from 8/25 grows MSSA 3/4 bottles. TTE 8/27 with normal. BCx 8/29 NGTD, today worsening WBC and renal function. GUANAKO 8/30 showed 0.9 cm x 0.5 cm vegetation. CT surg recommended medical management with repeat TTE on 9/7 and daily Bcx. TodayPatient remains afebrile, WBC stable in the 13s. BCx from 8/31 now grows gram positive cocci, BCx 9/1,9/2 NGTD.     - C/W Dapto 500mg q48h   - advise repeat GUANAKO 9/7  - can place new HD if indicated / PICC line as > 72 NGTD  - Can stop daily Bcx    ID Team 1 will continue to follow. Please see below attending attestation for finalized recommendations.    Marbin Penny MD PGY2 Internal Medicine  Infectious Disease Consult Service, Team 1

## 2021-09-03 NOTE — PROGRESS NOTE ADULT - PROBLEM SELECTOR PLAN 5
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  - f/u pending inhibitor assay (Lindsay units).   - f/u Factor VIII inhibitor testing  - f/u Hem/onc recs

## 2021-09-03 NOTE — PROGRESS NOTE ADULT - SUBJECTIVE AND OBJECTIVE BOX
Patient is a 76y Male seen and evaluated at bedside.   GUANAKO concerning for vegetation, CTSx rec medical management and repeat GUANAKO   last +BCx 8/29    hypokalemic   BP is acceptable   no complaints  Cr is downtrending   no hemodialysis today     Meds:    acetaminophen   Tablet .. 650 every 6 hours PRN  DAPTOmycin IVPB 500 every 48 hours  melatonin 3 at bedtime PRN      T(C): , Max: 37.1 (09-03-21 @ 05:38)  T(F): , Max: 98.8 (09-03-21 @ 05:38)  HR: 99 (09-03-21 @ 05:38)  BP: 139/77 (09-03-21 @ 05:38)  BP(mean): --  RR: 16 (09-03-21 @ 05:38)  SpO2: 97% (09-03-21 @ 05:38)  Wt(kg): --    09-02 @ 07:01  -  09-03 @ 07:00  --------------------------------------------------------  IN: 0 mL / OUT: 200 mL / NET: -200 mL          Review of Systems:  GEN: +anorexia, +strange taste  RESPIRATORY: No shortness of breath, cough   CARDIOVASCULAR: No chest pain or shortness of breath  GI: no nausea, no vomit or abdo pain   NEUROLOGICAL: No headaches or blurred vision  MUSCULOSKELETAL: no back pain      PHYSICAL EXAM:  GENERAL: alert, no acute distress at present  CHEST/LUNG: Clear to auscultation bilaterally no rales, rhonchi or wheezing  HEART: normal S1S2, RRR  ABDOMEN: Soft, Nontender  EXTREMITIES: No clubbing, cyanosis, or edema; no asterixis     LABS:                        9.9    13.87 )-----------( 327      ( 03 Sep 2021 10:50 )             30.5     09-02    137  |  101  |  46<H>  ----------------------------<  90  3.2<L>   |  22  |  3.37<H>    Ca    9.5      02 Sep 2021 08:19  Phos  4.1     09-02  Mg     2.4     09-02    TPro  5.4<L>  /  Alb  2.7<L>  /  TBili  0.7  /  DBili  x   /  AST  8<L>  /  ALT  7<L>  /  AlkPhos  181<H>  09-02      PT/INR - ( 03 Sep 2021 10:48 )   PT: 13.6 sec;   INR: 1.14          PTT - ( 03 Sep 2021 10:48 )  PTT:49.2 sec          RADIOLOGY & ADDITIONAL STUDIES:

## 2021-09-03 NOTE — PROGRESS NOTE ADULT - SUBJECTIVE AND OBJECTIVE BOX
**INCOMPLETE NOTE    OVERNIGHT EVENTS:    SUBJECTIVE:  Patient seen and examined at bedside.    Vital Signs Last 12 Hrs  T(F): 98.8 (09-03-21 @ 05:38), Max: 98.8 (09-03-21 @ 05:38)  HR: 99 (09-03-21 @ 05:38) (99 - 99)  BP: 139/77 (09-03-21 @ 05:38) (139/77 - 139/77)  BP(mean): --  RR: 16 (09-03-21 @ 05:38) (16 - 16)  SpO2: 97% (09-03-21 @ 05:38) (97% - 97%)  I&O's Summary    02 Sep 2021 07:01  -  03 Sep 2021 07:00  --------------------------------------------------------  IN: 0 mL / OUT: 200 mL / NET: -200 mL        PHYSICAL EXAM:  Constitutional: NAD, comfortable in bed.  HEENT: NC/AT, PERRLA, EOMI, no conjunctival pallor or scleral icterus, MMM  Neck: Supple, no JVD  Respiratory: CTA B/L. No w/r/r.   Cardiovascular: RRR, normal S1 and S2, no m/r/g.   Gastrointestinal: +BS, soft NTND, no guarding or rebound tenderness, no palpable masses   Extremities: wwp; no cyanosis, clubbing or edema.   Vascular: Pulses equal and strong throughout.   Neurological: AAOx3, no CN deficits, strength and sensation intact throughout.   Skin: No gross skin abnormalities or rashes        LABS:                        11.0   13.53 )-----------( 328      ( 02 Sep 2021 08:19 )             32.8     09-02    137  |  101  |  46<H>  ----------------------------<  90  3.2<L>   |  22  |  3.37<H>    Ca    9.5      02 Sep 2021 08:19  Phos  4.1     09-02  Mg     2.4     09-02    TPro  5.4<L>  /  Alb  2.7<L>  /  TBili  0.7  /  DBili  x   /  AST  8<L>  /  ALT  7<L>  /  AlkPhos  181<H>  09-02    PT/INR - ( 02 Sep 2021 08:19 )   PT: 14.9 sec;   INR: 1.26          PTT - ( 02 Sep 2021 08:19 )  PTT:49.3 sec        RADIOLOGY & ADDITIONAL TESTS:    MEDICATIONS  (STANDING):  DAPTOmycin IVPB 500 milliGRAM(s) IV Intermittent every 48 hours  lidocaine 2% Viscous 10 milliLiter(s) Swish and Spit three times a day    MEDICATIONS  (PRN):  acetaminophen   Tablet .. 650 milliGRAM(s) Oral every 6 hours PRN Temp greater or equal to 38C (100.4F), Moderate Pain (4 - 6)  melatonin 3 milliGRAM(s) Oral at bedtime PRN Insomnia   OVERNIGHT EVENTS: NAOE    SUBJECTIVE:  Patient seen and examined at bedside. Patient refers lidocaine solution made him nauseas and discontinued it.  Denies any pain, bleeding, dizziness, chest pain, palpitations, SOB, fevers and chills. Rest of ROS unremarkable.     Vital Signs Last 12 Hrs  T(F): 98.8 (09-03-21 @ 05:38), Max: 98.8 (09-03-21 @ 05:38)  HR: 99 (09-03-21 @ 05:38) (99 - 99)  BP: 139/77 (09-03-21 @ 05:38) (139/77 - 139/77)  BP(mean): --  RR: 16 (09-03-21 @ 05:38) (16 - 16)  SpO2: 97% (09-03-21 @ 05:38) (97% - 97%)  I&O's Summary    02 Sep 2021 07:01  -  03 Sep 2021 07:00  --------------------------------------------------------  IN: 0 mL / OUT: 200 mL / NET: -200 mL        PHYSICAL EXAM:    General: NAD, resting comfortably in bed  HEENT: NC/AT; PERRL, anicteric sclera; MMM with some small ulcers resembling canker sore   Neck: supple, no JVd  Chest: no erythema at upper right chest from permacath removal, no bruising, hematoma nor pain upon palpation  Cardiovascular: +S1/S2, RRR, no appreciated  murmur  Respiratory: CTA B/L; no W/R/R  Gastrointestinal: soft, NT/ND; +BS, no rebound or guarding  : no suprapubic tenderness, HD cath bandaged but no surrounding erythema or tenderness  Back: skin intact, no CVA tenderness, no ecchymosis,   Extremities: WWP; no edema, clubbing or cyanosis  Vascular: 2+ radial, DP/PT pulses B/L  Neurological: AAOx3; no focal deficits. Strength 5/5 in b/l LEs and UEs  Psychiatric: pleasant mood and affect  Dermatologic: no appreciable wounds, damage to the skin, splinter hemorrhaages, osler nodes,        LABS:                        11.0   13.53 )-----------( 328      ( 02 Sep 2021 08:19 )             32.8     09-02    137  |  101  |  46<H>  ----------------------------<  90  3.2<L>   |  22  |  3.37<H>    Ca    9.5      02 Sep 2021 08:19  Phos  4.1     09-02  Mg     2.4     09-02    TPro  5.4<L>  /  Alb  2.7<L>  /  TBili  0.7  /  DBili  x   /  AST  8<L>  /  ALT  7<L>  /  AlkPhos  181<H>  09-02    PT/INR - ( 02 Sep 2021 08:19 )   PT: 14.9 sec;   INR: 1.26          PTT - ( 02 Sep 2021 08:19 )  PTT:49.3 sec        RADIOLOGY & ADDITIONAL TESTS:    MEDICATIONS  (STANDING):  DAPTOmycin IVPB 500 milliGRAM(s) IV Intermittent every 48 hours  lidocaine 2% Viscous 10 milliLiter(s) Swish and Spit three times a day    MEDICATIONS  (PRN):  acetaminophen   Tablet .. 650 milliGRAM(s) Oral every 6 hours PRN Temp greater or equal to 38C (100.4F), Moderate Pain (4 - 6)  melatonin 3 milliGRAM(s) Oral at bedtime PRN Insomnia     SUBJECTIVE: Patient remains afebrile, WBC stable in the 13s. BCx from 8/31 now grows gram positive cocci, BCx 9/1,9/2 NGTD. Patient seen and examined at bedside. Patient's back pain has completely resolved. Patient denies h/n/v/d, fever, chills, cp, palpitations, sob, abd pain, leg swelling, rashes, dysuria, and changes in BM.     Vital Signs Last 12 Hrs  T(F): 98.8 (09-03-21 @ 05:38), Max: 98.8 (09-03-21 @ 05:38)  HR: 99 (09-03-21 @ 05:38) (99 - 99)  BP: 139/77 (09-03-21 @ 05:38) (139/77 - 139/77)  BP(mean): --  RR: 16 (09-03-21 @ 05:38) (16 - 16)  SpO2: 97% (09-03-21 @ 05:38) (97% - 97%)  I&O's Summary    02 Sep 2021 07:01  -  03 Sep 2021 07:00  --------------------------------------------------------  IN: 0 mL / OUT: 200 mL / NET: -200 mL    PHYSICAL EXAM:  Constitutional: NAD, comfortable in bed.  HEENT: NC/AT, PERRLA, EOMI, no conjunctival pallor or scleral icterus, MMM  Neck: Supple, no JVD  Respiratory: CTA B/L. No w/r/r.   Cardiovascular: RRR, normal S1 and S2, no m/r/g.   Gastrointestinal: +BS, soft NTND, no guarding or rebound tenderness, no palpable masses   Extremities: wwp; no cyanosis, clubbing or edema.   Vascular: Pulses equal and strong throughout.   Neurological: AAOx3, no CN deficits, strength and sensation intact throughout.   Skin: No gross skin abnormalities or rashes    LABS:                        11.0   13.53 )-----------( 328      ( 02 Sep 2021 08:19 )             32.8     09-02    137  |  101  |  46<H>  ----------------------------<  90  3.2<L>   |  22  |  3.37<H>    Ca    9.5      02 Sep 2021 08:19  Phos  4.1     09-02  Mg     2.4     09-02    TPro  5.4<L>  /  Alb  2.7<L>  /  TBili  0.7  /  DBili  x   /  AST  8<L>  /  ALT  7<L>  /  AlkPhos  181<H>  09-02    PT/INR - ( 02 Sep 2021 08:19 )   PT: 14.9 sec;   INR: 1.26          PTT - ( 02 Sep 2021 08:19 )  PTT:49.3 sec        RADIOLOGY & ADDITIONAL TESTS:    MEDICATIONS  (STANDING):  DAPTOmycin IVPB 500 milliGRAM(s) IV Intermittent every 48 hours  lidocaine 2% Viscous 10 milliLiter(s) Swish and Spit three times a day    MEDICATIONS  (PRN):  acetaminophen   Tablet .. 650 milliGRAM(s) Oral every 6 hours PRN Temp greater or equal to 38C (100.4F), Moderate Pain (4 - 6)  melatonin 3 milliGRAM(s) Oral at bedtime PRN Insomnia

## 2021-09-03 NOTE — PROGRESS NOTE ADULT - PROBLEM SELECTOR PLAN 2
As per ID, evaluation of possible endocarditis due to Bcx +MSSA, osteomyelitis and discitis.   -TTE 8/27/21: The mitral valve is mildly thickened. There is mild bileaflet valve prolapse (anterior > posterior). There is trace mitral regurgitation. Normal left and right ventricular size and systolic function.  - GUANAKO 8/30/21:       - No LA/RA/LISA/RAA thrombus seen.       - There is mitral valve prolapse of the anterior leaflet. There is an irregularly shaped linear echodensity measuring 0.9 cm x 0.5 cm on the atrial north of the anterior leaflet of the mitral valve, highly suspicious for a vegetation.      - There is thickening of the intervalvular fibrosa with systolic expansion - cannot rule out abscess formation.      - Compared to the previous GUANAKO performed on 2/26/2021, the mitral valve vegetation is new.  - Gallium scan 9/1/21: No significant uptake within the mitral valve region.  Plan:  - repeat GUANAKO on Tuesday July 7, 2021.   - F/u ID and CT surgery recs

## 2021-09-03 NOTE — PROGRESS NOTE ADULT - PROBLEM SELECTOR PLAN 6
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 Cr 2.86 on admission. Patient underwent dialysis yesterday, Cr this morning 2.17. Electrolytes, Ca, Phos, Hb at goal, euvolemic on exam. Heme consulted as patient's catheter needs to be removed given +Bcx MSSA bacteremia. Renal following.  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 Cr 2.86 on admission. Patient underwent dialysis yesterday, Cr this morning 2.17. Electrolytes, Ca, Phos, Hb at goal, euvolemic on exam. Heme consulted as patient's catheter needs to be removed given +Bcx MSSA bacteremia. Renal following.  - ESRD on HD  @[facility] usual Rx 3h 0.5L   - per Heme recs, TXA and factor VIII given prior to HD cath removal and procedure without complications  - trending daily Creatinine, BUN, electrolyte abnormalities, HCO3    As per nephro 9/1/21,  - No acute indication for HD  - replete K to 3.5, 20 KCL PO   - possibly uremic w/anorexia nausea and strange taste though no complaints today   - f/u with vascular regarding access  - monitor Bcx

## 2021-09-03 NOTE — PROGRESS NOTE ADULT - SUBJECTIVE AND OBJECTIVE BOX
Patient is a 76y old  Male who presents with a chief complaint of sepsis likely 2/2 UTI (03 Sep 2021 11:06)      INTERVAL HPI/OVERNIGHT EVENTS:    Pt. seen and examined earlier today  Pt. not eating much hospital food, but eating food brought from outside by family  Pt. denies F/C, CP, SOB, N/V, back pain    Review of Systems: 12 point review of systems otherwise negative    MEDICATIONS  (STANDING):  DAPTOmycin IVPB 500 milliGRAM(s) IV Intermittent every 48 hours    MEDICATIONS  (PRN):  acetaminophen   Tablet .. 650 milliGRAM(s) Oral every 6 hours PRN Temp greater or equal to 38C (100.4F), Moderate Pain (4 - 6)  melatonin 3 milliGRAM(s) Oral at bedtime PRN Insomnia      Allergies    allopurinol (Other)    Intolerances          Vital Signs Last 24 Hrs  T(C): 37 (03 Sep 2021 11:20), Max: 37.1 (03 Sep 2021 05:38)  T(F): 98.6 (03 Sep 2021 11:20), Max: 98.8 (03 Sep 2021 05:38)  HR: 97 (03 Sep 2021 11:20) (97 - 108)  BP: 127/78 (03 Sep 2021 11:20) (113/68 - 139/77)  BP(mean): --  RR: 17 (03 Sep 2021 11:20) (16 - 18)  SpO2: 97% (03 Sep 2021 11:20) (97% - 100%)  CAPILLARY BLOOD GLUCOSE          09-02 @ 07:01  -  09-03 @ 07:00  --------------------------------------------------------  IN: 0 mL / OUT: 200 mL / NET: -200 mL        Physical Exam:  (earlier today)  Daily     Daily   General:  non-toxic and comfortable-appearing in NAD  HEENT:  MMM  CV:  no murmur  Extremities:  no edema B/L LE  Skin:  WWP  Neuro:  AAOx3    LABS:                        9.9    13.87 )-----------( 327      ( 03 Sep 2021 10:50 )             30.5     09-03    138  |  105  |  45<H>  ----------------------------<  79  4.0   |  21<L>  |  2.96<H>    Ca    9.7      03 Sep 2021 10:48  Phos  2.9     09-03  Mg     2.1     09-03    TPro  5.5<L>  /  Alb  2.7<L>  /  TBili  0.6  /  DBili  x   /  AST  9<L>  /  ALT  7<L>  /  AlkPhos  157<H>  09-03    PT/INR - ( 03 Sep 2021 10:48 )   PT: 13.6 sec;   INR: 1.14          PTT - ( 03 Sep 2021 10:48 )  PTT:49.2 sec

## 2021-09-03 NOTE — PROGRESS NOTE ADULT - PROBLEM SELECTOR PLAN 1
POA, d/t MSSA bacteremia, c/b L5-S1 discitis/OM; GUANAKO 8/30 shows 0.9 cm x 0.5 cm echodensity on the atrial side of the anterior leaflet of the mitral valve, highly suspicious for a vegetation, can't r/o abscess; however, no significant uptake in mitral valve on gallium scan, which makes abscess less likely; appreciate ID, CTS, and Ortho recs; cont. medical mgmt w/ IV daptomycin; f/u surveillance cultures, repeat GUANAKO 9/7; no need for spine biopsy, per Ortho (risks would outweigh benefits), and unable to obtain MRI d/t non-compatible cochlear implant; cont. PT, WBAT; trend WBC, now down-trending

## 2021-09-03 NOTE — PROGRESS NOTE ADULT - PROBLEM SELECTOR PLAN 9
on admission. Last known level 169 (3/21) could be 2/2 to renal bone disease versus hepatobiliary.  this AM.  - continuously in decreasing trend

## 2021-09-03 NOTE — PROGRESS NOTE ADULT - PROBLEM SELECTOR PLAN 1
On admission pt met 3/4 SIRS criteria; HR 96, WBC 16.10 with neutrophilic predominance, Tmax 103.2F. Positive UA. Negative CXR. Patient empirically treated in the ED with 750mg vanc and 3.375g zosyn on 8/25, then CTX 2g, then one more dose of Vanc on 8/26. ID consulted. Bcx from 8/25 grew MSSA 3/4 bottles (likely recurrent as pt was diagnosed with MSSA bacteremia in March).  - Initial Bc 8/25/21: MSSA 2/2 bottles   - Permacath removed on 8/27/21, as per ID  - Cath culture 8/27/21: final result no growth  - continue Nafcillin 2g q4 for best MSSA coverage per ID started on 8/26/21  - Hold daily surveillance culture, follow final results of currently preliminary negative cultures

## 2021-09-03 NOTE — PROGRESS NOTE ADULT - PROBLEM SELECTOR PLAN 3
Low back pain with ambulation for 4-5 days. Per daughter, PT and warm compresses at home alleviated his pain. He does not report pain at bedside today. No spinal tenderness, strength 5/5 in b/l LE's, negative straight leg raise. CT lumbar spine performed, findings diagnostic for discitis-osteomyelitis. ID consulted, ortho following.  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 No acute ortho intervention at this time and no further imaging indicated.  - Gallium scan 9/1/21:  Uptake within the lower lumbar spine likely corresponding to area of abnormality on prior CT lumbar spine 8/26/2021 at L5-S1 which is suggestive of discitis osteomyelitis.  - treatment as above

## 2021-09-03 NOTE — PROGRESS NOTE ADULT - SUBJECTIVE AND OBJECTIVE BOX
OVERNIGHT EVENTS: NAOE    SUBJECTIVE:  Patient seen and examined at bedside. Patient refers lidocaine solution made him nauseas and discontinued it.  Denies any pain, bleeding, dizziness, chest pain, palpitations, SOB, fevers and chills. Rest of ROS unremarkable.     Vital Signs Last 12 Hrs  T(F): 98.8 (09-03-21 @ 05:38), Max: 98.8 (09-03-21 @ 05:38)  HR: 99 (09-03-21 @ 05:38) (99 - 99)  BP: 139/77 (09-03-21 @ 05:38) (139/77 - 139/77)  BP(mean): --  RR: 16 (09-03-21 @ 05:38) (16 - 16)  SpO2: 97% (09-03-21 @ 05:38) (97% - 97%)  I&O's Summary    02 Sep 2021 07:01  -  03 Sep 2021 07:00  --------------------------------------------------------  IN: 0 mL / OUT: 200 mL / NET: -200 mL        PHYSICAL EXAM:    General: NAD, resting comfortably in bed  HEENT: NC/AT; PERRL, anicteric sclera; MMM with some small ulcers resembling canker sore   Neck: supple, no JVd  Chest: no erythema at upper right chest from permacath removal, no bruising, hematoma nor pain upon palpation  Cardiovascular: +S1/S2, RRR, no appreciated  murmur  Respiratory: CTA B/L; no W/R/R  Gastrointestinal: soft, NT/ND; +BS, no rebound or guarding  : no suprapubic tenderness, HD cath bandaged but no surrounding erythema or tenderness  Back: skin intact, no CVA tenderness, no ecchymosis,   Extremities: WWP; no edema, clubbing or cyanosis  Vascular: 2+ radial, DP/PT pulses B/L  Neurological: AAOx3; no focal deficits. Strength 5/5 in b/l LEs and UEs  Psychiatric: pleasant mood and affect  Dermatologic: no appreciable wounds, damage to the skin, splinter hemorrhaages, osler nodes,        LABS:                        11.0   13.53 )-----------( 328      ( 02 Sep 2021 08:19 )             32.8     09-02    137  |  101  |  46<H>  ----------------------------<  90  3.2<L>   |  22  |  3.37<H>    Ca    9.5      02 Sep 2021 08:19  Phos  4.1     09-02  Mg     2.4     09-02    TPro  5.4<L>  /  Alb  2.7<L>  /  TBili  0.7  /  DBili  x   /  AST  8<L>  /  ALT  7<L>  /  AlkPhos  181<H>  09-02    PT/INR - ( 02 Sep 2021 08:19 )   PT: 14.9 sec;   INR: 1.26          PTT - ( 02 Sep 2021 08:19 )  PTT:49.3 sec        RADIOLOGY & ADDITIONAL TESTS:    MEDICATIONS  (STANDING):  DAPTOmycin IVPB 500 milliGRAM(s) IV Intermittent every 48 hours  lidocaine 2% Viscous 10 milliLiter(s) Swish and Spit three times a day    MEDICATIONS  (PRN):  acetaminophen   Tablet .. 650 milliGRAM(s) Oral every 6 hours PRN Temp greater or equal to 38C (100.4F), Moderate Pain (4 - 6)  melatonin 3 milliGRAM(s) Oral at bedtime PRN Insomnia

## 2021-09-03 NOTE — PROGRESS NOTE ADULT - ASSESSMENT
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 GUANAKO concerning for vegetation, CTSx rec medical management and repeat GUANAKO. Last BCx 8/29 remain +ve.     #ESRD on HD MF @[facility]  usual Rx 3h 0.5L   -electrolytes at goal   -euvolemic on examination  -No acute indication for HD as Cr downtrending   dry wt TBD  replete K to 3.5, 20 KCL PO   possibly uremic w/anorexia nausea and strange taste, though improving per patient     #Fevers  growing staph aureus   -repeat blood cx from 8/29 positive for staph aureus  -cannot get new HD line until neg x 72 hours  -GUANAKO concerning for vegetation pending repeat 9/7     #anemia  Hb at goal   no indication for EPO or IV Iron at this time   transfusion as per primary team     #renal bone disease   Ca and Phos acceptable, trend both daily     Thank you for the opportunity to participate in the care of your patient. The nephrology service remains available to assist with any questions or concerns. Please feel free to reach us by paging the on-call nephrology fellow for urgent issues or as below.     Ramón Dumont M.D.   PGY-5, Nephrology Fellow   C: [phone]   P: [phone]

## 2021-09-03 NOTE — PROGRESS NOTE ADULT - ATTENDING COMMENTS
See the Fellow's note written above, for details. I reviewed the fellow documentation.  I have personally seen and examined this patient today. I have reviewed vitals, labs, medications, and imaging. I agree with the fellow's findings and plans as written above with the following additions/amendments:    Patient seen and examined at bedside. Feeling overall better, no issues identified. Denies CP, palpitations, SOB. States eating somewhat better although food at bedside untouched  .  VITAL SIGNS:  T(F): 98.6 (09-03-21 @ 11:20), Max: 98.8 (09-03-21 @ 05:38)  HR: 97 (09-03-21 @ 11:20) (97 - 99)  BP: 127/78 (09-03-21 @ 11:20) (127/78 - 139/77)  BP(mean): --  RR: 17 (09-03-21 @ 11:20) (16 - 17)  SpO2: 97% (09-03-21 @ 11:20) (97% - 99%)  Constitutional: Thin, Resting comfortably in bed; NAD  HEENT:  EOMI, anicteric sclera; MMM  Respiratory: CTA B/L; no W/R/R, no accessory muscle use  Cardiac: +S1/S2; RRR; no M/R/G  Gastrointestinal: soft, NT/ND; no rebound or guarding; +BS  Extremities: WWP, no clubbing or cyanosis; no peripheral edema  Dermatologic: skin warm, dry and intact; no rashes, wounds, or scars  Access: removed, site without purulence    Anemia - Downtrending Hgb, unclear sources of bleeding, would work up    MBD - phos, pth at goal, continue low phos diet  No acute indication for HD - downtrending sCr with treatment of bacteremia interesting, perhaps some level of AIN 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 Strict I/Os, daily standing weight

## 2021-09-03 NOTE — PROGRESS NOTE ADULT - PROBLEM SELECTOR PLAN 10
Resolved: One episode of watery diarrhea on Saturday 8/28/21, 1 episode of small loss stool 8/31/21  - GI PCR: 8/30/21 Negative  - will monitor for s/s of infection  - miralax/senna discontinued

## 2021-09-03 NOTE — PROGRESS NOTE ADULT - PROBLEM SELECTOR PLAN 2
h/o DRESS syndrome; had been on HD twice a week prior to admission; HD catheter removed 8/27 in setting of bacteremia; Renal following, will monitor BMP, volume status off dialysis for now; renally-dose rx; Cr normalizing

## 2021-09-03 NOTE — PROGRESS NOTE ADULT - PROBLEM SELECTOR PLAN 5
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  - f/u pending inhibitor assay ([place] units).   - f/u Factor VIII inhibitor testing  - f/u Hem/onc recs

## 2021-09-03 NOTE — PROGRESS NOTE ADULT - PROBLEM SELECTOR PLAN 1
On admission pt met 3/4 SIRS criteria; HR 96, WBC 16.10 with neutrophilic predominance, Tmax 103.2F. Positive UA. Negative CXR. Patient empirically treated in the ED with 750mg vanc and 3.375g zosyn on 8/25, then CTX 2g, then one more dose of Vanc on 8/26. ID consulted. Bcx from 8/25 grew MSSA 3/4 bottles (likely recurrent as pt was diagnosed with MSSA bacteremia in March).  - Initial Bc 8/25/21: MSSA 2/2 bottles   - Permacath removed on 8/27/21, as per ID  - Cath culture 8/27/21: final result no growth  - continue Nafcillin 2g q4 for best MSSA coverage per ID started on 8/26/21  - Bcx from 8/31/21: today grew gram positive,  restarted daily cultures On admission pt met 3/4 SIRS criteria; HR 96, WBC 16.10 with neutrophilic predominance, Tmax 103.2F. Positive UA. Negative CXR. Patient empirically treated in the ED with 750mg vanc and 3.375g zosyn on 8/25, then CTX 2g, then one more dose of Vanc on 8/26. ID consulted. Bcx from 8/25 grew MSSA 3/4 bottles (likely recurrent as pt was diagnosed with MSSA bacteremia in March).  - Initial Bc 8/25/21: MSSA 2/2 bottles   - Permacath removed on 8/27/21, as per ID  - Cath culture 8/27/21: final result no growth  - continue Nafcillin 2g q4 for best MSSA coverage per ID started on 8/26/21  - Bcx from 8/31/21: today grew gram positive, consider restarting Bcx if cultures from 9/1 come positive.      -

## 2021-09-03 NOTE — PROGRESS NOTE ADULT - PROBLEM SELECTOR PLAN 8
Resolved: On admission pt met 3/4 SIRS criteria (HR 96, WBC 16.10 with neutrophilic predominance, Tmax 103.2F) found to have positive UA. Denies any urinary symptoms. Discussed with daughter, given history of BPH, concern for underlying urinary retention as nidus for ascending urinary tract infection. Denies constipation. ID consulted. Ucx from 8/25 grew Enterobacter Colacae.  - per ID, no need to treat asymptomatic bacteruria at this time  - monitor for any new urinary symptoms

## 2021-09-03 NOTE — PROGRESS NOTE ADULT - PROBLEM SELECTOR PLAN 6
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 Cr 2.86 on admission. Patient underwent dialysis yesterday, Cr this morning 2.17. Electrolytes, Ca, Phos, Hb at goal, euvolemic on exam. Heme consulted as patient's catheter needs to be removed given +Bcx MSSA bacteremia. Renal following.  - ESRD on HD  @[facility] usual Rx 3h 0.5L   - per Heme recs, TXA and factor VIII given prior to HD cath removal and procedure without complications  - trending daily Creatinine, BUN, electrolyte abnormalities, HCO3    As per nephro 9/1/21,  - No acute indication for HD  - replete K to 3.5, 20 KCL PO   - possibly uremic w/anorexia nausea and strange taste though no complaints today   - f/u with vascular regarding access

## 2021-09-04 LAB
-  CEFAZOLIN: SIGNIFICANT CHANGE UP
-  CLINDAMYCIN: SIGNIFICANT CHANGE UP
-  ERYTHROMYCIN: SIGNIFICANT CHANGE UP
-  LINEZOLID: SIGNIFICANT CHANGE UP
-  OXACILLIN: SIGNIFICANT CHANGE UP
-  RIFAMPIN: SIGNIFICANT CHANGE UP
-  TRIMETHOPRIM/SULFAMETHOXAZOLE: SIGNIFICANT CHANGE UP
-  VANCOMYCIN: SIGNIFICANT CHANGE UP
ALBUMIN SERPL ELPH-MCNC: 2.5 G/DL — LOW (ref 3.3–5)
ALP SERPL-CCNC: 171 U/L — HIGH (ref 40–120)
ALT FLD-CCNC: 6 U/L — LOW (ref 10–45)
ANION GAP SERPL CALC-SCNC: 10 MMOL/L — SIGNIFICANT CHANGE UP (ref 5–17)
APTT BLD: 47.3 SEC — HIGH (ref 27.5–35.5)
AST SERPL-CCNC: 9 U/L — LOW (ref 10–40)
BASOPHILS # BLD AUTO: 0.07 K/UL — SIGNIFICANT CHANGE UP (ref 0–0.2)
BASOPHILS NFR BLD AUTO: 0.5 % — SIGNIFICANT CHANGE UP (ref 0–2)
BILIRUB SERPL-MCNC: 0.6 MG/DL — SIGNIFICANT CHANGE UP (ref 0.2–1.2)
BLD GP AB SCN SERPL QL: NEGATIVE — SIGNIFICANT CHANGE UP
BUN SERPL-MCNC: 39 MG/DL — HIGH (ref 7–23)
CALCIUM SERPL-MCNC: 9.3 MG/DL — SIGNIFICANT CHANGE UP (ref 8.4–10.5)
CHLORIDE SERPL-SCNC: 108 MMOL/L — SIGNIFICANT CHANGE UP (ref 96–108)
CO2 SERPL-SCNC: 22 MMOL/L — SIGNIFICANT CHANGE UP (ref 22–31)
CREAT SERPL-MCNC: 2.68 MG/DL — HIGH (ref 0.5–1.3)
CULTURE RESULTS: SIGNIFICANT CHANGE UP
EOSINOPHIL # BLD AUTO: 0.43 K/UL — SIGNIFICANT CHANGE UP (ref 0–0.5)
EOSINOPHIL NFR BLD AUTO: 3.1 % — SIGNIFICANT CHANGE UP (ref 0–6)
GLUCOSE SERPL-MCNC: 128 MG/DL — HIGH (ref 70–99)
HCT VFR BLD CALC: 29.8 % — LOW (ref 39–50)
HGB BLD-MCNC: 9.7 G/DL — LOW (ref 13–17)
IMM GRANULOCYTES NFR BLD AUTO: 1.2 % — SIGNIFICANT CHANGE UP (ref 0–1.5)
INR BLD: 1.19 — HIGH (ref 0.88–1.16)
LYMPHOCYTES # BLD AUTO: 1.31 K/UL — SIGNIFICANT CHANGE UP (ref 1–3.3)
LYMPHOCYTES # BLD AUTO: 9.5 % — LOW (ref 13–44)
MAGNESIUM SERPL-MCNC: 2.1 MG/DL — SIGNIFICANT CHANGE UP (ref 1.6–2.6)
MCHC RBC-ENTMCNC: 26.8 PG — LOW (ref 27–34)
MCHC RBC-ENTMCNC: 32.6 GM/DL — SIGNIFICANT CHANGE UP (ref 32–36)
MCV RBC AUTO: 82.3 FL — SIGNIFICANT CHANGE UP (ref 80–100)
METHOD TYPE: SIGNIFICANT CHANGE UP
MONOCYTES # BLD AUTO: 0.93 K/UL — HIGH (ref 0–0.9)
MONOCYTES NFR BLD AUTO: 6.8 % — SIGNIFICANT CHANGE UP (ref 2–14)
NEUTROPHILS # BLD AUTO: 10.83 K/UL — HIGH (ref 1.8–7.4)
NEUTROPHILS NFR BLD AUTO: 78.9 % — HIGH (ref 43–77)
NRBC # BLD: 0 /100 WBCS — SIGNIFICANT CHANGE UP (ref 0–0)
ORGANISM # SPEC MICROSCOPIC CNT: SIGNIFICANT CHANGE UP
ORGANISM # SPEC MICROSCOPIC CNT: SIGNIFICANT CHANGE UP
PHOSPHATE SERPL-MCNC: 2.4 MG/DL — LOW (ref 2.5–4.5)
PLATELET # BLD AUTO: 331 K/UL — SIGNIFICANT CHANGE UP (ref 150–400)
POTASSIUM SERPL-MCNC: 3.9 MMOL/L — SIGNIFICANT CHANGE UP (ref 3.5–5.3)
POTASSIUM SERPL-SCNC: 3.9 MMOL/L — SIGNIFICANT CHANGE UP (ref 3.5–5.3)
PROT SERPL-MCNC: 5.8 G/DL — LOW (ref 6–8.3)
PROTHROM AB SERPL-ACNC: 14.2 SEC — HIGH (ref 10.6–13.6)
RBC # BLD: 3.62 M/UL — LOW (ref 4.2–5.8)
RBC # FLD: 16.5 % — HIGH (ref 10.3–14.5)
RH IG SCN BLD-IMP: POSITIVE — SIGNIFICANT CHANGE UP
SODIUM SERPL-SCNC: 140 MMOL/L — SIGNIFICANT CHANGE UP (ref 135–145)
SPECIMEN SOURCE: SIGNIFICANT CHANGE UP
WBC # BLD: 13.74 K/UL — HIGH (ref 3.8–10.5)
WBC # FLD AUTO: 13.74 K/UL — HIGH (ref 3.8–10.5)

## 2021-09-04 PROCEDURE — 99232 SBSQ HOSP IP/OBS MODERATE 35: CPT

## 2021-09-04 PROCEDURE — 99233 SBSQ HOSP IP/OBS HIGH 50: CPT | Mod: GC

## 2021-09-04 RX ADMIN — DAPTOMYCIN 120 MILLIGRAM(S): 500 INJECTION, POWDER, LYOPHILIZED, FOR SOLUTION INTRAVENOUS at 18:29

## 2021-09-04 NOTE — PROGRESS NOTE ADULT - SUBJECTIVE AND OBJECTIVE BOX
INTERVAL HPI/OVERNIGHT EVENTS:    SUBJECTIVE: Patient seen and examined at bedside.     OBJECTIVE:    VITAL SIGNS:  ICU Vital Signs Last 24 Hrs  T(C): 36.8 (04 Sep 2021 09:59), Max: 37.3 (03 Sep 2021 21:31)  T(F): 98.2 (04 Sep 2021 09:59), Max: 99.1 (03 Sep 2021 21:31)  HR: 96 (04 Sep 2021 09:59) (93 - 100)  BP: 136/75 (04 Sep 2021 09:59) (127/78 - 140/75)  BP(mean): --  ABP: --  ABP(mean): --  RR: 18 (04 Sep 2021 09:59) (17 - 18)  SpO2: 97% (04 Sep 2021 09:59) (97% - 98%)        09-03 @ 07:01  -  09-04 @ 07:00  --------------------------------------------------------  IN: 0 mL / OUT: 600 mL / NET: -600 mL      CAPILLARY BLOOD GLUCOSE          PHYSICAL EXAM:    General: NAD  HEENT: NC/AT; PERRL, clear conjunctiva  Neck: supple  Respiratory: CTA b/l  Cardiovascular: +S1/S2; RRR  Abdomen: soft, NT/ND; +BS x4  Extremities: WWP, 2+ peripheral pulses b/l; no LE edema  Skin: normal color and turgor; no rash  Neurological:    MEDICATIONS:  MEDICATIONS  (STANDING):  DAPTOmycin IVPB 500 milliGRAM(s) IV Intermittent every 48 hours    MEDICATIONS  (PRN):  acetaminophen   Tablet .. 650 milliGRAM(s) Oral every 6 hours PRN Temp greater or equal to 38C (100.4F), Moderate Pain (4 - 6)  melatonin 3 milliGRAM(s) Oral at bedtime PRN Insomnia      ALLERGIES:  Allergies    allopurinol (Other)    Intolerances        LABS:                        9.7    13.74 )-----------( 331      ( 04 Sep 2021 08:49 )             29.8     09-04    140  |  108  |  39<H>  ----------------------------<  128<H>  3.9   |  22  |  2.68<H>    Ca    9.3      04 Sep 2021 08:49  Phos  2.4     09-04  Mg     2.1     09-04    TPro  5.8<L>  /  Alb  2.5<L>  /  TBili  0.6  /  DBili  x   /  AST  9<L>  /  ALT  6<L>  /  AlkPhos  171<H>  09-04    PT/INR - ( 04 Sep 2021 08:50 )   PT: 14.2 sec;   INR: 1.19          PTT - ( 04 Sep 2021 08:50 )  PTT:47.3 sec      RADIOLOGY & ADDITIONAL TESTS: Reviewed. INTERVAL HPI/OVERNIGHT EVENTS: NAOE    SUBJECTIVE: Patient seen and examined at bedside. Patient reports no complaints, ROS unremarkable.    OBJECTIVE:    VITAL SIGNS:  ICU Vital Signs Last 24 Hrs  T(C): 36.8 (04 Sep 2021 09:59), Max: 37.3 (03 Sep 2021 21:31)  T(F): 98.2 (04 Sep 2021 09:59), Max: 99.1 (03 Sep 2021 21:31)  HR: 96 (04 Sep 2021 09:59) (93 - 100)  BP: 136/75 (04 Sep 2021 09:59) (127/78 - 140/75)  BP(mean): --  ABP: --  ABP(mean): --  RR: 18 (04 Sep 2021 09:59) (17 - 18)  SpO2: 97% (04 Sep 2021 09:59) (97% - 98%)        09-03 @ 07:01  -  09-04 @ 07:00  --------------------------------------------------------  IN: 0 mL / OUT: 600 mL / NET: -600 mL      CAPILLARY BLOOD GLUCOSE          PHYSICAL EXAM:    General: NAD, resting comfortably in bed  HEENT: NC/AT; PERRL, anicteric sclera; MMM with some small ulcers resembling canker sore   Neck: supple, no JVd  Chest: no erythema at upper right chest from permacath removal, no bruising, hematoma nor pain upon palpation  Cardiovascular: +S1/S2, RRR, no appreciated  murmur  Respiratory: CTA B/L; no W/R/R  Gastrointestinal: soft, NT/ND; +BS, no rebound or guarding  : no suprapubic tenderness, HD cath bandaged but no surrounding erythema or tenderness  Back: skin intact, no CVA tenderness, no ecchymosis,   Extremities: WWP; no edema, clubbing or cyanosis  Vascular: 2+ radial, DP/PT pulses B/L  Neurological: AAOx3; no focal deficits. Strength 5/5 in b/l LEs and UEs  Psychiatric: pleasant mood and affect  Dermatologic: no appreciable wounds, damage to the skin, splinter hemorrhaages, osler nodes,      MEDICATIONS:  MEDICATIONS  (STANDING):  DAPTOmycin IVPB 500 milliGRAM(s) IV Intermittent every 48 hours    MEDICATIONS  (PRN):  acetaminophen   Tablet .. 650 milliGRAM(s) Oral every 6 hours PRN Temp greater or equal to 38C (100.4F), Moderate Pain (4 - 6)  melatonin 3 milliGRAM(s) Oral at bedtime PRN Insomnia      ALLERGIES:  Allergies    allopurinol (Other)    Intolerances        LABS:                        9.7    13.74 )-----------( 331      ( 04 Sep 2021 08:49 )             29.8     09-04    140  |  108  |  39<H>  ----------------------------<  128<H>  3.9   |  22  |  2.68<H>    Ca    9.3      04 Sep 2021 08:49  Phos  2.4     09-04  Mg     2.1     09-04    TPro  5.8<L>  /  Alb  2.5<L>  /  TBili  0.6  /  DBili  x   /  AST  9<L>  /  ALT  6<L>  /  AlkPhos  171<H>  09-04    PT/INR - ( 04 Sep 2021 08:50 )   PT: 14.2 sec;   INR: 1.19          PTT - ( 04 Sep 2021 08:50 )  PTT:47.3 sec      RADIOLOGY & ADDITIONAL TESTS: Reviewed.

## 2021-09-04 NOTE — PROGRESS NOTE ADULT - ASSESSMENT
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     #RU previously on HD, no longer required  Creat downtrending  Volume, lytes and bicarb acceptable  Replete phos to goal of 3  No longer dialysis dependent  Anaemic- check iron panel and ferritin    Continue renal diet, renally dose meds  Continue bacteraemia management per primary team, no need for HD cath placement

## 2021-09-04 NOTE — PROGRESS NOTE ADULT - PROBLEM SELECTOR PLAN 6
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 Cr 2.86 on admission. Patient underwent dialysis yesterday, Cr this morning 2.17. Electrolytes, Ca, Phos, Hb at goal, euvolemic on exam. Heme consulted as patient's catheter needs to be removed given +Bcx MSSA bacteremia. Renal following.  - ESRD on HD  @[facility] usual Rx 3h 0.5L   - per Heme recs, TXA and factor VIII given prior to HD cath removal and procedure without complications  - trending daily Creatinine, BUN, electrolyte abnormalities, HCO3    As per nephro 9/1/21,  - No acute indication for HD  - replete K to 3.5, 20 KCL PO   - possibly uremic w/anorexia nausea and strange taste though no complaints today   - f/u with vascular regarding access  - monitor Bcx

## 2021-09-04 NOTE — PROVIDER CONTACT NOTE (CRITICAL VALUE NOTIFICATION) - SITUATION
Factor Inhibitor Assay Canceled
BLOOD CULTURE COLLECTED AUGUST 27, 2021. AEROBIC BOTTLE SHOWS GRAM POSITIVE COCCI IN CLUSTERS

## 2021-09-04 NOTE — PROGRESS NOTE ADULT - PROBLEM SELECTOR PLAN 1
On admission pt met 3/4 SIRS criteria; HR 96, WBC 16.10 with neutrophilic predominance, Tmax 103.2F. Positive UA. Negative CXR. Patient empirically treated in the ED with 750mg vanc and 3.375g zosyn on 8/25, then CTX 2g, then one more dose of Vanc on 8/26. ID consulted. Bcx from 8/25 grew MSSA 3/4 bottles (likely recurrent as pt was diagnosed with MSSA bacteremia in March).  - Initial Bc 8/25/21: MSSA 2/2 bottles   - Permacath removed on 8/27/21, as per ID  - Cath culture 8/27/21: final result no growth  - continue Nafcillin 2g q4 for best MSSA coverage per ID started on 8/26/21  - Bcx from 8/31/21: today grew gram positive, consider restarting Bcx if cultures from 9/1 come positive.      - On admission pt met 3/4 SIRS criteria; HR 96, WBC 16.10 with neutrophilic predominance, Tmax 103.2F. Positive UA. Negative CXR. Patient empirically treated in the ED with 750mg vanc and 3.375g zosyn on 8/25, then CTX 2g, then one more dose of Vanc on 8/26. ID consulted. Bcx from 8/25 grew MSSA 3/4 bottles (likely recurrent as pt was diagnosed with MSSA bacteremia in March).  - Initial Bc 8/25/21: MSSA 2/2 bottles   - Permacath removed on 8/27/21, as per ID  - Cath culture 8/27/21: final result no growth  - s/p Nafcillin 2g q4 for best MSSA coverage per ID started on 8/26/21  - c/w Daptomycin 500 mg IV q4hrs  - Bcx from 8/31/21: today grew gram positive, consider restarting Bcx if cultures from 9/1 come positive.      -

## 2021-09-04 NOTE — PROGRESS NOTE ADULT - PROBLEM SELECTOR PLAN 5
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  - f/u pending inhibitor assay ([place] units).   - f/u Factor VIII inhibitor testing  - f/u Hem/onc recs

## 2021-09-04 NOTE — PROGRESS NOTE ADULT - ATTENDING COMMENTS
Pt clinically stable, will need GUANAKO on 9/7  -Remains afebrile and without complaints  -Tachycardia close to his baseline    -F/u additional ID recs, Cr. stable

## 2021-09-04 NOTE — PROGRESS NOTE ADULT - ATTENDING COMMENTS
See the Fellow's note written above, for details. I reviewed the fellow documentation.  I have personally seen and examined this patient today. I have reviewed vitals, labs, medications, and imaging. I agree with the fellow's findings and plans as written above with the following additions/amendments:    Patient seen and examined at bedside. Feels well. Excited to hear that he no longer needs dialysis but understands that still needs close follow up for kidney diease    .  VITAL SIGNS:  T(F): 97.3 (09-04-21 @ 16:00), Max: 98.2 (09-04-21 @ 09:59)  HR: 95 (09-04-21 @ 16:00) (95 - 96)  BP: 149/77 (09-04-21 @ 16:00) (136/75 - 149/77)  BP(mean): --  RR: 20 (09-04-21 @ 16:00) (18 - 20)  SpO2: 99% (09-04-21 @ 16:00) (97% - 99%)    PHYSICAL EXAM:  Constitutional: Resting comfortably in bed; NAD  HEENT:  EOMI, anicteric sclera; MMM  Respiratory: CTA B/L; no W/R/R, no accessory muscle use  Cardiac: +S1/S2; RRR; no M/R/G  Gastrointestinal: soft, NT/ND; no rebound or guarding; +BS  Extremities: WWP, no clubbing or cyanosis; no peripheral edema  Dermatologic: skin warm, dry and intact; no rashes, wounds, or scars    Anemia - Downtrending Hgb, unclear sources of bleeding, would work up    MBD - phos, pth at goal, continue low phos diet    CKD - monitor off HD

## 2021-09-04 NOTE — PROGRESS NOTE ADULT - PROBLEM SELECTOR PLAN 11
Resolved post below adjustment  · Uncontrolled symptoms patient does have a hiatal hernia status post sees EGD but his symptoms have been quite a controlled for a while  I will increase his Pepcid to 20 mg p  o  b i d  and Protonix 40 mg p  o  daily put him on sodium bicarb 650 mg p o  b i d  p r n  and Maalox patient agrees with this plan  Normocytic anemia: Hb at goal today 13.6  As per Nephrology:  - no indication for EPO or IV Iron at this time Normocytic anemia:   - no indication of transfussion  - f/u Hem/onc recs    As per Nephrology:  - no indication for EPO or IV Iron at this time  - continue to monitor

## 2021-09-04 NOTE — PROGRESS NOTE ADULT - PROBLEM SELECTOR PLAN 12
F: s/p 500 cc, tolerating PO  E: repleteas er nephro K goal > 3.5  N: DASH/TLC    VTE Prophylaxis: SCDS (hemophilia no need for anticoag)  GI: not needed  C: Full Code  D: RMF F: s/p 500 cc, tolerating PO  E: replete as er nephro K goal > 3.5  N: DASH/TLC    VTE Prophylaxis: SCDS (hemophilia no need for anticoag)  GI: not needed  C: Full Code  D: RMF

## 2021-09-04 NOTE — PROVIDER CONTACT NOTE (CRITICAL VALUE NOTIFICATION) - PERSON GIVING RESULT:
Pedro Castro Microbiology
ALEJANDRO Holcomb/ Microbiology
[facility]
Ferdinand WISDOM

## 2021-09-04 NOTE — PROVIDER CONTACT NOTE (CRITICAL VALUE NOTIFICATION) - TEST AND RESULT REPORTED:
Blood culture from 8/31 aerobic bottle growing gram positive cocci in clusters
Factor Inhibitor Assay Canceled
blood culture drawn 8/25 gram positive coccxi in clusters
Blood culture collected August 27, 2021 - Aerobic bottle has gram positive cocci in clusters

## 2021-09-04 NOTE — PROGRESS NOTE ADULT - SUBJECTIVE AND OBJECTIVE BOX
Patient is a 76y Male seen and evaluated at bedside. Creat improving. No longer requires HD. BP controlled. Can be discharged off dialysis from nephrology perspective.    Meds:    acetaminophen   Tablet .. 650 every 6 hours PRN  DAPTOmycin IVPB 500 every 48 hours  melatonin 3 at bedtime PRN      T(C): , Max: 37.3 (09-03-21 @ 21:31)  T(F): , Max: 99.1 (09-03-21 @ 21:31)  HR: 96 (09-04-21 @ 09:59)  BP: 136/75 (09-04-21 @ 09:59)  BP(mean): --  RR: 18 (09-04-21 @ 09:59)  SpO2: 97% (09-04-21 @ 09:59)  Wt(kg): --    09-03 @ 07:01  -  09-04 @ 07:00  --------------------------------------------------------  IN: 0 mL / OUT: 600 mL / NET: -600 mL      Review of Systems:  RESPIRATORY: No shortness of breath  CARDIOVASCULAR: No chest pain   MUSCULOSKELETAL: No leg oedema    PHYSICAL EXAM:  GENERAL: well-developed, well nourished, alert, no acute distress at present on RA  CHEST/LUNG: Clear to auscultation bilaterally  HEART: normal S1S2, RRR  ABDOMEN: Soft, Nontender  EXTREMITIES: Trace oedema     LABS:                        9.7    13.74 )-----------( 331      ( 04 Sep 2021 08:49 )             29.8     09-04    140  |  108  |  39<H>  ----------------------------<  128<H>  3.9   |  22  |  2.68<H>    Ca    9.3      04 Sep 2021 08:49  Phos  2.4     09-04  Mg     2.1     09-04    TPro  5.8<L>  /  Alb  2.5<L>  /  TBili  0.6  /  DBili  x   /  AST  9<L>  /  ALT  6<L>  /  AlkPhos  171<H>  09-04      PT/INR - ( 04 Sep 2021 08:50 )   PT: 14.2 sec;   INR: 1.19          PTT - ( 04 Sep 2021 08:50 )  PTT:47.3 sec          RADIOLOGY & ADDITIONAL STUDIES:

## 2021-09-05 DIAGNOSIS — D64.9 ANEMIA, UNSPECIFIED: ICD-10-CM

## 2021-09-05 LAB
-  CEFAZOLIN: SIGNIFICANT CHANGE UP
-  CLINDAMYCIN: SIGNIFICANT CHANGE UP
-  ERYTHROMYCIN: SIGNIFICANT CHANGE UP
-  LINEZOLID: SIGNIFICANT CHANGE UP
-  OXACILLIN: SIGNIFICANT CHANGE UP
-  RIFAMPIN: SIGNIFICANT CHANGE UP
-  TRIMETHOPRIM/SULFAMETHOXAZOLE: SIGNIFICANT CHANGE UP
-  VANCOMYCIN: SIGNIFICANT CHANGE UP
ALBUMIN SERPL ELPH-MCNC: 2.8 G/DL — LOW (ref 3.3–5)
ALP SERPL-CCNC: 166 U/L — HIGH (ref 40–120)
ALT FLD-CCNC: 6 U/L — LOW (ref 10–45)
ANION GAP SERPL CALC-SCNC: 9 MMOL/L — SIGNIFICANT CHANGE UP (ref 5–17)
APTT BLD: 45.4 SEC — HIGH (ref 27.5–35.5)
AST SERPL-CCNC: 9 U/L — LOW (ref 10–40)
BASOPHILS # BLD AUTO: 0.07 K/UL — SIGNIFICANT CHANGE UP (ref 0–0.2)
BASOPHILS NFR BLD AUTO: 0.5 % — SIGNIFICANT CHANGE UP (ref 0–2)
BILIRUB SERPL-MCNC: 0.6 MG/DL — SIGNIFICANT CHANGE UP (ref 0.2–1.2)
BUN SERPL-MCNC: 34 MG/DL — HIGH (ref 7–23)
CALCIUM SERPL-MCNC: 9.8 MG/DL — SIGNIFICANT CHANGE UP (ref 8.4–10.5)
CHLORIDE SERPL-SCNC: 109 MMOL/L — HIGH (ref 96–108)
CO2 SERPL-SCNC: 23 MMOL/L — SIGNIFICANT CHANGE UP (ref 22–31)
CREAT SERPL-MCNC: 2.55 MG/DL — HIGH (ref 0.5–1.3)
CULTURE RESULTS: SIGNIFICANT CHANGE UP
EOSINOPHIL # BLD AUTO: 0.38 K/UL — SIGNIFICANT CHANGE UP (ref 0–0.5)
EOSINOPHIL NFR BLD AUTO: 2.7 % — SIGNIFICANT CHANGE UP (ref 0–6)
GLUCOSE SERPL-MCNC: 96 MG/DL — SIGNIFICANT CHANGE UP (ref 70–99)
HCT VFR BLD CALC: 30 % — LOW (ref 39–50)
HGB BLD-MCNC: 10.1 G/DL — LOW (ref 13–17)
IMM GRANULOCYTES NFR BLD AUTO: 1.3 % — SIGNIFICANT CHANGE UP (ref 0–1.5)
INR BLD: 1.21 — HIGH (ref 0.88–1.16)
LYMPHOCYTES # BLD AUTO: 1.63 K/UL — SIGNIFICANT CHANGE UP (ref 1–3.3)
LYMPHOCYTES # BLD AUTO: 11.4 % — LOW (ref 13–44)
MAGNESIUM SERPL-MCNC: 2.1 MG/DL — SIGNIFICANT CHANGE UP (ref 1.6–2.6)
MCHC RBC-ENTMCNC: 27.7 PG — SIGNIFICANT CHANGE UP (ref 27–34)
MCHC RBC-ENTMCNC: 33.7 GM/DL — SIGNIFICANT CHANGE UP (ref 32–36)
MCV RBC AUTO: 82.4 FL — SIGNIFICANT CHANGE UP (ref 80–100)
METHOD TYPE: SIGNIFICANT CHANGE UP
MONOCYTES # BLD AUTO: 1.08 K/UL — HIGH (ref 0–0.9)
MONOCYTES NFR BLD AUTO: 7.6 % — SIGNIFICANT CHANGE UP (ref 2–14)
NEUTROPHILS # BLD AUTO: 10.91 K/UL — HIGH (ref 1.8–7.4)
NEUTROPHILS NFR BLD AUTO: 76.5 % — SIGNIFICANT CHANGE UP (ref 43–77)
NRBC # BLD: 0 /100 WBCS — SIGNIFICANT CHANGE UP (ref 0–0)
ORGANISM # SPEC MICROSCOPIC CNT: SIGNIFICANT CHANGE UP
ORGANISM # SPEC MICROSCOPIC CNT: SIGNIFICANT CHANGE UP
PHOSPHATE SERPL-MCNC: 2.1 MG/DL — LOW (ref 2.5–4.5)
PLATELET # BLD AUTO: 322 K/UL — SIGNIFICANT CHANGE UP (ref 150–400)
POTASSIUM SERPL-MCNC: 4 MMOL/L — SIGNIFICANT CHANGE UP (ref 3.5–5.3)
POTASSIUM SERPL-SCNC: 4 MMOL/L — SIGNIFICANT CHANGE UP (ref 3.5–5.3)
PROT SERPL-MCNC: 5.7 G/DL — LOW (ref 6–8.3)
PROTHROM AB SERPL-ACNC: 14.4 SEC — HIGH (ref 10.6–13.6)
RBC # BLD: 3.64 M/UL — LOW (ref 4.2–5.8)
RBC # FLD: 16.3 % — HIGH (ref 10.3–14.5)
SODIUM SERPL-SCNC: 141 MMOL/L — SIGNIFICANT CHANGE UP (ref 135–145)
SPECIMEN SOURCE: SIGNIFICANT CHANGE UP
WBC # BLD: 14.25 K/UL — HIGH (ref 3.8–10.5)
WBC # FLD AUTO: 14.25 K/UL — HIGH (ref 3.8–10.5)

## 2021-09-05 PROCEDURE — 99232 SBSQ HOSP IP/OBS MODERATE 35: CPT

## 2021-09-05 PROCEDURE — 99233 SBSQ HOSP IP/OBS HIGH 50: CPT | Mod: GC

## 2021-09-05 NOTE — PROGRESS NOTE ADULT - ASSESSMENT
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 Repeat GUANAKO for 9/7. Continue daptomycin.   Discussed with primary team

## 2021-09-05 NOTE — PROGRESS NOTE ADULT - PROBLEM SELECTOR PLAN 1
POA, d/t MSSA bacteremia, c/b L5-S1 discitis/OM; GUANAKO 8/30 shows 0.9 cm x 0.5 cm echodensity on the atrial side of the anterior leaflet of the mitral valve, highly suspicious for a vegetation, can't r/o abscess; however, no significant uptake in mitral valve on gallium scan, which makes abscess less likely; appreciate ID, CTS, and Ortho recs; cont. medical mgmt w/ IV daptomycin; f/u surveillance cultures, repeat GUANAKO 9/7; no need for spine biopsy, per Ortho (risks would outweigh benefits), and unable to obtain MRI d/t non-compatible cochlear implant; cont. PT, WBAT; trend WBC, now down-trending    Still meeting SIRS criteria given WBC > 12, HR > 90

## 2021-09-05 NOTE — PROGRESS NOTE ADULT - PROBLEM SELECTOR PLAN 2
h/o DRESS syndrome; had been on HD twice a week prior to admission; HD catheter removed 8/27 in setting of bacteremia; Renal following, will monitor BMP, volume status off dialysis for now; renally-dose rx; Cr normalizing    Has not required ESRD during admission

## 2021-09-05 NOTE — PROGRESS NOTE ADULT - SUBJECTIVE AND OBJECTIVE BOX
Patient is a 76y old  Male who presents with a chief complaint of sepsis likely 2/2 UTI (03 Sep 2021 11:06)      INTERVAL HPI/OVERNIGHT EVENTS: No O/N events    Pt. seen and examined today  Pt. has no acute complaints. Was sleeping when I first entered. Denies F/C, CP, SOB, N/V, back pain    Review of Systems: 12 point review of systems otherwise negative    MEDICATIONS  (STANDING):  DAPTOmycin IVPB 500 milliGRAM(s) IV Intermittent every 48 hours    MEDICATIONS  (PRN):  acetaminophen   Tablet .. 650 milliGRAM(s) Oral every 6 hours PRN Temp greater or equal to 38C (100.4F), Moderate Pain (4 - 6)  melatonin 3 milliGRAM(s) Oral at bedtime PRN Insomnia        Allergies    allopurinol (Other)    Intolerances        Vital Signs Last 24 Hrs  T(C): 36.7 (05 Sep 2021 08:45), Max: 37.2 (04 Sep 2021 22:29)  T(F): 98 (05 Sep 2021 08:45), Max: 99 (04 Sep 2021 22:29)  HR: 90 (05 Sep 2021 10:29) (90 - 105)  BP: 132/71 (05 Sep 2021 08:45) (123/64 - 154/81)  BP(mean): --  RR: 20 (05 Sep 2021 08:45) (19 - 20)  SpO2: 99% (05 Sep 2021 08:45) (95% - 99%)        Physical Exam:  (earlier today)  Daily     Daily   General:  non-toxic and comfortable-appearing in NAD  HEENT:  MMM  CV:  no murmur  Extremities:  no edema B/L LE  Skin:  WWP  Neuro:  AAOx3    LABS:                                   10.1   14.25 )-----------( 322      ( 05 Sep 2021 08:13 )             30.0   09-05    141  |  109<H>  |  34<H>  ----------------------------<  96  4.0   |  23  |  2.55<H>    Ca    9.8      05 Sep 2021 08:13  Phos  2.1     09-05  Mg     2.1     09-05    TPro  5.7<L>  /  Alb  2.8<L>  /  TBili  0.6  /  DBili  x   /  AST  9<L>  /  ALT  6<L>  /  AlkPhos  166<H>  09-05

## 2021-09-05 NOTE — PROGRESS NOTE ADULT - SUBJECTIVE AND OBJECTIVE BOX
INTERVAL HPI/OVERNIGHT EVENTS: STEPHANIE.    CONSTITUTIONAL:  Negative fever or chills, feels well, good appetite  EYES:  Negative  blurry vision or double vision  CARDIOVASCULAR:  Negative for chest pain or palpitations  RESPIRATORY:  Negative for cough, wheezing, or SOB   GASTROINTESTINAL:  Negative for nausea, vomiting, diarrhea, constipation, or abdominal pain  GENITOURINARY:  Negative frequency, urgency or dysuria  NEUROLOGIC:  No headache, confusion, dizziness, lightheadedness      ANTIBIOTICS/RELEVANT:    MEDICATIONS  (STANDING):  DAPTOmycin IVPB 500 milliGRAM(s) IV Intermittent every 48 hours    MEDICATIONS  (PRN):  acetaminophen   Tablet .. 650 milliGRAM(s) Oral every 6 hours PRN Temp greater or equal to 38C (100.4F), Moderate Pain (4 - 6)  melatonin 3 milliGRAM(s) Oral at bedtime PRN Insomnia        Vital Signs Last 24 Hrs  T(C): 36.6 (05 Sep 2021 15:56), Max: 37.2 (04 Sep 2021 22:29)  T(F): 97.9 (05 Sep 2021 15:56), Max: 99 (04 Sep 2021 22:29)  HR: 99 (05 Sep 2021 15:56) (90 - 105)  BP: 125/73 (05 Sep 2021 15:56) (123/64 - 154/81)  BP(mean): --  RR: 18 (05 Sep 2021 15:56) (18 - 20)  SpO2: 97% (05 Sep 2021 15:56) (95% - 99%)    PHYSICAL EXAM:  Constitutional:Well-developed, well nourished  Eyes:VANESA, EOMI  Ear/Nose/Throat: no oral lesion, no sinus tenderness on percussion	  Neck:no JVD, no lymphadenopathy, supple  Respiratory: CTA libertad  Cardiovascular: S1S2 RRR, no murmurs  Gastrointestinal:soft, (+) BS, no HSM  Extremities:no e/e/c  Vascular: DP Pulse:	right normal; left normal      LABS:                        10.1   14.25 )-----------( 322      ( 05 Sep 2021 08:13 )             30.0     09-05    141  |  109<H>  |  34<H>  ----------------------------<  96  4.0   |  23  |  2.55<H>    Ca    9.8      05 Sep 2021 08:13  Phos  2.1     09-05  Mg     2.1     09-05    TPro  5.7<L>  /  Alb  2.8<L>  /  TBili  0.6  /  DBili  x   /  AST  9<L>  /  ALT  6<L>  /  AlkPhos  166<H>  09-05    PT/INR - ( 05 Sep 2021 08:13 )   PT: 14.4 sec;   INR: 1.21          PTT - ( 05 Sep 2021 08:13 )  PTT:45.4 sec      MICROBIOLOGY: reviewed    RADIOLOGY & ADDITIONAL STUDIES: reviewed

## 2021-09-06 LAB
ANION GAP SERPL CALC-SCNC: 9 MMOL/L — SIGNIFICANT CHANGE UP (ref 5–17)
APTT BLD: 43.1 SEC — HIGH (ref 27.5–35.5)
BASOPHILS # BLD AUTO: 0.08 K/UL — SIGNIFICANT CHANGE UP (ref 0–0.2)
BASOPHILS NFR BLD AUTO: 0.6 % — SIGNIFICANT CHANGE UP (ref 0–2)
BLD GP AB SCN SERPL QL: NEGATIVE — SIGNIFICANT CHANGE UP
BUN SERPL-MCNC: 38 MG/DL — HIGH (ref 7–23)
CALCIUM SERPL-MCNC: 9.9 MG/DL — SIGNIFICANT CHANGE UP (ref 8.4–10.5)
CHLORIDE SERPL-SCNC: 111 MMOL/L — HIGH (ref 96–108)
CO2 SERPL-SCNC: 24 MMOL/L — SIGNIFICANT CHANGE UP (ref 22–31)
CREAT SERPL-MCNC: 2.56 MG/DL — HIGH (ref 0.5–1.3)
CULTURE RESULTS: SIGNIFICANT CHANGE UP
CULTURE RESULTS: SIGNIFICANT CHANGE UP
EOSINOPHIL # BLD AUTO: 0.32 K/UL — SIGNIFICANT CHANGE UP (ref 0–0.5)
EOSINOPHIL NFR BLD AUTO: 2.3 % — SIGNIFICANT CHANGE UP (ref 0–6)
GLUCOSE SERPL-MCNC: 99 MG/DL — SIGNIFICANT CHANGE UP (ref 70–99)
HCT VFR BLD CALC: 27.3 % — LOW (ref 39–50)
HCT VFR BLD CALC: 28.3 % — LOW (ref 39–50)
HGB BLD-MCNC: 8.9 G/DL — LOW (ref 13–17)
HGB BLD-MCNC: 9.2 G/DL — LOW (ref 13–17)
IMM GRANULOCYTES NFR BLD AUTO: 1.3 % — SIGNIFICANT CHANGE UP (ref 0–1.5)
INR BLD: 1.24 — HIGH (ref 0.88–1.16)
LYMPHOCYTES # BLD AUTO: 1.53 K/UL — SIGNIFICANT CHANGE UP (ref 1–3.3)
LYMPHOCYTES # BLD AUTO: 11.2 % — LOW (ref 13–44)
MCHC RBC-ENTMCNC: 27.3 PG — SIGNIFICANT CHANGE UP (ref 27–34)
MCHC RBC-ENTMCNC: 27.5 PG — SIGNIFICANT CHANGE UP (ref 27–34)
MCHC RBC-ENTMCNC: 32.5 GM/DL — SIGNIFICANT CHANGE UP (ref 32–36)
MCHC RBC-ENTMCNC: 32.6 GM/DL — SIGNIFICANT CHANGE UP (ref 32–36)
MCV RBC AUTO: 83.7 FL — SIGNIFICANT CHANGE UP (ref 80–100)
MCV RBC AUTO: 84.7 FL — SIGNIFICANT CHANGE UP (ref 80–100)
MONOCYTES # BLD AUTO: 1.11 K/UL — HIGH (ref 0–0.9)
MONOCYTES NFR BLD AUTO: 8.1 % — SIGNIFICANT CHANGE UP (ref 2–14)
NEUTROPHILS # BLD AUTO: 10.49 K/UL — HIGH (ref 1.8–7.4)
NEUTROPHILS NFR BLD AUTO: 76.5 % — SIGNIFICANT CHANGE UP (ref 43–77)
NRBC # BLD: 0 /100 WBCS — SIGNIFICANT CHANGE UP (ref 0–0)
NRBC # BLD: 0 /100 WBCS — SIGNIFICANT CHANGE UP (ref 0–0)
ORGANISM # SPEC MICROSCOPIC CNT: SIGNIFICANT CHANGE UP
ORGANISM # SPEC MICROSCOPIC CNT: SIGNIFICANT CHANGE UP
PHOSPHATE SERPL-MCNC: 2.7 MG/DL — SIGNIFICANT CHANGE UP (ref 2.5–4.5)
PLATELET # BLD AUTO: 283 K/UL — SIGNIFICANT CHANGE UP (ref 150–400)
PLATELET # BLD AUTO: 296 K/UL — SIGNIFICANT CHANGE UP (ref 150–400)
POTASSIUM SERPL-MCNC: 4.2 MMOL/L — SIGNIFICANT CHANGE UP (ref 3.5–5.3)
POTASSIUM SERPL-SCNC: 4.2 MMOL/L — SIGNIFICANT CHANGE UP (ref 3.5–5.3)
PROTHROM AB SERPL-ACNC: 14.7 SEC — HIGH (ref 10.6–13.6)
RBC # BLD: 3.26 M/UL — LOW (ref 4.2–5.8)
RBC # BLD: 3.34 M/UL — LOW (ref 4.2–5.8)
RBC # FLD: 16.1 % — HIGH (ref 10.3–14.5)
RBC # FLD: 16.5 % — HIGH (ref 10.3–14.5)
RH IG SCN BLD-IMP: POSITIVE — SIGNIFICANT CHANGE UP
SODIUM SERPL-SCNC: 144 MMOL/L — SIGNIFICANT CHANGE UP (ref 135–145)
SPECIMEN SOURCE: SIGNIFICANT CHANGE UP
SPECIMEN SOURCE: SIGNIFICANT CHANGE UP
WBC # BLD: 13.71 K/UL — HIGH (ref 3.8–10.5)
WBC # BLD: 14.2 K/UL — HIGH (ref 3.8–10.5)
WBC # FLD AUTO: 13.71 K/UL — HIGH (ref 3.8–10.5)
WBC # FLD AUTO: 14.2 K/UL — HIGH (ref 3.8–10.5)

## 2021-09-06 PROCEDURE — 99233 SBSQ HOSP IP/OBS HIGH 50: CPT | Mod: GC

## 2021-09-06 RX ADMIN — DAPTOMYCIN 120 MILLIGRAM(S): 500 INJECTION, POWDER, LYOPHILIZED, FOR SOLUTION INTRAVENOUS at 16:44

## 2021-09-06 NOTE — PROGRESS NOTE ADULT - PROBLEM SELECTOR PLAN 4
CT lumbar spine performed, findings diagnostic for discitis-osteomyelitis. ID consulted, ortho following.  - treatment as above CT lumbar spine performed, findings diagnostic for discitis-osteomyelitis. ID consulted, ortho following.  - treatment with abx as above

## 2021-09-06 NOTE — PROGRESS NOTE ADULT - PROBLEM SELECTOR PLAN 11
Normocytic anemia:   - no indication of transfussion  - f/u Hem/onc recs    As per Nephrology:  - no indication for EPO or IV Iron at this time  - continue to monitor

## 2021-09-06 NOTE — PROGRESS NOTE ADULT - PROBLEM SELECTOR PLAN 9
on admission. Last known level 169 (3/21) could be 2/2 to renal bone disease versus hepatobiliary.  this AM.  - continuously in decreasing trend  on admission. Last known level 169 (3/21) could be 2/2 to renal bone disease versus hepatobiliary.  this AM.  - in decreasing trend

## 2021-09-06 NOTE — PROGRESS NOTE ADULT - PROBLEM SELECTOR PLAN 6
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 Cr 2.86 on admission. Patient underwent dialysis yesterday, Cr this morning 2.17. Electrolytes, Ca, Phos, Hb at goal, euvolemic on exam. Heme consulted as patient's catheter needs to be removed given +Bcx MSSA bacteremia. Renal following.  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 Cr 2.86 on admission. Patient underwent dialysis yesterday, Cr this morning 2.17. Electrolytes, Ca, Phos, Hb at goal, euvolemic on exam. Heme consulted as patient's catheter needs to be removed given +Bcx MSSA bacteremia. Renal following.  - ESRD on HD  @[facility] usual Rx 3h 0.5L   - per Heme recs, TXA and factor VIII given prior to HD cath removal and procedure without complications  - trending daily Creatinine, BUN, Ph, K,  HCO3    As per nephro,  - No acute indication for HD  - replete K to 3.5  - IF HD needed, f/u with vascular regarding access and f/u Hem for Factor VIII cuantification

## 2021-09-06 NOTE — PROGRESS NOTE ADULT - PROBLEM SELECTOR PLAN 12
F: s/p 500 cc, tolerating PO  E: replete as er nephro K goal > 3.5  N: DASH/TLC    VTE Prophylaxis: SCDS (hemophilia no need for anticoag)  GI: not needed  C: Full Code  D: RMF

## 2021-09-06 NOTE — PROGRESS NOTE ADULT - PROBLEM SELECTOR PLAN 2
As per ID, evaluation of possible endocarditis due to Bcx +MSSA, osteomyelitis and discitis.   -TTE 8/27/21: The mitral valve is mildly thickened. There is mild bileaflet valve prolapse (anterior > posterior). There is trace mitral regurgitation. Normal left and right ventricular size and systolic function.  - GUANAKO 8/30/21:       - No LA/RA/LISA/RAA thrombus seen.       - There is mitral valve prolapse of the anterior leaflet. There is an irregularly shaped linear echodensity measuring 0.9 cm x 0.5 cm on the atrial north of the anterior leaflet of the mitral valve, highly suspicious for a vegetation.      - There is thickening of the intervalvular fibrosa with systolic expansion - cannot rule out abscess formation.      - Compared to the previous GUANAKO performed on 2/26/2021, the mitral valve vegetation is new.  - Gallium scan 9/1/21: No significant uptake within the mitral valve region.  Plan:  - repeat GUANAKO on Tuesday July 7, 2021.   - F/u ID and CT surgery recs As per ID, evaluation of possible endocarditis due to Bcx +MSSA, osteomyelitis and discitis.   -TTE 8/27/21: The mitral valve is mildly thickened. There is mild bileaflet valve prolapse (anterior > posterior). There is trace mitral regurgitation. Normal left and right ventricular size and systolic function.  - GUANAKO 8/30/21:       - No LA/RA/LISA/RAA thrombus seen.       - There is mitral valve prolapse of the anterior leaflet. There is an irregularly shaped linear echodensity measuring 0.9 cm x 0.5 cm on the atrial north of the anterior leaflet of the mitral valve, highly suspicious for a vegetation.      - There is thickening of the intervalvular fibrosa with systolic expansion - cannot rule out abscess formation.      - Compared to the previous GUANAKO performed on 2/26/2021, the mitral valve vegetation is new.  - Gallium scan 9/1/21: No significant uptake within the mitral valve region.    Plan:  - Scheduled for GUANAKO on 9/7/21, give 750 U of Factor VIII prior to procedure as per Hematology recs  - F/u ID and CT surgery recs after GUANAKO results

## 2021-09-06 NOTE — PROGRESS NOTE ADULT - PROBLEM SELECTOR PLAN 3
Low back pain with ambulation for 4-5 days. Per daughter, PT and warm compresses at home alleviated his pain. He does not report pain at bedside today. No spinal tenderness, strength 5/5 in b/l LE's, negative straight leg raise. CT lumbar spine performed, findings diagnostic for discitis-osteomyelitis. ID consulted, ortho following.  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 No acute ortho intervention at this time and no further imaging indicated.  - Gallium scan 9/1/21:  Uptake within the lower lumbar spine likely corresponding to area of abnormality on prior CT lumbar spine 8/26/2021 at L5-S1 which is suggestive of discitis osteomyelitis.  - treatment as above Low back pain with ambulation for 4-5 days. Per daughter, PT and warm compresses at home alleviated his pain. He does not report pain at bedside today. No spinal tenderness, strength 5/5 in b/l LE's, negative straight leg raise. CT lumbar spine performed, findings diagnostic for discitis-osteomyelitis. ID consulted, ortho following.  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 No acute ortho intervention at this time and no further imaging indicated.  - Gallium scan 9/1/21:  Uptake within the lower lumbar spine likely corresponding to area of abnormality on prior CT lumbar spine 8/26/2021 at L5-S1 which is suggestive of discitis osteomyelitis.  - treatment with abx as above

## 2021-09-06 NOTE — PROGRESS NOTE ADULT - SUBJECTIVE AND OBJECTIVE BOX
INTERVAL HPI/OVERNIGHT EVENTS:    SUBJECTIVE: Patient seen and examined at bedside.     OBJECTIVE:    VITAL SIGNS:  ICU Vital Signs Last 24 Hrs  T(C): 36.8 (06 Sep 2021 05:06), Max: 37.2 (05 Sep 2021 20:42)  T(F): 98.3 (06 Sep 2021 05:06), Max: 98.9 (05 Sep 2021 20:42)  HR: 99 (06 Sep 2021 05:06) (90 - 105)  BP: 101/53 (06 Sep 2021 05:06) (101/53 - 132/71)  BP(mean): --  ABP: --  ABP(mean): --  RR: 18 (06 Sep 2021 05:06) (17 - 20)  SpO2: 98% (06 Sep 2021 05:06) (97% - 99%)        CAPILLARY BLOOD GLUCOSE          PHYSICAL EXAM:    General: NAD  HEENT: NC/AT; PERRL, clear conjunctiva  Neck: supple  Respiratory: CTA b/l  Cardiovascular: +S1/S2; RRR  Abdomen: soft, NT/ND; +BS x4  Extremities: WWP, 2+ peripheral pulses b/l; no LE edema  Skin: normal color and turgor; no rash  Neurological:    MEDICATIONS:  MEDICATIONS  (STANDING):  DAPTOmycin IVPB 500 milliGRAM(s) IV Intermittent every 48 hours    MEDICATIONS  (PRN):  acetaminophen   Tablet .. 650 milliGRAM(s) Oral every 6 hours PRN Temp greater or equal to 38C (100.4F), Moderate Pain (4 - 6)  melatonin 3 milliGRAM(s) Oral at bedtime PRN Insomnia      ALLERGIES:  Allergies    allopurinol (Other)    Intolerances        LABS:                        8.9    13.71 )-----------( 296      ( 06 Sep 2021 06:56 )             27.3     09-06    144  |  111<H>  |  38<H>  ----------------------------<  99  4.2   |  24  |  2.56<H>    Ca    9.9      06 Sep 2021 06:58  Phos  2.7     09-06  Mg     2.1     09-05    TPro  5.7<L>  /  Alb  2.8<L>  /  TBili  0.6  /  DBili  x   /  AST  9<L>  /  ALT  6<L>  /  AlkPhos  166<H>  09-05    PT/INR - ( 06 Sep 2021 06:55 )   PT: 14.7 sec;   INR: 1.24          PTT - ( 06 Sep 2021 06:55 )  PTT:43.1 sec      RADIOLOGY & ADDITIONAL TESTS: Reviewed. INTERVAL HPI/OVERNIGHT EVENTS: NAOE    SUBJECTIVE: Patient seen and examined at bedside.     OBJECTIVE:    VITAL SIGNS:  ICU Vital Signs Last 24 Hrs  T(C): 36.8 (06 Sep 2021 05:06), Max: 37.2 (05 Sep 2021 20:42)  T(F): 98.3 (06 Sep 2021 05:06), Max: 98.9 (05 Sep 2021 20:42)  HR: 99 (06 Sep 2021 05:06) (90 - 105)  BP: 101/53 (06 Sep 2021 05:06) (101/53 - 132/71)  BP(mean): --  ABP: --  ABP(mean): --  RR: 18 (06 Sep 2021 05:06) (17 - 20)  SpO2: 98% (06 Sep 2021 05:06) (97% - 99%)        CAPILLARY BLOOD GLUCOSE          PHYSICAL EXAM:    General: NAD  HEENT: NC/AT; PERRL, clear conjunctiva  Neck: supple  Respiratory: CTA b/l  Cardiovascular: +S1/S2; RRR  Abdomen: soft, NT/ND; +BS x4  Extremities: WWP, 2+ peripheral pulses b/l; no LE edema  Skin: normal color and turgor; no rash  Neurological:    MEDICATIONS:  MEDICATIONS  (STANDING):  DAPTOmycin IVPB 500 milliGRAM(s) IV Intermittent every 48 hours    MEDICATIONS  (PRN):  acetaminophen   Tablet .. 650 milliGRAM(s) Oral every 6 hours PRN Temp greater or equal to 38C (100.4F), Moderate Pain (4 - 6)  melatonin 3 milliGRAM(s) Oral at bedtime PRN Insomnia      ALLERGIES:  Allergies    allopurinol (Other)    Intolerances        LABS:                        8.9    13.71 )-----------( 296      ( 06 Sep 2021 06:56 )             27.3     09-06    144  |  111<H>  |  38<H>  ----------------------------<  99  4.2   |  24  |  2.56<H>    Ca    9.9      06 Sep 2021 06:58  Phos  2.7     09-06  Mg     2.1     09-05    TPro  5.7<L>  /  Alb  2.8<L>  /  TBili  0.6  /  DBili  x   /  AST  9<L>  /  ALT  6<L>  /  AlkPhos  166<H>  09-05    PT/INR - ( 06 Sep 2021 06:55 )   PT: 14.7 sec;   INR: 1.24          PTT - ( 06 Sep 2021 06:55 )  PTT:43.1 sec      RADIOLOGY & ADDITIONAL TESTS: Reviewed. INTERVAL HPI/OVERNIGHT EVENTS: NAOE    SUBJECTIVE: Patient seen and examined at bedside. Patient reports no concerns. Denies bleeding, pain, fever, chills, nausea, vomiting, chest pain, palpitations nor urinary symptoms.     OBJECTIVE:    VITAL SIGNS:  ICU Vital Signs Last 24 Hrs  T(C): 36.8 (06 Sep 2021 05:06), Max: 37.2 (05 Sep 2021 20:42)  T(F): 98.3 (06 Sep 2021 05:06), Max: 98.9 (05 Sep 2021 20:42)  HR: 99 (06 Sep 2021 05:06) (90 - 105)  BP: 101/53 (06 Sep 2021 05:06) (101/53 - 132/71)  BP(mean): --  ABP: --  ABP(mean): --  RR: 18 (06 Sep 2021 05:06) (17 - 20)  SpO2: 98% (06 Sep 2021 05:06) (97% - 99%)        CAPILLARY BLOOD GLUCOSE          PHYSICAL EXAM:    General: NAD, resting comfortably in bed, cooperative  HEENT: NC/AT; PERRL, anicteric sclera; MMM with some small ulcers resembling canker sore   Neck: supple, no JVd  Chest: permacath removal site clean, no erythema nor pain upon palpation  Cardiovascular: +S1/S2, RRR, no appreciated  murmur  Respiratory: CTA B/L; no W/R/R  Gastrointestinal: soft, NT/ND; +BS, no rebound or guarding  Extremities: WWP; no edema, clubbing or cyanosis  Vascular: 2+ radial, DP/PT pulses B/L  Neurological: AAOx3; no focal deficits. Strength 5/5 in b/l LEs and UEs  Psychiatric: pleasant mood and affect  Dermatologic: no appreciable wounds nor hematomas    MEDICATIONS:  MEDICATIONS  (STANDING):  DAPTOmycin IVPB 500 milliGRAM(s) IV Intermittent every 48 hours    MEDICATIONS  (PRN):  acetaminophen   Tablet .. 650 milliGRAM(s) Oral every 6 hours PRN Temp greater or equal to 38C (100.4F), Moderate Pain (4 - 6)  melatonin 3 milliGRAM(s) Oral at bedtime PRN Insomnia      ALLERGIES:  Allergies    allopurinol (Other)    Intolerances        LABS:                        8.9    13.71 )-----------( 296      ( 06 Sep 2021 06:56 )             27.3     09-06    144  |  111<H>  |  38<H>  ----------------------------<  99  4.2   |  24  |  2.56<H>    Ca    9.9      06 Sep 2021 06:58  Phos  2.7     09-06  Mg     2.1     09-05    TPro  5.7<L>  /  Alb  2.8<L>  /  TBili  0.6  /  DBili  x   /  AST  9<L>  /  ALT  6<L>  /  AlkPhos  166<H>  09-05    PT/INR - ( 06 Sep 2021 06:55 )   PT: 14.7 sec;   INR: 1.24          PTT - ( 06 Sep 2021 06:55 )  PTT:43.1 sec      RADIOLOGY & ADDITIONAL TESTS: Reviewed.

## 2021-09-06 NOTE — PROGRESS NOTE ADULT - PROBLEM SELECTOR PLAN 5
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  - f/u pending inhibitor assay ([place] units).   - f/u Factor VIII inhibitor testing  - f/u Hem/onc recs Patient with history of Hemophilia A, Hem/Onc consulted:  - s/p 1250 units of recombinant factor VIII on 8/27 and Tranexamic acid 10 mg/kg once on 8/27 prior to permacath removal w/o bleeding complications  - Lupus anticoagulant was strongly positive  - f/u anti-beta2 glycoprotein IgG/IgM, anticardiolipin Ab IgG/IgM.  - f/u pending inhibitor assay ([place] units).   - f/u Factor VIII inhibitor testing    As per Hem/onc,  - Order 750 U of Factor VIII prior to GUANAKO and PICC line as per Hematology recs

## 2021-09-06 NOTE — PROGRESS NOTE ADULT - PROBLEM SELECTOR PLAN 1
On admission pt met 3/4 SIRS criteria; HR 96, WBC 16.10 with neutrophilic predominance, Tmax 103.2F. Positive UA. Negative CXR. Patient empirically treated in the ED with 750mg vanc and 3.375g zosyn on 8/25, then CTX 2g, then one more dose of Vanc on 8/26. ID consulted. Bcx from 8/25 grew MSSA 3/4 bottles (likely recurrent as pt was diagnosed with MSSA bacteremia in March).  - Initial Bc 8/25/21: MSSA 2/2 bottles   - Permacath removed on 8/27/21, as per ID  - Cath culture 8/27/21: final result no growth  - s/p Nafcillin 2g q4 for best MSSA coverage per ID started on 8/26/21  - c/w   - Bcx from 9/1 final results are negative      - On admission pt met 3/4 SIRS criteria; HR 96, WBC 16.10 with neutrophilic predominance, Tmax 103.2F. Positive UA. Negative CXR. Patient empirically treated in the ED with 750mg vanc and 3.375g zosyn on 8/25, then CTX 2g, then one more dose of Vanc on 8/26. ID consulted. Bcx from 8/25 grew MSSA 3/4 bottles (likely recurrent as pt was diagnosed with MSSA bacteremia in March).  - Initial Bc 8/25/21: MSSA 2/2 bottles   - Permacath removed on 8/27/21, as per ID  - Cath culture 8/27/21: final result no growth  - s/p Nafcillin 2g q4 for best MSSA coverage per ID started on 8/26/21  - c/w Daptomycin 500 mg IV q4hrs  - Bcx from 9/1 final results are negative  - Will coordinate PICC placement tomorrow for prolonged abx 6 weeks

## 2021-09-06 NOTE — PROGRESS NOTE ADULT - PROBLEM SELECTOR PLAN 7
# hypokalemia  - as per Nephro, replete for goal of 3.5 Resolved hypokalemia  - as per Nephro, replete for goal of 3.5

## 2021-09-07 LAB
ALBUMIN SERPL ELPH-MCNC: 2.8 G/DL — LOW (ref 3.3–5)
ALP SERPL-CCNC: 171 U/L — HIGH (ref 40–120)
ALT FLD-CCNC: <5 U/L — LOW (ref 10–45)
ANION GAP SERPL CALC-SCNC: 11 MMOL/L — SIGNIFICANT CHANGE UP (ref 5–17)
APTT BLD: 43.9 SEC — HIGH (ref 27.5–35.5)
AST SERPL-CCNC: 8 U/L — LOW (ref 10–40)
BASOPHILS # BLD AUTO: 0.09 K/UL — SIGNIFICANT CHANGE UP (ref 0–0.2)
BASOPHILS NFR BLD AUTO: 0.6 % — SIGNIFICANT CHANGE UP (ref 0–2)
BILIRUB SERPL-MCNC: 0.6 MG/DL — SIGNIFICANT CHANGE UP (ref 0.2–1.2)
BUN SERPL-MCNC: 31 MG/DL — HIGH (ref 7–23)
CALCIUM SERPL-MCNC: 9.8 MG/DL — SIGNIFICANT CHANGE UP (ref 8.4–10.5)
CHLORIDE SERPL-SCNC: 108 MMOL/L — SIGNIFICANT CHANGE UP (ref 96–108)
CK SERPL-CCNC: 8 U/L — LOW (ref 30–200)
CO2 SERPL-SCNC: 22 MMOL/L — SIGNIFICANT CHANGE UP (ref 22–31)
CREAT SERPL-MCNC: 2.46 MG/DL — HIGH (ref 0.5–1.3)
CULTURE RESULTS: SIGNIFICANT CHANGE UP
EOSINOPHIL # BLD AUTO: 0.3 K/UL — SIGNIFICANT CHANGE UP (ref 0–0.5)
EOSINOPHIL NFR BLD AUTO: 2.2 % — SIGNIFICANT CHANGE UP (ref 0–6)
FACT VIII ACT/NOR PPP: 39 % — LOW (ref 51–138)
FACT VIII ACT/NOR PPP: 41 % — LOW (ref 51–138)
FACT VIII ACT/NOR PPP: 43 % — LOW (ref 51–138)
FACT VIII ACT/NOR PPP: 44 % — LOW (ref 51–138)
GLUCOSE SERPL-MCNC: 89 MG/DL — SIGNIFICANT CHANGE UP (ref 70–99)
HCT VFR BLD CALC: 26.7 % — LOW (ref 39–50)
HGB BLD-MCNC: 8.8 G/DL — LOW (ref 13–17)
IMM GRANULOCYTES NFR BLD AUTO: 1.2 % — SIGNIFICANT CHANGE UP (ref 0–1.5)
INR BLD: 1.26 — HIGH (ref 0.88–1.16)
LYMPHOCYTES # BLD AUTO: 1.76 K/UL — SIGNIFICANT CHANGE UP (ref 1–3.3)
LYMPHOCYTES # BLD AUTO: 12.7 % — LOW (ref 13–44)
MAGNESIUM SERPL-MCNC: 2 MG/DL — SIGNIFICANT CHANGE UP (ref 1.6–2.6)
MCHC RBC-ENTMCNC: 27.2 PG — SIGNIFICANT CHANGE UP (ref 27–34)
MCHC RBC-ENTMCNC: 33 GM/DL — SIGNIFICANT CHANGE UP (ref 32–36)
MCV RBC AUTO: 82.7 FL — SIGNIFICANT CHANGE UP (ref 80–100)
MONOCYTES # BLD AUTO: 1.08 K/UL — HIGH (ref 0–0.9)
MONOCYTES NFR BLD AUTO: 7.8 % — SIGNIFICANT CHANGE UP (ref 2–14)
NEUTROPHILS # BLD AUTO: 10.48 K/UL — HIGH (ref 1.8–7.4)
NEUTROPHILS NFR BLD AUTO: 75.5 % — SIGNIFICANT CHANGE UP (ref 43–77)
NRBC # BLD: 0 /100 WBCS — SIGNIFICANT CHANGE UP (ref 0–0)
PHOSPHATE SERPL-MCNC: 2.6 MG/DL — SIGNIFICANT CHANGE UP (ref 2.5–4.5)
PLATELET # BLD AUTO: 288 K/UL — SIGNIFICANT CHANGE UP (ref 150–400)
POTASSIUM SERPL-MCNC: 4.1 MMOL/L — SIGNIFICANT CHANGE UP (ref 3.5–5.3)
POTASSIUM SERPL-SCNC: 4.1 MMOL/L — SIGNIFICANT CHANGE UP (ref 3.5–5.3)
PROT SERPL-MCNC: 5.5 G/DL — LOW (ref 6–8.3)
PROTHROM AB SERPL-ACNC: 15 SEC — HIGH (ref 10.6–13.6)
RBC # BLD: 3.23 M/UL — LOW (ref 4.2–5.8)
RBC # FLD: 15.9 % — HIGH (ref 10.3–14.5)
SARS-COV-2 RNA SPEC QL NAA+PROBE: SIGNIFICANT CHANGE UP
SODIUM SERPL-SCNC: 141 MMOL/L — SIGNIFICANT CHANGE UP (ref 135–145)
SPECIMEN SOURCE: SIGNIFICANT CHANGE UP
WBC # BLD: 13.87 K/UL — HIGH (ref 3.8–10.5)
WBC # FLD AUTO: 13.87 K/UL — HIGH (ref 3.8–10.5)

## 2021-09-07 PROCEDURE — 99233 SBSQ HOSP IP/OBS HIGH 50: CPT | Mod: GC

## 2021-09-07 PROCEDURE — 99232 SBSQ HOSP IP/OBS MODERATE 35: CPT

## 2021-09-07 PROCEDURE — 93312 ECHO TRANSESOPHAGEAL: CPT | Mod: 26

## 2021-09-07 NOTE — PROGRESS NOTE ADULT - PROBLEM SELECTOR PLAN 9
on admission. Last known level 169 (3/21) could be 2/2 to renal bone disease versus hepatobiliary.  this AM.  - continue to monitor, elevated but stable 160-170

## 2021-09-07 NOTE — PROGRESS NOTE ADULT - SUBJECTIVE AND OBJECTIVE BOX
SUBJECTIVE: No acute events overnight. Per note of nephrology on 9/4 patient no longer requires HD. BP controlled and can be discharged off dialysis     DAPTOmycin IVPB 500 milliGRAM(s) IV Intermittent every 48 hours      Vital Signs Last 24 Hrs  T(C): 36.9 (07 Sep 2021 05:09), Max: 37.4 (06 Sep 2021 21:04)  T(F): 98.4 (07 Sep 2021 05:09), Max: 99.3 (06 Sep 2021 21:04)  HR: 94 (07 Sep 2021 05:09) (94 - 99)  BP: 126/77 (07 Sep 2021 05:09) (125/79 - 127/72)  BP(mean): --  RR: 18 (07 Sep 2021 05:09) (17 - 18)  SpO2: 97% (07 Sep 2021 05:09) (97% - 98%)  I&O's Detail      LABS:                        8.8    13.87 )-----------( 288      ( 07 Sep 2021 06:56 )             26.7     09-07    141  |  108  |  31<H>  ----------------------------<  89  4.1   |  22  |  2.46<H>    Ca    9.8      07 Sep 2021 06:56  Phos  2.6     09-07  Mg     2.0     09-07    TPro  5.5<L>  /  Alb  2.8<L>  /  TBili  0.6  /  DBili  x   /  AST  8<L>  /  ALT  <5<L>  /  AlkPhos  171<H>  09-07    PT/INR - ( 07 Sep 2021 06:56 )   PT: 15.0 sec;   INR: 1.26          PTT - ( 07 Sep 2021 06:56 )  PTT:43.9 sec      RADIOLOGY & ADDITIONAL STUDIES:

## 2021-09-07 NOTE — PROGRESS NOTE ADULT - PROBLEM SELECTOR PLAN 5
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  - f/u pending inhibitor assay ([place] units).   - f/u Factor VIII inhibitor testing

## 2021-09-07 NOTE — PROGRESS NOTE ADULT - SUBJECTIVE AND OBJECTIVE BOX
INTERVAL HPI/OVERNIGHT EVENTS:    SUBJECTIVE: Patient seen and examined at bedside.     OBJECTIVE:    VITAL SIGNS:  ICU Vital Signs Last 24 Hrs  T(C): 36.9 (07 Sep 2021 05:09), Max: 37.4 (06 Sep 2021 21:04)  T(F): 98.4 (07 Sep 2021 05:09), Max: 99.3 (06 Sep 2021 21:04)  HR: 94 (07 Sep 2021 05:09) (94 - 99)  BP: 126/77 (07 Sep 2021 05:09) (125/79 - 127/72)  BP(mean): --  ABP: --  ABP(mean): --  RR: 18 (07 Sep 2021 05:09) (17 - 18)  SpO2: 97% (07 Sep 2021 05:09) (97% - 98%)        CAPILLARY BLOOD GLUCOSE          PHYSICAL EXAM:    General: NAD  HEENT: NC/AT; PERRL, clear conjunctiva  Neck: supple  Respiratory: CTA b/l  Cardiovascular: +S1/S2; RRR  Abdomen: soft, NT/ND; +BS x4  Extremities: WWP, 2+ peripheral pulses b/l; no LE edema  Skin: normal color and turgor; no rash  Neurological:    MEDICATIONS:  MEDICATIONS  (STANDING):  DAPTOmycin IVPB 500 milliGRAM(s) IV Intermittent every 48 hours    MEDICATIONS  (PRN):  acetaminophen   Tablet .. 650 milliGRAM(s) Oral every 6 hours PRN Temp greater or equal to 38C (100.4F), Moderate Pain (4 - 6)  melatonin 3 milliGRAM(s) Oral at bedtime PRN Insomnia      ALLERGIES:  Allergies    allopurinol (Other)    Intolerances        LABS:                        8.8    13.87 )-----------( 288      ( 07 Sep 2021 06:56 )             26.7     09-07    141  |  108  |  31<H>  ----------------------------<  89  4.1   |  22  |  2.46<H>    Ca    9.8      07 Sep 2021 06:56  Phos  2.6     09-07  Mg     2.0     09-07    TPro  5.5<L>  /  Alb  2.8<L>  /  TBili  0.6  /  DBili  x   /  AST  8<L>  /  ALT  <5<L>  /  AlkPhos  171<H>  09-07    PT/INR - ( 07 Sep 2021 06:56 )   PT: 15.0 sec;   INR: 1.26          PTT - ( 07 Sep 2021 06:56 )  PTT:43.9 sec      RADIOLOGY & ADDITIONAL TESTS: Reviewed. INTERVAL HPI/OVERNIGHT EVENTS: NPO after midnight, factor VIII arrived    SUBJECTIVE: Patient seen and examined at bedside. No complaints as per patient, just pending GUANAKO. ROS negative.     OBJECTIVE:    VITAL SIGNS:  ICU Vital Signs Last 24 Hrs  T(C): 36.9 (07 Sep 2021 05:09), Max: 37.4 (06 Sep 2021 21:04)  T(F): 98.4 (07 Sep 2021 05:09), Max: 99.3 (06 Sep 2021 21:04)  HR: 94 (07 Sep 2021 05:09) (94 - 99)  BP: 126/77 (07 Sep 2021 05:09) (125/79 - 127/72)  BP(mean): --  ABP: --  ABP(mean): --  RR: 18 (07 Sep 2021 05:09) (17 - 18)  SpO2: 97% (07 Sep 2021 05:09) (97% - 98%)        CAPILLARY BLOOD GLUCOSE          PHYSICAL EXAM:    GA: well developed, NAD, cooperative  HEENT: EOMI PERRL, anicteric sclera; MMM  Neck: supple, no JVD  Chest: permacath removal site clean, no erythema nor pain upon palpation  Cardiovascular: +S1/S2, RRR, no appreciated  murmur  Respiratory: CTA B/L; no W/R/R  Gastrointestinal: soft, NT/ND; +BS, no rebound or guarding  Extremities: WWP; no edema, clubbing or cyanosis  Vascular: 2+ radial, DP/PT pulses B/L  Neurological: AAOx3; no focal deficits. Strength 5/5 in b/l LEs and UEs    MEDICATIONS:  MEDICATIONS  (STANDING):  DAPTOmycin IVPB 500 milliGRAM(s) IV Intermittent every 48 hours    MEDICATIONS  (PRN):  acetaminophen   Tablet .. 650 milliGRAM(s) Oral every 6 hours PRN Temp greater or equal to 38C (100.4F), Moderate Pain (4 - 6)  melatonin 3 milliGRAM(s) Oral at bedtime PRN Insomnia      ALLERGIES:  Allergies    allopurinol (Other)    Intolerances        LABS:                        8.8    13.87 )-----------( 288      ( 07 Sep 2021 06:56 )             26.7     09-07    141  |  108  |  31<H>  ----------------------------<  89  4.1   |  22  |  2.46<H>    Ca    9.8      07 Sep 2021 06:56  Phos  2.6     09-07  Mg     2.0     09-07    TPro  5.5<L>  /  Alb  2.8<L>  /  TBili  0.6  /  DBili  x   /  AST  8<L>  /  ALT  <5<L>  /  AlkPhos  171<H>  09-07    PT/INR - ( 07 Sep 2021 06:56 )   PT: 15.0 sec;   INR: 1.26          PTT - ( 07 Sep 2021 06:56 )  PTT:43.9 sec      RADIOLOGY & ADDITIONAL TESTS: Reviewed.

## 2021-09-07 NOTE — PROGRESS NOTE ADULT - PROBLEM SELECTOR PLAN 8
Resolved: On admission pt met 3/4 SIRS criteria (HR 96, WBC 16.10 with neutrophilic predominance, Tmax 103.2F) found to have positive UA. Denies any urinary symptoms. Discussed with daughter, given history of BPH, concern for underlying urinary retention as nidus for ascending urinary tract infection. Denies constipation. ID consulted. Ucx from 8/25 grew Enterobacter Colacae.  - no need to treat asymptomatic bacteruria

## 2021-09-07 NOTE — PROGRESS NOTE ADULT - PROBLEM SELECTOR PLAN 2
As per ID, evaluation of possible endocarditis due to Bcx +MSSA, osteomyelitis and discitis.   -TTE 8/27/21: The mitral valve is mildly thickened. There is mild bileaflet valve prolapse (anterior > posterior). There is trace mitral regurgitation. Normal left and right ventricular size and systolic function.  - GUANAKO 8/30/21:       - No LA/RA/LISA/RAA thrombus seen.       - There is mitral valve prolapse of the anterior leaflet. There is an irregularly shaped linear echodensity measuring 0.9 cm x 0.5 cm on the atrial north of the anterior leaflet of the mitral valve, highly suspicious for a vegetation.      - There is thickening of the intervalvular fibrosa with systolic expansion - cannot rule out abscess formation.      - Compared to the previous GUANAKO performed on 2/26/2021, the mitral valve vegetation is new.  - Gallium scan 9/1/21: No significant uptake within the mitral valve region.    Plan:  - GUANAKO on 9/7/21, patient received 750 U of Factor VIII prior to procedure as per Hematology recs  - New finding in the aorta, for which CT surgery reconsulted  - f/u CT surg recs

## 2021-09-07 NOTE — PROGRESS NOTE ADULT - PROBLEM SELECTOR PLAN 1
On admission pt met 3/4 SIRS criteria; HR 96, WBC 16.10 with neutrophilic predominance, Tmax 103.2F. Positive UA. Negative CXR. Patient empirically treated in the ED with 750mg vanc and 3.375g zosyn on 8/25, then CTX 2g, then one more dose of Vanc on 8/26. ID consulted. Bcx from 8/25 grew MSSA 3/4 bottles (likely recurrent as pt was diagnosed with MSSA bacteremia in March).  - Initial Bc 8/25/21: MSSA 2/2 bottles   - Permacath removed on 8/27/21, as per ID  - Cath culture 8/27/21: final result no growth  - s/p Nafcillin 2g q4 for best MSSA coverage per ID started on 8/26/21  - Bcx from 9/1 and 9/2 final results are negative    Plan:  - c/w Daptomycin 500 mg IV q4hrs  - Will coordinate PICC placement depending on CT surgery recs after GUANAKO

## 2021-09-07 NOTE — PROGRESS NOTE ADULT - PROBLEM SELECTOR PLAN 3
Low back pain with ambulation for 4-5 days. Per daughter, PT and warm compresses at home alleviated his pain. He does not report pain at bedside today. No spinal tenderness, strength 5/5 in b/l LE's, negative straight leg raise. CT lumbar spine performed, findings diagnostic for discitis-osteomyelitis. ID consulted, ortho following.  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 No acute ortho intervention at this time and no further imaging indicated.  - Gallium scan 9/1/21:  Uptake within the lower lumbar spine likely corresponding to area of abnormality on prior CT lumbar spine 8/26/2021 at L5-S1 which is suggestive of discitis osteomyelitis.  - treatment with abx as above

## 2021-09-07 NOTE — PROGRESS NOTE ADULT - PROBLEM SELECTOR PLAN 1
POA, d/t MSSA bacteremia, c/b L5-S1 discitis/OM; GUANAKO 8/30 shows 0.9 cm x 0.5 cm echodensity on the atrial side of the anterior leaflet of the mitral valve, highly suspicious for a vegetation, can't r/o abscess; however, no significant uptake in mitral valve on gallium scan, which makes abscess less likely; appreciate ID, CTS, and Ortho recs; cont. medical mgmt w/ IV daptomycin; f/u surveillance cultures, plan for repeat GUANAKO today to assess progress; no need for spine biopsy, per Ortho (risks would outweigh benefits), and unable to obtain MRI d/t non-compatible cochlear implant; cont. PT, WBAT; trend WBC (mostly stable)

## 2021-09-07 NOTE — PROGRESS NOTE ADULT - PROBLEM SELECTOR PLAN 2
h/o DRESS syndrome; had been on HD twice a week prior to admission; HD catheter removed 8/27 in setting of bacteremia; Renal following, will monitor BMP and volume status off dialysis for now; renally-dose rx; Cr normalizing -- does not appear to need further HD, but await Renal recs

## 2021-09-07 NOTE — PROGRESS NOTE ADULT - ASSESSMENT
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 Admitted for bacteremia in the setting of osteomyelitis and discitis. No Permcath dialysis access at the moment. 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   Vascular surgery following to determine placement of new HD catheter.    No acute events overnight. Per note of nephrology on 9/4 patient no longer requires HD. BP controlled and can be discharged off dialysis. Creatinine continues to improved and in the his normal value.     Vascular surgery will sign off for now, please consult us if there are questions or concerns.

## 2021-09-07 NOTE — PROGRESS NOTE ADULT - SUBJECTIVE AND OBJECTIVE BOX
Patient is a 76y old  Male who presents with a chief complaint of sepsis likely 2/2 UTI (07 Sep 2021 10:32)      INTERVAL HPI/OVERNIGHT EVENTS:    Pt. seen and examined earlier today  Pt. feels well, has no complaints  Pt. denies F/C, CP, SOB, back pain, bleeding sx    Review of Systems: 12 point review of systems otherwise negative    MEDICATIONS  (STANDING):  DAPTOmycin IVPB 500 milliGRAM(s) IV Intermittent every 48 hours    MEDICATIONS  (PRN):  acetaminophen   Tablet .. 650 milliGRAM(s) Oral every 6 hours PRN Temp greater or equal to 38C (100.4F), Moderate Pain (4 - 6)  melatonin 3 milliGRAM(s) Oral at bedtime PRN Insomnia      Allergies    allopurinol (Other)    Intolerances          Vital Signs Last 24 Hrs  T(C): 36.9 (07 Sep 2021 05:09), Max: 37.4 (06 Sep 2021 21:04)  T(F): 98.4 (07 Sep 2021 05:09), Max: 99.3 (06 Sep 2021 21:04)  HR: 94 (07 Sep 2021 05:09) (94 - 99)  BP: 126/77 (07 Sep 2021 05:09) (125/79 - 127/72)  BP(mean): --  RR: 18 (07 Sep 2021 05:09) (17 - 18)  SpO2: 97% (07 Sep 2021 05:09) (97% - 98%)  CAPILLARY BLOOD GLUCOSE            Physical Exam:  (earlier today)  Daily     Daily   General:  non-toxic and comfortable-appearing in NAD  HEENT:  MMM  CV:  RRR, no murmur  Lungs:  CTAB anteriorly  Extremities:  no edema B/L LE  Skin:  WWP  Neuro:  AAOx3    LABS:                        8.8    13.87 )-----------( 288      ( 07 Sep 2021 06:56 )             26.7     09-07    141  |  108  |  31<H>  ----------------------------<  89  4.1   |  22  |  2.46<H>    Ca    9.8      07 Sep 2021 06:56  Phos  2.6     09-07  Mg     2.0     09-07    TPro  5.5<L>  /  Alb  2.8<L>  /  TBili  0.6  /  DBili  x   /  AST  8<L>  /  ALT  <5<L>  /  AlkPhos  171<H>  09-07    PT/INR - ( 07 Sep 2021 06:56 )   PT: 15.0 sec;   INR: 1.26          PTT - ( 07 Sep 2021 06:56 )  PTT:43.9 sec

## 2021-09-07 NOTE — PROGRESS NOTE ADULT - ASSESSMENT
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    - repeat CK this AM. (add-on lab ok)  - Daptomycin 500 q 48h (previously rising LFTs on Nafcillin. Not recommending any alternative to Daptomycin.)  - c/w Daptomycin until 10/12  - weekly CMC, CMP, ESK, CRP, CK surveillance on discharge. To fax results to office of Dr. Schneider: [phone].  - recommend to discuss 9/7 GUANAKO with CTS, as showing worsening Aortic Root thickening.     Discussed with primary team.    ID Team 1 will continue to follow. Please see below attending attestation for finalized recommendations.  Alma Rosa Fuentes MD PGY2 Internal Medicine  Infectious Disease Consult Service, Team 1

## 2021-09-07 NOTE — PROGRESS NOTE ADULT - PROBLEM SELECTOR PLAN 4
CT lumbar spine performed, findings diagnostic for discitis-osteomyelitis. ID consulted, ortho following.  - treatment with abx as above

## 2021-09-07 NOTE — PROGRESS NOTE ADULT - PROBLEM SELECTOR PLAN 6
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 Cr 2.86 on admission. Patient underwent dialysis yesterday, Cr this morning 2.17. Electrolytes, Ca, Phos, Hb at goal, euvolemic on exam. Heme consulted as patient's catheter needs to be removed given +Bcx MSSA bacteremia. Renal following.  - ESRD on HD  @[facility] usual Rx 3h 0.5L   - per Heme recs, TXA and factor VIII given prior to HD cath removal and procedure without complications  - trending daily Creatinine, BUN, Ph, K,  HCO3  - Vascular signed off due to no indication of HD per nephro

## 2021-09-07 NOTE — PROGRESS NOTE ADULT - SUBJECTIVE AND OBJECTIVE BOX
Infectious Disease Progress Note    Interval Events:  GUANAKO showing table MV vegetation and mild/moderate MR as well as new finding of Ao root thickening.    Subjective:  Patient seen and evaluated at bedside.    ROS:  Denies new pain, fevers/chills, n/v, cough, sob.    Allergies    allopurinol (Other)    Intolerances        ANTIBIOTICS/RELEVANT:  antimicrobials  DAPTOmycin IVPB 500 milliGRAM(s) IV Intermittent every 48 hours    immunologic:    OTHER:  acetaminophen   Tablet .. 650 milliGRAM(s) Oral every 6 hours PRN  melatonin 3 milliGRAM(s) Oral at bedtime PRN      Objective:  Vital Signs Last 24 Hrs  T(C): 36.9 (07 Sep 2021 05:09), Max: 37.4 (06 Sep 2021 21:04)  T(F): 98.4 (07 Sep 2021 05:09), Max: 99.3 (06 Sep 2021 21:04)  HR: 94 (07 Sep 2021 05:09) (94 - 96)  BP: 126/77 (07 Sep 2021 05:09) (126/77 - 127/72)  BP(mean): --  RR: 18 (07 Sep 2021 05:09) (17 - 18)  SpO2: 97% (07 Sep 2021 05:09) (97% - 97%)    PHYSICAL EXAM:  General: NAD, elderly man lying in bed. appears fatigued and chronically ill.   HEENT: NC/AT; PERRL, clear conjunctiva  Neck: supple  Respiratory: CTA b/l  Cardiovascular: +S1/S2; RRR  Abdomen: soft, NT/ND; +BS x4  Extremities: WWP, 2+ peripheral pulses b/l; no LE edema  Skin: normal color and turgor; no rash      LABS:                        8.8    13.87 )-----------( 288      ( 07 Sep 2021 06:56 )             26.7     09-07    141  |  108  |  31<H>  ----------------------------<  89  4.1   |  22  |  2.46<H>    Ca    9.8      07 Sep 2021 06:56  Phos  2.6     09-07  Mg     2.0     09-07    TPro  5.5<L>  /  Alb  2.8<L>  /  TBili  0.6  /  DBili  x   /  AST  8<L>  /  ALT  <5<L>  /  AlkPhos  171<H>  09-07    PT/INR - ( 07 Sep 2021 06:56 )   PT: 15.0 sec;   INR: 1.26          PTT - ( 07 Sep 2021 06:56 )  PTT:43.9 sec      MICROBIOLOGY:            RECENT CULTURES:  09-02 @ 12:40  .Blood Blood  --  --  --    No growth at 5 days.  --  09-01 @ 08:18  .Blood Blood  --  --  --    No growth at 5 days.  --      RADIOLOGY & ADDITIONAL STUDIES:

## 2021-09-08 ENCOUNTER — NON-APPOINTMENT (OUTPATIENT)
Age: 76
End: 2021-09-08

## 2021-09-08 ENCOUNTER — TRANSCRIPTION ENCOUNTER (OUTPATIENT)
Age: 76
End: 2021-09-08

## 2021-09-08 DIAGNOSIS — I77.89 OTHER SPECIFIED DISORDERS OF ARTERIES AND ARTERIOLES: ICD-10-CM

## 2021-09-08 DIAGNOSIS — N18.4 CHRONIC KIDNEY DISEASE, STAGE 4 (SEVERE): ICD-10-CM

## 2021-09-08 LAB
ALBUMIN SERPL ELPH-MCNC: 2.3 G/DL — LOW (ref 3.3–5)
ALP SERPL-CCNC: 184 U/L — HIGH (ref 40–120)
ALT FLD-CCNC: 5 U/L — LOW (ref 10–45)
ANION GAP SERPL CALC-SCNC: 13 MMOL/L — SIGNIFICANT CHANGE UP (ref 5–17)
APTT BLD: 40.6 SEC — HIGH (ref 27.5–35.5)
AST SERPL-CCNC: 9 U/L — LOW (ref 10–40)
BASOPHILS # BLD AUTO: 0.08 K/UL — SIGNIFICANT CHANGE UP (ref 0–0.2)
BASOPHILS NFR BLD AUTO: 0.7 % — SIGNIFICANT CHANGE UP (ref 0–2)
BILIRUB SERPL-MCNC: 0.5 MG/DL — SIGNIFICANT CHANGE UP (ref 0.2–1.2)
BUN SERPL-MCNC: 39 MG/DL — HIGH (ref 7–23)
CALCIUM SERPL-MCNC: 10.1 MG/DL — SIGNIFICANT CHANGE UP (ref 8.4–10.5)
CHLORIDE SERPL-SCNC: 112 MMOL/L — HIGH (ref 96–108)
CO2 SERPL-SCNC: 19 MMOL/L — LOW (ref 22–31)
CREAT SERPL-MCNC: 2.63 MG/DL — HIGH (ref 0.5–1.3)
EOSINOPHIL # BLD AUTO: 0.23 K/UL — SIGNIFICANT CHANGE UP (ref 0–0.5)
EOSINOPHIL NFR BLD AUTO: 1.9 % — SIGNIFICANT CHANGE UP (ref 0–6)
FACT VIII ACT/NOR PPP: 53 % — SIGNIFICANT CHANGE UP (ref 51–138)
FACT VIII ACT/NOR PPP: 54 % — SIGNIFICANT CHANGE UP (ref 51–138)
GLUCOSE SERPL-MCNC: 93 MG/DL — SIGNIFICANT CHANGE UP (ref 70–99)
HCT VFR BLD CALC: 26.4 % — LOW (ref 39–50)
HGB BLD-MCNC: 8.5 G/DL — LOW (ref 13–17)
IMM GRANULOCYTES NFR BLD AUTO: 1 % — SIGNIFICANT CHANGE UP (ref 0–1.5)
INR BLD: 1.18 — HIGH (ref 0.88–1.16)
LYMPHOCYTES # BLD AUTO: 1.42 K/UL — SIGNIFICANT CHANGE UP (ref 1–3.3)
LYMPHOCYTES # BLD AUTO: 11.9 % — LOW (ref 13–44)
MAGNESIUM SERPL-MCNC: 1.8 MG/DL — SIGNIFICANT CHANGE UP (ref 1.6–2.6)
MCHC RBC-ENTMCNC: 27.2 PG — SIGNIFICANT CHANGE UP (ref 27–34)
MCHC RBC-ENTMCNC: 32.2 GM/DL — SIGNIFICANT CHANGE UP (ref 32–36)
MCV RBC AUTO: 84.6 FL — SIGNIFICANT CHANGE UP (ref 80–100)
MONOCYTES # BLD AUTO: 0.85 K/UL — SIGNIFICANT CHANGE UP (ref 0–0.9)
MONOCYTES NFR BLD AUTO: 7.1 % — SIGNIFICANT CHANGE UP (ref 2–14)
NEUTROPHILS # BLD AUTO: 9.26 K/UL — HIGH (ref 1.8–7.4)
NEUTROPHILS NFR BLD AUTO: 77.4 % — HIGH (ref 43–77)
NRBC # BLD: 0 /100 WBCS — SIGNIFICANT CHANGE UP (ref 0–0)
PHOSPHATE SERPL-MCNC: 3.1 MG/DL — SIGNIFICANT CHANGE UP (ref 2.5–4.5)
PLATELET # BLD AUTO: 274 K/UL — SIGNIFICANT CHANGE UP (ref 150–400)
POTASSIUM SERPL-MCNC: 4.3 MMOL/L — SIGNIFICANT CHANGE UP (ref 3.5–5.3)
POTASSIUM SERPL-SCNC: 4.3 MMOL/L — SIGNIFICANT CHANGE UP (ref 3.5–5.3)
PROT SERPL-MCNC: 5.8 G/DL — LOW (ref 6–8.3)
PROTHROM AB SERPL-ACNC: 14 SEC — HIGH (ref 10.6–13.6)
RBC # BLD: 3.12 M/UL — LOW (ref 4.2–5.8)
RBC # FLD: 16.1 % — HIGH (ref 10.3–14.5)
SODIUM SERPL-SCNC: 144 MMOL/L — SIGNIFICANT CHANGE UP (ref 135–145)
WBC # BLD: 11.96 K/UL — HIGH (ref 3.8–10.5)
WBC # FLD AUTO: 11.96 K/UL — HIGH (ref 3.8–10.5)

## 2021-09-08 PROCEDURE — 76937 US GUIDE VASCULAR ACCESS: CPT | Mod: 26,59

## 2021-09-08 PROCEDURE — 36573 INSJ PICC RS&I 5 YR+: CPT

## 2021-09-08 PROCEDURE — 99232 SBSQ HOSP IP/OBS MODERATE 35: CPT

## 2021-09-08 PROCEDURE — 99233 SBSQ HOSP IP/OBS HIGH 50: CPT | Mod: GC

## 2021-09-08 RX ORDER — SODIUM CHLORIDE 9 MG/ML
10 INJECTION INTRAMUSCULAR; INTRAVENOUS; SUBCUTANEOUS
Refills: 0 | Status: DISCONTINUED | OUTPATIENT
Start: 2021-09-08 | End: 2021-09-09

## 2021-09-08 RX ORDER — CHLORHEXIDINE GLUCONATE 213 G/1000ML
1 SOLUTION TOPICAL
Refills: 0 | Status: DISCONTINUED | OUTPATIENT
Start: 2021-09-08 | End: 2021-09-09

## 2021-09-08 RX ADMIN — DAPTOMYCIN 120 MILLIGRAM(S): 500 INJECTION, POWDER, LYOPHILIZED, FOR SOLUTION INTRAVENOUS at 18:18

## 2021-09-08 NOTE — DISCHARGE NOTE PROVIDER - CARE PROVIDER_API CALL
Danisha Yap)  Internal Medicine; Nephrology  [address], [address] [place] [address]  Phone: [phone]  Fax: [phone]  Follow Up Time: 1 month    Veronica Schneider)  Internal Medicine  [address], [address] [place] [address]  Phone: [phone]  Fax: [phone]  Follow Up Time: 1 month

## 2021-09-08 NOTE — PROGRESS NOTE ADULT - SUBJECTIVE AND OBJECTIVE BOX
*INCOMPLETE*  OVERNIGHT EVENTS: NAOE    SUBJECTIVE / INTERVAL HPI: Patient seen and examined at bedside.   Pt. seen and examined earlier today  Pt. feels well, has no complaints  Pt. denies F/C, CP, SOB, back pain, bleeding sx      MEDICATIONS  (STANDING):  DAPTOmycin IVPB 500 milliGRAM(s) IV Intermittent every 48 hours    MEDICATIONS  (PRN):  acetaminophen   Tablet .. 650 milliGRAM(s) Oral every 6 hours PRN Temp greater or equal to 38C (100.4F), Moderate Pain (4 - 6)  melatonin 3 milliGRAM(s) Oral at bedtime PRN Insomnia    Allergies    allopurinol (Other)    Intolerances        VITAL SIGNS:  Vital Signs Last 24 Hrs  T(C): 36.9 (08 Sep 2021 05:42), Max: 37 (07 Sep 2021 15:30)  T(F): 98.5 (08 Sep 2021 05:42), Max: 98.6 (07 Sep 2021 15:30)  HR: 96 (08 Sep 2021 05:42) (92 - 96)  BP: 119/70 (08 Sep 2021 05:42) (119/70 - 134/79)  BP(mean): --  RR: 19 (08 Sep 2021 05:42) (18 - 19)  SpO2: 98% (08 Sep 2021 05:42) (97% - 98%)      09-07-21 @ 07:01  -  09-08-21 @ 06:44  --------------------------------------------------------  IN: 0 mL / OUT: 525 mL / NET: -525 mL        PHYSICAL EXAM:  GA: well developed, NAD, cooperative  HEENT: EOMI PERRL, anicteric sclera; MMM  Neck: supple, no JVD  Chest: permacath removal site clean, no erythema nor pain upon palpation  Cardiovascular: +S1/S2, RRR, no appreciated  murmur  Respiratory: CTA B/L; no W/R/R  Gastrointestinal: soft, NT/ND; +BS, no rebound or guarding  Extremities: WWP; no edema, clubbing or cyanosis  Vascular: 2+ radial, DP/PT pulses B/L  Neurological: AAOx3; no focal deficits. Strength 5/5 in b/l LEs and UEs      LABS:                        8.8    13.87 )-----------( 288      ( 07 Sep 2021 06:56 )             26.7     09-07    141  |  108  |  31<H>  ----------------------------<  89  4.1   |  22  |  2.46<H>    Ca    9.8      07 Sep 2021 06:56  Phos  2.6     09-07  Mg     2.0     09-07    TPro  5.5<L>  /  Alb  2.8<L>  /  TBili  0.6  /  DBili  x   /  AST  8<L>  /  ALT  <5<L>  /  AlkPhos  171<H>  09-07    PT/INR - ( 07 Sep 2021 06:56 )   PT: 15.0 sec;   INR: 1.26          PTT - ( 07 Sep 2021 06:56 )  PTT:43.9 sec    CAPILLARY BLOOD GLUCOSE              RADIOLOGY & ADDITIONAL TESTS: Reviewed. OVERNIGHT EVENTS: NAOE    SUBJECTIVE / INTERVAL HPI: Patient seen and examined at bedside. Pt. feels well, has no complaints. Pt. denies F/C, CP, SOB, back pain, bleeding sx.    ROS negative if not stated in HPI.      MEDICATIONS  (STANDING):  DAPTOmycin IVPB 500 milliGRAM(s) IV Intermittent every 48 hours    MEDICATIONS  (PRN):  acetaminophen   Tablet .. 650 milliGRAM(s) Oral every 6 hours PRN Temp greater or equal to 38C (100.4F), Moderate Pain (4 - 6)  melatonin 3 milliGRAM(s) Oral at bedtime PRN Insomnia    Allergies    allopurinol (Other)    Intolerances        VITAL SIGNS:  Vital Signs Last 24 Hrs  T(C): 36.9 (08 Sep 2021 05:42), Max: 37 (07 Sep 2021 15:30)  T(F): 98.5 (08 Sep 2021 05:42), Max: 98.6 (07 Sep 2021 15:30)  HR: 96 (08 Sep 2021 05:42) (92 - 96)  BP: 119/70 (08 Sep 2021 05:42) (119/70 - 134/79)  BP(mean): --  RR: 19 (08 Sep 2021 05:42) (18 - 19)  SpO2: 98% (08 Sep 2021 05:42) (97% - 98%)      09-07-21 @ 07:01  -  09-08-21 @ 06:44  --------------------------------------------------------  IN: 0 mL / OUT: 525 mL / NET: -525 mL        PHYSICAL EXAM:  GA: well developed, NAD, cooperative  HEENT: EOMI PERRL, anicteric sclera; MMM  Neck: supple, no JVD  Chest: permacath removal site clean, no erythema nor pain upon palpation  Cardiovascular: +S1/S2, RRR, no appreciated  murmur  Respiratory: CTA B/L; no W/R/R  Gastrointestinal: soft, NT/ND; +BS, no rebound or guarding  Extremities: WWP; no edema, clubbing or cyanosis  Vascular: 2+ radial, DP/PT pulses B/L  Neurological: AAOx3; no focal deficits. Strength 5/5 in b/l LEs and UEs      LABS:                        8.8    13.87 )-----------( 288      ( 07 Sep 2021 06:56 )             26.7     09-07    141  |  108  |  31<H>  ----------------------------<  89  4.1   |  22  |  2.46<H>    Ca    9.8      07 Sep 2021 06:56  Phos  2.6     09-07  Mg     2.0     09-07    TPro  5.5<L>  /  Alb  2.8<L>  /  TBili  0.6  /  DBili  x   /  AST  8<L>  /  ALT  <5<L>  /  AlkPhos  171<H>  09-07    PT/INR - ( 07 Sep 2021 06:56 )   PT: 15.0 sec;   INR: 1.26          PTT - ( 07 Sep 2021 06:56 )  PTT:43.9 sec    CAPILLARY BLOOD GLUCOSE              RADIOLOGY & ADDITIONAL TESTS: Reviewed.

## 2021-09-08 NOTE — PROGRESS NOTE ADULT - PROBLEM SELECTOR PLAN 1
POA, d/t MSSA bacteremia, c/b L5-S1 discitis/OM; GUANAKO 8/30 shows 0.9 cm x 0.5 cm echodensity on the atrial side of the anterior leaflet of the mitral valve, highly suspicious for a vegetation, can't r/o abscess; however, no significant uptake in mitral valve on gallium scan, which makes abscess less likely; appreciate ID, CTS, and Ortho recs; cont. medical mgmt w/ IV daptomycin; f/u surveillance cultures, plan for repeat GUANAKO today to assess progress; no need for spine biopsy, per Ortho (risks would outweigh benefits), and unable to obtain MRI d/t non-compatible cochlear implant; cont. PT, WBAT; trend WBC (mostly stable).  - GUANAKO on 9/7/21, patient received 750 U of Factor VIII prior to procedure as per Hematology recs  - New finding in GUANAKO of aortic root thickening, for which CT surgery reconsulted  -c/w Daptomycin 500 mg IV q4hrs  -awaiting CT surg recs for PICC line placement POA, d/t MSSA bacteremia, c/b L5-S1 discitis/OM; GUANAKO 8/30 shows 0.9 cm x 0.5 cm echodensity on the atrial side of the anterior leaflet of the mitral valve, highly suspicious for a vegetation, can't r/o abscess; however, no significant uptake in mitral valve on gallium scan, which makes abscess less likely; appreciate ID, CTS, and Ortho recs; cont. medical mgmt w/ IV daptomycin; f/u surveillance cultures, plan for repeat GUANAKO today to assess progress; no need for spine biopsy, per Ortho (risks would outweigh benefits), and unable to obtain MRI d/t non-compatible cochlear implant; cont. PT, WBAT; trend WBC (mostly stable).    -GUANAKO on 9/7/21, patient received 750 U of Factor VIII prior to procedure as per Hematology recs  -New finding in GUANAKO of aortic root thickening, for which CT surgery reconsulted  -c/w Daptomycin 500 mg IV q4hrs  -awaiting CT surg recs for PICC line placement

## 2021-09-08 NOTE — CONSULT NOTE ADULT - REASON FOR ADMISSION
sepsis likely 2/2 UTI

## 2021-09-08 NOTE — PROGRESS NOTE ADULT - PROBLEM SELECTOR PLAN 3
Low back pain with ambulation for 4-5 days. Per daughter, PT and warm compresses at home alleviated his pain. He does not report pain at bedside today. No spinal tenderness, strength 5/5 in b/l LE's, negative straight leg raise. CT lumbar spine performed, findings diagnostic for discitis-osteomyelitis. ID consulted, ortho following.  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 No acute ortho intervention at this time and no further imaging indicated.  - Gallium scan 9/1/21:  Uptake within the lower lumbar spine likely corresponding to area of abnormality on prior CT lumbar spine 8/26/2021 at L5-S1 which is suggestive of discitis osteomyelitis.  - treatment with abx as above.    Discitis.   Plan: CT lumbar spine performed, findings diagnostic for discitis-osteomyelitis. ID consulted, ortho following.  - treatment with abx as above.

## 2021-09-08 NOTE — PROGRESS NOTE ADULT - SUBJECTIVE AND OBJECTIVE BOX
**incomplete Note** INFECTIOUS DISEASES FOLLOW UP    This is a follow up note for this  76yMale with  SEPSIS SECONDARY TO UTI    update/ S: Patient was under the impression he was going home today - I explained to him that he needs a PICC line for IV antibiotics of his discitis/OM. He feels well today and has no complaints - denies any cough, SOB, abdominal pain, dysuria, etc.     ROS:  negative except as above    Allergies    allopurinol (Other)    Intolerances        ANTIBIOTICS/RELEVANT:  antimicrobials  DAPTOmycin IVPB 500 milliGRAM(s) IV Intermittent every 48 hours    immunologic:    OTHER:  acetaminophen   Tablet .. 650 milliGRAM(s) Oral every 6 hours PRN  chlorhexidine 2% Cloths 1 Application(s) Topical <User Schedule>  melatonin 3 milliGRAM(s) Oral at bedtime PRN  sodium chloride 0.9% lock flush 10 milliLiter(s) IV Push every 1 hour PRN      Objective:  Vital Signs Last 24 Hrs  T(C): 36.6 (08 Sep 2021 15:35), Max: 36.9 (08 Sep 2021 05:42)  T(F): 97.8 (08 Sep 2021 15:35), Max: 98.5 (08 Sep 2021 05:42)  HR: 85 (08 Sep 2021 15:35) (85 - 96)  BP: 123/72 (08 Sep 2021 15:35) (119/70 - 142/81)  BP(mean): --  RR: 18 (08 Sep 2021 15:35) (18 - 19)  SpO2: 98% (08 Sep 2021 15:35) (97% - 98%)    PHYSICAL EXAM:  General: NAD, elderly man lying in bed. appears fatigued and chronically ill.   HEENT: NC/AT; PERRL, clear conjunctiva  Neck: supple  Respiratory: CTA b/l  Cardiovascular: +S1/S2; RRR  Abdomen: soft, NT/ND; +BS x4  Extremities: WWP, 2+ peripheral pulses b/l; no LE edema  Skin: normal color and turgor; no rash      LABS:                        8.5    11.96 )-----------( 274      ( 08 Sep 2021 07:14 )             26.4     09-08    144  |  112<H>  |  39<H>  ----------------------------<  93  4.3   |  19<L>  |  2.63<H>    Ca    10.1      08 Sep 2021 07:14  Phos  3.1     09-08  Mg     1.8     09-08    TPro  5.8<L>  /  Alb  2.3<L>  /  TBili  0.5  /  DBili  x   /  AST  9<L>  /  ALT  5<L>  /  AlkPhos  184<H>  09-08    PT/INR - ( 08 Sep 2021 07:14 )   PT: 14.0 sec;   INR: 1.18          PTT - ( 08 Sep 2021 07:14 )  PTT:40.6 sec      MICROBIOLOGY:            RECENT CULTURES:  09-02 @ 12:40  .Blood Blood  --  --  --    No growth at 5 days.  --      RADIOLOGY & ADDITIONAL STUDIES:

## 2021-09-08 NOTE — PROGRESS NOTE ADULT - PROBLEM SELECTOR PLAN 6
F: s/p 500 cc, tolerating PO  E: replete as er nephro K goal > 3.5  N: DASH/TLC  VTE Prophylaxis: SCDS (hemophilia no need for anticoag)  GI: not needed  C: Full Code  D: TBD F: s/p 500 cc, tolerating PO  E: replete as needed, nephro K goal > 3.5  N: DASH/TLC  VTE Prophylaxis: SCDS (hemophilia no need for anticoag)  GI: not needed  C: Full Code  D: TBD

## 2021-09-08 NOTE — DISCHARGE NOTE PROVIDER - NSDCMRMEDTOKEN_GEN_ALL_CORE_FT
DAPTOmycin 500 mg intravenous injection: 500 milligram(s) intravenous every 48 hours until 10/12/21  folic acid 1 mg oral tablet: 1 tab(s) orally once a day    Dr Meron Reyes (attending)

## 2021-09-08 NOTE — DISCHARGE NOTE PROVIDER - NSDCFUADDAPPT_GEN_ALL_CORE_FT
(1) Please follow up with your Infectious Disease provider, Dr. Veronica Schneider on 09/27/2021 at 9:00am via Telehealth.  Please note that this appointment will take place over the phone.    Appointment was confirmed by Ms. ARNAUD Acosta, Referral Coordinator.    (2) Please note that your Nephrology Provider, Dr. Danisha Yap will follow up with you at your dialysis center at [address] [place] [address] briefly following your hospital discharge.    Appointment as facilitated by Ms. ARNAUD Acosta, Referral Coordinator.

## 2021-09-08 NOTE — PROGRESS NOTE ADULT - PROBLEM SELECTOR PLAN 4
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  - f/u pending inhibitor assay ([place] units).   - f/u Factor VIII inhibitor testing. Patient with history of Hemophilia A, Hem/Onc consulted:  s/p 1250 units of recombinant factor VIII on 8/27 and Tranexamic acid 10 mg/kg once on 8/27 prior to permacath removal w/o bleeding complications    - f/u recs from heme/onc    - Lupus anticoagulant was strongly positive  - f/u anti-beta2 glycoprotein IgG/IgM, anticardiolipin Ab IgG/IgM.  - f/u pending inhibitor assay ([place] units).   - f/u Factor VIII inhibitor testing.

## 2021-09-08 NOTE — CONSULT NOTE ADULT - CONSULT REQUESTED DATE/TIME
26-Aug-2021 15:39
27-Aug-2021 10:10
30-Aug-2021 16:34
26-Aug-2021
26-Aug-2021 02:36
26-Aug-2021 07:34
27-Aug-2021
08-Sep-2021 12:08
26-Aug-2021 10:18
26-Aug-2021 16:10

## 2021-09-08 NOTE — CONSULT NOTE ADULT - PROVIDER SPECIALTY LIST ADULT
Heme/Onc
Infectious Disease
Intervent Radiology
CT Surgery
Infectious Disease
Nephrology
Vascular Surgery
Orthopedics
Critical Care

## 2021-09-08 NOTE — PROGRESS NOTE ADULT - SUBJECTIVE AND OBJECTIVE BOX
Patient is a 76y old  Male who presents with a chief complaint of sepsis likely 2/2 UTI (08 Sep 2021 14:31)      INTERVAL HPI/OVERNIGHT EVENTS:    Pt. seen and examined earlier today  Pt. has no complaints  No F/C  Pt. was hoping to go home today, but agreeable to stay O/N to monitor for bleeding    Review of Systems: 12 point review of systems otherwise negative    MEDICATIONS  (STANDING):  chlorhexidine 2% Cloths 1 Application(s) Topical <User Schedule>  DAPTOmycin IVPB 500 milliGRAM(s) IV Intermittent every 48 hours    MEDICATIONS  (PRN):  acetaminophen   Tablet .. 650 milliGRAM(s) Oral every 6 hours PRN Temp greater or equal to 38C (100.4F), Moderate Pain (4 - 6)  melatonin 3 milliGRAM(s) Oral at bedtime PRN Insomnia  sodium chloride 0.9% lock flush 10 milliLiter(s) IV Push every 1 hour PRN Pre/post blood products, medications, blood draw, and to maintain line patency      Allergies    allopurinol (Other)    Intolerances          Vital Signs Last 24 Hrs  T(C): 36.6 (08 Sep 2021 15:35), Max: 36.9 (08 Sep 2021 05:42)  T(F): 97.8 (08 Sep 2021 15:35), Max: 98.5 (08 Sep 2021 05:42)  HR: 85 (08 Sep 2021 15:35) (85 - 96)  BP: 123/72 (08 Sep 2021 15:35) (119/70 - 142/81)  BP(mean): --  RR: 18 (08 Sep 2021 15:35) (18 - 19)  SpO2: 98% (08 Sep 2021 15:35) (97% - 98%)  CAPILLARY BLOOD GLUCOSE          09-07 @ 07:01  -  09-08 @ 07:00  --------------------------------------------------------  IN: 0 mL / OUT: 525 mL / NET: -525 mL        Physical Exam:  (earlier today)  Daily     Daily   General:  non-toxic and comfortable-appearing in NAD  HEENT:  MMM  CV:  RRR, no murmur  Lungs:  CTAB anteriorly  Extremities:  no edema B/L LE; +RUE PICC, no signs of bleeding  Skin:  WWP  Neuro:  AAOx3    LABS:                        8.5    11.96 )-----------( 274      ( 08 Sep 2021 07:14 )             26.4     09-08    144  |  112<H>  |  39<H>  ----------------------------<  93  4.3   |  19<L>  |  2.63<H>    Ca    10.1      08 Sep 2021 07:14  Phos  3.1     09-08  Mg     1.8     09-08    TPro  5.8<L>  /  Alb  2.3<L>  /  TBili  0.5  /  DBili  x   /  AST  9<L>  /  ALT  5<L>  /  AlkPhos  184<H>  09-08    PT/INR - ( 08 Sep 2021 07:14 )   PT: 14.0 sec;   INR: 1.18          PTT - ( 08 Sep 2021 07:14 )  PTT:40.6 sec

## 2021-09-08 NOTE — PROGRESS NOTE ADULT - ASSESSMENT
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 Found to have BCx growing MSSA, complicated by mitral valve endocarditis and discitis-osteomyelitis, currently on medical management, awaiting repeat TTE on . Hematology Oncology was initially consulted for preoperative optimization prior to HD cath placement. 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     Hemophilia A  For preoperative optimization prior to HD cath placement it is imperative trend the patient's Factor VIII level. Of note, patient had an HD catheter replaced in March in which the patient received rFVIII and DDAVP mono-procedure and post-procedure for HD catheter placement. On that admission, mild inhibitor noted on 2 hr mixing study.     Factor VIII replacement is calculated by the following in IU.   Factor VIII units required = [(desired peak factor VIII level - patient's baseline factor VIII level) × body weight (kg)]/2  If Mr. Don, given history of prior bleeding with procedure, would aim for a desired peak factor VIII level of 100, resulting in approximately 1250 units of factor VIII    Alternatively DDAVP is an option, but that is in patient's who have previously demonstrated a response to DDAVP  Dosin.3 mcg/kg once (maximum recommended dose: 20 to 30 mcg). If used for prevention of surgical bleeding, initial (preoperative) dose should be timed to provide maximal coverage at the time of greatest bleeding risk (typically 30 to 60 minutes before the procedure)    Plan:  - Trend factor VIII level, PT/INR, PTT daily  - mixing studies showing 2hr prolongation indicating an inhibitor. Lupus anticoagulant was strongly positive, suggesting a possible diagnosis of APLS. Follow-up anti-beta2 glycoprotein IgG/IgM, anticardiolipin Ab IgG/IgM. Diagnosis of APLS will require an elevated antibody testing on 2 or more occasions, 12 weeks apart.    - Inhibitor assay ([place] units) was negative   - Due to history of major bleeding with prior peripheral line placement requiring transfusion, recommend observing patient overnight after PICC line placement and repeating Factor VIII level tomorrow prior to discharge.   - He will follow-up at [facility] (Dr. Grace) in approximately 2 weeks. Daughter will make appointment for the patient.     Discussed with Dr. 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 Found to have BCx growing MSSA, complicated by mitral valve endocarditis and discitis-osteomyelitis, currently on medical management, awaiting repeat TTE on . Hematology Oncology was initially consulted for preoperative optimization prior to HD cath placement. 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     Hemophilia A  For preoperative optimization prior to HD cath placement it is imperative trend the patient's Factor VIII level. Of note, patient had an HD catheter replaced in March in which the patient received rFVIII and DDAVP mono-procedure and post-procedure for HD catheter placement. On that admission, mild inhibitor noted on 2 hr mixing study.     Factor VIII replacement is calculated by the following in IU.   Factor VIII units required = [(desired peak factor VIII level - patient's baseline factor VIII level) × body weight (kg)]/2  If Mr. Don, given history of prior bleeding with procedure, would aim for a desired peak factor VIII level of 100, resulting in approximately 1250 units of factor VIII    Alternatively DDAVP is an option, but that is in patient's who have previously demonstrated a response to DDAVP  Dosin.3 mcg/kg once (maximum recommended dose: 20 to 30 mcg). If used for prevention of surgical bleeding, initial (preoperative) dose should be timed to provide maximal coverage at the time of greatest bleeding risk (typically 30 to 60 minutes before the procedure)    Plan:  - Trend factor VIII level, PT/INR, PTT daily  - mixing studies showing 2hr prolongation indicating an inhibitor. Lupus anticoagulant was strongly positive, suggesting a possible diagnosis of APLS. Follow-up anti-beta2 glycoprotein IgG/IgM, anticardiolipin Ab IgG/IgM. Diagnosis of APLS will require an elevated antibody testing on 2 or more occasions, 12 weeks apart.    - Inhibitor assay ([place] units) was negative   - Due to history of major bleeding with prior peripheral line placement requiring transfusion, recommend observing patient overnight after PICC line placement and repeating Factor VIII level tomorrow prior to discharge.   - Hb has been declining from baseline of 11.5-12 to 8.5 with no obvious bleeding source. Send reticulocyte count, haptoglobin/LDH, Iron studies, B12/Folate. Maintain active T+S, transfuse if Hb < 7 or if actively bleeding   - He will follow-up at [facility] (Dr. Grace) in approximately 2 weeks. Daughter will make appointment for the patient.     Discussed with Dr. Harden

## 2021-09-08 NOTE — PROGRESS NOTE ADULT - ATTENDING COMMENTS
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  was dialysis dependent until this admission for bacteremia- clinically improved  HD catheter out  creat stabilized in 2.5 range  volume status, urine output good  still needs to limit protein intake, avoid NSAIDS and PPIs  reviewed with pt's daughter on phone at pt bedside

## 2021-09-08 NOTE — PROGRESS NOTE ADULT - PROBLEM SELECTOR PLAN 1
POA, d/t MSSA bacteremia, c/b L5-S1 discitis/OM; GUANAKO 8/30 shows 0.9 cm x 0.5 cm echodensity on the atrial side of the anterior leaflet of the mitral valve, highly suspicious for a vegetation, can't r/o abscess; however, no significant uptake in mitral valve on gallium scan, which makes abscess less likely; appreciate ID, CTS, and Ortho recs; cont. medical mgmt w/ IV daptomycin (through 10/12); f/u surveillance cultures; no need for spine biopsy, per Ortho (risks would outweigh benefits), and unable to obtain MRI d/t non-compatible cochlear implant; cont. PT, WBAT; monitor WBC (mostly stable)

## 2021-09-08 NOTE — DISCHARGE NOTE PROVIDER - HOSPITAL COURSE
75 y/o M w/ PMHx of Hemophilia A, HTN, CKD stage 4 (Hx of HD) with Hx of DRESS syndrome, hx of MSSA bacteremia (3/2021) that presented to the ED due to fevers, chills and lower back pain for 4-5 days, found to have sepsis 2/2 recurrent MSSA bacteremia i/s/o permacath and spine osteomyelitis.    Problem List/Main Diagnoses (system-based):   # MSSA bacteremia  On admission pt met 3/4 SIRS criteria; HR 96, WBC 16.10 with neutrophilic predominance, Tmax 103.2F. Positive UA. Negative CXR. Patient empirically treated in the ED with 750mg vanc and 3.375g zosyn on 8/25, then CTX 2g, then one more dose of Vanc on 8/26. ID consulted. Bcx from 8/25 grew MSSA 3/4 bottles (likely recurrent as pt was diagnosed with MSSA bacteremia in March). Initial Bc 8/25/21: MSSA 2/2 bottles. Permacath removed on 8/27/21, as per ID. Cath culture 8/27/21: final result no growth. Bcx from 9/1 and 9/2 final results are negative. Pt started on Daptomycin 500 mg IV q48hrs until 10/12/21 with PICC placement on 9/8/21.     # Endocarditis  As per ID, evaluation of possible endocarditis due to Bcx +MSSA, osteomyelitis and discitis.   -TTE 8/27/21: The mitral valve is mildly thickened. There is mild bileaflet valve prolapse (anterior > posterior). There is trace mitral regurgitation. Normal left and right ventricular size and systolic function. GUANAKO 8/30/21: There is an irregularly shaped linear echodensity measuring 0.9 cm x 0.5 cm on the atrial side of the anterior leaflet of the mitral valve, highly suspicious for a vegetation. There is thickening of the intervalvular fibrosa with systolic expansion - cannot rule out abscess formation. Gallium scan 9/1/21: No significant uptake within the mitral valve region.     # Osteomyelitis/Discitis  Low back pain with ambulation for 4-5 days. Per daughter, PT and warm compresses at home alleviated his pain. He does not report pain at bedside today. No spinal tenderness, strength 5/5 in b/l LE's, negative straight leg raise. CT lumbar spine performed, findings diagnostic for discitis-osteomyelitis. ID consulted, ortho following. Ortho following: biopsy not recommended given patient's medical history of hemophilia A. No acute ortho intervention at this time and no further imaging indicated. Gallium scan 9/1/21:  Uptake within the lower lumbar spine likely corresponding to area of abnormality on prior CT lumbar spine 8/26/2021 at L5-S1 which is suggestive of discitis osteomyelitis. Treatment with abx as above.    # Hemophilia A  Patient with history of Hemophilia A, Hem/Onc consulted: Received 1250 units of recombinant factor VIII on 8/27 and Tranexamic acid 10 mg/kg once on 8/27 prior to permacath removal w/o bleeding complications. Lupus anticoagulant was strongly positive. Will follow up anti-beta2 glycoprotein IgG/IgM, anticardiolipin Ab IgG/IgM. Factor VIII inhibitor assay ([place] units) negative.     #CKD Stage 4, Hx of DRESS syndrome 2/2 allopurinol for gout (recent Cr 2.5 3/21, on HD , last HD Tues 8/24). Cr 2.86 on admission. Patient underwent dialysis inpatient HD on 8/26/21 prior to permacath removal due to MSSA bacteremia on 8/27/21. Hx of ESRD on HD  @[facility] usual Rx 3h 0.5L. Pt will follow up with nephro outpatient.     # Positive urine culture.   On admission pt met 3/4 SIRS criteria (HR 96, WBC 16.10 with neutrophilic predominance, Tmax 103.2F) found to have positive UA. Denies any urinary symptoms. Discussed with daughter, given history of BPH, concern for underlying urinary retention as nidus for ascending urinary tract infection. Denies constipation. ID consulted. Ucx from 8/25 grew Enterobacter Colacae. No need to treat asymptomatic bacteruria.    Inpatient treatment course: Daptomycin,     New medications: Daptomycin 500mg q48h until 10/12/21     Changes in medication: None    Discontinued medication: Midodrine    Labs to be followed outpatient:      Exam to be followed outpatient: Cardiovascular, respiratory, extremities examination.

## 2021-09-08 NOTE — PROGRESS NOTE ADULT - SUBJECTIVE AND OBJECTIVE BOX
Patient is a 76y Male seen and evaluated at bedside doing well this morning, states he feels well and is breathing ok with no SOB.       Meds:    acetaminophen   Tablet .. 650 every 6 hours PRN  DAPTOmycin IVPB 500 every 48 hours  melatonin 3 at bedtime PRN      T(C): , Max: 37 (09-07-21 @ 15:30)  T(F): , Max: 98.6 (09-07-21 @ 15:30)  HR: 93 (09-08-21 @ 09:05)  BP: 142/81 (09-08-21 @ 09:05)  BP(mean): --  RR: 18 (09-08-21 @ 09:05)  SpO2: 97% (09-08-21 @ 09:05)  Wt(kg): --    09-07 @ 07:01  -  09-08 @ 07:00  --------------------------------------------------------  IN: 0 mL / OUT: 525 mL / NET: -525 mL          Review of Systems:  10 point ROS negative except as per HPI      PHYSICAL EXAM:  GENERAL: well-developed, well nourished, alert, no acute distress at present on RA  CHEST/LUNG: Clear to auscultation bilaterally  HEART: normal S1S2, RRR  ABDOMEN: Soft, Nontender  EXTREMITIES: Trace edema     LABS:                        8.5    11.96 )-----------( 274      ( 08 Sep 2021 07:14 )             26.4     09-08    144  |  112<H>  |  39<H>  ----------------------------<  93  4.3   |  19<L>  |  2.63<H>    Ca    10.1      08 Sep 2021 07:14  Phos  3.1     09-08  Mg     1.8     09-08    TPro  5.8<L>  /  Alb  2.3<L>  /  TBili  0.5  /  DBili  x   /  AST  9<L>  /  ALT  5<L>  /  AlkPhos  184<H>  09-08      PT/INR - ( 08 Sep 2021 07:14 )   PT: 14.0 sec;   INR: 1.18          PTT - ( 08 Sep 2021 07:14 )  PTT:40.6 sec          RADIOLOGY & ADDITIONAL STUDIES:

## 2021-09-08 NOTE — DISCHARGE NOTE PROVIDER - NSDCCPCAREPLAN_GEN_ALL_CORE_FT
PRINCIPAL DISCHARGE DIAGNOSIS  Diagnosis: MSSA bacteremia  Assessment and Plan of Treatment:       SECONDARY DISCHARGE DIAGNOSES  Diagnosis: Stage 4 chronic kidney disease  Assessment and Plan of Treatment: Chronic kidney disease is the gradual and permanent loss of kidney function. Normally, the kidneys remove fluids, chemicals, and waste from your blood and turn these waste chemicals into urine. As your kidney disease worse, you lose your ability to remove waste from your body. Call 911 or seek care immediately if your heart is beating faster than normal for you, are confused or very drowsy, have a seizure, have sudden chest pain or shortness of breath.    Please follow up with the nephrologist for further management.       Diagnosis: Aortic root enlargement  Assessment and Plan of Treatment:     Diagnosis: Hemophilia A  Assessment and Plan of Treatment:     Diagnosis: MSSA bacteremia  Assessment and Plan of Treatment:      PRINCIPAL DISCHARGE DIAGNOSIS  Diagnosis: MSSA bacteremia  Assessment and Plan of Treatment: The bacteria Staphylococcus aureus lives on the skin. It usually only causes a problem such as staph bacteremia if it gets inside the body. Staph infections are usually treatable with antibiotics. Staph bacteremia occurs when the staph bacteria enters your bloodstream. You also have an infection in the vertebrae of your spine, called osteomyelitis. You were started on antibiotics daptomycin 500 mg given every 48 hours to help treat your infection(s). You should continue this antibiotic regimen until October 12th. You should follow up with the infectious disease specialist Dr Schneider, and have weekly labs (CBC, CMP, CRP, ESR and CK) sent (fax) to his office. He will also require additional imaging done, futher details will be given by Dr Schneider.      SECONDARY DISCHARGE DIAGNOSES  Diagnosis: Stage 4 chronic kidney disease  Assessment and Plan of Treatment: Chronic kidney disease is the gradual and permanent loss of kidney function. Normally, the kidneys remove fluids, chemicals, and waste from your blood and turn these waste chemicals into urine. As your kidney disease worse, you lose your ability to remove waste from your body. Call 911 or seek care immediately if your heart is beating faster than normal for you, are confused or very drowsy, have a seizure, have sudden chest pain or shortness of breath.    Please follow up with the nephrologist Dr Torres for further management.       Diagnosis: Hemophilia A  Assessment and Plan of Treatment: Hemophilia A is a genetic deficiency in clotting factor 8, which causes increased bleeding risks. Please follow up with your hematology Dr Grace at [facility]. You received several transfusions of factor 8 prior to procedures done at [facility] to prevent any bleeding episodes.

## 2021-09-08 NOTE — PROGRESS NOTE ADULT - PROBLEM SELECTOR PLAN 8
GUANAKO shows "thickening around the aortic root and intervalvular fibrosa of unclear etiology;" I spoke w/ NOELLE LIRIANO, who spoke w/ Dr. Herring; CTS has low suspicion for abscess formation, recommend continued medical mgmt w/ IV abx; they recommend repeating TTE or GUANAKO in 1 week or so; ID plans to repeat GUANAKO

## 2021-09-08 NOTE — DISCHARGE NOTE PROVIDER - CARE PROVIDERS DIRECT ADDRESSES
[email or web address]

## 2021-09-08 NOTE — CONSULT NOTE ADULT - SUBJECTIVE AND OBJECTIVE BOX
[facility] Service Consult Note    [facility]      Diagnosis: osteomyelitis and bacteremia    Indications for Vascular Access (Check all that apply)  [x  ]  Antibiotic Therapy       Antibiotic Prescribed:     daptomycin x 5 weeks                                                                                       [  ]  IV Hydration  [  ]  Total Parenteral Nutrition  [  ]  Chemotherapy  [  ]  Difficult Venous Access  [  ]  CVP monitoring  [  ]  Medications with high potential for tissue necrosis on extravasation  [  ]  Other    Screening (Check all that apply)  Previous Radiation to chest  [  ] Yes      [ x ]  No  Breast Cancer                          [  ] Left     [  ]  Right    [x  ]  No  Lymph Node Dissection         [  ] Left     [  ]  Right    [ x ]  No  Pacemaker or ICD                   [  ] Left     [  ]  Right    [ x ]  No  Upper Extremity DVT             [  ] Left     [  ]  Right    [  x]  No  Chronic Kidney Disease         [ x ]  Yes     [  ]  No  Hemodialysis                           [x  ]  Yes     [  ]  No  AV Fistula/ Graft                     [  ]  Left    [ x ]  Right : HD cath   [  ]  No  Temp>101F in past 24 H       [  ]  Yes     [ x ]  No  H/O PICC/Midline                   [  ]  Yes     [x  ]  No    Lab data:                        8.5    11.96 )-----------( 274      ( 08 Sep 2021 07:14 )             26.4     09-08    144  |  112<H>  |  39<H>  ----------------------------<  93  4.3   |  19<L>  |  2.63<H>    Ca    10.1      08 Sep 2021 07:14  Phos  3.1     09-08  Mg     1.8     09-08    TPro  5.8<L>  /  Alb  2.3<L>  /  TBili  0.5  /  DBili  x   /  AST  9<L>  /  ALT  5<L>  /  AlkPhos  184<H>  09-08    PT/INR - ( 08 Sep 2021 07:14 )   PT: 14.0 sec;   INR: 1.18          PTT - ( 08 Sep 2021 07:14 )  PTT:40.6 sec          I have reviewed the chart, interviewed and examined the patient and determined that this patient:  [ x ] Is a candidate for a PICC line  [  ] Is a candidate for a Midline  [  ] Is not a candidate for vascular access device (reason)    Lumens:    [x  ] Single  [  ] Double

## 2021-09-08 NOTE — PROGRESS NOTE ADULT - ATTENDING SUPERVISION STATEMENT
Fellow
Resident
Fellow
Resident
Fellow
Resident
Resident
Fellow
Resident

## 2021-09-08 NOTE — PROGRESS NOTE ADULT - PROBLEM SELECTOR PLAN 3
h/o DRESS syndrome; had been on HD twice a week prior to admission; HD catheter removed 8/27 in setting of bacteremia; Renal following, will monitor BMP and volume status off dialysis for now; renally-dose rx; Cr normalizing -- does not need further HD, per Renal

## 2021-09-08 NOTE — PROGRESS NOTE ADULT - ASSESSMENT
76 M with PMHx of Hemophilia A, HTN, ESRD currently not on HD/ no permcath dialysis access, Hx of MSSA bacteremia in 03/2021  Admitted for MSSA bacteremia in the setting of L5/S1 discitis/OM, GUANAKO suspicious for vegetation and showed aortic thickening, currently on IV Abx and awaiting final recs from CT surg, awaiting PICC placement following final recs from CT surg       76 M with PMHx of Hemophilia A, HTN, ESRD currently not on HD/ no permcath dialysis access, Hx of MSSA bacteremia in 03/2021, admitted for MSSA bacteremia in the setting of L5/S1 discitis/OM, GUANAKO suspicious for vegetation and showed aortic thickening, currently on IV Abx, awaiting PICC placement following final recs from CT surg

## 2021-09-08 NOTE — CONSULT NOTE ADULT - CONSULT REASON
Infected dialysis catheter recently removed
vascular access
Discitis and vertebral osteomyelitis
R permacath removal
ESRD
Lower back pain
request for tunneled hemodialysis catheter removal
Hemophilia A
Mitral valve endocarditis
Sepsis

## 2021-09-08 NOTE — PROGRESS NOTE ADULT - SUBJECTIVE AND OBJECTIVE BOX
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 Hx obtained from daughter. At HD session yesterday, pt developed shaking chills. HD session was stopped and pt went home. At home, he continued to have shaking chills and was febrile with tmax 101. He had an associated decrease in appetite and lower extremity weakness causing difficulty walking. Denies n/v/abdominal pain. Daughter states that since DRESS syndrome induced CKD, pt has been doing much better requiring 2 sessions of HD per week, and is on no medication.   Also reports low back pain for 4-5 days, worsened by movement. Daughter arranged at home PT and heating pads which helped with the pain. Pt reports pain only when standing/walking. Pt not in pain at the moment.    In the ED,  VS: T98.8   , HR87   , /83    , RR16    , SpO2  98   % (RA)  Labs: WBC 16.10, auto neut #14.20, 88.2% neut, PT14.3, INR1.2, PTT47.4, BUN 39, Cr 2.86,   Urine: pyuria and bacteriuria  EKG: NSR  CXR: Normal  Imaging: None  Interventions: 750mg vanc, 3.375g zosyn     (25 Aug 2021 19:08)    PAST MEDICAL & SURGICAL HISTORY:  HTN (hypertension)  Hemophilia A  Gout  History of BPH  Chronic kidney disease (CKD)  DRESS syndrome  Uses cochlear implant    ALLERGIES:  allopurinol (Other)    MEDICATIONS:  STANDING MEDICATIONS  DAPTOmycin IVPB 500 milliGRAM(s) IV Intermittent every 48 hours    PRN MEDICATIONS  acetaminophen   Tablet .. 650 milliGRAM(s) Oral every 6 hours PRN  melatonin 3 milliGRAM(s) Oral at bedtime PRN    VITALS:   T(F): 98.4  HR: 93  BP: 142/81  RR: 18  SpO2: 97%    LABS:                        8.5    11.96 )-----------( 274      ( 08 Sep 2021 07:14 )             26.4     09-08    144  |  112<H>  |  39<H>  ----------------------------<  93  4.3   |  19<L>  |  2.63<H>    Ca    10.1      08 Sep 2021 07:14  Phos  3.1     09-08  Mg     1.8     09-08    TPro  5.8<L>  /  Alb  2.3<L>  /  TBili  0.5  /  DBili  x   /  AST  9<L>  /  ALT  5<L>  /  AlkPhos  184<H>  09-08    PT/INR - ( 08 Sep 2021 07:14 )   PT: 14.0 sec;   INR: 1.18       PTT - ( 08 Sep 2021 07:14 )  PTT:40.6 sec    CARDIAC MARKERS ( 07 Sep 2021 06:56 )  x     / x     / 8 U/L / x     / x        RADIOLOGY:  Reviewed    PHYSICAL EXAM:  GEN: No acute distress  HEENT: NCAT  LUNGS: Clear to auscultation bilaterally   HEART: S1/S2 present. RRR.   ABD: Soft, non-tender, non-distended. Bowel sounds present  EXT: No pitting edema  NEURO: AAOX3

## 2021-09-08 NOTE — DISCHARGE NOTE PROVIDER - PROVIDER TOKENS
PROVIDER:[TOKEN:[[id number]],FOLLOWUP:[1 month]],PROVIDER:[TOKEN:[[id number]],FOLLOWUP:[1 month]]

## 2021-09-08 NOTE — PROGRESS NOTE ADULT - ASSESSMENT
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    - repeat CK this AM. (add-on lab ok)  - Daptomycin 500 q 48h (previously rising LFTs on Nafcillin. Not recommending any alternative to Daptomycin.)  - c/w Daptomycin until 10/12  - weekly CMC, CMP, ESK, CRP, CK surveillance on discharge. To fax results to office of Dr. Schneider: [phone].  - recommend to discuss 9/7 GUANAKO with CTS, as showing worsening Aortic Root thickening.  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    - CK normal   - Daptomycin 500 q 48h (previously rising LFTs on Nafcillin. Not recommending any alternative to Daptomycin.)  - c/w Daptomycin until 10/12  - weekly CMC, CMP, ESK, CRP, CK surveillance on discharge. To fax results to office of Dr. Schneider: [phone].  - CTS evaluated 9/7 GUANAKO - per their team, no c/f infectious etiology given negative gallium scan. However, gallium scan insensitive for native valve endocarditis - better used for prosthetic valve.   - Will arrange outpatient follow up with Dr. Schneider near end of antibiotic duration and set up GUANAKO outpatient     Discussed with primary team.     ID Team 1 will sign off. Please re-consult as needed with any questions.     Thank you for allowing us to participate in the care of this patient.

## 2021-09-08 NOTE — PROGRESS NOTE ADULT - ASSESSMENT
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     #RU previously on HD, no longer required  #CKD Stage 4  Creatinine still trending downwards, but appears to be more stable.  Prior to dress syndrome occurrence pt with normal creatinine function. But possible   now has CKD with GFR being 23 putting patient in CKD Stage 4 category. He has remained stable enough  to not require another HD cath.   -Will require outpatient follow up as new CKD patient  -Volume, lytes and bicarb acceptable  -Phosphorous within range  -No longer dialysis dependent    #Anemia  -check iron panel and ferritin    Continue renal diet, renally dose meds  Continue bacteremia management per primary team

## 2021-09-08 NOTE — PROGRESS NOTE ADULT - PROBLEM SELECTOR PLAN 2
h/o DRESS syndrome; had been on HD twice a week prior to admission; HD catheter removed 8/27 in setting of bacteremia; Renal following, will monitor BMP and volume status off dialysis for now; renally-dose rx; Cr normalizing -does not appear to need further HD, but await Renal recs.  - trending daily Creatinine, BUN, Ph, K,  HCO3  - Vascular signed off due to no indication of HD per nephro.      #Severe protein-calorie malnutrition.   Plan: f/u Nutrition recs. h/o DRESS syndrome; had been on HD twice a week prior to admission; HD catheter removed 8/27 in setting of bacteremia; Renal following, will monitor BMP and volume status off dialysis for now; renally-dose rx; Cr normalizing Does not appear to need further HD, but await Renal recs. Vasc signed off.  -Trending daily Creatinine, BUN, Ph, K,  HCO3      #Severe protein-calorie malnutrition.     -f/u Nutrition recs

## 2021-09-08 NOTE — CHART NOTE - NSCHARTNOTEFT_GEN_A_CORE
Admitting Diagnosis:   Patient is a 76y old  Male who presents with a chief complaint of sepsis likely 2/2 UTI (03 Sep 2021 14:37)      PAST MEDICAL & SURGICAL HISTORY:  HTN (hypertension)    Hemophilia A    Gout    History of BPH    Chronic kidney disease (CKD)    DRESS syndrome    Uses cochlear implant        Current Nutrition Order:   DASH TLC diet     PO Intake: Good (%) [   ]  Fair (50-75%) [  x ] Poor (<25%) [   ] ---50%    GI Issues: No reported n/v/d/c. Last BM 9/2    Pain: Denied pain    Skin Integrity: Ab 20, surgical incision    Labs:   09-03    138  |  105  |  45<H>  ----------------------------<  79  4.0   |  21<L>  |  2.96<H>    Ca    9.7      03 Sep 2021 10:48  Phos  2.9     09-03  Mg     2.1     09-03    TPro  5.5<L>  /  Alb  2.7<L>  /  TBili  0.6  /  DBili  x   /  AST  9<L>  /  ALT  7<L>  /  AlkPhos  157<H>  09-03    CAPILLARY BLOOD GLUCOSE          Medications:  MEDICATIONS  (STANDING):  DAPTOmycin IVPB 500 milliGRAM(s) IV Intermittent every 48 hours    MEDICATIONS  (PRN):  acetaminophen   Tablet .. 650 milliGRAM(s) Oral every 6 hours PRN Temp greater or equal to 38C (100.4F), Moderate Pain (4 - 6)  melatonin 3 milliGRAM(s) Oral at bedtime PRN Insomnia    Adm Anthropometrics:  · Height for BMI (INCHES)	68 Inch(s)  · Weight for BMI (lbs)	128 lb  · Weight for BMI (kg)	58.1 kg  · Body Mass Index	19.5  · Ideal Body Weight (lbs)	154  · Ideal Body Weight (kg)	69.8    Weight Change: no new wts in EMR. 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 Found to have discitis and OM. Ucx from 8/25 grow Enterobacter Colacae, asymptomatic. Bcx from 8/25 grows MSSA 3/4 bottles. TTE 8/27 with normal. BCx 8/29 NGTD, today worsening WBC and renal function. GUANAKO 8/30 showed 0.9 cm x 0.5 cm vegetation. CT surg recommended medical management with repeat TTE on 9/7 and daily Bcx. TodayPatient remains afebrile, WBC stable in the 13s. BCx from 8/31 now grows gram positive cocci, BCx 9/1,9/2 NGTD.     On follow up assessment, pt seen resting in bed. He continues to report poor appetite though he reports that their is a slight improvement. This AM, breakfast tray seen by bedside table, observed eaten cornflakes, 1/2 of yogurt w/ yogurt, milk, and juice. Continued to encourage to have adequate PO intake to meet nutritional needs. Pt reports to not have received any ONS, but reports not wanting any Ensures/Nepros as he reports dislike. Suggested magic cup which pt is amenable to trying to help meet needs. RD to continue to follow per protocol.    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     Etiology RT decreased appetite.     Signs/Symptoms AEB significant wt loss, likely meeting <75% EER, NFPE findings.     Goal/Expected Outcome Pt to meet >75% EER, show no further s/sx PCM.    Active [ x  ]  Resolved [   ]    If resolved, new PES:     Recommendations:  1. Continue with DASH TLC diet, rec trial of Ensure Magic Cup  2. Monitor %PO intake, encourage PO during meals  3. Trend wts pre/post HD wts  4. Monitor BMP, glucose, lytes, replete prn    Education: Encourage PO intake    Risk Level: High [ x  ] Moderate [   ] Low [   ]
Admitting Diagnosis:   Patient is a 76y old  Male who presents with a chief complaint of sepsis likely 2/2 UTI (08 Sep 2021 16:53)    PAST MEDICAL & SURGICAL HISTORY:  HTN (hypertension)    Hemophilia A    Gout    History of BPH    Chronic kidney disease (CKD)    DRESS syndrome    Uses cochlear implant    Current Nutrition Order:  DASH    PO Intake: Good (%) [   ]  Fair (50-75%) [   ] Poor (<25%) [ x ]    GI Issues:   Pt denies N/V/D/C  Last BM 9/8    Pain:  No pain noted    Skin Integrity:  No edema noted    Labs:   09-08    144  |  112<H>  |  39<H>  ----------------------------<  93  4.3   |  19<L>  |  2.63<H>    Ca    10.1      08 Sep 2021 07:14  Phos  3.1     09-08  Mg     1.8     09-08    TPro  5.8<L>  /  Alb  2.3<L>  /  TBili  0.5  /  DBili  x   /  AST  9<L>  /  ALT  5<L>  /  AlkPhos  184<H>  09-08    CAPILLARY BLOOD GLUCOSE    Medications:  MEDICATIONS  (STANDING):  chlorhexidine 2% Cloths 1 Application(s) Topical <User Schedule>  DAPTOmycin IVPB 500 milliGRAM(s) IV Intermittent every 48 hours    MEDICATIONS  (PRN):  acetaminophen   Tablet .. 650 milliGRAM(s) Oral every 6 hours PRN Temp greater or equal to 38C (100.4F), Moderate Pain (4 - 6)  melatonin 3 milliGRAM(s) Oral at bedtime PRN Insomnia  sodium chloride 0.9% lock flush 10 milliLiter(s) IV Push every 1 hour PRN Pre/post blood products, medications, blood draw, and to maintain line patency    Adm Anthropometrics:  · Height for BMI (INCHES)	68 Inch(s)  · Weight for BMI (lbs)	128 lb  · Weight for BMI (kg)	58.1 kg  · Body Mass Index	19.5  · Ideal Body Weight (lbs)	154  · Ideal Body Weight (kg)	69.8    Weight Change: No new wts     Estimated energy needs:   ABW used for calculations as pt between % of IBW (83%), adjusted for age, severe PCM, fluids per team  1450-1740kcals (25-30 kcals/kg, ABW)  69.6-81.2g protein (1.2-1.4 g/kg, ABW)    Subjective:   76 M with PMHx of Hemophilia A, HTN, ESRD currently not on HD/ no permcath dialysis access, Hx of MSSA bacteremia in 03/2021, admitted for MSSA bacteremia in the setting of L5/S1 discitis/OM, GUANAKO suspicious for vegetation and showed aortic thickening, currently on IV Abx, awaiting PICC placement following final recs from CT surg    Pt seen in room, resting in bed. Pt with poor/fair PO intake since admission. On DASH diet. Pt reports he eats better when family member brings outside food 2/2 he prefers his homemade sauce. RD encouraged pt to communicate food preferences prn. Pt expressed understanding. Please see full recs below. Will continue to follow per RD protocol.     Previous Nutrition Diagnosis: Suspect severe PCM RT decreased appetite AEB significant wt loss, likely meeting <75% EER, NFPE findings    Goal/Expected Outcome Pt to meet >75% EER, show no further s/sx PCM.    Active [ x  ]  Resolved [   ]    If resolved, new PES:     Recommendations:  1. Continue with DASH TLC diet, honor all food preferences  2. Monitor receptiveness to ONS  3. Trend wts   4. RD to remain available prn    Education: Encouraged PO intake    Risk Level: High [ x  ] Moderate [   ] Low [   ].
Discussed case w/ Dr. Herring.  In light of the Gallium scan findings, which did not show any signs of infection on the aortic root, and with his multiple comorbidities, we recommend continuing medical management for now and repeat echo in a week as out-patient.  Thank you, will sign off.  Patient does not need to F/U with Dr. Herring upon discharge.  He can be referred back to Dr. Herring if needed based on the repeat echo or change in clinical scenario.

## 2021-09-09 VITALS
TEMPERATURE: 98 F | RESPIRATION RATE: 18 BRPM | SYSTOLIC BLOOD PRESSURE: 126 MMHG | HEART RATE: 88 BPM | OXYGEN SATURATION: 97 % | DIASTOLIC BLOOD PRESSURE: 75 MMHG

## 2021-09-09 PROBLEM — N18.9 CHRONIC KIDNEY DISEASE, UNSPECIFIED: Chronic | Status: ACTIVE | Noted: 2021-08-25

## 2021-09-09 PROBLEM — D72.12 DRUG RASH WITH EOSINOPHILIA AND SYSTEMIC SYMPTOMS SYNDROME: Chronic | Status: ACTIVE | Noted: 2021-08-25

## 2021-09-09 LAB
ALBUMIN SERPL ELPH-MCNC: 2.8 G/DL — LOW (ref 3.3–5)
ALP SERPL-CCNC: 184 U/L — HIGH (ref 40–120)
ALT FLD-CCNC: 5 U/L — LOW (ref 10–45)
ANION GAP SERPL CALC-SCNC: 9 MMOL/L — SIGNIFICANT CHANGE UP (ref 5–17)
APTT BLD: 43.6 SEC — HIGH (ref 27.5–35.5)
AST SERPL-CCNC: 10 U/L — SIGNIFICANT CHANGE UP (ref 10–40)
BILIRUB SERPL-MCNC: 0.5 MG/DL — SIGNIFICANT CHANGE UP (ref 0.2–1.2)
BLD GP AB SCN SERPL QL: NEGATIVE — SIGNIFICANT CHANGE UP
BUN SERPL-MCNC: 37 MG/DL — HIGH (ref 7–23)
CALCIUM SERPL-MCNC: 10 MG/DL — SIGNIFICANT CHANGE UP (ref 8.4–10.5)
CHLORIDE SERPL-SCNC: 108 MMOL/L — SIGNIFICANT CHANGE UP (ref 96–108)
CO2 SERPL-SCNC: 22 MMOL/L — SIGNIFICANT CHANGE UP (ref 22–31)
CREAT SERPL-MCNC: 2.55 MG/DL — HIGH (ref 0.5–1.3)
FACT VIII ACT/NOR PPP: 113 % — SIGNIFICANT CHANGE UP (ref 51–138)
FERRITIN SERPL-MCNC: 1318 NG/ML — HIGH (ref 30–400)
FOLATE SERPL-MCNC: 12 NG/ML — SIGNIFICANT CHANGE UP
GLUCOSE SERPL-MCNC: 110 MG/DL — HIGH (ref 70–99)
HAPTOGLOB SERPL-MCNC: 415 MG/DL — HIGH (ref 34–200)
HCT VFR BLD CALC: 26.9 % — LOW (ref 39–50)
HGB BLD-MCNC: 8.7 G/DL — LOW (ref 13–17)
INR BLD: 1.17 — HIGH (ref 0.88–1.16)
IRON SATN MFR SERPL: 30 % — SIGNIFICANT CHANGE UP (ref 16–55)
IRON SATN MFR SERPL: 31 UG/DL — LOW (ref 45–165)
LDH SERPL L TO P-CCNC: 111 U/L — SIGNIFICANT CHANGE UP (ref 50–242)
MAGNESIUM SERPL-MCNC: 1.8 MG/DL — SIGNIFICANT CHANGE UP (ref 1.6–2.6)
MCHC RBC-ENTMCNC: 27 PG — SIGNIFICANT CHANGE UP (ref 27–34)
MCHC RBC-ENTMCNC: 32.3 GM/DL — SIGNIFICANT CHANGE UP (ref 32–36)
MCV RBC AUTO: 83.5 FL — SIGNIFICANT CHANGE UP (ref 80–100)
NRBC # BLD: 0 /100 WBCS — SIGNIFICANT CHANGE UP (ref 0–0)
PHOSPHATE SERPL-MCNC: 2.9 MG/DL — SIGNIFICANT CHANGE UP (ref 2.5–4.5)
PLATELET # BLD AUTO: 245 K/UL — SIGNIFICANT CHANGE UP (ref 150–400)
POTASSIUM SERPL-MCNC: 4.3 MMOL/L — SIGNIFICANT CHANGE UP (ref 3.5–5.3)
POTASSIUM SERPL-SCNC: 4.3 MMOL/L — SIGNIFICANT CHANGE UP (ref 3.5–5.3)
PROT SERPL-MCNC: 6 G/DL — SIGNIFICANT CHANGE UP (ref 6–8.3)
PROTHROM AB SERPL-ACNC: 13.9 SEC — HIGH (ref 10.6–13.6)
RBC # BLD: 3.12 M/UL — LOW (ref 4.2–5.8)
RBC # BLD: 3.22 M/UL — LOW (ref 4.2–5.8)
RBC # FLD: 15.9 % — HIGH (ref 10.3–14.5)
RETICS #: 38.1 K/UL — SIGNIFICANT CHANGE UP (ref 25–125)
RETICS/RBC NFR: 1.2 % — SIGNIFICANT CHANGE UP (ref 0.5–2.5)
RH IG SCN BLD-IMP: POSITIVE — SIGNIFICANT CHANGE UP
SODIUM SERPL-SCNC: 139 MMOL/L — SIGNIFICANT CHANGE UP (ref 135–145)
TIBC SERPL-MCNC: 104 UG/DL — LOW (ref 220–430)
UIBC SERPL-MCNC: 73 UG/DL — LOW (ref 110–370)
VIT B12 SERPL-MCNC: 1197 PG/ML — SIGNIFICANT CHANGE UP (ref 232–1245)
WBC # BLD: 10.02 K/UL — SIGNIFICANT CHANGE UP (ref 3.8–10.5)
WBC # FLD AUTO: 10.02 K/UL — SIGNIFICANT CHANGE UP (ref 3.8–10.5)

## 2021-09-09 PROCEDURE — 82607 VITAMIN B-12: CPT

## 2021-09-09 PROCEDURE — 85613 RUSSELL VIPER VENOM DILUTED: CPT

## 2021-09-09 PROCEDURE — 96365 THER/PROPH/DIAG IV INF INIT: CPT

## 2021-09-09 PROCEDURE — 85611 PROTHROMBIN TEST: CPT

## 2021-09-09 PROCEDURE — 99285 EMERGENCY DEPT VISIT HI MDM: CPT | Mod: 25

## 2021-09-09 PROCEDURE — 86850 RBC ANTIBODY SCREEN: CPT

## 2021-09-09 PROCEDURE — 36589 REMOVAL TUNNELED CV CATH: CPT

## 2021-09-09 PROCEDURE — 78802 RP LOCLZJ TUM WHBDY 1 D IMG: CPT

## 2021-09-09 PROCEDURE — 85027 COMPLETE CBC AUTOMATED: CPT

## 2021-09-09 PROCEDURE — 86147 CARDIOLIPIN ANTIBODY EA IG: CPT

## 2021-09-09 PROCEDURE — 85335 FACTOR INHIBITOR TEST: CPT

## 2021-09-09 PROCEDURE — 85610 PROTHROMBIN TIME: CPT

## 2021-09-09 PROCEDURE — 85245 CLOT FACTOR VIII VW RISTOCTN: CPT

## 2021-09-09 PROCEDURE — 36573 INSJ PICC RS&I 5 YR+: CPT

## 2021-09-09 PROCEDURE — 93306 TTE W/DOPPLER COMPLETE: CPT

## 2021-09-09 PROCEDURE — 85260 CLOT FACTOR X STUART-POWER: CPT

## 2021-09-09 PROCEDURE — 80053 COMPREHEN METABOLIC PANEL: CPT

## 2021-09-09 PROCEDURE — U0003: CPT

## 2021-09-09 PROCEDURE — 97530 THERAPEUTIC ACTIVITIES: CPT

## 2021-09-09 PROCEDURE — 84100 ASSAY OF PHOSPHORUS: CPT

## 2021-09-09 PROCEDURE — 82803 BLOOD GASES ANY COMBINATION: CPT

## 2021-09-09 PROCEDURE — 85270 CLOT FACTOR XI PTA: CPT

## 2021-09-09 PROCEDURE — C8925: CPT

## 2021-09-09 PROCEDURE — 97535 SELF CARE MNGMENT TRAINING: CPT

## 2021-09-09 PROCEDURE — 80048 BASIC METABOLIC PNL TOTAL CA: CPT

## 2021-09-09 PROCEDURE — 86146 BETA-2 GLYCOPROTEIN ANTIBODY: CPT

## 2021-09-09 PROCEDURE — 71045 X-RAY EXAM CHEST 1 VIEW: CPT

## 2021-09-09 PROCEDURE — 36415 COLL VENOUS BLD VENIPUNCTURE: CPT

## 2021-09-09 PROCEDURE — 85291 CLOT FACTOR XIII FIBRIN SCRN: CPT

## 2021-09-09 PROCEDURE — 86803 HEPATITIS C AB TEST: CPT

## 2021-09-09 PROCEDURE — U0005: CPT

## 2021-09-09 PROCEDURE — 82746 ASSAY OF FOLIC ACID SERUM: CPT

## 2021-09-09 PROCEDURE — 83735 ASSAY OF MAGNESIUM: CPT

## 2021-09-09 PROCEDURE — 99239 HOSP IP/OBS DSCHRG MGMT >30: CPT

## 2021-09-09 PROCEDURE — 87507 IADNA-DNA/RNA PROBE TQ 12-25: CPT

## 2021-09-09 PROCEDURE — 72131 CT LUMBAR SPINE W/O DYE: CPT

## 2021-09-09 PROCEDURE — 87150 DNA/RNA AMPLIFIED PROBE: CPT

## 2021-09-09 PROCEDURE — 0225U NFCT DS DNA&RNA 21 SARSCOV2: CPT

## 2021-09-09 PROCEDURE — 97161 PT EVAL LOW COMPLEX 20 MIN: CPT

## 2021-09-09 PROCEDURE — 82248 BILIRUBIN DIRECT: CPT

## 2021-09-09 PROCEDURE — 87186 SC STD MICRODIL/AGAR DIL: CPT

## 2021-09-09 PROCEDURE — 83605 ASSAY OF LACTIC ACID: CPT

## 2021-09-09 PROCEDURE — 85732 THROMBOPLASTIN TIME PARTIAL: CPT

## 2021-09-09 PROCEDURE — 97116 GAIT TRAINING THERAPY: CPT

## 2021-09-09 PROCEDURE — 86900 BLOOD TYPING SEROLOGIC ABO: CPT

## 2021-09-09 PROCEDURE — 85025 COMPLETE CBC W/AUTO DIFF WBC: CPT

## 2021-09-09 PROCEDURE — 83550 IRON BINDING TEST: CPT

## 2021-09-09 PROCEDURE — 83010 ASSAY OF HAPTOGLOBIN QUANT: CPT

## 2021-09-09 PROCEDURE — 85730 THROMBOPLASTIN TIME PARTIAL: CPT

## 2021-09-09 PROCEDURE — 82247 BILIRUBIN TOTAL: CPT

## 2021-09-09 PROCEDURE — 83930 ASSAY OF BLOOD OSMOLALITY: CPT

## 2021-09-09 PROCEDURE — 82728 ASSAY OF FERRITIN: CPT

## 2021-09-09 PROCEDURE — 82550 ASSAY OF CK (CPK): CPT

## 2021-09-09 PROCEDURE — 87340 HEPATITIS B SURFACE AG IA: CPT

## 2021-09-09 PROCEDURE — 85220 BLOOC CLOT FACTOR V TEST: CPT

## 2021-09-09 PROCEDURE — 83540 ASSAY OF IRON: CPT

## 2021-09-09 PROCEDURE — 86769 SARS-COV-2 COVID-19 ANTIBODY: CPT

## 2021-09-09 PROCEDURE — 85240 CLOT FACTOR VIII AHG 1 STAGE: CPT

## 2021-09-09 PROCEDURE — 78830 RP LOCLZJ TUM SPECT W/CT 1: CPT

## 2021-09-09 PROCEDURE — 85652 RBC SED RATE AUTOMATED: CPT

## 2021-09-09 PROCEDURE — 90935 HEMODIALYSIS ONE EVALUATION: CPT

## 2021-09-09 PROCEDURE — 83615 LACTATE (LD) (LDH) ENZYME: CPT

## 2021-09-09 PROCEDURE — 85230 CLOT FACTOR VII PROCONVERTIN: CPT

## 2021-09-09 PROCEDURE — 81001 URINALYSIS AUTO W/SCOPE: CPT

## 2021-09-09 PROCEDURE — 86901 BLOOD TYPING SEROLOGIC RH(D): CPT

## 2021-09-09 PROCEDURE — 86140 C-REACTIVE PROTEIN: CPT

## 2021-09-09 PROCEDURE — 85210 CLOT FACTOR II PROTHROM SPEC: CPT

## 2021-09-09 PROCEDURE — A9556: CPT

## 2021-09-09 PROCEDURE — 82977 ASSAY OF GGT: CPT

## 2021-09-09 PROCEDURE — 85670 THROMBIN TIME PLASMA: CPT

## 2021-09-09 PROCEDURE — 87040 BLOOD CULTURE FOR BACTERIA: CPT

## 2021-09-09 PROCEDURE — 87070 CULTURE OTHR SPECIMN AEROBIC: CPT

## 2021-09-09 PROCEDURE — 87086 URINE CULTURE/COLONY COUNT: CPT

## 2021-09-09 PROCEDURE — 85045 AUTOMATED RETICULOCYTE COUNT: CPT

## 2021-09-09 PROCEDURE — 86706 HEP B SURFACE ANTIBODY: CPT

## 2021-09-09 PROCEDURE — 85250 CLOT FACTOR IX PTC/CHRSTMAS: CPT

## 2021-09-09 PROCEDURE — 85598 HEXAGNAL PHOSPH PLTLT NEUTRL: CPT

## 2021-09-09 PROCEDURE — 85280 CLOT FACTOR XII HAGEMAN: CPT

## 2021-09-09 RX ORDER — DAPTOMYCIN 500 MG/10ML
500 INJECTION, POWDER, LYOPHILIZED, FOR SOLUTION INTRAVENOUS
Qty: 8 | Refills: 0
Start: 2021-09-09 | End: 2021-10-10

## 2021-09-09 RX ADMIN — CHLORHEXIDINE GLUCONATE 1 APPLICATION(S): 213 SOLUTION TOPICAL at 05:46

## 2021-09-09 NOTE — PROGRESS NOTE ADULT - TIME BILLING
Management of endocarditis
Dispo: pending PICC placement, pending cx data  repeat GUANAKO Tuesday 9/7  d/w ID attending  still unclear at this time if Pt. will need further HD  PT recommends HPT  has a HHA
Dispo: pending cx data, further work-up  PT recommends HPT  has a HHA
Dispo: HD cath removal today in IR  will need new HD cath next week when bacteremia clears  GUANAKO Monday  PT recommends HPT  has a HHA
Repeat GUANAKO 9/7 with stable MV vegetation and mild/moderate MR as well as new finding of Ao root thickening--advise review finding with CTS. Continue daptomycin--unfortunately there are no good substitutes (previous adverse reaction/LFT abnormality with nafcillin; cefazolin inoculum effect; vancomycin is inferior (to anti-Staphylococcal beta lactams) for MSSA.
management of staph aureus bacteremia
As above; Coordination of care
CTS input noted. They advise continued medical management and repeat echo in ?1 week as outpatient; they cite negative gallium scan however this modality is insensitive for native valve endocarditis. Continue daptomycin through 10/12; I plan to repeat echo (GUANAKO as TTE non diagnostic) in the coming weeks. Remainder of plan as above. Will arrange post hospital f/u with me in 2-3 weeks.  Please reconsult with ?  ID Team 1
Dispo: d/c home tomorrow to complete IV abx at home via PICC (through 10/12)  will arrange outpatient ID f/u  has Heme f/u at [facility]  PT recommends HPT  has a HHA  d/w Pt.'s daughter and CTS PA
Surveillance bcx 8/31, 9/1 ngtd; defer repeat bcx. Advise repeat GUANAKO rather than TTE (which was initially non diagnostic) to evaluate for interval change in 0.9 X 0.5cm MV vegetation; anticipated for ~9/7; f/u with CTS. Continue daptomycin (ELIOT 0.5) on most recent MSSA isolate.
management of Staph aureus bacteremia
As above; Coordination of care
management of mssa bacteremia
As above; Coordination of care
As above; Coordination of care
Dispo: pending PICC placement, pending cx data  still unclear at this time if Pt. will need further HD  PT recommends HPT  has a HHA
management of MSSA bacteremia
management of endocarditis
management of endocarditis
Dispo: pending cx data, further work-up  PT recommends HPT  has a HHA
Medical management
Dispo: anticipate d/c home today to complete IV abx at home via PICC (through 10/12), if OK w/ Heme-Onc  has outpatient ID f/u 9/27 at 9AM  has Heme f/u at [facility]  PT recommends HPT  has a HHA
Dispo: repeat GUANAKO today  likely needs PICC for prolonged course of IV abx at home  still unclear at this time if Pt. will need further HD  PT recommends HPT  has a HHA
Management of endocarditis and osteomyelitis

## 2021-09-09 NOTE — PROGRESS NOTE ADULT - SUBJECTIVE AND OBJECTIVE BOX
Patient is a 76y old  Male who presents with a chief complaint of sepsis likely 2/2 UTI (09 Sep 2021 11:36)      INTERVAL HPI/OVERNIGHT EVENTS:    Pt. seen and examined earlier today  Pt. feels well, wants to go home  No pain or bleeding at PICC site    Review of Systems: 12 point review of systems otherwise negative    MEDICATIONS  (STANDING):  chlorhexidine 2% Cloths 1 Application(s) Topical <User Schedule>  DAPTOmycin IVPB 500 milliGRAM(s) IV Intermittent every 48 hours    MEDICATIONS  (PRN):  acetaminophen   Tablet .. 650 milliGRAM(s) Oral every 6 hours PRN Temp greater or equal to 38C (100.4F), Moderate Pain (4 - 6)  melatonin 3 milliGRAM(s) Oral at bedtime PRN Insomnia  sodium chloride 0.9% lock flush 10 milliLiter(s) IV Push every 1 hour PRN Pre/post blood products, medications, blood draw, and to maintain line patency      Allergies    allopurinol (Other)    Intolerances          Vital Signs Last 24 Hrs  T(C): 36.7 (09 Sep 2021 08:55), Max: 37.3 (09 Sep 2021 06:24)  T(F): 98.1 (09 Sep 2021 08:55), Max: 99.2 (09 Sep 2021 06:24)  HR: 87 (09 Sep 2021 08:55) (84 - 89)  BP: 128/75 (09 Sep 2021 08:55) (123/72 - 150/80)  BP(mean): --  RR: 18 (09 Sep 2021 08:55) (17 - 18)  SpO2: 97% (09 Sep 2021 08:55) (97% - 98%)  CAPILLARY BLOOD GLUCOSE            Physical Exam:  (earlier today)  Daily     Daily   General:  non-toxic and comfortable-appearing in NAD  HEENT:  MMM, no pallor  Extremities:  +RUE PICC, no signs of bleeding  Skin:  WWP  Neuro:  AAOx3      LABS:                        8.7    10.02 )-----------( 245      ( 09 Sep 2021 08:05 )             26.9     09-09    139  |  108  |  37<H>  ----------------------------<  110<H>  4.3   |  22  |  2.55<H>    Ca    10.0      09 Sep 2021 08:05  Phos  2.9     09-09  Mg     1.8     09-09    TPro  6.0  /  Alb  2.8<L>  /  TBili  0.5  /  DBili  x   /  AST  10  /  ALT  5<L>  /  AlkPhos  184<H>  09-09    PT/INR - ( 09 Sep 2021 08:05 )   PT: 13.9 sec;   INR: 1.17          PTT - ( 09 Sep 2021 08:05 )  PTT:43.6 sec

## 2021-09-09 NOTE — PROGRESS NOTE ADULT - PROBLEM SELECTOR PLAN 9
likely d/t CKD4 and prolonged hospitalization/phlebotomy; monitor CBC; additional work-up per Heme recs; Pt. has outpatient Heme f/u at [facility]

## 2021-09-09 NOTE — PROGRESS NOTE ADULT - PROBLEM SELECTOR PROBLEM 1
Sepsis due to methicillin susceptible Staphylococcus aureus
MSSA bacteremia
Sepsis due to methicillin susceptible Staphylococcus aureus
Sepsis due to methicillin susceptible Staphylococcus aureus
MSSA bacteremia
MSSA bacteremia
Sepsis due to methicillin susceptible Staphylococcus aureus
MSSA bacteremia
MSSA bacteremia
Sepsis
Sepsis due to methicillin susceptible Staphylococcus aureus
Sepsis due to methicillin susceptible Staphylococcus aureus
MSSA bacteremia
Discitis
MSSA bacteremia
Sepsis due to methicillin susceptible Staphylococcus aureus
MSSA bacteremia

## 2021-09-09 NOTE — PROGRESS NOTE ADULT - ASSESSMENT
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 Found to have BCx growing MSSA, complicated by mitral valve endocarditis and discitis-osteomyelitis, currently on medical management, awaiting repeat TTE on . Hematology Oncology was initially consulted for preoperative optimization prior to HD cath placement. 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     Hemophilia A  For preoperative optimization prior to HD cath placement it is imperative trend the patient's Factor VIII level. Of note, patient had an HD catheter replaced in March in which the patient received rFVIII and DDAVP mono-procedure and post-procedure for HD catheter placement. On that admission, mild inhibitor noted on 2 hr mixing study.     Factor VIII replacement is calculated by the following in IU.   Factor VIII units required = [(desired peak factor VIII level - patient's baseline factor VIII level) × body weight (kg)]/2  If Mr. Don, given history of prior bleeding with procedure, would aim for a desired peak factor VIII level of 100, resulting in approximately 1250 units of factor VIII    Alternatively DDAVP is an option, but that is in patient's who have previously demonstrated a response to DDAVP  Dosin.3 mcg/kg once (maximum recommended dose: 20 to 30 mcg). If used for prevention of surgical bleeding, initial (preoperative) dose should be timed to provide maximal coverage at the time of greatest bleeding risk (typically 30 to 60 minutes before the procedure)    Plan:  - Trend factor VIII level, PT/INR, PTT daily  - mixing studies showing 2hr prolongation indicating an inhibitor. Lupus anticoagulant was strongly positive, suggesting a possible diagnosis of APLS. Follow-up anti-beta2 glycoprotein IgG/IgM, anticardiolipin Ab IgG/IgM. Diagnosis of APLS will require an elevated antibody testing on 2 or more occasions, 12 weeks apart.    - Inhibitor assay ([employer] units) was negative   - Due to history of major bleeding with prior peripheral line placement requiring transfusion, recommend observing patient overnight after PICC line placement and repeating Factor VIII level tomorrow prior to discharge.   - Hb has been declining from baseline of 11.5-12 to 8.5 with no obvious bleeding source. Reticulocyte count is inappropriately low for degree of anemia. LDH is WNL, pending haptoglobin; however, acute uncompensated hemolysis is unlikely in this scenario. Iron studies WNL, TSat 30%. Pending B12/Folate. Maintain active T+S, transfuse if Hb < 7 or if actively bleeding. Overall, cause of normocytic anemia may be multifactorial given active infection-induced myelosuppression vs antibiotic-induced myelosuppression in the setting of CKD4   - He will follow-up at [facility] (Dr. Grace) in approximately 2 weeks. Daughter will make appointment for the patient.     Discussed with Dr. Harden

## 2021-09-09 NOTE — PROGRESS NOTE ADULT - NSPROGADDITIONALINFOA_GEN_ALL_CORE
DVT ppx: SCDs (no A/C d/t Hemophilia A)

## 2021-09-09 NOTE — PROGRESS NOTE ADULT - PROBLEM SELECTOR PLAN 8
GUANAKO shows "thickening around the aortic root and intervalvular fibrosa of unclear etiology;" I spoke w/ CTS PA, who spoke w/ Dr. Herring; CTS has low suspicion for abscess formation, recommend continued medical mgmt w/ IV abx; they recommend repeating GUANAKO in 1 week or so, ID to f/u as outpatient

## 2021-09-09 NOTE — PROGRESS NOTE ADULT - NUTRITIONAL ASSESSMENT
This patient has been assessed with a concern for Malnutrition and has been determined to have a diagnosis/diagnoses of Severe protein-calorie malnutrition.    This patient is being managed with:   Diet DASH/TLC-  Sodium & Cholesterol Restricted  Entered: Aug 31 2021  6:40AM    
#ESRD on HD MF @[facility]  usual Rx 3h 0.5L   -electrolytes at goal   -euvolemic on examination  -No acute indication for HD  dry wt TBD  replete K to 3.5, 20 KCL PO   possibly uremic w/anorexia nausea and strange taste  will likely require temporary catheter 9/2 for hemodialysis 9/3 and 9/4 then remove     #Fevers  growing staph aureus   -repeat blood cx from 8/29 still positive for staph aureus  -cannot get new HD line until neg x 72 hours  -GUANAKO concerning for vegetation and repeat 9/7
This patient has been assessed with a concern for Malnutrition and has been determined to have a diagnosis/diagnoses of Severe protein-calorie malnutrition.    This patient is being managed with:   Diet DASH/TLC-  Sodium & Cholesterol Restricted  Entered: Aug 31 2021  6:40AM    
This patient has been assessed with a concern for Malnutrition and has been determined to have a diagnosis/diagnoses of Severe protein-calorie malnutrition.    This patient is being managed with:   Diet DASH/TLC-  Sodium & Cholesterol Restricted  Entered: Aug 31 2021  6:40AM    
This patient has been assessed with a concern for Malnutrition and has been determined to have a diagnosis/diagnoses of Severe protein-calorie malnutrition.    This patient is being managed with:   Diet NPO after Midnight-     NPO Start Date: 06-Sep-2021   NPO Start Time: 23:59  Entered: Sep  6 2021 12:41PM    Diet DASH/TLC-  Sodium & Cholesterol Restricted  Entered: Aug 31 2021  6:40AM    
This patient has been assessed with a concern for Malnutrition and has been determined to have a diagnosis/diagnoses of Severe protein-calorie malnutrition.    This patient is being managed with:   Diet NPO after Midnight-     NPO Start Date: 06-Sep-2021   NPO Start Time: 23:59  Entered: Sep  6 2021 12:41PM    Diet DASH/TLC-  Sodium & Cholesterol Restricted  Entered: Aug 31 2021  6:40AM    
This patient has been assessed with a concern for Malnutrition and has been determined to have a diagnosis/diagnoses of Severe protein-calorie malnutrition.    This patient is being managed with:   Diet DASH/TLC-  Sodium & Cholesterol Restricted  Entered: Aug 31 2021  6:40AM    
This patient has been assessed with a concern for Malnutrition and has been determined to have a diagnosis/diagnoses of Severe protein-calorie malnutrition.    This patient is being managed with:   Diet NPO after Midnight-     NPO Start Date: 06-Sep-2021   NPO Start Time: 23:59  Entered: Sep  6 2021 12:41PM    Diet DASH/TLC-  Sodium & Cholesterol Restricted  Entered: Aug 31 2021  6:40AM    
This patient has been assessed with a concern for Malnutrition and has been determined to have a diagnosis/diagnoses of Severe protein-calorie malnutrition.    This patient is being managed with:   Diet DASH/TLC-  Sodium & Cholesterol Restricted  Entered: Aug 31 2021  6:40AM    
This patient has been assessed with a concern for Malnutrition and has been determined to have a diagnosis/diagnoses of Severe protein-calorie malnutrition.    This patient is being managed with:   Diet DASH/TLC-  Sodium & Cholesterol Restricted  Entered: Aug 31 2021  6:40AM

## 2021-09-09 NOTE — PROGRESS NOTE ADULT - PROBLEM SELECTOR PLAN 1
POA, d/t MSSA bacteremia, c/b L5-S1 discitis/OM; GUANAKO 8/30 shows 0.9 cm x 0.5 cm echodensity on the atrial side of the anterior leaflet of the mitral valve, highly suspicious for a vegetation, can't r/o abscess; however, no significant uptake in mitral valve on gallium scan, which makes abscess less likely; WBC normalized; appreciate ID, CTS, and Ortho recs; cont. medical mgmt w/ IV daptomycin (through 10/12); f/u surveillance cultures; no need for spine biopsy, per Ortho (risks would outweigh benefits), and unable to obtain MRI d/t non-compatible cochlear implant; cont. PT, WBAT

## 2021-09-09 NOTE — PROGRESS NOTE ADULT - REASON FOR ADMISSION
sepsis likely 2/2 UTI

## 2021-09-09 NOTE — PROGRESS NOTE ADULT - SUBJECTIVE AND OBJECTIVE BOX
LENGTH OF HOSPITAL STAY: 15d    SUBJECTIVE: No acute overnight events. Had PICC line placed yesterday without complication.     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 Hx obtained from daughter. At HD session yesterday, pt developed shaking chills. HD session was stopped and pt went home. At home, he continued to have shaking chills and was febrile with tmax 101. He had an associated decrease in appetite and lower extremity weakness causing difficulty walking. Denies n/v/abdominal pain. Daughter states that since DRESS syndrome induced CKD, pt has been doing much better requiring 2 sessions of HD per week, and is on no medication.   Also reports low back pain for 4-5 days, worsened by movement. Daughter arranged at home PT and heating pads which helped with the pain. Pt reports pain only when standing/walking. Pt not in pain at the moment.    In the ED,  VS: T98.8   , HR87   , /83    , RR16    , SpO2  98   % (RA)  Labs: WBC 16.10, auto neut #14.20, 88.2% neut, PT14.3, INR1.2, PTT47.4, BUN 39, Cr 2.86,   Urine: pyuria and bacteriuria  EKG: NSR  CXR: Normal  Imaging: None  Interventions: 750mg vanc, 3.375g zosyn     (25 Aug 2021 19:08)    PAST MEDICAL & SURGICAL HISTORY:  HTN (hypertension)  Hemophilia A  Gout  History of BPH  Chronic kidney disease (CKD)  DRESS syndrome  Uses cochlear implant    ALLERGIES:  allopurinol (Other)    MEDICATIONS:  STANDING MEDICATIONS  chlorhexidine 2% Cloths 1 Application(s) Topical <User Schedule>  DAPTOmycin IVPB 500 milliGRAM(s) IV Intermittent every 48 hours    PRN MEDICATIONS  acetaminophen   Tablet .. 650 milliGRAM(s) Oral every 6 hours PRN  melatonin 3 milliGRAM(s) Oral at bedtime PRN  sodium chloride 0.9% lock flush 10 milliLiter(s) IV Push every 1 hour PRN    VITALS:   T(F): 98.1  HR: 87  BP: 128/75  RR: 18  SpO2: 97%    LABS:                        8.7    10.02 )-----------( 245      ( 09 Sep 2021 08:05 )             26.9     09-09    139  |  108  |  37<H>  ----------------------------<  110<H>  4.3   |  22  |  2.55<H>    Ca    10.0      09 Sep 2021 08:05  Phos  2.9     09-09  Mg     1.8     09-09    TPro  6.0  /  Alb  2.8<L>  /  TBili  0.5  /  DBili  x   /  AST  10  /  ALT  5<L>  /  AlkPhos  184<H>  09-09    PT/INR - ( 09 Sep 2021 08:05 )   PT: 13.9 sec;   INR: 1.17       PTT - ( 09 Sep 2021 08:05 )  PTT:43.6 sec    PHYSICAL EXAM:  GEN: No acute distress  HEENT: NCAT  LUNGS: Clear to auscultation bilaterally   HEART: S1/S2 present. RRR.   ABD: Soft, non-tender, non-distended. Bowel sounds present  EXT: No pitting edema, no bleeding/ecchymosis/petechiae  NEURO: AAOX3

## 2021-09-09 NOTE — PROGRESS NOTE ADULT - TIME-BASED BILLING (NON-CRITICAL CARE)
Time-based billing (NON-critical care)

## 2021-09-09 NOTE — PROGRESS NOTE ADULT - PROBLEM SELECTOR PLAN 2
mgmt per Heme-Onc; monitor daily coags and Factor VIII level; will monitor RUE s/p PICC placement (no e/o bleeding)

## 2021-09-12 DIAGNOSIS — E87.6 HYPOKALEMIA: ICD-10-CM

## 2021-09-12 DIAGNOSIS — E80.7 DISORDER OF BILIRUBIN METABOLISM, UNSPECIFIED: ICD-10-CM

## 2021-09-12 DIAGNOSIS — M46.46 DISCITIS, UNSPECIFIED, LUMBAR REGION: ICD-10-CM

## 2021-09-12 DIAGNOSIS — M46.26 OSTEOMYELITIS OF VERTEBRA, LUMBAR REGION: ICD-10-CM

## 2021-09-12 DIAGNOSIS — B95.61 METHICILLIN SUSCEPTIBLE STAPHYLOCOCCUS AUREUS INFECTION AS THE CAUSE OF DISEASES CLASSIFIED ELSEWHERE: ICD-10-CM

## 2021-09-12 DIAGNOSIS — I12.0 HYPERTENSIVE CHRONIC KIDNEY DISEASE WITH STAGE 5 CHRONIC KIDNEY DISEASE OR END STAGE RENAL DISEASE: ICD-10-CM

## 2021-09-12 DIAGNOSIS — I34.0 NONRHEUMATIC MITRAL (VALVE) INSUFFICIENCY: ICD-10-CM

## 2021-09-12 DIAGNOSIS — M10.9 GOUT, UNSPECIFIED: ICD-10-CM

## 2021-09-12 DIAGNOSIS — N17.9 ACUTE KIDNEY FAILURE, UNSPECIFIED: ICD-10-CM

## 2021-09-12 DIAGNOSIS — N39.0 URINARY TRACT INFECTION, SITE NOT SPECIFIED: ICD-10-CM

## 2021-09-12 DIAGNOSIS — N18.6 END STAGE RENAL DISEASE: ICD-10-CM

## 2021-09-12 DIAGNOSIS — A41.9 SEPSIS, UNSPECIFIED ORGANISM: ICD-10-CM

## 2021-09-12 DIAGNOSIS — D72.12 DRUG RASH WITH EOSINOPHILIA AND SYSTEMIC SYMPTOMS SYNDROME: ICD-10-CM

## 2021-09-12 DIAGNOSIS — I05.9 RHEUMATIC MITRAL VALVE DISEASE, UNSPECIFIED: ICD-10-CM

## 2021-09-12 DIAGNOSIS — E43 UNSPECIFIED SEVERE PROTEIN-CALORIE MALNUTRITION: ICD-10-CM

## 2021-09-12 DIAGNOSIS — D66 HEREDITARY FACTOR VIII DEFICIENCY: ICD-10-CM

## 2021-09-12 DIAGNOSIS — A41.01 SEPSIS DUE TO METHICILLIN SUSCEPTIBLE STAPHYLOCOCCUS AUREUS: ICD-10-CM

## 2021-09-12 DIAGNOSIS — Z79.52 LONG TERM (CURRENT) USE OF SYSTEMIC STEROIDS: ICD-10-CM

## 2021-09-12 DIAGNOSIS — Z88.8 ALLERGY STATUS TO OTHER DRUGS, MEDICAMENTS AND BIOLOGICAL SUBSTANCES: ICD-10-CM

## 2021-09-12 DIAGNOSIS — D63.1 ANEMIA IN CHRONIC KIDNEY DISEASE: ICD-10-CM

## 2021-09-16 ENCOUNTER — TRANSCRIPTION ENCOUNTER (OUTPATIENT)
Age: 76
End: 2021-09-16

## 2021-09-24 ENCOUNTER — APPOINTMENT (OUTPATIENT)
Dept: INFECTIOUS DISEASE | Facility: CLINIC | Age: 76
End: 2021-09-24
Payer: MEDICARE

## 2021-09-24 DIAGNOSIS — Z99.2 END STAGE RENAL DISEASE: ICD-10-CM

## 2021-09-24 DIAGNOSIS — N18.6 END STAGE RENAL DISEASE: ICD-10-CM

## 2021-09-24 PROCEDURE — 99443: CPT | Mod: 95

## 2021-09-25 PROBLEM — N18.6 ESRD ON DIALYSIS: Status: RESOLVED | Noted: 2021-04-01 | Resolved: 2021-09-25

## 2021-09-25 NOTE — DATA REVIEWED
[FreeTextEntry1] : 9/20 lab notable for\par 6.72>9<277\par Cr 2.41 improving \par CRP 13 (was 265)\par \par 9/2 BCx neg\par 9/1 BCx neg\par 8/25-8/31 BCx MSSA (pan sensitive)\par \par NM gallium 9/1\par Impression:\par 1. No significant uptake within the mitral valve region.\par 2. Uptake within the lower lumbar spine likely corresponding to area of abnormality on prior CT lumbar spine 8/26/2021 at L5-S1 which is suggestive of discitis osteomyelitis.\par 3. Prominent uptake within the posterior mediastinum appears to correspond to mediastinal and right hilar lymph nodes which are nonspecific and may reflect infectious or inflammatory etiology.\par \par CT lumbar spine 8/26\par IMPRESSION:\par 1.  Since 2/21/2021, interval worsening severe height loss L5-S1, with new erosive endplate changes and prevertebral fat stranding. 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 Recommend correlation with ESR/CRP and consider further evaluation with MRI lumbar with and without contrast.\par 2.  Dilated stool-filled rectum, consistent fecal impaction.\par \par 9/7 GUANAKO\par CONCLUSIONS:\par  1. Limited GUANAKO performed for reassessment of mitral valve and intervalvular fibrosa.\par  2. Mitral valve is mildly thickened. There is prolapse and area of focal thickening of the anterior mitral leaflet. There is mild to moderate mitral regurgitation (2 jets are noted).\par  3. There is thickening around the aortic root and intervalvular fibrosa of unclear etiology; may reflect nonspecific thickening vs early abscess formation given the clinical scenario.\par  4. Tricuspid leaflets are mildly thickened. Pulmonic valve is not well visualized.\par  5. Aortic leaflets are mildly thickened. Trace to mild aortic insufficiency.\par  6. Moderate to severe plaque noted in the descending aorta and aortic arch.\par  7. Compared to the previous GUANAKO performed on 8/30/2021, visually the mitral valve and intervalvular fibrosa appear similar. On comparable views, mitral regurgitation may be slightly more pronounced but still mild to moderate in severity.\par \par GUANAKO 8/30\par CONCLUSIONS:\par  1. Technically difficult study.\par  2. Normal left and right ventricular size and systolic function.\par  3. No LA/RA/LISA/RAA thrombus seen.\par  4. No pericardial effusion.\par  5. There is mitral valve prolapse of the anterior leaflet.There is an irregularly shaped linear echodensity measuring 0.9 cm x 0.5 cm on the atrial north of the anterior leaflet of the mitral valve, highly suspicious for a vegetation. There is mild to moderate, eccentric, posteriorly directed mitral regurgitation.\par  6. The aortic valve is tricuspid and mildly calcified. There is trace aortic regurgitation.\par  7. There is thickening of the intervalvular fibrosa with systolic expansion - cannot rule out abscess formation.\par  8. There is severe non-mobile plaque seen in the visualized portion of the descending aorta. There is severe non-mobile plaque seen in the visualized portion of the aortic arch.\par  9. Compared to the previous GUANAKO performed on 2/26/2021, the mitral valve vegetation is new. The aortic root (intervalvular fibrosa) thickening is more prominent.\par 10. Suggest a repeat GUANAKO in 7-10 days to re-evaluate the mitral valve and intervalvular fibrosa, if clinically indicated.\par 11. Above findings were discussed with Dr. Hidalgo on 8/30/21\par \par TTE 8/26\par CONCLUSIONS:\par  1. Normal left and right ventricular size and systolic function.\par  2. The mitral valve is mildly thickened. There is mild bileaflet valve prolapse (anterior > posterior). There is trace mitral regurgitation.\par  3. Trace aortic regurgitation.\par  4. No evidence of pulmonary hypertension.\par  5. No pericardial effusion.\par

## 2021-09-25 NOTE — ASSESSMENT
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  Daily fatigue likely due to lack of sleep in the evening.  IDSA guideline recommend obtaining echo at the end of abx therapy to establish new baseline.  Since TTE was unremarkable and GUANAKO showed vegetation, I will obtain GUANAKO.  \par \par - cont daptomycin 500mg IV q48h \par - duration of abx is 6 weeks (9/1 - 10/12)\par - plan to repeat GUANAKO toward the end of abx course \par - will reach out to Dr. Danny Smith (Card [phone]) cardiologist at [facility] \par - will send lab results to daughter (Fax [phone])\par - he will need ppx abx prior to dental procedure (amoxicillin 2g PO x 1 30-60min prior to procedure)\par - GUANAKO around the end of abx course \par - RTC 2 weeks \par

## 2021-09-25 NOTE — REASON FOR VISIT
[Follow-Up: _____] : a [unfilled] follow-up visit [Home] : at home, [unfilled] , at the time of the visit. [Medical Office: ([facility])___] : at the medical office located in  [Family Member] : family member [Verbal consent obtained from patient] : the patient, [unfilled] [FreeTextEntry1] : MSSA bacteremia, pericarditis

## 2021-09-25 NOTE — HISTORY OF PRESENT ILLNESS
[FreeTextEntry1] : 76M h/o CKD (off HD), Hemophilia A, DRESS Syndrome, MSSA bacteremia and uremic pericarditis s/p IV cefazolin x 2 weeks (end 3/15/21), recent admission for MSSA NVE of MV (0.9x0.5cm) c/b spinal OM on IV daptomycin (9/1-10/12) p/w management of MSSA NVE of MV and spinal OM (L5-S1).\par \par Patient was admitted from 8/25-9/9 for fever and LBP, found to have MSSA bacteremia due to CLABSI.  HD cath removed.  GUANAKO showed mitral valve prolapse and 0.9 x 0.5cm vegetation.  Gallium scan 9/1 negative.  CT lumbar spine showed discitis/OM at L5-S1 level.  he was seen by CT surg Dr. Herring since GUANAKO finding also showed thickening of the intervalvular fibrosa with systolic expansion, cannot r/o abscess.  Dr. Herring didn't think think it was abscess, and repeat GUANAKO obtained a week later, which showed similar finding.  He was discharged home with 6 weeks of daptomycin (on 10/12).  Of note, his Cr improved over time and he was off HD upon discharge.  \par \par Today there was an technical issue with telehealth so the visit was converted to telephone visit.  Daughter answered the phone.  She said patient is doing better, still very tired and feels sleepy during daytime.  Thinks this may be due to him staying up late to watch TV.  Denied fever/chills, n/v/d, abdominal pain, CP, SOB.  Tolerating daptomycin.

## 2021-09-27 ENCOUNTER — TRANSCRIPTION ENCOUNTER (OUTPATIENT)
Age: 76
End: 2021-09-27

## 2021-09-27 ENCOUNTER — APPOINTMENT (OUTPATIENT)
Dept: INFECTIOUS DISEASE | Facility: CLINIC | Age: 76
End: 2021-09-27

## 2021-09-28 ENCOUNTER — NON-APPOINTMENT (OUTPATIENT)
Age: 76
End: 2021-09-28

## 2021-10-01 ENCOUNTER — TRANSCRIPTION ENCOUNTER (OUTPATIENT)
Age: 76
End: 2021-10-01

## 2021-10-05 ENCOUNTER — NON-APPOINTMENT (OUTPATIENT)
Age: 76
End: 2021-10-05

## 2021-10-07 ENCOUNTER — APPOINTMENT (OUTPATIENT)
Dept: INFECTIOUS DISEASE | Facility: CLINIC | Age: 76
End: 2021-10-07
Payer: MEDICARE

## 2021-10-07 VITALS
WEIGHT: 115 LBS | HEIGHT: 68 IN | HEART RATE: 105 BPM | SYSTOLIC BLOOD PRESSURE: 138 MMHG | BODY MASS INDEX: 17.43 KG/M2 | TEMPERATURE: 97 F | OXYGEN SATURATION: 99 % | DIASTOLIC BLOOD PRESSURE: 74 MMHG

## 2021-10-07 PROCEDURE — 99214 OFFICE O/P EST MOD 30 MIN: CPT

## 2021-10-07 RX ORDER — MIDODRINE HYDROCHLORIDE 5 MG/1
5 TABLET ORAL
Refills: 0 | Status: COMPLETED | COMMUNITY
End: 2021-10-07

## 2021-10-07 NOTE — HISTORY OF PRESENT ILLNESS
[FreeTextEntry1] : 76M h/o CKD (off HD), Hemophilia A, DRESS Syndrome, MSSA bacteremia and uremic pericarditis s/p IV cefazolin x 2 weeks (end 3/15/21), recent admission for MSSA NVE of MV (0.9x0.5cm) c/b spinal OM on IV daptomycin (9/1-10/12) p/w management of MSSA NVE of MV and spinal OM (L5-S1).\par \par Patient was admitted from 8/25-9/9 for fever and LBP, found to have MSSA bacteremia due to CLABSI.  HD cath removed.  GUANAKO showed mitral valve prolapse and 0.9 x 0.5cm vegetation.  Gallium scan 9/1 negative.  CT lumbar spine showed discitis/OM at L5-S1 level.  he was seen by CT surg Dr. Herring since GUANAKO finding also showed thickening of the intervalvular fibrosa with systolic expansion, cannot r/o abscess.  Dr. Herring didn't think think it was abscess, and repeat GUANAKO obtained a week later, which showed similar finding.  He was discharged home with 6 weeks of daptomycin (on 10/12).  Of note, his Cr improved over time and he was off HD upon discharge.  \par \par Last visit was 9/24 (telephone visit).  Today patient came in with his wife and daughter.  Patient is tired and sleepy.  He came from [place] with a walker (car ride).  He drove once.  Back pain resolved.  He is still very weak and cannot go out much but better exercise tolerance than prior.  He is 115 lb and he has poor appetite and family struggling to make him eat more.  Denied fever/chills, n/v/d, abdominal pain, CP, SOB.  \par \par

## 2021-10-07 NOTE — DATA REVIEWED
[FreeTextEntry1] : 10/4 lab notable for\par 9.96>9.2<212\par Cr 2.42\par CRP 14, downtrending (was 265.2)\par ESR 66, downtrending (was 119)\par \par 9/20 lab notable for\par 6.72>9<277\par Cr 2.41 improving \par CRP 13 (was 265)\par \par 9/2 BCx neg\par 9/1 BCx neg\par 8/25-8/31 BCx MSSA (pan sensitive)\par \par NM gallium 9/1\par Impression:\par 1. No significant uptake within the mitral valve region.\par 2. Uptake within the lower lumbar spine likely corresponding to area of abnormality on prior CT lumbar spine 8/26/2021 at L5-S1 which is suggestive of discitis osteomyelitis.\par 3. Prominent uptake within the posterior mediastinum appears to correspond to mediastinal and right hilar lymph nodes which are nonspecific and may reflect infectious or inflammatory etiology.\par \par CT lumbar spine 8/26\par IMPRESSION:\par 1.  Since 2/21/2021, interval worsening severe height loss L5-S1, with new erosive endplate changes and prevertebral fat stranding. 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 Recommend correlation with ESR/CRP and consider further evaluation with MRI lumbar with and without contrast.\par 2.  Dilated stool-filled rectum, consistent fecal impaction.\par \par 9/7 GUANAKO\par CONCLUSIONS:\par  1. Limited GUANAKO performed for reassessment of mitral valve and intervalvular fibrosa.\par  2. Mitral valve is mildly thickened. There is prolapse and area of focal thickening of the anterior mitral leaflet. There is mild to moderate mitral regurgitation (2 jets are noted).\par  3. There is thickening around the aortic root and intervalvular fibrosa of unclear etiology; may reflect nonspecific thickening vs early abscess formation given the clinical scenario.\par  4. Tricuspid leaflets are mildly thickened. Pulmonic valve is not well visualized.\par  5. Aortic leaflets are mildly thickened. Trace to mild aortic insufficiency.\par  6. Moderate to severe plaque noted in the descending aorta and aortic arch.\par  7. Compared to the previous GUANAKO performed on 8/30/2021, visually the mitral valve and intervalvular fibrosa appear similar. On comparable views, mitral regurgitation may be slightly more pronounced but still mild to moderate in severity.\par \par GUANAKO 8/30\par CONCLUSIONS:\par  1. Technically difficult study.\par  2. Normal left and right ventricular size and systolic function.\par  3. No LA/RA/LISA/RAA thrombus seen.\par  4. No pericardial effusion.\par  5. There is mitral valve prolapse of the anterior leaflet.There is an irregularly shaped linear echodensity measuring 0.9 cm x 0.5 cm on the atrial north of the anterior leaflet of the mitral valve, highly suspicious for a vegetation. There is mild to moderate, eccentric, posteriorly directed mitral regurgitation.\par  6. The aortic valve is tricuspid and mildly calcified. There is trace aortic regurgitation.\par  7. There is thickening of the intervalvular fibrosa with systolic expansion - cannot rule out abscess formation.\par  8. There is severe non-mobile plaque seen in the visualized portion of the descending aorta. There is severe non-mobile plaque seen in the visualized portion of the aortic arch.\par  9. Compared to the previous GUANAKO performed on 2/26/2021, the mitral valve vegetation is new. The aortic root (intervalvular fibrosa) thickening is more prominent.\par 10. Suggest a repeat GUANAKO in 7-10 days to re-evaluate the mitral valve and intervalvular fibrosa, if clinically indicated.\par 11. Above findings were discussed with Dr. Hidalgo on 8/30/21\par \par TTE 8/26\par CONCLUSIONS:\par  1. Normal left and right ventricular size and systolic function.\par  2. The mitral valve is mildly thickened. There is mild bileaflet valve prolapse (anterior > posterior). There is trace mitral regurgitation.\par  3. Trace aortic regurgitation.\par  4. No evidence of pulmonary hypertension.\par  5. No pericardial effusion.\par

## 2021-10-07 NOTE — ASSESSMENT
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  Still weak, and daughter prefer to get GUANAKO done in 1 month after he recovers more (patient needs to get sedation and factor infusion).  I think it is reasonable - she will arrange GUANAKO.\par \par - cont daptomycin 500mg IV q48h \par - duration of abx is 6 weeks (9/1 - 10/12)\par - surveillance BCx in 1 week after 10/12 (around 10/19)\par - TTE at card office \par - plan to repeat GUANAKO in 1 month\par - patient's cardiologist is Dr. Danny Smith (Card [phone]) at [facility] \par - daughter (Fax [phone])\par - he will need ppx abx prior to dental procedure (amoxicillin 2g PO x 1 30-60min prior to procedure)\par - RTC 4 weeks \par

## 2021-10-07 NOTE — REASON FOR VISIT
[Follow-Up: _____] : a [unfilled] follow-up visit [Family Member] : family member [FreeTextEntry1] : MSSA bacteremia, pericarditis

## 2021-10-07 NOTE — PHYSICAL EXAM
[General Appearance - Alert] : alert [General Appearance - In No Acute Distress] : in no acute distress [Sclera] : the sclera and conjunctiva were normal [Outer Ear] : the ears and nose were normal in appearance [Neck Appearance] : the appearance of the neck was normal [] : the neck was supple [Auscultation Breath Sounds / Voice Sounds] : lungs were clear to auscultation bilaterally [Heart Rate And Rhythm] : heart rate was normal and rhythm regular [Heart Sounds] : normal S1 and S2 [Bowel Sounds] : normal bowel sounds [Abdomen Soft] : soft [Abdomen Tenderness] : non-tender [FreeTextEntry1] : albe to ambulate

## 2021-10-15 ENCOUNTER — TRANSCRIPTION ENCOUNTER (OUTPATIENT)
Age: 76
End: 2021-10-15

## 2021-10-25 ENCOUNTER — TRANSCRIPTION ENCOUNTER (OUTPATIENT)
Age: 76
End: 2021-10-25

## 2021-11-04 ENCOUNTER — APPOINTMENT (OUTPATIENT)
Dept: INFECTIOUS DISEASE | Facility: CLINIC | Age: 76
End: 2021-11-04
Payer: MEDICARE

## 2021-11-04 DIAGNOSIS — N18.9 CHRONIC KIDNEY DISEASE, UNSPECIFIED: ICD-10-CM

## 2021-11-04 DIAGNOSIS — I32 CHRONIC KIDNEY DISEASE, UNSPECIFIED: ICD-10-CM

## 2021-11-04 PROCEDURE — 99442: CPT | Mod: 95

## 2021-11-05 ENCOUNTER — NON-APPOINTMENT (OUTPATIENT)
Age: 76
End: 2021-11-05

## 2021-11-05 PROBLEM — N18.9 UREMIC PERICARDITIS: Status: RESOLVED | Noted: 2021-04-01 | Resolved: 2021-11-05

## 2021-11-05 NOTE — ASSESSMENT
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  Still weak.  Recent TTE reassuring and card will repeat another echo.  Patient has low back pain. It is difficult to say without full exam if it is worsening or intermittent MSK pain in setting of deconditioning.  I will check inflammatory marker. Daughter asked if she should follow up with orthopedics since he hasn't followed up after discharge.  I think it is a good idea to see if further work-up such as imaging is indicated. \par \par - monitor off abx\par - labs (daughter will arrange near his home) \par - plan to repeat TTE in 2 month with Dr. Smith \par - patient's cardiologist is Dr. Danny Smith (Card [phone]) at [facility],\par - daughter (Fax [phone])\par - he will need ppx abx prior to dental procedure (amoxicillin 2g PO x 1 30-60min prior to procedure)\par - RTC 2 weeks \par

## 2021-11-05 NOTE — HISTORY OF PRESENT ILLNESS
[FreeTextEntry1] : 76M h/o CKD (off HD), Hemophilia A, DRESS Syndrome, MSSA bacteremia and uremic pericarditis s/p IV cefazolin x 2 weeks (end 3/15/21), recent admission for MSSA NVE of MV (0.9x0.5cm) c/b spinal OM s/p IV daptomycin (9/1-10/12) p/w management after treatment of MSSA NVE of MV and spinal OM (L5-S1).\par \par Patient was admitted from 8/25-9/9 for fever and LBP, found to have MSSA bacteremia due to CLABSI.  HD cath removed.  GUANAKO showed mitral valve prolapse and 0.9 x 0.5cm vegetation.  Gallium scan 9/1 negative.  CT lumbar spine showed discitis/OM at L5-S1 level.  he was seen by CT surg Dr. Herring since GUANAKO finding also showed thickening of the intervalvular fibrosa with systolic expansion, cannot r/o abscess.  Dr. Herring didn't think think it was abscess, and repeat GUANAKO obtained a week later, which showed similar finding.  He was discharged home with 6 weeks of daptomycin (on 10/12).  Of note, his Cr improved over time and he was off HD upon discharge.  \par \par Last seen by me on 10/7 and he finished IV daptomycin 6 weeks course on 10/12.  He is off abx since and he had negative surveillance BCx (done at [facility]).  \par \par Today's visit was converted to telephone visit and daughter explained to me about the patient's condition.  Patient is c/o low back pain.  His back pain from OM initially resolved, then he is recently c/f intermittent back pain.  Daughter thinks it seems to be a different location from prior area but she is not sure, and she wonders if the infection is coming back or this is due to his lack of mobility. He doesn't have any fever/chills, n/v/d.  Constipated.\par \par Patient is not very mobile, sedentary and family has to force him to make him walk around.  He is deconditioned a lot and wife is trying really hard to make him eat since he continues to have lack of appetite.  \par \par He saw [facility] for mouth pain, was told to remove denture for 2 weeks to see that helps.  \par \par He went to see Dr. Tasneem hood in mid Oct.  TTE negative for vegetation, had thickening of valves.  He will get repeat TTE in 2 months (in 12/17) if that one is unchanged, then won't do GUANAKO since there is a risk of sedation for him.

## 2021-11-05 NOTE — REASON FOR VISIT
[Follow-Up: _____] : a [unfilled] follow-up visit [Home] : at home, [unfilled] , at the time of the visit. [Medical Office: ([facility])___] : at the medical office located in  [Verbal consent obtained from patient] : the patient, [unfilled] [Family Member] : family member [FreeTextEntry1] : MSSA bacteremia, pericarditis

## 2021-11-10 ENCOUNTER — OUTPATIENT (OUTPATIENT)
Dept: OUTPATIENT SERVICES | Facility: HOSPITAL | Age: 76
LOS: 1 days | End: 2021-11-10
Payer: MEDICARE

## 2021-11-10 ENCOUNTER — RESULT REVIEW (OUTPATIENT)
Age: 76
End: 2021-11-10

## 2021-11-10 ENCOUNTER — APPOINTMENT (OUTPATIENT)
Dept: ORTHOPEDIC SURGERY | Facility: CLINIC | Age: 76
End: 2021-11-10
Payer: MEDICARE

## 2021-11-10 VITALS
OXYGEN SATURATION: 99 % | HEART RATE: 98 BPM | SYSTOLIC BLOOD PRESSURE: 135 MMHG | DIASTOLIC BLOOD PRESSURE: 72 MMHG | HEIGHT: 68 IN | BODY MASS INDEX: 17.43 KG/M2 | WEIGHT: 115 LBS

## 2021-11-10 DIAGNOSIS — Z96.21 COCHLEAR IMPLANT STATUS: Chronic | ICD-10-CM

## 2021-11-10 LAB
BASOPHILS # BLD AUTO: 0.05 K/UL
BASOPHILS NFR BLD AUTO: 0.4 %
EOSINOPHIL # BLD AUTO: 0.26 K/UL
EOSINOPHIL NFR BLD AUTO: 2 %
ERYTHROCYTE [SEDIMENTATION RATE] IN BLOOD BY WESTERGREN METHOD: 50 MM/HR
HCT VFR BLD CALC: 40.2 %
HGB BLD-MCNC: 12.9 G/DL
IMM GRANULOCYTES NFR BLD AUTO: 0.5 %
LYMPHOCYTES # BLD AUTO: 1.58 K/UL
LYMPHOCYTES NFR BLD AUTO: 12.1 %
MAN DIFF?: NORMAL
MCHC RBC-ENTMCNC: 27.4 PG
MCHC RBC-ENTMCNC: 32.1 GM/DL
MCV RBC AUTO: 85.5 FL
MONOCYTES # BLD AUTO: 0.69 K/UL
MONOCYTES NFR BLD AUTO: 5.3 %
NEUTROPHILS # BLD AUTO: 10.46 K/UL
NEUTROPHILS NFR BLD AUTO: 79.7 %
PLATELET # BLD AUTO: 406 K/UL
RBC # BLD: 4.7 M/UL
RBC # FLD: 14.9 %
WBC # FLD AUTO: 13.1 K/UL

## 2021-11-10 PROCEDURE — 99204 OFFICE O/P NEW MOD 45 MIN: CPT

## 2021-11-10 PROCEDURE — 72084 X-RAY EXAM ENTIRE SPI 6/> VW: CPT | Mod: 26

## 2021-11-10 NOTE — REASON FOR VISIT
[Initial Consultation] : an initial consultation for [Back Pain] : back pain [Other: ____] : [unfilled] [Spouse] : spouse [Family Member] : family member

## 2021-11-11 ENCOUNTER — NON-APPOINTMENT (OUTPATIENT)
Age: 76
End: 2021-11-11

## 2021-11-11 ENCOUNTER — INPATIENT (INPATIENT)
Facility: HOSPITAL | Age: 76
LOS: 1 days | Discharge: HOME | DRG: 539 | End: 2021-11-13
Attending: HOSPITALIST | Admitting: STUDENT IN AN ORGANIZED HEALTH CARE EDUCATION/TRAINING PROGRAM
Payer: MEDICARE

## 2021-11-11 VITALS
HEART RATE: 95 BPM | WEIGHT: 110.01 LBS | HEIGHT: 68 IN | SYSTOLIC BLOOD PRESSURE: 139 MMHG | OXYGEN SATURATION: 99 % | RESPIRATION RATE: 19 BRPM | TEMPERATURE: 98 F | DIASTOLIC BLOOD PRESSURE: 70 MMHG

## 2021-11-11 DIAGNOSIS — R63.8 OTHER SYMPTOMS AND SIGNS CONCERNING FOOD AND FLUID INTAKE: ICD-10-CM

## 2021-11-11 DIAGNOSIS — D64.9 ANEMIA, UNSPECIFIED: ICD-10-CM

## 2021-11-11 DIAGNOSIS — Z96.21 COCHLEAR IMPLANT STATUS: Chronic | ICD-10-CM

## 2021-11-11 DIAGNOSIS — D66 HEREDITARY FACTOR VIII DEFICIENCY: ICD-10-CM

## 2021-11-11 DIAGNOSIS — M54.50 LOW BACK PAIN, UNSPECIFIED: ICD-10-CM

## 2021-11-11 DIAGNOSIS — M46.20 OSTEOMYELITIS OF VERTEBRA, SITE UNSPECIFIED: ICD-10-CM

## 2021-11-11 DIAGNOSIS — D72.829 ELEVATED WHITE BLOOD CELL COUNT, UNSPECIFIED: ICD-10-CM

## 2021-11-11 DIAGNOSIS — M46.40 DISCITIS, UNSPECIFIED, SITE UNSPECIFIED: ICD-10-CM

## 2021-11-11 DIAGNOSIS — N18.4 CHRONIC KIDNEY DISEASE, STAGE 4 (SEVERE): ICD-10-CM

## 2021-11-11 LAB
ALBUMIN SERPL ELPH-MCNC: 3.8 G/DL — SIGNIFICANT CHANGE UP (ref 3.3–5)
ALBUMIN SERPL ELPH-MCNC: 4.1 G/DL
ALP BLD-CCNC: 219 U/L
ALP SERPL-CCNC: 204 U/L — HIGH (ref 40–120)
ALT FLD-CCNC: 12 U/L — SIGNIFICANT CHANGE UP (ref 10–45)
ALT SERPL-CCNC: 14 U/L
ANION GAP SERPL CALC-SCNC: 12 MMOL/L — SIGNIFICANT CHANGE UP (ref 5–17)
ANION GAP SERPL CALC-SCNC: 16 MMOL/L
APTT BLD: 45.4 SEC — HIGH (ref 27.5–35.5)
AST SERPL-CCNC: 13 U/L
AST SERPL-CCNC: 16 U/L — SIGNIFICANT CHANGE UP (ref 10–40)
BASOPHILS # BLD AUTO: 0.05 K/UL — SIGNIFICANT CHANGE UP (ref 0–0.2)
BASOPHILS NFR BLD AUTO: 0.5 % — SIGNIFICANT CHANGE UP (ref 0–2)
BILIRUB SERPL-MCNC: 0.2 MG/DL
BILIRUB SERPL-MCNC: 0.2 MG/DL — SIGNIFICANT CHANGE UP (ref 0.2–1.2)
BLD GP AB SCN SERPL QL: NEGATIVE — SIGNIFICANT CHANGE UP
BUN SERPL-MCNC: 35 MG/DL
BUN SERPL-MCNC: 37 MG/DL — HIGH (ref 7–23)
CALCIUM SERPL-MCNC: 11 MG/DL — HIGH (ref 8.4–10.5)
CALCIUM SERPL-MCNC: 11.1 MG/DL
CHLORIDE SERPL-SCNC: 105 MMOL/L
CHLORIDE SERPL-SCNC: 107 MMOL/L — SIGNIFICANT CHANGE UP (ref 96–108)
CO2 SERPL-SCNC: 19 MMOL/L
CO2 SERPL-SCNC: 24 MMOL/L — SIGNIFICANT CHANGE UP (ref 22–31)
CREAT SERPL-MCNC: 2.02 MG/DL — HIGH (ref 0.5–1.3)
CREAT SERPL-MCNC: 2.19 MG/DL
CRP SERPL-MCNC: 21 MG/L
EOSINOPHIL # BLD AUTO: 0.36 K/UL — SIGNIFICANT CHANGE UP (ref 0–0.5)
EOSINOPHIL NFR BLD AUTO: 3.3 % — SIGNIFICANT CHANGE UP (ref 0–6)
GLUCOSE SERPL-MCNC: 111 MG/DL — HIGH (ref 70–99)
GLUCOSE SERPL-MCNC: 127 MG/DL
HCT VFR BLD CALC: 37.4 % — LOW (ref 39–50)
HGB BLD-MCNC: 12 G/DL — LOW (ref 13–17)
IMM GRANULOCYTES NFR BLD AUTO: 0.5 % — SIGNIFICANT CHANGE UP (ref 0–1.5)
INR BLD: 1.05 — SIGNIFICANT CHANGE UP (ref 0.88–1.16)
LYMPHOCYTES # BLD AUTO: 1.45 K/UL — SIGNIFICANT CHANGE UP (ref 1–3.3)
LYMPHOCYTES # BLD AUTO: 13.4 % — SIGNIFICANT CHANGE UP (ref 13–44)
MCHC RBC-ENTMCNC: 27.5 PG — SIGNIFICANT CHANGE UP (ref 27–34)
MCHC RBC-ENTMCNC: 32.1 GM/DL — SIGNIFICANT CHANGE UP (ref 32–36)
MCV RBC AUTO: 85.6 FL — SIGNIFICANT CHANGE UP (ref 80–100)
MONOCYTES # BLD AUTO: 0.83 K/UL — SIGNIFICANT CHANGE UP (ref 0–0.9)
MONOCYTES NFR BLD AUTO: 7.7 % — SIGNIFICANT CHANGE UP (ref 2–14)
NEUTROPHILS # BLD AUTO: 8.06 K/UL — HIGH (ref 1.8–7.4)
NEUTROPHILS NFR BLD AUTO: 74.6 % — SIGNIFICANT CHANGE UP (ref 43–77)
NRBC # BLD: 0 /100 WBCS — SIGNIFICANT CHANGE UP (ref 0–0)
PLATELET # BLD AUTO: 304 K/UL — SIGNIFICANT CHANGE UP (ref 150–400)
POTASSIUM SERPL-MCNC: 5.3 MMOL/L — SIGNIFICANT CHANGE UP (ref 3.5–5.3)
POTASSIUM SERPL-SCNC: 5 MMOL/L
POTASSIUM SERPL-SCNC: 5.3 MMOL/L — SIGNIFICANT CHANGE UP (ref 3.5–5.3)
PROT SERPL-MCNC: 7 G/DL
PROT SERPL-MCNC: 7.2 G/DL — SIGNIFICANT CHANGE UP (ref 6–8.3)
PROTHROM AB SERPL-ACNC: 12.6 SEC — SIGNIFICANT CHANGE UP (ref 10.6–13.6)
RBC # BLD: 4.37 M/UL — SIGNIFICANT CHANGE UP (ref 4.2–5.8)
RBC # FLD: 14.8 % — HIGH (ref 10.3–14.5)
RH IG SCN BLD-IMP: POSITIVE — SIGNIFICANT CHANGE UP
SARS-COV-2 RNA SPEC QL NAA+PROBE: SIGNIFICANT CHANGE UP
SODIUM SERPL-SCNC: 140 MMOL/L
SODIUM SERPL-SCNC: 143 MMOL/L — SIGNIFICANT CHANGE UP (ref 135–145)
WBC # BLD: 10.8 K/UL — HIGH (ref 3.8–10.5)
WBC # FLD AUTO: 10.8 K/UL — HIGH (ref 3.8–10.5)

## 2021-11-11 PROCEDURE — 99285 EMERGENCY DEPT VISIT HI MDM: CPT | Mod: 25

## 2021-11-11 PROCEDURE — 71045 X-RAY EXAM CHEST 1 VIEW: CPT | Mod: 26

## 2021-11-11 PROCEDURE — 71045 X-RAY EXAM CHEST 1 VIEW: CPT | Mod: 26,77

## 2021-11-11 PROCEDURE — 72131 CT LUMBAR SPINE W/O DYE: CPT | Mod: 26,MC

## 2021-11-11 PROCEDURE — 99223 1ST HOSP IP/OBS HIGH 75: CPT | Mod: GC

## 2021-11-11 RX ORDER — ACETAMINOPHEN 500 MG
650 TABLET ORAL EVERY 6 HOURS
Refills: 0 | Status: DISCONTINUED | OUTPATIENT
Start: 2021-11-11 | End: 2021-11-13

## 2021-11-11 RX ORDER — TRAMADOL HYDROCHLORIDE 50 MG/1
25 TABLET ORAL EVERY 6 HOURS
Refills: 0 | Status: DISCONTINUED | OUTPATIENT
Start: 2021-11-11 | End: 2021-11-13

## 2021-11-11 RX ORDER — DAPTOMYCIN 500 MG/10ML
500 INJECTION, POWDER, LYOPHILIZED, FOR SOLUTION INTRAVENOUS
Refills: 0 | Status: DISCONTINUED | OUTPATIENT
Start: 2021-11-11 | End: 2021-11-13

## 2021-11-11 RX ADMIN — DAPTOMYCIN 120 MILLIGRAM(S): 500 INJECTION, POWDER, LYOPHILIZED, FOR SOLUTION INTRAVENOUS at 22:32

## 2021-11-11 NOTE — ED ADULT NURSE REASSESSMENT NOTE - NS ED NURSE REASSESS COMMENT FT1
Introduced myself to pt. Medical team at bedside.   Patient provided for rounding. Patient in no apparent distress. Resting comfortably. Patient provided for emotional support, comfort, safety, and review of plan of care. Will continue to monitor.

## 2021-11-11 NOTE — ED ADULT NURSE NOTE - OBJECTIVE STATEMENT
Pt presents to ED A&Ox4, speaking in clear full sentences, VSS, NAD. Accompanied by daughter. As per daughter pt sent in by outpatient DR for PICC placement. States "he got 6 weeks of abx treatment for osteomyelitis of his spine, we thought everything was good so we stopped but now hes having back pain again. His WBC and ESR are elevated and his doctor wants the PICC line back in so he could get another round of abx". Pt reporting pain in back, worse with movement. Denies fevers, chills, cp, sob.

## 2021-11-11 NOTE — H&P ADULT - ASSESSMENT
75 y/o M w/ PMHx of Hemophilia A, HTN, CKD stage 4 (Hx of HD) with Hx of DRESS syndrome presented to ED for leukocytosis on blood work yesterday and was sent to ED for evaluation.  77 y/o M w/ PMHx of Hemophilia A, HTN, CKD stage 4 (Hx of HD) with Hx of DRESS syndrome presented to ED for worsening lower back pain x2-3 weeks and leukocytosis on blood work yesterday. Pt admitted due to concern for spinal OM and initiation of IV abx.  75 y/o M w/ PMHx of Hemophilia A, HTN, CKD stage 4 (Hx of HD) with Hx of DRESS syndrome presented to ED for worsening lower back pain x2-3 weeks and leukocytosis on blood work yesterday. Pt admitted due to concern for persistent spinal OM and initiation of IV abx.  77 y/o M w/ PMHx of spinal OM, Hemophilia A, HTN, CKD stage 4 (Hx of HD) with Hx of DRESS syndrome presented to ED for worsening lower back pain x2-3 weeks and leukocytosis on blood work yesterday. Pt admitted due to concern for persistent spinal OM and initiation of IV abx.

## 2021-11-11 NOTE — ED PROVIDER NOTE - OBJECTIVE STATEMENT
77 y/o M pt with PMHx of hemophilia and recently treated with abx for osteomyelitis of lower back presents to ED with abnormal lab results. Pt requires admission for PICC line for continued osteomyelitis treatment, and was sent to ED for admission for factor VIII level testing and factor VIII administration prior to obtaining PICC line. Pt is afebrile, however he was found to have leukocytosis by hematologist. Pt denies fever or any focal findings, and is at his baseline mental status.

## 2021-11-11 NOTE — CONSULT NOTE ADULT - SUBJECTIVE AND OBJECTIVE BOX
Orthopaedic Surgery Consult Note    For Surgeon: Dr. 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 allopurinol for gout, MSSA bacteremia (3/2021), cochlear implant placement presenting with     Vital Signs Last 24 Hrs  T(C): 37.4 (26 Aug 2021 06:30), Max: 39.6 (25 Aug 2021 20:46)  T(F): 99.4 (26 Aug 2021 06:30), Max: 103.2 (25 Aug 2021 20:46)  HR: 85 (26 Aug 2021 06:30) (85 - 115)  BP: 110/65 (26 Aug 2021 06:30) (110/65 - 160/90)  BP(mean): --  RR: 18 (26 Aug 2021 06:30) (16 - 18)  SpO2: 99% (26 Aug 2021 06:30) (98% - 99%)    Physical Exam:  General: NAD, resting comfortably in bed  Back: Skin intact, no ecchymosis; no TTP over spinous processes of vertebra  B/l LE:  Able to straight leg raise  SILT L1-S2  Iliopsoas 5/5  Quadriceps 5/5  EHL/FHL/AT/GS 5/5  DP/PT palpable    Imaging:  CT lumbar spine:  IMPRESSION:  1.  Since 8/26/2021, there has been interval decrease in inflammatory changes centered at the L5-S1 disc space.  2.  Slight interval increase in dilatation of the stool-filled rectum, consistent with fecal impaction.      A/P: 76yMale with PMH CKD on dialysis, MSSA bacteremia (3/2021), cochlear implant who presents with fever following dialysis and LBP x 4-5 days.  Likely L5/S1 discitis.  neurologically intact.  no back pain at rest today.  no indication for surgery.  -Activities as tolerated  -Continue IV abx  -Discussed with Dr. Lama    Ortho Pager [phone]   Orthopaedic Surgery Consult Note    For Surgeon: Dr. 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 Pt states the back pain is located across his lower back. Pain is worse with movement and improves when pt is not moving. Denies weakness in legs, saddle anesthesia, or B/B incontinence. Pt denies any fever, chills, chest pain, abd pain, n/v/d or urinary symptoms. 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       Vital Signs Last 24 Hrs  T(C): 37.4 (26 Aug 2021 06:30), Max: 39.6 (25 Aug 2021 20:46)  T(F): 99.4 (26 Aug 2021 06:30), Max: 103.2 (25 Aug 2021 20:46)  HR: 85 (26 Aug 2021 06:30) (85 - 115)  BP: 110/65 (26 Aug 2021 06:30) (110/65 - 160/90)  BP(mean): --  RR: 18 (26 Aug 2021 06:30) (16 - 18)  SpO2: 99% (26 Aug 2021 06:30) (98% - 99%)    Physical Exam:  General: NAD, resting comfortably in bed  Back: Skin intact, no ecchymosis; no TTP over spinous processes of vertebra  B/l LE:  Able to straight leg raise  SILT L1-S2  Iliopsoas 5/5  Quadriceps 5/5  EHL/FHL/AT/GS 5/5  DP/PT palpable    Imaging:  CT lumbar spine:  IMPRESSION:  1.  Since 8/26/2021, there has been interval decrease in inflammatory changes centered at the L5-S1 disc space.  2.  Slight interval increase in dilatation of the stool-filled rectum, consistent with fecal impaction.    A/P: 76yMale with PMH CKD on dialysis, MSSA bacteremia (3/2021), cochlear implant who presents with x2-3 weeks and leukocytosis on blood work 11/10. Pt had recent admission for MSSA endocarditis c/b spinal OM (L5-S1) s/p IV daptomycin (9/1-10/12).   -Activities as tolerated  -Continue IV abx per ID  -Discussed with Dr. Lama    Ortho Pager [phone]   Orthopaedic Surgery Consult Note    For Surgeon: Dr. 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 Pt states the back pain is located across his lower back. Pain is worse with movement and improves when pt is not moving. Denies weakness in legs, saddle anesthesia, or B/B incontinence. Pt denies any fever, chills, chest pain, abd pain, n/v/d or urinary symptoms. Of note, pt had recent admission for MSSA NVE of MV c/b L5-S1 discitis s/p IV daptomycin (9/1-10/12).       Vital Signs Last 24 Hrs  T(C): 37.4 (26 Aug 2021 06:30), Max: 39.6 (25 Aug 2021 20:46)  T(F): 99.4 (26 Aug 2021 06:30), Max: 103.2 (25 Aug 2021 20:46)  HR: 85 (26 Aug 2021 06:30) (85 - 115)  BP: 110/65 (26 Aug 2021 06:30) (110/65 - 160/90)  BP(mean): --  RR: 18 (26 Aug 2021 06:30) (16 - 18)  SpO2: 99% (26 Aug 2021 06:30) (98% - 99%)    Physical Exam:  General: NAD, resting comfortably in bed  Back: Skin intact, no ecchymosis; no TTP over spinous processes of vertebra  B/l LE:  Able to straight leg raise  SILT L1-S2  Iliopsoas 5/5  Quadriceps 5/5  EHL/FHL/AT/GS 5/5  DP/PT palpable    Imaging:  CT lumbar spine:  IMPRESSION:  1.  Since 8/26/2021, there has been interval decrease in inflammatory changes centered at the L5-S1 disc space.  2.  Slight interval increase in dilatation of the stool-filled rectum, consistent with fecal impaction.    A/P: 76yMale with PMH CKD on dialysis, MSSA bacteremia (3/2021), cochlear implant who presents with x2-3 weeks and leukocytosis on blood work 11/10. Pt had recent admission for MSSA endocarditis c/b L5-S1 discitis s/p IV daptomycin (9/1-10/12).   -Activities as tolerated  -Continue IV abx per ID  -Discussed with Dr. Lama    Ortho Pager [phone]

## 2021-11-11 NOTE — ED PROVIDER NOTE - CLINICAL SUMMARY MEDICAL DECISION MAKING FREE TEXT BOX
75 y/o M pt with PMHx of hemophilia presents to ED with leukocytosis and needing PICC line. Plan for labs, pt given CT lumbar spine, and case discussed with Dr. Yost. Pt will be followed as in-patient. 77 y/o M pt with PMHx of hemophilia presents to ED with leukocytosis and needing PICC line. Plan for labs, pt given CT lumbar spine, and case discussed with Dr. Barnard.  Factor VIII level drawn now, needs to be repeated in AM.  Heme-Onc to calculate amount of Factor VIII to be given. Then plan for PICC line.  Ortho, Hemo-Onc and ID to be consulted in AM.  Pt will be followed as in-patient.

## 2021-11-11 NOTE — H&P ADULT - PROBLEM SELECTOR PLAN 2
- f/u factor VIII assay and heme recs for replacement prior to picc line placement  - currently no s/s of bleeding

## 2021-11-11 NOTE — H&P ADULT - PROBLEM SELECTOR PLAN 5
F: none  E: replete prn  N: renal diet  DVT: SCDs  Dispo: [facility] - BUN/Cr 37/2.02. GFR 32, Improved from previous baseline.   - avoid nephrotoxic drugs

## 2021-11-11 NOTE — H&P ADULT - HISTORY OF PRESENT ILLNESS
77 y/o M w/ PMHx of Hemophilia A, HTN, CKD stage 4 (Hx of HD) with Hx of DRESS syndrome presented to ED for leukocytosis on blood work yesterday and was sent to ED for evaluation. Pt had recent admission for MSSA NVE of MV c/b spinal OM s/p IV daptomycin (9/1-10/12) p/w management after treatment of MSSA NVE of MV and spinal OM (L5-S1).     In ED, vitals: T 98.2, HR 95, /70, RR 19, 99% on RA  Labs signficant for wbc 10.8, Hb 12, PTT 43.4, BUN/Cr 37/2.02 (at baseline) Ca 11, alk phos 204  CXR: No acute infiltrates  EKG: NSR w/o acute ischemic changes  CT L-spine: IMPRESSION:  1.  Since 8/26/2021, there has been interval decrease in inflammatory changes centered at the L5-S1 disc space.  2.  Slight interval increase in dilatation of the stool-filled rectum, consistent with fecal impaction.  ED management: ID discussion with Dr. Barnard, plan for factor VIII replacement prior to picc line placement for abx   77 y/o M w/ PMHx of Hemophilia A, HTN, CKD stage 4 (Hx of HD) with hx of DRESS syndrome presented to ED for worsening lower back pain x2-3 weeks and leukocytosis on blood work 11/10. Pt states the back pain is located across his lower back. Pain is worse with movement and improves when pt is not moving. Denies weakness in legs, saddle anesthesia, or B/B incontinence. Pt denies any fever, chills, chest pain, abd pain, n/v/d or urinary symptoms. 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     In ED, vitals: T 98.2, HR 95, /70, RR 19, 99% on RA  Labs signficant for wbc 10.8, Hb 12, PTT 43.4, BUN/Cr 37/2.02 (at baseline) Ca 11, alk phos 204  CXR: No acute infiltrates  EKG: NSR w/o acute ischemic changes  CT L-spine: IMPRESSION:  1.  Since 8/26/2021, there has been interval decrease in inflammatory changes centered at the L5-S1 disc space.  2.  Slight interval increase in dilatation of the stool-filled rectum, consistent with fecal impaction.  ED management: ID discussion with Dr. Barnard, plan for factor VIII replacement prior to picc line placement for abx   77 y/o M w/ PMHx of spinal OM, Hemophilia A, HTN, CKD stage 4 (Hx of HD) with hx of DRESS syndrome presented to ED for worsening lower back pain x2-3 weeks and leukocytosis on blood work 11/10. Pt states the back pain is located across his lower back. Pain is worse with movement and improves when pt is not moving. Denies weakness in legs, saddle anesthesia, or B/B incontinence. Pt denies any fever, chills, chest pain, abd pain, n/v/d or urinary symptoms. 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     In ED, vitals: T 98.2, HR 95, /70, RR 19, 99% on RA  Labs signficant for wbc 10.8, Hb 12, PTT 43.4, BUN/Cr 37/2.02 (at baseline) Ca 11, alk phos 204  CXR: No acute infiltrates  EKG: NSR w/o acute ischemic changes  CT L-spine: IMPRESSION:  1.  Since 8/26/2021, there has been interval decrease in inflammatory changes centered at the L5-S1 disc space.  2.  Slight interval increase in dilatation of the stool-filled rectum, consistent with fecal impaction.  ED management: ID discussion with Dr. Barnard, plan for factor VIII replacement prior to picc line placement for abx

## 2021-11-11 NOTE — H&P ADULT - PROBLEM SELECTOR PLAN 4
- BUN/Cr 37/2.02. GFR 32, Improved from previous baseline.   - avoid nephrotoxic drugs Hb 12, at baseline. No s/s of bleeding. Likely 2/2 to anemia of chronic disease, CKD.

## 2021-11-11 NOTE — H&P ADULT - PROBLEM SELECTOR PLAN 3
Hb 12, at baseline. No s/s of bleeding. Likely 2/2 to anemia of chronic disease, CKD. pain control with tylenol prn for mild-moderate and tramadol prn for severe pain

## 2021-11-11 NOTE — H&P ADULT - ATTENDING COMMENTS
76 year old male patient with history of spinal OM (L5-S1) s/p IV daptomycin (9/1-10/12), hemophilia A, hypertension, CKD stage 4, DRESS syndrome presented to ED for worsening lower back pain of 3 weeks duration and leukocytosis on blood work and increasing elevated inflamtory markers in outpatient setting 11/10. In the ED patient was tachycardic o 95 and his labs were significant for wbc 10.8, Hgb 12, PTT 43.4, BUN/Cr 37/2.02 (at baseline) Ca 11, alk phos 204 and CR imaging shows there has been interval decrease in inflammatory changes centered at the L5-S1 disc space and slight interval increase in dilatation of the stool-filled rectum, consistent with fecal impaction. Patient had ID consult and with concern of persistent OM  is admitted to the medical service.     1. Osteomyelitis of spine  ID consult  ID daptomycin 500 mg q48h after PICC line    2. Lower Back Pain  ortho consult   tylenol PRN for moderate pain  tramadol PRN for severe pain

## 2021-11-11 NOTE — H&P ADULT - NSHPLABSRESULTS_GEN_ALL_CORE
.  LABS:                         12.0   10.80 )-----------( 304      ( 11 Nov 2021 19:05 )             37.4     11-11    143  |  107  |  37<H>  ----------------------------<  111<H>  5.3   |  24  |  2.02<H>    Ca    11.0<H>      11 Nov 2021 19:05    TPro  7.2  /  Alb  3.8  /  TBili  0.2  /  DBili  x   /  AST  16  /  ALT  12  /  AlkPhos  204<H>  11-11    PT/INR - ( 11 Nov 2021 19:05 )   PT: 12.6 sec;   INR: 1.05          PTT - ( 11 Nov 2021 19:05 )  PTT:45.4 sec              RADIOLOGY, EKG & ADDITIONAL TESTS: Reviewed.

## 2021-11-11 NOTE — H&P ADULT - PROBLEM SELECTOR PLAN 1
Pt presenting with worsening lower back pain x2-3 weeks with leukocytosis. Pt was discharged Pt presenting with worsening lower back pain x2-3 weeks. No fever or chills however pt with increased wbc of 10.8. CT Lumbar spine showing since 8/26/2021, there has been interval decrease in inflammatory changes centered at the L5-S1 disc space. Concern for residual spinal OM. Pt had completed course of daptomycin 9/1-10/12 for MSSA bacteremia with NVE of MV c/b spinal OM (L5-S1)  - F/u bcx. Per Dr. Barnard, ID, will start daptomycin 500 mg q48h  - plan for single lumen picc line placement pending factor VIII levels and replacement  - f/u ID recs  - f/u ortho recs Pt presenting with worsening lower back pain x2-3 weeks. No fever or chills however pt with increased wbc of 13.1 yesterday, 10.8 today. ESR and CRP uptrending. CT Lumbar spine today showing since 8/26/2021, there has been interval decrease in inflammatory changes centered at the L5-S1 disc space. Concern for persistent spinal OM. Pt had completed course of daptomycin 9/1-10/12 for MSSA bacteremia with NVE of MV c/b spinal OM (L5-S1)  - F/u bcx. Per Dr. Barnard, ID, will start daptomycin 500 mg q48h  - plan for single lumen picc line placement pending factor VIII levels and replacement  - f/u ID recs  - f/u ortho recs

## 2021-11-11 NOTE — H&P ADULT - NSHPPHYSICALEXAM_GEN_ALL_CORE
VITAL SIGNS:  T(C): 36.8 (11-11-21 @ 17:41), Max: 36.8 (11-11-21 @ 17:41)  T(F): 98.2 (11-11-21 @ 17:41), Max: 98.2 (11-11-21 @ 17:41)  HR: 95 (11-11-21 @ 17:41) (95 - 95)  BP: 139/70 (11-11-21 @ 17:41) (139/70 - 139/70)  BP(mean): --  RR: 19 (11-11-21 @ 17:41) (19 - 19)  SpO2: 99% (11-11-21 @ 17:41) (99% - 99%)  Wt(kg): --    PHYSICAL EXAM:    Constitutional: thin, elderly male resting comfortably in bed; NAD  Head: NC/AT  Eyes: PERRL, EOMI, anicteric sclera  ENT: no nasal discharge; uvula midline, no oropharyngeal erythema or exudates; MMM  Neck: supple; no JVD or thyromegaly  Respiratory: CTA B/L; no W/R/R, no retractions  Cardiac: +S1/S2; RRR; no M/R/G  Gastrointestinal: abdomen soft, NT/ND; no rebound or guarding; +BSx4  Back: spine midline, mild lumbar tenderness to palpation  Extremities: WWP, no clubbing or cyanosis; no peripheral edema  Musculoskeletal: NROM x4; no joint swelling, tenderness or erythema  Vascular: 2+ radial, DP/PT pulses B/L  Dermatologic: skin warm, dry and intact; no rashes, wounds, or scars  Lymphatic: no submandibular or cervical LAD  Neurologic: AAOx3; CNII-XII grossly intact; no focal deficits; strength 5/5 b/l LE, sensation intact to light touch  Psychiatric: affect and characteristics of appearance, verbalizations, behaviors are appropriate

## 2021-11-11 NOTE — ED ADULT TRIAGE NOTE - CHIEF COMPLAINT QUOTE
Patient arrives via walker with daughter for eval of back pain/ elevated WBC/ESR/CRP, sent by his doctor for PICC placement.

## 2021-11-12 ENCOUNTER — TRANSCRIPTION ENCOUNTER (OUTPATIENT)
Age: 76
End: 2021-11-12

## 2021-11-12 LAB
ALBUMIN SERPL ELPH-MCNC: 3.5 G/DL — SIGNIFICANT CHANGE UP (ref 3.3–5)
ALP SERPL-CCNC: 176 U/L — HIGH (ref 40–120)
ALT FLD-CCNC: 10 U/L — SIGNIFICANT CHANGE UP (ref 10–45)
ANION GAP SERPL CALC-SCNC: 8 MMOL/L — SIGNIFICANT CHANGE UP (ref 5–17)
APTT BLD: 45.3 SEC — HIGH (ref 27.5–35.5)
AST SERPL-CCNC: 14 U/L — SIGNIFICANT CHANGE UP (ref 10–40)
BASOPHILS # BLD AUTO: 0.05 K/UL — SIGNIFICANT CHANGE UP (ref 0–0.2)
BASOPHILS NFR BLD AUTO: 0.6 % — SIGNIFICANT CHANGE UP (ref 0–2)
BILIRUB SERPL-MCNC: 0.3 MG/DL — SIGNIFICANT CHANGE UP (ref 0.2–1.2)
BUN SERPL-MCNC: 37 MG/DL — HIGH (ref 7–23)
CALCIUM SERPL-MCNC: 10.8 MG/DL — HIGH (ref 8.4–10.5)
CHLORIDE SERPL-SCNC: 108 MMOL/L — SIGNIFICANT CHANGE UP (ref 96–108)
CO2 SERPL-SCNC: 26 MMOL/L — SIGNIFICANT CHANGE UP (ref 22–31)
COVID-19 SPIKE DOMAIN AB INTERP: NEGATIVE — SIGNIFICANT CHANGE UP
COVID-19 SPIKE DOMAIN ANTIBODY RESULT: 0.4 U/ML — SIGNIFICANT CHANGE UP
CREAT SERPL-MCNC: 1.91 MG/DL — HIGH (ref 0.5–1.3)
EOSINOPHIL # BLD AUTO: 0.32 K/UL — SIGNIFICANT CHANGE UP (ref 0–0.5)
EOSINOPHIL NFR BLD AUTO: 3.6 % — SIGNIFICANT CHANGE UP (ref 0–6)
FACT VIII ACT/NOR PPP: 41 % — LOW (ref 51–138)
FACT VIII ACT/NOR PPP: 42 % — LOW (ref 51–138)
GLUCOSE SERPL-MCNC: 92 MG/DL — SIGNIFICANT CHANGE UP (ref 70–99)
HCT VFR BLD CALC: 38.7 % — LOW (ref 39–50)
HGB BLD-MCNC: 11.9 G/DL — LOW (ref 13–17)
IMM GRANULOCYTES NFR BLD AUTO: 0.6 % — SIGNIFICANT CHANGE UP (ref 0–1.5)
INR BLD: 1.15 — SIGNIFICANT CHANGE UP (ref 0.88–1.16)
LYMPHOCYTES # BLD AUTO: 1.3 K/UL — SIGNIFICANT CHANGE UP (ref 1–3.3)
LYMPHOCYTES # BLD AUTO: 14.6 % — SIGNIFICANT CHANGE UP (ref 13–44)
MAGNESIUM SERPL-MCNC: 2 MG/DL — SIGNIFICANT CHANGE UP (ref 1.6–2.6)
MCHC RBC-ENTMCNC: 26.7 PG — LOW (ref 27–34)
MCHC RBC-ENTMCNC: 30.7 GM/DL — LOW (ref 32–36)
MCV RBC AUTO: 87 FL — SIGNIFICANT CHANGE UP (ref 80–100)
MONOCYTES # BLD AUTO: 0.59 K/UL — SIGNIFICANT CHANGE UP (ref 0–0.9)
MONOCYTES NFR BLD AUTO: 6.6 % — SIGNIFICANT CHANGE UP (ref 2–14)
NEUTROPHILS # BLD AUTO: 6.59 K/UL — SIGNIFICANT CHANGE UP (ref 1.8–7.4)
NEUTROPHILS NFR BLD AUTO: 74 % — SIGNIFICANT CHANGE UP (ref 43–77)
NRBC # BLD: 0 /100 WBCS — SIGNIFICANT CHANGE UP (ref 0–0)
PHOSPHATE SERPL-MCNC: 2.7 MG/DL — SIGNIFICANT CHANGE UP (ref 2.5–4.5)
PLATELET # BLD AUTO: 277 K/UL — SIGNIFICANT CHANGE UP (ref 150–400)
POTASSIUM SERPL-MCNC: 4.9 MMOL/L — SIGNIFICANT CHANGE UP (ref 3.5–5.3)
POTASSIUM SERPL-SCNC: 4.9 MMOL/L — SIGNIFICANT CHANGE UP (ref 3.5–5.3)
PROT SERPL-MCNC: 6.5 G/DL — SIGNIFICANT CHANGE UP (ref 6–8.3)
PROTHROM AB SERPL-ACNC: 13.7 SEC — HIGH (ref 10.6–13.6)
RBC # BLD: 4.45 M/UL — SIGNIFICANT CHANGE UP (ref 4.2–5.8)
RBC # FLD: 14.8 % — HIGH (ref 10.3–14.5)
SARS-COV-2 IGG+IGM SERPL QL IA: 0.4 U/ML — SIGNIFICANT CHANGE UP
SARS-COV-2 IGG+IGM SERPL QL IA: NEGATIVE — SIGNIFICANT CHANGE UP
SODIUM SERPL-SCNC: 142 MMOL/L — SIGNIFICANT CHANGE UP (ref 135–145)
WBC # BLD: 8.9 K/UL — SIGNIFICANT CHANGE UP (ref 3.8–10.5)
WBC # FLD AUTO: 8.9 K/UL — SIGNIFICANT CHANGE UP (ref 3.8–10.5)

## 2021-11-12 PROCEDURE — 99233 SBSQ HOSP IP/OBS HIGH 50: CPT | Mod: GC

## 2021-11-12 PROCEDURE — 99222 1ST HOSP IP/OBS MODERATE 55: CPT

## 2021-11-12 RX ORDER — SODIUM CHLORIDE 9 MG/ML
10 INJECTION INTRAMUSCULAR; INTRAVENOUS; SUBCUTANEOUS
Qty: 1260 | Refills: 0
Start: 2021-11-12 | End: 2021-12-23

## 2021-11-12 RX ORDER — INFLUENZA VIRUS VACCINE 15; 15; 15; 15 UG/.5ML; UG/.5ML; UG/.5ML; UG/.5ML
0.7 SUSPENSION INTRAMUSCULAR ONCE
Refills: 0 | Status: DISCONTINUED | OUTPATIENT
Start: 2021-11-12 | End: 2021-11-13

## 2021-11-12 RX ORDER — DESMOPRESSIN ACETATE 0.1 MG/1
15 TABLET ORAL ONCE
Refills: 0 | Status: COMPLETED | OUTPATIENT
Start: 2021-11-12 | End: 2021-11-13

## 2021-11-12 RX ORDER — DESMOPRESSIN ACETATE 0.1 MG/1
15 TABLET ORAL ONCE
Refills: 0 | Status: DISCONTINUED | OUTPATIENT
Start: 2021-11-12 | End: 2021-11-12

## 2021-11-12 RX ORDER — DAPTOMYCIN 500 MG/10ML
400 INJECTION, POWDER, LYOPHILIZED, FOR SOLUTION INTRAVENOUS
Qty: 8.4 | Refills: 0
Start: 2021-11-12 | End: 2021-12-23

## 2021-11-12 NOTE — DISCHARGE NOTE PROVIDER - PROVIDER TOKENS
PROVIDER:[TOKEN:[[id number]],FOLLOWUP:[1 week],ESTABLISHEDPATIENT:[T]] PROVIDER:[TOKEN:[[id number]],SCHEDULEDAPPT:[11/18/2021],SCHEDULEDAPPTTIME:[10:20 AM],ESTABLISHEDPATIENT:[T]] PROVIDER:[TOKEN:[[id number]],SCHEDULEDAPPT:[11/18/2021],SCHEDULEDAPPTTIME:[10:20 AM],ESTABLISHEDPATIENT:[T]],PROVIDER:[TOKEN:[[id number]],FOLLOWUP:[1 month]] PROVIDER:[TOKEN:[[id number]],SCHEDULEDAPPT:[11/18/2021],SCHEDULEDAPPTTIME:[10:20 AM],ESTABLISHEDPATIENT:[T]],PROVIDER:[TOKEN:[[id number]],FOLLOWUP:[1 month]],PROVIDER:[TOKEN:[[id number]],FOLLOWUP:[2 weeks]]

## 2021-11-12 NOTE — PHYSICAL THERAPY INITIAL EVALUATION ADULT - PERTINENT HX OF CURRENT PROBLEM, REHAB EVAL
77 y/o M w/ PMHx of spinal OM, Hemophilia A, HTN, CKD stage 4 (Hx of HD) with hx of DRESS syndrome presented to ED for worsening lower back pain x2-3 weeks and leukocytosis on blood work 11/10. Pt states the back pain is located across his lower back. Pain is worse with movement and improves when pt is not moving. Denies weakness in legs, saddle anesthesia, or B/B incontinence.

## 2021-11-12 NOTE — DISCHARGE NOTE PROVIDER - NSDCFUSCHEDAPPT_GEN_ALL_CORE_FT
YAIR FELDMAN ; 11/16/2021 ; [facility] [address] [place] [address] YAIR FELDMAN ; 11/16/2021 ; [facility] [address]  YAIR FELDMAN ; 11/18/2021 ; [facility]  [address]

## 2021-11-12 NOTE — DIETITIAN INITIAL EVALUATION ADULT. - OTHER CALCULATIONS
IBW used to calculate energy needs due to pt's current body weight <80% of IBW (71%). IBW utilized to optimize nutrition. Increased needs for PCM and OM/inflammation. Fluids per team

## 2021-11-12 NOTE — DISCHARGE NOTE PROVIDER - NSDCMRMEDTOKEN_GEN_ALL_CORE_FT
DAPTOmycin: 400 milligram(s) intravenous every 48 hours via PICC until 12/23/21 for osteomyelitis  Heparin Lock Flush 10 units/mL intravenous solution: 30 unit(s) intravenously every 48 hours post infusion  Normal Saline Flush 0.9% injectable solution: 10 milliliter(s) injectable every 48 hours pre and post infusion  PICC Care: 1 application intravenous once a day   Weekly CBC with diff, CMP, ESR, CRP, CK faxed to Dr. Barnard fax# [phone]: 1 application intravenous once a week

## 2021-11-12 NOTE — CONSULT NOTE ADULT - ASSESSMENT
76M h/o CKD (off HD), Hemophilia A, DRESS Syndrome, MSSA NVE of MV (0.9x0.5cm) c/b spinal OM s/p IV daptomycin (9/1-10/12) p/w worsening back pain c/f incompletely treated spinal OM.  CT spine w/o con showed persistent OM and pre and paravertebral soft tissue components as well as vertebral epidural soft tissue component.   Since we cannot get MRI and CT with con, it is difficult to completely rule out epidural abscess.  Will treat spinal infection with long course of IV abx..    - Place single lumen PICC line, appreciate heme/onc rec on management of Hemophilia   - Discharge patient with IV daptomycin 400mg q48h   - Duration of antibiotics is 6 weeks ( 11/11 - 12/22 )  - check CK   - Weekly labs: CMP, CBC, ESR, CRP, CK faxed to ID office at [phone]  - Patient to follow up with Dr. barnard in 2 weeks ([address] [place] [address], [phone]), ID office will call patient to schedule     Thank you for your consult.  Please re-consult us or call us with questions.  Case d/w primary team.    Joyce Barnard MD, MS  Infectious Disease attending  work cell [phone]  For any questions during evening/weekend/holiday, please page ID on call

## 2021-11-12 NOTE — DIETITIAN INITIAL EVALUATION ADULT. - ADD RECOMMEND
1. NPO, adv diet when medically feasible, resume Renal restricted diet 2. monitor %PO intakes, rec Ensure Ensure 1xday (350 kcals, 20 g protein, 180 mls H2O) 3. Trend wts 4. Monitor BMP, BG q6hrs, renal indices, lytes, replete prn 1. NPO, adv diet when medically feasible, resume Renal restricted diet 2. monitor %PO intakes, rec Ensure Enlive 1xday (350 kcals, 20 g protein, 180 mls H2O) 3. Trend wts 4. Monitor BMP, BG q6hrs, renal indices, lytes, replete prn

## 2021-11-12 NOTE — DISCHARGE NOTE PROVIDER - NSDCCPCAREPLAN_GEN_ALL_CORE_FT
PRINCIPAL DISCHARGE DIAGNOSIS  Diagnosis: Osteomyelitis of spine  Assessment and Plan of Treatment:        PRINCIPAL DISCHARGE DIAGNOSIS  Diagnosis: Osteomyelitis of spine  Assessment and Plan of Treatment: You came in due to worsening back pain and uptrending labs with Dr. Barnard. There was concern that the osteomyelitis of the spine is not completely resolved so you will restart daptomycin infusions at home until 12/23. You will get infusions and home and will get weekly blood work sent to Dr. Barnard for followup.      SECONDARY DISCHARGE DIAGNOSES  Diagnosis: Hemophilia A  Assessment and Plan of Treatment: You have a history of hemophilia A so there was concern about the PICC line procedure. You were given DDAVP prior to the procedure to help prevent bleeding. You tolerated the procedure well.

## 2021-11-12 NOTE — CONSULT NOTE ADULT - SUBJECTIVE AND OBJECTIVE BOX
INFECTIOUS DISEASES INITIAL CONSULT NOTE    HPI:  76M h/o CKD (off HD), Hemophilia A, DRESS Syndrome, MSSA NVE of MV (0.9x0.5cm) c/b spinal OM s/p IV daptomycin (9/1-10/12) p/w worsening back pain c/f incompletely treated spinal OM. Patient was admitted from 8/25-9/9 for fever and LBP, found to have MSSA bacteremia due to CLABSI. HD cath removed. GUANAKO showed mitral valve prolapse and 0.9 x 0.5cm vegetation. Gallium scan 9/1 negative. CT lumbar spine showed discitis/OM at L5-S1 level. he was seen by CT surg Dr. Herring since GUANAKO finding also showed thickening of the intervalvular fibrosa with systolic expansion, cannot r/o abscess. Dr. Herring didn't think think it was abscess, and repeat GUANAKO obtained a week later, which showed similar finding. He was discharged home with 6 weeks of daptomycin (on 10/12).  After discharge, he completed 6 weeks of IV daptomycin and he had resolution of back pain.  However in the past few weeks, he started to have recurrent LBP which was worsening.  Also found to have new leukocytosis and uptrending CRP and ESR, c/f incompletely treated spinal OM.  He was never able to get MRI spine (due to cochlear implant) and cannot give CT lumbar spine w/ IV cont to r/o epidural abscess.  He was instructed to go to ED for PICC placement for restarting IV abx therapy.  He has hemophilia and will need facter VIII transfusion priror to PICC and it cannot be given as outpatient.  Since admission he reported improvement of back pain after initiation of IV daptomycin.  Denied fever/chills, leg weakness, bowel/urinary incontinence. +poor appetite, fatigue.         PAST MEDICAL & SURGICAL HISTORY:  HTN (hypertension)    Hemophilia A    Gout    History of BPH    Chronic kidney disease (CKD)    DRESS syndrome    Uses cochlear implant          Review of Systems:   Constitutional, eyes, ENT, cardiovascular, respiratory, gastrointestinal, genitourinary, integumentary, neurological, psychiatric and heme/lymph are otherwise negative other than noted above       ANTIBIOTICS:  MEDICATIONS  (STANDING):  DAPTOmycin IVPB 500 milliGRAM(s) IV Intermittent every 48 hours  influenza  Vaccine (HIGH DOSE) 0.7 milliLiter(s) IntraMuscular once    MEDICATIONS  (PRN):  acetaminophen     Tablet .. 650 milliGRAM(s) Oral every 6 hours PRN Temp greater or equal to 38C (100.4F), Mild Pain (1 - 3)  desmopressin IVPB 15 MICROGram(s) IV Intermittent once PRN PICC procedure  traMADol 25 milliGRAM(s) Oral every 6 hours PRN Severe Pain (7 - 10)      Allergies    allopurinol (Other)    Intolerances        SOCIAL HISTORY:    FAMILY HISTORY:  FH: HTN (hypertension)     no FH leading to current infection    Vital Signs Last 24 Hrs  T(C): 36.8 (12 Nov 2021 17:09), Max: 37 (11 Nov 2021 22:37)  T(F): 98.3 (12 Nov 2021 17:09), Max: 98.6 (11 Nov 2021 22:37)  HR: 75 (12 Nov 2021 17:09) (68 - 79)  BP: 130/70 (12 Nov 2021 17:09) (108/66 - 157/79)  BP(mean): --  RR: 18 (12 Nov 2021 17:09) (18 - 18)  SpO2: 97% (12 Nov 2021 17:09) (97% - 99%)      PHYSICAL EXAM:  Constitutional: alert, NAD  Eyes: the sclera and conjunctiva were normal.   ENT: the ears and nose were normal in appearance.   Neck: the appearance of the neck was normal and the neck was supple.   Pulmonary: no respiratory distress and lungs were clear to auscultation bilaterally.   Heart: heart rate was normal and rhythm regular, normal S1 and S2  Back: no ttp   Abdomen: normal bowel sounds, soft, non-tender        LABS:                        11.9   8.90  )-----------( 277      ( 12 Nov 2021 08:42 )             38.7     11-12    142  |  108  |  37<H>  ----------------------------<  92  4.9   |  26  |  1.91<H>    Ca    10.8<H>      12 Nov 2021 08:42  Phos  2.7     11-12  Mg     2.0     11-12    TPro  6.5  /  Alb  3.5  /  TBili  0.3  /  DBili  x   /  AST  14  /  ALT  10  /  AlkPhos  176<H>  11-12    PT/INR - ( 12 Nov 2021 08:42 )   PT: 13.7 sec;   INR: 1.15          PTT - ( 12 Nov 2021 08:42 )  PTT:45.3 sec      MICROBIOLOGY:    RADIOLOGY & ADDITIONAL STUDIES:  CT lumbar spine w/o con 11/11  There is persistent cortical irregularity with erosive changes involving the endplates at the L5/S1 level with surrounding bony sclerosis with osteomyelitis. There remains pre and paravertebral soft tissue component as well as a ventral epidural soft tissue component which appears to have slightly improved. These findings would be better evaluated with a contrast study or MRI.

## 2021-11-12 NOTE — DISCHARGE NOTE PROVIDER - HOSPITAL COURSE
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 Osteomyelitis of spine - worsening back pain and ESR and CRP uptrending. Sent in by Dr. Barnard for PICC placement and initiation of IV dapto. Started on dapto here and will be d/cuca with home infusions of daptomycin  2. Hemophilia - hematology onboard for PICC placement guidance on hemophilia. DDAVP to be given 30-60 minutes before procedure.  Inpatient treatment course: as above  New medications: IV daptomycin 500mg q48hrs  Labs to be followed outpatient: CK, ESR, CRP  Exam to be followed outpatient: lower back

## 2021-11-12 NOTE — PROGRESS NOTE ADULT - SUBJECTIVE AND OBJECTIVE BOX
CC: Patient is a 76y old  Male who presents with a chief complaint of sepsis likely 2/2 UTI (11 Nov 2021 22:01)      INTERVAL EVENTS: STEPHANIE    SUBJECTIVE / INTERVAL HPI: Patient seen and examined at bedside.     ROS: negative unless otherwise stated above.    VITAL SIGNS:  Vital Signs Last 24 Hrs  T(C): 36.7 (12 Nov 2021 05:18), Max: 37 (11 Nov 2021 22:37)  T(F): 98 (12 Nov 2021 05:18), Max: 98.6 (11 Nov 2021 22:37)  HR: 68 (12 Nov 2021 05:18) (68 - 95)  BP: 108/66 (12 Nov 2021 05:18) (108/66 - 157/79)  BP(mean): --  RR: 18 (12 Nov 2021 05:18) (18 - 19)  SpO2: 97% (12 Nov 2021 05:18) (97% - 99%)        PHYSICAL EXAM:    General: NAD  HEENT: MMM  Neck: supple  Cardiovascular: +S1/S2; RRR  Respiratory: CTA B/L; no W/R/R  Gastrointestinal: soft, NT/ND  Extremities: WWP; no edema, clubbing or cyanosis  Vascular: 2+ radial, DP/PT pulses B/L  Neurological: AAOx3; no focal deficits    MEDICATIONS:  MEDICATIONS  (STANDING):  DAPTOmycin IVPB 500 milliGRAM(s) IV Intermittent every 48 hours  influenza  Vaccine (HIGH DOSE) 0.7 milliLiter(s) IntraMuscular once    MEDICATIONS  (PRN):  acetaminophen     Tablet .. 650 milliGRAM(s) Oral every 6 hours PRN Temp greater or equal to 38C (100.4F), Mild Pain (1 - 3)  traMADol 25 milliGRAM(s) Oral every 6 hours PRN Severe Pain (7 - 10)      ALLERGIES:  Allergies    allopurinol (Other)    Intolerances        LABS:                        12.0   10.80 )-----------( 304      ( 11 Nov 2021 19:05 )             37.4     11-11    143  |  107  |  37<H>  ----------------------------<  111<H>  5.3   |  24  |  2.02<H>    Ca    11.0<H>      11 Nov 2021 19:05    TPro  7.2  /  Alb  3.8  /  TBili  0.2  /  DBili  x   /  AST  16  /  ALT  12  /  AlkPhos  204<H>  11-11    PT/INR - ( 11 Nov 2021 19:05 )   PT: 12.6 sec;   INR: 1.05          PTT - ( 11 Nov 2021 19:05 )  PTT:45.4 sec    CAPILLARY BLOOD GLUCOSE          RADIOLOGY & ADDITIONAL TESTS: Reviewed. CC: Patient is a 76y old  Male who presents with a chief complaint of sepsis likely 2/2 UTI (11 Nov 2021 22:01)      INTERVAL EVENTS: STEPHANIE    SUBJECTIVE / INTERVAL HPI: Patient seen and examined at bedside. Patient does not have any pain in his back this morning.     ROS: negative unless otherwise stated above.    VITAL SIGNS:  Vital Signs Last 24 Hrs  T(C): 36.7 (12 Nov 2021 05:18), Max: 37 (11 Nov 2021 22:37)  T(F): 98 (12 Nov 2021 05:18), Max: 98.6 (11 Nov 2021 22:37)  HR: 68 (12 Nov 2021 05:18) (68 - 95)  BP: 108/66 (12 Nov 2021 05:18) (108/66 - 157/79)  BP(mean): --  RR: 18 (12 Nov 2021 05:18) (18 - 19)  SpO2: 97% (12 Nov 2021 05:18) (97% - 99%)        PHYSICAL EXAM:    General: NAD  HEENT: MMM  Neck: supple  Cardiovascular: +S1/S2; RRR  Respiratory: CTA B/L; no W/R/R  Gastrointestinal: soft, NT/ND  Extremities: WWP; no edema, clubbing or cyanosis  Vascular: 2+ radial, DP/PT pulses B/L  Neurological: AAOx3; no focal deficits  MSK: Spine not tender to palpation    MEDICATIONS:  MEDICATIONS  (STANDING):  DAPTOmycin IVPB 500 milliGRAM(s) IV Intermittent every 48 hours  influenza  Vaccine (HIGH DOSE) 0.7 milliLiter(s) IntraMuscular once    MEDICATIONS  (PRN):  acetaminophen     Tablet .. 650 milliGRAM(s) Oral every 6 hours PRN Temp greater or equal to 38C (100.4F), Mild Pain (1 - 3)  traMADol 25 milliGRAM(s) Oral every 6 hours PRN Severe Pain (7 - 10)      ALLERGIES:  Allergies    allopurinol (Other)    Intolerances        LABS:                        12.0   10.80 )-----------( 304      ( 11 Nov 2021 19:05 )             37.4     11-11    143  |  107  |  37<H>  ----------------------------<  111<H>  5.3   |  24  |  2.02<H>    Ca    11.0<H>      11 Nov 2021 19:05    TPro  7.2  /  Alb  3.8  /  TBili  0.2  /  DBili  x   /  AST  16  /  ALT  12  /  AlkPhos  204<H>  11-11    PT/INR - ( 11 Nov 2021 19:05 )   PT: 12.6 sec;   INR: 1.05          PTT - ( 11 Nov 2021 19:05 )  PTT:45.4 sec    CAPILLARY BLOOD GLUCOSE          RADIOLOGY & ADDITIONAL TESTS: Reviewed.

## 2021-11-12 NOTE — DIETITIAN NUTRITION RISK NOTIFICATION - TREATMENT: THE FOLLOWING DIET HAS BEEN RECOMMENDED
1. NPO, adv diet when medically feasible, resume Renal restricted diet   2. monitor %PO intakes, rec Ensure Enlive 1xday (350 kcals, 20 g protein, 180 mls H2O)   3. Trend wts   4. Monitor BMP, BG q6hrs, renal indices, lytes, replete prn

## 2021-11-12 NOTE — DISCHARGE NOTE PROVIDER - DETAILS OF MALNUTRITION DIAGNOSIS/DIAGNOSES
This patient has been assessed with a concern for Malnutrition and was treated during this hospitalization for the following Nutrition diagnosis/diagnoses:     -  11/12/2021: Severe protein-calorie malnutrition   -  11/12/2021: Underweight (BMI < 19)

## 2021-11-12 NOTE — PROGRESS NOTE ADULT - SUBJECTIVE AND OBJECTIVE BOX
SUBJECTIVE: Pt seen and examined on morning rounds. No issues overnight. Not complaining of any pain or numbness this morning.    Vital Signs Last 24 Hrs  T(C): 36.7 (12 Nov 2021 05:18), Max: 37 (11 Nov 2021 22:37)  T(F): 98 (12 Nov 2021 05:18), Max: 98.6 (11 Nov 2021 22:37)  HR: 68 (12 Nov 2021 05:18) (68 - 95)  BP: 108/66 (12 Nov 2021 05:18) (108/66 - 157/79)  BP(mean): --  RR: 18 (12 Nov 2021 05:18) (18 - 19)  SpO2: 97% (12 Nov 2021 05:18) (97% - 99%)    Physical Exam:  General: NAD, resting comfortably in bed  Spine: Low back no TTP  Motor 5/5 L2-S1  SILT grossly L2-S1    Assessment/Plan:  76yM with L5-S1 Discitis admitted for IV Abx  - Weight Bearing Status: WBAT  - Pain control  - DVT PPx: as per primary  - IV ABx as per primary  - F/u AM labs    Jose Grijalva, PGY-2  Orthopedic Surgery

## 2021-11-12 NOTE — CONSULT NOTE ADULT - SUBJECTIVE AND OBJECTIVE BOX
LENGTH OF HOSPITAL STAY: 1d    CHIEF COMPLAINT:   Patient is a 76y old  Male who presents with a chief complaint of osteomyelitis (12 Nov 2021 07:05)    HISTORY OF PRESENTING ILLNESS:   77 y/o M w/ PMHx of spinal OM, Hemophilia A, HTN, CKD stage 4 (Hx of HD) with hx of DRESS syndrome presented to ED for worsening lower back pain x2-3 weeks and leukocytosis on blood work 11/10. Pt states the back pain is located across his lower back. Pain is worse with movement and improves when pt is not moving. Denies weakness in legs, saddle anesthesia, or B/B incontinence. Pt denies any fever, chills, chest pain, abd pain, n/v/d or urinary symptoms. 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     In ED, vitals: T 98.2, HR 95, /70, RR 19, 99% on RA  Labs signficant for wbc 10.8, Hb 12, PTT 43.4, BUN/Cr 37/2.02 (at baseline) Ca 11, alk phos 204  CXR: No acute infiltrates  EKG: NSR w/o acute ischemic changes  CT L-spine: IMPRESSION:  1.  Since 8/26/2021, there has been interval decrease in inflammatory changes centered at the L5-S1 disc space.  2.  Slight interval increase in dilatation of the stool-filled rectum, consistent with fecal impaction.  ED management: ID discussion with Dr. Barnard, plan for factor VIII replacement prior to picc line placement for abx   (11 Nov 2021 20:26)    PAST MEDICAL & SURGICAL HISTORY:  HTN (hypertension)  Hemophilia A  Gout  History of BPH  Chronic kidney disease (CKD)  DRESS syndrome  Uses cochlear implant    SOCIAL HISTORY:    ALLERGIES:  allopurinol (Other)    MEDICATIONS:  STANDING MEDICATIONS  DAPTOmycin IVPB 500 milliGRAM(s) IV Intermittent every 48 hours  influenza  Vaccine (HIGH DOSE) 0.7 milliLiter(s) IntraMuscular once    PRN MEDICATIONS  acetaminophen     Tablet .. 650 milliGRAM(s) Oral every 6 hours PRN  traMADol 25 milliGRAM(s) Oral every 6 hours PRN    VITALS:   T(F): 98.1  HR: 79  BP: 116/65  RR: 18  SpO2: 98%    LABS:                        11.9   8.90  )-----------( 277      ( 12 Nov 2021 08:42 )             38.7     11-12    142  |  108  |  37<H>  ----------------------------<  92  4.9   |  26  |  1.91<H>    Ca    10.8<H>      12 Nov 2021 08:42  Phos  2.7     11-12  Mg     2.0     11-12    TPro  6.5  /  Alb  3.5  /  TBili  0.3  /  DBili  x   /  AST  14  /  ALT  10  /  AlkPhos  176<H>  11-12    PT/INR - ( 12 Nov 2021 08:42 )   PT: 13.7 sec;   INR: 1.15       PTT - ( 12 Nov 2021 08:42 )  PTT:45.3 sec    Culture - Blood (collected 11 Nov 2021 19:32)  Source: .Blood Blood-Peripheral  Preliminary Report (12 Nov 2021 08:01):    No growth at 12 hours    Culture - Blood (collected 11 Nov 2021 19:32)  Source: .Blood Blood-Peripheral  Preliminary Report (12 Nov 2021 08:01):    No growth at 12 hours    RADIOLOGY:  Reviewed    PHYSICAL EXAM:  GEN: No acute distress  HEENT: NCAT  LUNGS: Clear to auscultation bilaterally   HEART: S1/S2 present. RRR.   ABD: Soft, non-tender, non-distended. Bowel sounds present  EXT: No pitting edema  NEURO: AAOX3

## 2021-11-12 NOTE — DIETITIAN INITIAL EVALUATION ADULT. - MALNUTRITION
Severe protein calorie malnutrition Severe PCM as evidenced by >29% wt loss >3 months, severe muscle wasting

## 2021-11-12 NOTE — PHYSICAL THERAPY INITIAL EVALUATION ADULT - ADDITIONAL COMMENTS
Pt lives in elevator building, no YURID[place], with wife and children. Pt uses a rollator ever since recent hospitalization

## 2021-11-12 NOTE — DIETITIAN INITIAL EVALUATION ADULT. - PROBLEM SELECTOR PLAN 1
Pt presenting with worsening lower back pain x2-3 weeks. No fever or chills however pt with increased wbc of 13.1 yesterday, 10.8 today. ESR and CRP uptrending. CT Lumbar spine today showing since 8/26/2021, there has been interval decrease in inflammatory changes centered at the L5-S1 disc space. Concern for persistent spinal OM. Pt had completed course of daptomycin 9/1-10/12 for MSSA bacteremia with NVE of MV c/b spinal OM (L5-S1)  - F/u bcx. Per Dr. Barnard, ID, will start daptomycin 500 mg q48h  - plan for single lumen picc line placement pending factor VIII levels and replacement  - f/u ID recs  - f/u ortho recs

## 2021-11-12 NOTE — PROGRESS NOTE ADULT - SUBJECTIVE AND OBJECTIVE BOX
Patient was seen and examined by me at bedside. I agree with resident's note, subjective, objective physical exam, assessment and plan with following modifications/additions.    Greater than 35 minutes spent on total encounter; more than 50% of the visit was spent counseling and/or coordinating care by the attending physician.    77 y/o M w/ PMHx of spinal OM, Hemophilia A, HTN, CKD stage 4 (Hx of HD) here with progressive low back pain c/f recurrent spinal OM, pending PICC line inpatient given hemophilia hx  #Spinal OM- dapto here, appreciate ID recs on dapto duration, PICC today (see below)  #Hemophilia hx, undergoing PICC- reviewed personally with heme team and blood bank director and family amenable to plan- DDAVP before PICC, monitor closely for bleeding, if issues can give factor replacement but based on level this am and since with APLS ab favored no additional factor replacement ahead of time. Will monitor 24-48 hours post PICC  #Stage IV CKD- improved sig from last visit, trend, monitor Na from DDAVP     #DVT Px- SCDs given bleeding risk  #Dispo- Goal Sat after PICC if stable    Pilo Gann MD [phone]

## 2021-11-12 NOTE — DISCHARGE NOTE PROVIDER - CARE PROVIDERS DIRECT ADDRESSES
,DirectAddress_Unknown ,DirectAddress_Unknown,[email or web address] ,DirectAddress_Unknown,[email or web address]

## 2021-11-12 NOTE — DISCHARGE NOTE PROVIDER - CARE PROVIDER_API CALL
Joyce Barnard)  Infectious Disease; Internal Medicine  [address] [place] [address]  Phone: [phone]  Fax: [phone]  Established Patient  Follow Up Time: 1 week   Joyce Barnard)  Infectious Disease; Internal Medicine  [address] [place] [address]  Phone: [phone]  Fax: [phone]  Established Patient  Scheduled Appointment: 11/18/2021 10:20 AM   Joyce Barnard)  Infectious Disease; Internal Medicine  [address] [place] [address]  Phone: [phone]  Fax: [phone]  Established Patient  Scheduled Appointment: 11/18/2021 10:20 AM    Tate Lama)  Orthopedics  [address] [address]  Phone: [phone]  Fax: [phone]  Follow Up Time: 1 month   Joyce Barnard)  Infectious Disease; Internal Medicine  [address] [place] [address]  Phone: [phone]  Fax: [phone]  Established Patient  Scheduled Appointment: 11/18/2021 10:20 AM    Tate Lama)  Orthopedics  [address] [id number]  Phone: [phone]  Fax: [phone]  Follow Up Time: 1 month    Sindhu Fischer)  Internal Medicine  [address] [place] [address]  Phone: [phone]  Fax: [phone]  Follow Up Time: 2 weeks

## 2021-11-12 NOTE — DIETITIAN INITIAL EVALUATION ADULT. - OTHER INFO
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 Pt currently NPO.    On assessment, pt seen resting in bed. Per pt report, eats pancakes, [place]ian toast and tea for breakfast, a light sandwich (reports eating 1 slice of bread with sausage) for lunch and drinks stew/soup for dinner. He reports that this his portion sizes are of medium servings, not too large or small. Though pt acknowledges that his appetite has decreased specially in the past 2 weeks, and has noticed that he hasn't been consuming meat d/t dislike of taste. Reports UBW of 155 lbs, current wt of 110 lbs, indicates wt loss of 45 lbs which he reports occurred in 2-3 months. Also noted in prev adm in August 2021, adm wt of 128 lbs, wt loss likely occurring >3 months. Discussed that current intake is likely not meeting estimated needs, effecting wt loss and importance of increased intake to meet kcal/protein needs. Pt reports having some ONS at home, unsure of quantity that he is able to drink but amenable to trial ONS at current adm. NFPE performed, noted mod-severe muscle wasting. No cultural, ethnic, Jain food preferences noted. NKFA. Pt denies pain, no reported n/v/d. No BM at current adm yet. Skin: luis 15, pressure wise skin intact. RD to follow per protocol. Please see below for nutr recs.

## 2021-11-12 NOTE — CONSULT NOTE ADULT - ASSESSMENT
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    #) DIAGNOSIS  - Hemophilia A, requiring rVIII perioperatively, no inhibitor to Factor VIII (last assessed on 08/27/21)  - Antiphospholipid antibody positivity, no history of thrombophilia  - Anemia of chronic/renal disease     #) PLAN  - Repeat Lupus profile given persistently prolonged aPTT to assess for previously detected antiphospholipid antibodies in the setting of Hemophilia A  - Given concurrent hemophilia and possible thrombophilic state, he may be at increased risk of clotting in the setting of Factor VIII administration. Factor VIII is currently measured at 41% which is reasonable for PICC line placement. Repeat administration of Factor VIII can increase risk of inhibitor against Factor VIII itself, which will complicate future procedures and therapies for bleeding. **INCOMPLETE Will follow-up with Dr. Guzmán for final recommendations regarding Factor VIII administration   - Repeat Factor VIII level tomorrow  - Daughter would like to follow-up with a [facility] Hematologist. Can follow-up with Dr. Sindhu Fischer in 2-3 weeks upon discharge.     Discussed with Dr. 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    #) DIAGNOSIS  - Hemophilia A, requiring rVIII perioperatively, no inhibitor to Factor VIII (last assessed on 08/27/21)  - Antiphospholipid antibody positivity, no history of thrombophilia  - Anemia of chronic/renal disease     #) PLAN  - Repeat Lupus profile given persistently prolonged aPTT to assess for previously detected antiphospholipid antibodies in the setting of Hemophilia A  - Given concurrent hemophilia and possible thrombophilic state, he may be at increased risk of clotting in the setting of Factor VIII administration. Factor VIII is currently measured at 41% which is reasonable for PICC line placement. Repeat administration of Factor VIII can increase risk of inhibitor against Factor VIII itself, which will complicate future procedures and therapies for bleeding.  - Give 0.3 mcg/kg IV DDAVP today 30-60 min prior to PICC line procedure. Will administer recombinant Factor VIII in the setting of a bleed. Repeat Factor VIII testing prior to procedure to assess for Factor VIII response, no need to delay procedure for this test to be resulted.   - Repeat Factor VIII level tomorrow  - Daughter would like to follow-up with a [facility] Hematologist. Can follow-up with Dr. Sindhu Fischer in 2-3 weeks upon discharge.   - Send vWF Ag and vWF Ab to assess for concurrent von Willebrand disease  - Maintain active T+S, transfuse if Hb < 7 or if actively bleeding    Discussed with Dr. Lynne

## 2021-11-13 ENCOUNTER — TRANSCRIPTION ENCOUNTER (OUTPATIENT)
Age: 76
End: 2021-11-13

## 2021-11-13 VITALS
DIASTOLIC BLOOD PRESSURE: 72 MMHG | RESPIRATION RATE: 16 BRPM | TEMPERATURE: 98 F | HEART RATE: 78 BPM | OXYGEN SATURATION: 99 % | SYSTOLIC BLOOD PRESSURE: 124 MMHG

## 2021-11-13 LAB
ALBUMIN SERPL ELPH-MCNC: 3 G/DL — LOW (ref 3.3–5)
ALP SERPL-CCNC: 160 U/L — HIGH (ref 40–120)
ALT FLD-CCNC: 9 U/L — LOW (ref 10–45)
ANION GAP SERPL CALC-SCNC: 10 MMOL/L — SIGNIFICANT CHANGE UP (ref 5–17)
APTT BLD: 44.2 SEC — HIGH (ref 27.5–35.5)
AST SERPL-CCNC: 14 U/L — SIGNIFICANT CHANGE UP (ref 10–40)
BASOPHILS # BLD AUTO: 0.07 K/UL — SIGNIFICANT CHANGE UP (ref 0–0.2)
BASOPHILS NFR BLD AUTO: 0.7 % — SIGNIFICANT CHANGE UP (ref 0–2)
BILIRUB SERPL-MCNC: 0.2 MG/DL — SIGNIFICANT CHANGE UP (ref 0.2–1.2)
BUN SERPL-MCNC: 34 MG/DL — HIGH (ref 7–23)
CALCIUM SERPL-MCNC: 10.4 MG/DL — SIGNIFICANT CHANGE UP (ref 8.4–10.5)
CHLORIDE SERPL-SCNC: 110 MMOL/L — HIGH (ref 96–108)
CK SERPL-CCNC: 14 U/L — LOW (ref 30–200)
CO2 SERPL-SCNC: 22 MMOL/L — SIGNIFICANT CHANGE UP (ref 22–31)
COVID-19 NUCLEOCAPSID GAM AB INTERP: NEGATIVE — SIGNIFICANT CHANGE UP
COVID-19 NUCLEOCAPSID TOTAL GAM ANTIBODY RESULT: 0.07 INDEX — SIGNIFICANT CHANGE UP
CREAT SERPL-MCNC: 1.95 MG/DL — HIGH (ref 0.5–1.3)
EOSINOPHIL # BLD AUTO: 0.38 K/UL — SIGNIFICANT CHANGE UP (ref 0–0.5)
EOSINOPHIL NFR BLD AUTO: 3.9 % — SIGNIFICANT CHANGE UP (ref 0–6)
GLUCOSE SERPL-MCNC: 97 MG/DL — SIGNIFICANT CHANGE UP (ref 70–99)
HCT VFR BLD CALC: 34.7 % — LOW (ref 39–50)
HGB BLD-MCNC: 11.1 G/DL — LOW (ref 13–17)
IMM GRANULOCYTES NFR BLD AUTO: 0.3 % — SIGNIFICANT CHANGE UP (ref 0–1.5)
INR BLD: 1.15 — SIGNIFICANT CHANGE UP (ref 0.88–1.16)
LYMPHOCYTES # BLD AUTO: 1.41 K/UL — SIGNIFICANT CHANGE UP (ref 1–3.3)
LYMPHOCYTES # BLD AUTO: 14.6 % — SIGNIFICANT CHANGE UP (ref 13–44)
MAGNESIUM SERPL-MCNC: 2 MG/DL — SIGNIFICANT CHANGE UP (ref 1.6–2.6)
MCHC RBC-ENTMCNC: 27.4 PG — SIGNIFICANT CHANGE UP (ref 27–34)
MCHC RBC-ENTMCNC: 32 GM/DL — SIGNIFICANT CHANGE UP (ref 32–36)
MCV RBC AUTO: 85.7 FL — SIGNIFICANT CHANGE UP (ref 80–100)
MONOCYTES # BLD AUTO: 0.79 K/UL — SIGNIFICANT CHANGE UP (ref 0–0.9)
MONOCYTES NFR BLD AUTO: 8.2 % — SIGNIFICANT CHANGE UP (ref 2–14)
NEUTROPHILS # BLD AUTO: 7.01 K/UL — SIGNIFICANT CHANGE UP (ref 1.8–7.4)
NEUTROPHILS NFR BLD AUTO: 72.3 % — SIGNIFICANT CHANGE UP (ref 43–77)
NRBC # BLD: 0 /100 WBCS — SIGNIFICANT CHANGE UP (ref 0–0)
PHOSPHATE SERPL-MCNC: 2.6 MG/DL — SIGNIFICANT CHANGE UP (ref 2.5–4.5)
PLATELET # BLD AUTO: 279 K/UL — SIGNIFICANT CHANGE UP (ref 150–400)
POTASSIUM SERPL-MCNC: 5.1 MMOL/L — SIGNIFICANT CHANGE UP (ref 3.5–5.3)
POTASSIUM SERPL-SCNC: 5.1 MMOL/L — SIGNIFICANT CHANGE UP (ref 3.5–5.3)
PROT SERPL-MCNC: 6.1 G/DL — SIGNIFICANT CHANGE UP (ref 6–8.3)
PROTHROM AB SERPL-ACNC: 13.7 SEC — HIGH (ref 10.6–13.6)
RBC # BLD: 4.05 M/UL — LOW (ref 4.2–5.8)
RBC # FLD: 14.8 % — HIGH (ref 10.3–14.5)
SARS-COV-2 IGG+IGM SERPL QL IA: 0.07 INDEX — SIGNIFICANT CHANGE UP
SARS-COV-2 IGG+IGM SERPL QL IA: NEGATIVE — SIGNIFICANT CHANGE UP
SODIUM SERPL-SCNC: 142 MMOL/L — SIGNIFICANT CHANGE UP (ref 135–145)
WBC # BLD: 9.69 K/UL — SIGNIFICANT CHANGE UP (ref 3.8–10.5)
WBC # FLD AUTO: 9.69 K/UL — SIGNIFICANT CHANGE UP (ref 3.8–10.5)

## 2021-11-13 PROCEDURE — 76937 US GUIDE VASCULAR ACCESS: CPT | Mod: 26,59

## 2021-11-13 PROCEDURE — 99239 HOSP IP/OBS DSCHRG MGMT >30: CPT

## 2021-11-13 PROCEDURE — 36573 INSJ PICC RS&I 5 YR+: CPT

## 2021-11-13 RX ORDER — SODIUM CHLORIDE 9 MG/ML
10 INJECTION INTRAMUSCULAR; INTRAVENOUS; SUBCUTANEOUS
Refills: 0 | Status: DISCONTINUED | OUTPATIENT
Start: 2021-11-13 | End: 2021-11-13

## 2021-11-13 RX ORDER — CHLORHEXIDINE GLUCONATE 213 G/1000ML
1 SOLUTION TOPICAL
Refills: 0 | Status: DISCONTINUED | OUTPATIENT
Start: 2021-11-13 | End: 2021-11-13

## 2021-11-13 RX ADMIN — DAPTOMYCIN 120 MILLIGRAM(S): 500 INJECTION, POWDER, LYOPHILIZED, FOR SOLUTION INTRAVENOUS at 16:43

## 2021-11-13 RX ADMIN — DESMOPRESSIN ACETATE 215 MICROGRAM(S): 0.1 TABLET ORAL at 10:08

## 2021-11-13 NOTE — PROGRESS NOTE ADULT - PROBLEM SELECTOR PLAN 4
BUN/Cr 37/2.02. GFR 32, Improved from previous baseline.   - avoid nephrotoxic drugs.
BUN/Cr 37/2.02. GFR 32, Improved from previous baseline.   - avoid nephrotoxic drugs.

## 2021-11-13 NOTE — PROGRESS NOTE ADULT - PROBLEM SELECTOR PLAN 1
Pt presenting with worsening lower back pain x2-3 weeks. No fever or chills. WBC downtrended  - F/u bcx. Per Dr. Barnard, ID, will start daptomycin 500 mg q48h (due at 9pm tonight)  - plan for single lumen picc line placement today with DDAVP 1 hour before placement  - f/u ID recs  - f/u ortho recs.
Pt presenting with worsening lower back pain x2-3 weeks. No fever or chills however pt with increased wbc of 10.8 yesterday, 8.9 today. ESR and CRP uptrending. CT Lumbar spine today showing since 8/26/2021, there has been interval decrease in inflammatory changes centered at the L5-S1 disc space. Concern for persistent spinal OM. Pt had completed course of daptomycin 9/1-10/12 for MSSA bacteremia with NVE of MV c/b spinal OM (L5-S1)  - F/u bcx. Per Dr. Barnard, ID, will start daptomycin 500 mg q48h  - plan for single lumen picc line placement pending factor VIII levels and replacement  - f/u ID recs  - f/u ortho recs.

## 2021-11-13 NOTE — PROGRESS NOTE ADULT - SUBJECTIVE AND OBJECTIVE BOX
SUBJECTIVE: Pt seen and examined on morning rounds. No issues overnight. Not complaining of any pain or numbness this morning. Only has some pain with ambulation.    Vital Signs Last 24 Hrs  T(C): 36.9 (13 Nov 2021 05:24), Max: 37.1 (12 Nov 2021 21:45)  T(F): 98.5 (13 Nov 2021 05:24), Max: 98.7 (12 Nov 2021 21:45)  HR: 985 (13 Nov 2021 05:24) (75 - 985)  BP: 130/79 (13 Nov 2021 05:24) (113/60 - 130/79)  BP(mean): --  RR: 17 (13 Nov 2021 05:24) (17 - 18)  SpO2: 99% (13 Nov 2021 05:24) (96% - 99%)    Physical Exam:  General: NAD, resting comfortably in bed  Spine: Low back no TTP  Motor 5/5 L2-S1  SILT grossly L2-S1    Assessment/Plan:  76yM with L5-S1 Discitis admitted for IV Abx  - Weight Bearing Status: WBAT  - LSO brace delivered  - Pain control  - DVT PPx: as per primary  - IV ABx as per primary  - F/u AM labs  - Orthopedics will sign off at this time, please have pt f/u with Dr. Lama in office in 4 weeks    Jose Grijalva, PGY-2  Orthopedic Surgery

## 2021-11-13 NOTE — CONSULT NOTE ADULT - SUBJECTIVE AND OBJECTIVE BOX
Vascular Access Service Consult Note    [facility]    Diagnosis: osteomyelitis    Indications for Vascular Access (Check all that apply)  [ x ]  Antibiotic Therapy       Antibiotic Prescribed: daptomycin x 6 weeks                                                                                   [  ]  IV Hydration  [  ]  Total Parenteral Nutrition  [  ]  Chemotherapy  [  ]  Difficult Venous Access  [  ]  CVP monitoring  [  ]  Medications with high potential for tissue necrosis on extravasation  [  ]  Other    Screening (Check all that apply)  Previous Radiation to chest  [  ] Yes      [ x ]  No  Breast Cancer                          [  ] Left     [  ]  Right    [ x ]  No  Lymph Node Dissection         [  ] Left     [  ]  Right    [x  ]  No  Pacemaker or ICD                   [  ] Left     [  ]  Right    [ x ]  No  Upper Extremity DVT             [  ] Left     [  ]  Right    [x  ]  No  Chronic Kidney Disease         [  ]  Yes     [ x ]  No  Hemodialysis                           [  ]  Yes     [ x ]  No  AV Fistula/ Graft                     [  ]  Left    [  ]  Right    [ x ]  No  Temp>101F in past 24 H       [  ]  Yes     [  ]  No  H/O PICC/Midline                   [x  ]  Yes     [  ]  No    Lab data:                        11.9   8.90  )-----------( 277      ( 12 Nov 2021 08:42 )             38.7     11-12    142  |  108  |  37<H>  ----------------------------<  92  4.9   |  26  |  1.91<H>    Ca    10.8<H>      12 Nov 2021 08:42  Phos  2.7     11-12  Mg     2.0     11-12    TPro  6.5  /  Alb  3.5  /  TBili  0.3  /  DBili  x   /  AST  14  /  ALT  10  /  AlkPhos  176<H>  11-12    PT/INR - ( 12 Nov 2021 08:42 )   PT: 13.7 sec;   INR: 1.15          PTT - ( 12 Nov 2021 08:42 )  PTT:45.3 sec          I have reviewed the chart, interviewed and examined the patient and determined that this patient:  [ x ] Is a candidate for a PICC line  [  ] Is a candidate for a Midline  [  ] Is not a candidate for vascular access device (reason)    Lumens:    [ x ] Single  [  ] Double

## 2021-11-13 NOTE — PROGRESS NOTE ADULT - NUTRITIONAL ASSESSMENT
This patient has been assessed with a concern for Malnutrition and has been determined to have a diagnosis/diagnoses of Severe protein-calorie malnutrition and Underweight (BMI < 19).    This patient is being managed with:   Diet NPO-  Entered: Nov 12 2021  1:40AM    
This patient has been assessed with a concern for Malnutrition and has been determined to have a diagnosis/diagnoses of Severe protein-calorie malnutrition and Underweight (BMI < 19).    This patient is being managed with:   Diet Renal Restrictions-  For patients receiving Renal Replacement - No Protein Restr No Conc K No Conc Phos Low Sodium  Entered: Nov 12 2021  3:04PM

## 2021-11-13 NOTE — PROGRESS NOTE ADULT - ASSESSMENT
75 y/o M w/ PMHx of spinal OM, Hemophilia A, HTN, CKD stage 4 (Hx of HD), gout with Hx of DRESS syndrome presented to ED for worsening lower back pain x2-3 weeks and leukocytosis on blood work yesterday. Pt admitted due to concern for persistent spinal OM and initiation of IV abx.   
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    #) DIAGNOSIS  - Hemophilia A, requiring rVIII perioperatively, no inhibitor to Factor VIII (last assessed on 08/27/21)  - Antiphospholipid antibody positivity, no history of thrombophilia  - Anemia of chronic/renal disease     #) PLAN  - Repeat Lupus profile given persistently prolonged aPTT to assess for previously detected antiphospholipid antibodies in the setting of Hemophilia A  - Given concurrent hemophilia and possible thrombophilic state, he may be at increased risk of clotting in the setting of Factor VIII administration. Factor VIII was measured at 41% which is reasonable for PICC line placement. Repeat administration of Factor VIII can increase risk of inhibitor against Factor VIII itself, which will complicate future procedures and therapies for bleeding.  - s/p 0.3 mcg/kg IV DDAVP today 30-60 min prior to PICC line procedure. Will administer recombinant Factor VIII in the setting of a bleed. Repeat Factor VIII testing prior to procedure to assess for Factor VIII response, no need to delay procedure for this test to be resulted.   - Factor VIII after procedure was drawn at 4 pm   - Daughter would like to follow-up with a [facility] Hematologist. Can follow-up with Dr. Sindhu Fischer in 2-3 weeks upon discharge.   - Will need to send vWF Ag and vWF Ab to assess for concurrent von Willebrand disease  - Maintain active T+S, transfuse if Hb < 7 or if actively bleeding    Discussed with Dr. Lynne 
75 y/o M w/ PMHx of spinal OM, Hemophilia A, HTN, CKD stage 4 (Hx of HD), gout with Hx of DRESS syndrome presented to ED for worsening lower back pain x2-3 weeks and leukocytosis on blood work yesterday. Pt admitted due to concern for persistent spinal OM and initiation of IV abx.

## 2021-11-13 NOTE — PROGRESS NOTE ADULT - PROBLEM SELECTOR PLAN 5
Hb 12, at baseline. No s/s of bleeding. Likely 2/2 to anemia of chronic disease, CKD.
Hb 12, at baseline. No s/s of bleeding. Likely 2/2 to anemia of chronic disease, CKD.

## 2021-11-13 NOTE — DISCHARGE NOTE NURSING/CASE MANAGEMENT/SOCIAL WORK - NSDCFUADDAPPT_GEN_ALL_CORE_FT
Please bring your Insurance card, Photo ID, Covid-19 vaccination card (if applicable) and Discharge paperwork to the following appointment:    (1) Please follow up with your Infectious Disease Provider, Dr. Joyce Barnard at [address] [place] [address], [address] [place] [address] on 11/18/2021 at 10:20am.    Appointment was scheduled by Ms. ARNAUD Acosta, Referral Coordinator.

## 2021-11-13 NOTE — DISCHARGE NOTE NURSING/CASE MANAGEMENT/SOCIAL WORK - PATIENT PORTAL LINK FT
You can access the FollowMyHealth Patient Portal offered by [facility] by registering at the following website: [email or web address]. By joining [email or web address] FollowMyHealth portal, you will also be able to view your health information using other applications (apps) compatible with our system.

## 2021-11-13 NOTE — PROGRESS NOTE ADULT - SUBJECTIVE AND OBJECTIVE BOX
LENGTH OF HOSPITAL STAY: 2d    SUBJECTIVE: No acute bleeding with PICC line insertion.     HISTORY OF PRESENTING ILLNESS:   77 y/o M w/ PMHx of spinal OM, Hemophilia A, HTN, CKD stage 4 (Hx of HD) with hx of DRESS syndrome presented to ED for worsening lower back pain x2-3 weeks and leukocytosis on blood work 11/10. Pt states the back pain is located across his lower back. Pain is worse with movement and improves when pt is not moving. Denies weakness in legs, saddle anesthesia, or B/B incontinence. Pt denies any fever, chills, chest pain, abd pain, n/v/d or urinary symptoms. 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     In ED, vitals: T 98.2, HR 95, /70, RR 19, 99% on RA  Labs signficant for wbc 10.8, Hb 12, PTT 43.4, BUN/Cr 37/2.02 (at baseline) Ca 11, alk phos 204  CXR: No acute infiltrates  EKG: NSR w/o acute ischemic changes  CT L-spine: IMPRESSION:  1.  Since 8/26/2021, there has been interval decrease in inflammatory changes centered at the L5-S1 disc space.  2.  Slight interval increase in dilatation of the stool-filled rectum, consistent with fecal impaction.  ED management: ID discussion with Dr. Barnard, plan for factor VIII replacement prior to picc line placement for abx   (11 Nov 2021 20:26)    PAST MEDICAL & SURGICAL HISTORY:  HTN (hypertension)  Hemophilia A  Gout  History of BPH  Chronic kidney disease (CKD)  DRESS syndrome  Uses cochlear implant    ALLERGIES:  allopurinol (Other)    MEDICATIONS:  STANDING MEDICATIONS  chlorhexidine 2% Cloths 1 Application(s) Topical <User Schedule>  DAPTOmycin IVPB 500 milliGRAM(s) IV Intermittent every 48 hours  influenza  Vaccine (HIGH DOSE) 0.7 milliLiter(s) IntraMuscular once    PRN MEDICATIONS  acetaminophen     Tablet .. 650 milliGRAM(s) Oral every 6 hours PRN  sodium chloride 0.9% lock flush 10 milliLiter(s) IV Push every 1 hour PRN  traMADol 25 milliGRAM(s) Oral every 6 hours PRN    VITALS:   T(F): 97.8  HR: 78  BP: 124/72  RR: 16  SpO2: 99%    LABS:                        11.1   9.69  )-----------( 279      ( 13 Nov 2021 08:49 )             34.7     11-13    142  |  110<H>  |  34<H>  ----------------------------<  97  5.1   |  22  |  1.95<H>    Ca    10.4      13 Nov 2021 08:40  Phos  2.6     11-13  Mg     2.0     11-13    TPro  6.1  /  Alb  3.0<L>  /  TBili  0.2  /  DBili  x   /  AST  14  /  ALT  9<L>  /  AlkPhos  160<H>  11-13    PT/INR - ( 13 Nov 2021 08:29 )   PT: 13.7 sec;   INR: 1.15       PTT - ( 13 Nov 2021 08:29 )  PTT:44.2 sec    Creatine Kinase, Serum: 14 U/L *L* (11-13-21 @ 08:40)    Culture - Blood (collected 11 Nov 2021 19:32)  Source: .Blood Blood-Peripheral  Preliminary Report (12 Nov 2021 20:00):    No growth at 1 day.    Culture - Blood (collected 11 Nov 2021 19:32)  Source: .Blood Blood-Peripheral  Preliminary Report (12 Nov 2021 20:00):    No growth at 1 day.    CARDIAC MARKERS ( 13 Nov 2021 08:40 )  x     / x     / 14 U/L / x     / x        RADIOLOGY:  Reviewed    PHYSICAL EXAM:  GEN: No acute distress  HEENT: NCAT  LUNGS: Clear to auscultation bilaterally   HEART: S1/S2 present. RRR.   ABD: Soft, non-tender, non-distended. Bowel sounds present  EXT: No pitting edema  NEURO: AAOX3

## 2021-11-13 NOTE — PROGRESS NOTE ADULT - SUBJECTIVE AND OBJECTIVE BOX
CC: Patient is a 76y old  Male who presents with a chief complaint of Back osteomyelitis (12 Nov 2021 15:50)      INTERVAL EVENTS: STEPHANIE    SUBJECTIVE / INTERVAL HPI: Patient seen and examined at bedside. No complaints today. No fevers, chills and no back pain. wants to go home.     ROS: negative unless otherwise stated above.    VITAL SIGNS:  Vital Signs Last 24 Hrs  T(C): 36.9 (13 Nov 2021 05:24), Max: 37.1 (12 Nov 2021 21:45)  T(F): 98.5 (13 Nov 2021 05:24), Max: 98.7 (12 Nov 2021 21:45)  HR: 85 (13 Nov 2021 05:24) (75 - 85)  BP: 130/79 (13 Nov 2021 05:24) (113/60 - 130/79)  BP(mean): --  RR: 17 (13 Nov 2021 05:24) (17 - 18)  SpO2: 99% (13 Nov 2021 05:24) (96% - 99%)        PHYSICAL EXAM:    General: NAD  HEENT: MMM  Neck: supple  Cardiovascular: +S1/S2; RRR  Respiratory: CTA B/L; no W/R/R  Gastrointestinal: soft, NT/ND  Extremities: WWP; no edema, clubbing or cyanosis  Vascular: 2+ radial, DP/PT pulses B/L  Neurological: AAOx3; no focal deficits    MEDICATIONS:  MEDICATIONS  (STANDING):  DAPTOmycin IVPB 500 milliGRAM(s) IV Intermittent every 48 hours  influenza  Vaccine (HIGH DOSE) 0.7 milliLiter(s) IntraMuscular once    MEDICATIONS  (PRN):  acetaminophen     Tablet .. 650 milliGRAM(s) Oral every 6 hours PRN Temp greater or equal to 38C (100.4F), Mild Pain (1 - 3)  desmopressin IVPB 15 MICROGram(s) IV Intermittent once PRN PICC procedure  traMADol 25 milliGRAM(s) Oral every 6 hours PRN Severe Pain (7 - 10)      ALLERGIES:  Allergies    allopurinol (Other)    Intolerances        LABS:                        11.1   9.69  )-----------( 279      ( 13 Nov 2021 08:49 )             34.7     11-13    142  |  110<H>  |  34<H>  ----------------------------<  97  5.1   |  22  |  1.95<H>    Ca    10.4      13 Nov 2021 08:40  Phos  2.6     11-13  Mg     2.0     11-13    TPro  6.1  /  Alb  3.0<L>  /  TBili  0.2  /  DBili  x   /  AST  14  /  ALT  9<L>  /  AlkPhos  160<H>  11-13    PT/INR - ( 13 Nov 2021 08:29 )   PT: 13.7 sec;   INR: 1.15          PTT - ( 13 Nov 2021 08:29 )  PTT:44.2 sec    CAPILLARY BLOOD GLUCOSE          RADIOLOGY & ADDITIONAL TESTS: Reviewed.

## 2021-11-13 NOTE — PROGRESS NOTE ADULT - PROBLEM SELECTOR PLAN 3
pain control with tylenol prn for mild-moderate and tramadol prn for severe pain.
pain control with tylenol prn for mild-moderate and tramadol prn for severe pain.

## 2021-11-15 LAB
FACT VIII ACT/NOR PPP: 30 % — LOW (ref 51–138)
FACT VIII ACT/NOR PPP: 35 % — LOW (ref 51–138)

## 2021-11-16 ENCOUNTER — APPOINTMENT (OUTPATIENT)
Dept: NEPHROLOGY | Facility: CLINIC | Age: 76
End: 2021-11-16
Payer: MEDICARE

## 2021-11-16 DIAGNOSIS — N11.9 CHRONIC TUBULO-INTERSTITIAL NEPHRITIS, UNSPECIFIED: ICD-10-CM

## 2021-11-16 LAB
CULTURE RESULTS: SIGNIFICANT CHANGE UP
CULTURE RESULTS: SIGNIFICANT CHANGE UP
SPECIMEN SOURCE: SIGNIFICANT CHANGE UP
SPECIMEN SOURCE: SIGNIFICANT CHANGE UP

## 2021-11-16 PROCEDURE — 99214 OFFICE O/P EST MOD 30 MIN: CPT | Mod: 95

## 2021-11-17 LAB
CONFIRM APTT STACLOT: NEGATIVE — SIGNIFICANT CHANGE UP
DRVVT SCREEN TO CONFIRM RATIO: SIGNIFICANT CHANGE UP
LA NT DPL PPP QL: 27.4 SEC — SIGNIFICANT CHANGE UP

## 2021-11-18 ENCOUNTER — APPOINTMENT (OUTPATIENT)
Dept: INFECTIOUS DISEASE | Facility: CLINIC | Age: 76
End: 2021-11-18
Payer: MEDICARE

## 2021-11-18 DIAGNOSIS — N18.4 CHRONIC KIDNEY DISEASE, STAGE 4 (SEVERE): ICD-10-CM

## 2021-11-18 DIAGNOSIS — M46.27 OSTEOMYELITIS OF VERTEBRA, LUMBOSACRAL REGION: ICD-10-CM

## 2021-11-18 DIAGNOSIS — D63.1 ANEMIA IN CHRONIC KIDNEY DISEASE: ICD-10-CM

## 2021-11-18 DIAGNOSIS — Z41.8 ENCOUNTER FOR OTHER PROCEDURES FOR PURPOSES OTHER THAN REMEDYING HEALTH STATE: ICD-10-CM

## 2021-11-18 DIAGNOSIS — I12.9 HYPERTENSIVE CHRONIC KIDNEY DISEASE WITH STAGE 1 THROUGH STAGE 4 CHRONIC KIDNEY DISEASE, OR UNSPECIFIED CHRONIC KIDNEY DISEASE: ICD-10-CM

## 2021-11-18 DIAGNOSIS — D72.829 ELEVATED WHITE BLOOD CELL COUNT, UNSPECIFIED: ICD-10-CM

## 2021-11-18 DIAGNOSIS — M10.9 GOUT, UNSPECIFIED: ICD-10-CM

## 2021-11-18 DIAGNOSIS — E43 UNSPECIFIED SEVERE PROTEIN-CALORIE MALNUTRITION: ICD-10-CM

## 2021-11-18 DIAGNOSIS — M54.50 LOW BACK PAIN, UNSPECIFIED: ICD-10-CM

## 2021-11-18 DIAGNOSIS — D66 HEREDITARY FACTOR VIII DEFICIENCY: ICD-10-CM

## 2021-11-18 DIAGNOSIS — M46.37: ICD-10-CM

## 2021-11-18 PROBLEM — N11.9 INTERSTITIAL NEPHRITIS CHRONIC: Status: ACTIVE | Noted: 2021-11-18

## 2021-11-18 PROCEDURE — 99442: CPT | Mod: 95

## 2021-11-18 NOTE — HISTORY OF PRESENT ILLNESS
[FreeTextEntry1] : 75 yo man with oligoanuric RU April, 202, dialysis dependent, from AIN-DRES syndrome from allopurinol. Very slow but steady improvement of his urine output and creatinine and was able to come off dialys late October. Was admitted to [facility] with bacteremia at that time. - possible endocarditis and completed a 6 week course of daptomycin. Also with probable osteomyelitis back/spine.  Saw ortho and ID was it was decided that he should complete an additional 6 weeks of daptomycin as WBC, ESR and CRP all were rising.- PICC in place and receiving home IVAbx\par latest creat 2.19; K 5.0, CO2 19\par No HTN. No prescription medications other than the antibiotic,  B12 and folate\par was in patient [facility] 11/18-11/20 for PICC line\par  syst range\par appetite not back to his known baseline but is able to consume 3-4 ensure bottles daily- encouragement provided and offered that he had no dietary restrictions for the time being.\par Denies flank pain, dysuria, hematuria or frothy urine \par No SOB, CP\par

## 2021-11-18 NOTE — REASON FOR VISIT
[Home] : at home, [unfilled] , at the time of the visit. [Medical Office: ([facility])___] : at the medical office located in  [Spouse] : spouse [Family Member] : family member [Other:____] : [unfilled] [Verbal consent obtained from patient] : the patient, [unfilled] [Follow-Up] : a follow-up visit [FreeTextEntry1] : CKD, RU

## 2021-11-18 NOTE — ASSESSMENT
[FreeTextEntry1] : 75 yo man recovered from dialysis dependent AIN-  CKD 3-\par home BP, volume status okay- per daughter (she is a PA)\par c/w present regimen and ad lesa diet\par track potassium levels- needs exchanges of K rich foods, alternating them by day so as not to raise K \par will be getting frequent labs per ID-\par f/u 3 months but will review labs as they come in- being follow by ID and ortho\par

## 2021-11-19 NOTE — REASON FOR VISIT
[Follow-Up: _____] : a [unfilled] follow-up visit [Home] : at home, [unfilled] , at the time of the visit. [Medical Office: ([facility])___] : at the medical office located in  [Family Member] : family member [Verbal consent obtained from patient] : the patient, [unfilled] [FreeTextEntry1] : MSSA bacteremia, pericarditis

## 2021-11-19 NOTE — DATA REVIEWED
[FreeTextEntry1] : CT lumbar spine w/o con 11/11\par There is persistent cortical irregularity with erosive changes involving the endplates at the L5/S1 level with surrounding bony sclerosis with osteomyelitis. There remains pre and paravertebral soft tissue component as well as a ventral epidural soft tissue component which appears to have slightly improved. These findings would be better evaluated with a contrast study or MRI.\par \par NM gallium 9/1\par Impression:\par 1. No significant uptake within the mitral valve region.\par 2. Uptake within the lower lumbar spine likely corresponding to area of abnormality on prior CT lumbar spine 8/26/2021 at L5-S1 which is suggestive of discitis osteomyelitis.\par 3. Prominent uptake within the posterior mediastinum appears to correspond to mediastinal and right hilar lymph nodes which are nonspecific and may reflect infectious or inflammatory etiology.\par \par CT lumbar spine 8/26\par IMPRESSION:\par 1.  Since 2/21/2021, interval worsening severe height loss L5-S1, with new erosive endplate changes and prevertebral fat stranding. 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 Recommend correlation with ESR/CRP and consider further evaluation with MRI lumbar with and without contrast.\par 2.  Dilated stool-filled rectum, consistent fecal impaction.\par \par 9/7 GUANAKO\par CONCLUSIONS:\par  1. Limited GUANAKO performed for reassessment of mitral valve and intervalvular fibrosa.\par  2. Mitral valve is mildly thickened. There is prolapse and area of focal thickening of the anterior mitral leaflet. There is mild to moderate mitral regurgitation (2 jets are noted).\par  3. There is thickening around the aortic root and intervalvular fibrosa of unclear etiology; may reflect nonspecific thickening vs early abscess formation given the clinical scenario.\par  4. Tricuspid leaflets are mildly thickened. Pulmonic valve is not well visualized.\par  5. Aortic leaflets are mildly thickened. Trace to mild aortic insufficiency.\par  6. Moderate to severe plaque noted in the descending aorta and aortic arch.\par  7. Compared to the previous GUANAKO performed on 8/30/2021, visually the mitral valve and intervalvular fibrosa appear similar. On comparable views, mitral regurgitation may be slightly more pronounced but still mild to moderate in severity.\par \par GUANAKO 8/30\par CONCLUSIONS:\par  1. Technically difficult study.\par  2. Normal left and right ventricular size and systolic function.\par  3. No LA/RA/LISA/RAA thrombus seen.\par  4. No pericardial effusion.\par  5. There is mitral valve prolapse of the anterior leaflet.There is an irregularly shaped linear echodensity measuring 0.9 cm x 0.5 cm on the atrial north of the anterior leaflet of the mitral valve, highly suspicious for a vegetation. There is mild to moderate, eccentric, posteriorly directed mitral regurgitation.\par  6. The aortic valve is tricuspid and mildly calcified. There is trace aortic regurgitation.\par  7. There is thickening of the intervalvular fibrosa with systolic expansion - cannot rule out abscess formation.\par  8. There is severe non-mobile plaque seen in the visualized portion of the descending aorta. There is severe non-mobile plaque seen in the visualized portion of the aortic arch.\par  9. Compared to the previous GUANAKO performed on 2/26/2021, the mitral valve vegetation is new. The aortic root (intervalvular fibrosa) thickening is more prominent.\par 10. Suggest a repeat GUANAKO in 7-10 days to re-evaluate the mitral valve and intervalvular fibrosa, if clinically indicated.\par 11. Above findings were discussed with Dr. Hdialgo on 8/30/21\par \par TTE 8/26\par CONCLUSIONS:\par  1. Normal left and right ventricular size and systolic function.\par  2. The mitral valve is mildly thickened. There is mild bileaflet valve prolapse (anterior > posterior). There is trace mitral regurgitation.\par  3. Trace aortic regurgitation.\par  4. No evidence of pulmonary hypertension.\par  5. No pericardial effusion.\par

## 2021-11-19 NOTE — HISTORY OF PRESENT ILLNESS
[FreeTextEntry1] : 76M h/o CKD (off HD), Hemophilia A, DRESS Syndrome, MSSA NVE of MV (0.9x0.5cm) c/b spinal OM s/p IV daptomycin (9/1-10/12) with worsening LBP now on daptomycin (11/11-12/22) p/w management of LBP likely due to incompletely treated spinal OM. \par \par Patient was initially admitted from 8/25-9/9 for fever and LBP, found to have MSSA bacteremia due to CLABSI. HD cath removed. GUANAKO showed mitral valve prolapse and 0.9 x 0.5cm vegetation. Gallium scan 9/1 negative. CT lumbar spine showed discitis/OM at L5-S1 level. he was seen by CT surg Dr. Herring since GUANAKO finding also showed thickening of the intervalvular fibrosa with systolic expansion, cannot r/o abscess. Dr. Herring didn't think think it was abscess, and repeat GUANAKO obtained a week later, which showed similar finding. He was discharged home with 6 weeks of daptomycin (on 10/12).  After discharge, he completed 6 weeks of IV daptomycin and he had resolution of back pain.  However in the past few weeks, he started to have recurrent LBP which was worsening.  Also found to have new leukocytosis and uptrending CRP and ESR, c/f incompletely treated spinal OM.  He was never able to get MRI spine (due to cochlear implant) and cannot give CT lumbar spine w/ IV cont to r/o epidural abscess.  He was instructed to go to ED for PICC placement for restarting IV abx therapy and stayed at [facility] from 11/11-11/13.  He reported improvement of back pain after initiation of IV daptomycin.  He was discharged home with IV daptomycin 6 weeks course.\par \par Today due to technical difficulty, phone visit was done and daughter answered the conversation (patient doesn't speak English well).  Daughter said patient is doing better, LBP is improving, and tolerating IV daptomycin without side effects.  His wife is giving him supplemental shakes and patient is taking more food than before. Denied fever/chills, CP, SOB, n/v/d.

## 2021-11-19 NOTE — ASSESSMENT
[FreeTextEntry1] : 76M h/o CKD (off HD), Hemophilia A, DRESS Syndrome, MSSA NVE of MV (0.9x0.5cm) c/b spinal OM s/p IV daptomycin (9/1-10/12) with worsening LBP now on daptomycin (11/11-12/22) p/w management of LBP likely due to incompletely treated spinal OM. \par \par LBP is improving after restarting daptomycin. He may have epidural abscess which was not seen on CT scan w/o contrast.  Will do additional 6 weeks of IV abx.\par \par - cont IV daptomycin 400mg q48h \par - Duration of antibiotics is 6 weeks ( 11/11 - 12/22 )\par - plan to repeat TTE in 1/2022 with Dr. Smith \par - patient's cardiologist is Dr. Danny Smith (Card [phone]) at [facility],\par - daughter (Fax [phone])\par - he will need ppx abx prior to dental procedure (amoxicillin 2g PO x 1 30-60min prior to procedure)\par - RTC 2 weeks \par

## 2021-12-02 ENCOUNTER — NON-APPOINTMENT (OUTPATIENT)
Age: 76
End: 2021-12-02

## 2021-12-08 ENCOUNTER — APPOINTMENT (OUTPATIENT)
Dept: INFECTIOUS DISEASE | Facility: CLINIC | Age: 76
End: 2021-12-08
Payer: MEDICARE

## 2021-12-08 PROCEDURE — 99213 OFFICE O/P EST LOW 20 MIN: CPT | Mod: 95

## 2021-12-08 NOTE — ASSESSMENT
[FreeTextEntry1] : 76M h/o CKD (off HD), Hemophilia A, DRESS Syndrome, MSSA NVE of MV (0.9x0.5cm) c/b spinal OM s/p IV daptomycin (9/1-10/12) with worsening LBP now on daptomycin (11/11-12/22) p/w management of LBP likely due to incompletely treated spinal OM. \par \par LBP now resolved after restarting daptomycin, WBC and CRP normal.  He may have epidural abscess which was not seen on CT scan w/o contrast.  Will do empiric additional 6 weeks of IV dapto to treat presumed residual epidural infection. \par \par - cont IV daptomycin 400mg q48h \par - Duration of antibiotics is 6 weeks ( 11/11 - 12/23)\par - plan to repeat TTE in 1/2022 with Dr. Smith \par - patient's cardiologist is Dr. Danny Smith (Card [phone]) at [facility],\par - daughter (Fax [phone])\par - he will need ppx abx prior to dental procedure (amoxicillin 2g PO x 1 30-60min prior to procedure)\par - RTC 4 weeks \par - f/u Dr. Lama (ortho) \par

## 2021-12-08 NOTE — REASON FOR VISIT
[Follow-Up: _____] : a [unfilled] follow-up visit [Home] : at home, [unfilled] , at the time of the visit. [Medical Office: ([facility])___] : at the medical office located in  [Family Member] : family member [Verbal consent obtained from patient] : the patient, [unfilled] [FreeTextEntry1] : MSSA bacteremia, pericarditis

## 2021-12-08 NOTE — HISTORY OF PRESENT ILLNESS
[FreeTextEntry1] : 76M h/o CKD (off HD), Hemophilia A, DRESS Syndrome, MSSA NVE of MV (0.9x0.5cm) c/b spinal OM s/p IV daptomycin (9/1-10/12) with worsening LBP now on daptomycin (11/11-12/22) p/w management of LBP likely due to incompletely treated spinal OM. \par \par Patient was initially admitted from 8/25-9/9 for fever and LBP, found to have MSSA bacteremia due to CLABSI. HD cath removed. GUANAKO showed mitral valve prolapse and 0.9 x 0.5cm vegetation. Gallium scan 9/1 negative. CT lumbar spine showed discitis/OM at L5-S1 level. he was seen by CT surg Dr. Herring since GUANAKO finding also showed thickening of the intervalvular fibrosa with systolic expansion, cannot r/o abscess. Dr. Herring didn't think think it was abscess, and repeat GUANAKO obtained a week later, which showed similar finding. He was discharged home with 6 weeks of daptomycin (on 10/12).  After discharge, he completed 6 weeks of IV daptomycin and he had resolution of back pain.  However in the past few weeks, he started to have recurrent LBP which was worsening.  Also found to have new leukocytosis and uptrending CRP and ESR, c/f incompletely treated spinal OM.  He was never able to get MRI spine (due to cochlear implant) and cannot give CT lumbar spine w/ IV cont to r/o epidural abscess.  He was instructed to go to ED for PICC placement for restarting IV abx therapy and stayed at [facility] from 11/11-11/13.  He reported improvement of back pain after initiation of IV daptomycin.  He was discharged home with IV daptomycin 6 weeks course.\par \par Today daughter reported patient is  improving bit by bit, still weak compared to baseline but better than prior.  patient denied any back pain, fever/chills, n/v/d, abdominal pain.  He is taking Ensure tid, and participating PT.  He is looking forward to driving to [place] once he is medically cleared.

## 2021-12-08 NOTE — PHYSICAL EXAM
[General Appearance - Alert] : alert [General Appearance - In No Acute Distress] : in no acute distress [Sclera] : the sclera and conjunctiva were normal [Outer Ear] : the ears and nose were normal in appearance [Neck Appearance] : the appearance of the neck was normal [] : no respiratory distress [FreeTextEntry1] : albe to ambulate

## 2021-12-08 NOTE — DATA REVIEWED
[FreeTextEntry1] : 12/7 lab notable for\par WBC 10.05\par Cr 2.54\par ESR 50 stable\par CRP 4\par \par CT lumbar spine w/o con 11/11\par There is persistent cortical irregularity with erosive changes involving the endplates at the L5/S1 level with surrounding bony sclerosis with osteomyelitis. There remains pre and paravertebral soft tissue component as well as a ventral epidural soft tissue component which appears to have slightly improved. These findings would be better evaluated with a contrast study or MRI.\par \par NM gallium 9/1\par Impression:\par 1. No significant uptake within the mitral valve region.\par 2. Uptake within the lower lumbar spine likely corresponding to area of abnormality on prior CT lumbar spine 8/26/2021 at L5-S1 which is suggestive of discitis osteomyelitis.\par 3. Prominent uptake within the posterior mediastinum appears to correspond to mediastinal and right hilar lymph nodes which are nonspecific and may reflect infectious or inflammatory etiology.\par \par CT lumbar spine 8/26\par IMPRESSION:\par 1.  Since 2/21/2021, interval worsening severe height loss L5-S1, with new erosive endplate changes and prevertebral fat stranding. 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 Recommend correlation with ESR/CRP and consider further evaluation with MRI lumbar with and without contrast.\par 2.  Dilated stool-filled rectum, consistent fecal impaction.\par \par 9/7 GUANAKO\par CONCLUSIONS:\par  1. Limited GUANAKO performed for reassessment of mitral valve and intervalvular fibrosa.\par  2. Mitral valve is mildly thickened. There is prolapse and area of focal thickening of the anterior mitral leaflet. There is mild to moderate mitral regurgitation (2 jets are noted).\par  3. There is thickening around the aortic root and intervalvular fibrosa of unclear etiology; may reflect nonspecific thickening vs early abscess formation given the clinical scenario.\par  4. Tricuspid leaflets are mildly thickened. Pulmonic valve is not well visualized.\par  5. Aortic leaflets are mildly thickened. Trace to mild aortic insufficiency.\par  6. Moderate to severe plaque noted in the descending aorta and aortic arch.\par  7. Compared to the previous GUANAKO performed on 8/30/2021, visually the mitral valve and intervalvular fibrosa appear similar. On comparable views, mitral regurgitation may be slightly more pronounced but still mild to moderate in severity.\par \par GUANAKO 8/30\par CONCLUSIONS:\par  1. Technically difficult study.\par  2. Normal left and right ventricular size and systolic function.\par  3. No LA/RA/LISA/RAA thrombus seen.\par  4. No pericardial effusion.\par  5. There is mitral valve prolapse of the anterior leaflet.There is an irregularly shaped linear echodensity measuring 0.9 cm x 0.5 cm on the atrial north of the anterior leaflet of the mitral valve, highly suspicious for a vegetation. There is mild to moderate, eccentric, posteriorly directed mitral regurgitation.\par  6. The aortic valve is tricuspid and mildly calcified. There is trace aortic regurgitation.\par  7. There is thickening of the intervalvular fibrosa with systolic expansion - cannot rule out abscess formation.\par  8. There is severe non-mobile plaque seen in the visualized portion of the descending aorta. There is severe non-mobile plaque seen in the visualized portion of the aortic arch.\par  9. Compared to the previous GUANAKO performed on 2/26/2021, the mitral valve vegetation is new. The aortic root (intervalvular fibrosa) thickening is more prominent.\par 10. Suggest a repeat GUANAKO in 7-10 days to re-evaluate the mitral valve and intervalvular fibrosa, if clinically indicated.\par 11. Above findings were discussed with Dr. Hidalgo on 8/30/21\par \par TTE 8/26\par CONCLUSIONS:\par  1. Normal left and right ventricular size and systolic function.\par  2. The mitral valve is mildly thickened. There is mild bileaflet valve prolapse (anterior > posterior). There is trace mitral regurgitation.\par  3. Trace aortic regurgitation.\par  4. No evidence of pulmonary hypertension.\par  5. No pericardial effusion.\par

## 2021-12-15 ENCOUNTER — APPOINTMENT (OUTPATIENT)
Dept: ORTHOPEDIC SURGERY | Facility: CLINIC | Age: 76
End: 2021-12-15
Payer: MEDICARE

## 2021-12-15 PROCEDURE — 99214 OFFICE O/P EST MOD 30 MIN: CPT

## 2021-12-16 ENCOUNTER — APPOINTMENT (OUTPATIENT)
Dept: HEMATOLOGY ONCOLOGY | Facility: CLINIC | Age: 76
End: 2021-12-16
Payer: MEDICARE

## 2021-12-16 PROCEDURE — 99204 OFFICE O/P NEW MOD 45 MIN: CPT | Mod: 95

## 2021-12-16 RX ORDER — DAPTOMYCIN 350 MG/7ML
INJECTION, POWDER, LYOPHILIZED, FOR SOLUTION INTRAVENOUS
Refills: 0 | Status: COMPLETED | COMMUNITY
End: 2021-12-16

## 2021-12-16 NOTE — HISTORY OF PRESENT ILLNESS
[Home] : at home, [unfilled] , at the time of the visit. 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 He was recently in the hospital, with a MSSA infection and was found to have lupus anticoagulants... He was referred here for hematology follow-up...

## 2021-12-16 NOTE — ASSESSMENT
[FreeTextEntry1] : Discussed with patient in detail patient's condition... Hospital records reviewed...\par \par Patient was invited to see us in follow-up in the office... He should be able to see me and Dr. Barnard on the same date as we share in office.

## 2021-12-21 NOTE — HISTORY OF PRESENT ILLNESS
[de-identified] : Mr. FELDMAN is a very pleasant 76 year old male who complains of worsening low back pain for the past 3 weeks, progressing after stopping IV daptomycin for L5/S1 discitis (completed 6 week antibiotic course). Rare right buttock pain. Family reports increased need for help with ADLs, transferring from sitting to standing. \par \par h/o CKD (off HD), Hemophilia A, DRESS Syndrome, MSSA bacteremia and uremic pericarditis \par Unable to get MRI due to cochlear implant\par \par The patient no history of previous spine surgery.\par \par The patient has no history of unexpected weight loss, no history of active cancer, no history bladder or bowel dysfunction, no night pain, no fevers or chills.\par \par The past medical history, surgical history, family history, allergies, medications, 10+ point review of systems, family history and social history were reviewed and non contributory.\par \par

## 2021-12-21 NOTE — DISCUSSION/SUMMARY
[de-identified] : Discussed my findings with the patient and his family. I am recommending repeat blood work, will complete today. Will follow up via telephone tomorrow to discuss results. Next steps planning blood work results. Plan discussed with ID Dr. Barnard. All questions answered.

## 2021-12-21 NOTE — PHYSICAL EXAM
[de-identified] : General: patient is well developed, well nourished, in no acute \par distress, alert and oriented x 3. \par \par Mood and affect: normal\par \par Respiratory: no respiratory distress noted\par \par Skin: no scars over spine, skin intact, no erythema, increased warmth\par \par Alignment:The spine is well compensated in the coronal and sagittal plane. \par \par Gait: The patient is able to toe walk and heel walk with mild difficulty.\par \par Palpation: no tenderness to palpation spine or paraspinal region\par \par Neurologic Exam:\par Motor: Manual Muscle testing in the lower extremities is 5 out of 5 in all muscle groups. There is no evidence of muscular atrophy in the lower extremities. Sensory: Sensation to light touch is grossly intact in the lower extremities\par \par Hip Exam: No pain with internal or external rotation of hips bilaterally\par \par Special tests: Straight leg raise test negative. Cross straight leg test negative. \par  [de-identified] : XR 11/10/21: L5/S1 erosive endplate changes and sclerosis, no change from previous imaging.

## 2021-12-21 NOTE — HISTORY OF PRESENT ILLNESS
[de-identified] : Follow up 12/15/21: Patient presents for follow up. Reports resolution of low back pain from last visit. Had PICC line for 4 weeks. Line became clogged, had to be removed. Was switched to linezolid per Dr. Barnard. \par \par Initial 11/10/21: Mr. FELDMAN is a very pleasant 76 year old male who complains of worsening low back pain for the past 3 weeks, progressing after stopping IV daptomycin for L5/S1 discitis (completed 6 week antibiotic course). Rare right buttock pain. Family reports increased need for help with ADLs, transferring from sitting to standing. \par \par h/o CKD (off HD), Hemophilia A, DRESS Syndrome, MSSA bacteremia and uremic pericarditis \par Unable to get MRI due to cochlear implant\par \par The patient no history of previous spine surgery.\par \par The patient has no history of unexpected weight loss, no history of active cancer, no history bladder or bowel dysfunction, no night pain, no fevers or chills.\par \par The past medical history, surgical history, family history, allergies, medications, 10+ point review of systems, family history and social history were reviewed and non contributory.\par \par

## 2021-12-21 NOTE — DISCUSSION/SUMMARY
[de-identified] : Discussed my findings with the patient. Markers normalizing. Continue follow up with Dr. Barnard per her recommendations. He will follow up with me in 6-8 weeks, sooner if there is an issue. XR lumbar AP/lateral at next visit. All questions answered.

## 2021-12-22 ENCOUNTER — NON-APPOINTMENT (OUTPATIENT)
Age: 76
End: 2021-12-22

## 2021-12-22 PROCEDURE — 71045 X-RAY EXAM CHEST 1 VIEW: CPT

## 2021-12-22 PROCEDURE — 82550 ASSAY OF CK (CPK): CPT

## 2021-12-22 PROCEDURE — 86850 RBC ANTIBODY SCREEN: CPT

## 2021-12-22 PROCEDURE — 36573 INSJ PICC RS&I 5 YR+: CPT

## 2021-12-22 PROCEDURE — 85730 THROMBOPLASTIN TIME PARTIAL: CPT

## 2021-12-22 PROCEDURE — 85598 HEXAGNAL PHOSPH PLTLT NEUTRL: CPT

## 2021-12-22 PROCEDURE — 72131 CT LUMBAR SPINE W/O DYE: CPT | Mod: MC

## 2021-12-22 PROCEDURE — U0005: CPT

## 2021-12-22 PROCEDURE — 72084 X-RAY EXAM ENTIRE SPI 6/> VW: CPT

## 2021-12-22 PROCEDURE — 97161 PT EVAL LOW COMPLEX 20 MIN: CPT

## 2021-12-22 PROCEDURE — 86769 SARS-COV-2 COVID-19 ANTIBODY: CPT

## 2021-12-22 PROCEDURE — 86901 BLOOD TYPING SEROLOGIC RH(D): CPT

## 2021-12-22 PROCEDURE — 84100 ASSAY OF PHOSPHORUS: CPT

## 2021-12-22 PROCEDURE — 72082 X-RAY EXAM ENTIRE SPI 2/3 VW: CPT

## 2021-12-22 PROCEDURE — 87040 BLOOD CULTURE FOR BACTERIA: CPT

## 2021-12-22 PROCEDURE — 85240 CLOT FACTOR VIII AHG 1 STAGE: CPT

## 2021-12-22 PROCEDURE — 72100 X-RAY EXAM L-S SPINE 2/3 VWS: CPT

## 2021-12-22 PROCEDURE — 83735 ASSAY OF MAGNESIUM: CPT

## 2021-12-22 PROCEDURE — 85610 PROTHROMBIN TIME: CPT

## 2021-12-22 PROCEDURE — 80053 COMPREHEN METABOLIC PANEL: CPT

## 2021-12-22 PROCEDURE — U0003: CPT

## 2021-12-22 PROCEDURE — 85025 COMPLETE CBC W/AUTO DIFF WBC: CPT

## 2021-12-22 PROCEDURE — 36415 COLL VENOUS BLD VENIPUNCTURE: CPT

## 2021-12-22 PROCEDURE — 99285 EMERGENCY DEPT VISIT HI MDM: CPT

## 2021-12-22 PROCEDURE — 36000 PLACE NEEDLE IN VEIN: CPT

## 2021-12-22 PROCEDURE — 93005 ELECTROCARDIOGRAM TRACING: CPT

## 2021-12-22 PROCEDURE — 86900 BLOOD TYPING SEROLOGIC ABO: CPT

## 2022-01-01 NOTE — CONSULT NOTE ADULT - I HAVE PERSONALLY SEEN, EXAMINED, AND PARTICIPATED IN THE CARE OF THIS PATIENT.  I HAVE REVIEWED ALL PERTINENT CLINICAL INFORMATION, INCLUDING HISTORY, PHYSICAL EXAM, PLAN AND THE MEDICAL/PA/NP STUDENT’S NOTE AND AGREE EXCEPT AS NOTED.
[facility]: [phone]  NURSE ON CALL AFTER HOURS:  [phone]  EMERGENCY:    [phone]    Statement Selected

## 2022-01-05 ENCOUNTER — NON-APPOINTMENT (OUTPATIENT)
Age: 77
End: 2022-01-05

## 2022-01-07 ENCOUNTER — APPOINTMENT (OUTPATIENT)
Dept: INFECTIOUS DISEASE | Facility: CLINIC | Age: 77
End: 2022-01-07
Payer: MEDICARE

## 2022-01-07 VITALS
HEIGHT: 68 IN | DIASTOLIC BLOOD PRESSURE: 78 MMHG | SYSTOLIC BLOOD PRESSURE: 143 MMHG | TEMPERATURE: 98.2 F | WEIGHT: 115 LBS | BODY MASS INDEX: 17.43 KG/M2 | HEART RATE: 103 BPM | OXYGEN SATURATION: 98 %

## 2022-01-07 DIAGNOSIS — K14.0 GLOSSITIS: ICD-10-CM

## 2022-01-07 PROCEDURE — 99214 OFFICE O/P EST MOD 30 MIN: CPT

## 2022-01-07 RX ORDER — LINEZOLID 600 MG/1
600 TABLET, FILM COATED ORAL
Qty: 28 | Refills: 2 | Status: COMPLETED | COMMUNITY
Start: 2021-12-10 | End: 2022-01-07

## 2022-01-10 PROBLEM — K14.0 TONGUE ULCER: Status: ACTIVE | Noted: 2022-01-10

## 2022-01-10 LAB
ALBUMIN SERPL ELPH-MCNC: 3.9 G/DL
ALP BLD-CCNC: 412 U/L
ALT SERPL-CCNC: 49 U/L
ANION GAP SERPL CALC-SCNC: 13 MMOL/L
AST SERPL-CCNC: 25 U/L
BASOPHILS # BLD AUTO: 0.02 K/UL
BASOPHILS NFR BLD AUTO: 0.2 %
BILIRUB SERPL-MCNC: 0.3 MG/DL
BUN SERPL-MCNC: 42 MG/DL
CALCIUM SERPL-MCNC: 10.1 MG/DL
CHLORIDE SERPL-SCNC: 103 MMOL/L
CO2 SERPL-SCNC: 21 MMOL/L
CREAT SERPL-MCNC: 2.31 MG/DL
CRP SERPL-MCNC: 70 MG/L
EOSINOPHIL # BLD AUTO: 0.12 K/UL
EOSINOPHIL NFR BLD AUTO: 1.2 %
ERYTHROCYTE [SEDIMENTATION RATE] IN BLOOD BY WESTERGREN METHOD: 62 MM/HR
GLUCOSE SERPL-MCNC: 213 MG/DL
HCT VFR BLD CALC: 36.9 %
HGB BLD-MCNC: 11.4 G/DL
IMM GRANULOCYTES NFR BLD AUTO: 0.3 %
LYMPHOCYTES # BLD AUTO: 0.71 K/UL
LYMPHOCYTES NFR BLD AUTO: 6.8 %
MAN DIFF?: NORMAL
MCHC RBC-ENTMCNC: 27.6 PG
MCHC RBC-ENTMCNC: 30.9 GM/DL
MCV RBC AUTO: 89.3 FL
MONOCYTES # BLD AUTO: 0.59 K/UL
MONOCYTES NFR BLD AUTO: 5.7 %
NEUTROPHILS # BLD AUTO: 8.91 K/UL
NEUTROPHILS NFR BLD AUTO: 85.8 %
PLATELET # BLD AUTO: 229 K/UL
POTASSIUM SERPL-SCNC: 4.9 MMOL/L
PROT SERPL-MCNC: 6.2 G/DL
RBC # BLD: 4.13 M/UL
RBC # FLD: 15.5 %
SODIUM SERPL-SCNC: 138 MMOL/L
WBC # FLD AUTO: 10.38 K/UL

## 2022-01-10 NOTE — PHYSICAL EXAM
[General Appearance - Alert] : alert [General Appearance - In No Acute Distress] : in no acute distress [Sclera] : the sclera and conjunctiva were normal [Neck Appearance] : the appearance of the neck was normal [] : no respiratory distress [Outer Ear] : the ears and nose were normal in appearance [FreeTextEntry1] : albe to ambulate independently

## 2022-01-10 NOTE — ASSESSMENT
[FreeTextEntry1] : 76M h/o CKD (off HD), Hemophilia A, DRESS Syndrome, MSSA NVE of MV (0.9x0.5cm) c/b spinal OM s/p IV daptomycin (9/1-10/12) with worsening LBP s/p daptomycin --> linezolid (11/11-12/22/21) p/w management of LBP.\par \par Had two days of L hip pain from 1/5-1/6 then now resolved.  Suspect MSK pain.  Will check inflammatory markers to get baseline.  In case of recurrent and persistent LBP, then will obtain NM dual isotope spect CT (case d/w Dr. Dent).  \par \par Of note, patient has persistent tongue ulcer since April with intermittent mouth/lip ulcers.  Will refer him to rheum.\par \par - monitor off abx\par - labs today\par - will obtain gallium scan if labs are concerning \par - patient's cardiologist is Dr. Danny Smith (Card [phone]) at [facility],\par - daughter (Fax [phone])\par - he will need ppx abx prior to dental procedure (amoxicillin 2g PO x 1 30-60min prior to procedure)\par - f/u Dr. Lama (ortho) \par - refer to rheumatology for ulcer \par

## 2022-01-10 NOTE — HISTORY OF PRESENT ILLNESS
[FreeTextEntry1] : 76M h/o CKD (off HD), Hemophilia A, DRESS Syndrome, MSSA NVE of MV (0.9x0.5cm) c/b spinal OM s/p IV daptomycin (9/1-10/12) with worsening LBP s/p daptomycin --> linezolid (11/11-12/22/21) p/w management of LBP.\par \par Patient was initially admitted from 8/25-9/9 for fever and LBP, found to have MSSA bacteremia due to CLABSI. HD cath removed. GUANAKO showed mitral valve prolapse and 0.9 x 0.5cm vegetation. Gallium scan 9/1 negative. CT lumbar spine showed discitis/OM at L5-S1 level. he was seen by CT surg Dr. Herring since GUANAKO finding also showed thickening of the intervalvular fibrosa with systolic expansion, cannot r/o abscess. Dr. Herring didn't think think it was abscess, and repeat GUANAKO obtained a week later, which showed similar finding. He was discharged home with 6 weeks of daptomycin (on 10/12).  After discharge, he completed 6 weeks of IV daptomycin and he had resolution of back pain.  However in the past few weeks, he started to have recurrent LBP which was worsening.  Also found to have new leukocytosis and uptrending CRP and ESR, c/f incompletely treated spinal OM.  He was never able to get MRI spine (due to cochlear implant) and cannot give CT lumbar spine w/ IV cont to r/o epidural abscess.  He was instructed to go to ED for PICC placement for restarting IV abx therapy and stayed at [facility] from 11/11-11/13.  He reported improvement of back pain after initiation of IV daptomycin.  Since discharge his LBP completely resolved and he finished another 6 weeks of IV abx (11/11-12/22), initially dapto --> switched to linezolid for the last 10 days given clogged PICC.  \par \par Today patient came here with his daughter for recurrent LBP.  Per daughter, patient started to have L hip pain on 1/4 which lasted for about 2 days then now resolved.  he feels well now, able to ambulate with a walker outside, and walk independently at home.  Denied back/hip pain.  Denied fever/chills, n/v/d, abdominal pain.  Reports non-healing tongue ulcer since April and intermittently having mouth ulcers.  Unclear why.  Per daughter, patient got TTE in 12/2021 at cardiologist office and was told result was good.

## 2022-01-11 ENCOUNTER — APPOINTMENT (OUTPATIENT)
Dept: HEMATOLOGY ONCOLOGY | Facility: CLINIC | Age: 77
End: 2022-01-11

## 2022-01-19 ENCOUNTER — OUTPATIENT (OUTPATIENT)
Dept: OUTPATIENT SERVICES | Facility: HOSPITAL | Age: 77
LOS: 1 days | End: 2022-01-19
Payer: MEDICARE

## 2022-01-19 ENCOUNTER — TRANSCRIPTION ENCOUNTER (OUTPATIENT)
Age: 77
End: 2022-01-19

## 2022-01-19 DIAGNOSIS — Z96.21 COCHLEAR IMPLANT STATUS: Chronic | ICD-10-CM

## 2022-01-20 ENCOUNTER — APPOINTMENT (OUTPATIENT)
Dept: INFECTIOUS DISEASE | Facility: CLINIC | Age: 77
End: 2022-01-20

## 2022-01-20 PROCEDURE — G1004: CPT

## 2022-01-20 PROCEDURE — 78832 RP LOCLZJ TUM SPECT W/CT 2: CPT | Mod: 26,MG

## 2022-01-21 ENCOUNTER — TRANSCRIPTION ENCOUNTER (OUTPATIENT)
Age: 77
End: 2022-01-21

## 2022-01-21 ENCOUNTER — INPATIENT (INPATIENT)
Facility: HOSPITAL | Age: 77
LOS: 6 days | Discharge: HOME | DRG: 480 | End: 2022-01-28
Attending: ORTHOPAEDIC SURGERY | Admitting: STUDENT IN AN ORGANIZED HEALTH CARE EDUCATION/TRAINING PROGRAM
Payer: MEDICARE

## 2022-01-21 VITALS
HEART RATE: 93 BPM | HEIGHT: 68 IN | DIASTOLIC BLOOD PRESSURE: 64 MMHG | TEMPERATURE: 98 F | RESPIRATION RATE: 18 BRPM | OXYGEN SATURATION: 100 % | WEIGHT: 154.98 LBS | SYSTOLIC BLOOD PRESSURE: 137 MMHG

## 2022-01-21 DIAGNOSIS — D66 HEREDITARY FACTOR VIII DEFICIENCY: ICD-10-CM

## 2022-01-21 DIAGNOSIS — M25.551 PAIN IN RIGHT HIP: ICD-10-CM

## 2022-01-21 DIAGNOSIS — D64.9 ANEMIA, UNSPECIFIED: ICD-10-CM

## 2022-01-21 DIAGNOSIS — Z96.21 COCHLEAR IMPLANT STATUS: Chronic | ICD-10-CM

## 2022-01-21 DIAGNOSIS — R74.8 ABNORMAL LEVELS OF OTHER SERUM ENZYMES: ICD-10-CM

## 2022-01-21 DIAGNOSIS — N18.9 CHRONIC KIDNEY DISEASE, UNSPECIFIED: ICD-10-CM

## 2022-01-21 DIAGNOSIS — Z29.9 ENCOUNTER FOR PROPHYLACTIC MEASURES, UNSPECIFIED: ICD-10-CM

## 2022-01-21 LAB
ALBUMIN SERPL ELPH-MCNC: 3.5 G/DL — SIGNIFICANT CHANGE UP (ref 3.3–5)
ALBUMIN SERPL ELPH-MCNC: 3.8 G/DL
ALP BLD-CCNC: 343 U/L
ALP SERPL-CCNC: 313 U/L — HIGH (ref 40–120)
ALT FLD-CCNC: 16 U/L — SIGNIFICANT CHANGE UP (ref 10–45)
ALT SERPL-CCNC: 18 U/L
ANION GAP SERPL CALC-SCNC: 13 MMOL/L — SIGNIFICANT CHANGE UP (ref 5–17)
ANION GAP SERPL CALC-SCNC: 15 MMOL/L
APPEARANCE UR: CLEAR — SIGNIFICANT CHANGE UP
APTT BLD: 46.9 SEC — HIGH (ref 27.5–35.5)
AST SERPL-CCNC: 14 U/L
AST SERPL-CCNC: 18 U/L — SIGNIFICANT CHANGE UP (ref 10–40)
BASOPHILS # BLD AUTO: 0.04 K/UL
BASOPHILS # BLD AUTO: 0.04 K/UL — SIGNIFICANT CHANGE UP (ref 0–0.2)
BASOPHILS NFR BLD AUTO: 0.3 %
BASOPHILS NFR BLD AUTO: 0.3 % — SIGNIFICANT CHANGE UP (ref 0–2)
BILIRUB SERPL-MCNC: 0.4 MG/DL — SIGNIFICANT CHANGE UP (ref 0.2–1.2)
BILIRUB SERPL-MCNC: 0.7 MG/DL
BILIRUB UR-MCNC: NEGATIVE — SIGNIFICANT CHANGE UP
BLD GP AB SCN SERPL QL: NEGATIVE — SIGNIFICANT CHANGE UP
BUN SERPL-MCNC: 36 MG/DL — HIGH (ref 7–23)
BUN SERPL-MCNC: 42 MG/DL
CALCIUM SERPL-MCNC: 10.5 MG/DL — SIGNIFICANT CHANGE UP (ref 8.4–10.5)
CALCIUM SERPL-MCNC: 10.8 MG/DL
CHLORIDE SERPL-SCNC: 105 MMOL/L
CHLORIDE SERPL-SCNC: 106 MMOL/L — SIGNIFICANT CHANGE UP (ref 96–108)
CO2 SERPL-SCNC: 18 MMOL/L
CO2 SERPL-SCNC: 21 MMOL/L — LOW (ref 22–31)
COLOR SPEC: YELLOW — SIGNIFICANT CHANGE UP
CREAT ?TM UR-MCNC: 67 MG/DL — SIGNIFICANT CHANGE UP
CREAT SERPL-MCNC: 1.93 MG/DL — HIGH (ref 0.5–1.3)
CREAT SERPL-MCNC: 2.12 MG/DL
CRP SERPL-MCNC: 101 MG/L — HIGH (ref 0–4)
CRP SERPL-MCNC: 97 MG/L
DIFF PNL FLD: NEGATIVE — SIGNIFICANT CHANGE UP
EOSINOPHIL # BLD AUTO: 0.23 K/UL
EOSINOPHIL # BLD AUTO: 0.23 K/UL — SIGNIFICANT CHANGE UP (ref 0–0.5)
EOSINOPHIL NFR BLD AUTO: 1.7 %
EOSINOPHIL NFR BLD AUTO: 1.9 % — SIGNIFICANT CHANGE UP (ref 0–6)
ERYTHROCYTE [SEDIMENTATION RATE] IN BLOOD BY WESTERGREN METHOD: 94 MM/HR
ERYTHROCYTE [SEDIMENTATION RATE] IN BLOOD: 120 MM/HR — HIGH
GGT SERPL-CCNC: 176 U/L — HIGH (ref 9–50)
GLUCOSE SERPL-MCNC: 113 MG/DL — HIGH (ref 70–99)
GLUCOSE SERPL-MCNC: 89 MG/DL
GLUCOSE UR QL: NEGATIVE — SIGNIFICANT CHANGE UP
HCT VFR BLD CALC: 33.3 % — LOW (ref 39–50)
HCT VFR BLD CALC: 38.5 %
HGB BLD-MCNC: 11.2 G/DL — LOW (ref 13–17)
HGB BLD-MCNC: 12.3 G/DL
IMM GRANULOCYTES NFR BLD AUTO: 0.4 % — SIGNIFICANT CHANGE UP (ref 0–1.5)
IMM GRANULOCYTES NFR BLD AUTO: 0.6 %
INR BLD: 1.12 — SIGNIFICANT CHANGE UP (ref 0.88–1.16)
KETONES UR-MCNC: NEGATIVE — SIGNIFICANT CHANGE UP
LEUKOCYTE ESTERASE UR-ACNC: NEGATIVE — SIGNIFICANT CHANGE UP
LYMPHOCYTES # BLD AUTO: 1.34 K/UL — SIGNIFICANT CHANGE UP (ref 1–3.3)
LYMPHOCYTES # BLD AUTO: 1.56 K/UL
LYMPHOCYTES # BLD AUTO: 11.3 % — LOW (ref 13–44)
LYMPHOCYTES NFR BLD AUTO: 11.3 %
MAN DIFF?: NORMAL
MCHC RBC-ENTMCNC: 27.5 PG
MCHC RBC-ENTMCNC: 28.5 PG — SIGNIFICANT CHANGE UP (ref 27–34)
MCHC RBC-ENTMCNC: 31.9 GM/DL
MCHC RBC-ENTMCNC: 33.6 GM/DL — SIGNIFICANT CHANGE UP (ref 32–36)
MCV RBC AUTO: 84.7 FL — SIGNIFICANT CHANGE UP (ref 80–100)
MCV RBC AUTO: 86.1 FL
MONOCYTES # BLD AUTO: 0.85 K/UL
MONOCYTES # BLD AUTO: 0.91 K/UL — HIGH (ref 0–0.9)
MONOCYTES NFR BLD AUTO: 6.2 %
MONOCYTES NFR BLD AUTO: 7.7 % — SIGNIFICANT CHANGE UP (ref 2–14)
NEUTROPHILS # BLD AUTO: 11.02 K/UL
NEUTROPHILS # BLD AUTO: 9.26 K/UL — HIGH (ref 1.8–7.4)
NEUTROPHILS NFR BLD AUTO: 78.4 % — HIGH (ref 43–77)
NEUTROPHILS NFR BLD AUTO: 79.9 %
NITRITE UR-MCNC: NEGATIVE — SIGNIFICANT CHANGE UP
NRBC # BLD: 0 /100 WBCS — SIGNIFICANT CHANGE UP (ref 0–0)
PH UR: 6 — SIGNIFICANT CHANGE UP (ref 5–8)
PLATELET # BLD AUTO: 263 K/UL — SIGNIFICANT CHANGE UP (ref 150–400)
PLATELET # BLD AUTO: 324 K/UL
POTASSIUM SERPL-MCNC: 5.2 MMOL/L — SIGNIFICANT CHANGE UP (ref 3.5–5.3)
POTASSIUM SERPL-SCNC: 4.7 MMOL/L
POTASSIUM SERPL-SCNC: 5.2 MMOL/L — SIGNIFICANT CHANGE UP (ref 3.5–5.3)
PROT SERPL-MCNC: 6.2 G/DL
PROT SERPL-MCNC: 6.8 G/DL — SIGNIFICANT CHANGE UP (ref 6–8.3)
PROT UR-MCNC: NEGATIVE MG/DL — SIGNIFICANT CHANGE UP
PROTHROM AB SERPL-ACNC: 13.4 SEC — SIGNIFICANT CHANGE UP (ref 10.6–13.6)
RBC # BLD: 3.93 M/UL — LOW (ref 4.2–5.8)
RBC # BLD: 4.47 M/UL
RBC # FLD: 14.5 % — SIGNIFICANT CHANGE UP (ref 10.3–14.5)
RBC # FLD: 14.7 %
RH IG SCN BLD-IMP: POSITIVE — SIGNIFICANT CHANGE UP
SARS-COV-2 RNA SPEC QL NAA+PROBE: SIGNIFICANT CHANGE UP
SODIUM SERPL-SCNC: 139 MMOL/L
SODIUM SERPL-SCNC: 140 MMOL/L — SIGNIFICANT CHANGE UP (ref 135–145)
SODIUM UR-SCNC: 82 MMOL/L — SIGNIFICANT CHANGE UP
SP GR SPEC: 1.01 — SIGNIFICANT CHANGE UP (ref 1–1.03)
UROBILINOGEN FLD QL: 0.2 E.U./DL — SIGNIFICANT CHANGE UP
WBC # BLD: 11.83 K/UL — HIGH (ref 3.8–10.5)
WBC # FLD AUTO: 11.83 K/UL — HIGH (ref 3.8–10.5)
WBC # FLD AUTO: 13.78 K/UL

## 2022-01-21 PROCEDURE — 99222 1ST HOSP IP/OBS MODERATE 55: CPT | Mod: GC

## 2022-01-21 PROCEDURE — 99222 1ST HOSP IP/OBS MODERATE 55: CPT

## 2022-01-21 PROCEDURE — 73502 X-RAY EXAM HIP UNI 2-3 VIEWS: CPT | Mod: 26,RT

## 2022-01-21 PROCEDURE — 99285 EMERGENCY DEPT VISIT HI MDM: CPT | Mod: FS

## 2022-01-21 PROCEDURE — 99283 EMERGENCY DEPT VISIT LOW MDM: CPT

## 2022-01-21 RX ORDER — ACETAMINOPHEN 500 MG
650 TABLET ORAL EVERY 6 HOURS
Refills: 0 | Status: DISCONTINUED | OUTPATIENT
Start: 2022-01-21 | End: 2022-01-22

## 2022-01-21 RX ORDER — HEPARIN SODIUM 5000 [USP'U]/ML
5000 INJECTION INTRAVENOUS; SUBCUTANEOUS EVERY 8 HOURS
Refills: 0 | Status: DISCONTINUED | OUTPATIENT
Start: 2022-01-21 | End: 2022-01-21

## 2022-01-21 NOTE — ED PROVIDER NOTE - OBJECTIVE STATEMENT
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 Barnard due to concern for R hip septic joint from gallium scan done yesterday.  As per daughter pt having R hip pain w/ movement for past week.  Had blood tests w/ elevated esr/crp and gallium scan yesterday, which daughter reports concern for R hip infection but OM of spine resolving.  States they were sent for admission.  Denies f/c, headache, dizziness, fainting, chest pain, sob, abd pain, nvd, urinary sxs, back pain, neck pain, numbness/weakness, fall/trauma.

## 2022-01-21 NOTE — H&P ADULT - HISTORY OF PRESENT ILLNESS
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 Barnard due to concern for R hip septic joint from gallium scan done yesterday. Per patient and daughter pt having R hip pain w/ movement for past week. States he ambulates with walker/can and he is able to bear weight on his R hip but with difficulty and pain. Denies recent fall/trauma to the area. Had blood tests w/ elevated esr/crp and gallium scan yesterday, which daughter reports concern for R hip infection but OM of spine resolving.  States they were sent for admission. Denies subjective fevers, chills, headache, dizziness, fainting, chest pain, sob, abd pain, nvd, urinary sxs, back pain, neck pain, numbness/weakness, fall/trauma.    ED course:   Vitals: T: 98.3 | HR: 93 | BP: 137/64 | RR: 18 100% RA  Labs: WBC: 11.83 | H/H: 11.2/33.3| BUN/Cr: 36/1.93 | ESR: 120 | CRP: 101 | Alk phos: 313    Imaging: Gallium scan: There is gallium uptake in the right hip joint with associated hypodensity in the right hip joint suspicious for infection. The activity at the L5-S1 level may be due to resolving inflammatory   changes.  X-ray hip: wet read no fracture/reactive changes  EKG: NSR, QTc: 435  Tx: none

## 2022-01-21 NOTE — ED ADULT TRIAGE NOTE - WILL THE PATIENT ACCEPT THE PFIZER COVID-19 VACCINE IF ELIGIBLE AND IT IS AVAILABLE?
Middle Ear Infection (Adult)  You have an infection of the middle ear, the space behind the eardrum. This is also called acute otitis media (AOM). Sometimes it is caused by the common cold. This is because congestion can block the internal passage (eustachian tube) that drains fluid from the middle ear. When the middle ear fills with fluid, bacteria can grow there and cause an infection. Oral antibiotics are used to treat this illness, not ear drops. Symptoms usually start to improve within 1 to 2 days of treatment.    Home care  The following are general care guidelines:  · Finish all of the antibiotic medicine given, even though you may feel better after the first few days.  · You may use over-the-counter medicine, such as acetaminophen or ibuprofen, to control pain and fever, unless something else was prescribed. If you have chronic liver or kidney disease or have ever had a stomach ulcer or gastrointestinal bleeding, talk with your healthcare provider before using these medicines. Do not give aspirin to anyone under 18 years of age who has a fever. It may cause severe illness or death.  Follow-up care  Follow up with your healthcare provider, or as advised, in 2 weeks if all symptoms have not gotten better, or if hearing doesn't go back to normal within 1 month.  When to seek medical advice  Call your healthcare provider right away if any of these occur:  · Ear pain gets worse or does not improve after 3 days of treatment  · Unusual drowsiness or confusion  · Neck pain, stiff neck, or headache  · Fluid or blood draining from the ear canal  · Fever of 100.4°F (38°C) or as advised   · Seizure  Date Last Reviewed: 6/1/2016  © 0938-2593 [employer]. [address] [place] [address], [place] [address]. All rights reserved. This information is not intended as a substitute for professional medical care. Always follow your healthcare professional's instructions.         No

## 2022-01-21 NOTE — ED ADULT NURSE NOTE - OBJECTIVE STATEMENT
Pt A&Ox4, ambulatory with steady gait, speaking in clear/full sentences, no acute distress, vital signs stable. Pt presents to the ED for Pt ambulatory with steady gait, speaking in clear/full sentences, no acute distress, vital signs stable. Pt presents to the ED for R hip osteomyelitis. Pt had galium MRI scan yesterday referred to ED for osteomyelitis treatment. Pt had gout several months ago and contracted DRESS. Pt able to move all four extremities. Weak in lower r leg.

## 2022-01-21 NOTE — CONSULT NOTE ADULT - SUBJECTIVE AND OBJECTIVE BOX
Patient is a 77y old  Male who presents with a chief complaint of     HPI: 77M PMHx CKD from DRESS 2/2 allopurinol for gout, off hemodialysis, baseline creatinine ~2-2.6, Hemophilia A, HTN, MSSA bacteremia (3/2021) presenting with OM of R hip. Patient endorses pain to R hip but denies other complaints. No CP SOB fever dysuria. Making "normal amount of urine." No dysuria f/c n/v or abdominal pain. Nephrology consulted for elevated creatinine.     PAST MEDICAL & SURGICAL HISTORY:  HTN (hypertension)    Hemophilia A    Gout    History of BPH    Chronic kidney disease (CKD)    DRESS syndrome    Uses cochlear implant          Allergies:  allopurinol (Other)      Home Medications:       Hospital Medications:   MEDICATIONS  (STANDING):      SOCIAL HISTORY:  Denies ETOh, smoking, or drug use.     Family History:  FAMILY HISTORY:  FH: HTN (hypertension)          VITALS:  T(F): 98.3 (01-21-22 @ 14:42), Max: 98.3 (01-21-22 @ 14:42)  HR: 93 (01-21-22 @ 14:42)  BP: 137/64 (01-21-22 @ 14:42)  RR: 18 (01-21-22 @ 14:42)  SpO2: 100% (01-21-22 @ 14:42)  Wt(kg): --    Height (cm): 172.7 (01-21 @ 14:42)  Weight (kg): 70.3 (01-21 @ 14:42)  BMI (kg/m2): 23.6 (01-21 @ 14:42)  BSA (m2): 1.83 (01-21 @ 14:42)  CAPILLARY BLOOD GLUCOSE          Review of Systems:  CONSTITUTIONAL: No fever or weight loss   RESPIRATORY: No shortness of breath, cough  CARDIOVASCULAR: No Chest pain, shortness of breath   GASTROINTESTINAL: No abdominal pain, nausea, vomiting, diarrhea  GENITOURINARY: No urinary frequency, gross hematuria, dysuria  NEUROLOGICAL: No headache, weakness  MUSCULOSKELETAL: No swelling, +pain to R hip     PHYSICAL EXAM:  GENERAL: Alert, awake, oriented x3 in NAD on RA   HEENT: neck supple, no JVP  CHEST/LUNG: Bilateral clear breath sounds  HEART: Regular rate and rhythm, no murmur, no gallops, no rub   ABDOMEN: Soft, nontender, non distended  : No flank or supra pubic tenderness  EXTREMITIES: no pedal edema  Neurology: AAOx3, moves all extremities       LABS:        Creatinine Trend:     Urine Studies:

## 2022-01-21 NOTE — H&P ADULT - NSICDXFAMILYHX_GEN_ALL_CORE_FT
FAMILY HISTORY:  FH: HTN (hypertension)     FAMILY HISTORY:  No pertinent family history in first degree relatives

## 2022-01-21 NOTE — CONSULT NOTE ADULT - SUBJECTIVE AND OBJECTIVE BOX
Orthopaedic Surgery Consult Note    For Surgeon: Dr. 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 Patient was previously hospitalized at [facility] in September and November 2021 with MSSA NVA of MV and lumbar discitis. Patient had previous treatment with IV abx, Daptomycin. He finished his most recent antibiotic treatment 12/23/22. He is currently followed by Dr. Barnard in Infectious Disease. He had been having minimal back pain with a short flare up that resolved at the beginning of this year. When his hip pain developed, he followed up with Dr. Barnard where his labs showed elevated WBC, ESR, CRP (1/19/22: 13.7, 94, 97). A gallium scan was ordered 1/20/22 that showed right hip effusion, negative right hip bone uptake, and lingering uptake in the lumbar spine. He was sent to the ED due concerns of hip effusion.   Patient reports pain is located at the lateral hip. It does not radiate down the legs. He denies numbness/tingling, saddle anesthesia or loss of bowel or urinary control. Patient can bear weight with discomfort. Endorses pain when lying on the right side. Denies pain at rest. Patient has tried Tylenol, Motrin and ice without significant relief. Patient uses a rolling walker at baseline.       Allergies    allopurinol (Other)    Intolerances      PAST MEDICAL & SURGICAL HISTORY:  HTN (hypertension)    Hemophilia A    Gout    History of BPH    Chronic kidney disease (CKD)    DRESS syndrome    Uses cochlear implant      MEDICATIONS  (STANDING):    MEDICATIONS  (PRN):      Vital Signs Last 24 Hrs  T(C): 36.8 (21 Jan 2022 14:42), Max: 36.8 (21 Jan 2022 14:42)  T(F): 98.3 (21 Jan 2022 14:42), Max: 98.3 (21 Jan 2022 14:42)  HR: 93 (21 Jan 2022 14:42) (93 - 93)  BP: 137/64 (21 Jan 2022 14:42) (137/64 - 137/64)  BP(mean): --  RR: 18 (21 Jan 2022 14:42) (18 - 18)  SpO2: 100% (21 Jan 2022 14:42) (100% - 100%)    Physical Exam:  Patient is sitting comfortably in exam bed with his hips in a flexed position. He is not in distress. He answers questions appropriately. There is a rolling walker bedside.   Exam shows significant atrophy of the lower extremities.   Exam of the right hip does not show erythema, ecchymosis, abrasions, lesions or signs of infection. There is no obvious effusion appreciated.   Patient is tender to palpation about the superior aspect of the greater trochanter.   General sensation to light touch intact bilat LE  Patient requires assistance to SLR but is able to actively hold SLR once the leg is raised.   Patient has mild pain with passive IR and ER with the right hip flexed. No obvious distress with motion.   Mild discomfort with log roll RLE  Active hip flex/ext/knee flex/ext intact  TA/GS/EHL/FHL firing bilat LE    Labs (Allscripts) 1/19/22:  EBC 13.7; ESR 94; CRP 97    Imaging:   Gallium Scan 1/20/22  There is gallium uptake in the right hip joint with associated hypodensity in the right hip joint suspicious for infection.    The activity at the L5-S1 level may be due to resolving inflammatory changes.  Uptake within the L5-S1 vertebral body levels and disc space with a greater extent of uptake on gallium compared to bone agent which is suspicious for discitis osteomyelitis. There is persistent prevertebral and presacral soft tissue swelling although to a lesser extent than prior CT lumbar spine of 1121.    A/P: 78yo male with previous history of MSSA and lumbar discitis, with new atraumatic right hip pain x 1 week.  - Given preserved active ROM and patient sitting with hips in flexed position, low suspicion for right septic hip joint.   - Needs right hip XR. Due to cochlear implants and previous CKD that required HD, CT with contrast and MRI are contraindicated  - Pending imaging, could consider right hip aspiration with IR. Would need heme consult secondary to hemophilia.   - ID involvement     -Discussed with Dr. Miguel Pager [phone]   Orthopaedic Surgery Consult Note    For Surgeon: Dr. 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 Patient was previously hospitalized at [facility] in September and November 2021 with MSSA NVA of MV and lumbar discitis. Patient had previous treatment with IV abx, Daptomycin. He finished his most recent antibiotic treatment 12/23/22. He is currently followed by Dr. Barnard in Infectious Disease. He had been having minimal back pain with a short flare up that resolved at the beginning of this year. When his hip pain developed, he followed up with Dr. Barnard where his labs showed elevated WBC, ESR, CRP (1/19/22: 13.7, 94, 97). A gallium scan was ordered 1/20/22 that showed right hip effusion, negative right hip bone uptake, and lingering uptake in the lumbar spine. He was sent to the ED due concerns of hip effusion.   Patient reports pain is located at the lateral hip. It does not radiate down the legs. He denies numbness/tingling, saddle anesthesia or loss of bowel or urinary control. Patient can bear weight with discomfort. Endorses pain when lying on the right side. Denies pain at rest. Patient has tried Tylenol, Motrin and ice without significant relief. Patient uses a rolling walker at baseline.       Allergies    allopurinol (Other)    Intolerances      PAST MEDICAL & SURGICAL HISTORY:  HTN (hypertension)    Hemophilia A    Gout    History of BPH    Chronic kidney disease (CKD)    DRESS syndrome    Uses cochlear implant      MEDICATIONS  (STANDING):    MEDICATIONS  (PRN):      Vital Signs Last 24 Hrs  T(C): 36.8 (21 Jan 2022 14:42), Max: 36.8 (21 Jan 2022 14:42)  T(F): 98.3 (21 Jan 2022 14:42), Max: 98.3 (21 Jan 2022 14:42)  HR: 93 (21 Jan 2022 14:42) (93 - 93)  BP: 137/64 (21 Jan 2022 14:42) (137/64 - 137/64)  BP(mean): --  RR: 18 (21 Jan 2022 14:42) (18 - 18)  SpO2: 100% (21 Jan 2022 14:42) (100% - 100%)    Physical Exam:  Patient is sitting comfortably in exam bed with his hips in a flexed position. He is not in distress. He answers questions appropriately. There is a rolling walker bedside.   Exam shows significant atrophy of the lower extremities.   Exam of the right hip does not show erythema, ecchymosis, abrasions, lesions or signs of infection. There is no obvious effusion appreciated.   Patient is tender to palpation about the superior aspect of the greater trochanter.   General sensation to light touch intact bilat LE  Patient requires assistance to SLR but is able to actively hold SLR once the leg is raised.   Patient has mild pain with passive IR and ER with the right hip flexed. No obvious distress with motion.   Mild discomfort with log roll RLE  Active hip flex/ext/knee flex/ext intact  TA/GS/EHL/FHL firing bilat LE    Labs (Allscripts) 1/19/22:  EBC 13.7; ESR 94; CRP 97    Imaging:   Gallium Scan 1/20/22  There is gallium uptake in the right hip joint with associated hypodensity in the right hip joint suspicious for infection.    The activity at the L5-S1 level may be due to resolving inflammatory changes.  Uptake within the L5-S1 vertebral body levels and disc space with a greater extent of uptake on gallium compared to bone agent which is suspicious for discitis osteomyelitis. There is persistent prevertebral and presacral soft tissue swelling although to a lesser extent than prior CT lumbar spine of 1121.    A/P: 76yo male with previous history of MSSA and lumbar discitis, with new atraumatic right hip pain x 1 week.  - Given preserved active ROM and patient sitting with hips in flexed position, low suspicion for right septic hip joint.   - Needs right hip XR. Due to cochlear implants and previous CKD that required HD, CT with contrast and MRI are contraindicated  - Pending imaging, could consider right hip aspiration with IR. Would need heme consult secondary to hemophilia.   - ID involvement     -Discussed with Dr. Miguel Pager [phone]   Orthopaedic Surgery Consult Note    For Surgeon: Dr. 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 Patient was previously hospitalized at [facility] in September and November 2021 with MSSA NVA of MV and lumbar discitis (Ortho spine consulted)). Patient had previous treatment with IV abx, Daptomycin. He finished his most recent antibiotic treatment 12/23/22. He is currently followed by Dr. Barnard in Infectious Disease. He had been having minimal back pain with a short flare up that resolved at the beginning of this year. When his hip pain developed, he followed up with Dr. Barnard where his labs showed elevated WBC, ESR, CRP (1/19/22: 13.7, 94, 97). A gallium scan was ordered 1/20/22 that showed right hip effusion, negative right hip bone uptake, and lingering uptake in the lumbar spine. He was sent to the ED due concerns of hip effusion.   Patient reports pain is located at the lateral hip. It does not radiate down the legs. He denies numbness/tingling, saddle anesthesia or loss of bowel or urinary control. Patient can bear weight with discomfort. Endorses pain when lying on the right side. Denies pain at rest. Patient has tried Tylenol, Motrin and ice without significant relief. Patient uses a rolling walker at baseline.       Allergies    allopurinol (Other)    Intolerances      PAST MEDICAL & SURGICAL HISTORY:  HTN (hypertension)    Hemophilia A    Gout    History of BPH    Chronic kidney disease (CKD)    DRESS syndrome    Uses cochlear implant      MEDICATIONS  (STANDING):    MEDICATIONS  (PRN):      Vital Signs Last 24 Hrs  T(C): 36.8 (21 Jan 2022 14:42), Max: 36.8 (21 Jan 2022 14:42)  T(F): 98.3 (21 Jan 2022 14:42), Max: 98.3 (21 Jan 2022 14:42)  HR: 93 (21 Jan 2022 14:42) (93 - 93)  BP: 137/64 (21 Jan 2022 14:42) (137/64 - 137/64)  BP(mean): --  RR: 18 (21 Jan 2022 14:42) (18 - 18)  SpO2: 100% (21 Jan 2022 14:42) (100% - 100%)    Physical Exam:  Patient is sitting comfortably in exam bed with his hips in a flexed position. He is not in distress. He answers questions appropriately. There is a rolling walker bedside.   Exam shows significant atrophy of the lower extremities.   Exam of the right hip does not show erythema, ecchymosis, abrasions, lesions or signs of infection. There is no obvious effusion appreciated.   Patient is tender to palpation about the superior aspect of the greater trochanter.   General sensation to light touch intact bilat LE  Patient requires assistance to SLR but is able to actively hold SLR once the leg is raised.   Patient has mild pain with passive IR and ER with the right hip flexed. No obvious distress with motion.   Mild discomfort with log roll RLE  Active hip flex/ext/knee flex/ext intact  TA/GS/EHL/FHL firing bilat LE    Labs (Allscripts) 1/19/22:  EBC 13.7; ESR 94; CRP 97    Imaging:   Gallium Scan 1/20/22  There is gallium uptake in the right hip joint with associated hypodensity in the right hip joint suspicious for infection.    The activity at the L5-S1 level may be due to resolving inflammatory changes.  Uptake within the L5-S1 vertebral body levels and disc space with a greater extent of uptake on gallium compared to bone agent which is suspicious for discitis osteomyelitis. There is persistent prevertebral and presacral soft tissue swelling although to a lesser extent than prior CT lumbar spine of 1121.    A/P: 76yo male with previous history of MSSA and lumbar discitis, with new atraumatic right hip pain x 1 week.  - Given preserved active ROM and patient sitting with hips in flexed position, low suspicion for right septic hip joint.   - Needs right hip XR. Due to cochlear implants and previous CKD that required HD, CT with contrast and MRI are contraindicated  - Pending imaging, could consider right hip aspiration with IR. Would need heme consult secondary to hemophilia.   - ID involvement     -Discussed with Dr. Miguel Pager [phone]   Orthopaedic Surgery Consult Note    For Surgeon: Dr. 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 Patient was previously hospitalized at [facility] in September and November 2021 with MSSA NVA of MV and lumbar discitis (Ortho spine consulted)). Patient had previous treatment with IV abx, Daptomycin. He finished his most recent antibiotic treatment 12/23/22. He is currently followed by Dr. Barnard in Infectious Disease. He had been having minimal back pain with a short flare up that resolved at the beginning of this year. When his hip pain developed, he followed up with Dr. Barnard where his labs showed elevated WBC, ESR, CRP (1/19/22: 13.7, 94, 97). A gallium scan was ordered 1/20/22 that showed right hip effusion, negative right hip bone uptake, and lingering uptake in the lumbar spine. He was sent to the ED due concerns of hip effusion.   Patient reports pain is located at the lateral hip. It does not radiate down the legs. He denies numbness/tingling, saddle anesthesia or loss of bowel or urinary control. Patient can bear weight with discomfort. Endorses pain when lying on the right side. Denies pain at rest. Patient has tried Tylenol, Motrin and ice without significant relief. Patient uses a rolling walker at baseline.     Patient currently not taking medications. Previous history of HD but discontinued due to stable labs.  Lives with his wife.  Daughter lives nearby.    Allergies    allopurinol (Other)    Intolerances      PAST MEDICAL & SURGICAL HISTORY:  HTN (hypertension)    Hemophilia A    Gout    History of BPH    Chronic kidney disease (CKD)    DRESS syndrome    Uses cochlear implant      MEDICATIONS  (STANDING):    MEDICATIONS  (PRN):      Vital Signs Last 24 Hrs  T(C): 36.8 (21 Jan 2022 14:42), Max: 36.8 (21 Jan 2022 14:42)  T(F): 98.3 (21 Jan 2022 14:42), Max: 98.3 (21 Jan 2022 14:42)  HR: 93 (21 Jan 2022 14:42) (93 - 93)  BP: 137/64 (21 Jan 2022 14:42) (137/64 - 137/64)  BP(mean): --  RR: 18 (21 Jan 2022 14:42) (18 - 18)  SpO2: 100% (21 Jan 2022 14:42) (100% - 100%)    Physical Exam:  Patient is sitting comfortably in exam bed with his hips in a flexed position. He is not in distress. He answers questions appropriately. There is a rolling walker bedside.   Exam shows significant atrophy of the lower extremities.   Exam of the right hip does not show erythema, ecchymosis, abrasions, lesions or signs of infection. There is no obvious effusion appreciated.   Patient is tender to palpation about the superior aspect of the greater trochanter.   General sensation to light touch intact bilat LE  Patient requires assistance to SLR but is able to actively hold SLR once the leg is raised.   Patient has mild pain with passive IR and ER with the right hip flexed. No obvious distress with motion.   Mild discomfort with log roll RLE  Active hip flex/ext/knee flex/ext intact  TA/GS/EHL/FHL firing bilat LE    Labs (Allscripts) 1/19/22:  EBC 13.7; ESR 94; CRP 97    Imaging:   Gallium Scan 1/20/22  There is gallium uptake in the right hip joint with associated hypodensity in the right hip joint suspicious for infection.    The activity at the L5-S1 level may be due to resolving inflammatory changes.  Uptake within the L5-S1 vertebral body levels and disc space with a greater extent of uptake on gallium compared to bone agent which is suspicious for discitis osteomyelitis. There is persistent prevertebral and presacral soft tissue swelling although to a lesser extent than prior CT lumbar spine of 1121.    A/P: 76yo male with previous history of MSSA and lumbar discitis, with new atraumatic right hip pain x 1 week.  - Given preserved active ROM and patient sitting with hips in flexed position, low suspicion for right septic hip joint.   - Needs right hip XR. Due to cochlear implants and previous CKD that required HD, CT with contrast and MRI are contraindicated  - Pending imaging, could consider right hip aspiration with IR. Would need heme consult secondary to hemophilia.   - ID involvement     -Discussed with Dr. Miguel Pager [phone]   Orthopaedic Surgery Consult Note    For Surgeon: Dr. 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 Patient was previously hospitalized at [facility] in September and November 2021 with MSSA NVA of MV and lumbar discitis (Ortho spine consulted)). Patient had previous treatment with IV abx, Daptomycin. He finished his most recent antibiotic treatment 12/23/22. He is currently followed by Dr. Barnard in Infectious Disease. He had been having minimal back pain with a short flare up that resolved at the beginning of this year. When his hip pain developed, he followed up with Dr. Barnard where his labs showed elevated WBC, ESR, CRP (1/19/22: 13.7, 94, 97). A gallium scan was ordered 1/20/22 that showed right hip effusion, negative right hip bone uptake, and lingering uptake in the lumbar spine. He was sent to the ED due concerns of hip effusion.   Patient reports pain is located at the lateral hip. It does not radiate down the legs. He denies numbness/tingling, saddle anesthesia or loss of bowel or urinary control. Patient can bear weight with discomfort. Endorses pain when lying on the right side. Denies pain at rest. Patient has tried Tylenol, Motrin and ice without significant relief. Patient uses a rolling walker at baseline.     Patient currently not taking medications. Previous history of HD but discontinued due to stable labs.  Lives with his wife.  Daughter lives nearby.    Allergies    allopurinol (Other)    Intolerances      PAST MEDICAL & SURGICAL HISTORY:  HTN (hypertension)    Hemophilia A    Gout    History of BPH    Chronic kidney disease (CKD)    DRESS syndrome    Uses cochlear implant      MEDICATIONS  (STANDING):    MEDICATIONS  (PRN):      Vital Signs Last 24 Hrs  T(C): 36.8 (21 Jan 2022 14:42), Max: 36.8 (21 Jan 2022 14:42)  T(F): 98.3 (21 Jan 2022 14:42), Max: 98.3 (21 Jan 2022 14:42)  HR: 93 (21 Jan 2022 14:42) (93 - 93)  BP: 137/64 (21 Jan 2022 14:42) (137/64 - 137/64)  BP(mean): --  RR: 18 (21 Jan 2022 14:42) (18 - 18)  SpO2: 100% (21 Jan 2022 14:42) (100% - 100%)    Physical Exam:  Patient is sitting comfortably in exam bed with his hips in a flexed position. He is not in distress. He answers questions appropriately. There is a rolling walker bedside.   Exam shows significant atrophy of the lower extremities.   Exam of the right hip does not show erythema, ecchymosis, abrasions, lesions or signs of infection. There is no obvious effusion appreciated.   Patient is tender to palpation about the superior aspect of the greater trochanter.   General sensation to light touch intact bilat LE  Patient requires assistance to SLR but is able to actively hold SLR once the leg is raised.   Patient has mild pain with passive IR and ER with the right hip flexed. No obvious distress with motion.   Mild discomfort with log roll RLE  Active hip flex/ext/knee flex/ext intact  TA/GS/EHL/FHL firing bilat LE    Labs (Allscripts) 1/19/22:  EBC 13.7; ESR 94; CRP 97    Imaging:   Gallium Scan 1/20/22  There is gallium uptake in the right hip joint with associated hypodensity in the right hip joint suspicious for infection.    The activity at the L5-S1 level may be due to resolving inflammatory changes.  Uptake within the L5-S1 vertebral body levels and disc space with a greater extent of uptake on gallium compared to bone agent which is suspicious for discitis osteomyelitis. There is persistent prevertebral and presacral soft tissue swelling although to a lesser extent than prior CT lumbar spine of 1121.    A/P: 78yo male with previous history of MSSA and lumbar discitis, with new atraumatic right hip pain x 1 week.  - Given preserved active ROM and patient sitting with hips in flexed position, low suspicion for right septic hip joint.   - Needs right hip XR. Due to cochlear implants and previous CKD that required HD, CT with contrast and MRI are contraindicated  - Can consider right hip aspiration with IR. Would require heme consult secondary to hemophilia.   - ID involvement     -Discussed with Dr. Miguel Pager [phone]   Orthopaedic Surgery Consult Note    For Surgeon: Dr. 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 Patient was previously hospitalized at [facility] in September and November 2021 with MSSA NVA of MV and lumbar discitis (Ortho spine consulted)). Patient had previous treatment with IV abx, Daptomycin. He finished his most recent antibiotic treatment 12/23/22. He is currently followed by Dr. Barnard in Infectious Disease. He had been having minimal back pain with a short flare up that resolved at the beginning of this year. When his hip pain developed, he followed up with Dr. Barnard where his labs showed elevated WBC, ESR, CRP (1/19/22: 13.7, 94, 97). A gallium scan was ordered 1/20/22 that showed right hip effusion, negative right hip bone uptake, and lingering uptake in the lumbar spine. He was sent to the ED due concerns of hip effusion.   Patient reports pain is located at the lateral hip. It does not radiate down the legs. He denies numbness/tingling, saddle anesthesia or loss of bowel or urinary control. Patient can bear weight with discomfort. Endorses pain when lying on the right side. Denies pain at rest. Patient has tried Tylenol, Motrin and ice without significant relief. Patient uses a rolling walker at baseline.     Patient currently not taking medications. Previous history of HD but discontinued due to stable labs.  Lives with his wife.  Daughter lives nearby.    Allergies    allopurinol (Other)    Intolerances      PAST MEDICAL & SURGICAL HISTORY:  HTN (hypertension)    Hemophilia A    Gout    History of BPH    Chronic kidney disease (CKD)    DRESS syndrome    Uses cochlear implant      MEDICATIONS  (STANDING):    MEDICATIONS  (PRN):      Vital Signs Last 24 Hrs  T(C): 36.8 (21 Jan 2022 14:42), Max: 36.8 (21 Jan 2022 14:42)  T(F): 98.3 (21 Jan 2022 14:42), Max: 98.3 (21 Jan 2022 14:42)  HR: 93 (21 Jan 2022 14:42) (93 - 93)  BP: 137/64 (21 Jan 2022 14:42) (137/64 - 137/64)  BP(mean): --  RR: 18 (21 Jan 2022 14:42) (18 - 18)  SpO2: 100% (21 Jan 2022 14:42) (100% - 100%)    Physical Exam:  Patient is sitting comfortably in exam bed with his hips in a flexed position. He is not in distress. He answers questions appropriately. There is a rolling walker bedside.   Exam shows significant atrophy of the lower extremities.   Exam of the right hip does not show erythema, ecchymosis, abrasions, lesions or signs of infection. There is no obvious effusion appreciated.   Patient is tender to palpation about the superior aspect of the greater trochanter.   General sensation to light touch intact bilat LE  Patient requires assistance to SLR but is able to actively hold SLR once the leg is raised.   Patient has mild pain with passive IR and ER with the right hip flexed. No obvious distress with motion.   Mild discomfort with log roll RLE  Active hip flex/ext/knee flex/ext intact  TA/GS/EHL/FHL firing bilat LE    Labs (Allscripts) 1/19/22:  EBC 13.7; ESR 94; CRP 97    Imaging:   Gallium Scan 1/20/22  There is gallium uptake in the right hip joint with associated hypodensity in the right hip joint suspicious for infection.    The activity at the L5-S1 level may be due to resolving inflammatory changes.  Uptake within the L5-S1 vertebral body levels and disc space with a greater extent of uptake on gallium compared to bone agent which is suspicious for discitis osteomyelitis. There is persistent prevertebral and presacral soft tissue swelling although to a lesser extent than prior CT lumbar spine of 1121.    A/P: 78yo male with previous history of MSSA and lumbar discitis, with new atraumatic right hip pain x 1 week.  - Given preserved active ROM and patient sitting with hips in flexed position, low suspicion for right septic hip joint.   - Needs right hip XR. Due to cochlear implants and previous CKD that required HD, CT with contrast and MRI are contraindicated  - Pending XR, can consider right hip aspiration with IR. Would require heme consult secondary to hemophilia.   - ID involvement     -Discussed with Dr. Miguel Pager [phone]

## 2022-01-21 NOTE — ED PROVIDER NOTE - ATTENDING CONTRIBUTION TO CARE
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 Barnard due to concern for R hip septic joint from gallium scan done yesterday.  on exam pt afebrile, well appearing, lungs ctab, hrrr, abd soft/nt, no skin changes, Ext: diffuse muscle wasting in all ext, FROM throughout, pain w/ ROM R hip but not limited, no joint ttp/deformity or edema, no peripheral edema/calf ttp, distal pulses intact, SILT throughout equally.  will obtain labs and d/w ortho/ID    Agree with above note as documented by PA.  I was available as the supervising attending during patient's ER evaluation.

## 2022-01-21 NOTE — H&P ADULT - PROBLEM SELECTOR PLAN 6
F: none  E: replete prn  N: renal, NPO after MN  DVT ppx: SCDs, hold AC i/s/o hemophilia  Dispo: [facility]

## 2022-01-21 NOTE — H&P ADULT - NSICDXPASTSURGICALHX_GEN_ALL_CORE_FT
PAST SURGICAL HISTORY:  Uses cochlear implant      PAST SURGICAL HISTORY:  History of cholecystectomy     Uses cochlear implant

## 2022-01-21 NOTE — H&P ADULT - NSHPLABSRESULTS_GEN_ALL_CORE
LABS:                        11.2   11.83 )-----------( 263      ( 2022 17:12 )             33.3         140  |  106  |  36<H>  ----------------------------<  113<H>  5.2   |  21<L>  |  1.93<H>    Ca    10.5      2022 17:12    TPro  6.8  /  Alb  3.5  /  TBili  0.4  /  DBili  x   /  AST  18  /  ALT  16  /  AlkPhos  313<H>      PT/INR - ( 2022 17:12 )   PT: 13.4 sec;   INR: 1.12          PTT - ( 2022 17:12 )  PTT:46.9 sec  Urinalysis Basic - ( 2022 17:28 )    Color: Yellow / Appearance: Clear / S.015 / pH: x  Gluc: x / Ketone: NEGATIVE  / Bili: Negative / Urobili: 0.2 E.U./dL   Blood: x / Protein: NEGATIVE mg/dL / Nitrite: NEGATIVE   Leuk Esterase: NEGATIVE / RBC: x / WBC x   Sq Epi: x / Non Sq Epi: x / Bacteria: x      CAPILLARY BLOOD GLUCOSE          RADIOLOGY & ADDITIONAL TESTS: Reviewed.

## 2022-01-21 NOTE — H&P ADULT - PROBLEM SELECTOR PLAN 4
p/w H/H: 11.2/33.3. Normocytic. Iron labs from 09/21 demonstrates likely ACD. No s/s bleeding  - trend H/H, active T&S  - transfuse hb>7    #h/o MSSA bacteremia   #hx of endocarditis of MV  #h/o spinal OM  s/p IV daptomycin, complete PO linezolid course. Last antibiotic dose on 12/23. No longer on treatment.

## 2022-01-21 NOTE — ED PROVIDER NOTE - PHYSICAL EXAMINATION
Vitals reviewed  Gen: well appearing elderly M, nad, speaking in full sentences  Skin: wwp, no rash/lesions, no erythema or warmth   HEENT: ncat, eomi, mmm  Neck/Back: no midline ttp/step off, FROM   CV: rrr, no audible m/r/g  Resp: symmetrical expansion, ctab, no w/r/r  Abd: nondistended, soft, nontender, no rebound/guarding,  Ext: diffuse muscle wasting in all ext, FROM throughout, pain w/ ROM R hip but not limited, no joint ttp/deformity or edema, no peripheral edema/calf ttp, distal pulses intact, SILT throughout equally   Neuro: alert/oriented, no focal deficits

## 2022-01-21 NOTE — H&P ADULT - NSHPPHYSICALEXAM_GEN_ALL_CORE
VITAL SIGNS:  Vital Signs Last 24 Hrs  T(C): 36.8 (21 Jan 2022 14:42), Max: 36.8 (21 Jan 2022 14:42)  T(F): 98.3 (21 Jan 2022 14:42), Max: 98.3 (21 Jan 2022 14:42)  HR: 92 (21 Jan 2022 16:50) (92 - 93)  BP: 147/67 (21 Jan 2022 16:50) (137/64 - 147/67)  BP(mean): --  RR: 18 (21 Jan 2022 16:50) (18 - 18)  SpO2: 98% (21 Jan 2022 16:50) (98% - 100%)  I&O's Summary      PHYSICAL EXAM:    General: elderly male, NAD  HEENT: hard of hearing, PERRL, anicteric sclera; MMM  Cardiovascular: +S1/S2; RRR  Respiratory: CTA B/L; no W/R/R  Gastrointestinal: soft, NT/ND; +BSx4  Extremities: R hip + pain on extension. No erythema, swelling around hip.  Vascular: 2+ radial, DP/PT pulses B/L  Neurological: AAOx3; no focal deficits

## 2022-01-21 NOTE — ED ADULT NURSE NOTE - CAS TRG GENERAL NORM CIRC DET
negative Strong peripheral pulses Affect and characteristics of appearance, verbalizations, behaviors are appropriate

## 2022-01-21 NOTE — H&P ADULT - PROBLEM SELECTOR PLAN 5
F: none  E: replete prn  N: renal, NPO after MN  DVT ppx: SCDs, hold AC i/s/o hemophilia  Dispo: [facility] patient with elevation in alkaline phosphatase from admission 160 -> 313. No abdominal pain/tenderness  - f/u GGT  - trend CMP

## 2022-01-21 NOTE — CONSULT NOTE ADULT - ASSESSMENT
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   - f/u bcx 1/21  - ortho evaluation for consideration of diagnostic arthrocentesis (?need for hematology clearance prior to procedure)   - following collection of above specimens, can start empiric daptomycin 400mg IV q48h (check baseline CK) plus Zosyn 2.25g IV q6h    d/w ED     Dr. Edgar to cover this evening through Sunday; I return Monday 1/24    ID Team 2

## 2022-01-21 NOTE — H&P ADULT - ATTENDING COMMENTS
reviewed pertinent data , h&p  patient seen and examined overnight   a/f right hip pain r/o septic joint.     PE as above, in addition pt is thin, elderly w/ cochlear implants ; +Right hip pain on extension and abduction    1. right hip pain r/o septic joint given gallium scan studies; pending IR arthrocentesis, consult heme re: hemophilia management; NPO overnight, followup ID recs. abx initiation s/p arthrocentesis        rest of  plan as above

## 2022-01-21 NOTE — ED CLERICAL - NS ED CLERK NOTE PRE-ARRIVAL INFORMATION; ADDITIONAL PRE-ARRIVAL INFORMATION
78 Y/O M YAIR FELDMAN BEING SENT IN BY SEPTIC ARTHIRITIS OF RIGHT HIP   DR WEST FOR ID   ORTHO CONSULT   HEMATOLOGY CONSULT

## 2022-01-21 NOTE — CONSULT NOTE ADULT - ASSESSMENT
77M PMHx CKD from DRESS 2/2 allopurinol for gout, off hemodialysis, baseline creatinine ~2-2.6, Hemophilia A, HTN, MSSA bacteremia (3/2021) presenting with OM of R hip. Patient endorses pain to R hip but denies other complaints. No CP SOB fever dysuria. Making "normal amount of urine." No dysuria f/c n/v or abdominal pain. Nephrology consulted for elevated creatinine.     Assessment/Plan:   #CKD r/o RU pending labs     Recommend:   daily BMP + urine lytes (Na, Cr, Urea, Osm)   strict I/Os   renal diet     Further recs pending today's labs.     Thank you for the opportunity to participate in the care of your patient. The nephrology service remains available to assist with any questions or concerns. Please feel free to reach us by paging the on-call nephrology fellow for urgent issues or as below.     Ramón Dumont M.D.   PGY-5, Nephrology Fellow   C: [phone]   P: [phone]

## 2022-01-21 NOTE — ED PROVIDER NOTE - CLINICAL SUMMARY MEDICAL DECISION MAKING FREE TEXT BOX
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 Barnard due to concern for R hip septic joint from gallium scan done yesterday.  on exam pt afebrile, well appearing, lungs ctab, hrrr, abd soft/nt, no skin changes, Ext: diffuse muscle wasting in all ext, FROM throughout, pain w/ ROM R hip but not limited, no joint ttp/deformity or edema, no peripheral edema/calf ttp, distal pulses intact, SILT throughout equally.  will obtain labs and d/w ortho/ID

## 2022-01-21 NOTE — H&P ADULT - PROBLEM SELECTOR PLAN 1
#R/o septic joint  presents with 1 week of R hip pain worse on weight bearing and extension of hip. No hx of trauma. Gallium scan c/f R hip infection, resolving lumbar OM. Ddx r/o septic joint vs hip fracture vs OA.  - CT w/ IV contrast and MR are contraindicated due to recent hx of HD/CKD and cochlear implants  - X ray hip official read pending  - Ortho consulted, less c/f septic joint as patient is able to at least partially bear weight on hip. No indication for an urgent orthocentesis. f/u recs  - IR consult in AM for arthrocentesis  - Heme consult in AM prior to procedure given hx of hemophilia A  - ID following, appreciate recs  - f/u bcx 1/21  - per ID recs - following arthrocentesis, can start empiric daptomycin 400mg IV q48h (check baseline CK) plus Zosyn 2.25g IV q6h

## 2022-01-21 NOTE — ED PROVIDER NOTE - PROGRESS NOTE DETAILS
d/w ID Dr. Scnheider, will hold abx at this time pending possible arthrocentesis R hip, if pt becomes septic can start dapto/zosyn. d/w ID Dr. Schneider, will hold abx at this time pending possible arthrocentesis R hip, after cult/joint asp or if pt becomes septic can start dapto/zosyn.  d/w ortho who eval pt and low suspicion for septic joint at this time, can consider IR aspiration (need heme to clear prior to procedure), plan for medicine admission pending labs

## 2022-01-21 NOTE — H&P ADULT - PROBLEM SELECTOR PLAN 2
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 Cr improved from 6.59 (02/21) to 1.93 this admission. He was briefly on HD for months but was weaned off of it.  - f/u urine lytes  - monitor I&Os  - trend BMP, avoid nephrotoxic agents  - nephro following, appreciate recs

## 2022-01-21 NOTE — ED ADULT TRIAGE NOTE - CHIEF COMPLAINT QUOTE
78 y/o male brought in by daughter for right hip pain, PMD wants to r/o septic knee. Pt with hx of osteomyelitis.

## 2022-01-21 NOTE — H&P ADULT - PROBLEM SELECTOR PLAN 3
hx of hemophilia A, followed by heme/onc and sees Dr. Fischer outpatient. Required factor VIII prior to PICC placement last admission. Currently no s/s bleeding.   - heme/onc consult in AM for potential arthrocentesis as above  - f/u factor VIII levels

## 2022-01-21 NOTE — H&P ADULT - ASSESSMENT
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

## 2022-01-21 NOTE — ED ADULT NURSE NOTE - IS THE PATIENT ABLE TO BE SCREENED?
Dr. Tilley updated on positive blood cultures no further orders.  Place steri strips on incision.   Yes

## 2022-01-22 DIAGNOSIS — Z90.49 ACQUIRED ABSENCE OF OTHER SPECIFIED PARTS OF DIGESTIVE TRACT: Chronic | ICD-10-CM

## 2022-01-22 LAB
ALBUMIN SERPL ELPH-MCNC: 3.4 G/DL — SIGNIFICANT CHANGE UP (ref 3.3–5)
ALP SERPL-CCNC: 277 U/L — HIGH (ref 40–120)
ALT FLD-CCNC: 12 U/L — SIGNIFICANT CHANGE UP (ref 10–45)
ANION GAP SERPL CALC-SCNC: 13 MMOL/L — SIGNIFICANT CHANGE UP (ref 5–17)
AST SERPL-CCNC: 10 U/L — SIGNIFICANT CHANGE UP (ref 10–40)
B PERT IGG+IGM PNL SER: SIGNIFICANT CHANGE UP
BASOPHILS # BLD AUTO: 0.05 K/UL — SIGNIFICANT CHANGE UP (ref 0–0.2)
BASOPHILS NFR BLD AUTO: 0.4 % — SIGNIFICANT CHANGE UP (ref 0–2)
BILIRUB SERPL-MCNC: 0.4 MG/DL — SIGNIFICANT CHANGE UP (ref 0.2–1.2)
BLD GP AB SCN SERPL QL: NEGATIVE — SIGNIFICANT CHANGE UP
BUN SERPL-MCNC: 32 MG/DL — HIGH (ref 7–23)
CALCIUM SERPL-MCNC: 10.3 MG/DL — SIGNIFICANT CHANGE UP (ref 8.4–10.5)
CHLORIDE SERPL-SCNC: 108 MMOL/L — SIGNIFICANT CHANGE UP (ref 96–108)
CHOLEST SERPL-MCNC: 158 MG/DL — SIGNIFICANT CHANGE UP
CK SERPL-CCNC: 11 U/L — LOW (ref 30–200)
CO2 SERPL-SCNC: 20 MMOL/L — LOW (ref 22–31)
COLOR FLD: YELLOW — SIGNIFICANT CHANGE UP
CREAT SERPL-MCNC: 1.87 MG/DL — HIGH (ref 0.5–1.3)
EOSINOPHIL # BLD AUTO: 0.36 K/UL — SIGNIFICANT CHANGE UP (ref 0–0.5)
EOSINOPHIL NFR BLD AUTO: 3.1 % — SIGNIFICANT CHANGE UP (ref 0–6)
FACT VIII ACT/NOR PPP: 32 % — LOW (ref 51–138)
FLUID INTAKE SUBSTANCE CLASS: SIGNIFICANT CHANGE UP
FLUID SEGMENTED GRANULOCYTES: 92 % — SIGNIFICANT CHANGE UP
GLUCOSE FLD-MCNC: 32 MG/DL — SIGNIFICANT CHANGE UP
GLUCOSE SERPL-MCNC: 95 MG/DL — SIGNIFICANT CHANGE UP (ref 70–99)
HCT VFR BLD CALC: 32.9 % — LOW (ref 39–50)
HDLC SERPL-MCNC: 33 MG/DL — LOW
HGB BLD-MCNC: 10.7 G/DL — LOW (ref 13–17)
IMM GRANULOCYTES NFR BLD AUTO: 0.4 % — SIGNIFICANT CHANGE UP (ref 0–1.5)
LIPID PNL WITH DIRECT LDL SERPL: 104 MG/DL — HIGH
LYMPHOCYTES # BLD AUTO: 1.37 K/UL — SIGNIFICANT CHANGE UP (ref 1–3.3)
LYMPHOCYTES # BLD AUTO: 11.6 % — LOW (ref 13–44)
LYMPHOCYTES # FLD: 1 % — SIGNIFICANT CHANGE UP
MAGNESIUM SERPL-MCNC: 2 MG/DL — SIGNIFICANT CHANGE UP (ref 1.6–2.6)
MCHC RBC-ENTMCNC: 27.7 PG — SIGNIFICANT CHANGE UP (ref 27–34)
MCHC RBC-ENTMCNC: 32.5 GM/DL — SIGNIFICANT CHANGE UP (ref 32–36)
MCV RBC AUTO: 85.2 FL — SIGNIFICANT CHANGE UP (ref 80–100)
MONOCYTES # BLD AUTO: 0.93 K/UL — HIGH (ref 0–0.9)
MONOCYTES NFR BLD AUTO: 7.9 % — SIGNIFICANT CHANGE UP (ref 2–14)
MONOS+MACROS # FLD: 7 % — SIGNIFICANT CHANGE UP
NEUTROPHILS # BLD AUTO: 9.01 K/UL — HIGH (ref 1.8–7.4)
NEUTROPHILS NFR BLD AUTO: 76.6 % — SIGNIFICANT CHANGE UP (ref 43–77)
NON HDL CHOLESTEROL: 125 MG/DL — SIGNIFICANT CHANGE UP
NRBC # BLD: 0 /100 WBCS — SIGNIFICANT CHANGE UP (ref 0–0)
PHOSPHATE SERPL-MCNC: 3.1 MG/DL — SIGNIFICANT CHANGE UP (ref 2.5–4.5)
PLATELET # BLD AUTO: 282 K/UL — SIGNIFICANT CHANGE UP (ref 150–400)
POTASSIUM SERPL-MCNC: 4.5 MMOL/L — SIGNIFICANT CHANGE UP (ref 3.5–5.3)
POTASSIUM SERPL-SCNC: 4.5 MMOL/L — SIGNIFICANT CHANGE UP (ref 3.5–5.3)
PROT FLD-MCNC: 4.7 G/DL — SIGNIFICANT CHANGE UP
PROT SERPL-MCNC: 6.3 G/DL — SIGNIFICANT CHANGE UP (ref 6–8.3)
RBC # BLD: 3.86 M/UL — LOW (ref 4.2–5.8)
RBC # FLD: 14.6 % — HIGH (ref 10.3–14.5)
RCV VOL RI: HIGH /UL (ref 0–0)
RH IG SCN BLD-IMP: POSITIVE — SIGNIFICANT CHANGE UP
SODIUM SERPL-SCNC: 141 MMOL/L — SIGNIFICANT CHANGE UP (ref 135–145)
SYNOVIAL CRYSTALS CLARITY: ABNORMAL
SYNOVIAL CRYSTALS COLOR: YELLOW
SYNOVIAL CRYSTALS ID: SIGNIFICANT CHANGE UP
SYNOVIAL CRYSTALS TUBE: SIGNIFICANT CHANGE UP
TOTAL NUCLEATED CELL COUNT, BODY FLUID: SIGNIFICANT CHANGE UP /UL
TRIGL SERPL-MCNC: 106 MG/DL — SIGNIFICANT CHANGE UP
TUBE TYPE: SIGNIFICANT CHANGE UP
URATE UR-MCNC: 34.5 MG/DL — SIGNIFICANT CHANGE UP
WBC # BLD: 11.77 K/UL — HIGH (ref 3.8–10.5)
WBC # FLD AUTO: 11.77 K/UL — HIGH (ref 3.8–10.5)

## 2022-01-22 PROCEDURE — 27030 ARTHROTOMY HIP W/DRAINAGE: CPT | Mod: RT

## 2022-01-22 PROCEDURE — 99233 SBSQ HOSP IP/OBS HIGH 50: CPT | Mod: GC

## 2022-01-22 RX ORDER — OXYCODONE HYDROCHLORIDE 5 MG/1
5 TABLET ORAL
Refills: 0 | Status: DISCONTINUED | OUTPATIENT
Start: 2022-01-23 | End: 2022-01-28

## 2022-01-22 RX ORDER — HYDROMORPHONE HYDROCHLORIDE 2 MG/ML
1 INJECTION INTRAMUSCULAR; INTRAVENOUS; SUBCUTANEOUS ONCE
Refills: 0 | Status: DISCONTINUED | OUTPATIENT
Start: 2022-01-22 | End: 2022-01-22

## 2022-01-22 RX ORDER — PIPERACILLIN AND TAZOBACTAM 4; .5 G/20ML; G/20ML
2.25 INJECTION, POWDER, LYOPHILIZED, FOR SOLUTION INTRAVENOUS EVERY 6 HOURS
Refills: 0 | Status: DISCONTINUED | OUTPATIENT
Start: 2022-01-23 | End: 2022-01-24

## 2022-01-22 RX ORDER — INFLUENZA VIRUS VACCINE 15; 15; 15; 15 UG/.5ML; UG/.5ML; UG/.5ML; UG/.5ML
0.7 SUSPENSION INTRAMUSCULAR ONCE
Refills: 0 | Status: DISCONTINUED | OUTPATIENT
Start: 2022-01-22 | End: 2022-01-28

## 2022-01-22 RX ORDER — ONDANSETRON 8 MG/1
4 TABLET, FILM COATED ORAL EVERY 6 HOURS
Refills: 0 | Status: DISCONTINUED | OUTPATIENT
Start: 2022-01-23 | End: 2022-01-28

## 2022-01-22 RX ORDER — DAPTOMYCIN 500 MG/10ML
400 INJECTION, POWDER, LYOPHILIZED, FOR SOLUTION INTRAVENOUS
Refills: 0 | Status: CANCELLED | OUTPATIENT
Start: 2022-01-24 | End: 2022-01-22

## 2022-01-22 RX ORDER — DAPTOMYCIN 500 MG/10ML
400 INJECTION, POWDER, LYOPHILIZED, FOR SOLUTION INTRAVENOUS ONCE
Refills: 0 | Status: DISCONTINUED | OUTPATIENT
Start: 2022-01-22 | End: 2022-01-22

## 2022-01-22 RX ORDER — PIPERACILLIN AND TAZOBACTAM 4; .5 G/20ML; G/20ML
2.25 INJECTION, POWDER, LYOPHILIZED, FOR SOLUTION INTRAVENOUS ONCE
Refills: 0 | Status: COMPLETED | OUTPATIENT
Start: 2022-01-22 | End: 2022-01-22

## 2022-01-22 RX ORDER — HYDROMORPHONE HYDROCHLORIDE 2 MG/ML
0.5 INJECTION INTRAMUSCULAR; INTRAVENOUS; SUBCUTANEOUS EVERY 4 HOURS
Refills: 0 | Status: DISCONTINUED | OUTPATIENT
Start: 2022-01-23 | End: 2022-01-28

## 2022-01-22 RX ORDER — DAPTOMYCIN 500 MG/10ML
INJECTION, POWDER, LYOPHILIZED, FOR SOLUTION INTRAVENOUS
Refills: 0 | Status: DISCONTINUED | OUTPATIENT
Start: 2022-01-23 | End: 2022-01-24

## 2022-01-22 RX ORDER — DAPTOMYCIN 500 MG/10ML
400 INJECTION, POWDER, LYOPHILIZED, FOR SOLUTION INTRAVENOUS EVERY 24 HOURS
Refills: 0 | Status: DISCONTINUED | OUTPATIENT
Start: 2022-01-24 | End: 2022-01-24

## 2022-01-22 RX ORDER — PIPERACILLIN AND TAZOBACTAM 4; .5 G/20ML; G/20ML
2.25 INJECTION, POWDER, LYOPHILIZED, FOR SOLUTION INTRAVENOUS EVERY 6 HOURS
Refills: 0 | Status: DISCONTINUED | OUTPATIENT
Start: 2022-01-22 | End: 2022-01-22

## 2022-01-22 RX ORDER — DAPTOMYCIN 500 MG/10ML
INJECTION, POWDER, LYOPHILIZED, FOR SOLUTION INTRAVENOUS
Refills: 0 | Status: DISCONTINUED | OUTPATIENT
Start: 2022-01-22 | End: 2022-01-22

## 2022-01-22 RX ORDER — ACETAMINOPHEN 500 MG
650 TABLET ORAL EVERY 6 HOURS
Refills: 0 | Status: DISCONTINUED | OUTPATIENT
Start: 2022-01-23 | End: 2022-01-28

## 2022-01-22 RX ORDER — OXYCODONE HYDROCHLORIDE 5 MG/1
10 TABLET ORAL
Refills: 0 | Status: DISCONTINUED | OUTPATIENT
Start: 2022-01-23 | End: 2022-01-28

## 2022-01-22 RX ORDER — SENNA PLUS 8.6 MG/1
2 TABLET ORAL AT BEDTIME
Refills: 0 | Status: DISCONTINUED | OUTPATIENT
Start: 2022-01-23 | End: 2022-01-28

## 2022-01-22 RX ADMIN — HYDROMORPHONE HYDROCHLORIDE 1 MILLIGRAM(S): 2 INJECTION INTRAMUSCULAR; INTRAVENOUS; SUBCUTANEOUS at 16:20

## 2022-01-22 RX ADMIN — HYDROMORPHONE HYDROCHLORIDE 1 MILLIGRAM(S): 2 INJECTION INTRAMUSCULAR; INTRAVENOUS; SUBCUTANEOUS at 15:55

## 2022-01-22 NOTE — PROCEDURE NOTE - ADDITIONAL PROCEDURE DETAILS
Hip was localized using an ultrasound. Anterior head/neck junction was identified. Anterolateral portal was prepped with chlorhexidine. 18ga spinal was advanced to the head/neck junction under US navigation. 5cc of cloudy yellow fluid was withdrawn. Needle was removed. Minimal pinpoint bleeding was appreciated after needle removal. Gauze dressing was applied. Fluid was sent for cell counts, Gram stain, cultures, and crystal analysis. Patient tolerated procedure well without complication. Hip was localized using an ultrasound. Anterior head/neck junction was identified. Anterolateral portal was prepped with chlorhexidine. 18ga spinal was advanced to the head/neck junction under US navigation. 5cc of cloudy yellow fluid was withdrawn. Needle was removed. Minimal pinpoint bleeding was appreciated after needle removal. Gauze dressing was applied. Fluid was sent for cell counts, Gram stain, cultures, and crystal analysis. Patient tolerated procedure well without complication.    I re-examined the lateral aspect of the hip and his tenderness to palpation along the lateral aspect had resolved. There was no palpable fluctuant mass over the greater trochanteric bursa. I decided to forgo a bursal aspiration attempt given the abnormal appearance of the hip synovial fluid.

## 2022-01-22 NOTE — CONSULT NOTE ADULT - ASSESSMENT
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 Barnard due to concern for R hip septic joint from gallium scan done yesterday.    Hematology consulted for known hemophilia A.    Hemophilia A  Checked stat factor VIII level=32.  Given 1335 IU of recombinant VIII prior to planned bedside arthrocentesis.  This will bring his VIII level to at least 70%.  Monitor for post procedural joint bleeding (swelling, increasing pain, bruising).  If concern for hemarthrosis check stat imaging (US, CT) and if confirmed bleeding call heme/onc (I am on call the whole weekend)  repeat anticardiolipin and beta 2 microglobulin levels as his LA from Sep was positive but negative in November, other APL abs not repeated    D/w  (Heme SVC attending). 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 Barnard due to concern for R hip septic joint from gallium scan done yesterday.    Hematology consulted for known hemophilia A.    Hemophilia A  Checked stat factor VIII level=32.  Given 1335 IU of recombinant VIII prior to planned bedside arthrocentesis.  This will bring his VIII level to at least 70%.  Monitor for post procedural joint bleeding (swelling, increasing pain, bruising).  If concern for hemarthrosis check stat imaging (US, CT) and if confirmed bleeding call heme/onc (I am on call the whole weekend)  repeat anticardiolipin and beta 2 microglobulin levels as his LA from Sep was positive but negative in November, other APL abs not repeated  Trend daily coags and factor VIII level    D/w  (Heme SVC attending). 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 Barnard due to concern for R hip septic joint from gallium scan done yesterday.    Hematology consulted for known hemophilia A.    Hemophilia A  Checked stat factor VIII level=32.  Given 1335 IU of recombinant VIII prior to planned bedside arthrocentesis.  This will bring his VIII level to at least 70%.  Monitor for post procedural joint bleeding (swelling, increasing pain, bruising).  If concern for hemarthrosis check stat imaging (US, CT) and if confirmed bleeding call heme/onc (I am on call the whole weekend)  repeat anticardiolipin and beta 2 microglobulin levels as his LA from Sep was positive but negative in November, other APL abs not repeated  Trend daily coags and factor VIII level    D/w  (Heme SVC attending).    Addendum:  Aspiration positive for septic arthritis. Ortho plan for hip washout tonight.    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 Barnard due to concern for R hip septic joint from gallium scan done yesterday.    Hematology consulted for known hemophilia A.    Hemophilia A  Checked stat factor VIII level=32.  Given 1335 IU of recombinant VIII prior to planned bedside arthrocentesis.  This will bring his VIII level to at least 70%.  Monitor for post procedural joint bleeding (swelling, increasing pain, bruising).  If concern for hemarthrosis check stat imaging (US, CT) and if confirmed bleeding call heme/onc (I am on call the whole weekend)  repeat anticardiolipin and beta 2 microglobulin levels as his LA from Sep was positive but negative in November, other APL abs not repeated  Trend daily coags and factor VIII level    D/w  (Heme SVC attending).    Addendum:  Aspiration positive for septic arthritis. Ortho plan for hip washout tonight.    Recommendations as follows:  Give another 1335 IU of rFactor VIII at 8PM (2 hrs prior to planned OR time)  then continue maintenance factor VIII 1335 IU every 8hrs until 8PM tomorrow night  Then start with 1335 IU rFactor VIII q12 hrs for 2 more days  Monitor Factor VIII level daily  If bleeding can given Tranexamic acid

## 2022-01-22 NOTE — PROGRESS NOTE ADULT - SUBJECTIVE AND OBJECTIVE BOX
OVERNIGHT EVENTS: heather    SUBJECTIVE:  Patient seen and examined at bedside. reports pain at right hip w extension. pain when he presses on it too. no other complaints    Vital Signs Last 12 Hrs  T(F): 98.6 (22 @ 05:13), Max: 98.6 (22 @ 05:13)  HR: 85 (22 @ 05:13) (85 - 92)  BP: 143/72 (22 @ 05:13) (143/72 - 148/77)  BP(mean): --  RR: 18 (22 @ 05:13) (16 - 18)  SpO2: 98% (22 @ 05:13) (98% - 99%)  I&O's Summary      PHYSICAL EXAM:  General: elderly male, NAD  HEENT: hard of hearing, PERRL, anicteric sclera; MMM  Cardiovascular: +S1/S2; RRR  Respiratory: CTA B/L; no W/R/R  Gastrointestinal: soft, NT/ND; +BSx4  Extremities: R hip + pain on extension. No erythema, swelling around hip.  Vascular: 2+ radial, DP/PT pulses B/L  Neurological: AAOx3; no focal deficits        LABS:                        10.7   11.77 )-----------( 282      ( 2022 07:31 )             32.9         141  |  108  |  32<H>  ----------------------------<  95  4.5   |  20<L>  |  1.87<H>    Ca    10.3      2022 07:31  Phos  3.1       Mg     2.0         TPro  6.3  /  Alb  3.4  /  TBili  0.4  /  DBili  x   /  AST  10  /  ALT  12  /  AlkPhos  277<H>      PT/INR - ( 2022 17:12 )   PT: 13.4 sec;   INR: 1.12          PTT - ( 2022 17:12 )  PTT:46.9 sec  Urinalysis Basic - ( 2022 17:28 )    Color: Yellow / Appearance: Clear / S.015 / pH: x  Gluc: x / Ketone: NEGATIVE  / Bili: Negative / Urobili: 0.2 E.U./dL   Blood: x / Protein: NEGATIVE mg/dL / Nitrite: NEGATIVE   Leuk Esterase: NEGATIVE / RBC: x / WBC x   Sq Epi: x / Non Sq Epi: x / Bacteria: x          RADIOLOGY & ADDITIONAL TESTS:    MEDICATIONS  (STANDING):  influenza  Vaccine (HIGH DOSE) 0.7 milliLiter(s) IntraMuscular once    MEDICATIONS  (PRN):  acetaminophen     Tablet .. 650 milliGRAM(s) Oral every 6 hours PRN Mild Pain (1 - 3)

## 2022-01-22 NOTE — PROGRESS NOTE ADULT - SUBJECTIVE AND OBJECTIVE BOX
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 Upon receiving 1335 units of Factor 8, pt deemed safe for aspiration per heme onc which revealed septic arthritis Heme onc said to give 1335 units again 2 hours pre procedure. OR scheduled for 11pm tonight and pt will be going to orthopedic svc after.    SUBJECTIVE:  Patient seen and examined at bedside. Reports pain at right hip w extension. Pain when he presses on it too. No other complaints.    VITAL SIGNS:  Vital Signs Last 24 Hrs  T(C): 37.3 (22 Jan 2022 20:47), Max: 37.3 (22 Jan 2022 20:47)  T(F): 99.2 (22 Jan 2022 20:47), Max: 99.2 (22 Jan 2022 20:47)  HR: 107 (22 Jan 2022 20:47) (85 - 107)  BP: 152/83 (22 Jan 2022 20:47) (143/72 - 152/83)  BP(mean): --  RR: 18 (22 Jan 2022 20:47) (16 - 18)  SpO2: 97% (22 Jan 2022 20:47) (97% - 99%)    PHYSICAL EXAM:  General: elderly male, NAD  HEENT: hard of hearing, PERRL, anicteric sclera; MMM  Cardiovascular: +S1/S2; RRR  Respiratory: CTA B/L; no W/R/R  Gastrointestinal: soft, NT/ND; +BSx4  Extremities: R hip + pain on extension. No erythema, swelling around hip.  Vascular: 2+ radial, DP/PT pulses B/L  Neurological: AAOx3; no focal deficits    MEDICATIONS:  MEDICATIONS  (STANDING):  influenza  Vaccine (HIGH DOSE) 0.7 milliLiter(s) IntraMuscular once    MEDICATIONS  (PRN):  acetaminophen     Tablet .. 650 milliGRAM(s) Oral every 6 hours PRN Mild Pain (1 - 3)      ALLERGIES:  Allergies    allopurinol (Other)    Intolerances        LABS:                        10.7   11.77 )-----------( 282      ( 22 Jan 2022 07:31 )             32.9     01-22    141  |  108  |  32<H>  ----------------------------<  95  4.5   |  20<L>  |  1.87<H>    Ca    10.3      22 Jan 2022 07:31  Phos  3.1     01-22  Mg     2.0     01-22    TPro  6.3  /  Alb  3.4  /  TBili  0.4  /  DBili  x   /  AST  10  /  ALT  12  /  AlkPhos  277<H>  01-22    PT/INR - ( 21 Jan 2022 17:12 )   PT: 13.4 sec;   INR: 1.12          PTT - ( 21 Jan 2022 17:12 )  PTT:46.9 sec    RADIOLOGY & ADDITIONAL TESTS: Reviewed.

## 2022-01-22 NOTE — PATIENT PROFILE ADULT - NSPROPOAPRESSUREINJURY_GEN_A_NUR
What Is The Reason For Today's Visit?: History of Non-Melanoma Skin Cancer How Many Skin Cancers Have You Had?: more than one no

## 2022-01-22 NOTE — CONSULT NOTE ADULT - ATTENDING COMMENTS
pt known to renal service  reviewed case in detail with daughter at pt bedside  creat has remained high 2's- repeat today pending  ortho to aspirate hip-  will review dosage of antibiotic with team once decided by ID    to monitor
Case was discussed with fellow.
Seen by me. I also discussed the recent history with his daughter Tori. History as above; recent spinal infection apparently resistant to antibiotics. Had a short bout of left hip pain around [place] which resolved spontaneously, followed by the current episode of about a week of escalating right hip pain. He reports that the pain is all lateral about the hip and upper thigh, no groin pain at any point. Does not relate any injury. Remains able to stand and ambulate, though with discomfort. Has not been febrile at home.    XRs reviewed by me; right hip radiographically unremarkable without evidence of arthritis or osteonecrosis; no fracture.    Exam as above; fairly painless SLR but unable to tolerate passive extension, notably stiff to internal and external rotation, painful on palpation of the trochanteric bursa and lateral proximal femur.    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 I would not wait on a hematology consultation to get it done  - NPO and hold antibiotics until the hip aspiration is done. If positive, will bring to OR for surgical debridement. If the bursal aspiration is positive and the joint aspiration is negative, likely trial of management with IV antibiotics as per ID.   - PT for mobilization, WBAT RLE; observe fall precautions  - OOB as tolerated with assistive device  - Pain control with Tylenol, topical NSAIDs, and ice  - Will follow    Sebas Nguyen MD  [phone]

## 2022-01-22 NOTE — PROGRESS NOTE ADULT - PROBLEM SELECTOR PLAN 6
F: none  E: replete prn  N: renal, NPO after MN  DVT ppx: SCDs, hold AC i/s/o hemophilia  Dispo: [facility]
F: none  E: replete PRN  N: renal, NPO for procedure  DVT ppx: SCDs, hold AC i/s/o hemophilia  D: [facility] transfer to Orthopedic Surgery

## 2022-01-22 NOTE — CONSULT NOTE ADULT - SUBJECTIVE AND OBJECTIVE BOX
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 Barnard due to concern for R hip septic joint from gallium scan done yesterday. Per patient and daughter pt having R hip pain w/ movement for past week. States he ambulates with walker/can and he is able to bear weight on his R hip but with difficulty and pain. Denies recent fall/trauma to the area. Had blood tests w/ elevated esr/crp and gallium scan yesterday, which daughter reports concern for R hip infection but OM of spine resolving.  States they were sent for admission. Denies subjective fevers, chills, headache, dizziness, fainting, chest pain, sob, abd pain, nvd, urinary sxs, back pain, neck pain, numbness/weakness, fall/trauma.    ED course:   Vitals: T: 98.3 | HR: 93 | BP: 137/64 | RR: 18 100% RA  Labs: WBC: 11.83 | H/H: 11.2/33.3| BUN/Cr: 36/1.93 | ESR: 120 | CRP: 101 | Alk phos: 313    Imaging: Gallium scan: There is gallium uptake in the right hip joint with associated hypodensity in the right hip joint suspicious for infection. The activity at the L5-S1 level may be due to resolving inflammatory   changes.  X-ray hip: wet read no fracture/reactive changes  EKG: NSR, QTc: 435  Tx: none (21 Jan 2022 21:43)  Allergies    allopurinol (Other)    Intolerances      MEDICATIONS  (STANDING):  influenza  Vaccine (HIGH DOSE) 0.7 milliLiter(s) IntraMuscular once    MEDICATIONS  (PRN):  acetaminophen     Tablet .. 650 milliGRAM(s) Oral every 6 hours PRN Mild Pain (1 - 3)    Vital Signs Last 24 Hrs  T(C): 37.1 (01-22-22 @ 16:00), Max: 37.1 (01-22-22 @ 16:00)  T(F): 98.8 (01-22-22 @ 16:00), Max: 98.8 (01-22-22 @ 16:00)  HR: 100 (01-22-22 @ 16:00) (85 - 100)  BP: 152/74 (01-22-22 @ 16:00) (143/72 - 152/74)  BP(mean): --  RR: 18 (01-22-22 @ 16:00) (16 - 18)  SpO2: 98% (01-22-22 @ 16:00) (98% - 99%)    PHYSICAL EXAM:  Constitutional: NAD, well-groomed, well-developed  HEENT: PERRLA, EOMI, Normal Hearing, MMM  Neck: No LAD, No JVD  Back: Normal spine flexure, No CVA tenderness  Respiratory: CTAB  Cardiovascular: S1 and S2, RRR, no M/G/R  Gastrointestinal: BS+, soft, NT/ND  Extremities: No peripheral edema  Vascular: 2+ peripheral pulses  Neurological: A/O x 3, no focal deficits  Psychiatric: Normal mood, normal affect  Musculoskeletal: 5/5 strength b/l upper and lower extremities  Skin: No rashes        CBC Full  -  ( 22 Jan 2022 07:31 )  WBC Count : 11.77 K/uL  RBC Count : 3.86 M/uL  Hemoglobin : 10.7 g/dL  Hematocrit : 32.9 %  Platelet Count - Automated : 282 K/uL  Mean Cell Volume : 85.2 fl  Mean Cell Hemoglobin : 27.7 pg  Mean Cell Hemoglobin Concentration : 32.5 gm/dL  Auto Neutrophil # : 9.01 K/uL  Auto Lymphocyte # : 1.37 K/uL  Auto Monocyte # : 0.93 K/uL  Auto Eosinophil # : 0.36 K/uL  Auto Basophil # : 0.05 K/uL  Auto Neutrophil % : 76.6 %  Auto Lymphocyte % : 11.6 %  Auto Monocyte % : 7.9 %  Auto Eosinophil % : 3.1 %  Auto Basophil % : 0.4 %      INR: 1.12 (01-21-22 @ 17:12)  Activated Partial Thromboplastin Time: 46.9 sec (01-21-22 @ 17:12)      Ferritin, Serum: 1318 ng/mL (09-09 @ 10:43)  Iron Total, Serum: 31 ug/dL (09-09 @ 10:43)  Iron - Total Binding Capacity.: 104 ug/dL (09-09 @ 10:43)    01-22    141  |  108  |  32<H>  ----------------------------<  95  4.5   |  20<L>  |  1.87<H>    Ca    10.3      22 Jan 2022 07:31  Phos  3.1     01-22  Mg     2.0     01-22    TPro  6.3  /  Alb  3.4  /  TBili  0.4  /  DBili  x   /  AST  10  /  ALT  12  /  AlkPhos  277<H>  01-22    Pathology:

## 2022-01-22 NOTE — PROGRESS NOTE ADULT - SUBJECTIVE AND OBJECTIVE BOX
Surgical Indication Note    Initial results of this afternoon's right hip aspiration demonstrate 86k WBC/uL with 92% PMN and negative crystals. Cultures are pending. This represents septic arthritis. He is indicated for operative irrigation and debridement. We are making plans for surgery tonight. Medical clearance and optimization note will be required pre-procedure.

## 2022-01-22 NOTE — PATIENT PROFILE ADULT - FALL HARM RISK - HARM RISK INTERVENTIONS

## 2022-01-22 NOTE — PROGRESS NOTE ADULT - SUBJECTIVE AND OBJECTIVE BOX
SUBJECTIVE: Pt seen and examined on morning rounds. Pt notes R hip pain is unchanged from prior. Able to range but slowly and with some pain. Denies any numbness/tingling, fevers/chills.    Vital Signs Last 24 Hrs  T(C): 37 (22 Jan 2022 05:13), Max: 37 (22 Jan 2022 05:13)  T(F): 98.6 (22 Jan 2022 05:13), Max: 98.6 (22 Jan 2022 05:13)  HR: 85 (22 Jan 2022 05:13) (85 - 93)  BP: 143/72 (22 Jan 2022 05:13) (137/64 - 148/77)  BP(mean): --  RR: 18 (22 Jan 2022 05:13) (16 - 18)  SpO2: 98% (22 Jan 2022 05:13) (98% - 100%)    Physical Exam:  General: NAD, resting comfortably in bed  R Hip: Skin grossly intact  Mild warmness to touch over lateral hip  Motor 5/5 Quad/Psoas/TA/GS/EHL, although slightly limited 2/2 pain  SILT grossly SPN/DPN/Saph/Samuel/Tib  DP 2+  AROM 0-90 flex/ext  Severely limited IR/ER  TTP over lateral femur/GT region    Assessment/Plan:  77yM for r/o R hip septic arthritis vs. Trochanteric Bursitis  - Recommend IR aspiration of R hip joint and trochanteric bursa ASAP by IR for evaluation of septic arthritis  - NPO and hold Abx until aspiration  - Weight Bearing Status: WBAT, PT, OOB w/assistance  - Pain control  - DVT PPx: As per primary  - F/u AM labs    Jose Grijalva, PGY-2  Orthopedic Surgery     SUBJECTIVE: Pt seen and examined on morning rounds. Pt notes R hip pain is unchanged from prior. Able to range but slowly and with some pain. Denies any numbness/tingling, fevers/chills.    Vital Signs Last 24 Hrs  T(C): 37 (22 Jan 2022 05:13), Max: 37 (22 Jan 2022 05:13)  T(F): 98.6 (22 Jan 2022 05:13), Max: 98.6 (22 Jan 2022 05:13)  HR: 85 (22 Jan 2022 05:13) (85 - 93)  BP: 143/72 (22 Jan 2022 05:13) (137/64 - 148/77)  BP(mean): --  RR: 18 (22 Jan 2022 05:13) (16 - 18)  SpO2: 98% (22 Jan 2022 05:13) (98% - 100%)    Physical Exam:  General: NAD, resting comfortably in bed  R Hip: Skin grossly intact  Mild warmness to touch over lateral hip  Motor 5/5 Quad/Psoas/TA/GS/EHL, although slightly limited 2/2 pain  SILT grossly SPN/DPN/Saph/Samuel/Tib  DP 2+  AROM 30-90 flex/ext  Severely limited IR/ER by pain  TTP over lateral femur/GT region    Assessment/Plan:  77yM for r/o R hip septic arthritis vs. Trochanteric Bursitis  - Recommend IR aspiration of R hip joint and trochanteric bursa ASAP by IR for evaluation of septic arthritis  - NPO and hold Abx until aspiration  - Weight Bearing Status: WBAT, PT, OOB w/assistance  - Pain control  - DVT PPx: As per primary  - F/u AM labs    Jose Grijalva, PGY-2  Orthopedic Surgery

## 2022-01-22 NOTE — PHYSICAL THERAPY INITIAL EVALUATION ADULT - ADDITIONAL COMMENTS
Daughter (Milla) assisted w/ translation and subjective information. Pt currently resides w/ wife in elevator apartment, no YURIDIA. Has HHA "few hours every weekday" to assist w/ home management and recently transfers 2/2 RLE pain. Denies falls within past 6 months. Daughter (Milla) assisted w/ translation and subjective information. Pt currently resides w/ wife in elevator apartment, no YURIDIA. Has HHA "few hours every weekday" to assist w/ home management and recently transfers 2/2 RLE pain. Primarily amb independently however has rollator. Denies falls within past 6 months.

## 2022-01-22 NOTE — CHART NOTE - NSCHARTNOTEFT_GEN_A_CORE
Patient w septic arthritis and need for wash out surgery tonight by orthopedic surgery. RCRI of 0 and Parks risk of 0.4%. EKG ***    patient receiving 1335 factor VIII prior to procedure and will receive q8 w recheck factor VIII in AM. Pt medically optimized for washout procedure. Patient w septic arthritis and need for emergent wash out surgery tonight by orthopedic surgery. RCRI of 0 and Parks risk of 0.4%. EKG ***    patient receiving 1335 factor VIII prior to procedure and will receive q8 w recheck factor VIII in AM. Pt medically optimized for washout procedure. Patient w septic arthritis and need for emergent wash out surgery tonight by orthopedic surgery. RCRI of 0 and Parks risk of 0.4%. EKG non ischemic.    patient receiving 1335 factor VIII prior to procedure and will receive q8 w recheck factor VIII in AM. Pt medically optimized for emergent orthopedic procedure. Discussed with attending physician Adalid Rice and Heme onc on board as well Patient w septic arthritis and need for emergent wash out surgery tonight by orthopedic surgery. RCRI of 0 and Parks risk of 0.4%. EKG non ischemic. functional Mets prior to hip pain    patient receiving 1335 factor VIII prior to procedure and will receive q8 w recheck factor VIII in AM. Pt medically optimized for emergent orthopedic procedure. Discussed with attending physician Adalid Rice and Heme onc on board as well Patient w septic arthritis and need for emergent wash out surgery tonight by orthopedic surgery. RCRI of 0 and Parks risk of 0.4%. EKG non ischemic. functional Mets prior to hip pain    patient receiving 1335 factor VIII prior to procedure and will receive q8 w recheck factor VIII in AM. Pt medically optimized for emergent orthopedic procedure. Discussed with attending physician Adalid Rice and Heme onc on board as well    Per discussion with daughter Milla, patients outpatient cardiologist Dr. Smith made aware of procedure and stated medically optimized from cardiac standpoint.

## 2022-01-23 DIAGNOSIS — M00.9 PYOGENIC ARTHRITIS, UNSPECIFIED: ICD-10-CM

## 2022-01-23 LAB
ANION GAP SERPL CALC-SCNC: 13 MMOL/L — SIGNIFICANT CHANGE UP (ref 5–17)
BUN SERPL-MCNC: 34 MG/DL — HIGH (ref 7–23)
CALCIUM SERPL-MCNC: 10.3 MG/DL — SIGNIFICANT CHANGE UP (ref 8.4–10.5)
CHLORIDE SERPL-SCNC: 106 MMOL/L — SIGNIFICANT CHANGE UP (ref 96–108)
CO2 SERPL-SCNC: 22 MMOL/L — SIGNIFICANT CHANGE UP (ref 22–31)
CREAT SERPL-MCNC: 1.93 MG/DL — HIGH (ref 0.5–1.3)
GLUCOSE SERPL-MCNC: 145 MG/DL — HIGH (ref 70–99)
GRAM STN FLD: SIGNIFICANT CHANGE UP
HCT VFR BLD CALC: 35.3 % — LOW (ref 39–50)
HGB BLD-MCNC: 11.2 G/DL — LOW (ref 13–17)
MAGNESIUM SERPL-MCNC: 2 MG/DL — SIGNIFICANT CHANGE UP (ref 1.6–2.6)
MCHC RBC-ENTMCNC: 27.3 PG — SIGNIFICANT CHANGE UP (ref 27–34)
MCHC RBC-ENTMCNC: 31.7 GM/DL — LOW (ref 32–36)
MCV RBC AUTO: 85.9 FL — SIGNIFICANT CHANGE UP (ref 80–100)
NRBC # BLD: 0 /100 WBCS — SIGNIFICANT CHANGE UP (ref 0–0)
PHOSPHATE SERPL-MCNC: 3.8 MG/DL — SIGNIFICANT CHANGE UP (ref 2.5–4.5)
PLATELET # BLD AUTO: 264 K/UL — SIGNIFICANT CHANGE UP (ref 150–400)
POTASSIUM SERPL-MCNC: 4.8 MMOL/L — SIGNIFICANT CHANGE UP (ref 3.5–5.3)
POTASSIUM SERPL-SCNC: 4.8 MMOL/L — SIGNIFICANT CHANGE UP (ref 3.5–5.3)
RBC # BLD: 4.11 M/UL — LOW (ref 4.2–5.8)
RBC # FLD: 14.4 % — SIGNIFICANT CHANGE UP (ref 10.3–14.5)
SODIUM SERPL-SCNC: 141 MMOL/L — SIGNIFICANT CHANGE UP (ref 135–145)
SPECIMEN SOURCE: SIGNIFICANT CHANGE UP
WBC # BLD: 16.9 K/UL — HIGH (ref 3.8–10.5)
WBC # FLD AUTO: 16.9 K/UL — HIGH (ref 3.8–10.5)

## 2022-01-23 PROCEDURE — 99232 SBSQ HOSP IP/OBS MODERATE 35: CPT

## 2022-01-23 PROCEDURE — 99233 SBSQ HOSP IP/OBS HIGH 50: CPT

## 2022-01-23 PROCEDURE — 76705 ECHO EXAM OF ABDOMEN: CPT | Mod: 26

## 2022-01-23 RX ORDER — BUPIVACAINE 13.3 MG/ML
20 INJECTION, SUSPENSION, LIPOSOMAL INFILTRATION ONCE
Refills: 0 | Status: DISCONTINUED | OUTPATIENT
Start: 2022-01-22 | End: 2022-01-28

## 2022-01-23 RX ORDER — DAPTOMYCIN 500 MG/10ML
400 INJECTION, POWDER, LYOPHILIZED, FOR SOLUTION INTRAVENOUS ONCE
Refills: 0 | Status: COMPLETED | OUTPATIENT
Start: 2022-01-23 | End: 2022-01-23

## 2022-01-23 RX ORDER — HYDROMORPHONE HYDROCHLORIDE 2 MG/ML
0.5 INJECTION INTRAMUSCULAR; INTRAVENOUS; SUBCUTANEOUS
Refills: 0 | Status: DISCONTINUED | OUTPATIENT
Start: 2022-01-23 | End: 2022-01-28

## 2022-01-23 RX ADMIN — Medication 650 MILLIGRAM(S): at 17:30

## 2022-01-23 RX ADMIN — OXYCODONE HYDROCHLORIDE 10 MILLIGRAM(S): 5 TABLET ORAL at 10:25

## 2022-01-23 RX ADMIN — HYDROMORPHONE HYDROCHLORIDE 0.5 MILLIGRAM(S): 2 INJECTION INTRAMUSCULAR; INTRAVENOUS; SUBCUTANEOUS at 02:20

## 2022-01-23 RX ADMIN — PIPERACILLIN AND TAZOBACTAM 200 GRAM(S): 4; .5 INJECTION, POWDER, LYOPHILIZED, FOR SOLUTION INTRAVENOUS at 00:10

## 2022-01-23 RX ADMIN — PIPERACILLIN AND TAZOBACTAM 200 GRAM(S): 4; .5 INJECTION, POWDER, LYOPHILIZED, FOR SOLUTION INTRAVENOUS at 18:14

## 2022-01-23 RX ADMIN — Medication 650 MILLIGRAM(S): at 18:20

## 2022-01-23 RX ADMIN — PIPERACILLIN AND TAZOBACTAM 200 GRAM(S): 4; .5 INJECTION, POWDER, LYOPHILIZED, FOR SOLUTION INTRAVENOUS at 23:58

## 2022-01-23 RX ADMIN — OXYCODONE HYDROCHLORIDE 10 MILLIGRAM(S): 5 TABLET ORAL at 11:20

## 2022-01-23 RX ADMIN — DAPTOMYCIN 116 MILLIGRAM(S): 500 INJECTION, POWDER, LYOPHILIZED, FOR SOLUTION INTRAVENOUS at 01:24

## 2022-01-23 RX ADMIN — PIPERACILLIN AND TAZOBACTAM 200 GRAM(S): 4; .5 INJECTION, POWDER, LYOPHILIZED, FOR SOLUTION INTRAVENOUS at 12:36

## 2022-01-23 RX ADMIN — HYDROMORPHONE HYDROCHLORIDE 0.5 MILLIGRAM(S): 2 INJECTION INTRAMUSCULAR; INTRAVENOUS; SUBCUTANEOUS at 02:03

## 2022-01-23 RX ADMIN — PIPERACILLIN AND TAZOBACTAM 200 GRAM(S): 4; .5 INJECTION, POWDER, LYOPHILIZED, FOR SOLUTION INTRAVENOUS at 06:21

## 2022-01-23 NOTE — PROGRESS NOTE ADULT - SUBJECTIVE AND OBJECTIVE BOX
INTERVAL HPI/OVERNIGHT EVENTS:  Coverage for Dr. Schneider.  He is s/p aspiration of hip yesterday, followed by I&D.  He has less hip pain but more pain on movement of hip.    CONSTITUTIONAL:  No fever, chills, night sweats  EYES:  No photophobia or visual changes  CARDIOVASCULAR:  No chest pain  RESPIRATORY:  No cough, wheezing, or SOB   GASTROINTESTINAL:  No nausea, vomiting, diarrhea, constipation, or abdominal pain  GENITOURINARY:  No frequency, urgency, dysuria or hematuria  NEUROLOGIC:  No headache, lightheadedness      ANTIBIOTICS/RELEVANT:    Daptomycin 400 mg IV q48h  Pip-tazo 2.25 g IV q6h      Vital Signs Last 24 Hrs  T(C): 37 (23 Jan 2022 20:35), Max: 37.1 (23 Jan 2022 17:39)  T(F): 98.6 (23 Jan 2022 20:35), Max: 98.7 (23 Jan 2022 17:39)  HR: 87 (23 Jan 2022 20:35) (87 - 97)  BP: 149/66 (23 Jan 2022 20:35) (90/50 - 177/79)  BP(mean): 113 (23 Jan 2022 02:16) (60 - 113)  RR: 18 (23 Jan 2022 20:35) (13 - 23)  SpO2: 99% (23 Jan 2022 20:35) (98% - 100%)    PHYSICAL EXAM:  Constitutional:  Well-developed, well nourished  Eyes:  Sclerae anicteric, conjunctivae clear, PERRL  Ear/Nose/Throat:  No nasal exudate or sinus tenderness;  No buccal mucosal lesions, no pharyngeal erythema or exudate	  Neck:  Supple, no adenopathy  Respiratory:  Clear bilaterally  Cardiovascular:  RRR, S1S2, no murmur appreciated  Gastrointestinal:  Symmetric, normoactive BS, soft, NT, no masses, guarding or rebound.  No HSM  Extremities:  R hip incision dressed      LABS:                        11.2   16.90 )-----------( 264      ( 23 Jan 2022 14:41 )             35.3         01-23    141  |  106  |  34<H>  ----------------------------<  145<H>  4.8   |  22  |  1.93<H>    Ca    10.3      23 Jan 2022 14:41  Phos  3.8     01-23  Mg     2.0     01-23    TPro  6.3  /  Alb  3.4  /  TBili  0.4  /  DBili  x   /  AST  10  /  ALT  12  /  AlkPhos  277<H>  01-22      Creatine Kinase, Serum: 11 U/L (01.22.22 @ 07:31)    Cell Count, Body Fluid (01.22.22 @ 16:21)    Monocyte/Macrophage Count - Body Fluid: 7 %    Fluid Segmented Granulocytes: 92 %    Fluid Type: Synovial fluid    Body Fluid Appearance: Turbid    BF Lymphocytes: 1 %    Tube Type: Sterile    Color - Body Fluid: Yellow    Total Nucleated Cell Count, Body Fluid: 88835: Reference Ranges:  Synovial Fluid  Total nucleated cells < 150 cells/uL  Neutrophils <25%  Lymphocytes 10-15%  Monocytes/Macrophages </=70%  Other parameters- No established reference ranges.  Bronchoalveolar lavage  Macrophages >85%  Lymphocytes 10-15%  Neutrophils <3%  Eosinophils <1%  Other parameters- No established reference ranges.  Peritoneal/pleural/pericardial fluid/other body fluid types  No established reference ranges. /uL    Total RBC Count: 05022 /uL        MICROBIOLOGY:        RADIOLOGY & ADDITIONAL STUDIES:

## 2022-01-23 NOTE — PHYSICAL THERAPY INITIAL EVALUATION ADULT - IMPAIRMENTS CONTRIBUTING TO GAIT DEVIATIONS, PT EVAL
pain/decreased ROM/decreased strength
impaired balance/narrow base of support/pain/decreased strength

## 2022-01-23 NOTE — PHYSICAL THERAPY INITIAL EVALUATION ADULT - PLANNED THERAPY INTERVENTIONS, PT EVAL
balance training/bed mobility training/gait training/postural re-education/ROM/strengthening/stretching/transfer training
bed mobility training/gait training/strengthening/transfer training

## 2022-01-23 NOTE — PROGRESS NOTE ADULT - ASSESSMENT
76 yo M unvax for COVID, hx of Hemophilia A, HTN, CKD 4 (Hx of requiring HD), DRESS 2/2 allopurinol and recent MSSA NVE of MV c/b spinal discitis/OM of L5-S1 s/p abx who presented due to gallium scan concerning for R hip septic arthritis.  S/p arthrocentesis w/signs of septic joint and going to OR for washout 1/22

## 2022-01-23 NOTE — PACU DISCHARGE NOTE - COMMENTS
Patient is hemodynamically stable and reports pain improving.  Meets PACU discharge criteria.  Report given to unit RN.  Patient transported via bed.  Accompanied by transport team.

## 2022-01-23 NOTE — PHYSICAL THERAPY INITIAL EVALUATION ADULT - GENERAL OBSERVATIONS, REHAB EVAL
semi supine in NAD +hepblock +CHARLENE
Pt received lying semifowler in NAD +heplock intact. Daughter (Milla) and MD present in room.

## 2022-01-23 NOTE — PROGRESS NOTE ADULT - ASSESSMENT
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  Aspirate with WBC 86,000 with 92% PMNs c/c septic arthritis, he is s/p I&D last night.  Gram stain from OR cultures does not show organisms.  - f/u bcx 1/21  - f/u OR cultures 1/23  - Continue daptomycin 400mg IV q48h    - Continue Zosyn 2.25g IV q6h  Recommendations discussed with primary team.  Will follow with you - team 2.  Dr. Schneider will resume care tomorrow.

## 2022-01-23 NOTE — PHYSICAL THERAPY INITIAL EVALUATION ADULT - GAIT DEVIATIONS NOTED, PT EVAL
Pt w/ dec step length/valeria noted. RLE w/ difficulty to initiate hip extension during ambulation. Slightly unsteady balance however no LOB/falls observed. Multiple steps required during L turns. Good aerobic endurance noted, no SOB/coughs observed post-amb. Forward head posture also noted./decreased valeria/decreased step length/decreased weight-shifting ability
decreased weight-shifting ability

## 2022-01-23 NOTE — PHYSICAL THERAPY INITIAL EVALUATION ADULT - DIAGNOSIS, PT EVAL
5A: Primary Prevention/Risk Reduction for Loss of Balance and Falling
Impaired Joint Mobility, Motor Function, Muscle Performance, and Range of Motion Associated with Bony or Soft Tissue Surgery.

## 2022-01-23 NOTE — PHYSICAL THERAPY INITIAL EVALUATION ADULT - IMPAIRMENTS FOUND, PT EVAL
aerobic capacity/endurance/gait, locomotion, and balance/muscle strength/ROM
gait, locomotion, and balance/muscle strength/posture/ROM

## 2022-01-23 NOTE — PHYSICAL THERAPY INITIAL EVALUATION ADULT - PERTINENT HX OF CURRENT PROBLEM, REHAB EVAL
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 Barnard due to concern for R hip septic joint Per patient and daughter pt having R hip pain w/ movement for past week. States he ambulates with walker/can and he is able to bear weight on his R hip but with difficulty and pain.
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

## 2022-01-23 NOTE — PROGRESS NOTE ADULT - ASSESSMENT
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 Barnard due to concern for R hip septic joint from gallium scan done yesterday.    Hematology consulted for known hemophilia A.    Hemophilia A  Checked stat factor VIII level=32.  S/p 1335 IU of recombinant VIII prior to planned bedside arthrocentesis on 1/22, s/p 1335 IU again at 8PM pre wash out.  Monitor for post procedural joint bleeding (swelling, increasing pain, bruising).  If concern for hemarthrosis check stat imaging (US, CT) and if confirmed bleeding call heme/onc (I am on call the whole weekend)  repeat anticardiolipin and beta 2 microglobulin levels as his LA from Sep was positive but negative in November, other APL abs not repeated  Trend daily coags and factor VIII level  C/w rFactor VIII 1335 IU every 12 hrs (next dose today at 8PM, then tomorrow at 8AM)  No DVT ppx until factor VIII activity comes back due to very high risk of hemartrosis    D/w  ([facility] attending).

## 2022-01-23 NOTE — PHYSICAL THERAPY INITIAL EVALUATION ADULT - IMPAIRED TRANSFERS: SIT/STAND, REHAB EVAL
impaired balance/narrow base of support/pain/decreased strength
pain/decreased ROM/decreased strength

## 2022-01-23 NOTE — PHYSICAL THERAPY INITIAL EVALUATION ADULT - BED MOBILITY LIMITATIONS, REHAB EVAL
decreased ability to use legs for bridging/pushing/impaired ability to control trunk for mobility
decreased ability to use arms for pushing/pulling/decreased ability to use legs for bridging/pushing

## 2022-01-23 NOTE — PROGRESS NOTE ADULT - SUBJECTIVE AND OBJECTIVE BOX
INTERVAL HPI/OVERNIGHT EVENTS:  Pt was seen and examined at the bedside. He was observed to be lying down comfortably.         VITAL SIGNS:  T(F): 97.8 (22 @ 10:23)  HR: 88 (22 @ 10:23)  BP: 137/77 (22 @ 10:23)  RR: 15 (22 @ 10:23)  SpO2: 100% (22 @ 10:23)  Wt(kg): --  I&O's Summary    2022 07:  -  2022 07:00  --------------------------------------------------------  IN: 200 mL / OUT: 30 mL / NET: 170 mL    2022 07:  -  2022 12:03  --------------------------------------------------------  IN: 0 mL / OUT: 30 mL / NET: -30 mL      PHYSICAL EXAM:  Constitutional: NAD, well-groomed, well-developed  HEENT: PERRLA, EOMI, Normal Hearing, MMM  Neck: No LAD, No JVD  Back: Normal spine flexure, No CVA tenderness  Respiratory: CTAB  Cardiovascular: S1 and S2, RRR, no M/G/R  Gastrointestinal: BS+, soft, NT/ND  Extremities: No peripheral edema  Vascular: 2+ peripheral pulses  Neurological: A/O x 3, no focal deficits  Psychiatric: Normal mood, normal affect  Musculoskeletal: 5/5 strength b/l upper and lower extremities  Skin: No rashes        MEDICATIONS  (STANDING):  BUpivacaine liposome 1.3% Injectable (no eMAR) 20 milliLiter(s) Local Injection once  DAPTOmycin IVPB      influenza  Vaccine (HIGH DOSE) 0.7 milliLiter(s) IntraMuscular once  piperacillin/tazobactam IVPB.. 2.25 Gram(s) IV Intermittent every 6 hours  senna 2 Tablet(s) Oral at bedtime    MEDICATIONS  (PRN):  acetaminophen     Tablet .. 650 milliGRAM(s) Oral every 6 hours PRN Mild Pain (1 - 3)  HYDROmorphone  Injectable 0.5 milliGRAM(s) IV Push every 15 minutes PRN breakthrough  HYDROmorphone  Injectable 0.5 milliGRAM(s) IV Push every 4 hours PRN breakthrough  ondansetron Injectable 4 milliGRAM(s) IV Push every 6 hours PRN Nausea and/or Vomiting  oxyCODONE    IR 5 milliGRAM(s) Oral every 3 hours PRN Moderate Pain (4 - 6)  oxyCODONE    IR 10 milliGRAM(s) Oral every 3 hours PRN Severe Pain (7 - 10)      Allergies    allopurinol (Other)    Intolerances        LABS:  CBC Full  -  ( 2022 07:31 )  WBC Count : 11.77 K/uL  RBC Count : 3.86 M/uL  Hemoglobin : 10.7 g/dL  Hematocrit : 32.9 %  Platelet Count - Automated : 282 K/uL  Mean Cell Volume : 85.2 fl  Mean Cell Hemoglobin : 27.7 pg  Mean Cell Hemoglobin Concentration : 32.5 gm/dL  Auto Neutrophil # : 9.01 K/uL  Auto Lymphocyte # : 1.37 K/uL  Auto Monocyte # : 0.93 K/uL  Auto Eosinophil # : 0.36 K/uL  Auto Basophil # : 0.05 K/uL  Auto Neutrophil % : 76.6 %  Auto Lymphocyte % : 11.6 %  Auto Monocyte % : 7.9 %  Auto Eosinophil % : 3.1 %  Auto Basophil % : 0.4 %        141  |  108  |  32<H>  ----------------------------<  95  4.5   |  20<L>  |  1.87<H>    Ca    10.3      2022 07:31  Phos  3.1       Mg     2.0         TPro  6.3  /  Alb  3.4  /  TBili  0.4  /  DBili  x   /  AST  10  /  ALT  12  /  AlkPhos  277<H>      PT/INR - ( 2022 17:12 )   PT: 13.4 sec;   INR: 1.12          PTT - ( 2022 17:12 )  PTT:46.9 sec  Urinalysis Basic - ( 2022 17:28 )    Color: Yellow / Appearance: Clear / S.015 / pH: x  Gluc: x / Ketone: NEGATIVE  / Bili: Negative / Urobili: 0.2 E.U./dL   Blood: x / Protein: NEGATIVE mg/dL / Nitrite: NEGATIVE   Leuk Esterase: NEGATIVE / RBC: x / WBC x   Sq Epi: x / Non Sq Epi: x / Bacteria: x        Ferritin, Serum: 1318 ng/mL ( @ 10:43)  Iron Total, Serum: 31 ug/dL ( @ 10:43)  Iron - Total Binding Capacity.: 104 ug/dL ( @ 10:43)    INR: 1.12 (22 @ 17:12)  Activated Partial Thromboplastin Time: 46.9 sec (22 @ 17:12)      RADIOLOGY & ADDITIONAL TESTS: Reviewed.

## 2022-01-23 NOTE — PHYSICAL THERAPY INITIAL EVALUATION ADULT - MANUAL MUSCLE TESTING RESULTS, REHAB EVAL
B UE and B LE >3+/5 upon functional assessment against gravity.
At least 3/5 BL UE & LE based on antigravity mobility.

## 2022-01-23 NOTE — PROGRESS NOTE ADULT - SUBJECTIVE AND OBJECTIVE BOX
SUBJECTIVE:  Patient seen and examined at bedside.  S/p R hip washout last night.  Patient sitting in bed comfortably, reports some pain at R hip but controlled.  Has not noted any swelling or bleeding at the sight.  Denies fever, chills, CP, N/V, abd pain, diarrhea.    Vital Signs Last 12 Hrs  T(F): 97.8 (22 @ 10:23), Max: 98.2 (22 @ 03:00)  HR: 88 (22 @ 10:23) (88 - 96)  BP: 137/77 (22 @ 10:23) (137/77 - 151/79)  BP(mean): --  RR: 15 (22 @ 10:23) (15 - 18)  SpO2: 100% (22 @ 10:23) (98% - 100%)  I&O's Summary    2022 07:  -  2022 07:00  --------------------------------------------------------  IN: 200 mL / OUT: 30 mL / NET: 170 mL    2022 07:  -  2022 14:53  --------------------------------------------------------  IN: 577 mL / OUT: 330 mL / NET: 247 mL        PHYSICAL EXAM:  Constitutional: NAD, comfortable in bed.  HEENT: NC/AT, PERRLA, EOMI, no conjunctival pallor or scleral icterus, MMM  Neck: Supple  Respiratory: CTA B/L. No w/r/r.   Cardiovascular: RRR, normal S1 and S2, no m/r/g.   Gastrointestinal: +BS, soft NTND, no guarding or rebound tenderness, no palpable masses   Extremities: wwp; no cyanosis, clubbing or edema.  R hip with dressing clean dry and intact, drain with serous drainage.  Minimal tenderness to palpation, no palpable hematoma   Vascular: Pulses equal and strong throughout.   Neurological: AAOx3, no CN deficits, strength and sensation intact throughout.   Skin: No gross skin abnormalities or rashes        LABS:                        11.2   16.90 )-----------( 264      ( 2022 14:41 )             35.3         141  |  108  |  32<H>  ----------------------------<  95  4.5   |  20<L>  |  1.87<H>    Ca    10.3      2022 07:31  Phos  3.1       Mg     2.0         TPro  6.3  /  Alb  3.4  /  TBili  0.4  /  DBili  x   /  AST  10  /  ALT  12  /  AlkPhos  277<H>      PT/INR - ( 2022 17:12 )   PT: 13.4 sec;   INR: 1.12          PTT - ( 2022 17:12 )  PTT:46.9 sec  Urinalysis Basic - ( 2022 17:28 )    Color: Yellow / Appearance: Clear / S.015 / pH: x  Gluc: x / Ketone: NEGATIVE  / Bili: Negative / Urobili: 0.2 E.U./dL   Blood: x / Protein: NEGATIVE mg/dL / Nitrite: NEGATIVE   Leuk Esterase: NEGATIVE / RBC: x / WBC x   Sq Epi: x / Non Sq Epi: x / Bacteria: x          RADIOLOGY & ADDITIONAL TESTS:    MEDICATIONS  (STANDING):  BUpivacaine liposome 1.3% Injectable (no eMAR) 20 milliLiter(s) Local Injection once  DAPTOmycin IVPB      influenza  Vaccine (HIGH DOSE) 0.7 milliLiter(s) IntraMuscular once  piperacillin/tazobactam IVPB.. 2.25 Gram(s) IV Intermittent every 6 hours  senna 2 Tablet(s) Oral at bedtime    MEDICATIONS  (PRN):  acetaminophen     Tablet .. 650 milliGRAM(s) Oral every 6 hours PRN Mild Pain (1 - 3)  HYDROmorphone  Injectable 0.5 milliGRAM(s) IV Push every 15 minutes PRN breakthrough  HYDROmorphone  Injectable 0.5 milliGRAM(s) IV Push every 4 hours PRN breakthrough  ondansetron Injectable 4 milliGRAM(s) IV Push every 6 hours PRN Nausea and/or Vomiting  oxyCODONE    IR 5 milliGRAM(s) Oral every 3 hours PRN Moderate Pain (4 - 6)  oxyCODONE    IR 10 milliGRAM(s) Oral every 3 hours PRN Severe Pain (7 - 10)

## 2022-01-24 ENCOUNTER — TRANSCRIPTION ENCOUNTER (OUTPATIENT)
Age: 77
End: 2022-01-24

## 2022-01-24 LAB
ANION GAP SERPL CALC-SCNC: 14 MMOL/L — SIGNIFICANT CHANGE UP (ref 5–17)
BUN SERPL-MCNC: 33 MG/DL — HIGH (ref 7–23)
CALCIUM SERPL-MCNC: 9.9 MG/DL — SIGNIFICANT CHANGE UP (ref 8.4–10.5)
CHLORIDE SERPL-SCNC: 106 MMOL/L — SIGNIFICANT CHANGE UP (ref 96–108)
CO2 SERPL-SCNC: 21 MMOL/L — LOW (ref 22–31)
CREAT SERPL-MCNC: 2.12 MG/DL — HIGH (ref 0.5–1.3)
FACT VIII ACT/NOR PPP: 111 % — SIGNIFICANT CHANGE UP (ref 51–138)
FACT VIII ACT/NOR PPP: 93 % — SIGNIFICANT CHANGE UP (ref 51–138)
GLUCOSE SERPL-MCNC: 164 MG/DL — HIGH (ref 70–99)
HCT VFR BLD CALC: 30.9 % — LOW (ref 39–50)
HGB BLD-MCNC: 9.8 G/DL — LOW (ref 13–17)
MCHC RBC-ENTMCNC: 27.5 PG — SIGNIFICANT CHANGE UP (ref 27–34)
MCHC RBC-ENTMCNC: 31.7 GM/DL — LOW (ref 32–36)
MCV RBC AUTO: 86.6 FL — SIGNIFICANT CHANGE UP (ref 80–100)
NRBC # BLD: 0 /100 WBCS — SIGNIFICANT CHANGE UP (ref 0–0)
PLATELET # BLD AUTO: 265 K/UL — SIGNIFICANT CHANGE UP (ref 150–400)
POTASSIUM SERPL-MCNC: 4.4 MMOL/L — SIGNIFICANT CHANGE UP (ref 3.5–5.3)
POTASSIUM SERPL-SCNC: 4.4 MMOL/L — SIGNIFICANT CHANGE UP (ref 3.5–5.3)
RBC # BLD: 3.57 M/UL — LOW (ref 4.2–5.8)
RBC # FLD: 14.5 % — SIGNIFICANT CHANGE UP (ref 10.3–14.5)
SODIUM SERPL-SCNC: 141 MMOL/L — SIGNIFICANT CHANGE UP (ref 135–145)
WBC # BLD: 15.22 K/UL — HIGH (ref 3.8–10.5)
WBC # FLD AUTO: 15.22 K/UL — HIGH (ref 3.8–10.5)

## 2022-01-24 PROCEDURE — 99233 SBSQ HOSP IP/OBS HIGH 50: CPT | Mod: GC

## 2022-01-24 PROCEDURE — 36573 INSJ PICC RS&I 5 YR+: CPT

## 2022-01-24 PROCEDURE — 76937 US GUIDE VASCULAR ACCESS: CPT | Mod: 26,59

## 2022-01-24 PROCEDURE — 99232 SBSQ HOSP IP/OBS MODERATE 35: CPT

## 2022-01-24 RX ORDER — ASPIRIN/CALCIUM CARB/MAGNESIUM 324 MG
81 TABLET ORAL
Refills: 0 | Status: DISCONTINUED | OUTPATIENT
Start: 2022-01-24 | End: 2022-01-28

## 2022-01-24 RX ORDER — CEFAZOLIN SODIUM 1 G
2000 VIAL (EA) INJECTION EVERY 12 HOURS
Refills: 0 | Status: DISCONTINUED | OUTPATIENT
Start: 2022-01-24 | End: 2022-01-28

## 2022-01-24 RX ORDER — CHLORHEXIDINE GLUCONATE 213 G/1000ML
1 SOLUTION TOPICAL
Refills: 0 | Status: DISCONTINUED | OUTPATIENT
Start: 2022-01-24 | End: 2022-01-28

## 2022-01-24 RX ORDER — SODIUM CHLORIDE 9 MG/ML
10 INJECTION INTRAMUSCULAR; INTRAVENOUS; SUBCUTANEOUS
Refills: 0 | Status: DISCONTINUED | OUTPATIENT
Start: 2022-01-24 | End: 2022-01-28

## 2022-01-24 RX ADMIN — Medication 81 MILLIGRAM(S): at 21:19

## 2022-01-24 RX ADMIN — DAPTOMYCIN 116 MILLIGRAM(S): 500 INJECTION, POWDER, LYOPHILIZED, FOR SOLUTION INTRAVENOUS at 00:33

## 2022-01-24 RX ADMIN — OXYCODONE HYDROCHLORIDE 10 MILLIGRAM(S): 5 TABLET ORAL at 09:46

## 2022-01-24 RX ADMIN — SENNA PLUS 2 TABLET(S): 8.6 TABLET ORAL at 21:19

## 2022-01-24 RX ADMIN — OXYCODONE HYDROCHLORIDE 10 MILLIGRAM(S): 5 TABLET ORAL at 10:30

## 2022-01-24 RX ADMIN — PIPERACILLIN AND TAZOBACTAM 200 GRAM(S): 4; .5 INJECTION, POWDER, LYOPHILIZED, FOR SOLUTION INTRAVENOUS at 04:42

## 2022-01-24 RX ADMIN — Medication 100 MILLIGRAM(S): at 17:40

## 2022-01-24 NOTE — DIETITIAN INITIAL EVALUATION ADULT. - PROBLEM SELECTOR PLAN 6
F: none  E: replete prn  N: renal, NPO after MN  DVT ppx: SCDs, hold AC i/s/o hemophilia  Dispo: [facility]

## 2022-01-24 NOTE — DISCHARGE NOTE PROVIDER - NSDCACTIVITY_GEN_ALL_CORE
Follow Instructions Provided by your Surgical Team Do not drive or operate machinery/Walking - Indoors allowed/No heavy lifting/straining/Follow Instructions Provided by your Surgical Team

## 2022-01-24 NOTE — DIETITIAN INITIAL EVALUATION ADULT. - OTHER INFO
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 Barnard due to concern for R hip septic joint from gallium scan done 1/20.   Now s/p incision and drainage of Rt hip on 1/22. On abx and ID following. Plan for PICC line placement today for long-term abx, ID to provide final recs. PT following. On renal diet d/t hx of CKD stage 4.   Pt states appetite is poor, lunch tray on bedside table at visit and pt only drinking coffee. Has not touched food yet. Pt states may try to have family bring in food that he likes to help improve his appetite. Also, states does not like meat, but willing to eat fish/ poultry/ eggs, and other protein foods. Discussed with pt importance of nutrition eating a variety of foods/ beverages to maintain strength. Agreeable to Ensure shake once daily    GI: abd-soft, no BM since admission (x3 days)-senna ordered   Skin: surgical incision to rt hip     Pt reports 40# (18.2kg) wt loss x 1 year and UBW is 155# (70.5kg). Per EMR, variable weights from 58.1kg on 8/25/2021 then  52.2kg on 1/7/2022 and 70.3kg for admission wt on 1/21/2022. Question accuracy of admission wt since wt consistently 52.2kg since 10/2021 and denies any increase in appetite over the last few months. Overall, suspect significant wt loss.

## 2022-01-24 NOTE — PROGRESS NOTE ADULT - SUBJECTIVE AND OBJECTIVE BOX
INTERVAL HPI/OVERNIGHT EVENTS:  Pt was seen and examined at the bedside. He was observed to be lying down comfortably.   No bleeding noted.      VITAL SIGNS:  T(F): 98.6 (01-24-22 @ 09:40)  HR: 76 (01-24-22 @ 09:40)  BP: 150/79 (01-24-22 @ 09:40)  RR: 15 (01-24-22 @ 09:40)  SpO2: 98% (01-24-22 @ 09:40)  Wt(kg): --  I&O's Summary    23 Jan 2022 07:01  -  24 Jan 2022 07:00  --------------------------------------------------------  IN: 1577 mL / OUT: 1347.5 mL / NET: 229.5 mL    24 Jan 2022 07:01  -  24 Jan 2022 12:06  --------------------------------------------------------  IN: 240 mL / OUT: 150 mL / NET: 90 mL      PHYSICAL EXAM:  Constitutional: NAD, well-groomed, well-developed  HEENT: PERRLA, EOMI, Normal Hearing, MMM  Neck: No LAD, No JVD  Back: Normal spine flexure, No CVA tenderness  Respiratory: CTAB  Cardiovascular: S1 and S2, RRR, no M/G/R  Gastrointestinal: BS+, soft, NT/ND  Extremities: No peripheral edema  Vascular: 2+ peripheral pulses  Neurological: A/O x 3, no focal deficits  Psychiatric: Normal mood, normal affect  Musculoskeletal: 5/5 strength b/l upper and lower extremities  Skin: No rashes        MEDICATIONS  (STANDING):  BUpivacaine liposome 1.3% Injectable (no eMAR) 20 milliLiter(s) Local Injection once  ceFAZolin   IVPB 2000 milliGRAM(s) IV Intermittent every 12 hours  influenza  Vaccine (HIGH DOSE) 0.7 milliLiter(s) IntraMuscular once  senna 2 Tablet(s) Oral at bedtime    MEDICATIONS  (PRN):  acetaminophen     Tablet .. 650 milliGRAM(s) Oral every 6 hours PRN Mild Pain (1 - 3)  HYDROmorphone  Injectable 0.5 milliGRAM(s) IV Push every 15 minutes PRN breakthrough  HYDROmorphone  Injectable 0.5 milliGRAM(s) IV Push every 4 hours PRN breakthrough  ondansetron Injectable 4 milliGRAM(s) IV Push every 6 hours PRN Nausea and/or Vomiting  oxyCODONE    IR 5 milliGRAM(s) Oral every 3 hours PRN Moderate Pain (4 - 6)  oxyCODONE    IR 10 milliGRAM(s) Oral every 3 hours PRN Severe Pain (7 - 10)      Allergies    allopurinol (Other)    Intolerances        LABS:  CBC Full  -  ( 24 Jan 2022 07:30 )  WBC Count : 15.22 K/uL  RBC Count : 3.57 M/uL  Hemoglobin : 9.8 g/dL  Hematocrit : 30.9 %  Platelet Count - Automated : 265 K/uL  Mean Cell Volume : 86.6 fl  Mean Cell Hemoglobin : 27.5 pg  Mean Cell Hemoglobin Concentration : 31.7 gm/dL  Auto Neutrophil # : x  Auto Lymphocyte # : x  Auto Monocyte # : x  Auto Eosinophil # : x  Auto Basophil # : x  Auto Neutrophil % : x  Auto Lymphocyte % : x  Auto Monocyte % : x  Auto Eosinophil % : x  Auto Basophil % : x    01-24    141  |  106  |  33<H>  ----------------------------<  164<H>  4.4   |  21<L>  |  2.12<H>    Ca    9.9      24 Jan 2022 07:30  Phos  3.8     01-23  Mg     2.0     01-23            Ferritin, Serum: 1318 ng/mL (09-09 @ 10:43)  Iron Total, Serum: 31 ug/dL (09-09 @ 10:43)  Iron - Total Binding Capacity.: 104 ug/dL (09-09 @ 10:43)    INR: 1.12 (01-21-22 @ 17:12)  Activated Partial Thromboplastin Time: 46.9 sec (01-21-22 @ 17:12)      RADIOLOGY & ADDITIONAL TESTS: Reviewed.

## 2022-01-24 NOTE — DIETITIAN INITIAL EVALUATION ADULT. - OTHER CALCULATIONS
IBW used for calculations d/t question admission wt accuracy and wts variable in EMR. Needs adjusted for acute illness, CKD 4, age, post-op healing

## 2022-01-24 NOTE — DISCHARGE NOTE PROVIDER - NSDCFUSCHEDAPPT_GEN_ALL_CORE_FT
YAIR FELDMAN ; 02/18/2022 ; [facility]  [address] YAIR FELDMAN ; 02/16/2022 ; [facility] [address]  YAIR FELDMAN ; 02/18/2022 ; [facility]  [address]

## 2022-01-24 NOTE — DISCHARGE NOTE PROVIDER - NSDCFUADDINST_GEN_ALL_CORE_FT
You have a PICC line and are receiving IV antibiotics to treat the MSSA infection in your right hip. You will beon Ancef 2g every 12 hours for 6 weeks (last date of administration: 3/7/22). Weekly CBC, CMP, ESR, CRP should be drawn and results faxed to Dr. Schneider at [phone].    You received factor VIII infusions while at [facility]. Continue to follow-up with your hematologist outpatient. Also continue to follow-up with your nephrologist and primary care doctor within 1-2 weeks after discharge for continued outpatient monitoring.    50% partial weight bearing on right hip.   No strenuous activity, heavy lifting, driving or returning to work until cleared by MD.  You may shower - dressing is water-resistant, no soaking in bathtubs.  Remove dressing after post op day 7, then leave incision open to air. Keep incision clean and dry.  Try to have regular bowel movements, take stool softener or laxative if necessary.  May take Pepcid or Zantac for upset stomach.  May take Aleve or Naproxen instead of Meloxicam/Celebrex.  Swelling may travel all the way down leg to foot, this is normal and will subside in a few weeks.  Call to schedule an appt with Dr. Nguyen for follow up, if you have staples or sutures they will be removed in office.  Contact your doctor if you experience: fever greater than 101.5, chills, chest pain, difficulty breathing, redness or excessive drainage around the incision, other concerns.  Follow up with your primary care provider.   You have a PICC line and are receiving IV antibiotics to treat the MSSA infection in your right hip. You will beon Ancef 2g every 12 hours for 6 weeks (last date of administration: 3/7/22). Weekly CBC, CMP, ESR, CRP should be drawn and results faxed to Dr. Barnard at [phone].    You received factor VIII infusions while at [facility]. Continue to follow-up with your hematologist outpatient. Also continue to follow-up with your nephrologist and primary care doctor within 1-2 weeks after discharge for continued outpatient monitoring.    50% partial weight bearing on right hip with walker.  You have an aquacel dressing. This dressing is water-resistant. You may take a shower. Do not soak dressing or incision. Keep clean and dry. The dressing may be removed in 7 days and incision left open to air.    Try to have regular bowel movements, take stool softener or laxative if necessary.    Swelling may travel all the way down leg to foot, this is normal and will subside in a few weeks.  Call to schedule an appt with Dr. Nguyen for follow up, if you have staples or sutures they will be removed in office.  Contact your doctor if you experience: fever greater than 101.5, chills, chest pain, difficulty breathing, redness or excessive drainage around the incision, other concerns.  Follow up with your primary care provider.   You have a PICC line and are receiving IV antibiotics to treat the MSSA infection in your right hip. You will be on Ancef 2g every 12 hours for 6 weeks (last date of administration: 3/7/22). Weekly CBC, CMP, ESR, CRP should be drawn and results faxed to Dr. Barnard at [phone].    You received factor VIII infusions while at [facility]. Continue to follow-up with your hematologist outpatient. Also continue to follow-up with your nephrologist and primary care doctor within 1-2 weeks after discharge for continued outpatient monitoring.    50% partial weight bearing on right hip with walker.  You have an aquacel dressing. This dressing is water-resistant. You may take a shower. Do not soak dressing or incision. Keep clean and dry. The dressing may be removed in 7 days and incision left open to air.    Try to have regular bowel movements, take stool softener or laxative if necessary.    Swelling may travel all the way down leg to foot, this is normal and will subside in a few weeks.  Call to schedule an appt with Dr. Nguyen for follow up, if you have staples or sutures they will be removed in office.  Contact your doctor if you experience: fever greater than 101.5, chills, chest pain, difficulty breathing, redness or excessive drainage around the incision, other concerns.  Follow up with your primary care provider.   You have a PICC line and are receiving IV antibiotics to treat the MSSA infection in your right hip. You will be on Ancef 2g every 12 hours for 6 weeks (last date of administration: 3/7/22). Weekly CBC, CMP, ESR, CRP should be drawn and results faxed to Dr. Barnard at [phone].    You received factor VIII infusions while at [facility]. Continue to follow-up with your hematologist outpatient. Also continue to follow-up with your nephrologist and primary care doctor within 1-2 weeks after discharge for continued outpatient monitoring.    50% partial weight bearing on right hip with walker.    You have an aquacel dressing. This dressing is water-resistant. You may take a shower. Do not soak dressing or incision. Keep clean and dry. The dressing may be removed in 7 days and incision left open to air. Additionally you have 4x4 gauze, and abdominal pad, and ace wrap compression dressing on your right thigh below your aquacel dressing. This is where the drain you had used to be. It is normal to have some bleeding from this site, which should be improving each day. Change dressing daily with gauze/ tegaderm, abdominal pad, and compression dressing until bleeding has completely stopped. After bleeding has completely stopped you may stop using the abdominal pads and ace wrap as extra reinforcement, and just keep the area clean, dry, and covered with gauze/tegaderm. If bleeding from this area starts to increase and is not controlled by the compression dressing, please call Dr. Nguyen.     Try to have regular bowel movements, take stool softener or laxative if necessary.    Swelling may travel all the way down leg to foot, this is normal and will subside in a few weeks.  Call to schedule an appt with Dr. Nguyen for follow up, if you have staples or sutures they will be removed in office.  Contact your doctor if you experience: fever greater than 101.5, chills, chest pain, difficulty breathing, redness or excessive drainage around the incision, other concerns.  Follow up with your primary care provider.

## 2022-01-24 NOTE — CONSULT NOTE ADULT - SUBJECTIVE AND OBJECTIVE BOX
Vascular Access Service Consult Note    [facility]:         Diagnosis: right hip infection    Indications for Vascular Access (Check all that apply)  [ x ]  Antibiotic Therapy       Antibiotic Prescribed:    ancef x 6 weeks                                                                                 [  ]  IV Hydration  [  ]  Total Parenteral Nutrition  [  ]  Chemotherapy  [  ]  Difficult Venous Access  [  ]  CVP monitoring  [  ]  Medications with high potential for tissue necrosis on extravasation  [  ]  Other    Screening (Check all that apply)  Previous Radiation to chest  [  ] Yes      [x ]  No  Breast Cancer                          [  ] Left     [  ]  Right    [x  ]  No  Lymph Node Dissection         [  ] Left     [  ]  Right    [x  ]  No  Pacemaker or ICD                   [  ] Left     [  ]  Right    [  x]  No  Upper Extremity DVT             [  ] Left     [  ]  Right    [ x ]  No  Chronic Kidney Disease         [  ]  Yes     [ x ]  No  Hemodialysis                           [  ]  Yes     [x  ]  No  AV Fistula/ Graft                     [  ]  Left    [  ]  Right    [ x ]  No  Temp>101F in past 24 H       [  ]  Yes     [ x ]  No  H/O PICC/Midline                   [ x ]  Yes     [  ]  No    Lab data:                        9.8    15.22 )-----------( 265      ( 24 Jan 2022 07:30 )             30.9     01-24    141  |  106  |  33<H>  ----------------------------<  164<H>  4.4   |  21<L>  |  2.12<H>    Ca    9.9      24 Jan 2022 07:30  Phos  3.8     01-23  Mg     2.0     01-23        Culture Results:   Moderate Staphylococcus aureus Presumptive Methicillin susceptible  Confirmation to follow within 24 hrs.  Susceptibility to follow.  Culture in progress (01-23 @ 09:33)  Culture Results:   Moderate Staphylococcus aureus Presumptive Methicillin susceptible  Confirmation to follow within 24 hrs.  Susceptibility to follow.  Culture in progress (01-23 @ 09:32)  Culture Results:   Moderate Staphylococcus aureus Presumptive Methicillin susceptible  Confirmation to follow within 24 hrs.  Susceptibility to follow.  Culture in progress (01-23 @ 09:31)          I have reviewed the chart, interviewed and examined the patient and determined that this patient:  [x  ] Is a candidate for a PICC line  [  ] Is a candidate for a Midline  [  ] Is not a candidate for vascular access device (reason)    Lumens:    [x  ] Single  [  ] Double

## 2022-01-24 NOTE — DISCHARGE NOTE PROVIDER - NSDCMRMEDTOKEN_GEN_ALL_CORE_FT
ceFAZolin 2 g/100 mL-D5% injectable solution: 2 gram(s) intravenously every 12 hours   Heparin Lock Flush 100 units/mL intravenous solution: 5mL  intravenously administer via PICC after each infusion  2 times a day   PICC supplies :   saline flush : 5 milliliter(s) via PICC pre/post infusion 2 times a day   Weekly Labs : CBC, CMP, ESR, CRP Weekly beginning 1/31/22 - 3/7/22   Fax Results to  Attn Dr. Schneider  Infectious Disease  [phone]   acetaminophen 500 mg oral capsule: 2 cap(s) orally every 6 hours, as needed for mild to moderate pain MDD:8  aspirin 81 mg oral tablet, chewable: 1 tab(s) orally 2 times a day for 30 days after surgery  ceFAZolin 2 g/100 mL-D5% injectable solution: 2 gram(s) intravenously every 12 hours   Heparin Lock Flush 100 units/mL intravenous solution: 5mL  intravenously administer via PICC after each infusion  2 times a day   oxyCODONE 5 mg oral tablet: 1/2- 1 tab(s) orally every 8 hours, as needed for SEVERE pain MDD:3  PICC supplies :   saline flush : 5 milliliter(s) via PICC pre/post infusion 2 times a day   Weekly Labs : CBC, CMP, ESR, CRP Weekly beginning 1/31/22 - 3/7/22   Fax Results to  Attn Dr. Schneider  Infectious Disease  [phone]

## 2022-01-24 NOTE — PROGRESS NOTE ADULT - SUBJECTIVE AND OBJECTIVE BOX
INTERVAL HPI/OVERNIGHT EVENTS: STEPHANIE.    CONSTITUTIONAL:  Negative fever or chills, feels well, good appetite  EYES:  Negative  blurry vision or double vision  CARDIOVASCULAR:  Negative for chest pain or palpitations  RESPIRATORY:  Negative for cough, wheezing, or SOB   GASTROINTESTINAL:  Negative for nausea, vomiting, diarrhea, constipation, or abdominal pain  GENITOURINARY:  Negative frequency, urgency or dysuria  NEUROLOGIC:  No headache, confusion, dizziness, lightheadedness      ANTIBIOTICS/RELEVANT:    MEDICATIONS  (STANDING):  aspirin  chewable 81 milliGRAM(s) Oral two times a day  BUpivacaine liposome 1.3% Injectable (no eMAR) 20 milliLiter(s) Local Injection once  ceFAZolin   IVPB 2000 milliGRAM(s) IV Intermittent every 12 hours  chlorhexidine 2% Cloths 1 Application(s) Topical <User Schedule>  influenza  Vaccine (HIGH DOSE) 0.7 milliLiter(s) IntraMuscular once  senna 2 Tablet(s) Oral at bedtime    MEDICATIONS  (PRN):  acetaminophen     Tablet .. 650 milliGRAM(s) Oral every 6 hours PRN Mild Pain (1 - 3)  HYDROmorphone  Injectable 0.5 milliGRAM(s) IV Push every 15 minutes PRN breakthrough  HYDROmorphone  Injectable 0.5 milliGRAM(s) IV Push every 4 hours PRN breakthrough  ondansetron Injectable 4 milliGRAM(s) IV Push every 6 hours PRN Nausea and/or Vomiting  oxyCODONE    IR 5 milliGRAM(s) Oral every 3 hours PRN Moderate Pain (4 - 6)  oxyCODONE    IR 10 milliGRAM(s) Oral every 3 hours PRN Severe Pain (7 - 10)  sodium chloride 0.9% lock flush 10 milliLiter(s) IV Push every 1 hour PRN Pre/post blood products, medications, blood draw, and to maintain line patency        Vital Signs Last 24 Hrs  T(C): 36.8 (24 Jan 2022 13:50), Max: 37.7 (24 Jan 2022 05:56)  T(F): 98.2 (24 Jan 2022 13:50), Max: 99.8 (24 Jan 2022 05:56)  HR: 87 (24 Jan 2022 13:50) (76 - 96)  BP: 124/59 (24 Jan 2022 13:50) (124/59 - 150/79)  BP(mean): --  RR: 18 (24 Jan 2022 13:50) (15 - 18)  SpO2: 97% (24 Jan 2022 13:50) (97% - 99%)    PHYSICAL EXAM:  Constitutional: NAD  Eyes: VANESA, EOMI  Ear/Nose/Throat: no oral lesion, no sinus tenderness on percussion	  Neck: no JVD, no lymphadenopathy, supple  Respiratory: CTA libertad  Cardiovascular: S1S2 RRR, no murmurs  Gastrointestinal:soft, (+) BS, no HSM  Extremities: + R hip drain; RLE extended   Vascular: DP Pulse:	right normal; left normal      LABS:                        9.8    15.22 )-----------( 265      ( 24 Jan 2022 07:30 )             30.9     01-24    141  |  106  |  33<H>  ----------------------------<  164<H>  4.4   |  21<L>  |  2.12<H>    Ca    9.9      24 Jan 2022 07:30  Phos  3.8     01-23  Mg     2.0     01-23            MICROBIOLOGY: Culture - Tissue with Gram Stain (01.23.22 @ 09:33)    Gram Stain:   No organisms seen  No WBC's seen.    Specimen Source: .Tissue Right Hip Capsule #2    Culture Results:   Moderate Staphylococcus aureus Presumptive Methicillin susceptible  Confirmation to follow within 24 hrs.  Susceptibility to follow.  Culture in progress        RADIOLOGY & ADDITIONAL STUDIES: reviewed

## 2022-01-24 NOTE — DIETITIAN INITIAL EVALUATION ADULT. - MALNUTRITION
Moderate malnutrition in the context of chronic illness AEB <75% of estimated energy requirement for >/= 1 month, mild/moderate muscle wasting and fat loss in upper body

## 2022-01-24 NOTE — DISCHARGE NOTE PROVIDER - PROVIDER TOKENS
PROVIDER:[TOKEN:[[id number]],FOLLOWUP:[2 weeks]] PROVIDER:[TOKEN:[[id number]],FOLLOWUP:[2 weeks]],PROVIDER:[TOKEN:[[id number]],FOLLOWUP:[2 weeks]]

## 2022-01-24 NOTE — PROGRESS NOTE ADULT - ASSESSMENT
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  Aspirate with WBC 86,000 with 92% PMNs; s/p R hip I&D 1/22 with cultures growing MSSA c/w septic arthritis.  - d/c daptomycin and Zosyn and start cefazolin 2g IV [id number] 6 week course through 3/7, possibly to be followed by PO suppression (to be determined on outpatient f/u with Dr. Barnard)  - PICC (single lumen)    d/w primary team    ID Team 2

## 2022-01-24 NOTE — DIETITIAN INITIAL EVALUATION ADULT. - PROBLEM SELECTOR PLAN 5
patient with elevation in alkaline phosphatase from admission 160 -> 313. No abdominal pain/tenderness  - f/u GGT  - trend CMP

## 2022-01-24 NOTE — PROGRESS NOTE ADULT - ATTENDING COMMENTS
I was informed by Dr. Tamez that IR is unavailable for hip aspirations over the weekends. I still believe that the patient needs a hip aspiration to definitively rule out septic arthritis. Awaiting factor levels and possible transfusion if levels are abnormal as per Dr. Rice. I will attempt the aspiration myself once cleared to proceed by primary team. Continue NPO and holding antibiotics until the aspiration has been done. D/w patient and daughter Tori at the bedside.
Patient initially seen with fellow on 1/24/2022, all management over the weekend supervised over the phone.   No bleeding.   Please check daily CBCs and Coags.   Replete factor per above protocol.
76 yo M unvax for COVID, hx of Hemophilia A, HTN, CKD 4 (Hx of requiring HD), DRESS 2/2 allopurinol and recent MSSA NVE of MV c/b spinal discitis/OM of L5-S1 s/p abx who presented due to gallium scan concerning for R hip septic arthritis.  S/p arthrocentesis w/signs of septic joint and going to OR for washout 1/22    #Septic joint of R hip  - s/p arthrocentesis with elevated WBC and blood, no cyrstals. Concerning for septic joint  - OR tonight for washout, medically optimized (see chart note)  - Start Dapto and Zosyn per ID following culture collection from washout, appreciate ID recs    #Hemophilia A  - last Factor VIII in November, assay today 32.  Held arthrocentesis until given 1335 IU.   - heme following, c/w factor VIII 1335 q8    #CKD - Cr stable 1.8 - 1.9, avoid nephrotoxic agents, contrast.  trend Cr    Patient to be transferred to ortho after OR, medicine to follow

## 2022-01-24 NOTE — PROGRESS NOTE ADULT - SUBJECTIVE AND OBJECTIVE BOX
Ortho AM Note    Pt seen and examined this AM.   No severe pain this AM.   ranging hip.   Denies CP, SOB, N/V, numbness/tingling     Vital Signs Last 24 Hrs  T(C): 37.7 (01-24-22 @ 05:56), Max: 37.7 (01-24-22 @ 05:56)  T(F): 99.8 (01-24-22 @ 05:56), Max: 99.8 (01-24-22 @ 05:56)  HR: 96 (01-24-22 @ 05:56) (96 - 96)  BP: 144/76 (01-24-22 @ 05:56) (144/76 - 144/76)  BP(mean): --  RR: 17 (01-24-22 @ 05:56) (17 - 17)  SpO2: 97% (01-24-22 @ 05:56) (97% - 97%)    General: Pt Alert and oriented, NAD  DSG C/D/I  drain in place holding suction   cap refill < 2 seconds  Sensation: SILT throughout lower extremity  Motor: 5/5 EHL/FHL/TA/GS        A/P: 77yMale POD#1 s/p R hip I&D. ID following on dapto and zosyn. Needs daily labs, trend infectious markers.   - Stable  - Pain Control  - DVT ppx: pending Oh and heme onc  - PT, WBS: PWB 50%  - drain in place.    Ortho Pager [phone]

## 2022-01-24 NOTE — DIETITIAN INITIAL EVALUATION ADULT. - LOSS OF SUBCUTANEOUS FAT
AFTER VISIT SUMMARY (AVS):    At today's visit we discussed various diagnostic possibilities for your symptoms and the reasons for work-up, which includes:  Orders Placed This Encounter   Procedures     Lyme Disease Jaleesa with reflex to WB Serum     Vitamin E     Vitamin B1 whole blood     Protein electrophoresis     Protein electrophoresis random urine     Erythrocyte sedimentation rate auto     Hemoglobin A1c     Protein Immunofixation Serum     Vitamin B6     Protein immunofixation urine     Anti Nuclear Jaleesa IgG by IFA with Reflex     EMG     No new medications were ordered.    Additional recommendations after the work-up.    Next follow-up appointment is in the next 4-8 weeks or earlier if needed.    Please do not hesitate to call me with any questions or concerns.    Thanks.    
Buccal...

## 2022-01-24 NOTE — DISCHARGE NOTE PROVIDER - CARE PROVIDER_API CALL
Sebas Nguyen)  Orthopedics  [address], [address] [facility], [place] [address]  Phone: [phone]  Fax: [phone]  Follow Up Time: 2 weeks   Sebas Nguyen)  Orthopedics  [address], [address] [place] [address]  Phone: [phone]  Fax: [phone]  Follow Up Time: 2 weeks    Joyce Barnard)  Infectious Disease; Internal Medicine  [address], [address] [place] [address]  Phone: [phone]  Fax: [phone]  Follow Up Time: 2 weeks

## 2022-01-24 NOTE — PROGRESS NOTE ADULT - SUBJECTIVE AND OBJECTIVE BOX
Ortho Note    Surgery: s/p R Hip I&D for MSSA native septic joint POD #2  Patient seen and examined at bedside this AM   Pt endorses some pain right hip   Denies CP, SOB, N/V, numbness/tingling     Vital Signs Last 24 Hrs  T(C): 37 (01-24-22 @ 09:40), Max: 37 (01-24-22 @ 09:40)  T(F): 98.6 (01-24-22 @ 09:40), Max: 98.6 (01-24-22 @ 09:40)  HR: 76 (01-24-22 @ 09:40) (76 - 76)  BP: 150/79 (01-24-22 @ 09:40) (150/79 - 150/79)  BP(mean): --  RR: 15 (01-24-22 @ 09:40) (15 - 15)  SpO2: 98% (01-24-22 @ 09:40) (98% - 98%)  AVSS    General: Pt Alert and oriented, NAD  Dressing C/D/I: aquacel right hip, CHARLENE drain holding suction   Pulses: 2+   Sensation: intact to light touch bilateral LE   Motor: EHL/FHL/TA/GS 5/5 bilateral LE         A/P: 77yMale POD# 2s/p Right hip I&D for MSSA septic joint    - Stable  - Pain Control  - DVT ppx:  - PT, WBS:     Ortho Pager [phone] Ortho Note    Surgery: s/p R Hip I&D for MSSA native septic joint POD #2  Patient seen and examined at bedside this AM   Pt endorses some pain right hip   Denies CP, SOB, N/V, numbness/tingling     Vital Signs Last 24 Hrs  T(C): 37 (01-24-22 @ 09:40), Max: 37 (01-24-22 @ 09:40)  T(F): 98.6 (01-24-22 @ 09:40), Max: 98.6 (01-24-22 @ 09:40)  HR: 76 (01-24-22 @ 09:40) (76 - 76)  BP: 150/79 (01-24-22 @ 09:40) (150/79 - 150/79)  BP(mean): --  RR: 15 (01-24-22 @ 09:40) (15 - 15)  SpO2: 98% (01-24-22 @ 09:40) (98% - 98%)  AVSS    General: Pt Alert and oriented, NAD  Dressing C/D/I: aquacel right hip, CHARLENE drain holding suction   Pulses: 2+   Sensation: intact to light touch bilateral LE   Motor: EHL/FHL/TA/GS 5/5 bilateral LE         A/P: 77yMale POD# 2s/p Right hip I&D for MSSA septic joint    - VSS, Cr 2.12- will discuss uptrend with nephrology  - Hemophilia A: Appreciate hematology recs: Factor VIII level within range, repeat 1400, repeat coags/Factor VII daily; other labs ordered as requested   - Pain Control: IV and PO PRN   - DVT ppx: ASA 81mg BID- ok to start per Heme   - c/w monitor CHARLENE drain output   - PT, WBS: PWB 50% RLE  - PICC today: start ancef for MSSA, pending final recs  - home pending PT clearance, drain output, hemophilia labs, final ID recs likely later in the week     Ortho Pager [phone]

## 2022-01-24 NOTE — DIETITIAN NUTRITION RISK NOTIFICATION - TREATMENT: THE FOLLOWING DIET HAS BEEN RECOMMENDED
Diet, Renal Restrictions:   For patients receiving Renal Replacement - No Protein Restr, No Conc K, No Conc Phos, Low Sodium  Supplement Feeding Modality:  Oral  Ensure Enlive Cans or Servings Per Day:  1       Frequency:  Daily (01-24-22 @ 15:03) [Pending Verification By Attending]  Diet, Renal Restrictions:   For patients receiving Renal Replacement - No Protein Restr, No Conc K, No Conc Phos, Low Sodium (01-22-22 @ 23:32) [Active]

## 2022-01-24 NOTE — PROGRESS NOTE ADULT - SUBJECTIVE AND OBJECTIVE BOX
Pt has no acute complaints  no fever/chills  ros otherwise negative       Vital Signs Last 12 Hrs  T(F): 97.8 (22 @ 10:23), Max: 98.2 (22 @ 03:00)  HR: 88 (22 @ 10:23) (88 - 96)  BP: 137/77 (22 @ 10:23) (137/77 - 151/79)  BP(mean): --  RR: 15 (22 @ 10:23) (15 - 18)  SpO2: 100% (22 @ 10:23) (98% - 100%)  I&O's Summary    2022 07:01  -  2022 07:00  --------------------------------------------------------  IN: 200 mL / OUT: 30 mL / NET: 170 mL    2022 07:01  -  2022 14:53  --------------------------------------------------------  IN: 577 mL / OUT: 330 mL / NET: 247 mL        PHYSICAL EXAM:  Constitutional: NAD, comfortable in bed.  HEENT: NC/AT, PERRLA, EOMI, no conjunctival pallor or scleral icterus, MMM  Neck: Supple  Respiratory: CTA B/L. No w/r/r.   Cardiovascular: RRR, normal S1 and S2, no m/r/g.   Gastrointestinal: +BS, soft NTND, no guarding or rebound tenderness, no palpable masses   Extremities: wwp; no cyanosis, clubbing or edema.  R hip with dressing clean dry and intact, drain with serous drainage.  Minimal tenderness to palpation, no palpable hematoma   Vascular: Pulses equal and strong throughout.   Neurological: AAOx3, no CN deficits, strength and sensation intact throughout.   Skin: No gross skin abnormalities or rashes        LABS:                        11.2   16.90 )-----------( 264      ( 2022 14:41 )             35.3         141  |  108  |  32<H>  ----------------------------<  95  4.5   |  20<L>  |  1.87<H>    Ca    10.3      2022 07:31  Phos  3.1       Mg     2.0         TPro  6.3  /  Alb  3.4  /  TBili  0.4  /  DBili  x   /  AST  10  /  ALT  12  /  AlkPhos  277<H>      PT/INR - ( 2022 17:12 )   PT: 13.4 sec;   INR: 1.12          PTT - ( 2022 17:12 )  PTT:46.9 sec  Urinalysis Basic - ( 2022 17:28 )    Color: Yellow / Appearance: Clear / S.015 / pH: x  Gluc: x / Ketone: NEGATIVE  / Bili: Negative / Urobili: 0.2 E.U./dL   Blood: x / Protein: NEGATIVE mg/dL / Nitrite: NEGATIVE   Leuk Esterase: NEGATIVE / RBC: x / WBC x   Sq Epi: x / Non Sq Epi: x / Bacteria: x          RADIOLOGY & ADDITIONAL TESTS:    MEDICATIONS  (STANDING):  BUpivacaine liposome 1.3% Injectable (no eMAR) 20 milliLiter(s) Local Injection once  DAPTOmycin IVPB      influenza  Vaccine (HIGH DOSE) 0.7 milliLiter(s) IntraMuscular once  piperacillin/tazobactam IVPB.. 2.25 Gram(s) IV Intermittent every 6 hours  senna 2 Tablet(s) Oral at bedtime    MEDICATIONS  (PRN):  acetaminophen     Tablet .. 650 milliGRAM(s) Oral every 6 hours PRN Mild Pain (1 - 3)  HYDROmorphone  Injectable 0.5 milliGRAM(s) IV Push every 15 minutes PRN breakthrough  HYDROmorphone  Injectable 0.5 milliGRAM(s) IV Push every 4 hours PRN breakthrough  ondansetron Injectable 4 milliGRAM(s) IV Push every 6 hours PRN Nausea and/or Vomiting  oxyCODONE    IR 5 milliGRAM(s) Oral every 3 hours PRN Moderate Pain (4 - 6)  oxyCODONE    IR 10 milliGRAM(s) Oral every 3 hours PRN Severe Pain (7 - 10)

## 2022-01-24 NOTE — PROGRESS NOTE ADULT - ASSESSMENT
78 yo M unvax for COVID, hx of Hemophilia A, HTN, CKD 4 (Hx of requiring HD), DRESS 2/2 allopurinol and recent MSSA NVE of MV c/b spinal discitis/OM of L5-S1 s/p abx who presented due to gallium scan concerning for R hip septic arthritis.  S/p arthrocentesis w/signs of septic joint and going to OR for washout 1/22

## 2022-01-24 NOTE — DISCHARGE NOTE PROVIDER - NSDCCPCAREPLAN_GEN_ALL_CORE_FT
PRINCIPAL DISCHARGE DIAGNOSIS  Diagnosis: Hip effusion, right  Assessment and Plan of Treatment:

## 2022-01-24 NOTE — DISCHARGE NOTE PROVIDER - DETAILS OF MALNUTRITION DIAGNOSIS/DIAGNOSES
This patient has been assessed with a concern for Malnutrition and was treated during this hospitalization for the following Nutrition diagnosis/diagnoses:     -  01/24/2022: Moderate protein-calorie malnutrition

## 2022-01-24 NOTE — DISCHARGE NOTE PROVIDER - HOSPITAL COURSE
Admitted: 1/21/22  Surgery: 1/22/22, right hip I&D for MSSA septic joint  Maricarmen-op Antibiotics: Ancef (previously Dapto & Zosyn)  Pain control  DVT prophylaxis: Aspirin 81mg twice daily   OOB/Physical Therapy  Infectious disease consulted  Medicine consulted  Heme/onc consulted  IR consulted for placement of PICC line   Admitted: 1/21/22 to medicine service with right hip pain/ infection, co-morbidities of hemophilia A, CKD, DRESS syndrome, gout, BPH  Surgery: 1/22/22, right hip I&D for MSSA septic joint; transferred to orthopedic service post-operatively  Maricarmen-op Antibiotics: Ancef 2g every 12 hours for 6 weeks through 3/7/22 (previously Dapto & Zosyn)  Pain control  DVT prophylaxis: Aspirin 81mg twice daily   OOB/Physical Therapy  Infectious disease consulted for R hip infection- growing MSSA on OR cultures, recommended Ancef x 6 weeks  Medicine consulted for co-management and optimization  Heme/onc consulted for recommendations (h/o hemophilia A)- Received multiple factor VIII infusions throughout admission (last infusion needed: 1/23)  IR consulted for placement of PICC line, placed 1/24/22   Admitted: 1/21/22 to medicine service with right hip pain/ infection, co-morbidities of hemophilia A, CKD, DRESS syndrome, gout, BPH  Surgery: 1/22/22, right hip I&D for MSSA septic joint; transferred to orthopedic service post-operatively  Maricarmen-op Antibiotics: Ancef 2g every 12 hours for 6 weeks through 3/7/22 (previously Dapto & Zosyn), may need PO suppressive therapy after completion of IV abx  Pain control  DVT prophylaxis: Aspirin 81mg twice daily   OOB/Physical Therapy  Infectious disease consulted for R hip infection- growing MSSA on OR cultures, recommended Ancef x 6 weeks (through 3/7/22). Should follow-up with ID Dr. Barnard in 2 weeks after discharge.  Medicine consulted for co-management and optimization  Heme/onc consulted for recommendations (h/o hemophilia A)- Received multiple factor VIII infusions throughout admission (last infusion needed: 1/23)  IR consulted for placement of PICC line, placed 1/24/22   Admitted: 1/21/22 to medicine service with right hip pain/ infection, co-morbidities of hemophilia A, CKD, DRESS syndrome, gout, BPH  Surgery: 1/22/22, right hip I&D for MSSA septic joint; transferred to orthopedic service post-operatively  Maricarmen-op Antibiotics: Ancef 2g every 12 hours for 6 weeks through 3/7/22 (previously Dapto & Zosyn), may need PO suppressive therapy after completion of IV abx  Pain control  DVT prophylaxis: Aspirin 81mg twice daily   OOB/Physical Therapy  Infectious disease consulted for R hip infection- growing MSSA on OR cultures, recommended Ancef x 6 weeks (through 3/7/22). Should follow-up with ID Dr. Barnard in 2 weeks after discharge.  Medicine consulted for co-management and optimization  Heme/onc consulted for recommendations (h/o hemophilia A)- Received multiple factor VIII infusions throughout admission (last infusion needed: 1/23). To follow-up with hematology Dr. Fischer outpatient.  IR consulted for placement of PICC line, placed 1/24/22

## 2022-01-24 NOTE — PROCEDURE NOTE - NSPROCDETAILS_GEN_ALL_CORE
location identified, draped/prepped, sterile technique used/sterile dressing applied/sterile technique, catheter placed/supine position/ultrasound guidance
a needle was inserted into (see location above)/aspiration...

## 2022-01-24 NOTE — PROGRESS NOTE ADULT - ASSESSMENT
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 Barnard due to concern for R hip septic joint from gallium scan done yesterday.    Hematology consulted for known hemophilia A.    Hemophilia A  Checked stat factor VIII level=32.  S/p 1335 IU of recombinant VIII prior to planned bedside arthrocentesis on 1/22, s/p 1335 IU again at 8PM pre wash out.  Monitor for post procedural joint bleeding (swelling, increasing pain, bruising).  If concern for hemarthrosis check stat imaging (US, CT) and if confirmed bleeding call heme/onc (I am on call the whole weekend)  repeat anticardiolipin and beta 2 microglobulin levels as his LA from Sep was positive but negative in November, other APL abs not repeated  Trend daily coags and factor VIII level  Factor VIII level is 111% this AM, please hold rFactor VIII for this morning  Repeat factor VIII activity at 2PM, will give further replacement recommendations based on that. If Factor VIII activity at 2PM is <50% can give 1000 IU of recombinant Factor VIII, if >50% no replacement needed  Repeat another Factor VIII level in the morning  No contraindication to place PICC line with the current level  No DVT ppx until factor VIII activity comes back due to very high risk of hemartrosis    D/w  ([facility] SVC attending).     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 Barnard due to concern for R hip septic joint from gallium scan done yesterday.    Hematology consulted for known hemophilia A.    Hemophilia A  Checked stat factor VIII level=32.  S/p 1335 IU of recombinant VIII prior to planned bedside arthrocentesis on 1/22, s/p 1335 IU again at 8PM pre wash out.  Monitor for post procedural joint bleeding (swelling, increasing pain, bruising).  If concern for hemarthrosis check stat imaging (US, CT) and if confirmed bleeding call heme/onc (I am on call the whole weekend)  repeat anticardiolipin and beta 2 microglobulin levels as his LA from Sep was positive but negative in November, other APL abs not repeated  Trend daily coags and factor VIII level  Factor VIII level is 111% this AM, please hold rFactor VIII for this morning  Repeat factor VIII activity at 2PM, will give further replacement recommendations based on that. If Factor VIII activity at 2PM is <50% can give 1000 IU of recombinant Factor VIII, if >50% no replacement needed  Repeat another Factor VIII level in the morning  No contraindication to place PICC line with the current level  Since Factor VIII level is normal can start DVT ppx, please monitor factor VIII level daily while on DVT ppx    D/w  ([facility] SVC attending).

## 2022-01-25 LAB
-  CEFAZOLIN: SIGNIFICANT CHANGE UP
-  CLINDAMYCIN: SIGNIFICANT CHANGE UP
-  ERYTHROMYCIN: SIGNIFICANT CHANGE UP
-  LINEZOLID: SIGNIFICANT CHANGE UP
-  OXACILLIN: SIGNIFICANT CHANGE UP
-  RIFAMPIN: SIGNIFICANT CHANGE UP
-  TRIMETHOPRIM/SULFAMETHOXAZOLE: SIGNIFICANT CHANGE UP
-  VANCOMYCIN: SIGNIFICANT CHANGE UP
ANION GAP SERPL CALC-SCNC: 10 MMOL/L — SIGNIFICANT CHANGE UP (ref 5–17)
BUN SERPL-MCNC: 33 MG/DL — HIGH (ref 7–23)
CALCIUM SERPL-MCNC: 10.4 MG/DL — SIGNIFICANT CHANGE UP (ref 8.4–10.5)
CHLORIDE SERPL-SCNC: 108 MMOL/L — SIGNIFICANT CHANGE UP (ref 96–108)
CO2 SERPL-SCNC: 22 MMOL/L — SIGNIFICANT CHANGE UP (ref 22–31)
CREAT SERPL-MCNC: 2.1 MG/DL — HIGH (ref 0.5–1.3)
CULTURE RESULTS: SIGNIFICANT CHANGE UP
FACT VIII ACT/NOR PPP: 82 % — SIGNIFICANT CHANGE UP (ref 51–138)
GLUCOSE SERPL-MCNC: 89 MG/DL — SIGNIFICANT CHANGE UP (ref 70–99)
HCT VFR BLD CALC: 31.3 % — LOW (ref 39–50)
HGB BLD-MCNC: 9.9 G/DL — LOW (ref 13–17)
MCHC RBC-ENTMCNC: 27 PG — SIGNIFICANT CHANGE UP (ref 27–34)
MCHC RBC-ENTMCNC: 31.6 GM/DL — LOW (ref 32–36)
MCV RBC AUTO: 85.3 FL — SIGNIFICANT CHANGE UP (ref 80–100)
METHOD TYPE: SIGNIFICANT CHANGE UP
NRBC # BLD: 0 /100 WBCS — SIGNIFICANT CHANGE UP (ref 0–0)
ORGANISM # SPEC MICROSCOPIC CNT: SIGNIFICANT CHANGE UP
PLATELET # BLD AUTO: 251 K/UL — SIGNIFICANT CHANGE UP (ref 150–400)
POTASSIUM SERPL-MCNC: 4.5 MMOL/L — SIGNIFICANT CHANGE UP (ref 3.5–5.3)
POTASSIUM SERPL-SCNC: 4.5 MMOL/L — SIGNIFICANT CHANGE UP (ref 3.5–5.3)
RBC # BLD: 3.67 M/UL — LOW (ref 4.2–5.8)
RBC # FLD: 14.5 % — SIGNIFICANT CHANGE UP (ref 10.3–14.5)
SODIUM SERPL-SCNC: 140 MMOL/L — SIGNIFICANT CHANGE UP (ref 135–145)
SPECIMEN SOURCE: SIGNIFICANT CHANGE UP
WBC # BLD: 12.75 K/UL — HIGH (ref 3.8–10.5)
WBC # FLD AUTO: 12.75 K/UL — HIGH (ref 3.8–10.5)

## 2022-01-25 PROCEDURE — 99233 SBSQ HOSP IP/OBS HIGH 50: CPT

## 2022-01-25 PROCEDURE — 99232 SBSQ HOSP IP/OBS MODERATE 35: CPT

## 2022-01-25 RX ORDER — LANOLIN ALCOHOL/MO/W.PET/CERES
5 CREAM (GRAM) TOPICAL AT BEDTIME
Refills: 0 | Status: DISCONTINUED | OUTPATIENT
Start: 2022-01-25 | End: 2022-01-28

## 2022-01-25 RX ORDER — POLYETHYLENE GLYCOL 3350 17 G/17G
17 POWDER, FOR SOLUTION ORAL AT BEDTIME
Refills: 0 | Status: DISCONTINUED | OUTPATIENT
Start: 2022-01-25 | End: 2022-01-28

## 2022-01-25 RX ORDER — CEFAZOLIN SODIUM 1 G
2 VIAL (EA) INJECTION
Qty: 168 | Refills: 0
Start: 2022-01-25 | End: 2022-03-07

## 2022-01-25 RX ADMIN — SENNA PLUS 2 TABLET(S): 8.6 TABLET ORAL at 21:53

## 2022-01-25 RX ADMIN — Medication 81 MILLIGRAM(S): at 09:30

## 2022-01-25 RX ADMIN — Medication 81 MILLIGRAM(S): at 21:53

## 2022-01-25 RX ADMIN — Medication 100 MILLIGRAM(S): at 06:49

## 2022-01-25 RX ADMIN — CHLORHEXIDINE GLUCONATE 1 APPLICATION(S): 213 SOLUTION TOPICAL at 06:48

## 2022-01-25 RX ADMIN — Medication 100 MILLIGRAM(S): at 17:07

## 2022-01-25 NOTE — PROGRESS NOTE ADULT - ASSESSMENT
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  Aspirate with WBC 86,000 with 92% PMNs; s/p R hip I&D 1/22 with cultures growing MSSA c/w septic arthritis. s/p PICC 1/24.  - continue cefazolin 2g IV [id number] 6 week course through 3/7, possibly to be followed by PO suppression (to be determined on outpatient f/u with Dr. Barnard)  - weekly CBC, CMP, ESR, CRP to be faxed to Dr. Barnard [phone]    Will arrange post hospital f/u with Dr. Barnard for 2-3 weeks    Please reconsult with ?    ID Team 2

## 2022-01-25 NOTE — PROGRESS NOTE ADULT - SUBJECTIVE AND OBJECTIVE BOX
Interval Events:     Pt examined at bedside, has no acute complaints  no fever/chills  ros otherwise negative       Vital Signs Last 24 Hrs  T(C): 36.9 (25 Jan 2022 13:04), Max: 37.2 (24 Jan 2022 20:08)  T(F): 98.4 (25 Jan 2022 13:04), Max: 98.9 (24 Jan 2022 20:08)  HR: 83 (25 Jan 2022 13:04) (83 - 93)  BP: 134/75 (25 Jan 2022 13:04) (126/72 - 144/76)  BP(mean): 95 (25 Jan 2022 13:04) (95 - 95)  RR: 18 (25 Jan 2022 13:04) (18 - 18)  SpO2: 97% (25 Jan 2022 13:04) (97% - 99%)      PHYSICAL EXAM:  Constitutional: NAD, comfortable in bed.  HEENT: NC/AT, PERRLA, EOMI, no conjunctival pallor or scleral icterus, MMM  Neck: Supple  Respiratory: CTA B/L. No w/r/r.   Cardiovascular: RRR, normal S1 and S2, no m/r/g.   Gastrointestinal: +BS, soft NTND, no guarding or rebound tenderness, no palpable masses   Extremities: wwp; no cyanosis, clubbing or edema.  R hip with dressing clean dry and intact, +drain in place.  Minimal tenderness to palpation, no palpable hematoma   Vascular: Pulses equal and strong throughout.   Neurological: AAOx3, no CN deficits, strength and sensation intact throughout.   Skin: No gross skin abnormalities or rashes        LABS:                                   9.9    12.75 )-----------( 251      ( 25 Jan 2022 10:57 )             31.3   01-25    140  |  108  |  33<H>  ----------------------------<  89  4.5   |  22  |  2.10<H>    Ca    10.4      25 Jan 2022 10:58  Phos  3.8     01-23  Mg     2.0     01-23            RADIOLOGY & ADDITIONAL TESTS:    MEDICATIONS  (STANDING):  aspirin  chewable 81 milliGRAM(s) Oral two times a day  BUpivacaine liposome 1.3% Injectable (no eMAR) 20 milliLiter(s) Local Injection once  ceFAZolin   IVPB 2000 milliGRAM(s) IV Intermittent every 12 hours  chlorhexidine 2% Cloths 1 Application(s) Topical <User Schedule>  influenza  Vaccine (HIGH DOSE) 0.7 milliLiter(s) IntraMuscular once  senna 2 Tablet(s) Oral at bedtime    MEDICATIONS  (PRN):  acetaminophen     Tablet .. 650 milliGRAM(s) Oral every 6 hours PRN Mild Pain (1 - 3)  HYDROmorphone  Injectable 0.5 milliGRAM(s) IV Push every 15 minutes PRN breakthrough  HYDROmorphone  Injectable 0.5 milliGRAM(s) IV Push every 4 hours PRN breakthrough  ondansetron Injectable 4 milliGRAM(s) IV Push every 6 hours PRN Nausea and/or Vomiting  oxyCODONE    IR 5 milliGRAM(s) Oral every 3 hours PRN Moderate Pain (4 - 6)  oxyCODONE    IR 10 milliGRAM(s) Oral every 3 hours PRN Severe Pain (7 - 10)  sodium chloride 0.9% lock flush 10 milliLiter(s) IV Push every 1 hour PRN Pre/post blood products, medications, blood draw, and to maintain line patency

## 2022-01-25 NOTE — PROGRESS NOTE ADULT - SUBJECTIVE AND OBJECTIVE BOX
Ortho Note    Pt seen and examined. Comfortable without complaints, pain controlled  Denies CP, SOB, N/V, numbness/tingling     Vital Signs Last 24 Hrs  T(C): 37 (01-25-22 @ 08:30), Max: 37 (01-25-22 @ 08:30)  T(F): 98.6 (01-25-22 @ 08:30), Max: 98.6 (01-25-22 @ 08:30)  HR: 89 (01-25-22 @ 08:30) (86 - 89)  BP: 144/76 (01-25-22 @ 08:30) (138/77 - 144/76)  BP(mean): --  RR: 18 (01-25-22 @ 08:30) (18 - 18)  SpO2: 98% (01-25-22 @ 08:30) (97% - 98%)  AVSS    General: Pt Alert and oriented, NAD  DSG- aquacel C/D/I; CHARLENE x 1 in place  Pulses: +2DP, WWP feet  Sensation: SILT BLE  Motor: 5/5 EHL/FHL/TA/GS                          9.8    15.22 )-----------( 265      ( 24 Jan 2022 07:30 )             30.9     01-24    141  |  106  |  33<H>  ----------------------------<  164<H>  4.4   |  21<L>  |  2.12<H>    Ca    9.9      24 Jan 2022 07:30  Phos  3.8     01-23  Mg     2.0     01-23        A/P: 77yMale POD#4 s/p right hip I+D  - VSS, continue to monitor labs  - Pain Control  - DVT ppx: ASA  - PT, WBS: 50% PWB  - OOB for meals, I/S  - continue bowel regimen  - appreciate medicine, hematology, ID recommendations  - monitor CHARLENE x 1 output, continue for today  - dispo: home with \[facility]\"" and home infusion services, likely Thursday    Ortho Pager [phone]

## 2022-01-25 NOTE — PROGRESS NOTE ADULT - SUBJECTIVE AND OBJECTIVE BOX
Ortho AM Note    Pt seen and examined this AM.   reports pain is much improved from yesterday.   denies any issues overnight.   Denies CP, SOB, N/V, numbness/tingling     Vital Signs Last 24 Hrs  T(C): 36.9 (01-25-22 @ 05:14), Max: 36.9 (01-25-22 @ 05:14)  T(F): 98.4 (01-25-22 @ 05:14), Max: 98.4 (01-25-22 @ 05:14)  HR: 86 (01-25-22 @ 05:14) (86 - 86)  BP: 138/77 (01-25-22 @ 05:14) (138/77 - 138/77)  BP(mean): --  RR: 18 (01-25-22 @ 05:14) (18 - 18)  SpO2: 97% (01-25-22 @ 05:14) (97% - 97%)    General: Pt Alert and oriented, NAD  DSG C/D/I  drain in place holding suction   cap refill < 2 seconds  Sensation: SILT throughout lower extremity  Motor: 5/5 EHL/FHL/TA/GS        A/P: 77yMale POD#2 s/p R hip I&D. ID following rec ancef due to presumptive MSSA growth prelim on cultures. continue with PT. Will possibly pull drain today.   - Stable  - Pain Control  - DVT ppx: asa 81mg BID  - PT, WBS: PWB 50%  - drain in place.    Ortho Pager [phone]

## 2022-01-26 LAB
ANION GAP SERPL CALC-SCNC: 12 MMOL/L — SIGNIFICANT CHANGE UP (ref 5–17)
BUN SERPL-MCNC: 34 MG/DL — HIGH (ref 7–23)
CALCIUM SERPL-MCNC: 10.4 MG/DL — SIGNIFICANT CHANGE UP (ref 8.4–10.5)
CHLORIDE SERPL-SCNC: 107 MMOL/L — SIGNIFICANT CHANGE UP (ref 96–108)
CO2 SERPL-SCNC: 21 MMOL/L — LOW (ref 22–31)
CREAT SERPL-MCNC: 2.02 MG/DL — HIGH (ref 0.5–1.3)
CRP SERPL-MCNC: 115.4 MG/L — HIGH (ref 0–4)
CULTURE RESULTS: SIGNIFICANT CHANGE UP
CULTURE RESULTS: SIGNIFICANT CHANGE UP
ERYTHROCYTE [SEDIMENTATION RATE] IN BLOOD: >130 MM/HR — HIGH
GLUCOSE SERPL-MCNC: 102 MG/DL — HIGH (ref 70–99)
HCT VFR BLD CALC: 31.1 % — LOW (ref 39–50)
HGB BLD-MCNC: 9.6 G/DL — LOW (ref 13–17)
MCHC RBC-ENTMCNC: 27.2 PG — SIGNIFICANT CHANGE UP (ref 27–34)
MCHC RBC-ENTMCNC: 30.9 GM/DL — LOW (ref 32–36)
MCV RBC AUTO: 88.1 FL — SIGNIFICANT CHANGE UP (ref 80–100)
NRBC # BLD: 0 /100 WBCS — SIGNIFICANT CHANGE UP (ref 0–0)
PLATELET # BLD AUTO: 269 K/UL — SIGNIFICANT CHANGE UP (ref 150–400)
POTASSIUM SERPL-MCNC: 4.2 MMOL/L — SIGNIFICANT CHANGE UP (ref 3.5–5.3)
POTASSIUM SERPL-SCNC: 4.2 MMOL/L — SIGNIFICANT CHANGE UP (ref 3.5–5.3)
RBC # BLD: 3.53 M/UL — LOW (ref 4.2–5.8)
RBC # FLD: 14.4 % — SIGNIFICANT CHANGE UP (ref 10.3–14.5)
SODIUM SERPL-SCNC: 140 MMOL/L — SIGNIFICANT CHANGE UP (ref 135–145)
SPECIMEN SOURCE: SIGNIFICANT CHANGE UP
SPECIMEN SOURCE: SIGNIFICANT CHANGE UP
WBC # BLD: 12.92 K/UL — HIGH (ref 3.8–10.5)
WBC # FLD AUTO: 12.92 K/UL — HIGH (ref 3.8–10.5)

## 2022-01-26 PROCEDURE — 99232 SBSQ HOSP IP/OBS MODERATE 35: CPT

## 2022-01-26 RX ADMIN — SENNA PLUS 2 TABLET(S): 8.6 TABLET ORAL at 21:43

## 2022-01-26 RX ADMIN — Medication 650 MILLIGRAM(S): at 09:04

## 2022-01-26 RX ADMIN — Medication 650 MILLIGRAM(S): at 10:00

## 2022-01-26 RX ADMIN — CHLORHEXIDINE GLUCONATE 1 APPLICATION(S): 213 SOLUTION TOPICAL at 06:16

## 2022-01-26 RX ADMIN — Medication 81 MILLIGRAM(S): at 21:43

## 2022-01-26 RX ADMIN — Medication 81 MILLIGRAM(S): at 09:04

## 2022-01-26 RX ADMIN — Medication 100 MILLIGRAM(S): at 06:17

## 2022-01-26 RX ADMIN — Medication 100 MILLIGRAM(S): at 18:09

## 2022-01-26 NOTE — PROGRESS NOTE ADULT - SUBJECTIVE AND OBJECTIVE BOX
Ortho AM Note    Seen this AM, no significant issues overnight.   Denies CP, SOB, N/V, numbness/tingling     Vital Signs Last 24 Hrs  T(C): 37.2 (01-26-22 @ 04:48), Max: 37.2 (01-26-22 @ 04:48)  T(F): 98.9 (01-26-22 @ 04:48), Max: 98.9 (01-26-22 @ 04:48)  HR: 84 (01-26-22 @ 04:48) (84 - 84)  BP: 132/58 (01-26-22 @ 04:48) (132/58 - 132/58)  BP(mean): 83 (01-26-22 @ 04:48) (83 - 83)  RR: 18 (01-26-22 @ 04:48) (18 - 18)  SpO2: 97% (01-26-22 @ 04:48) (97% - 97%)    General: Pt Alert and oriented, NAD  DSG C/D/I  drain in place holding suction   cap refill < 2 seconds  Sensation: SILT throughout lower extremity  Motor: 5/5 EHL/FHL/TA/GS        A/P: 77yMale POD#2 s/p R hip I&D. On ancef as per ID. Continue with PT. follow up with Dr. Barnard as an outpatient. Follow up AM Labs.    - Stable  - Pain Control  - DVT ppx: asa 81mg BID  - Ancef as per ID  - follow up lio Barnard as an outpatient  - PT, WBS: PWB 50%  - drain in place - 45ccs ON    Ortho Pager [phone]

## 2022-01-26 NOTE — PROGRESS NOTE ADULT - SUBJECTIVE AND OBJECTIVE BOX
Ortho Note    Pt comfortable without complaints, pain controlled  Denies CP, SOB, N/V, numbness/tingling     Vital Signs Last 24 Hrs  T(C): 36.9 (01-26-22 @ 08:55), Max: 37.2 (01-26-22 @ 04:48)  T(F): 98.4 (01-26-22 @ 08:55), Max: 98.9 (01-26-22 @ 04:48)  HR: 84 (01-26-22 @ 08:55) (84 - 84)  BP: 132/77 (01-26-22 @ 08:55) (132/58 - 132/77)  BP(mean): 83 (01-26-22 @ 04:48) (83 - 83)  RR: 17 (01-26-22 @ 08:55) (17 - 18)  SpO2: 97% (01-26-22 @ 08:55) (97% - 97%)  AVSS    General: Pt Alert and oriented, NAD  DSG- aquacel C/D/I, CHARLENE x 1 in place  Pulses: +2DP  Sensation: SILT BLE  Motor: 5/5 EHL/FHL/TA/GS                          9.6    12.92 )-----------( 269      ( 26 Jan 2022 08:01 )             31.1     01-26    140  |  107  |  34<H>  ----------------------------<  102<H>  4.2   |  21<L>  |  2.02<H>    Ca    10.4      26 Jan 2022 08:03        A/P: 77yMale POD#5 s/p right hip I+D  - VSS, continue to monitor labs including factor VIII and cr (h/o hemophilia and CKD)  - Pain Control  - DVT ppx: ASA  - PT, WBS: 50% PWB  - OOB for meals, I/S  - continue bowel regimen  - appreciate medicine, hematology, ID recommendations  - monitor CHARLENE x 1 output, continue for today  - dispo: home with [facility] and home infusion services, likely Friday    Ortho Pager [phone]

## 2022-01-27 LAB
ANION GAP SERPL CALC-SCNC: 11 MMOL/L — SIGNIFICANT CHANGE UP (ref 5–17)
B2 MICROGLOB SERPL-MCNC: 11.5 MG/L — HIGH (ref 0.8–2.2)
BUN SERPL-MCNC: 37 MG/DL — HIGH (ref 7–23)
CALCIUM SERPL-MCNC: 10.5 MG/DL — SIGNIFICANT CHANGE UP (ref 8.4–10.5)
CHLORIDE SERPL-SCNC: 106 MMOL/L — SIGNIFICANT CHANGE UP (ref 96–108)
CO2 SERPL-SCNC: 21 MMOL/L — LOW (ref 22–31)
CREAT SERPL-MCNC: 1.97 MG/DL — HIGH (ref 0.5–1.3)
FACT VIII ACT/NOR PPP: 49 % — LOW (ref 51–138)
FACT VIII ACT/NOR PPP: 56 % — SIGNIFICANT CHANGE UP (ref 51–138)
GLUCOSE SERPL-MCNC: 100 MG/DL — HIGH (ref 70–99)
HCT VFR BLD CALC: 32.1 % — LOW (ref 39–50)
HCT VFR BLD CALC: 32.6 % — LOW (ref 39–50)
HGB BLD-MCNC: 10.1 G/DL — LOW (ref 13–17)
HGB BLD-MCNC: 10.4 G/DL — LOW (ref 13–17)
MCHC RBC-ENTMCNC: 27.6 PG — SIGNIFICANT CHANGE UP (ref 27–34)
MCHC RBC-ENTMCNC: 28 PG — SIGNIFICANT CHANGE UP (ref 27–34)
MCHC RBC-ENTMCNC: 31.5 GM/DL — LOW (ref 32–36)
MCHC RBC-ENTMCNC: 31.9 GM/DL — LOW (ref 32–36)
MCV RBC AUTO: 87.7 FL — SIGNIFICANT CHANGE UP (ref 80–100)
MCV RBC AUTO: 87.9 FL — SIGNIFICANT CHANGE UP (ref 80–100)
NRBC # BLD: 0 /100 WBCS — SIGNIFICANT CHANGE UP (ref 0–0)
NRBC # BLD: 0 /100 WBCS — SIGNIFICANT CHANGE UP (ref 0–0)
PLATELET # BLD AUTO: 299 K/UL — SIGNIFICANT CHANGE UP (ref 150–400)
PLATELET # BLD AUTO: 339 K/UL — SIGNIFICANT CHANGE UP (ref 150–400)
POTASSIUM SERPL-MCNC: 4.1 MMOL/L — SIGNIFICANT CHANGE UP (ref 3.5–5.3)
POTASSIUM SERPL-SCNC: 4.1 MMOL/L — SIGNIFICANT CHANGE UP (ref 3.5–5.3)
RBC # BLD: 3.66 M/UL — LOW (ref 4.2–5.8)
RBC # BLD: 3.71 M/UL — LOW (ref 4.2–5.8)
RBC # FLD: 14.4 % — SIGNIFICANT CHANGE UP (ref 10.3–14.5)
RBC # FLD: 14.4 % — SIGNIFICANT CHANGE UP (ref 10.3–14.5)
SODIUM SERPL-SCNC: 138 MMOL/L — SIGNIFICANT CHANGE UP (ref 135–145)
WBC # BLD: 11.67 K/UL — HIGH (ref 3.8–10.5)
WBC # BLD: 16.91 K/UL — HIGH (ref 3.8–10.5)
WBC # FLD AUTO: 11.67 K/UL — HIGH (ref 3.8–10.5)
WBC # FLD AUTO: 16.91 K/UL — HIGH (ref 3.8–10.5)

## 2022-01-27 PROCEDURE — 99233 SBSQ HOSP IP/OBS HIGH 50: CPT

## 2022-01-27 RX ADMIN — Medication 81 MILLIGRAM(S): at 10:42

## 2022-01-27 RX ADMIN — Medication 100 MILLIGRAM(S): at 06:10

## 2022-01-27 RX ADMIN — Medication 100 MILLIGRAM(S): at 18:11

## 2022-01-27 RX ADMIN — CHLORHEXIDINE GLUCONATE 1 APPLICATION(S): 213 SOLUTION TOPICAL at 06:11

## 2022-01-27 NOTE — PROGRESS NOTE ADULT - ASSESSMENT
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 Barnard due to concern for R hip septic joint from gallium scan done yesterday.    Hematology consulted for known hemophilia A.    Hemophilia A  Checked stat factor VIII level=32.  S/p 1335 IU of recombinant VIII prior to planned bedside arthrocentesis on 1/22, s/p 1335 IU again at 8PM pre wash out.  Monitor for post procedural joint bleeding (swelling, increasing pain, bruising).  If concern for hemarthrosis check stat imaging (US, CT) and if confirmed bleeding call heme/onc (I am on call the whole weekend)  repeat anticardiolipin and beta 2 glycoprotein levels as his LA from Sep was positive but negative in November, other APL abs not repeated  Trend daily coags and factor VIII level  So far no unusual unexpected bleeding per ortho  F/u CBC tonight  If any unexpected bleeding please call on-call heme fellow for recommendations    D/w  (Heme SVC attending).     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 Barnard due to concern for R hip septic joint from gallium scan. Now s/p right hip wash out.    Hematology consulted for known hemophilia A.    Hemophilia A  Checked stat factor VIII level=32.  S/p 1335 IU of recombinant VIII prior to planned bedside arthrocentesis on 1/22, s/p 1335 IU again at 8PM pre wash out.  Monitor for post procedural joint bleeding (swelling, increasing pain, bruising).  repeat anticardiolipin and beta 2 glycoprotein levels as his LA from Sep was positive but negative in November, other APL abs not repeated  Trend daily coags and factor VIII level  So far no unusual unexpected bleeding per ortho  F/u CBC tonight  If any unexpected bleeding please call on-call heme fellow for recommendations    D/w  (Heme SVC attending).

## 2022-01-27 NOTE — PROGRESS NOTE ADULT - SUBJECTIVE AND OBJECTIVE BOX
Ortho AM Note    in bed this AM resting comfortably.   Denies CP, SOB, N/V, numbness/tingling     Vital Signs Last 24 Hrs  T(C): 37.1 (01-27-22 @ 04:37), Max: 37.1 (01-27-22 @ 04:37)  T(F): 98.7 (01-27-22 @ 04:37), Max: 98.7 (01-27-22 @ 04:37)  HR: 100 (01-27-22 @ 04:37) (100 - 100)  BP: 126/73 (01-27-22 @ 04:37) (126/73 - 126/73)  BP(mean): --  RR: 17 (01-27-22 @ 04:37) (17 - 17)  SpO2: 98% (01-27-22 @ 04:37) (98% - 98%)    General: Pt Alert and oriented, NAD  DSG C/D/I  drain in place holding suction   cap refill < 2 seconds  Sensation: SILT throughout lower extremity  Motor: 5/5 EHL/FHL/TA/GS        A/P: 77yMale s/p R hip I&D. On ancef as per ID. Continue with PT. Follow up with Dr. Barnard as an outpatient.   - Stable  - Pain Control  - DVT ppx: asa 81mg BID  - Ancef as per ID  - follow up lio Barnard as an outpatient  - PT, WBS: PWB 50%  - drain in place - 5ccs overnight, likely pull today     Ortho Pager [phone]

## 2022-01-27 NOTE — PROGRESS NOTE ADULT - SUBJECTIVE AND OBJECTIVE BOX
Interval Events:     Pt examined at bedside, has no acute complaints  no fever/chills, feels well today and resting comfortably.      Vital Signs Last 24 Hrs  T(C): 36.9 (27 Jan 2022 13:30), Max: 37.1 (27 Jan 2022 04:37)  T(F): 98.5 (27 Jan 2022 13:30), Max: 98.7 (27 Jan 2022 04:37)  HR: 100 (27 Jan 2022 13:30) (86 - 100)  BP: 108/70 (27 Jan 2022 13:30) (108/70 - 153/70)  BP(mean): --  RR: 15 (27 Jan 2022 13:30) (15 - 18)  SpO2: 99% (27 Jan 2022 13:30) (97% - 100%)      PHYSICAL EXAM:  Constitutional: NAD, comfortable in bed.  HEENT: NC/AT, PERRLA, EOMI, no conjunctival pallor or scleral icterus, MMM  Neck: Supple  Respiratory: CTA B/L. No w/r/r.   Cardiovascular: RRR, normal S1 and S2, no m/r/g.   Gastrointestinal: +BS, soft NTND, no guarding or rebound tenderness, no palpable masses   Extremities: wwp; no cyanosis, clubbing or edema.  dsg in place, drain removed  Vascular: Pulses equal and strong throughout.   Neurological: AAOx3, no CN deficits, strength and sensation intact throughout.   Skin: No gross skin abnormalities or rashes        LABS:                                   10.1   11.67 )-----------( 299      ( 27 Jan 2022 06:40 )             32.1   01-27    138  |  106  |  37<H>  ----------------------------<  100<H>  4.1   |  21<L>  |  1.97<H>    Ca    10.5      27 Jan 2022 06:40          RADIOLOGY & ADDITIONAL TESTS:    MEDICATIONS  (STANDING):  aspirin  chewable 81 milliGRAM(s) Oral two times a day  BUpivacaine liposome 1.3% Injectable (no eMAR) 20 milliLiter(s) Local Injection once  ceFAZolin   IVPB 2000 milliGRAM(s) IV Intermittent every 12 hours  chlorhexidine 2% Cloths 1 Application(s) Topical <User Schedule>  influenza  Vaccine (HIGH DOSE) 0.7 milliLiter(s) IntraMuscular once  senna 2 Tablet(s) Oral at bedtime    MEDICATIONS  (PRN):  acetaminophen     Tablet .. 650 milliGRAM(s) Oral every 6 hours PRN Mild Pain (1 - 3)  HYDROmorphone  Injectable 0.5 milliGRAM(s) IV Push every 15 minutes PRN breakthrough  HYDROmorphone  Injectable 0.5 milliGRAM(s) IV Push every 4 hours PRN breakthrough  melatonin 5 milliGRAM(s) Oral at bedtime PRN Sleep  ondansetron Injectable 4 milliGRAM(s) IV Push every 6 hours PRN Nausea and/or Vomiting  oxyCODONE    IR 5 milliGRAM(s) Oral every 3 hours PRN Moderate Pain (4 - 6)  oxyCODONE    IR 10 milliGRAM(s) Oral every 3 hours PRN Severe Pain (7 - 10)  polyethylene glycol 3350 17 Gram(s) Oral at bedtime PRN Constipation  sodium chloride 0.9% lock flush 10 milliLiter(s) IV Push every 1 hour PRN Pre/post blood products, medications, blood draw, and to maintain line patency

## 2022-01-27 NOTE — PROGRESS NOTE ADULT - SUBJECTIVE AND OBJECTIVE BOX
INTERVAL HPI/OVERNIGHT EVENTS:  Pt was seen and examined at the bedside. He was observed to be lying down comfortably.   Pt had minor bleeding from wound today after drain removal,  which is expected per ortho. Now with compression dressing.      VITAL SIGNS:  T(F): 98.5 (01-27-22 @ 13:30)  HR: 100 (01-27-22 @ 13:30)  BP: 108/70 (01-27-22 @ 13:30)  RR: 15 (01-27-22 @ 13:30)  SpO2: 99% (01-27-22 @ 13:30)  Wt(kg): --  I&O's Summary    26 Jan 2022 07:01  -  27 Jan 2022 07:00  --------------------------------------------------------  IN: 100 mL / OUT: 1258 mL / NET: -1158 mL    27 Jan 2022 07:01  -  27 Jan 2022 16:48  --------------------------------------------------------  IN: 240 mL / OUT: 200 mL / NET: 40 mL      PHYSICAL EXAM:  Constitutional: NAD, well-groomed, well-developed  HEENT: PERRLA, EOMI, Normal Hearing, MMM  Neck: No LAD, No JVD  Back: Normal spine flexure, No CVA tenderness  Respiratory: CTAB  Cardiovascular: S1 and S2, RRR, no M/G/R  Gastrointestinal: BS+, soft, NT/ND  Extremities: No peripheral edema  Vascular: 2+ peripheral pulses          MEDICATIONS  (STANDING):  aspirin  chewable 81 milliGRAM(s) Oral two times a day  BUpivacaine liposome 1.3% Injectable (no eMAR) 20 milliLiter(s) Local Injection once  ceFAZolin   IVPB 2000 milliGRAM(s) IV Intermittent every 12 hours  chlorhexidine 2% Cloths 1 Application(s) Topical <User Schedule>  influenza  Vaccine (HIGH DOSE) 0.7 milliLiter(s) IntraMuscular once  senna 2 Tablet(s) Oral at bedtime    MEDICATIONS  (PRN):  acetaminophen     Tablet .. 650 milliGRAM(s) Oral every 6 hours PRN Mild Pain (1 - 3)  HYDROmorphone  Injectable 0.5 milliGRAM(s) IV Push every 15 minutes PRN breakthrough  HYDROmorphone  Injectable 0.5 milliGRAM(s) IV Push every 4 hours PRN breakthrough  melatonin 5 milliGRAM(s) Oral at bedtime PRN Sleep  ondansetron Injectable 4 milliGRAM(s) IV Push every 6 hours PRN Nausea and/or Vomiting  oxyCODONE    IR 5 milliGRAM(s) Oral every 3 hours PRN Moderate Pain (4 - 6)  oxyCODONE    IR 10 milliGRAM(s) Oral every 3 hours PRN Severe Pain (7 - 10)  polyethylene glycol 3350 17 Gram(s) Oral at bedtime PRN Constipation  sodium chloride 0.9% lock flush 10 milliLiter(s) IV Push every 1 hour PRN Pre/post blood products, medications, blood draw, and to maintain line patency      Allergies    allopurinol (Other)    Intolerances        LABS:  CBC Full  -  ( 27 Jan 2022 06:40 )  WBC Count : 11.67 K/uL  RBC Count : 3.66 M/uL  Hemoglobin : 10.1 g/dL  Hematocrit : 32.1 %  Platelet Count - Automated : 299 K/uL  Mean Cell Volume : 87.7 fl  Mean Cell Hemoglobin : 27.6 pg  Mean Cell Hemoglobin Concentration : 31.5 gm/dL  Auto Neutrophil # : x  Auto Lymphocyte # : x  Auto Monocyte # : x  Auto Eosinophil # : x  Auto Basophil # : x  Auto Neutrophil % : x  Auto Lymphocyte % : x  Auto Monocyte % : x  Auto Eosinophil % : x  Auto Basophil % : x    01-27    138  |  106  |  37<H>  ----------------------------<  100<H>  4.1   |  21<L>  |  1.97<H>    Ca    10.5      27 Jan 2022 06:40            Ferritin, Serum: 1318 ng/mL (09-09 @ 10:43)  Iron Total, Serum: 31 ug/dL (09-09 @ 10:43)  Iron - Total Binding Capacity.: 104 ug/dL (09-09 @ 10:43)        RADIOLOGY & ADDITIONAL TESTS: Reviewed.

## 2022-01-27 NOTE — PROGRESS NOTE ADULT - SUBJECTIVE AND OBJECTIVE BOX
Ortho Note    Pt seen and examined. Comfortable without complaints, pain controlled  Denies CP, SOB, N/V, numbness/tingling.      Vital Signs Last 24 Hrs  T(C): 37.1 (01-27-22 @ 04:37), Max: 37.1 (01-27-22 @ 04:37)  T(F): 98.7 (01-27-22 @ 04:37), Max: 98.7 (01-27-22 @ 04:37)  HR: 100 (01-27-22 @ 04:37) (100 - 100)  BP: 126/73 (01-27-22 @ 04:37) (126/73 - 126/73)  BP(mean): --  RR: 17 (01-27-22 @ 04:37) (17 - 17)  SpO2: 98% (01-27-22 @ 04:37) (98% - 98%)  AVSS    General: Pt Alert and oriented, NAD  DSG- aquacel changed; drain removed- reinforced with gauze  Pulses: +2DP, WWP feet  Sensation: SILT BLE  Motor: 5/5 EHL/FHL/TA/GS                          10.1   11.67 )-----------( 299      ( 27 Jan 2022 06:40 )             32.1     01-27    138  |  106  |  37<H>  ----------------------------<  100<H>  4.1   |  21<L>  |  1.97<H>    Ca    10.5      27 Jan 2022 06:40        A/P: 77yMale POD#6 s/p right hip I+D  - VSS, continue to monitor labs including factor VIII and cr (h/o hemophilia and CKD)  - Pain Control  - DVT ppx: ASA  - PT, WBS: 50% PWB  - OOB for meals, I/S  - continue bowel regimen  - appreciate medicine, hematology, ID recommendations  - CHARLENE removed, monitor drainage  - dispo: home with [facility] and home infusion services, likely tomorrow    Ortho Pager [phone]

## 2022-01-28 ENCOUNTER — NON-APPOINTMENT (OUTPATIENT)
Age: 77
End: 2022-01-28

## 2022-01-28 ENCOUNTER — TRANSCRIPTION ENCOUNTER (OUTPATIENT)
Age: 77
End: 2022-01-28

## 2022-01-28 VITALS
DIASTOLIC BLOOD PRESSURE: 74 MMHG | SYSTOLIC BLOOD PRESSURE: 120 MMHG | OXYGEN SATURATION: 100 % | RESPIRATION RATE: 18 BRPM | TEMPERATURE: 98 F | HEART RATE: 99 BPM

## 2022-01-28 LAB
ANION GAP SERPL CALC-SCNC: 13 MMOL/L — SIGNIFICANT CHANGE UP (ref 5–17)
BUN SERPL-MCNC: 43 MG/DL — HIGH (ref 7–23)
CALCIUM SERPL-MCNC: 10.9 MG/DL — HIGH (ref 8.4–10.5)
CARDIOLIPIN AB SER-ACNC: POSITIVE
CHLORIDE SERPL-SCNC: 109 MMOL/L — HIGH (ref 96–108)
CO2 SERPL-SCNC: 21 MMOL/L — LOW (ref 22–31)
CREAT SERPL-MCNC: 2.07 MG/DL — HIGH (ref 0.5–1.3)
FACT VIII ACT/NOR PPP: 43 % — LOW (ref 51–138)
FACT XIII ACT/NOR PPP CHRO: 75 % — SIGNIFICANT CHANGE UP (ref 51–163)
GLUCOSE SERPL-MCNC: 138 MG/DL — HIGH (ref 70–99)
HCT VFR BLD CALC: 31.6 % — LOW (ref 39–50)
HGB BLD-MCNC: 9.8 G/DL — LOW (ref 13–17)
MCHC RBC-ENTMCNC: 27.5 PG — SIGNIFICANT CHANGE UP (ref 27–34)
MCHC RBC-ENTMCNC: 31 GM/DL — LOW (ref 32–36)
MCV RBC AUTO: 88.5 FL — SIGNIFICANT CHANGE UP (ref 80–100)
NRBC # BLD: 0 /100 WBCS — SIGNIFICANT CHANGE UP (ref 0–0)
PLATELET # BLD AUTO: 329 K/UL — SIGNIFICANT CHANGE UP (ref 150–400)
POTASSIUM SERPL-MCNC: 4.5 MMOL/L — SIGNIFICANT CHANGE UP (ref 3.5–5.3)
POTASSIUM SERPL-SCNC: 4.5 MMOL/L — SIGNIFICANT CHANGE UP (ref 3.5–5.3)
RBC # BLD: 3.57 M/UL — LOW (ref 4.2–5.8)
RBC # FLD: 14.5 % — SIGNIFICANT CHANGE UP (ref 10.3–14.5)
SARS-COV-2 RNA SPEC QL NAA+PROBE: SIGNIFICANT CHANGE UP
SODIUM SERPL-SCNC: 143 MMOL/L — SIGNIFICANT CHANGE UP (ref 135–145)
WBC # BLD: 15.66 K/UL — HIGH (ref 3.8–10.5)
WBC # FLD AUTO: 15.66 K/UL — HIGH (ref 3.8–10.5)

## 2022-01-28 PROCEDURE — 85610 PROTHROMBIN TIME: CPT

## 2022-01-28 PROCEDURE — 86901 BLOOD TYPING SEROLOGIC RH(D): CPT

## 2022-01-28 PROCEDURE — 87070 CULTURE OTHR SPECIMN AEROBIC: CPT

## 2022-01-28 PROCEDURE — 87040 BLOOD CULTURE FOR BACTERIA: CPT

## 2022-01-28 PROCEDURE — 99285 EMERGENCY DEPT VISIT HI MDM: CPT

## 2022-01-28 PROCEDURE — 82232 ASSAY OF BETA-2 PROTEIN: CPT

## 2022-01-28 PROCEDURE — 99233 SBSQ HOSP IP/OBS HIGH 50: CPT

## 2022-01-28 PROCEDURE — 82570 ASSAY OF URINE CREATININE: CPT

## 2022-01-28 PROCEDURE — 81003 URINALYSIS AUTO W/O SCOPE: CPT

## 2022-01-28 PROCEDURE — A9503: CPT

## 2022-01-28 PROCEDURE — 87186 SC STD MICRODIL/AGAR DIL: CPT

## 2022-01-28 PROCEDURE — 85290 CLOT FACTOR XIII FIBRIN STAB: CPT

## 2022-01-28 PROCEDURE — 36573 INSJ PICC RS&I 5 YR+: CPT

## 2022-01-28 PROCEDURE — U0005: CPT

## 2022-01-28 PROCEDURE — 36415 COLL VENOUS BLD VENIPUNCTURE: CPT

## 2022-01-28 PROCEDURE — 86146 BETA-2 GLYCOPROTEIN ANTIBODY: CPT

## 2022-01-28 PROCEDURE — U0003: CPT

## 2022-01-28 PROCEDURE — 97116 GAIT TRAINING THERAPY: CPT

## 2022-01-28 PROCEDURE — 82977 ASSAY OF GGT: CPT

## 2022-01-28 PROCEDURE — 73502 X-RAY EXAM HIP UNI 2-3 VIEWS: CPT

## 2022-01-28 PROCEDURE — 85025 COMPLETE CBC W/AUTO DIFF WBC: CPT

## 2022-01-28 PROCEDURE — 97161 PT EVAL LOW COMPLEX 20 MIN: CPT

## 2022-01-28 PROCEDURE — 85027 COMPLETE CBC AUTOMATED: CPT

## 2022-01-28 PROCEDURE — 87075 CULTR BACTERIA EXCEPT BLOOD: CPT

## 2022-01-28 PROCEDURE — 80061 LIPID PANEL: CPT

## 2022-01-28 PROCEDURE — 85730 THROMBOPLASTIN TIME PARTIAL: CPT

## 2022-01-28 PROCEDURE — 86140 C-REACTIVE PROTEIN: CPT

## 2022-01-28 PROCEDURE — 85291 CLOT FACTOR XIII FIBRIN SCRN: CPT

## 2022-01-28 PROCEDURE — 84100 ASSAY OF PHOSPHORUS: CPT

## 2022-01-28 PROCEDURE — 89051 BODY FLUID CELL COUNT: CPT

## 2022-01-28 PROCEDURE — 80053 COMPREHEN METABOLIC PANEL: CPT

## 2022-01-28 PROCEDURE — 86850 RBC ANTIBODY SCREEN: CPT

## 2022-01-28 PROCEDURE — 84560 ASSAY OF URINE/URIC ACID: CPT

## 2022-01-28 PROCEDURE — A9556: CPT

## 2022-01-28 PROCEDURE — 89060 EXAM SYNOVIAL FLUID CRYSTALS: CPT

## 2022-01-28 PROCEDURE — 78832 RP LOCLZJ TUM SPECT W/CT 2: CPT | Mod: MG

## 2022-01-28 PROCEDURE — 86900 BLOOD TYPING SEROLOGIC ABO: CPT

## 2022-01-28 PROCEDURE — G1004: CPT

## 2022-01-28 PROCEDURE — 85240 CLOT FACTOR VIII AHG 1 STAGE: CPT

## 2022-01-28 PROCEDURE — 85652 RBC SED RATE AUTOMATED: CPT

## 2022-01-28 PROCEDURE — 84157 ASSAY OF PROTEIN OTHER: CPT

## 2022-01-28 PROCEDURE — 76705 ECHO EXAM OF ABDOMEN: CPT

## 2022-01-28 PROCEDURE — 82550 ASSAY OF CK (CPK): CPT

## 2022-01-28 PROCEDURE — 80048 BASIC METABOLIC PNL TOTAL CA: CPT

## 2022-01-28 PROCEDURE — 82945 GLUCOSE OTHER FLUID: CPT

## 2022-01-28 PROCEDURE — 86147 CARDIOLIPIN ANTIBODY EA IG: CPT

## 2022-01-28 PROCEDURE — 84300 ASSAY OF URINE SODIUM: CPT

## 2022-01-28 PROCEDURE — 83735 ASSAY OF MAGNESIUM: CPT

## 2022-01-28 RX ORDER — ACETAMINOPHEN 500 MG
2 TABLET ORAL
Qty: 56 | Refills: 0
Start: 2022-01-28 | End: 2022-02-03

## 2022-01-28 RX ORDER — ASPIRIN/CALCIUM CARB/MAGNESIUM 324 MG
1 TABLET ORAL
Qty: 60 | Refills: 0
Start: 2022-01-28 | End: 2022-02-26

## 2022-01-28 RX ORDER — OXYCODONE HYDROCHLORIDE 5 MG/1
1 TABLET ORAL
Qty: 6 | Refills: 0
Start: 2022-01-28 | End: 2022-01-29

## 2022-01-28 RX ADMIN — CHLORHEXIDINE GLUCONATE 1 APPLICATION(S): 213 SOLUTION TOPICAL at 05:29

## 2022-01-28 RX ADMIN — Medication 81 MILLIGRAM(S): at 10:08

## 2022-01-28 RX ADMIN — Medication 100 MILLIGRAM(S): at 05:32

## 2022-01-28 NOTE — PROGRESS NOTE ADULT - SUBJECTIVE AND OBJECTIVE BOX
Interval Events:     Pt examined at bedside, has no acute complaints  no fever/chills, dysuria, cough, sob, congestion, n/v/d/c. feels well today and resting comfortably.      Vital Signs Last 24 Hrs  T(C): 36.7 (28 Jan 2022 13:49), Max: 36.8 (27 Jan 2022 20:33)  T(F): 98 (28 Jan 2022 13:49), Max: 98.3 (28 Jan 2022 08:00)  HR: 99 (28 Jan 2022 13:49) (95 - 105)  BP: 120/74 (28 Jan 2022 13:49) (108/63 - 125/77)  BP(mean): --  RR: 18 (28 Jan 2022 13:49) (16 - 18)  SpO2: 100% (28 Jan 2022 13:49) (99% - 100%)      PHYSICAL EXAM:  Constitutional: NAD, comfortable in bed.  HEENT: NC/AT, PERRLA, EOMI, no conjunctival pallor or scleral icterus, MMM  Neck: Supple  Respiratory: CTA B/L. No w/r/r.   Cardiovascular: RRR, normal S1 and S2, no m/r/g.   Gastrointestinal: +BS, soft NTND, no guarding or rebound tenderness, no palpable masses   Extremities: wwp; no cyanosis, clubbing or edema.  dsg in place  Vascular: Pulses equal and strong throughout.   Neurological: AAOx3, no CN deficits, strength and sensation intact throughout.   Skin: No gross skin abnormalities or rashes        LABS:                        9.8    15.66 )-----------( 329      ( 28 Jan 2022 06:43 )             31.6   01-28    143  |  109<H>  |  43<H>  ----------------------------<  138<H>  4.5   |  21<L>  |  2.07<H>    Ca    10.9<H>      28 Jan 2022 06:43            RADIOLOGY & ADDITIONAL TESTS:    MEDICATIONS  (STANDING):  aspirin  chewable 81 milliGRAM(s) Oral two times a day  BUpivacaine liposome 1.3% Injectable (no eMAR) 20 milliLiter(s) Local Injection once  ceFAZolin   IVPB 2000 milliGRAM(s) IV Intermittent every 12 hours  chlorhexidine 2% Cloths 1 Application(s) Topical <User Schedule>  influenza  Vaccine (HIGH DOSE) 0.7 milliLiter(s) IntraMuscular once  senna 2 Tablet(s) Oral at bedtime    MEDICATIONS  (PRN):  acetaminophen     Tablet .. 650 milliGRAM(s) Oral every 6 hours PRN Mild Pain (1 - 3)  HYDROmorphone  Injectable 0.5 milliGRAM(s) IV Push every 15 minutes PRN breakthrough  HYDROmorphone  Injectable 0.5 milliGRAM(s) IV Push every 4 hours PRN breakthrough  melatonin 5 milliGRAM(s) Oral at bedtime PRN Sleep  ondansetron Injectable 4 milliGRAM(s) IV Push every 6 hours PRN Nausea and/or Vomiting  oxyCODONE    IR 5 milliGRAM(s) Oral every 3 hours PRN Moderate Pain (4 - 6)  oxyCODONE    IR 10 milliGRAM(s) Oral every 3 hours PRN Severe Pain (7 - 10)  polyethylene glycol 3350 17 Gram(s) Oral at bedtime PRN Constipation  sodium chloride 0.9% lock flush 10 milliLiter(s) IV Push every 1 hour PRN Pre/post blood products, medications, blood draw, and to maintain line patency

## 2022-01-28 NOTE — PROGRESS NOTE ADULT - PROBLEM SELECTOR PLAN 3
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
hx of hemophilia A, followed by heme/onc and sees Dr. Fischer outpatient. Required factor VIII prior to PICC placement last admission. Currently no s/s bleeding.   -h/o consulted. see above. they will see pt today.  - f/u factor VIII levels Monday
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 Cr improved from 6.59 (02/21) to 1.93 this admission. He was briefly on HD for months but was weaned off of it.  -cr stable 1.8 - 1.9 with good UOP  - monitor I&Os  - trend Cr, avoid nephrotoxic agents  - nephro following
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
hx of hemophilia A, followed by heme/onc and sees Dr. Fischer outpatient. Required factor VIII prior to PICC placement last admission. Currently no s/s bleeding.   -h/o consulted. see above. they will see pt today.  - f/u factor VIII levels Monday

## 2022-01-28 NOTE — PROGRESS NOTE ADULT - PROVIDER SPECIALTY LIST ADULT
Heme/Onc
Heme/Onc
Internal Medicine
Orthopedics
Heme/Onc
Infectious Disease
Infectious Disease
Orthopedics
Hospitalist
Infectious Disease
Hospitalist
Internal Medicine
Internal Medicine

## 2022-01-28 NOTE — PROGRESS NOTE ADULT - PROBLEM SELECTOR PLAN 5
patient with elevation in alkaline phosphatase from admission 160 -> 313. No abdominal pain/tenderness  - f/u GGT  - trend CMP
patient with elevation in alkaline phosphatase from admission 160 -> 313. No abdominal pain/tenderness  - f/u GGT  - trend CMP
F: none  E: replete prn  N: renal diet  DVT ppx: SCDs, hold AC i/s/o hemophilia

## 2022-01-28 NOTE — PROGRESS NOTE ADULT - NUTRITIONAL ASSESSMENT
This patient has been assessed with a concern for Malnutrition and has been determined to have a diagnosis/diagnoses of Moderate protein-calorie malnutrition.    This patient is being managed with:   Diet Renal Restrictions-  For patients receiving Renal Replacement - No Protein Restr No Conc K No Conc Phos Low Sodium  Supplement Feeding Modality:  Oral  Ensure Enlive Cans or Servings Per Day:  1       Frequency:  Daily  Entered: Jan 24 2022  3:03PM    Diet Renal Restrictions-  For patients receiving Renal Replacement - No Protein Restr No Conc K No Conc Phos Low Sodium  Entered: Jan 23 2022  1:10AM    The following pending diet order is being considered for treatment of Moderate protein-calorie malnutrition:null

## 2022-01-28 NOTE — PROGRESS NOTE ADULT - SUBJECTIVE AND OBJECTIVE BOX
Ortho AM Note    No complaints this AM  Denies CP, SOB, N/V, numbness/tingling     Vital Signs Last 24 Hrs  T(C): 36.8 (28 Jan 2022 04:47), Max: 36.9 (27 Jan 2022 09:15)  T(F): 98.2 (28 Jan 2022 04:47), Max: 98.5 (27 Jan 2022 09:15)  HR: 100 (28 Jan 2022 04:47) (97 - 105)  BP: 125/77 (28 Jan 2022 04:47) (108/63 - 132/72)  BP(mean): --  RR: 17 (28 Jan 2022 04:47) (15 - 17)  SpO2: 99% (28 Jan 2022 04:47) (97% - 100%)    General: Pt Alert and oriented, NAD  DSG C/D/I   cap refill < 2 seconds  Sensation: SILT throughout lower extremity  Motor: 5/5 EHL/FHL/TA/GS        A/P: 77yMale s/p R hip I&D. On ancef as per ID. Continue with PT. Follow up with Dr. Barnard as an outpatient.   - Stable  - Pain Control  - DVT ppx: asa 81mg BID  - Ancef as per ID  - follow up lio Barnard as an outpatient  - PT, WBS: PWB 50%  - possible DC    Ortho Pager [phone]

## 2022-01-28 NOTE — PROGRESS NOTE ADULT - PROBLEM SELECTOR PROBLEM 2
Hemophilia A
Hemophilia A
Chronic kidney disease (CKD)
Chronic kidney disease (CKD)
Hemophilia A

## 2022-01-28 NOTE — PROGRESS NOTE ADULT - PROBLEM SELECTOR PLAN 2
hx of hemophilia A, followed by heme/onc and sees Dr. Fischer outpatient.  Currently R hip site with no s/s bleeding, Hgb stable  - f/u factor VIII levels and coags daily  - Factor VIII as per heme onc recs  - heme onc following
hx of hemophilia A, followed by heme/onc and sees Dr. Fischer outpatient.  Currently R hip site with no s/s bleeding, Hgb stable  - f/u factor VIII levels and coags daily  - Factor VIII q12 as above  - heme onc following
hx of hemophilia A, followed by heme/onc and sees Dr. Fischer outpatient.  Currently R hip site with no s/s bleeding, Hgb stable  - f/u factor VIII levels and coags daily  - Factor VIII q12 as above  - heme onc following
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 Cr improved from 6.59 (02/21) to 1.93 this admission. He was briefly on HD for months but was weaned off of it.  -cr downtrending  - f/u urine lytes  - monitor I&Os  - trend BMP, avoid nephrotoxic agents  - nephro following, appreciate recs
hx of hemophilia A, followed by heme/onc and sees Dr. Fiscehr outpatient.  Currently R hip site with no s/s bleeding, Hgb stable  - f/u factor VIII levels and coags daily  - Factor VIII q12 as above  - heme onc following
hx of hemophilia A, followed by heme/onc and sees Dr. Fischer outpatient.  Currently R hip site with no s/s bleeding, Hgb stable  - f/u factor VIII levels and coags daily  - Factor VIII q12 as above  - heme onc following
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 Cr improved from 6.59 (02/21) to 1.93 this admission. He was briefly on HD for months but was weaned off of it.  -cr downtrending  - f/u urine lytes  - monitor I&Os  - trend BMP, avoid nephrotoxic agents  - nephro following, appreciate recs
hx of hemophilia A, followed by heme/onc and sees Dr. Fischer outpatient.  Currently R hip site with no s/s bleeding, Hgb stable  - f/u factor VIII levels and coags daily  - Factor VIII as per heme onc recs  - heme onc following

## 2022-01-28 NOTE — PROGRESS NOTE ADULT - PROBLEM SELECTOR PLAN 4
Hgb drop yesterday 9.8 - stable today 9.9 Normocytic. Iron labs from 09/21 demonstrates likely ACD. No s/s bleeding
Hgb stable 11-12 Normocytic. Iron labs from 09/21 demonstrates likely ACD. No s/s bleeding  - trend H/H, active T&S  - transfuse hb < 7  - heme onc following
p/w H/H: 11.2/33.3. Normocytic. Iron labs from 09/21 demonstrates likely ACD. No s/s bleeding  - trend H/H, active T&S  - transfuse hb>7    #h/o MSSA bacteremia   #hx of endocarditis of MV  #h/o spinal OM  s/p IV daptomycin, complete PO linezolid course. Last antibiotic dose on 12/23. No longer on treatment.
p/w H/H: 11.2/33.3. Normocytic. Iron labs from 09/21 demonstrates likely ACD. No s/s bleeding  - trend H/H, active T&S  - transfuse hb>7    #h/o MSSA bacteremia   #hx of endocarditis of MV  #h/o spinal OM  s/p IV daptomycin, complete PO linezolid course. Last antibiotic dose on 12/23. No longer on treatment.
H/H stable today 9.9 Normocytic. Iron labs from 09/21 demonstrates likely ACD. No s/s bleeding
Hgb stable 11-12 Normocytic. Iron labs from 09/21 demonstrates likely ACD. No s/s bleeding

## 2022-01-28 NOTE — DISCHARGE NOTE NURSING/CASE MANAGEMENT/SOCIAL WORK - PATIENT PORTAL LINK FT
You can access the FollowMyHealth Patient Portal offered by [facility] by registering at the following website: [email or web address]. By joining [facility]’s FollowMyHealth portal, you will also be able to view your health information using other applications (apps) compatible with our system.

## 2022-01-28 NOTE — PROGRESS NOTE ADULT - PROBLEM SELECTOR PLAN 1
ID following, c/w Ancef  - management of drain per ortho primary - removed today       #Leukocytosis  - WBC stable
ID following, c/w Ancef  - management of drain per ortho primary - plan to keep in one more day     - Given hemophilia A, patient high risk of bleed. Continue with factor VIII q12 (next dose 8pm tonight, 8am 12/24), check factor VIII assay with AM labs  - Trend daily coags  - HOLD DVT ppx  - If concern for bleeding would check stat imaging, CBC, alert heme onc fellow    #Leukocytosis  - WBC stable
R hip pain as outpatient and gallium scan concerning for infection, s/p aspiration 1/22 with 86k WBC/uL w/92% PMN consistent with septic arthritis.  Now s/p washout  - f/u OR cultures  - ID following, c/w Dapto and Zosyn  - management of drain per ortho primary  - Pain control per ortho, would monitor BMs and if constipated increase bowel regimen (consider adding miralax to senna for combination osmotic and stimulant laxative regimen)  - Given hemophilia A, patient high risk of bleed. Continue with factor VIII q12 (next dose 8pm tonight, 8am 12/24), check factor VIII assay with AM labs  - Trend daily coags  - HOLD DVT ppx  - If concern for bleeding would check stat imaging, CBC, alert heme onc fellow    #Leukocytosis  - WBC up today likely reactive in setting of procedure yesterday, continue to trend
ID following, c/w Ancef  - management of drain per ortho primary    - Given hemophilia A, patient high risk of bleed. Continue with factor VIII q12 (next dose 8pm tonight, 8am 12/24), check factor VIII assay with AM labs  - Trend daily coags  - HOLD DVT ppx  - If concern for bleeding would check stat imaging, CBC, alert heme onc fellow    #Leukocytosis  - WBC improving today
#R/o septic joint  presents with 1 week of R hip pain worse on weight bearing and extension of hip. No hx of trauma. Gallium scan c/f R hip infection, resolving lumbar OM. Ddx r/o septic joint vs hip fracture vs OA.  -spoke with heme onc. Patient is high risk for bleed and needs factor VIII assay before procedure. Lab cannot run this test over the weekend so patient will need to have procedure monday after factor VIII assay is back  - CT w/ IV contrast and MR are contraindicated due to recent hx of HD/CKD and cochlear implants  - X ray hip official read pending  - Ortho consulted, less c/f septic joint as patient is able to at least partially bear weight on hip. f/u recs  - ID following, appreciate recs  - f/u bcx 1/21  - per ID recs - following arthrocentesis, can start empiric daptomycin 400mg IV q48h (check baseline CK) plus Zosyn 2.25g IV q6h
ID following, c/w Dapto and Zosyn  - management of drain per ortho primary    - Given hemophilia A, patient high risk of bleed. Continue with factor VIII q12 (next dose 8pm tonight, 8am 12/24), check factor VIII assay with AM labs  - Trend daily coags  - HOLD DVT ppx  - If concern for bleeding would check stat imaging, CBC, alert heme onc fellow    #Leukocytosis  - WBC up today likely reactive in setting of procedure yesterday, continue to trend
#R/o septic joint  presents with 1 week of R hip pain worse on weight bearing and extension of hip. No hx of trauma. Gallium scan c/f R hip infection, resolving lumbar OM. Ddx r/o septic joint vs hip fracture vs OA.  -spoke with heme onc. Patient is high risk for bleed and needs factor VIII assay before procedure. Lab cannot run this test over the weekend so patient will need to have procedure monday after factor VIII assay is back  - CT w/ IV contrast and MR are contraindicated due to recent hx of HD/CKD and cochlear implants  - X ray hip official read pending  - Ortho consulted, less c/f septic joint as patient is able to at least partially bear weight on hip. . f/u recs  - ID following, appreciate recs  - f/u bcx 1/21  - per ID recs - following arthrocentesis, can start empiric daptomycin 400mg IV q48h (check baseline CK) plus Zosyn 2.25g IV q6h
ID following, c/w Ancef  - f/u ID recs       #Leukocytosis  - WBC stable, afebrile  - no signs/sx of infection

## 2022-01-28 NOTE — PROGRESS NOTE ADULT - PROBLEM SELECTOR PROBLEM 5
Prophylactic measure
High alkaline phosphatase
Prophylactic measure
High alkaline phosphatase

## 2022-01-28 NOTE — DISCHARGE NOTE NURSING/CASE MANAGEMENT/SOCIAL WORK - NSDCPEFALRISK_GEN_ALL_CORE
For information on Fall & Injury Prevention, visit: [email or web address] OR  [email or web address] OR  https://www.cdc.gov/steadi/patient.html

## 2022-01-28 NOTE — PROGRESS NOTE ADULT - PROBLEM SELECTOR PROBLEM 3
Hemophilia A
Chronic kidney disease (CKD)
Hemophilia A

## 2022-02-01 LAB — B2 GLYCOPROT1 AB SER QL: POSITIVE

## 2022-02-02 DIAGNOSIS — N18.4 CHRONIC KIDNEY DISEASE, STAGE 4 (SEVERE): ICD-10-CM

## 2022-02-02 DIAGNOSIS — B95.61 METHICILLIN SUSCEPTIBLE STAPHYLOCOCCUS AUREUS INFECTION AS THE CAUSE OF DISEASES CLASSIFIED ELSEWHERE: ICD-10-CM

## 2022-02-02 DIAGNOSIS — E44.0 MODERATE PROTEIN-CALORIE MALNUTRITION: ICD-10-CM

## 2022-02-02 DIAGNOSIS — Z88.8 ALLERGY STATUS TO OTHER DRUGS, MEDICAMENTS AND BIOLOGICAL SUBSTANCES: ICD-10-CM

## 2022-02-02 DIAGNOSIS — I12.9 HYPERTENSIVE CHRONIC KIDNEY DISEASE WITH STAGE 1 THROUGH STAGE 4 CHRONIC KIDNEY DISEASE, OR UNSPECIFIED CHRONIC KIDNEY DISEASE: ICD-10-CM

## 2022-02-02 DIAGNOSIS — D63.8 ANEMIA IN OTHER CHRONIC DISEASES CLASSIFIED ELSEWHERE: ICD-10-CM

## 2022-02-02 DIAGNOSIS — M25.551 PAIN IN RIGHT HIP: ICD-10-CM

## 2022-02-02 DIAGNOSIS — D66 HEREDITARY FACTOR VIII DEFICIENCY: ICD-10-CM

## 2022-02-02 DIAGNOSIS — M00.9 PYOGENIC ARTHRITIS, UNSPECIFIED: ICD-10-CM

## 2022-02-02 LAB
CARDIOLIPIN IGM SER-MCNC: 24.1 MPL — HIGH (ref 0–12.5)
CARDIOLIPIN IGM SER-MCNC: <5 GPL — SIGNIFICANT CHANGE UP (ref 0–12.5)
DEPRECATED CARDIOLIPIN IGA SER: <5 APL — SIGNIFICANT CHANGE UP (ref 0–12.5)

## 2022-02-03 LAB
B2 GLYCOPROT1 IGA SER QL: <5 SAU — SIGNIFICANT CHANGE UP
B2 GLYCOPROT1 IGG SER-ACNC: <5 SGU — SIGNIFICANT CHANGE UP
B2 GLYCOPROT1 IGM SER-ACNC: 34.9 SMU — HIGH

## 2022-02-07 ENCOUNTER — TRANSCRIPTION ENCOUNTER (OUTPATIENT)
Age: 77
End: 2022-02-07

## 2022-02-16 ENCOUNTER — APPOINTMENT (OUTPATIENT)
Dept: ORTHOPEDIC SURGERY | Facility: CLINIC | Age: 77
End: 2022-02-16
Payer: MEDICARE

## 2022-02-16 VITALS
HEIGHT: 68 IN | SYSTOLIC BLOOD PRESSURE: 133 MMHG | HEART RATE: 111 BPM | BODY MASS INDEX: 17.43 KG/M2 | OXYGEN SATURATION: 100 % | WEIGHT: 115 LBS | DIASTOLIC BLOOD PRESSURE: 72 MMHG

## 2022-02-16 PROCEDURE — 99024 POSTOP FOLLOW-UP VISIT: CPT

## 2022-02-17 ENCOUNTER — TRANSCRIPTION ENCOUNTER (OUTPATIENT)
Age: 77
End: 2022-02-17

## 2022-02-18 ENCOUNTER — APPOINTMENT (OUTPATIENT)
Dept: INFECTIOUS DISEASE | Facility: CLINIC | Age: 77
End: 2022-02-18
Payer: MEDICARE

## 2022-02-18 PROCEDURE — 99213 OFFICE O/P EST LOW 20 MIN: CPT | Mod: 95

## 2022-02-19 NOTE — HISTORY OF PRESENT ILLNESS
[de-identified] : First post op s/p right hip open irrigation and debridement for MSSA septic arthritis, surgical date 1/22/22 [de-identified] : Presenting with daughter. Doing well, pain-free in the right hip, ambulating with walker and attending home PT. Trying his best to gain weight. Compliant with antibiotics as per Dr. Barnard. No wound or systemic issues. [de-identified] : Lab results were reviewed. ESR downtrending, CRP now normal. MSSA on cultures. [de-identified] : Right hip incision well healed, benign appearing. ROM 0-110, 15 ADD, 30 ABD, 10 IR, 40 ER. Severe wasting/atrophy globally. Ambulating with walker, no specific antalgia. [de-identified] : 78y/o male about 3.5wk s/p R hip I&D for MSSA septic arthritis. Very low suspicion for any persistent hip infection at this time. [de-identified] : Cont abx per ID\par Cont home PT for severe deconditioning\par HEP\par WBAT/FROM\par RTC 6wk with right hip XRs

## 2022-02-28 NOTE — DATA REVIEWED
[FreeTextEntry1] : 2/14/22 lab notable for\par WBC 9.26\par ESR 39\par Cr 1.79\par CRP <3\par \par 1/23 R hip capsule culture #2: MSSA (pan sensitive)\par 1/23 R hip capsule culture #1: MSSA (pan sensitive)\par 1/23 R hip synovial fluid Cx: MSSA (pan sensitive)\par \par 1/20/22  Gallium scan\par There is gallium uptake in the right hip joint with associated \par hypodensity in the right hip joint suspicious for infection.\par The activity at the L5-S1 level may be due to resolving inflammatory \par changes.\par \par CT lumbar spine w/o con 11/11\par There is persistent cortical irregularity with erosive changes involving the endplates at the L5/S1 level with surrounding bony sclerosis with osteomyelitis. There remains pre and paravertebral soft tissue component as well as a ventral epidural soft tissue component which appears to have slightly improved. These findings would be better evaluated with a contrast study or MRI.\par \par NM gallium 9/1\par Impression:\par 1. No significant uptake within the mitral valve region.\par 2. Uptake within the lower lumbar spine likely corresponding to area of abnormality on prior CT lumbar spine 8/26/2021 at L5-S1 which is suggestive of discitis osteomyelitis.\par 3. Prominent uptake within the posterior mediastinum appears to correspond to mediastinal and right hilar lymph nodes which are nonspecific and may reflect infectious or inflammatory etiology.\par \par CT lumbar spine 8/26\par IMPRESSION:\par 1.  Since 2/21/2021, interval worsening severe height loss L5-S1, with new erosive endplate changes and prevertebral fat stranding. 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 Recommend correlation with ESR/CRP and consider further evaluation with MRI lumbar with and without contrast.\par 2.  Dilated stool-filled rectum, consistent fecal impaction.\par \par 9/7 GUANAKO\par CONCLUSIONS:\par  1. Limited GUANAKO performed for reassessment of mitral valve and intervalvular fibrosa.\par  2. Mitral valve is mildly thickened. There is prolapse and area of focal thickening of the anterior mitral leaflet. There is mild to moderate mitral regurgitation (2 jets are noted).\par  3. There is thickening around the aortic root and intervalvular fibrosa of unclear etiology; may reflect nonspecific thickening vs early abscess formation given the clinical scenario.\par  4. Tricuspid leaflets are mildly thickened. Pulmonic valve is not well visualized.\par  5. Aortic leaflets are mildly thickened. Trace to mild aortic insufficiency.\par  6. Moderate to severe plaque noted in the descending aorta and aortic arch.\par  7. Compared to the previous GUANAKO performed on 8/30/2021, visually the mitral valve and intervalvular fibrosa appear similar. On comparable views, mitral regurgitation may be slightly more pronounced but still mild to moderate in severity.\par \par GUANAKO 8/30\par CONCLUSIONS:\par  1. Technically difficult study.\par  2. Normal left and right ventricular size and systolic function.\par  3. No LA/RA/LISA/RAA thrombus seen.\par  4. No pericardial effusion.\par  5. There is mitral valve prolapse of the anterior leaflet.There is an irregularly shaped linear echodensity measuring 0.9 cm x 0.5 cm on the atrial north of the anterior leaflet of the mitral valve, highly suspicious for a vegetation. There is mild to moderate, eccentric, posteriorly directed mitral regurgitation.\par  6. The aortic valve is tricuspid and mildly calcified. There is trace aortic regurgitation.\par  7. There is thickening of the intervalvular fibrosa with systolic expansion - cannot rule out abscess formation.\par  8. There is severe non-mobile plaque seen in the visualized portion of the descending aorta. There is severe non-mobile plaque seen in the visualized portion of the aortic arch.\par  9. Compared to the previous GUANAKO performed on 2/26/2021, the mitral valve vegetation is new. The aortic root (intervalvular fibrosa) thickening is more prominent.\par 10. Suggest a repeat GUANAKO in 7-10 days to re-evaluate the mitral valve and intervalvular fibrosa, if clinically indicated.\par 11. Above findings were discussed with Dr. Hidalgo on 8/30/21\par \par TTE 8/26\par CONCLUSIONS:\par  1. Normal left and right ventricular size and systolic function.\par  2. The mitral valve is mildly thickened. There is mild bileaflet valve prolapse (anterior > posterior). There is trace mitral regurgitation.\par  3. Trace aortic regurgitation.\par  4. No evidence of pulmonary hypertension.\par  5. No pericardial effusion.\par

## 2022-02-28 NOTE — PHYSICAL EXAM
[General Appearance - Alert] : alert [General Appearance - In No Acute Distress] : in no acute distress [Sclera] : the sclera and conjunctiva were normal [Outer Ear] : the ears and nose were normal in appearance [Neck Appearance] : the appearance of the neck was normal [] : no respiratory distress [FreeTextEntry1] : tongue ulcer at R side of the tongue

## 2022-02-28 NOTE — REASON FOR VISIT
[Home] : at home, [unfilled] , at the time of the visit. [Follow-Up: _____] : a [unfilled] follow-up visit [Family Member] : family member [Medical Office: ([facility])___] : at the medical office located in  [Verbal consent obtained from patient] : the patient, [unfilled] [FreeTextEntry1] : MSSA septic arthritis of R hip

## 2022-02-28 NOTE — ASSESSMENT
[FreeTextEntry1] : 76M h/o CKD (off HD), Hemophilia A, DRESS Syndrome, MSSA NVE of MV (0.9x0.5cm) c/b spinal OM s/p IV daptomycin (9/1-10/12) with worsening LBP s/p daptomycin --> linezolid (11/11-12/22/21) now on IV cefazolin for R hip septic arthritis (1/22- 3/7/22) p/w management of R hip septic arthritis..\par \par Tolerating cefazolin.  R hip pain completely resolved and improving mobility.  Given h/o recurrence of MSSA infection, will plan to switch to long term suppressive therapy after completion of 6 weeks of IV cefazolin course. \par \par - cont cefazolin 2g IV q12h\par - duration of IV abx is 1/22 - 3/7\par - after 3/8, will switch to cefadroxil 500mg PO q12h suppressive therapy, duration TBD \par - f/u 2 weeks

## 2022-02-28 NOTE — HISTORY OF PRESENT ILLNESS
[FreeTextEntry1] : 76M h/o CKD (off HD), Hemophilia A, DRESS Syndrome, MSSA NVE of MV (0.9x0.5cm) c/b spinal OM s/p IV daptomycin (9/1-10/12) with worsening LBP s/p daptomycin --> linezolid (11/11-12/22/21) now on IV cefazolin for R hip septic arthritis (1/22- 3/7/22) p/w management of R hip septic arthritis.\par \par Patient was initially admitted from 8/25-9/9 for fever and LBP, found to have MSSA bacteremia due to CLABSI. HD cath removed. GUANAKO showed mitral valve prolapse and 0.9 x 0.5cm vegetation. Gallium scan 9/1 negative. CT lumbar spine showed discitis/OM at L5-S1 level. he was seen by CT surg Dr. Herring since GUANAKO finding also showed thickening of the intervalvular fibrosa with systolic expansion, cannot r/o abscess. Dr. Herring didn't think think it was abscess, and repeat GUANAKO obtained a week later, which showed similar finding. He was discharged home with 6 weeks of daptomycin (on 10/12).  After discharge, he completed 6 weeks of IV daptomycin and he had resolution of back pain.  However in the past few weeks, he started to have recurrent LBP which was worsening.  Also found to have new leukocytosis and uptrending CRP and ESR, c/f incompletely treated spinal OM.  He was never able to get MRI spine (due to cochlear implant) and cannot give CT lumbar spine w/ IV cont to r/o epidural abscess.  He was instructed to go to ED for PICC placement for restarting IV abx therapy and stayed at [facility] from 11/11-11/13.  He reported improvement of back pain after initiation of IV daptomycin.  Since discharge his LBP completely resolved and he finished another 6 weeks of IV abx (11/11-12/22), initially dapto --> switched to linezolid for the last 10 days given clogged PICC.  \par \par Patient was off abx since 12/23 and initially did well. Came to see me on 1/7 and was doing well.  Then he developed R hip pain with limping and uptrending CRP and ESR.  Gallium scan was obtained, which showed infection at R hip c/f septic arthritis, and improved activity at lumbar area.  He was sent to the ED for work-up of R hip infection.  He was admitted from 1/21-1/27.  He was seen by Dr. Nguyen and R hip was tapped, fluid analysis c/w septic arthritis.  He was taken to the OR for washout on 1/23 and OR culture grew MSSA.  He was switched to IV cefazolin which he tolerated well. He was discharged with IV cefazolin x 6 weeks course and plan to switch to PO cefadroxil suppressive therapy given recurrence of infection. \par \par Today patient was on video visit with his daughter.  Patient now ambulate with rollator, feels well, denied back pain or hip pain.  Denied fever/chills, n/v/d, abdominal pain.  Is constipated.  Toelrating cefazolin.  Trying to gain weight by eating more. \par

## 2022-03-04 ENCOUNTER — APPOINTMENT (OUTPATIENT)
Dept: INFECTIOUS DISEASE | Facility: CLINIC | Age: 77
End: 2022-03-04
Payer: MEDICARE

## 2022-03-04 PROCEDURE — 99442: CPT | Mod: 95

## 2022-03-04 NOTE — REASON FOR VISIT
[Follow-Up: _____] : a [unfilled] follow-up visit [Home] : at home, [unfilled] , at the time of the visit. [Medical Office: ([facility])___] : at the medical office located in  [Family Member] : family member [Verbal consent obtained from patient] : the patient, [unfilled] [FreeTextEntry1] : MSSA septic arthritis of R hip [FreeTextEntry3] : daughter, Milla

## 2022-03-04 NOTE — DATA REVIEWED
[FreeTextEntry1] : 2/28/22 lab notable for\par 9.56>11.1<175\par Cr 1.72\par CRP 3\par ESR 18\par \par 1/23 R hip capsule culture #2: MSSA (pan sensitive)\par 1/23 R hip capsule culture #1: MSSA (pan sensitive)\par 1/23 R hip synovial fluid Cx: MSSA (pan sensitive)\par \par 1/20/22  Gallium scan\par There is gallium uptake in the right hip joint with associated \par hypodensity in the right hip joint suspicious for infection.\par The activity at the L5-S1 level may be due to resolving inflammatory \par changes.\par \par CT lumbar spine w/o con 11/11\par There is persistent cortical irregularity with erosive changes involving the endplates at the L5/S1 level with surrounding bony sclerosis with osteomyelitis. There remains pre and paravertebral soft tissue component as well as a ventral epidural soft tissue component which appears to have slightly improved. These findings would be better evaluated with a contrast study or MRI.\par \par NM gallium 9/1\par Impression:\par 1. No significant uptake within the mitral valve region.\par 2. Uptake within the lower lumbar spine likely corresponding to area of abnormality on prior CT lumbar spine 8/26/2021 at L5-S1 which is suggestive of discitis osteomyelitis.\par 3. Prominent uptake within the posterior mediastinum appears to correspond to mediastinal and right hilar lymph nodes which are nonspecific and may reflect infectious or inflammatory etiology.\par \par CT lumbar spine 8/26\par IMPRESSION:\par 1.  Since 2/21/2021, interval worsening severe height loss L5-S1, with new erosive endplate changes and prevertebral fat stranding. 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 Recommend correlation with ESR/CRP and consider further evaluation with MRI lumbar with and without contrast.\par 2.  Dilated stool-filled rectum, consistent fecal impaction.\par \par 9/7 GUANAKO\par CONCLUSIONS:\par  1. Limited GUANAKO performed for reassessment of mitral valve and intervalvular fibrosa.\par  2. Mitral valve is mildly thickened. There is prolapse and area of focal thickening of the anterior mitral leaflet. There is mild to moderate mitral regurgitation (2 jets are noted).\par  3. There is thickening around the aortic root and intervalvular fibrosa of unclear etiology; may reflect nonspecific thickening vs early abscess formation given the clinical scenario.\par  4. Tricuspid leaflets are mildly thickened. Pulmonic valve is not well visualized.\par  5. Aortic leaflets are mildly thickened. Trace to mild aortic insufficiency.\par  6. Moderate to severe plaque noted in the descending aorta and aortic arch.\par  7. Compared to the previous GUANAKO performed on 8/30/2021, visually the mitral valve and intervalvular fibrosa appear similar. On comparable views, mitral regurgitation may be slightly more pronounced but still mild to moderate in severity.\par \par GUANAKO 8/30\par CONCLUSIONS:\par  1. Technically difficult study.\par  2. Normal left and right ventricular size and systolic function.\par  3. No LA/RA/LISA/RAA thrombus seen.\par  4. No pericardial effusion.\par  5. There is mitral valve prolapse of the anterior leaflet.There is an irregularly shaped linear echodensity measuring 0.9 cm x 0.5 cm on the atrial north of the anterior leaflet of the mitral valve, highly suspicious for a vegetation. There is mild to moderate, eccentric, posteriorly directed mitral regurgitation.\par  6. The aortic valve is tricuspid and mildly calcified. There is trace aortic regurgitation.\par  7. There is thickening of the intervalvular fibrosa with systolic expansion - cannot rule out abscess formation.\par  8. There is severe non-mobile plaque seen in the visualized portion of the descending aorta. There is severe non-mobile plaque seen in the visualized portion of the aortic arch.\par  9. Compared to the previous GUANAKO performed on 2/26/2021, the mitral valve vegetation is new. The aortic root (intervalvular fibrosa) thickening is more prominent.\par 10. Suggest a repeat GUANAKO in 7-10 days to re-evaluate the mitral valve and intervalvular fibrosa, if clinically indicated.\par 11. Above findings were discussed with Dr. Hidalgo on 8/30/21\par \par TTE 8/26\par CONCLUSIONS:\par  1. Normal left and right ventricular size and systolic function.\par  2. The mitral valve is mildly thickened. There is mild bileaflet valve prolapse (anterior > posterior). There is trace mitral regurgitation.\par  3. Trace aortic regurgitation.\par  4. No evidence of pulmonary hypertension.\par  5. No pericardial effusion.\par

## 2022-03-04 NOTE — HISTORY OF PRESENT ILLNESS
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 HD cath removed. GUANAKO showed mitral valve prolapse and 0.9 x 0.5cm vegetation. Gallium scan 9/1 negative. CT lumbar spine showed discitis/OM at L5-S1 level. he was seen by CT surg Dr. Herring since GUANAKO finding also showed thickening of the intervalvular fibrosa with systolic expansion, cannot r/o abscess. Dr. Herring didn't think think it was abscess, and repeat GUANAKO obtained a week later, which showed similar finding. He was discharged home with 6 weeks of daptomycin (on 10/12).  After discharge, he completed 6 weeks of IV daptomycin and he had resolution of back pain.  However in the past few weeks, he started to have recurrent LBP which was worsening.  Also found to have new leukocytosis and uptrending CRP and ESR, c/f incompletely treated spinal OM.  He was never able to get MRI spine (due to cochlear implant) and cannot give CT lumbar spine w/ IV cont to r/o epidural abscess.  He was instructed to go to ED for PICC placement for restarting IV abx therapy and stayed at [facility] from 11/11-11/13.  He reported improvement of back pain after initiation of IV daptomycin.  Since discharge his LBP completely resolved and he finished another 6 weeks of IV abx (11/11-12/22), initially dapto --> switched to linezolid for the last 10 days given clogged PICC.  \par \par Patient was off abx since 12/23 and initially did well. Came to see me on 1/7 and was doing well.  Then he developed R hip pain with limping and uptrending CRP and ESR.  Gallium scan was obtained, which showed infection at R hip c/f septic arthritis, and improved activity at lumbar area.  He was sent to the ED for work-up of R hip infection.  He was admitted from 1/21-1/27.  He was seen by Dr. Nguyen and R hip was tapped, fluid analysis c/w septic arthritis.  He was taken to the OR for washout on 1/23 and OR culture grew MSSA.  He was switched to IV cefazolin which he tolerated well. He was discharged with IV cefazolin x 6 weeks course and plan to switch to PO cefadroxil suppressive therapy given recurrence of infection. \par \par Last seen by me on 2/18.  He was doing well at that time and tolerating IV cefazolin. \par Today patient is with his daughter Milla.  She said he is eating, weight stable.  He denied hip pain or back pain.   He can get up on his own, and walks with a rollator.  Denied fever/hills, n/v/d.

## 2022-03-15 NOTE — PROGRESS NOTE ADULT - PROBLEM SELECTOR PLAN 2
Patient position supine. Patient prepped and draped per unit standard.    Safety straps applied:N/A   #Bacteremia   Patient growing MSSA started on ANCEF at 3AM on 2/22.   -Continue ANCEF   -Repeat Blood cultures 2/22 10 PM    #Leukocytosis  - Chronic due to prolonged high dose Prednisone use for Dress Syndrome    #Dress Syndrome  - Currently taking Prednisone 60 mg with improvement in rash   - Continue Prednisone 60 mg daily    #Tachycardia  - HR constantly in 110s per daughter  - Sinus tachycardia on EKG  - Likely 2/2 intravascular depletion due to ascites/anasarca    #Anemia  - Hgb 11.5 on arrival with unclear baseline  - Like 2/2 acute kidney failure or dilutional due to volume overload Breast cancer Palliative care encounter

## 2022-03-29 ENCOUNTER — APPOINTMENT (OUTPATIENT)
Dept: INFECTIOUS DISEASE | Facility: CLINIC | Age: 77
End: 2022-03-29
Payer: MEDICARE

## 2022-03-29 DIAGNOSIS — Z79.2 LONG TERM (CURRENT) USE OF ANTIBIOTICS: ICD-10-CM

## 2022-03-29 PROCEDURE — 99203 OFFICE O/P NEW LOW 30 MIN: CPT | Mod: 95

## 2022-03-29 PROCEDURE — 99213 OFFICE O/P EST LOW 20 MIN: CPT | Mod: 95

## 2022-03-29 RX ORDER — CEFAZOLIN SODIUM 1 G/50ML
INJECTION, SOLUTION INTRAVENOUS
Refills: 0 | Status: COMPLETED | COMMUNITY
End: 2022-03-29

## 2022-03-29 NOTE — ASSESSMENT
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  No side effect, tolerating cefadroxil.  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  Given high risk of recurrence, will continue cefadroxil for now.  He wants to travel to [place] by a car and plans to stay there for a month.  He hasn't been vaccinated against COVID-19.  Discussed about the risks of COVID-19 on unvaccinated population and recommended to get COVID-19 vaccine.  Family very reluctant, saying that he got so many complications last year and they wants to hold off vaccine for now and continues precaution.  \par \par - cont cefadroxil 500mg PO q12h suppressive therapy, duration TBD (plan for minimum 3 months)\par - patient to get labs tomorrow (CBC, CMP, ESR, CRP, ordered)\par - continue COVID-19 precautions \par - RTC 1-2 month\par - f/u Dr. Nguyen (ortho)

## 2022-03-29 NOTE — REASON FOR VISIT
[Follow-Up: _____] : a [unfilled] follow-up visit [Home] : at home, [unfilled] , at the time of the visit. [Medical Office: ([facility])___] : at the medical office located in  [Spouse] : spouse [Family Member] : family member [Verbal consent obtained from patient] : the patient, [unfilled] [FreeTextEntry1] : MSSA septic arthritis of R hip [FreeTextEntry3] : daughter, Milla

## 2022-03-29 NOTE — HISTORY OF PRESENT ILLNESS
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 HD cath removed. GUANAKO showed mitral valve prolapse and 0.9 x 0.5cm vegetation. Gallium scan 9/1 negative. CT lumbar spine showed discitis/OM at L5-S1 level. he was seen by CT surg Dr. Herring since GUANAKO finding also showed thickening of the intervalvular fibrosa with systolic expansion, cannot r/o abscess. Dr. Herring didn't think think it was abscess, and repeat GUANAKO obtained a week later, which showed similar finding. He was discharged home with 6 weeks of daptomycin (on 10/12).  After discharge, he completed 6 weeks of IV daptomycin and he had resolution of back pain.  However in the past few weeks, he started to have recurrent LBP which was worsening.  Also found to have new leukocytosis and uptrending CRP and ESR, c/f incompletely treated spinal OM.  He was never able to get MRI spine (due to cochlear implant) and cannot give CT lumbar spine w/ IV cont to r/o epidural abscess.  He was instructed to go to ED for PICC placement for restarting IV abx therapy and stayed at [facility] from 11/11-11/13.  He reported improvement of back pain after initiation of IV daptomycin.  Since discharge his LBP completely resolved and he finished another 6 weeks of IV abx (11/11-12/22), initially dapto --> switched to linezolid for the last 10 days given clogged PICC.  \par \par Patient was off abx since 12/23 and initially did well. Came to see me on 1/7 and was doing well.  Then he developed R hip pain with limping and uptrending CRP and ESR.  Gallium scan was obtained, which showed infection at R hip c/f septic arthritis, and improved activity at lumbar area.  He was sent to the ED for work-up of R hip infection.  He was admitted from 1/21-1/27.  He was seen by Dr. Nguyen and R hip was tapped, fluid analysis c/w septic arthritis.  He was taken to the OR for washout on 1/23 and OR culture grew MSSA.  He was switched to IV cefazolin which he tolerated well. He was discharged with IV cefazolin x 6 weeks course and plan to switch to PO cefadroxil suppressive therapy given recurrence of infection. \par \par Last seen by me on 3/4.  He finished IV cefazolin 6 weeks course on 3/7 as scheduled and was switched to cefadroxil suppression on 3/8 to prevent recurrent MSSA infection.\par \par Today patient reports feeling well, walks with a walker, and tries to walk without any assistance inside of the house.  Gustavo back pain or hip pain.  Denied fever/chills, n/v/d/abdominal pain.  Tolerating cefadroxil without any side effect. He was able to drive to [place] recently.  PLanning a trop to [place].

## 2022-03-30 ENCOUNTER — OUTPATIENT (OUTPATIENT)
Dept: OUTPATIENT SERVICES | Facility: HOSPITAL | Age: 77
LOS: 1 days | End: 2022-03-30
Payer: MEDICARE

## 2022-03-30 ENCOUNTER — APPOINTMENT (OUTPATIENT)
Dept: ORTHOPEDIC SURGERY | Facility: CLINIC | Age: 77
End: 2022-03-30
Payer: MEDICARE

## 2022-03-30 ENCOUNTER — RESULT REVIEW (OUTPATIENT)
Age: 77
End: 2022-03-30

## 2022-03-30 VITALS
BODY MASS INDEX: 16.37 KG/M2 | OXYGEN SATURATION: 100 % | HEART RATE: 95 BPM | HEIGHT: 68 IN | TEMPERATURE: 98.2 F | DIASTOLIC BLOOD PRESSURE: 80 MMHG | SYSTOLIC BLOOD PRESSURE: 147 MMHG | WEIGHT: 108 LBS

## 2022-03-30 DIAGNOSIS — R53.81 OTHER MALAISE: ICD-10-CM

## 2022-03-30 DIAGNOSIS — Z96.21 COCHLEAR IMPLANT STATUS: Chronic | ICD-10-CM

## 2022-03-30 DIAGNOSIS — Z90.49 ACQUIRED ABSENCE OF OTHER SPECIFIED PARTS OF DIGESTIVE TRACT: Chronic | ICD-10-CM

## 2022-03-30 PROCEDURE — 99024 POSTOP FOLLOW-UP VISIT: CPT

## 2022-03-30 PROCEDURE — 73502 X-RAY EXAM HIP UNI 2-3 VIEWS: CPT

## 2022-03-30 PROCEDURE — 73502 X-RAY EXAM HIP UNI 2-3 VIEWS: CPT | Mod: 26,RT

## 2022-03-30 NOTE — HISTORY OF PRESENT ILLNESS
[de-identified] : Second post op s/p right hip open irrigation and debridement for MSSA septic arthritis, surgical date 1/22/22.  [de-identified] : Presenting with daughter. Doing well, pain-free in the right hip, ambulating with walker and attending home private PT twice a week daughter has noticed improvement. Insurance is still pending approval for further HPT. He has not been successful in gaining any weight despite the best efforts of his family. Would like to begin transitioning off the walker. Currently on PO antibiotics as per Dr. Barnard. Inflammatory markers have normalized. [de-identified] : R hip incision healed, benign appearing. No erythema or swelling. ROM 0-125 flex, 10 ADD, 40 ABD, 15 IR, 70 ER. Cachexia unchanged from prior with associated generalized weakness, no focal hip flexor/abductor insufficiency. Ambulating with rollator, not antalgic. [de-identified] : Right hip XRs taken today demonstrate minimal osteoarthritis without evidence of osteonecrosis or other osseous abnormality. [de-identified] : 78y/o male about 9 weeks s/p R hip I&D for MSSA septic arthritis [de-identified] : Begin OPT\par Cont abx as per ID. OK for 3mo PO from my perspective but probably doesn't need any further from hip standpoint; will let ID comment on need for spinal protection etc\par RTC as needed

## 2022-04-01 LAB
-  TETRACYCLINE: SIGNIFICANT CHANGE UP
ALBUMIN SERPL ELPH-MCNC: 4.2 G/DL
ALP BLD-CCNC: 658 U/L
ALT SERPL-CCNC: 112 U/L
ANION GAP SERPL CALC-SCNC: 14 MMOL/L
AST SERPL-CCNC: 115 U/L
BASOPHILS # BLD AUTO: 0.05 K/UL
BASOPHILS NFR BLD AUTO: 0.6 %
BILIRUB SERPL-MCNC: 1.1 MG/DL
BUN SERPL-MCNC: 31 MG/DL
CALCIUM SERPL-MCNC: 10.7 MG/DL
CHLORIDE SERPL-SCNC: 106 MMOL/L
CO2 SERPL-SCNC: 20 MMOL/L
CREAT SERPL-MCNC: 1.91 MG/DL
CRP SERPL-MCNC: 15 MG/L
EGFR: 36 ML/MIN/1.73M2
EOSINOPHIL # BLD AUTO: 0.57 K/UL
EOSINOPHIL NFR BLD AUTO: 6.9 %
ERYTHROCYTE [SEDIMENTATION RATE] IN BLOOD BY WESTERGREN METHOD: 23 MM/HR
GLUCOSE SERPL-MCNC: 131 MG/DL
HCT VFR BLD CALC: 40 %
HGB BLD-MCNC: 13 G/DL
IMM GRANULOCYTES NFR BLD AUTO: 0.2 %
LYMPHOCYTES # BLD AUTO: 0.9 K/UL
LYMPHOCYTES NFR BLD AUTO: 10.8 %
MAN DIFF?: NORMAL
MCHC RBC-ENTMCNC: 29.3 PG
MCHC RBC-ENTMCNC: 32.5 GM/DL
MCV RBC AUTO: 90.3 FL
MONOCYTES # BLD AUTO: 0.65 K/UL
MONOCYTES NFR BLD AUTO: 7.8 %
NEUTROPHILS # BLD AUTO: 6.13 K/UL
NEUTROPHILS NFR BLD AUTO: 73.7 %
ORGANISM # SPEC MICROSCOPIC CNT: SIGNIFICANT CHANGE UP
PLATELET # BLD AUTO: 152 K/UL
POTASSIUM SERPL-SCNC: 3.9 MMOL/L
PROT SERPL-MCNC: 6.6 G/DL
RBC # BLD: 4.43 M/UL
RBC # FLD: 13.5 %
SODIUM SERPL-SCNC: 140 MMOL/L
WBC # FLD AUTO: 8.32 K/UL

## 2022-04-07 LAB
ALBUMIN SERPL ELPH-MCNC: 3.9 G/DL
ALP BLD-CCNC: 983 U/L
ALT SERPL-CCNC: 59 U/L
ANION GAP SERPL CALC-SCNC: 16 MMOL/L
AST SERPL-CCNC: 25 U/L
BASOPHILS # BLD AUTO: 0.04 K/UL
BASOPHILS NFR BLD AUTO: 0.4 %
BILIRUB SERPL-MCNC: 1 MG/DL
BUN SERPL-MCNC: 48 MG/DL
CALCIUM SERPL-MCNC: 11.1 MG/DL
CHLORIDE SERPL-SCNC: 103 MMOL/L
CO2 SERPL-SCNC: 18 MMOL/L
CREAT SERPL-MCNC: 1.82 MG/DL
CRP SERPL-MCNC: 48 MG/L
EGFR: 38 ML/MIN/1.73M2
EOSINOPHIL # BLD AUTO: 0.73 K/UL
EOSINOPHIL NFR BLD AUTO: 6.5 %
ERYTHROCYTE [SEDIMENTATION RATE] IN BLOOD BY WESTERGREN METHOD: 45 MM/HR
GLUCOSE SERPL-MCNC: 152 MG/DL
HCT VFR BLD CALC: 42.7 %
HGB BLD-MCNC: 13.6 G/DL
IMM GRANULOCYTES NFR BLD AUTO: 0.4 %
LYMPHOCYTES # BLD AUTO: 1.91 K/UL
LYMPHOCYTES NFR BLD AUTO: 16.9 %
MAN DIFF?: NORMAL
MCHC RBC-ENTMCNC: 28.6 PG
MCHC RBC-ENTMCNC: 31.9 GM/DL
MCV RBC AUTO: 89.9 FL
MONOCYTES # BLD AUTO: 0.63 K/UL
MONOCYTES NFR BLD AUTO: 5.6 %
NEUTROPHILS # BLD AUTO: 7.95 K/UL
NEUTROPHILS NFR BLD AUTO: 70.2 %
PLATELET # BLD AUTO: 231 K/UL
POTASSIUM SERPL-SCNC: 4.3 MMOL/L
PROT SERPL-MCNC: 6.4 G/DL
RBC # BLD: 4.75 M/UL
RBC # FLD: 13.6 %
SODIUM SERPL-SCNC: 137 MMOL/L
WBC # FLD AUTO: 11.31 K/UL

## 2022-04-08 ENCOUNTER — NON-APPOINTMENT (OUTPATIENT)
Age: 77
End: 2022-04-08

## 2022-04-12 ENCOUNTER — NON-APPOINTMENT (OUTPATIENT)
Age: 77
End: 2022-04-12

## 2022-04-12 ENCOUNTER — OUTPATIENT (OUTPATIENT)
Dept: OUTPATIENT SERVICES | Facility: HOSPITAL | Age: 77
LOS: 1 days | End: 2022-04-12
Payer: MEDICARE

## 2022-04-12 DIAGNOSIS — Z96.21 COCHLEAR IMPLANT STATUS: Chronic | ICD-10-CM

## 2022-04-12 DIAGNOSIS — Z90.49 ACQUIRED ABSENCE OF OTHER SPECIFIED PARTS OF DIGESTIVE TRACT: Chronic | ICD-10-CM

## 2022-04-13 PROCEDURE — G1004: CPT

## 2022-04-13 PROCEDURE — A9556: CPT

## 2022-04-13 PROCEDURE — 78802 RP LOCLZJ TUM WHBDY 1 D IMG: CPT | Mod: 26,MG

## 2022-04-13 PROCEDURE — 78802 RP LOCLZJ TUM WHBDY 1 D IMG: CPT | Mod: MG

## 2022-04-14 ENCOUNTER — TRANSCRIPTION ENCOUNTER (OUTPATIENT)
Age: 77
End: 2022-04-14

## 2022-04-15 ENCOUNTER — APPOINTMENT (OUTPATIENT)
Dept: INFECTIOUS DISEASE | Facility: CLINIC | Age: 77
End: 2022-04-15
Payer: MEDICARE

## 2022-04-15 VITALS
HEIGHT: 68 IN | TEMPERATURE: 97.3 F | SYSTOLIC BLOOD PRESSURE: 135 MMHG | DIASTOLIC BLOOD PRESSURE: 85 MMHG | BODY MASS INDEX: 16.37 KG/M2 | HEART RATE: 91 BPM | OXYGEN SATURATION: 98 % | WEIGHT: 108 LBS

## 2022-04-15 PROCEDURE — 99215 OFFICE O/P EST HI 40 MIN: CPT

## 2022-04-15 RX ORDER — MUPIROCIN 20 MG/G
2 OINTMENT TOPICAL
Qty: 1 | Refills: 5 | Status: ACTIVE | COMMUNITY
Start: 2022-04-15 | End: 1900-01-01

## 2022-04-15 RX ORDER — CHLORHEXIDINE GLUCONATE 213 G/1000ML
4 SOLUTION TOPICAL
Qty: 1 | Refills: 5 | Status: ACTIVE | COMMUNITY
Start: 2022-04-15 | End: 1900-01-01

## 2022-04-17 RX ORDER — CEFADROXIL 500 MG/1
500 CAPSULE ORAL
Qty: 60 | Refills: 5 | Status: COMPLETED | COMMUNITY
Start: 2022-03-04 | End: 2022-04-17

## 2022-04-17 RX ORDER — DOXYCYCLINE HYCLATE 100 MG/1
100 CAPSULE ORAL TWICE DAILY
Qty: 60 | Refills: 2 | Status: COMPLETED | COMMUNITY
Start: 2022-04-01 | End: 2022-04-17

## 2022-04-17 RX ORDER — OXYCODONE 5 MG/1
5 TABLET ORAL
Qty: 6 | Refills: 0 | Status: COMPLETED | COMMUNITY
Start: 2022-01-28 | End: 2022-04-17

## 2022-04-17 NOTE — DATA REVIEWED
[FreeTextEntry1] : 4/13/22 Gallium Scan\par PROCEDURE DATE:  04/13/2022\par INTERPRETATION:  INFLAMMATION IMAGING - WHOLE BODY\par Findings: There is been resolution of the activity within the right hip and in the lower lumbar spine compared to prior gallium scans 1/20/2022 and 9/1/2022. There is no focal areas of abnormal gallium uptake to suggest sites of infection. There is physiologic uptake within the nasopharynx, liver, spleen, bowel and within the bone marrow.\par Impression: Since prior gallium scan 1/20/2022, resolution of the activity in the right hip in the lower lumbar spine. No new areas of abnormal uptake to suggest site of infection.\par \par \par 4/6/22 lab notable for \par ESR 45\par CRP 48\par \par 1/23 R hip capsule culture #2: MSSA (pan sensitive)\par 1/23 R hip capsule culture #1: MSSA (pan sensitive)\par 1/23 R hip synovial fluid Cx: MSSA (pan sensitive)\par \par 1/20/22  Gallium scan\par There is gallium uptake in the right hip joint with associated \par hypodensity in the right hip joint suspicious for infection.\par The activity at the L5-S1 level may be due to resolving inflammatory \par changes.\par \par CT lumbar spine w/o con 11/11\par There is persistent cortical irregularity with erosive changes involving the endplates at the L5/S1 level with surrounding bony sclerosis with osteomyelitis. There remains pre and paravertebral soft tissue component as well as a ventral epidural soft tissue component which appears to have slightly improved. These findings would be better evaluated with a contrast study or MRI.\par \par NM gallium 9/1\par Impression:\par 1. No significant uptake within the mitral valve region.\par 2. Uptake within the lower lumbar spine likely corresponding to area of abnormality on prior CT lumbar spine 8/26/2021 at L5-S1 which is suggestive of discitis osteomyelitis.\par 3. Prominent uptake within the posterior mediastinum appears to correspond to mediastinal and right hilar lymph nodes which are nonspecific and may reflect infectious or inflammatory etiology.\par \par CT lumbar spine 8/26\par IMPRESSION:\par 1.  Since 2/21/2021, interval worsening severe height loss L5-S1, with new erosive endplate changes and prevertebral fat stranding. 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 Recommend correlation with ESR/CRP and consider further evaluation with MRI lumbar with and without contrast.\par 2.  Dilated stool-filled rectum, consistent fecal impaction.\par \par 9/7 GUANAKO\par CONCLUSIONS:\par  1. Limited GUANAKO performed for reassessment of mitral valve and intervalvular fibrosa.\par  2. Mitral valve is mildly thickened. There is prolapse and area of focal thickening of the anterior mitral leaflet. There is mild to moderate mitral regurgitation (2 jets are noted).\par  3. There is thickening around the aortic root and intervalvular fibrosa of unclear etiology; may reflect nonspecific thickening vs early abscess formation given the clinical scenario.\par  4. Tricuspid leaflets are mildly thickened. Pulmonic valve is not well visualized.\par  5. Aortic leaflets are mildly thickened. Trace to mild aortic insufficiency.\par  6. Moderate to severe plaque noted in the descending aorta and aortic arch.\par  7. Compared to the previous GUANAKO performed on 8/30/2021, visually the mitral valve and intervalvular fibrosa appear similar. On comparable views, mitral regurgitation may be slightly more pronounced but still mild to moderate in severity.\par \par GUANAKO 8/30\par CONCLUSIONS:\par  1. Technically difficult study.\par  2. Normal left and right ventricular size and systolic function.\par  3. No LA/RA/LISA/RAA thrombus seen.\par  4. No pericardial effusion.\par  5. There is mitral valve prolapse of the anterior leaflet.There is an irregularly shaped linear echodensity measuring 0.9 cm x 0.5 cm on the atrial north of the anterior leaflet of the mitral valve, highly suspicious for a vegetation. There is mild to moderate, eccentric, posteriorly directed mitral regurgitation.\par  6. The aortic valve is tricuspid and mildly calcified. There is trace aortic regurgitation.\par  7. There is thickening of the intervalvular fibrosa with systolic expansion - cannot rule out abscess formation.\par  8. There is severe non-mobile plaque seen in the visualized portion of the descending aorta. There is severe non-mobile plaque seen in the visualized portion of the aortic arch.\par  9. Compared to the previous GUANAKO performed on 2/26/2021, the mitral valve vegetation is new. The aortic root (intervalvular fibrosa) thickening is more prominent.\par 10. Suggest a repeat GUANAKO in 7-10 days to re-evaluate the mitral valve and intervalvular fibrosa, if clinically indicated.\par 11. Above findings were discussed with Dr. Hidalgo on 8/30/21\par \par TTE 8/26\par CONCLUSIONS:\par  1. Normal left and right ventricular size and systolic function.\par  2. The mitral valve is mildly thickened. There is mild bileaflet valve prolapse (anterior > posterior). There is trace mitral regurgitation.\par  3. Trace aortic regurgitation.\par  4. No evidence of pulmonary hypertension.\par  5. No pericardial effusion.\par

## 2022-04-17 NOTE — END OF VISIT
Impression: Combined forms of age-related cataract, bilateral: H25.813.  Plan: see plan 1
Impression: Nonexudative macular degeneration, intermediate dry stage, bilateral: H35.3675. Plan: Recommendation is to be evaluated by retina specialist for further management of Macular changes and to get an opinion of whether or not CE/IOL is recommended. Reduced glare, but vision and distortion may not improve. Recommend starting AREDS/AREDs2 RTC next available for AMD Malka marques/ Dr. Laina Adhikari
[Time Spent: ___ minutes] : I have spent [unfilled] minutes of time on the encounter.

## 2022-04-17 NOTE — REASON FOR VISIT
[Follow-Up: _____] : a [unfilled] follow-up visit [Family Member] : family member [FreeTextEntry1] : MSSA septic arthritis of R hip

## 2022-04-17 NOTE — PHYSICAL EXAM
[General Appearance - Alert] : alert [General Appearance - In No Acute Distress] : in no acute distress [Sclera] : the sclera and conjunctiva were normal [Neck Appearance] : the appearance of the neck was normal [] : no respiratory distress [Heart Rate And Rhythm] : heart rate was normal and rhythm regular [Heart Sounds] : normal S1 and S2 [Bowel Sounds] : normal bowel sounds [Abdomen Soft] : soft [Abdomen Tenderness] : non-tender [FreeTextEntry1] : no ttp on spine or R hip area

## 2022-04-17 NOTE — ASSESSMENT
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  Rising ESR and CRP was c/f recurrent infection but currently he doesn't have any symptoms suggestive of recurrent infection, and importantly whole body gallium scan was negative, which was reassuring.  Since he got 6 weeks of IV abx followed by >1 months of PO antibiotics, unable to tolerate PO antibiotics (both cefadroxil and doxy), and no clinical e/o infection other than nonspecific inflammatory markers, I think it is best for the patient that we observe off abx.   Will repeat labs today for trending purpose.  \par \par In order to reduce the chance of recurrent MSSA infection, I think he will benefit from decolonization protocol.  This was discussed with Milla.  \par \par - monitor off abx\par - labs today including BCx \par - decolonization protocol for 6 months \par --- Hibiclens 4% body wash daily x 5 days now followed by every 1-5th calender month\par --- Mupirocin oint bid x 5 days now followed by every 1-5th calender month\par --- Cleaning high touch surface (rollator handles, door nobs, counter top etc) with disinfectant wipes frequently\par - RTC 1-2 months \par \par

## 2022-04-17 NOTE — HISTORY OF PRESENT ILLNESS
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 HD cath removed. GUANAKO showed mitral valve prolapse and 0.9 x 0.5cm vegetation. Gallium scan 9/1 negative. CT lumbar spine showed discitis/OM at L5-S1 level. he was seen by CT surg Dr. Herring since GUANAKO finding also showed thickening of the intervalvular fibrosa with systolic expansion, cannot r/o abscess. Dr. Herring didn't think think it was abscess, and repeat GUANAKO obtained a week later, which showed similar finding. He was discharged home with 6 weeks of daptomycin (on 10/12).  After discharge, he completed 6 weeks of IV daptomycin and he had resolution of back pain.  However in the past few weeks, he started to have recurrent LBP which was worsening.  Also found to have new leukocytosis and uptrending CRP and ESR, c/f incompletely treated spinal OM.  He was never able to get MRI spine (due to cochlear implant) and cannot give CT lumbar spine w/ IV cont to r/o epidural abscess.  He was instructed to go to ED for PICC placement for restarting IV abx therapy and stayed at [facility] from 11/11-11/13.  He reported improvement of back pain after initiation of IV daptomycin.  Since discharge his LBP completely resolved and he finished another 6 weeks of IV abx (11/11-12/22), initially dapto --> switched to linezolid for the last 10 days given clogged PICC.  \par \par Patient was off abx since 12/23 and initially did well. Came to see me on 1/7 and was doing well.  Then he developed R hip pain with limping and uptrending CRP and ESR.  Gallium scan was obtained, which showed infection at R hip c/f septic arthritis, and improved activity at lumbar area.  He was sent to the ED for work-up of R hip infection.  He was admitted from 1/21-1/27.  He was seen by Dr. Nguyen and R hip was tapped, fluid analysis c/w septic arthritis.  He was taken to the OR for washout on 1/23 and OR culture grew MSSA.  He was switched to IV cefazolin which he tolerated well. He was discharged with IV cefazolin x 6 weeks course and plan to switch to PO cefadroxil suppressive therapy given recurrence of infection. \par \par After discharge, he finished IV cefazolin 6 weeks course on 3/7 as scheduled and was switched to cefadroxil suppression on 3/8 to prevent recurrent MSSA infection.  During last visit on 3/29, he was found to have transaminitis so cefadroxil was switched to doxycycline.  \par \par Since then he started to have uptrending CRP and ESR as well as lack of appetite.  He underwent Gallium scan on 4/13 which was negative for infection.  Due to lack of appetite and negative gallium scan, doxy was stopped yesterday.\par \par Today patient came in with his daughter Milla and his wife.  Per daughter Milla, patient started to eat more with more energy, and his lack of appetite and weakness were likely due to doxycycline.  He feels better off doxycycline.  Denied fever/chills, joint pain, back pain, n/v/d, abdominal pain, CP, SOB.  \par \par

## 2022-04-19 LAB
ALBUMIN SERPL ELPH-MCNC: 4 G/DL
ALP BLD-CCNC: 522 U/L
ALT SERPL-CCNC: 19 U/L
ANION GAP SERPL CALC-SCNC: 13 MMOL/L
AST SERPL-CCNC: 17 U/L
BASOPHILS # BLD AUTO: 0.1 K/UL
BASOPHILS NFR BLD AUTO: 0.9 %
BILIRUB SERPL-MCNC: 0.4 MG/DL
BUN SERPL-MCNC: 62 MG/DL
CALCIUM SERPL-MCNC: 11 MG/DL
CHLORIDE SERPL-SCNC: 105 MMOL/L
CO2 SERPL-SCNC: 19 MMOL/L
CREAT SERPL-MCNC: 2 MG/DL
CRP SERPL-MCNC: <3 MG/L
EGFR: 34 ML/MIN/1.73M2
EOSINOPHIL # BLD AUTO: 0.42 K/UL
EOSINOPHIL NFR BLD AUTO: 3.6 %
ERYTHROCYTE [SEDIMENTATION RATE] IN BLOOD BY WESTERGREN METHOD: 28 MM/HR
GLUCOSE SERPL-MCNC: 146 MG/DL
HCT VFR BLD CALC: 46.3 %
HGB BLD-MCNC: 14.5 G/DL
IMM GRANULOCYTES NFR BLD AUTO: 0.3 %
LYMPHOCYTES # BLD AUTO: 2.18 K/UL
LYMPHOCYTES NFR BLD AUTO: 18.6 %
MAN DIFF?: NORMAL
MCHC RBC-ENTMCNC: 28.6 PG
MCHC RBC-ENTMCNC: 31.3 GM/DL
MCV RBC AUTO: 91.3 FL
MONOCYTES # BLD AUTO: 0.71 K/UL
MONOCYTES NFR BLD AUTO: 6.1 %
NEUTROPHILS # BLD AUTO: 8.24 K/UL
NEUTROPHILS NFR BLD AUTO: 70.5 %
PLATELET # BLD AUTO: 221 K/UL
POTASSIUM SERPL-SCNC: 5 MMOL/L
PROT SERPL-MCNC: 6.5 G/DL
RBC # BLD: 5.07 M/UL
RBC # FLD: 13.2 %
SODIUM SERPL-SCNC: 138 MMOL/L
WBC # FLD AUTO: 11.69 K/UL

## 2022-04-21 LAB
BACTERIA BLD CULT: NORMAL
BACTERIA BLD CULT: NORMAL

## 2022-04-27 ENCOUNTER — LABORATORY RESULT (OUTPATIENT)
Age: 77
End: 2022-04-27

## 2022-04-27 ENCOUNTER — APPOINTMENT (OUTPATIENT)
Dept: HEMATOLOGY ONCOLOGY | Facility: CLINIC | Age: 77
End: 2022-04-27
Payer: MEDICARE

## 2022-04-27 VITALS
DIASTOLIC BLOOD PRESSURE: 78 MMHG | OXYGEN SATURATION: 100 % | TEMPERATURE: 97.3 F | BODY MASS INDEX: 15.49 KG/M2 | WEIGHT: 102.2 LBS | HEART RATE: 102 BPM | SYSTOLIC BLOOD PRESSURE: 138 MMHG | HEIGHT: 68 IN

## 2022-04-27 DIAGNOSIS — D66 HEREDITARY FACTOR VIII DEFICIENCY: ICD-10-CM

## 2022-04-27 DIAGNOSIS — R76.0 RAISED ANTIBODY TITER: ICD-10-CM

## 2022-04-27 PROCEDURE — 36415 COLL VENOUS BLD VENIPUNCTURE: CPT

## 2022-04-27 PROCEDURE — 99214 OFFICE O/P EST MOD 30 MIN: CPT | Mod: 25

## 2022-04-27 RX ORDER — FOLIC ACID 1 MG/1
1 TABLET ORAL
Refills: 0 | Status: COMPLETED | COMMUNITY
End: 2022-04-27

## 2022-05-07 ENCOUNTER — TRANSCRIPTION ENCOUNTER (OUTPATIENT)
Age: 77
End: 2022-05-07

## 2022-05-07 PROBLEM — R76.0 LUPUS ANTICOAGULANT POSITIVE: Status: ACTIVE | Noted: 2021-12-16

## 2022-05-07 LAB
25(OH)D3 SERPL-MCNC: 47.1 NG/ML
ALBUMIN MFR SERPL ELPH: 59.4 %
ALBUMIN SERPL ELPH-MCNC: 4.2 G/DL
ALBUMIN SERPL-MCNC: 3.9 G/DL
ALBUMIN/GLOB SERPL: 1.4 RATIO
ALP BLD-CCNC: 269 U/L
ALPHA1 GLOB MFR SERPL ELPH: 5.2 %
ALPHA1 GLOB SERPL ELPH-MCNC: 0.3 G/DL
ALPHA2 GLOB MFR SERPL ELPH: 11.9 %
ALPHA2 GLOB SERPL ELPH-MCNC: 0.8 G/DL
ALT SERPL-CCNC: 22 U/L
ANION GAP SERPL CALC-SCNC: 13 MMOL/L
APTT 2H P 1:4 NP PPP: 38.5 SEC
APTT 2H P INC PPP: 38.8 SEC
APTT 50/50 MIX COMMENT: NORMAL
APTT BLD: 45.1 SEC
APTT IMM NP/PRE NP PPP: 37.4 SEC
APTT INV RATIO PPP: 45.1 SEC
AST SERPL-CCNC: 20 U/L
B-GLOBULIN MFR SERPL ELPH: 9.4 %
B-GLOBULIN SERPL ELPH-MCNC: 0.6 G/DL
B2 GLYCOPROT1 IGA SERPL IA-ACNC: <5 SAU
B2 GLYCOPROT1 IGG SER-ACNC: <5 SGU
B2 GLYCOPROT1 IGM SER-ACNC: 20.4 SMU
BASOPHILS # BLD AUTO: 0.03 K/UL
BASOPHILS NFR BLD AUTO: 0.3 %
BILIRUB SERPL-MCNC: 0.4 MG/DL
BUN SERPL-MCNC: 43 MG/DL
CALCIUM SERPL-MCNC: 10.8 MG/DL
CARDIOLIPIN AB SER IA-ACNC: POSITIVE
CHLORIDE SERPL-SCNC: 109 MMOL/L
CO2 SERPL-SCNC: 21 MMOL/L
CONFIRM: 28.5 SEC
CREAT SERPL-MCNC: 1.96 MG/DL
DEPRECATED KAPPA LC FREE/LAMBDA SER: 1.04 RATIO
DRVVT IMM 1:2 NP PPP: NORMAL
DRVVT SCREEN TO CONFIRM RATIO: 0.96 RATIO
EGFR: 35 ML/MIN/1.73M2
EOSINOPHIL # BLD AUTO: 0.36 K/UL
EOSINOPHIL NFR BLD AUTO: 4.1 %
ERYTHROCYTE [SEDIMENTATION RATE] IN BLOOD BY WESTERGREN METHOD: 26 MM/HR
ESTIMATED AVERAGE GLUCOSE: 114 MG/DL
FACT VIII ACT/NOR PPP: 24 %
FACT XII PPP CHRO-ACNC: 97 %
GAMMA GLOB FLD ELPH-MCNC: 0.9 G/DL
GAMMA GLOB MFR SERPL ELPH: 14.1 %
GLUCOSE SERPL-MCNC: 167 MG/DL
HBA1C MFR BLD HPLC: 5.6 %
HCT VFR BLD CALC: 42.3 %
HGB BLD-MCNC: 13.5 G/DL
IGA SER QL IEP: 151 MG/DL
IGG SER QL IEP: 877 MG/DL
IGM SER QL IEP: 258 MG/DL
IMM GRANULOCYTES NFR BLD AUTO: 0.3 %
INTERPRETATION SERPL IEP-IMP: NORMAL
KAPPA LC CSF-MCNC: 5.34 MG/DL
KAPPA LC SERPL-MCNC: 5.55 MG/DL
LDH SERPL-CCNC: 151 U/L
LYMPHOCYTES # BLD AUTO: 1.25 K/UL
LYMPHOCYTES NFR BLD AUTO: 14.3 %
M PROTEIN SPEC IFE-MCNC: NORMAL
MAN DIFF?: NORMAL
MCHC RBC-ENTMCNC: 27.7 PG
MCHC RBC-ENTMCNC: 31.9 GM/DL
MCV RBC AUTO: 86.9 FL
MONOCYTES # BLD AUTO: 0.45 K/UL
MONOCYTES NFR BLD AUTO: 5.1 %
NEUTROPHILS # BLD AUTO: 6.62 K/UL
NEUTROPHILS NFR BLD AUTO: 75.9 %
NPP NORMAL POOLED PLASMA: 34 SECS
PLATELET # BLD AUTO: 139 K/UL
POTASSIUM SERPL-SCNC: 4.9 MMOL/L
PROT SERPL-MCNC: 6.6 G/DL
RBC # BLD: 4.87 M/UL
RBC # FLD: 13 %
SCREEN DRVVT: 32.6 SEC
SILICA CLOTTING TIME INTERPRETATION: NORMAL
SILICA CLOTTING TIME S/C: 1.08 RATIO
SODIUM SERPL-SCNC: 142 MMOL/L
VIT B12 SERPL-MCNC: 641 PG/ML
VWF AG PPP IA-ACNC: 455 %
VWF:RCO ACT/NOR PPP PL AGG: 365 %
WBC # FLD AUTO: 8.74 K/UL

## 2022-05-07 NOTE — HISTORY OF PRESENT ILLNESS
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 He was recently in the hospital, with a MSSA infection and was found to have lupus anticoagulants... He was referred here for hematology follow-up... [de-identified] : April 27, 2022 returns for follow-up.... Further history patient was diagnosed with hemophilia A at age 42... Recently at [facility] with recurrent MSSA infections...

## 2022-05-07 NOTE — ASSESSMENT
[FreeTextEntry1] : Discussed with patient and his daughter who is a PA, his condition...\par \par Repeat blood work done to rule out von Willebrand disease, patient has normal von Willebrand factor level a factor VIII level of 20 to 30%... Repeat lupus anticoagulants done... And patient has presents of IgM anticardiolipin antibodies and beta-2 glycoproteins.\par \par Results communicated to patient and family via patient portal.\par \par Follow-up here in 4 to 6 months or sooner if needed.

## 2022-05-07 NOTE — CONSULT LETTER
[Dear  ___] : Dear  [unfilled], [Consult Letter:] : I had the pleasure of evaluating your patient, [unfilled]. [Please see my note below.] : Please see my note below. [Consult Closing:] : Thank you very much for allowing me to participate in the care of this patient.  If you have any questions, please do not hesitate to contact me. [Sincerely,] : Sincerely, [FreeTextEntry3] : Sindhu Fischer MD\par

## 2022-05-08 ENCOUNTER — TRANSCRIPTION ENCOUNTER (OUTPATIENT)
Age: 77
End: 2022-05-08

## 2022-05-12 LAB — VWF MULTIMERS PPP IA-ACNC: NORMAL

## 2022-06-22 NOTE — PHYSICAL EXAM
[de-identified] : General: patient is well developed, well nourished, in no acute \par distress, alert and oriented x 3. \par \par Mood and affect: normal\par \par Respiratory: no respiratory distress noted\par \par Skin: no scars over spine, skin intact, no erythema, increased warmth\par \par Alignment:The spine is well compensated in the coronal and sagittal plane. \par \par Gait: The patient is able to toe walk and heel walk with mild difficulty.\par \par Palpation: no tenderness to palpation spine or paraspinal region\par \par Neurologic Exam:\par Motor: Manual Muscle testing in the lower extremities is 5 out of 5 in all muscle groups. There is no evidence of muscular atrophy in the lower extremities. Sensory: Sensation to light touch is grossly intact in the lower extremities\par \par Hip Exam: No pain with internal or external rotation of hips bilaterally\par \par Special tests: Straight leg raise test negative.  Cross straight leg test negative. \par \par  [de-identified] : XR 11/10/21: L5/S1 erosive endplate changes and sclerosis, no change from previous imaging Render In Strict Bullet Format?: No Detail Level: Zone Samples Given: - Cetaphil lotion\\n- Cetaphil healing ointment\\n- Cetaphil sensitive skin cleanser\\n- recommend Cetaphil cream

## 2022-06-29 ENCOUNTER — TRANSCRIPTION ENCOUNTER (OUTPATIENT)
Age: 77
End: 2022-06-29

## 2022-08-04 NOTE — PROGRESS NOTE ADULT - PROBLEM SELECTOR PLAN 10
F: None  E: Lokelma for K >5.5;  N: Renal diet  DVT: None  GI: none Cantharidin Pregnancy And Lactation Text: The use of this medication during pregnancy or lactation is not recommended as there is insufficient data.

## 2022-08-09 ENCOUNTER — TRANSCRIPTION ENCOUNTER (OUTPATIENT)
Age: 77
End: 2022-08-09

## 2022-08-11 LAB
ALBUMIN SERPL ELPH-MCNC: 4.3 G/DL
ALP BLD-CCNC: 207 U/L
ALT SERPL-CCNC: 10 U/L
ANION GAP SERPL CALC-SCNC: 14 MMOL/L
AST SERPL-CCNC: 9 U/L
BASOPHILS # BLD AUTO: 0.03 K/UL
BASOPHILS NFR BLD AUTO: 0.3 %
BILIRUB SERPL-MCNC: 0.2 MG/DL
BUN SERPL-MCNC: 43 MG/DL
CALCIUM SERPL-MCNC: 10.5 MG/DL
CHLORIDE SERPL-SCNC: 106 MMOL/L
CO2 SERPL-SCNC: 17 MMOL/L
CREAT SERPL-MCNC: 2.47 MG/DL
CRP SERPL-MCNC: 5 MG/L
EGFR: 27 ML/MIN/1.73M2
EOSINOPHIL # BLD AUTO: 0.35 K/UL
EOSINOPHIL NFR BLD AUTO: 3.4 %
ERYTHROCYTE [SEDIMENTATION RATE] IN BLOOD BY WESTERGREN METHOD: 26 MM/HR
GLUCOSE SERPL-MCNC: 210 MG/DL
HCT VFR BLD CALC: 39.4 %
HGB BLD-MCNC: 12.9 G/DL
IMM GRANULOCYTES NFR BLD AUTO: 0.5 %
LYMPHOCYTES # BLD AUTO: 1.42 K/UL
LYMPHOCYTES NFR BLD AUTO: 14 %
MAN DIFF?: NORMAL
MCHC RBC-ENTMCNC: 28.4 PG
MCHC RBC-ENTMCNC: 32.7 GM/DL
MCV RBC AUTO: 86.8 FL
MONOCYTES # BLD AUTO: 0.5 K/UL
MONOCYTES NFR BLD AUTO: 4.9 %
NEUTROPHILS # BLD AUTO: 7.8 K/UL
NEUTROPHILS NFR BLD AUTO: 76.9 %
PLATELET # BLD AUTO: 166 K/UL
POTASSIUM SERPL-SCNC: 5 MMOL/L
PROT SERPL-MCNC: 6.6 G/DL
RBC # BLD: 4.54 M/UL
RBC # FLD: 14.9 %
SODIUM SERPL-SCNC: 137 MMOL/L
WBC # FLD AUTO: 10.15 K/UL

## 2022-08-12 ENCOUNTER — APPOINTMENT (OUTPATIENT)
Dept: INFECTIOUS DISEASE | Facility: CLINIC | Age: 77
End: 2022-08-12

## 2022-08-12 VITALS
SYSTOLIC BLOOD PRESSURE: 130 MMHG | TEMPERATURE: 98.1 F | WEIGHT: 112 LBS | BODY MASS INDEX: 16.97 KG/M2 | HEART RATE: 90 BPM | HEIGHT: 68 IN | OXYGEN SATURATION: 100 % | DIASTOLIC BLOOD PRESSURE: 75 MMHG

## 2022-08-12 DIAGNOSIS — I33.0 ACUTE AND SUBACUTE INFECTIVE ENDOCARDITIS: ICD-10-CM

## 2022-08-12 DIAGNOSIS — B95.61 BACTEREMIA: ICD-10-CM

## 2022-08-12 DIAGNOSIS — M00.051: ICD-10-CM

## 2022-08-12 DIAGNOSIS — B95.61 ACUTE AND SUBACUTE INFECTIVE ENDOCARDITIS: ICD-10-CM

## 2022-08-12 DIAGNOSIS — Z79.2 LONG TERM (CURRENT) USE OF ANTIBIOTICS: ICD-10-CM

## 2022-08-12 DIAGNOSIS — R78.81 BACTEREMIA: ICD-10-CM

## 2022-08-12 DIAGNOSIS — M46.26 OSTEOMYELITIS OF VERTEBRA, LUMBAR REGION: ICD-10-CM

## 2022-08-12 PROCEDURE — 99214 OFFICE O/P EST MOD 30 MIN: CPT

## 2022-08-12 RX ORDER — CLOBETASOL PROPIONATE 0.5 MG/ML
0.05 SOLUTION TOPICAL
Qty: 50 | Refills: 0 | Status: ACTIVE | COMMUNITY
Start: 2022-06-27

## 2022-08-12 RX ORDER — CLINDAMYCIN PHOSPHATE 10 MG/ML
1 LOTION TOPICAL
Qty: 60 | Refills: 0 | Status: ACTIVE | COMMUNITY
Start: 2022-06-30

## 2022-08-12 RX ORDER — KETOCONAZOLE 20 MG/G
2 CREAM TOPICAL
Qty: 60 | Refills: 0 | Status: ACTIVE | COMMUNITY
Start: 2022-08-10

## 2022-08-12 RX ORDER — GENTAMICIN SULFATE 1 MG/G
0.1 CREAM TOPICAL
Qty: 30 | Refills: 0 | Status: ACTIVE | COMMUNITY
Start: 2022-08-08

## 2022-08-12 RX ORDER — MOMETASONE FUROATE 1 MG/G
0.1 OINTMENT TOPICAL
Qty: 45 | Refills: 0 | Status: ACTIVE | COMMUNITY
Start: 2022-08-08

## 2022-08-12 RX ORDER — CLOTRIMAZOLE 10 MG/G
1 CREAM TOPICAL
Qty: 30 | Refills: 0 | Status: ACTIVE | COMMUNITY
Start: 2022-06-30

## 2022-08-12 NOTE — PHYSICAL EXAM
[General Appearance - Alert] : alert [General Appearance - In No Acute Distress] : in no acute distress [Sclera] : the sclera and conjunctiva were normal [Neck Appearance] : the appearance of the neck was normal [] : no respiratory distress [Heart Rate And Rhythm] : heart rate was normal and rhythm regular [Heart Sounds] : normal S1 and S2 [Bowel Sounds] : normal bowel sounds [Abdomen Soft] : soft [Abdomen Tenderness] : non-tender [No Focal Deficits] : no focal deficits [FreeTextEntry1] : ambulating normally

## 2022-08-12 NOTE — DATA REVIEWED
[FreeTextEntry1] : 8/10/22\par WBC 10.5\par ESR 26 stable\par CRP 5 stable \par Cr 2.47\par Alk phos 207 downtrending\par \par \par 4/13/22 Gallium Scan\par PROCEDURE DATE:  04/13/2022\par INTERPRETATION:  INFLAMMATION IMAGING - WHOLE BODY\par Findings: There is been resolution of the activity within the right hip and in the lower lumbar spine compared to prior gallium scans 1/20/2022 and 9/1/2022. There is no focal areas of abnormal gallium uptake to suggest sites of infection. There is physiologic uptake within the nasopharynx, liver, spleen, bowel and within the bone marrow.\par Impression: Since prior gallium scan 1/20/2022, resolution of the activity in the right hip in the lower lumbar spine. No new areas of abnormal uptake to suggest site of infection.\par \par \par 4/6/22 lab notable for \par ESR 45\par CRP 48\par \par 1/23 R hip capsule culture #2: MSSA (pan sensitive)\par 1/23 R hip capsule culture #1: MSSA (pan sensitive)\par 1/23 R hip synovial fluid Cx: MSSA (pan sensitive)\par \par 1/20/22  Gallium scan\par There is gallium uptake in the right hip joint with associated \par hypodensity in the right hip joint suspicious for infection.\par The activity at the L5-S1 level may be due to resolving inflammatory \par changes.\par \par CT lumbar spine w/o con 11/11\par There is persistent cortical irregularity with erosive changes involving the endplates at the L5/S1 level with surrounding bony sclerosis with osteomyelitis. There remains pre and paravertebral soft tissue component as well as a ventral epidural soft tissue component which appears to have slightly improved. These findings would be better evaluated with a contrast study or MRI.\par \par NM gallium 9/1\par Impression:\par 1. No significant uptake within the mitral valve region.\par 2. Uptake within the lower lumbar spine likely corresponding to area of abnormality on prior CT lumbar spine 8/26/2021 at L5-S1 which is suggestive of discitis osteomyelitis.\par 3. Prominent uptake within the posterior mediastinum appears to correspond to mediastinal and right hilar lymph nodes which are nonspecific and may reflect infectious or inflammatory etiology.\par \par CT lumbar spine 8/26\par IMPRESSION:\par 1.  Since 2/21/2021, interval worsening severe height loss L5-S1, with new erosive endplate changes and prevertebral fat stranding. 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 Recommend correlation with ESR/CRP and consider further evaluation with MRI lumbar with and without contrast.\par 2.  Dilated stool-filled rectum, consistent fecal impaction.\par \par 9/7 GUANAKO\par CONCLUSIONS:\par  1. Limited GUANAKO performed for reassessment of mitral valve and intervalvular fibrosa.\par  2. Mitral valve is mildly thickened. There is prolapse and area of focal thickening of the anterior mitral leaflet. There is mild to moderate mitral regurgitation (2 jets are noted).\par  3. There is thickening around the aortic root and intervalvular fibrosa of unclear etiology; may reflect nonspecific thickening vs early abscess formation given the clinical scenario.\par  4. Tricuspid leaflets are mildly thickened. Pulmonic valve is not well visualized.\par  5. Aortic leaflets are mildly thickened. Trace to mild aortic insufficiency.\par  6. Moderate to severe plaque noted in the descending aorta and aortic arch.\par  7. Compared to the previous GUANAKO performed on 8/30/2021, visually the mitral valve and intervalvular fibrosa appear similar. On comparable views, mitral regurgitation may be slightly more pronounced but still mild to moderate in severity.\par \par GUANAKO 8/30\par CONCLUSIONS:\par  1. Technically difficult study.\par  2. Normal left and right ventricular size and systolic function.\par  3. No LA/RA/LISA/RAA thrombus seen.\par  4. No pericardial effusion.\par  5. There is mitral valve prolapse of the anterior leaflet.There is an irregularly shaped linear echodensity measuring 0.9 cm x 0.5 cm on the atrial north of the anterior leaflet of the mitral valve, highly suspicious for a vegetation. There is mild to moderate, eccentric, posteriorly directed mitral regurgitation.\par  6. The aortic valve is tricuspid and mildly calcified. There is trace aortic regurgitation.\par  7. There is thickening of the intervalvular fibrosa with systolic expansion - cannot rule out abscess formation.\par  8. There is severe non-mobile plaque seen in the visualized portion of the descending aorta. There is severe non-mobile plaque seen in the visualized portion of the aortic arch.\par  9. Compared to the previous GUANAKO performed on 2/26/2021, the mitral valve vegetation is new. The aortic root (intervalvular fibrosa) thickening is more prominent.\par 10. Suggest a repeat GUANAKO in 7-10 days to re-evaluate the mitral valve and intervalvular fibrosa, if clinically indicated.\par 11. Above findings were discussed with Dr. Hidalgo on 8/30/21\par \par TTE 8/26\par CONCLUSIONS:\par  1. Normal left and right ventricular size and systolic function.\par  2. The mitral valve is mildly thickened. There is mild bileaflet valve prolapse (anterior > posterior). There is trace mitral regurgitation.\par  3. Trace aortic regurgitation.\par  4. No evidence of pulmonary hypertension.\par  5. No pericardial effusion.\par

## 2022-08-12 NOTE — ASSESSMENT
[FreeTextEntry1] : 76M h/o CKD (off HD), Hemophilia A, DRESS Syndrome, MSSA NVE of MV (0.9x0.5cm) c/b spinal OM s/p IV daptomycin (9/1-10/12) with worsening LBP s/p daptomycin --> linezolid (11/11-12/22/21) s/p IV cefazolin for R hip septic arthritis (1/22- 3/7/22) s/p cefadroxil --> doxy suppression (3/8/22-4/14/22) p/w management of recurrent MSSA infection.\par \par He is doing very well off abx and continues to improve clinically.  No further work-up indicated at this time.  Advised to continue decolonizatpin for 6 more months since he had MSSA growth from groin culture.  Discussed that skin is not a sterile area and bacterial growth doesn't necessarily means infection and may represents colonization, and advised to limit the usage of topical antibiotics.   Patient to see a dermatologist for groin care.   For Cr elevation, patient will see renal. \par \par - monitor off abx\par - decolonization protocol for 6 more months \par --- Hibiclens 4% body wash daily x 5 days now followed by every 1-5th calender month\par --- Mupirocin oint bid x 5 days now followed by every 1-5th calender month\par --- Cleaning high touch surface (rollator handles, door nobs, counter top etc) with disinfectant wipes frequently\par - groin rash management per dermatologist, patient to see a derm \par - f/u renal Dr Mayer \par - RTC prn \par \par

## 2022-08-12 NOTE — HISTORY OF PRESENT ILLNESS
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 HD cath removed. GUANAKO showed mitral valve prolapse and 0.9 x 0.5cm vegetation. Gallium scan 9/1 negative. CT lumbar spine showed discitis/OM at L5-S1 level. he was seen by CT surg Dr. Herring since GUANAKO finding also showed thickening of the intervalvular fibrosa with systolic expansion, cannot r/o abscess. Dr. Herring didn't think think it was abscess, and repeat GUANAKO obtained a week later, which showed similar finding. He was discharged home with 6 weeks of daptomycin (on 10/12).  After discharge, he completed 6 weeks of IV daptomycin and he had resolution of back pain.  However in the past few weeks, he started to have recurrent LBP which was worsening.  Also found to have new leukocytosis and uptrending CRP and ESR, c/f incompletely treated spinal OM.  He was never able to get MRI spine (due to cochlear implant) and cannot give CT lumbar spine w/ IV cont to r/o epidural abscess.  He was instructed to go to ED for PICC placement for restarting IV abx therapy and stayed at [facility] from 11/11-11/13.  He reported improvement of back pain after initiation of IV daptomycin.  Since discharge his LBP completely resolved and he finished another 6 weeks of IV abx (11/11-12/22), initially dapto --> switched to linezolid for the last 10 days given clogged PICC.  \par \par Patient was off abx since 12/23 and initially did well. Came to see me on 1/7 and was doing well.  Then he developed R hip pain with limping and uptrending CRP and ESR.  Gallium scan was obtained, which showed infection at R hip c/f septic arthritis, and improved activity at lumbar area.  He was sent to the ED for work-up of R hip infection.  He was admitted from 1/21-1/27.  He was seen by Dr. Nguyen and R hip was tapped, fluid analysis c/w septic arthritis.  He was taken to the OR for washout on 1/23 and OR culture grew MSSA.  He was switched to IV cefazolin which he tolerated well. He was discharged with IV cefazolin x 6 weeks course and plan to switch to PO cefadroxil suppressive therapy given recurrence of infection. \par \par After discharge, he finished IV cefazolin 6 weeks course on 3/7 as scheduled and was switched to cefadroxil suppression on 3/8 to prevent recurrent MSSA infection.  During last visit on 3/29, he was found to have transaminitis so cefadroxil was switched to doxycycline.  \par \par Since then he started to have uptrending CRP and ESR as well as lack of appetite.  He underwent Gallium scan on 4/13 which was negative for infection.  Due to lack of appetite and negative gallium scan, doxy was stopped yesterday.\par \par Last seen by me on 4/15.  At that time he was slowly improving.  Today he came in with his daughter and wife.  Patient is doing significantly better, ambulating without any assisting device.  He drove to [place] himself and spent a few months there, eats more than before and was able to gain 15 lb and came back.  He developed erythematous rash at groin area and was seen by dermatologist.  Skin culture grew MSSA and PsA.  He was treated with topical antibiotics (gentamicin, clindamycin) as well as antifungal (clotrimazole) and cortizone cream with significant improvement.  He continues to do decolonization therapy every month, and he says Hibiclens wash burns his groin area.  \par \par

## 2022-10-13 NOTE — PROGRESS NOTE ADULT - SUBJECTIVE AND OBJECTIVE BOX
Vaccine status unknown INTERVAL EVENTS: Patient doing well. No complaints.     PAST MEDICAL & SURGICAL HISTORY:  History of BPH    Gout    Hemophilia A    HTN (hypertension)    Uses cochlear implant        MEDICATIONS  (STANDING):  ceFAZolin   IVPB 1000 milliGRAM(s) IV Intermittent every 24 hours  doxazosin 4 milliGRAM(s) Oral daily  furosemide   Injectable 80 milliGRAM(s) IV Push every 12 hours  midodrine. 10 milliGRAM(s) Oral every 8 hours  pantoprazole    Tablet 40 milliGRAM(s) Oral before breakfast  polyethylene glycol 3350 17 Gram(s) Oral daily  PPD  5 Tuberculin Unit(s) Injectable 5 Unit(s) IntraDermal once  predniSONE   Tablet 60 milliGRAM(s) Oral daily  senna 2 Tablet(s) Oral at bedtime  sevelamer carbonate 800 milliGRAM(s) Oral three times a day with meals    MEDICATIONS  (PRN):      Vital Signs Last 24 Hrs  T(C): 36.2 (26 Feb 2021 09:31), Max: 37.1 (25 Feb 2021 13:30)  T(F): 97.2 (26 Feb 2021 09:31), Max: 98.8 (25 Feb 2021 13:30)  HR: 104 (26 Feb 2021 08:00) (86 - 106)  BP: 139/58 (26 Feb 2021 08:00) (108/57 - 139/58)  BP(mean): 83 (26 Feb 2021 08:00) (77 - 95)  RR: 19 (26 Feb 2021 08:00) (18 - 20)  SpO2: 96% (26 Feb 2021 08:00) (91% - 97%)     PHYSICAL EXAM:  GEN: Awake, alert. NAD.   HEENT: NCAT, PERRL, EOMI. Mucosa moist. No JVD.  RESP: CTA b/l  CV: RRR. Normal S1/S2. No m/r/g.  ABD: Soft. NT/ND. BS+  EXT: Warm. Diffuse anasarca. Mildly improved from yesterday.   NEURO: AAOx3. No focal deficits.       LABS:                        9.8    12.41 )-----------( 57       ( 26 Feb 2021 06:38 )             31.0     02-26    138  |  98  |  60<H>  ----------------------------<  127<H>  4.3   |  30  |  3.40<H>    Ca    8.7      26 Feb 2021 06:38  Phos  5.2     02-26  Mg     2.3     02-26    TPro  5.2<L>  /  Alb  2.4<L>  /  TBili  0.7  /  DBili  x   /  AST  19  /  ALT  17  /  AlkPhos  288<H>  02-26        PT/INR - ( 26 Feb 2021 06:38 )   PT: 15.5 sec;   INR: 1.31          PTT - ( 26 Feb 2021 06:38 )  PTT:33.7 sec    I&O's Summary    25 Feb 2021 07:01  -  26 Feb 2021 07:00  --------------------------------------------------------  IN: 884 mL / OUT: 1750 mL / NET: -866 mL      BNP  RADIOLOGY & ADDITIONAL STUDIES:    TELEMETRY:    EKG:

## 2022-11-20 NOTE — DATA REVIEWED
[FreeTextEntry1] : 12/7 lab notable for\par WBC 10.05\par Cr 2.54\par ESR 50 stable\par CRP 4\par \par CT lumbar spine w/o con 11/11\par There is persistent cortical irregularity with erosive changes involving the endplates at the L5/S1 level with surrounding bony sclerosis with osteomyelitis. There remains pre and paravertebral soft tissue component as well as a ventral epidural soft tissue component which appears to have slightly improved. These findings would be better evaluated with a contrast study or MRI.\par \par NM gallium 9/1\par Impression:\par 1. No significant uptake within the mitral valve region.\par 2. Uptake within the lower lumbar spine likely corresponding to area of abnormality on prior CT lumbar spine 8/26/2021 at L5-S1 which is suggestive of discitis osteomyelitis.\par 3. Prominent uptake within the posterior mediastinum appears to correspond to mediastinal and right hilar lymph nodes which are nonspecific and may reflect infectious or inflammatory etiology.\par \par CT lumbar spine 8/26\par IMPRESSION:\par 1.  Since 2/21/2021, interval worsening severe height loss L5-S1, with new erosive endplate changes and prevertebral fat stranding. 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 Recommend correlation with ESR/CRP and consider further evaluation with MRI lumbar with and without contrast.\par 2.  Dilated stool-filled rectum, consistent fecal impaction.\par \par 9/7 GUANAKO\par CONCLUSIONS:\par  1. Limited GUANAKO performed for reassessment of mitral valve and intervalvular fibrosa.\par  2. Mitral valve is mildly thickened. There is prolapse and area of focal thickening of the anterior mitral leaflet. There is mild to moderate mitral regurgitation (2 jets are noted).\par  3. There is thickening around the aortic root and intervalvular fibrosa of unclear etiology; may reflect nonspecific thickening vs early abscess formation given the clinical scenario.\par  4. Tricuspid leaflets are mildly thickened. Pulmonic valve is not well visualized.\par  5. Aortic leaflets are mildly thickened. Trace to mild aortic insufficiency.\par  6. Moderate to severe plaque noted in the descending aorta and aortic arch.\par  7. Compared to the previous GUANAKO performed on 8/30/2021, visually the mitral valve and intervalvular fibrosa appear similar. On comparable views, mitral regurgitation may be slightly more pronounced but still mild to moderate in severity.\par \par GUANAKO 8/30\par CONCLUSIONS:\par  1. Technically difficult study.\par  2. Normal left and right ventricular size and systolic function.\par  3. No LA/RA/LISA/RAA thrombus seen.\par  4. No pericardial effusion.\par  5. There is mitral valve prolapse of the anterior leaflet.There is an irregularly shaped linear echodensity measuring 0.9 cm x 0.5 cm on the atrial north of the anterior leaflet of the mitral valve, highly suspicious for a vegetation. There is mild to moderate, eccentric, posteriorly directed mitral regurgitation.\par  6. The aortic valve is tricuspid and mildly calcified. There is trace aortic regurgitation.\par  7. There is thickening of the intervalvular fibrosa with systolic expansion - cannot rule out abscess formation.\par  8. There is severe non-mobile plaque seen in the visualized portion of the descending aorta. There is severe non-mobile plaque seen in the visualized portion of the aortic arch.\par  9. Compared to the previous GUANAKO performed on 2/26/2021, the mitral valve vegetation is new. The aortic root (intervalvular fibrosa) thickening is more prominent.\par 10. Suggest a repeat GUANAKO in 7-10 days to re-evaluate the mitral valve and intervalvular fibrosa, if clinically indicated.\par 11. Above findings were discussed with Dr. Hidalgo on 8/30/21\par \par TTE 8/26\par CONCLUSIONS:\par  1. Normal left and right ventricular size and systolic function.\par  2. The mitral valve is mildly thickened. There is mild bileaflet valve prolapse (anterior > posterior). There is trace mitral regurgitation.\par  3. Trace aortic regurgitation.\par  4. No evidence of pulmonary hypertension.\par  5. No pericardial effusion.\par 
Patient feels much better.  Shortness of breath resolved.  Chest x-ray and NAD.  On reexam lungs CTA.  No indication for antibiotic use.  Symptoms from the flu.  Will discharge with Rx for medication for symptom relief.  Will need follow-up with PMD in 2 to 3 days. Pt is well appearing walking with steady gait, stable for discharge and follow up without fail with medical doctor. I had a detailed discussion with the patient and/or guardian regarding the historical points, exam findings, and any diagnostic results supporting the discharge diagnosis. Pt educated on care and need for follow up. Strict return instructions and red flag signs and symptoms discussed with patient. Questions answered. Pt shows understanding of discharge information and agrees to follow.

## 2023-01-10 NOTE — DISCHARGE NOTE PROVIDER - NSDCQMSTAIRS_GEN_ALL_CORE
[facility] PALLIATIVE CARE SPECIALTY CONSULT NOTE  Patient: Maria T Elizabeth Date: 1/10/2023   : 1947 Attending:Dr. Anton Monteiro   MRN: [id number]       Reason for Visit/Chief Complaint:  Consult requested by  Dr. Julienne Fierro to assist with goal clarification and pain and symptom management in context of cancer.    Previous palliative care encounters: Dr. Edith Naqvi; Becka Mccoy NP during hospitalization -2022      RECOMMENDATIONS  Symptoms/GoC/Advance Care Planning/Code Status  Symptoms:   ***  ***    GoC:   ***    Advance Care Planning   POA-HC document on file, is not activated. Agents are John Paul Elizabeth (brother), Myrtle Elizabeth (sister-in-law), and Kevin Araceli (nephew).    Follow-up:  Will follow up in coordination with oncology appointments. Provided contact information and encouraged them to call at any time for assistance with symptom management and/or need for support.       History of Present Illness:  75 year old female with history of colorectal cancer s/p neoadjuvant chemo, LAR, and now with progression to peritoneal carcinomatosis, recurrent SBO (frequent hospitalizations r/t complications) s/p venting G tube.    Phuong was diagnosed with colon cancer in 2020. Treated with chemo, course complicated by intolerance, weakness, and dizziness as well as colovesical fistula. Underwent a low anterior resection, laparoscopic splenic flexure mobilization, flexible sigmoidoscopy, cystectomy with repair complex post chemotherapy previous surgery, intramuscular transverse abdominis block, omental pedical flap, abdominal wall debridement with removal of prosthetic mesh with Dr. Rubio and Dr. Friend on 2021. Continued treatment with chemotherapy into late . Has had frequent hospitalizations in mid-late  r/t SBO, abdominal pain, subsequent diarrhea. S/p venting G tube placement. She is known to palliative care from her hospitalizations. Presents for f/u with Dr. Monteiro today re: potential  resumption of chemotherapy. Palliative Care following for symptom management and support re: goals of care.       Allergies, Medications, PMH, PSH, SH, FH  Reviewed in [facility], for this consultation    OBJECTIVE  Visit Vitals  LMP 10/25/2001     Wt Readings from Last 4 Encounters:   12/29/22 122.6 kg (270 lb 2.8 oz)   12/21/22 122.4 kg (269 lb 13.5 oz)   12/16/22 127.7 kg (281 lb 8.4 oz)   11/28/22 122.5 kg (270 lb)       Exam  GEN: ***, NAD   MS: A&O x ***, *** insight into current status  EYES: no icterus or conjunctivitis  HENT:  *** dentition, tongue and lips are moist  NECK: trachea midline  PUL: ***respirations non-labored  ABD: non-distended, non-tender***  EXTs: *** edema, *** atrophy, *** weakness  SKIN: Warm and dry  NEURO: no obvious focal deficits, able to move all 4 extremities, follows commands      LABS:  Albumin (g/dL)   Date Value   12/15/2022 3.0 (L)   10/31/2022 2.4 (L)   10/24/2022 2.3 (L)   10/17/2022 2.3 (L)     Creatinine (mg/dL)   Date Value   12/21/2022 1.00 (H)   12/18/2022 0.80   12/17/2022 0.85   12/16/2022 0.79     HCT (%)   Date Value   12/18/2022 30.8 (L)   12/17/2022 28.9 (L)   12/16/2022 30.1 (L)   12/15/2022 31.5 (L)   05/20/2016 42.9     HGB (g/dL)   Date Value   12/18/2022 9.4 (L)   12/17/2022 8.7 (L)   12/16/2022 9.2 (L)   12/15/2022 9.6 (L)   05/20/2016 14.2     PLT (K/mcL)   Date Value   12/18/2022 358   12/17/2022 359   12/16/2022 358   12/15/2022 389   05/20/2016 322     WBC (K/mcL)   Date Value   12/18/2022 9.5   12/17/2022 9.2   12/16/2022 8.9   12/15/2022 12.6 (H)   05/20/2016 8.8       Imaging, Procedures  Reviewed in EPIC and notable for:  CT ABDOMEN PELVIS W CONTRAST  12/15/2022  IMPRESSION:  1.  Small bowel obstruction with transition point in the region of the  proximal to mid ileum.  2.  Findings of metastatic disease with peritoneal carcinomatosis. Interval  mild decrease in size of retroperitoneal lymphadenopathy.  3.  Unchanged bilateral renal cysts.  4.  Unchanged  left adrenal adenoma.  5.  Hepatic steatosis.    COMPREHENSIVE ASSESSMENT    MEDICAL  Pertinent Diagnoses: ***; ***  Pertinent Symptoms: ***, ***  Co-morbidities:  ***; ***  Functional status at baseline: { FUNCTIONAL STATUS:939466}    SOCIAL HISTORY, SPIRITUAL, CULTURAL  Family and support system: , lived with her mother for >30 years, caregiver, has a brother and ELISHA also supportive  Current living situation: home with mom (99y/o)  Education/occupation/hobbies: College graduate. Complete certificate for Billing and Coding. Worked at [employer] as , retired 2016.  Spiritual concerns/values: Religion denise  Substances/habits (tobacco, alcohol, drugs):  reports that she quit smoking about 52 years ago. Her smoking use included cigarettes. She has a 0.75 pack-year smoking history. She has never used smokeless tobacco. She reports no current alcohol use. She reports that she does not use drugs.  CARE TRANSITIONS  Continuity Providers: Samir Payne MD (PCP); Dr. Monteiro  Current home care services: established with [facility] for SN, PT, OT    ETHICAL, LEGAL  Available on EPIC  [facility]: Listed #1 John Paul Charles (Relation: brother), Myrtle Charles Sister in law, Kevin Charles Nephew   Code Status preference, per document: FULL CODE, unless prognosis poor  Does current code status align with advance directive? Yes  Other notable aspects of the DPOA document: NA      DISCUSSION  Met with Phuong following visit with Dr. Monteiro.         Symptoms:  -Pain: ***  -SOB: ***  -Fatigue: ***  -Appetite: ***  -Nausea: ***  -Bowels: ***  -Depression: ***  -Anxiety: ***  -Insomnia:***        -------------------------------------------------------------------------------------------------------------------------------------------------------------------------------------------    Total time for today's encounter was *** minutes with >50% spent in counseling and coordination of care.     NEIDA Larson  [facility]  Palliative Care  Pager: [phone]    Monday-Friday between the hours of 8am and 4pm, please page directly.  After hours or weekends, please call the answering service [phone]       Yes

## 2023-01-19 NOTE — PROCEDURE NOTE - NSPERFORMEDBY_GEN_A_CORE
Price (Do Not Change): 0.00
Instructions: This plan will send the code FBSE to the PM system.  DO NOT or CHANGE the price.
Detail Level: Generalized
Attending

## 2023-01-31 NOTE — ED PROVIDER NOTE - HIV OFFER
Previously Declined (within the last year) Ketoconazole Counseling:   Patient counseled regarding improving absorption with orange juice.  Adverse effects include but are not limited to breast enlargement, headache, diarrhea, nausea, upset stomach, liver function test abnormalities, taste disturbance, and stomach pain.  There is a rare possibility of liver failure that can occur when taking ketoconazole. The patient understands that monitoring of LFTs may be required, especially at baseline. The patient verbalized understanding of the proper use and possible adverse effects of ketoconazole.  All of the patient's questions and concerns were addressed.

## 2023-03-31 ENCOUNTER — APPOINTMENT (OUTPATIENT)
Dept: NEPHROLOGY | Facility: CLINIC | Age: 78
End: 2023-03-31
Payer: MEDICARE

## 2023-03-31 VITALS — SYSTOLIC BLOOD PRESSURE: 142 MMHG | DIASTOLIC BLOOD PRESSURE: 82 MMHG | HEART RATE: 86 BPM

## 2023-03-31 DIAGNOSIS — L29.9 PRURITUS, UNSPECIFIED: ICD-10-CM

## 2023-03-31 PROCEDURE — 99214 OFFICE O/P EST MOD 30 MIN: CPT

## 2023-03-31 RX ORDER — UBIDECARENONE 200 MG
CAPSULE ORAL
Refills: 0 | Status: ACTIVE | COMMUNITY

## 2023-03-31 NOTE — HISTORY OF PRESENT ILLNESS
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 Very slow but steady improvement of his urine output and creatinine and was able to come off dialysis late October.\par Admitted to [facility] with bacteremia at that time. - possible endocarditis and completed a 6 week course of daptomycin. Also with probable osteomyelitis back/spine.  Saw ortho and ID was it was decided that he should complete an additional 6 weeks of daptomycin as WBC, ESR and CRP all were rising.- PICC in place and receiving home IVAbx\par \par on no prescription medications at this time\par uses OTC folate and Vit B12 oral.\par has diffuse pruritus- saw derm may go for light therapy- I concur.\par No rashes\par appetite good but  has burn tongue at times- limits into of beef. And at times other proteins\par Denies flank pain, dysuria, hematuria or frothy urine \par No SOB, CP\par

## 2023-03-31 NOTE — ASSESSMENT
[FreeTextEntry1] : all lab data was reviewed with patient in detail from 3/18/2023\par 77 yo man recovered from dialysis dependent AIN, residual CKD 3, elevated BP, pruritus, glucose intolerance\par -CKD 3- creat stable and acceptable\par electrolytes okay\par -elevated BP- instructed to retart home BP measurements- forwar to office\par may need to restart antihypertensive medications\par -glucose intolerance- limit CHO intake; now that he is walking better should try to actively walk daily\par -pruritus- try sarna cream at least 3 times daily. tepid showers (or baths- no soaking)\par agree with derm suggestion of light treatments\par track potassium levels- needs exchanges of K rich foods, alternating them by day so as not to raise K \par daughter aden keep me apprised \par \par f/u 6 months\par \par

## 2023-03-31 NOTE — REASON FOR VISIT
[Follow-Up] : a follow-up visit [Spouse] : spouse [Family Member] : family member [Other: _____] : [unfilled] [FreeTextEntry1] : CKD, RU

## 2023-05-10 NOTE — DISCHARGE NOTE NURSING/CASE MANAGEMENT/SOCIAL WORK - HAS THE PATIENT RECEIVED THE INFLUENZA VACCINE THIS SEASON?
Ob/Gyn Office Visit:  5/10/2023    MA/RN notes reviewed.    Chief Complaint   Patient presents with   • Consultation     Uterine polyp       History of Present Illness: Nnamdi Palacios is a 48 year old  who presents to the office for discussion of polyp.     Her history is pertinent for: asthma, back pain.     Patient's last menstrual period was 2023 (exact date), lasted to . Varying flow, light spotting to heavy, not soaking a pad per hr. US on 23 showed a suspected endometrial polyp.     Irregular cycles, previous periods:   - None in    - Feb 15-21    Irregular since age 18. Was told she had PCOS. Had ovulation induction for her pregnancies.     LLQ pain for past few days, shoots down leg. Never had it before.    Peeing frequently. No dysuria, hematuria.   No issues with BM.   No dyspareunia.   No recent injuries, low back pain on R, herniated disk.   No vaginal discharge.     Review of Systems: all systems reviewed and are negative other than pertinent positives and negatives in HPI    OB History    Para Term  AB Living   4 3 3 0 1 0   SAB IAB Ectopic Molar Multiple Live Births   1 0 0 0 0 3       ALLERGIES:   Allergen Reactions   • Watermelon   (Food Or Med) THROAT SWELLING       Outpatient Medications Marked as Taking for the 5/10/23 encounter (Office Visit) with Mario Gonzalez MD   Medication Sig Dispense Refill   • omeprazole (PriLOSEC) 40 MG capsule TAKE 1 CAPSULE BY MOUTH DAILY. FOR GERD 90 capsule 1   • montelukast (SINGULAIR) 10 MG tablet Take 1 tablet by mouth nightly. 90 tablet 3   • gabapentin (NEURONTIN) 100 MG capsule Take 100 mg by mouth in the morning and 100 mg in the evening.     • albuterol (ProAir HFA) 108 (90 Base) MCG/ACT inhaler Inhale 2 puffs into the lungs every 4 hours as needed for Shortness of Breath or Wheezing. 1 each 3   • fluticasone propionate (Flovent HFA) 110 MCG/ACT inhaler Inhale 1 puff into the lungs in the morning and 1 puff in the  evening. Disp chamber.  Instruct. 12 g 12   • Probiotic Product (PROBIOTIC DAILY PO)      • fexofenadine (ALLEGRA) 60 MG tablet Take 60 mg by mouth daily.     • acyclovir (ZOVIRAX) 800 MG tablet Take 1 tablet by mouth 3 times daily. 30 tablet 6   • budesonide (RHINOCORT AQUA) 32 MCG/ACT nasal spray Spray 2 sprays in each nostril 2 times daily. 1 Bottle 11   • MULTIPLE VITAMIN PO Take by mouth daily.     • CALCIUM CARBONATE PO Take by mouth daily.         Past Medical History:   Diagnosis Date   • Allergic rhinitis, cause unspecified    • Blood type A-    • COVID-19 01/04/2021   • Diarrhea    • Disc displacement, lumbar 07/10    large herniation L5-S1   • Nausea alone    • OM (otitis media), acute suppurative, with perforation of eardrum 2015    right   • Other abnormal product of conception 11/04    blighted ovum   • Unspecified asthma(493.90)         Past Surgical History:   Procedure Laterality Date   • Colonoscopy diagnostic     • D and c  11/10/2004    D&C  after blighted ovum   • Esophagogastroduodenoscopy transoral flex diag     • Inj transforam epidural lumbar/sacral  08/11/2010    Left L5-S1, S1-2 transforaminal epidural steroid injection   • Inj transforam epidural lumbar/sacral  11/24/2014    right S1 transforaminal ARELY Dr Uriarte   • Inj transforam epidural lumbar/sacral  11/03/2014    right L5-S1 transforaminal ARE[facility]   • Laminotomy  12/2/3/14    right l5-S1 laminotomy and diskectomy Dr Fournier   • Past surgical history      wisdom teeth  removed       Family History   Problem Relation Age of Onset   • Cancer Father         multiple myeloma   • Multiple myeloma Father    • Heart Sister         irregular heart  rate   • Heart Paternal Uncle         tachycardia   • Cancer, Colon Paternal Uncle    • Cancer, Endometrial Neg Hx    • Cancer, Ovarian Neg Hx    • Cancer, Breast Neg Hx        Social History     Socioeconomic History   • Marital status: /Civil Union     Spouse name: Not on file   • Number  of children: 2   • Years of education: Not on file   • Highest education level: Not on file   Occupational History     Employer: [employer]   Tobacco Use   • Smoking status: Never   • Smokeless tobacco: Never   Vaping Use   • Vaping Use: never used   Substance and Sexual Activity   • Alcohol use: No   • Drug use: No   • Sexual activity: Yes     Partners: Male   Other Topics Concern   • Not on file   Social History Narrative   • Not on file     Social Determinants of Health     Financial Resource Strain: Not on file   Food Insecurity: Not on file   Transportation Needs: Not on file   Physical Activity: Not on file   Stress: Not on file   Social Connections: Not on file   Intimate Partner Violence: Not on file       Objective:  Visit Vitals  /70 (BP Location: LUE - Left upper extremity, Patient Position: Sitting, Cuff Size: Regular)   Pulse 80   Ht 5' 9\" (1.753 m)   Wt 107.8 kg (237 lb 11.2 oz)   LMP 2023 (Exact Date) Comment: irregular cycles   SpO2 99%   BMI 35.10 kg/m²     Vitals reviewed.    Physical Exam:   Constitutional: well-developed, well-nourished.  Respiratory: no increased work of breathing, clear to ausculation bilaterally  Cardiovascular: regular rate and rhythm   Skin: examined areas intact, warm, dry, no rashes  Neurologic: alert and oriented, no cognitive impairment  Ext: no lower extremity edema     Labs:  WBC (K/mcL)   Date Value   2023 5.6     RBC (mil/mcL)   Date Value   2023 4.51     HCT (%)   Date Value   2023 39.2     HGB (g/dL)   Date Value   2023 13.4     PLT (K/mcL)   Date Value   2023 278       TSH (mcUnits/mL)   Date Value   2023 1.870       Imagin23 Pelvic US  The uterus is visualized and measures 12.8 x 8.4 x 5.6 cm. The endometrium  measures 0.8 cm. Suspected 8 mm endometrial polyp. Recommend short-term  interval follow-up exam for surveillance.      The right ovary is visualized and measures 3.3 x 3.5 x 1.9 cm. No  focal  masses or enlarged cysts are visualized in the right ovary. Normal color  Doppler arterial blood flow is visualized in the right ovary.     The left ovary is visualized and measures 4.2 x 4.3 x 2.2 cm. No focal  masses or enlarged cysts are visualized in the left ovary. Normal color  Doppler arterial blood flow is visualized in the left ovary.              IMPRESSION:     1. Suspected 8 mm endometrial polyp. Recommend short-term interval  follow-up exam for surveillance.       Assessment/Plan: Nnamdi Palacios is a 48 year old  who presents for:  Abnormal uterine bleeding  (primary encounter diagnosis)  Endometrial polyp  PCOS (polycystic ovarian syndrome)  Pelvic pain in female  Frequent urination     - Discussed how endometrial polyps can lead to AUB. Typically they are benign, but rarely can have associated cancer. Given her bothersome bleeding, recommend removal with hysteroscopy in order to treat AUB and get pathologic diagnosis.  - Also suspect AUB-O from PCOS and possible perimenopause  - We discussed the hysteroscopy procedure. Discussed risks of bleeding and infection. Discussed risks of fluid overload with associated risks of strain on heart and lungs, and need to terminate procedure early, in which case a staged procedure may be indicated. Reviewed risks of uterine perforation, with potential for diagnostic laparoscopy and/or laparotomy with repair to injury to uterus, bladder, bowel, or other structures. She provides verbal informed consent.   - Also discussed Mirena IUD insertion as an option to reduce risk of polyp recurrence and to further treat AUB. Could also consider endometrial ablation. She will consider Mirena and let us know prior to procedure.   - Naproxen for cramping.   - Pap up to date and WNL    Orders Placed This Encounter   • Urinalysis With Microscopy & Culture If Indicated   • POCT Urine pregnancy   • naproxen (ANAPROX) 550 MG tablet       Return to clinic for postop,  sooner PRN.     I spent a total of 25 minutes on the day of the visit.  This includes pre-charting, chart review and documenting.    Mario Gonzalez MD  Obstetrics and Gynecology    Surgery Scheduling Requirements Include:     PROCEDURE:  Hysteroscopy W/Endometrial Biopsy and/or Polypectomy, with D&C [83824]  Possible Mirena IUD insertion     DIAGNOSIS:   1. Abnormal uterine bleeding    2. Endometrial polyp    3. PCOS (polycystic ovarian syndrome)    4. Pelvic pain in female    5. Frequent urination        Anesthesia: MAC  Duration of Procedure:  45 minutes   Location of Procedure:  [facility]  Preferred Scheduling Dates/Times:  Usual, ideally in May or June  Type of Admit:  outpatient    To Assist:  None   Co-Surgeon: No      Special Equipment:   Truclear  Rep needed  Possible Mirena IUD     Pre-op Testing:  None    Preps:  Bowel Prep:  none  Skin Prep:  CHG Wipes: (For Your Well-Being) Preparing the Skin Before Surgery with CHG Cloth           STAAR patient?: Yes  Should STAAR patient get the clear carb drink?:  Yes        Patient Conditions:   Latex Allergy:  No  Implanted Pacemaker/Defibrillator:  No  Diabetic?: No    Appointments:  Pre-op H&P:  Done at today's visit  Post-op Visit at:  2 weeks    Consent:  To be signed at hospital    Additional Comments: none     Does additional form need to be signed for government insurance for sterilization or hysterectomy? No    Surgery has to be scheduled after 30 days up to 6 months. Otherwise consent is not good.               no...

## 2023-09-28 ENCOUNTER — APPOINTMENT (OUTPATIENT)
Dept: NEPHROLOGY | Facility: CLINIC | Age: 78
End: 2023-09-28
Payer: MEDICARE

## 2023-09-28 VITALS
BODY MASS INDEX: 19.31 KG/M2 | DIASTOLIC BLOOD PRESSURE: 84 MMHG | SYSTOLIC BLOOD PRESSURE: 142 MMHG | WEIGHT: 127 LBS | HEART RATE: 84 BPM

## 2023-09-28 DIAGNOSIS — E87.5 HYPERKALEMIA: ICD-10-CM

## 2023-09-28 DIAGNOSIS — R73.02 IMPAIRED GLUCOSE TOLERANCE (ORAL): ICD-10-CM

## 2023-09-28 DIAGNOSIS — E87.20 ACIDOSIS, UNSPECIFIED: ICD-10-CM

## 2023-09-28 DIAGNOSIS — N18.32 CHRONIC KIDNEY DISEASE, STAGE 3B: ICD-10-CM

## 2023-09-28 PROCEDURE — 99215 OFFICE O/P EST HI 40 MIN: CPT

## 2023-09-28 NOTE — PHYSICAL THERAPY INITIAL EVALUATION ADULT - WORK/LEISURE ACTIVITY, REHAB EVAL
Group Topic: BH Activity Group    Date: 9/27/2023  Start Time: 2007  End Time: 2045  Facilitators: Saida Fernández HUC    Focus: Identifying what is within and out of your control.   Number in attendance: 13    Method: Group   Attendance: Present  Participation: Minimal  Patient Response: Appropriate feedback, Attentive and Interactive  Mood: Anxious  Affect: Type: Anxious   Range: Restricted   Congruency: Congruent   Stability: Stable  Behavior/Socialization: Appropriate to group, Cooperative and Engaged  Thought Process: Focused and Goal-directed  Task Performance: Follows directions  Patient Evaluation: Independent - full participation      
independent
independent

## 2023-10-13 LAB
ALBUMIN SERPL ELPH-MCNC: 4.5 G/DL
ANION GAP SERPL CALC-SCNC: 14 MMOL/L
BUN SERPL-MCNC: 52 MG/DL
CALCIUM SERPL-MCNC: 10.3 MG/DL
CHLORIDE SERPL-SCNC: 107 MMOL/L
CO2 SERPL-SCNC: 19 MMOL/L
CREAT SERPL-MCNC: 2.53 MG/DL
EGFR: 26 ML/MIN/1.73M2
GLUCOSE SERPL-MCNC: 107 MG/DL
PHOSPHATE SERPL-MCNC: 3.6 MG/DL
POTASSIUM SERPL-SCNC: 5.4 MMOL/L
SODIUM SERPL-SCNC: 141 MMOL/L

## 2023-12-21 ENCOUNTER — LABORATORY RESULT (OUTPATIENT)
Age: 78
End: 2023-12-21

## 2023-12-21 ENCOUNTER — APPOINTMENT (OUTPATIENT)
Dept: UROLOGY | Facility: CLINIC | Age: 78
End: 2023-12-21
Payer: MEDICARE

## 2023-12-21 ENCOUNTER — NON-APPOINTMENT (OUTPATIENT)
Age: 78
End: 2023-12-21

## 2023-12-21 VITALS
TEMPERATURE: 98.3 F | HEART RATE: 84 BPM | SYSTOLIC BLOOD PRESSURE: 180 MMHG | OXYGEN SATURATION: 100 % | DIASTOLIC BLOOD PRESSURE: 79 MMHG

## 2023-12-21 DIAGNOSIS — R31.0 GROSS HEMATURIA: ICD-10-CM

## 2023-12-21 DIAGNOSIS — Z87.898 PERSONAL HISTORY OF OTHER SPECIFIED CONDITIONS: ICD-10-CM

## 2023-12-21 PROCEDURE — 99203 OFFICE O/P NEW LOW 30 MIN: CPT

## 2023-12-21 NOTE — HISTORY OF PRESENT ILLNESS
[FreeTextEntry1] : CC: gross hematuria, dysuria, urinary frequency and urgency   HPI: 77 y/o male with HTN, hemophilia A, Gout, CKD, hx of pericarditis and osteomyelitis. He had gross hematuria about a week ago accompanied with urinary frequency, urgency and dysuria. No fevers and chills.  Urine culture done in urgent care 12/16/23 was negative but UA showed 60 RBCs, WBC, few bacteria, 2+ leuks. Patient was given cipro bid x 3 days, and symptoms resolved with medication.   PVR 12/21/23 3cc  PMH/PSH:  Gout CKD, had previously been on dialysis 2020 Hx of pericarditis Hx of osteomyelitis  s/p lap cholecystectomy cochlear implant surgery

## 2023-12-21 NOTE — PHYSICAL EXAM
[General Appearance - Well Developed] : well developed [Normal Appearance] : normal appearance [General Appearance - In No Acute Distress] : no acute distress [] : no respiratory distress [Abdomen Tenderness] : non-tender [Urethral Meatus] : meatus normal [Penis Abnormality] : normal circumcised penis [Testes Tenderness] : no tenderness of the testes [Testes Mass (___cm)] : there were no testicular masses [Prostate Tenderness] : the prostate was not tender [No Prostate Nodules] : no prostate nodules [Prostate Size ___ gm] : prostate size [unfilled] gm [Normal Station and Gait] : the gait and station were normal for the patient's age [No Focal Deficits] : no focal deficits [Oriented To Time, Place, And Person] : oriented to person, place, and time [Affect] : the affect was normal [Mood] : the mood was normal [de-identified] : umbilical hernia

## 2023-12-21 NOTE — ASSESSMENT
[FreeTextEntry1] : Diagnosis: gross hematuria, urinary frequency and urgency  Plan:  UA, culture and cytology today CT non contrast Cystoscopy Patient instructed to follow up with Dr. Mayer for elevated BP  Liu Emery MD, FACS, FRCS  of Urology [facility] Director of Laparoscopic and Robotic Surgery [facility] Director of Urology, [facility] Professor of Urology (Office) [phone] (Cell) [phone] [email or web address]

## 2024-01-04 ENCOUNTER — APPOINTMENT (OUTPATIENT)
Dept: UROLOGY | Facility: CLINIC | Age: 79
End: 2024-01-04

## 2024-01-11 NOTE — PROGRESS NOTE ADULT - PROBLEM SELECTOR PLAN 5
f/u Nutrition recs Na 132 -->135  s/p  one bolus of 500cc  of NaCl now   serum osm, urine osm, and urine Na ordered  Will not correct more than 6-8 meq in the next 24 hours  Continue to trend BMP daily

## 2024-01-14 ENCOUNTER — OUTPATIENT (OUTPATIENT)
Dept: OUTPATIENT SERVICES | Facility: HOSPITAL | Age: 79
LOS: 1 days | End: 2024-01-14
Payer: MEDICARE

## 2024-01-14 ENCOUNTER — APPOINTMENT (OUTPATIENT)
Dept: CT IMAGING | Facility: HOSPITAL | Age: 79
End: 2024-01-14

## 2024-01-14 DIAGNOSIS — Z96.21 COCHLEAR IMPLANT STATUS: Chronic | ICD-10-CM

## 2024-01-14 DIAGNOSIS — Z90.49 ACQUIRED ABSENCE OF OTHER SPECIFIED PARTS OF DIGESTIVE TRACT: Chronic | ICD-10-CM

## 2024-01-14 PROCEDURE — 74176 CT ABD & PELVIS W/O CONTRAST: CPT | Mod: 26

## 2024-01-14 PROCEDURE — 74176 CT ABD & PELVIS W/O CONTRAST: CPT

## 2024-01-18 ENCOUNTER — APPOINTMENT (OUTPATIENT)
Dept: NEPHROLOGY | Facility: CLINIC | Age: 79
End: 2024-01-18
Payer: MEDICARE

## 2024-01-18 ENCOUNTER — APPOINTMENT (OUTPATIENT)
Dept: UROLOGY | Facility: CLINIC | Age: 79
End: 2024-01-18
Payer: MEDICARE

## 2024-01-18 VITALS
WEIGHT: 130 LBS | DIASTOLIC BLOOD PRESSURE: 100 MMHG | HEIGHT: 68 IN | TEMPERATURE: 98.2 F | HEART RATE: 82 BPM | BODY MASS INDEX: 19.7 KG/M2 | SYSTOLIC BLOOD PRESSURE: 178 MMHG

## 2024-01-18 VITALS — HEART RATE: 82 BPM | DIASTOLIC BLOOD PRESSURE: 81 MMHG | SYSTOLIC BLOOD PRESSURE: 156 MMHG

## 2024-01-18 DIAGNOSIS — R03.0 ELEVATED BLOOD-PRESSURE READING, W/OUT DIAGNOSIS OF HYPERTENSION: ICD-10-CM

## 2024-01-18 PROCEDURE — 99213 OFFICE O/P EST LOW 20 MIN: CPT

## 2024-01-18 PROCEDURE — 93784 AMBL BP MNTR W/SOFTWARE: CPT

## 2024-01-18 NOTE — ASSESSMENT
[FreeTextEntry1] : Diagnosis:  Gross hematuria  Patient refusing cystoscopy, AMA.  Discussed all risks of occult pathology and risks if not detected  He and daughter understand and do not consent to procedure/cysto  Liu Emery MD, FACS, FRCS  of Urology [facility] Director of Laparoscopic and Robotic Surgery [facility] Director of Urology, [facility] Professor of Urology   (Office) [phone] (Cell)  [phone] [email or web address]

## 2024-01-18 NOTE — HISTORY OF PRESENT ILLNESS
[FreeTextEntry1] : CC: gross hematuria  HPI: 78 y/o male with recent gross hematuria There was also urinary frequency, urgency and dysuria  No fevers and chills. Urine culture done in urgent care 12/16/23 was negative.  Patient had noncontrast CT- personally reviewed and independently interpreted; no actionable pathology UA no significant RBC  Cytology negative  Asymptomatic, no hematuria since last visit   PVR 12/21/23 3cc  PMH/PSH: Gout CKD, had previously been on dialysis 2020 Hx of pericarditis Hx of osteomyelitis s/p lap cholecystectomy cochlear implant surgery

## 2024-01-18 NOTE — PROCEDURE
[FreeTextEntry1] : Has been off BP meds, but has been elevated recently when occasionally checking at home.  Very elevated at  office this morning.   Placed ABPM cuff on left upper arm. Gave instructions for device function and proper fit. approx sleep period: 9/10p - 7/8a current BP meds: none

## 2024-02-08 NOTE — PROGRESS NOTE ADULT - ASSESSMENT
PROGRESS NOTE    Subjective   Chief complaint: Albert Schwartz is a 75 y.o. male who is an acute skilled patient being seen and evaluated for weakness    HPI:  HPI  Therapy is working with patient on bilateral upper extremity strengthening and fine and gross motor skills.  Patient was seen and examined at bedside, appears to be in no apparent distress.  Denies chest pain or shortness of breath.  Denies nausea or vomiting.  Denies fever or chills.  Nursing staff voicing no new concerns at this time.    Objective   Vital signs: 190/70, 98.2, 64, 14, blood sugar 115, 95%    Physical Exam  Constitutional:       General: He is not in acute distress.  Eyes:      Extraocular Movements: Extraocular movements intact.   Cardiovascular:      Rate and Rhythm: Normal rate and regular rhythm.   Pulmonary:      Effort: Pulmonary effort is normal.      Breath sounds: Normal breath sounds.   Abdominal:      General: Bowel sounds are normal.      Palpations: Abdomen is soft.   Musculoskeletal:      Cervical back: Neck supple.      Right lower leg: No edema.      Left lower leg: No edema.   Neurological:      Mental Status: He is alert.      Motor: No weakness.   Psychiatric:         Mood and Affect: Mood normal.         Behavior: Behavior is cooperative.         Assessment/Plan   Problem List Items Addressed This Visit       Hypertension, essential     Monitor blood pressure  Amlodipine  Metoprolol         Degenerative lumbar disc     Therapy  Lidocaine patch  As needed meds         Deep vein thrombosis (DVT) of right lower extremity (CMS/HCC)     Xarelto  Monitor          Chronic diastolic congestive heart failure (CMS/HCC)     Stable  Monitor weight         Weakness - Primary     Continue to work toward established goals in therapy          Medications, treatments, and labs reviewed  Continue medications and treatments as listed in EMR      Scribe Attestation  PHILIPPE, Rich Chaney   attest that this documentation has been  prepared under the direction and in the presence of Caleb Mcdaniel MD    Provider Attestation - Scribe documentation  All medical record entries made by the Scribe were at my direction and personally dictated by me. I have reviewed the chart and agree that the record accurately reflects my personal performance of the history, physical exam, discussion and plan.   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 Found to have discitis and OM. Ucx from 8/25 grow Enterobacter Colacae, asymptomatic. Bcx from 8/25 grows MSSA 3/4 bottles. TTE 8/27 with normal. BCx 8/29 NGTD, today worsening WBC and renal function. For GUANAKO today    Incomplete***    ID Team 1 will continue to follow. Please see below attending attestation for finalized recommendations.    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 Found to have discitis and OM. Ucx from 8/25 grow Enterobacter Colacae, asymptomatic. Bcx from 8/25 grows MSSA 3/4 bottles. TTE 8/27 with normal. BCx 8/29 NGTD, today worsening WBC and renal function. For GUANAKO today    - Continue Nafcillin 2 grams IV q4hrs  - f/u GUANAKO  - BCx 8/27 NGTD, if stays negative can place new HD cath (if indicated) on 9/1    ID Team 1 will continue to follow. Please see below attending attestation for finalized recommendations.    Marbin Penny MD PGY2 Internal Medicine  Infectious Disease Consult Service, Team 1

## 2024-04-18 RX ORDER — AMLODIPINE BESYLATE 5 MG/1
5 TABLET ORAL
Qty: 90 | Refills: 3 | Status: ACTIVE | COMMUNITY
Start: 2024-01-26 | End: 1900-01-01

## 2024-07-17 NOTE — PROGRESS NOTE ADULT - PROBLEM SELECTOR PLAN 5
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 Cr 2.86 on admission. Patient underwent dialysis yesterday, Cr this morning 2.17. Electrolytes, Ca, Phos, Hb at goal, euvolemic on exam. Heme consulted as patient's catheter needs to be removed given +Bcx MSSA bacteremia. Renal following.  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 Cr 2.86 on admission. Patient underwent dialysis yesterday, Cr this morning 2.17. Electrolytes, Ca, Phos, Hb at goal, euvolemic on exam. Heme consulted as patient's catheter needs to be removed given +Bcx MSSA bacteremia. Renal following.  - ESRD on HD  @[facility] usual Rx 3h 0.5L   - per Heme recs, TXA and factor VIII given prior to HD cath removal and procedure without complications  - trending daily Creatinine, BUN, electrolyte abnormalities, HCO3    As per nephro 9/1/21,  - No acute indication for HD  - replete K to 3.5, 20 KCL PO   - possibly uremic w/anorexia nausea and strange taste though no complaints today   - f/u with vascular regarding access Patient with history of Hemophilia A, Hem/Onc consulted:  - s/p 1250 units of recombinant factor VIII on 8/27 and Tranexamic acid 10 mg/kg once on 8/27 prior to permacath removal w/o bleeding complications  - 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  - pending inhibitor assay ([place] units).   - send Factor VIII inhibitor testing

## 2024-09-25 NOTE — PHYSICAL THERAPY INITIAL EVALUATION ADULT - CRITERIA FOR SKILLED THERAPEUTIC INTERVENTIONS
[Dysphagia] : dysphagia [Negative] : Allergic/Immunologic [Loss of Hearing] : no loss of hearing [Nosebleeds] : no nosebleeds [Hoarseness] : no hoarseness [Odynophagia] : no odynophagia [Mucosal Pain] : no mucosal pain [FreeTextEntry3] : Cataracts  [FreeTextEntry4] : Occasional difficulty swallowing impairments found/rehab potential/therapy frequency/anticipated discharge recommendation

## 2024-10-25 LAB
ALBUMIN SERPL ELPH-MCNC: 4.2 G/DL
ANION GAP SERPL CALC-SCNC: 12 MMOL/L
BUN SERPL-MCNC: 47 MG/DL
CALCIUM SERPL-MCNC: 10 MG/DL
CHLORIDE SERPL-SCNC: 105 MMOL/L
CHOLEST SERPL-MCNC: 185 MG/DL
CO2 SERPL-SCNC: 23 MMOL/L
CREAT SERPL-MCNC: 2.37 MG/DL
EGFR: 28 ML/MIN/1.73M2
GLUCOSE SERPL-MCNC: 107 MG/DL
HCT VFR BLD CALC: 39.7 %
HDLC SERPL-MCNC: 39 MG/DL
HGB BLD-MCNC: 12.7 G/DL
IRON SATN MFR SERPL: 30 %
IRON SERPL-MCNC: 75 UG/DL
LDLC SERPL CALC-MCNC: 123 MG/DL
MAGNESIUM SERPL-MCNC: 2.1 MG/DL
MCHC RBC-ENTMCNC: 28 PG
MCHC RBC-ENTMCNC: 32 GM/DL
MCV RBC AUTO: 87.6 FL
NONHDLC SERPL-MCNC: 146 MG/DL
PHOSPHATE SERPL-MCNC: 3 MG/DL
PLATELET # BLD AUTO: 187 K/UL
POTASSIUM SERPL-SCNC: 5.5 MMOL/L
RBC # BLD: 4.53 M/UL
RBC # FLD: 14 %
SODIUM SERPL-SCNC: 140 MMOL/L
TIBC SERPL-MCNC: 251 UG/DL
TRIGL SERPL-MCNC: 125 MG/DL
UIBC SERPL-MCNC: 176 UG/DL
URATE SERPL-MCNC: 7.3 MG/DL
WBC # FLD AUTO: 10.28 K/UL

## 2024-10-26 LAB
25(OH)D3 SERPL-MCNC: 45.6 NG/ML
APPEARANCE: CLEAR
BACTERIA: NEGATIVE /HPF
BILIRUBIN URINE: NEGATIVE
BLOOD URINE: NEGATIVE
CALCIUM SERPL-MCNC: 10 MG/DL
CAST: 0 /LPF
COLOR: YELLOW
CREAT SPEC-SCNC: 111 MG/DL
EPITHELIAL CELLS: 0 /HPF
ESTIMATED AVERAGE GLUCOSE: 114 MG/DL
GLUCOSE QUALITATIVE U: NEGATIVE MG/DL
HBA1C MFR BLD HPLC: 5.6 %
KETONES URINE: NEGATIVE MG/DL
LEUKOCYTE ESTERASE URINE: NEGATIVE
MICROALBUMIN 24H UR DL<=1MG/L-MCNC: 1.7 MG/DL
MICROALBUMIN/CREAT 24H UR-RTO: 15 MG/G
MICROSCOPIC-UA: NORMAL
NITRITE URINE: NEGATIVE
PARATHYROID HORMONE INTACT: 84 PG/ML
PH URINE: 6
PROTEIN URINE: 30 MG/DL
RED BLOOD CELLS URINE: 0 /HPF
SPECIFIC GRAVITY URINE: 1.02
UROBILINOGEN URINE: 1 MG/DL
WHITE BLOOD CELLS URINE: 0 /HPF

## 2024-10-29 ENCOUNTER — APPOINTMENT (OUTPATIENT)
Dept: NEPHROLOGY | Facility: CLINIC | Age: 79
End: 2024-10-29
Payer: MEDICARE

## 2024-10-29 ENCOUNTER — NON-APPOINTMENT (OUTPATIENT)
Age: 79
End: 2024-10-29

## 2024-10-29 VITALS — SYSTOLIC BLOOD PRESSURE: 136 MMHG | HEART RATE: 78 BPM | DIASTOLIC BLOOD PRESSURE: 78 MMHG

## 2024-10-29 DIAGNOSIS — R73.02 IMPAIRED GLUCOSE TOLERANCE (ORAL): ICD-10-CM

## 2024-10-29 DIAGNOSIS — N18.32 CHRONIC KIDNEY DISEASE, STAGE 3B: ICD-10-CM

## 2024-10-29 DIAGNOSIS — R03.0 ELEVATED BLOOD-PRESSURE READING, W/OUT DIAGNOSIS OF HYPERTENSION: ICD-10-CM

## 2024-10-29 DIAGNOSIS — E87.5 HYPERKALEMIA: ICD-10-CM

## 2024-10-29 PROCEDURE — G2211 COMPLEX E/M VISIT ADD ON: CPT

## 2024-10-29 PROCEDURE — 99214 OFFICE O/P EST MOD 30 MIN: CPT

## 2024-12-10 NOTE — OCCUPATIONAL THERAPY INITIAL EVALUATION ADULT - ANTICIPATED DISCHARGE DISPOSITION, OT EVAL
Contacted Odalis Jett and left voicemail regarding Dietitian follow up. Left call back number and will follow up as appropriate.       Carlita Hu RDN, LD  [phone]      
Home with home OT- reinstated HHA and assistance from wife

## 2024-12-13 NOTE — PROGRESS NOTE ADULT - SUBJECTIVE AND OBJECTIVE BOX
Statement Selected Interval Events:     Pt examined at bedside, has no acute complaints  no fever/chills, feels well.      Vital Signs Last 24 Hrs  T(C): 36.9 (26 Jan 2022 08:55), Max: 37.2 (26 Jan 2022 04:48)  T(F): 98.4 (26 Jan 2022 08:55), Max: 98.9 (26 Jan 2022 04:48)  HR: 84 (26 Jan 2022 08:55) (83 - 101)  BP: 132/77 (26 Jan 2022 08:55) (129/75 - 144/77)  BP(mean): 83 (26 Jan 2022 04:48) (83 - 95)  RR: 17 (26 Jan 2022 08:55) (17 - 18)  SpO2: 97% (26 Jan 2022 08:55) (92% - 99%)      PHYSICAL EXAM:  Constitutional: NAD, comfortable in bed.  HEENT: NC/AT, PERRLA, EOMI, no conjunctival pallor or scleral icterus, MMM  Neck: Supple  Respiratory: CTA B/L. No w/r/r.   Cardiovascular: RRR, normal S1 and S2, no m/r/g.   Gastrointestinal: +BS, soft NTND, no guarding or rebound tenderness, no palpable masses   Extremities: wwp; no cyanosis, clubbing or edema.  R hip with dressing clean dry and intact, +drain in place with serosanguinous drainage.  Minimal tenderness to palpation, no palpable hematoma   Vascular: Pulses equal and strong throughout.   Neurological: AAOx3, no CN deficits, strength and sensation intact throughout.   Skin: No gross skin abnormalities or rashes        LABS:                        9.6    12.92 )-----------( 269      ( 26 Jan 2022 08:01 )             31.1   01-26    140  |  107  |  34<H>  ----------------------------<  102<H>  4.2   |  21<L>  |  2.02<H>    Ca    10.4      26 Jan 2022 08:03        RADIOLOGY & ADDITIONAL TESTS:    MEDICATIONS  (STANDING):  aspirin  chewable 81 milliGRAM(s) Oral two times a day  BUpivacaine liposome 1.3% Injectable (no eMAR) 20 milliLiter(s) Local Injection once  ceFAZolin   IVPB 2000 milliGRAM(s) IV Intermittent every 12 hours  chlorhexidine 2% Cloths 1 Application(s) Topical <User Schedule>  influenza  Vaccine (HIGH DOSE) 0.7 milliLiter(s) IntraMuscular once  senna 2 Tablet(s) Oral at bedtime    MEDICATIONS  (PRN):  acetaminophen     Tablet .. 650 milliGRAM(s) Oral every 6 hours PRN Mild Pain (1 - 3)  HYDROmorphone  Injectable 0.5 milliGRAM(s) IV Push every 4 hours PRN breakthrough  HYDROmorphone  Injectable 0.5 milliGRAM(s) IV Push every 15 minutes PRN breakthrough  melatonin 5 milliGRAM(s) Oral at bedtime PRN Sleep  ondansetron Injectable 4 milliGRAM(s) IV Push every 6 hours PRN Nausea and/or Vomiting  oxyCODONE    IR 10 milliGRAM(s) Oral every 3 hours PRN Severe Pain (7 - 10)  oxyCODONE    IR 5 milliGRAM(s) Oral every 3 hours PRN Moderate Pain (4 - 6)  polyethylene glycol 3350 17 Gram(s) Oral at bedtime PRN Constipation  sodium chloride 0.9% lock flush 10 milliLiter(s) IV Push every 1 hour PRN Pre/post blood products, medications, blood draw, and to maintain line patency

## 2025-01-09 NOTE — PROGRESS NOTE ADULT - SUBJECTIVE AND OBJECTIVE BOX
After obtaining consent from patient and receiving verbal/written orders from MARISELA Alejandra, I gave patient 0.75mL of testosterone cypionate injection in RIGHT upper quad. gluteus, patient tolerated well.  Medication was supplied by the patient.     DX: Low Testosterone in Male [R79.89]      Patient will follow up in 2 wks for next injection.     NDC:7125-5135-94  LOT: [id number]  EXP: 02/28/27   INTERVAL HPI/OVERNIGHT EVENTS:  Pt was seen and examined at the bedside. He was observed to be lying down comfortably.   No complaints.      VITAL SIGNS:  T(F): 98.1 (21 @ 08:44)  HR: 93 (21 @ 08:44)  BP: 114/66 (21 @ 08:44)  RR: 14 (21 @ 08:44)  SpO2: 97% (21 @ 08:26)  Wt(kg): --  I&O's Summary    26 Aug 2021 07:01  -  27 Aug 2021 07:00  --------------------------------------------------------  IN: 600 mL / OUT: 1000 mL / NET: -400 mL      PHYSICAL EXAM:  Constitutional: NAD, well-groomed, well-developed  HEENT: PERRLA, EOMI, Normal Hearing, MMM  Neck: No LAD, No JVD  Back: Normal spine flexure, No CVA tenderness  Respiratory: CTAB  Cardiovascular: S1 and S2, RRR, no M/G/R  Gastrointestinal: BS+, soft, NT/ND  Extremities: No peripheral edema  Vascular: 2+ peripheral pulses  Neurological: A/O x 3, no focal deficits  Psychiatric: Normal mood, normal affect  Musculoskeletal: 5/5 strength b/l upper and lower extremities  Skin: No rashes        MEDICATIONS  (STANDING):  nafcillin  IVPB      nafcillin  IVPB 2 Gram(s) IV Intermittent every 4 hours  polyethylene glycol 3350 17 Gram(s) Oral at bedtime  senna 2 Tablet(s) Oral at bedtime    MEDICATIONS  (PRN):  acetaminophen   Tablet .. 650 milliGRAM(s) Oral every 6 hours PRN Temp greater or equal to 38C (100.4F), Moderate Pain (4 - 6)  melatonin 3 milliGRAM(s) Oral at bedtime PRN Insomnia  ondansetron Injectable 4 milliGRAM(s) IV Push every 8 hours PRN Nausea and/or Vomiting      Allergies    allopurinol (Other)    Intolerances        LABS:  CBC Full  -  ( 27 Aug 2021 07:43 )  WBC Count : 11.72 K/uL  RBC Count : 4.28 M/uL  Hemoglobin : 11.6 g/dL  Hematocrit : 35.6 %  Platelet Count - Automated : 188 K/uL  Mean Cell Volume : 83.2 fl  Mean Cell Hemoglobin : 27.1 pg  Mean Cell Hemoglobin Concentration : 32.6 gm/dL  Auto Neutrophil # : 9.67 K/uL  Auto Lymphocyte # : 0.84 K/uL  Auto Monocyte # : 0.87 K/uL  Auto Eosinophil # : 0.19 K/uL  Auto Basophil # : 0.05 K/uL  Auto Neutrophil % : 82.5 %  Auto Lymphocyte % : 7.2 %  Auto Monocyte % : 7.4 %  Auto Eosinophil % : 1.6 %  Auto Basophil % : 0.4 %        133<L>  |  95<L>  |  25<H>  ----------------------------<  89  3.4<L>   |  27  |  2.17<H>    Ca    9.3      27 Aug 2021 07:43  Phos  3.1       Mg     2.2         TPro  5.4<L>  /  Alb  2.6<L>  /  TBili  1.9<H>  /  DBili  x   /  AST  20  /  ALT  14  /  AlkPhos  307<H>      PT/INR - ( 27 Aug 2021 07:43 )   PT: 14.5 sec;   INR: 1.22          PTT - ( 27 Aug 2021 07:43 )  PTT:44.1 sec  Urinalysis Basic - ( 25 Aug 2021 17:18 )    Color: Yellow / Appearance: Clear / S.025 / pH: x  Gluc: x / Ketone: NEGATIVE  / Bili: Small / Urobili: 2.0 E.U./dL   Blood: x / Protein: 100 mg/dL / Nitrite: NEGATIVE   Leuk Esterase: Moderate / RBC: < 5 /HPF / WBC Many /HPF   Sq Epi: x / Non Sq Epi: 0-5 /HPF / Bacteria: Many /HPF        Iron Total, Serum: 63 ug/dL ( @ 07:57)  Iron - Total Binding Capacity.: 151 ug/dL ( @ 07:57)  Ferritin, Serum: 730 ng/mL ( @ 07:57)    INR: 1.22 (21 @ 07:43)  APTT 50/50: 35.1 secs (21 @ 07:43)  Activated Partial Thromboplastin Time: 44.1 sec (21 @ 07:43)  INR: 1.27 (21 @ 14:35)  APTT 50/50: 36.0 secs (21 @ 14:35)  Activated Partial Thromboplastin Time: 46.7 sec (21 @ 14:35)  INR: 1.20 (21 @ 16:47)  Activated Partial Thromboplastin Time: 47.4 sec (21 @ 16:47)      RADIOLOGY & ADDITIONAL TESTS: Reviewed.

## 2025-04-17 ENCOUNTER — APPOINTMENT (OUTPATIENT)
Dept: NEPHROLOGY | Facility: CLINIC | Age: 80
End: 2025-04-17
Payer: MEDICARE

## 2025-04-17 VITALS — DIASTOLIC BLOOD PRESSURE: 76 MMHG | SYSTOLIC BLOOD PRESSURE: 144 MMHG | HEART RATE: 74 BPM

## 2025-04-17 DIAGNOSIS — E87.20 ACIDOSIS, UNSPECIFIED: ICD-10-CM

## 2025-04-17 DIAGNOSIS — I10 ESSENTIAL (PRIMARY) HYPERTENSION: ICD-10-CM

## 2025-04-17 DIAGNOSIS — R03.0 ELEVATED BLOOD-PRESSURE READING, W/OUT DIAGNOSIS OF HYPERTENSION: ICD-10-CM

## 2025-04-17 DIAGNOSIS — E87.5 HYPERKALEMIA: ICD-10-CM

## 2025-04-17 DIAGNOSIS — R73.02 IMPAIRED GLUCOSE TOLERANCE (ORAL): ICD-10-CM

## 2025-04-17 DIAGNOSIS — N18.32 CHRONIC KIDNEY DISEASE, STAGE 3B: ICD-10-CM

## 2025-04-17 LAB
ALBUMIN SERPL ELPH-MCNC: 4.4 G/DL
ANION GAP SERPL CALC-SCNC: 14 MMOL/L
APPEARANCE: CLEAR
BACTERIA: NEGATIVE /HPF
BILIRUBIN URINE: NEGATIVE
BLOOD URINE: NEGATIVE
BUN SERPL-MCNC: 50 MG/DL
CALCIUM SERPL-MCNC: 10.3 MG/DL
CALCIUM SERPL-MCNC: 10.3 MG/DL
CAST: 1 /LPF
CHLORIDE SERPL-SCNC: 105 MMOL/L
CO2 SERPL-SCNC: 19 MMOL/L
COLOR: YELLOW
CREAT SERPL-MCNC: 2.47 MG/DL
CREAT SPEC-SCNC: 108 MG/DL
CREAT SPEC-SCNC: 108 MG/DL
CREAT/PROT UR: 0.1 RATIO
EGFRCR SERPLBLD CKD-EPI 2021: 26 ML/MIN/1.73M2
EPITHELIAL CELLS: 0 /HPF
GLUCOSE QUALITATIVE U: NEGATIVE MG/DL
GLUCOSE SERPL-MCNC: 173 MG/DL
KETONES URINE: NEGATIVE MG/DL
LEUKOCYTE ESTERASE URINE: NEGATIVE
MICROALBUMIN 24H UR DL<=1MG/L-MCNC: 1.7 MG/DL
MICROALBUMIN/CREAT 24H UR-RTO: 16 MG/G
MICROSCOPIC-UA: NORMAL
NITRITE URINE: NEGATIVE
PARATHYROID HORMONE INTACT: 43 PG/ML
PH URINE: 5.5
PHOSPHATE SERPL-MCNC: 3.1 MG/DL
POTASSIUM SERPL-SCNC: 4.9 MMOL/L
PROT UR-MCNC: 14 MG/DL
PROTEIN URINE: NORMAL MG/DL
RED BLOOD CELLS URINE: 0 /HPF
SODIUM SERPL-SCNC: 138 MMOL/L
SPECIFIC GRAVITY URINE: 1.02
UROBILINOGEN URINE: 1 MG/DL
WHITE BLOOD CELLS URINE: 0 /HPF

## 2025-04-17 PROCEDURE — G2211 COMPLEX E/M VISIT ADD ON: CPT

## 2025-04-17 PROCEDURE — 99214 OFFICE O/P EST MOD 30 MIN: CPT

## 2025-05-02 NOTE — PROGRESS NOTE ADULT - PROBLEM SELECTOR PLAN 2
- currently no s/s of bleeding.  - ddavp before PICC placement  - monitor for bleeding
Toe injury
f/u factor VIII assay and heme recs for replacement prior to picc line placement  - currently no s/s of bleeding.
